# Patient Record
Sex: FEMALE | Race: WHITE | Employment: OTHER | URBAN - METROPOLITAN AREA
[De-identification: names, ages, dates, MRNs, and addresses within clinical notes are randomized per-mention and may not be internally consistent; named-entity substitution may affect disease eponyms.]

---

## 2017-05-11 ENCOUNTER — HOSPITAL ENCOUNTER (OUTPATIENT)
Dept: GENERAL RADIOLOGY | Age: 67
Discharge: HOME | End: 2017-05-11
Payer: MEDICARE

## 2017-05-11 ENCOUNTER — TELEPHONE (OUTPATIENT)
Dept: ORTHOPEDIC SURGERY | Age: 67
End: 2017-05-11

## 2017-05-11 ENCOUNTER — HOSPITAL ENCOUNTER (OUTPATIENT)
Age: 67
Discharge: HOME | End: 2017-05-11
Payer: MEDICARE

## 2017-05-11 DIAGNOSIS — M17.0 OSTEOARTHRITIS OF BOTH KNEES, UNSPECIFIED OSTEOARTHRITIS TYPE: ICD-10-CM

## 2017-05-11 PROCEDURE — 73562 X-RAY EXAM OF KNEE 3: CPT

## 2017-06-08 ENCOUNTER — OFFICE VISIT (OUTPATIENT)
Dept: ORTHOPEDIC SURGERY | Age: 67
End: 2017-06-08
Payer: MEDICARE

## 2017-06-08 DIAGNOSIS — Z96.653 STATUS POST BILATERAL UNICOMPARTMENTAL KNEE REPLACEMENT: ICD-10-CM

## 2017-06-08 DIAGNOSIS — M25.562 PAIN IN BOTH KNEES, UNSPECIFIED CHRONICITY: Primary | ICD-10-CM

## 2017-06-08 DIAGNOSIS — M25.561 PAIN IN BOTH KNEES, UNSPECIFIED CHRONICITY: Primary | ICD-10-CM

## 2017-06-08 DIAGNOSIS — M54.5 LOW BACK PAIN, UNSPECIFIED BACK PAIN LATERALITY, UNSPECIFIED CHRONICITY, WITH SCIATICA PRESENCE UNSPECIFIED: ICD-10-CM

## 2017-06-08 PROCEDURE — 1036F TOBACCO NON-USER: CPT | Performed by: ORTHOPAEDIC SURGERY

## 2017-06-08 PROCEDURE — 99203 OFFICE O/P NEW LOW 30 MIN: CPT | Performed by: ORTHOPAEDIC SURGERY

## 2017-06-08 PROCEDURE — 4040F PNEUMOC VAC/ADMIN/RCVD: CPT | Performed by: ORTHOPAEDIC SURGERY

## 2017-06-08 PROCEDURE — G8427 DOCREV CUR MEDS BY ELIG CLIN: HCPCS | Performed by: ORTHOPAEDIC SURGERY

## 2017-06-08 PROCEDURE — G8421 BMI NOT CALCULATED: HCPCS | Performed by: ORTHOPAEDIC SURGERY

## 2017-06-08 PROCEDURE — 1090F PRES/ABSN URINE INCON ASSESS: CPT | Performed by: ORTHOPAEDIC SURGERY

## 2017-06-08 PROCEDURE — 3014F SCREEN MAMMO DOC REV: CPT | Performed by: ORTHOPAEDIC SURGERY

## 2017-06-08 PROCEDURE — 1123F ACP DISCUSS/DSCN MKR DOCD: CPT | Performed by: ORTHOPAEDIC SURGERY

## 2017-06-08 PROCEDURE — G8400 PT W/DXA NO RESULTS DOC: HCPCS | Performed by: ORTHOPAEDIC SURGERY

## 2017-06-08 PROCEDURE — 3017F COLORECTAL CA SCREEN DOC REV: CPT | Performed by: ORTHOPAEDIC SURGERY

## 2017-06-08 RX ORDER — CARISOPRODOL 350 MG/1
350 TABLET ORAL 4 TIMES DAILY PRN
COMMUNITY
End: 2019-12-09

## 2017-06-08 RX ORDER — NITROGLYCERIN 0.4 MG/1
0.4 TABLET SUBLINGUAL EVERY 5 MIN PRN
COMMUNITY

## 2017-06-08 RX ORDER — FESOTERODINE FUMARATE 8 MG/1
TABLET, EXTENDED RELEASE ORAL
COMMUNITY
End: 2017-11-13

## 2017-06-08 RX ORDER — ALBUTEROL SULFATE 90 UG/1
2 AEROSOL, METERED RESPIRATORY (INHALATION) EVERY 6 HOURS PRN
COMMUNITY
End: 2021-02-26 | Stop reason: SDUPTHER

## 2017-06-13 ENCOUNTER — HOSPITAL ENCOUNTER (OUTPATIENT)
Dept: MRI IMAGING | Age: 67
Discharge: HOME | End: 2017-06-13
Payer: MEDICARE

## 2017-06-13 DIAGNOSIS — M54.5 LOW BACK PAIN, UNSPECIFIED BACK PAIN LATERALITY, UNSPECIFIED CHRONICITY, WITH SCIATICA PRESENCE UNSPECIFIED: ICD-10-CM

## 2017-06-13 PROCEDURE — 72148 MRI LUMBAR SPINE W/O DYE: CPT

## 2017-06-14 ENCOUNTER — TELEPHONE (OUTPATIENT)
Dept: ORTHOPEDIC SURGERY | Age: 67
End: 2017-06-14

## 2017-06-14 DIAGNOSIS — M54.16 SPINAL STENOSIS OF LUMBAR REGION WITH RADICULOPATHY: Primary | ICD-10-CM

## 2017-06-14 DIAGNOSIS — M48.061 SPINAL STENOSIS OF LUMBAR REGION WITH RADICULOPATHY: Primary | ICD-10-CM

## 2017-06-19 ENCOUNTER — OFFICE VISIT (OUTPATIENT)
Dept: ORTHOPEDIC SURGERY | Age: 67
End: 2017-06-19
Payer: MEDICARE

## 2017-06-19 DIAGNOSIS — M54.50 CHRONIC MIDLINE LOW BACK PAIN WITHOUT SCIATICA: Primary | ICD-10-CM

## 2017-06-19 DIAGNOSIS — M25.552 LEFT HIP PAIN: ICD-10-CM

## 2017-06-19 DIAGNOSIS — G89.29 CHRONIC MIDLINE LOW BACK PAIN WITHOUT SCIATICA: Primary | ICD-10-CM

## 2017-06-19 DIAGNOSIS — M16.12 ARTHRITIS OF LEFT HIP: ICD-10-CM

## 2017-06-19 PROCEDURE — G8427 DOCREV CUR MEDS BY ELIG CLIN: HCPCS | Performed by: ORTHOPAEDIC SURGERY

## 2017-06-19 PROCEDURE — 3017F COLORECTAL CA SCREEN DOC REV: CPT | Performed by: ORTHOPAEDIC SURGERY

## 2017-06-19 PROCEDURE — 4040F PNEUMOC VAC/ADMIN/RCVD: CPT | Performed by: ORTHOPAEDIC SURGERY

## 2017-06-19 PROCEDURE — G8400 PT W/DXA NO RESULTS DOC: HCPCS | Performed by: ORTHOPAEDIC SURGERY

## 2017-06-19 PROCEDURE — 1036F TOBACCO NON-USER: CPT | Performed by: ORTHOPAEDIC SURGERY

## 2017-06-19 PROCEDURE — G8421 BMI NOT CALCULATED: HCPCS | Performed by: ORTHOPAEDIC SURGERY

## 2017-06-19 PROCEDURE — 3014F SCREEN MAMMO DOC REV: CPT | Performed by: ORTHOPAEDIC SURGERY

## 2017-06-19 PROCEDURE — 99213 OFFICE O/P EST LOW 20 MIN: CPT | Performed by: ORTHOPAEDIC SURGERY

## 2017-06-19 PROCEDURE — 1123F ACP DISCUSS/DSCN MKR DOCD: CPT | Performed by: ORTHOPAEDIC SURGERY

## 2017-06-19 PROCEDURE — 1090F PRES/ABSN URINE INCON ASSESS: CPT | Performed by: ORTHOPAEDIC SURGERY

## 2017-06-21 ENCOUNTER — HOSPITAL ENCOUNTER (OUTPATIENT)
Dept: MRI IMAGING | Age: 67
Discharge: HOME | End: 2017-06-21
Payer: MEDICARE

## 2017-06-21 DIAGNOSIS — M54.50 CHRONIC MIDLINE LOW BACK PAIN WITHOUT SCIATICA: ICD-10-CM

## 2017-06-21 DIAGNOSIS — M16.12 ARTHRITIS OF LEFT HIP: ICD-10-CM

## 2017-06-21 DIAGNOSIS — G89.29 CHRONIC MIDLINE LOW BACK PAIN WITHOUT SCIATICA: ICD-10-CM

## 2017-06-21 DIAGNOSIS — M25.552 LEFT HIP PAIN: ICD-10-CM

## 2017-06-21 PROCEDURE — 73721 MRI JNT OF LWR EXTRE W/O DYE: CPT

## 2017-06-27 ENCOUNTER — HOSPITAL ENCOUNTER (OUTPATIENT)
Dept: INTERVENTIONAL RADIOLOGY/VASCULAR | Age: 67
Discharge: HOME | End: 2017-06-27
Payer: MEDICARE

## 2017-06-27 ENCOUNTER — OFFICE VISIT (OUTPATIENT)
Dept: ORTHOPEDIC SURGERY | Age: 67
End: 2017-06-27
Payer: MEDICARE

## 2017-06-27 DIAGNOSIS — G89.29 CHRONIC MIDLINE LOW BACK PAIN WITHOUT SCIATICA: Primary | ICD-10-CM

## 2017-06-27 DIAGNOSIS — M16.12 ARTHRITIS OF LEFT HIP: ICD-10-CM

## 2017-06-27 DIAGNOSIS — M25.552 LEFT HIP PAIN: ICD-10-CM

## 2017-06-27 DIAGNOSIS — M54.50 CHRONIC MIDLINE LOW BACK PAIN WITHOUT SCIATICA: ICD-10-CM

## 2017-06-27 DIAGNOSIS — M54.50 CHRONIC MIDLINE LOW BACK PAIN WITHOUT SCIATICA: Primary | ICD-10-CM

## 2017-06-27 DIAGNOSIS — G89.29 CHRONIC MIDLINE LOW BACK PAIN WITHOUT SCIATICA: ICD-10-CM

## 2017-06-27 PROCEDURE — 6360000004 HC RX CONTRAST MEDICATION: Performed by: ORTHOPAEDIC SURGERY

## 2017-06-27 PROCEDURE — 1036F TOBACCO NON-USER: CPT | Performed by: ORTHOPAEDIC SURGERY

## 2017-06-27 PROCEDURE — G8400 PT W/DXA NO RESULTS DOC: HCPCS | Performed by: ORTHOPAEDIC SURGERY

## 2017-06-27 PROCEDURE — 3017F COLORECTAL CA SCREEN DOC REV: CPT | Performed by: ORTHOPAEDIC SURGERY

## 2017-06-27 PROCEDURE — 2500000003 HC RX 250 WO HCPCS: Performed by: RADIOLOGY

## 2017-06-27 PROCEDURE — G8427 DOCREV CUR MEDS BY ELIG CLIN: HCPCS | Performed by: ORTHOPAEDIC SURGERY

## 2017-06-27 PROCEDURE — 1123F ACP DISCUSS/DSCN MKR DOCD: CPT | Performed by: ORTHOPAEDIC SURGERY

## 2017-06-27 PROCEDURE — 4040F PNEUMOC VAC/ADMIN/RCVD: CPT | Performed by: ORTHOPAEDIC SURGERY

## 2017-06-27 PROCEDURE — 2500000003 HC RX 250 WO HCPCS: Performed by: ORTHOPAEDIC SURGERY

## 2017-06-27 PROCEDURE — 99213 OFFICE O/P EST LOW 20 MIN: CPT | Performed by: ORTHOPAEDIC SURGERY

## 2017-06-27 PROCEDURE — 20610 DRAIN/INJ JOINT/BURSA W/O US: CPT | Performed by: RADIOLOGY

## 2017-06-27 PROCEDURE — 3014F SCREEN MAMMO DOC REV: CPT | Performed by: ORTHOPAEDIC SURGERY

## 2017-06-27 PROCEDURE — 77002 NEEDLE LOCALIZATION BY XRAY: CPT | Performed by: RADIOLOGY

## 2017-06-27 PROCEDURE — 1090F PRES/ABSN URINE INCON ASSESS: CPT | Performed by: ORTHOPAEDIC SURGERY

## 2017-06-27 PROCEDURE — 6360000002 HC RX W HCPCS: Performed by: ORTHOPAEDIC SURGERY

## 2017-06-27 PROCEDURE — G8421 BMI NOT CALCULATED: HCPCS | Performed by: ORTHOPAEDIC SURGERY

## 2017-06-27 RX ORDER — LIDOCAINE HYDROCHLORIDE 10 MG/ML
INJECTION, SOLUTION INFILTRATION; PERINEURAL
Status: COMPLETED | OUTPATIENT
Start: 2017-06-27 | End: 2017-06-27

## 2017-06-27 RX ORDER — BUPIVACAINE HYDROCHLORIDE 5 MG/ML
4 INJECTION, SOLUTION EPIDURAL; INTRACAUDAL ONCE
Status: COMPLETED | OUTPATIENT
Start: 2017-06-27 | End: 2017-06-27

## 2017-06-27 RX ORDER — METHYLPREDNISOLONE ACETATE 80 MG/ML
80 INJECTION, SUSPENSION INTRA-ARTICULAR; INTRALESIONAL; INTRAMUSCULAR; SOFT TISSUE ONCE
Status: COMPLETED | OUTPATIENT
Start: 2017-06-27 | End: 2017-06-27

## 2017-06-27 RX ORDER — LIDOCAINE HYDROCHLORIDE 10 MG/ML
4 INJECTION, SOLUTION EPIDURAL; INFILTRATION; INTRACAUDAL; PERINEURAL ONCE
Status: COMPLETED | OUTPATIENT
Start: 2017-06-27 | End: 2017-06-27

## 2017-06-27 RX ADMIN — METHYLPREDNISOLONE ACETATE 80 MG: 80 INJECTION, SUSPENSION INTRA-ARTICULAR; INTRALESIONAL; INTRAMUSCULAR; SOFT TISSUE at 13:01

## 2017-06-27 RX ADMIN — LIDOCAINE HYDROCHLORIDE 3 ML: 10 INJECTION, SOLUTION INFILTRATION; PERINEURAL at 12:57

## 2017-06-27 RX ADMIN — IOTHALAMATE MEGLUMINE 50 ML: 430 INJECTION INTRAVASCULAR at 13:08

## 2017-06-27 RX ADMIN — LIDOCAINE HYDROCHLORIDE 4 ML: 10 INJECTION, SOLUTION EPIDURAL; INFILTRATION; INTRACAUDAL; PERINEURAL at 13:01

## 2017-06-27 RX ADMIN — BUPIVACAINE HYDROCHLORIDE 20 MG: 5 INJECTION, SOLUTION EPIDURAL; INTRACAUDAL at 13:01

## 2017-06-27 ASSESSMENT — PAIN SCALES - GENERAL: PAINLEVEL_OUTOF10: 0

## 2017-07-25 ENCOUNTER — OFFICE VISIT (OUTPATIENT)
Dept: ORTHOPEDIC SURGERY | Age: 67
End: 2017-07-25
Payer: MEDICARE

## 2017-07-25 DIAGNOSIS — M54.50 CHRONIC MIDLINE LOW BACK PAIN WITHOUT SCIATICA: ICD-10-CM

## 2017-07-25 DIAGNOSIS — M25.552 LEFT HIP PAIN: Primary | ICD-10-CM

## 2017-07-25 DIAGNOSIS — G89.29 CHRONIC MIDLINE LOW BACK PAIN WITHOUT SCIATICA: ICD-10-CM

## 2017-07-25 DIAGNOSIS — M16.12 ARTHRITIS OF LEFT HIP: ICD-10-CM

## 2017-07-25 DIAGNOSIS — M48.061 SPINAL STENOSIS, LUMBAR REGION, WITHOUT NEUROGENIC CLAUDICATION: ICD-10-CM

## 2017-07-25 PROCEDURE — 1123F ACP DISCUSS/DSCN MKR DOCD: CPT | Performed by: ORTHOPAEDIC SURGERY

## 2017-07-25 PROCEDURE — 3014F SCREEN MAMMO DOC REV: CPT | Performed by: ORTHOPAEDIC SURGERY

## 2017-07-25 PROCEDURE — G8400 PT W/DXA NO RESULTS DOC: HCPCS | Performed by: ORTHOPAEDIC SURGERY

## 2017-07-25 PROCEDURE — 3017F COLORECTAL CA SCREEN DOC REV: CPT | Performed by: ORTHOPAEDIC SURGERY

## 2017-07-25 PROCEDURE — G8421 BMI NOT CALCULATED: HCPCS | Performed by: ORTHOPAEDIC SURGERY

## 2017-07-25 PROCEDURE — 4040F PNEUMOC VAC/ADMIN/RCVD: CPT | Performed by: ORTHOPAEDIC SURGERY

## 2017-07-25 PROCEDURE — 1036F TOBACCO NON-USER: CPT | Performed by: ORTHOPAEDIC SURGERY

## 2017-07-25 PROCEDURE — G8427 DOCREV CUR MEDS BY ELIG CLIN: HCPCS | Performed by: ORTHOPAEDIC SURGERY

## 2017-07-25 PROCEDURE — 1090F PRES/ABSN URINE INCON ASSESS: CPT | Performed by: ORTHOPAEDIC SURGERY

## 2017-07-25 PROCEDURE — 99213 OFFICE O/P EST LOW 20 MIN: CPT | Performed by: ORTHOPAEDIC SURGERY

## 2017-08-14 ENCOUNTER — HOSPITAL ENCOUNTER (OUTPATIENT)
Dept: PAIN MANAGEMENT | Age: 67
Discharge: HOME | End: 2017-08-14
Payer: MEDICARE

## 2017-08-14 VITALS
SYSTOLIC BLOOD PRESSURE: 138 MMHG | DIASTOLIC BLOOD PRESSURE: 70 MMHG | WEIGHT: 210 LBS | TEMPERATURE: 98 F | OXYGEN SATURATION: 98 % | BODY MASS INDEX: 34.99 KG/M2 | HEART RATE: 58 BPM | HEIGHT: 65 IN | RESPIRATION RATE: 20 BRPM

## 2017-08-14 DIAGNOSIS — M54.16 LUMBAR RADICULOPATHY: Primary | ICD-10-CM

## 2017-08-14 DIAGNOSIS — M47.817 LUMBOSACRAL SPONDYLOSIS WITHOUT MYELOPATHY: ICD-10-CM

## 2017-08-14 DIAGNOSIS — M46.1 SACROILIITIS (HCC): ICD-10-CM

## 2017-08-14 DIAGNOSIS — M16.12 PRIMARY OSTEOARTHRITIS OF LEFT HIP: ICD-10-CM

## 2017-08-14 DIAGNOSIS — M48.061 DEGENERATIVE LUMBAR SPINAL STENOSIS: ICD-10-CM

## 2017-08-14 DIAGNOSIS — M51.36 DDD (DEGENERATIVE DISC DISEASE), LUMBAR: ICD-10-CM

## 2017-08-14 PROCEDURE — 80307 DRUG TEST PRSMV CHEM ANLYZR: CPT

## 2017-08-14 PROCEDURE — 99203 OFFICE O/P NEW LOW 30 MIN: CPT | Performed by: PAIN MEDICINE

## 2017-08-14 PROCEDURE — 99203 OFFICE O/P NEW LOW 30 MIN: CPT

## 2017-08-14 RX ORDER — ASPIRIN 81 MG/1
TABLET, CHEWABLE ORAL
COMMUNITY
End: 2017-08-14

## 2017-08-14 RX ORDER — ATORVASTATIN CALCIUM 40 MG/1
40 TABLET, FILM COATED ORAL DAILY
COMMUNITY
Start: 2017-07-02

## 2017-08-14 ASSESSMENT — ENCOUNTER SYMPTOMS
NAUSEA: 0
VOMITING: 0
COUGH: 0
SHORTNESS OF BREATH: 1
BACK PAIN: 1
CONSTIPATION: 1
DIARRHEA: 1
HEMOPTYSIS: 0
BLURRED VISION: 0
PHOTOPHOBIA: 0
DOUBLE VISION: 0
HEARTBURN: 1

## 2017-08-14 ASSESSMENT — PAIN DESCRIPTION - ONSET: ONSET: ON-GOING

## 2017-08-14 ASSESSMENT — PAIN DESCRIPTION - PROGRESSION: CLINICAL_PROGRESSION: NOT CHANGED

## 2017-08-14 ASSESSMENT — PAIN DESCRIPTION - ORIENTATION: ORIENTATION: LEFT

## 2017-08-14 ASSESSMENT — PAIN SCALES - GENERAL: PAINLEVEL_OUTOF10: 8

## 2017-08-14 ASSESSMENT — PAIN DESCRIPTION - PAIN TYPE: TYPE: CHRONIC PAIN

## 2017-08-14 ASSESSMENT — PAIN DESCRIPTION - DESCRIPTORS: DESCRIPTORS: ACHING;CONSTANT

## 2017-08-14 ASSESSMENT — PAIN DESCRIPTION - LOCATION: LOCATION: BACK;LEG;HIP

## 2017-08-14 ASSESSMENT — PAIN DESCRIPTION - FREQUENCY: FREQUENCY: CONTINUOUS

## 2017-08-18 LAB
6-ACETYLMORPHINE, UR: NOT DETECTED
7-AMINOCLONAZEPAM, URINE: NOT DETECTED
ALPHA-OH-ALPRAZ, URINE: NOT DETECTED
ALPRAZOLAM, URINE: NOT DETECTED
AMPHETAMINES, URINE: NOT DETECTED
BARBITURATES, URINE: NOT DETECTED
BENZOYLECGONINE, UR: NOT DETECTED
BUPRENORPHINE URINE: NOT DETECTED
CARISOPRODOL, UR: NOT DETECTED
CLONAZEPAM, URINE: NOT DETECTED
CODEINE, URINE: NOT DETECTED
CREATININE URINE: 217.6 MG/DL (ref 20–400)
DIAZEPAM, URINE: NOT DETECTED
DRUGS EXPECTED, UR: NORMAL
EER HI RES INTERP UR: NORMAL
ETHYL GLUCURONIDE UR: NOT DETECTED
FENTANYL URINE: NOT DETECTED
HYDROCODONE, URINE: NOT DETECTED
HYDROMORPHONE, URINE: NOT DETECTED
LORAZEPAM, URINE: NOT DETECTED
MARIJUANA METAB, UR: NOT DETECTED
MDA, UR: NOT DETECTED
MDEA, EVE, UR: NOT DETECTED
MDMA URINE: NOT DETECTED
MEPERIDINE METAB, UR: NOT DETECTED
METHADONE, URINE: NOT DETECTED
METHAMPHETAMINE, URINE: NOT DETECTED
METHYLPHENIDATE: NOT DETECTED
MIDAZOLAM, URINE: NOT DETECTED
MORPHINE URINE: NOT DETECTED
NORBUPRENORPHINE, URINE: NOT DETECTED
NORDIAZEPAM, URINE: NOT DETECTED
NORFENTANYL, URINE: NOT DETECTED
NORHYDROCODONE, URINE: NOT DETECTED
NOROXYCODONE, URINE: NOT DETECTED
NOROXYMORPHONE, URINE: NOT DETECTED
OXAZEPAM, URINE: NOT DETECTED
OXYCODONE URINE: NOT DETECTED
OXYMORPHONE, URINE: NOT DETECTED
PAIN MANAGEMENT DRUG PANEL INTERP, URINE: NORMAL
PAIN MGT DRUG PANEL, HI RES, UR: NORMAL
PCP,URINE: NOT DETECTED
PHENTERMINE, UR: NOT DETECTED
PROPOXYPHENE, URINE: NOT DETECTED
TAPENTADOL, URINE: NOT DETECTED
TAPENTADOL-O-SULFATE, URINE: NOT DETECTED
TEMAZEPAM, URINE: PRESENT
TRAMADOL, URINE: NOT DETECTED
ZOLPIDEM, URINE: NOT DETECTED

## 2017-10-09 ENCOUNTER — HOSPITAL ENCOUNTER (OUTPATIENT)
Dept: GENERAL RADIOLOGY | Age: 67
Discharge: HOME | End: 2017-10-09
Payer: MEDICARE

## 2017-10-09 ENCOUNTER — HOSPITAL ENCOUNTER (OUTPATIENT)
Dept: PAIN MANAGEMENT | Age: 67
Discharge: HOME | End: 2017-10-09
Payer: MEDICARE

## 2017-10-09 VITALS
SYSTOLIC BLOOD PRESSURE: 158 MMHG | HEIGHT: 66 IN | TEMPERATURE: 98.6 F | HEART RATE: 67 BPM | DIASTOLIC BLOOD PRESSURE: 71 MMHG | OXYGEN SATURATION: 95 % | WEIGHT: 212 LBS | RESPIRATION RATE: 16 BRPM | BODY MASS INDEX: 34.07 KG/M2

## 2017-10-09 DIAGNOSIS — M48.061 DEGENERATIVE LUMBAR SPINAL STENOSIS: ICD-10-CM

## 2017-10-09 DIAGNOSIS — M47.817 LUMBOSACRAL SPONDYLOSIS WITHOUT MYELOPATHY: ICD-10-CM

## 2017-10-09 DIAGNOSIS — R52 PAIN: ICD-10-CM

## 2017-10-09 DIAGNOSIS — M54.16 LUMBAR RADICULOPATHY: Primary | ICD-10-CM

## 2017-10-09 DIAGNOSIS — M51.36 DDD (DEGENERATIVE DISC DISEASE), LUMBAR: ICD-10-CM

## 2017-10-09 PROCEDURE — 6360000002 HC RX W HCPCS

## 2017-10-09 PROCEDURE — 3209999900 FLUORO FOR SURGICAL PROCEDURES

## 2017-10-09 PROCEDURE — 6360000004 HC RX CONTRAST MEDICATION

## 2017-10-09 PROCEDURE — 62323 NJX INTERLAMINAR LMBR/SAC: CPT | Performed by: PAIN MEDICINE

## 2017-10-09 PROCEDURE — 62327 NJX INTERLAMINAR LMBR/SAC: CPT

## 2017-10-09 RX ORDER — TROSPIUM CHLORIDE ER 60 MG/1
60 CAPSULE ORAL
COMMUNITY
Start: 2017-08-18 | End: 2017-11-13

## 2017-10-09 RX ORDER — HYDROCODONE BITARTRATE AND ACETAMINOPHEN 7.5; 325 MG/1; MG/1
1 TABLET ORAL EVERY 8 HOURS PRN
COMMUNITY
Start: 2017-08-18 | End: 2021-01-12 | Stop reason: ALTCHOICE

## 2017-10-09 RX ORDER — LEVOTHYROXINE SODIUM 112 UG/1
TABLET ORAL
COMMUNITY
Start: 2017-10-02 | End: 2021-09-23 | Stop reason: SDUPTHER

## 2017-10-09 ASSESSMENT — PAIN - FUNCTIONAL ASSESSMENT
PAIN_FUNCTIONAL_ASSESSMENT: 0-10
PAIN_FUNCTIONAL_ASSESSMENT: 0-10

## 2017-10-09 NOTE — PROCEDURES
Postoperative Diagnosis:    1. Lumbar radiculopathy    2. DDD (degenerative disc disease), lumbar    3. Lumbosacral spondylosis without myelopathy    4. Degenerative lumbar spinal stenosis        Procedure Performed:  Lumbar epidural steroid injection under fluoroscopy guidance without IV sedation. Indication for the Procedure: The patient failed conservative management  for pain in the low back radiating to lower extremities. As the patient is not responding to conservative management and it is interfering with activities of daily living we decided to proceed with lumbar epidural steroid injection. The procedure and risks were discussed with the patient and an informed consent was obtained  Current Pain Assessment  Pain Assessment  Pain Assessment: 0-10  Pain Level: 7  Pain Type: Chronic pain  Pain Location: Back, Leg  Pain Orientation: Right, Left  Pain Radiating Towards: bilat. legs  Pain Descriptors: Constant, Aching, Shooting, Spasm, Tingling  Pain Frequency: Continuous  Pain Onset: On-going  Clinical Progression: Gradually worsening  Effect of Pain on Daily Activities: Unable to sit/walk w/o having pain & pain w/laying down  Patient's Stated Pain Goal: 2 (decrease pain increase activiity)  Pain Intervention(s): Medication (see eMar), Rest, Repositioned  Response to Pain Intervention:  (Left hip injection 3 months ago  didn't left)     Procedure:     Patient's vital signs including BP, EKG and SaO2 were monitored by the RN and they remained stable during the procedure. A meaningful communication was kept up with the patient throughout  the procedure. The patient is placed in prone position. Skin over the back was prepped and draped in sterile manner. Then using fluoroscopy the L4, L5 interspace was observed and the skin and deep tissues in the left paramedian area were infiltrated with 5 ml of 1% lidocaine.  The #20-gauge, 3-1/2 inch Tuohy needle was inserted through the skin wheal and the epidural space was identified using loss of resistance technique with normal saline. This was confirmed with AP and lateral views using fluoroscopy after injecting about 2 ml of Omnipaque-180 and observing the spread of the contrast in the epidural space. Then after negative aspiration a total of 80 mg of triamcinolone with 8 ml of normal saline was injected into the epidural space. The needle is removed and a Band-Aid was placed over the needle insertion site. Patient's vital signs remained stable and the patient tolerated the procedure well. The patient was discharged home in stable condition and will be followed in the pain clinic in the next few weeks for further planning.     Electronically signed by Alexa Marsh MD on 10/9/2017 at 10:59 AM

## 2017-10-09 NOTE — PROGRESS NOTES
Discharge criteria met. Patient alert and oriented x3  Post procedure dressing dry and intact. Sensory and motor function intact as per pre-procedure. Instructions and follow up reviewed with pt at patient at discharge.   Patient discharged ambulatory @ [phone]

## 2017-10-10 ENCOUNTER — TELEPHONE (OUTPATIENT)
Dept: PAIN MANAGEMENT | Age: 67
End: 2017-10-10

## 2017-11-01 ENCOUNTER — HOSPITAL ENCOUNTER (OUTPATIENT)
Dept: PAIN MANAGEMENT | Age: 67
Discharge: HOME | End: 2017-11-01
Payer: MEDICARE

## 2017-11-01 VITALS
WEIGHT: 212 LBS | SYSTOLIC BLOOD PRESSURE: 151 MMHG | HEIGHT: 66 IN | DIASTOLIC BLOOD PRESSURE: 75 MMHG | TEMPERATURE: 98.2 F | BODY MASS INDEX: 34.07 KG/M2 | OXYGEN SATURATION: 97 % | HEART RATE: 62 BPM

## 2017-11-01 DIAGNOSIS — M16.12 PRIMARY OSTEOARTHRITIS OF LEFT HIP: ICD-10-CM

## 2017-11-01 DIAGNOSIS — M46.1 SACROILIITIS (HCC): ICD-10-CM

## 2017-11-01 DIAGNOSIS — M51.36 DDD (DEGENERATIVE DISC DISEASE), LUMBAR: Primary | ICD-10-CM

## 2017-11-01 DIAGNOSIS — M48.061 SPINAL STENOSIS, LUMBAR REGION, WITHOUT NEUROGENIC CLAUDICATION: ICD-10-CM

## 2017-11-01 DIAGNOSIS — M47.817 LUMBOSACRAL SPONDYLOSIS WITHOUT MYELOPATHY: ICD-10-CM

## 2017-11-01 DIAGNOSIS — M48.061 DEGENERATIVE LUMBAR SPINAL STENOSIS: ICD-10-CM

## 2017-11-01 PROCEDURE — 99213 OFFICE O/P EST LOW 20 MIN: CPT

## 2017-11-01 PROCEDURE — 99213 OFFICE O/P EST LOW 20 MIN: CPT | Performed by: NURSE PRACTITIONER

## 2017-11-01 ASSESSMENT — ENCOUNTER SYMPTOMS
GASTROINTESTINAL NEGATIVE: 1
BACK PAIN: 1

## 2017-11-01 NOTE — PROGRESS NOTES
Pulmonary valve stenosis     mild/congenital per chart    Thyroid disease 2004    HYPOTHYROIDISM ON RX    Wears glasses        Past Surgical History:   Procedure Laterality Date    CERVICAL FUSION  1993    CORONARY ANGIOPLASTY WITH STENT PLACEMENT  2000    1 STENT    FINGER SURGERY Left 03/21/2014    [place] ARTHOPLASTY LEFT THUMB    HYSTERECTOMY  1980    WITH RT SALPINGOOPHERECTOMY    JOINT REPLACEMENT Bilateral 1994    PARTIAL-KNEES    OTHER SURGICAL HISTORY Right 09/23/2014    thumb repair    SALPINGO-OOPHORECTOMY Left 1987    SHOULDER SURGERY Left 1993    SPURS    TOE OSTEOTOMY Right 6/8/2016    Right 5th digit adductory wedge osteotomy with K wire fixation        Allergies   Allergen Reactions    Pcn [Penicillins] Swelling and Hives    Heparin Other (See Comments)     MIGRAINE      Cyclobenzaprine     Heparin (Porcine)      Other reaction(s): Migraine    Other      PORCINE PRODUCTS         Current Outpatient Prescriptions:     levothyroxine (SYNTHROID) 112 MCG tablet, TAKE ONE TABLET BY MOUTH DAILY, Disp: , Rfl:     HYDROcodone-acetaminophen (NORCO) 7.5-325 MG per tablet, Take 1 tablet by mouth ., Disp: , Rfl:     atorvastatin (LIPITOR) 40 MG tablet, , Disp: , Rfl:     albuterol sulfate  (90 BASE) MCG/ACT inhaler, Inhale 2 puffs into the lungs every 6 hours as needed for Wheezing, Disp: , Rfl:     pramipexole (MIRAPEX) 0.125 MG tablet, Take 0.125 mg by mouth daily, Disp: , Rfl:     Cholecalciferol (VITAMIN D3) 2000 UNITS CAPS, Take 1 capsule by mouth daily. , Disp: , Rfl:     celecoxib (CELEBREX) 200 MG capsule, Take 200 mg by mouth 2 times daily. , Disp: , Rfl:     Multiple Vitamins-Minerals (THERAPEUTIC MULTIVITAMIN-MINERALS) tablet, Take 1 tablet by mouth daily. , Disp: , Rfl:     aspirin 81 MG EC tablet, Take 81 mg by mouth daily. LAST DOSE 9/8/14, Disp: , Rfl:     metoprolol (TOPROL-XL) 100 MG XL tablet, Take 100 mg by mouth daily. , Disp: , Rfl:     FLUoxetine (PROZAC) 20 MG 2. Probable mild left acetabular labral degeneration. 3. Mild edema in the left greater trochanteric bursa/inferior left gluteus   medius muscle, nonspecific. 4. No evidence for left-sided ARMANDO.         Study: Lumbosacral spine, 3 views       Clinical Indication: Lumbar radiculopathy       Comparison: 3 view examination of the lumbosacral spine from 3/13/09       Technique: AP, lateral and lateral coned-down views lungs are spine submitted       Findings: Hypoplastic T12 ribs. 5 lumbar vertebral bodies (with probable lumbarization first sacral segment) without malalignment or vertebral body height loss. Again noted is mild intervertebral disc space narrowing L4/L5, and mild spondylitic changes    throughout, with some facet arthropathy, the latter best appreciated at L4-L5 and L5-S1.       No destructive or blastic lesion. Pedicles and paravertebral soft tissue structures unremarkable. SI joints patent. Some aortic vascular calcifications without obvious AAA.       Impression: No acute abnormality. Similar findings to those seen on 3/13/09.       Final report electronically signed by Beatriz Manzo M.D. on 12/1/2015 5:22 PM           Assessment:    Problem List Items Addressed This Visit     Degenerative lumbar spinal stenosis    DDD (degenerative disc disease), lumbar - Primary    Lumbosacral spondylosis without myelopathy    Primary osteoarthritis of left hip    Sacroiliitis ([facility] [address]    Relevant Orders    VA INJECT FOR SACROILIAC JOINT      Other Visit Diagnoses    None. Treatment Plan:  DISCUSSION: Treatment options discussed with patient and all questions answered to patient's satisfaction.     Will schedule for right si joint injection, she has tenderness both si joints, worse on right, positive patricks sign  TREATMENT OPTIONS:   See ASAP  Right si joint injection

## 2017-11-13 ENCOUNTER — HOSPITAL ENCOUNTER (OUTPATIENT)
Dept: PAIN MANAGEMENT | Age: 67
Discharge: HOME | End: 2017-11-13
Payer: MEDICARE

## 2017-11-13 ENCOUNTER — HOSPITAL ENCOUNTER (OUTPATIENT)
Dept: GENERAL RADIOLOGY | Age: 67
Discharge: HOME | End: 2017-11-13
Payer: MEDICARE

## 2017-11-13 VITALS
WEIGHT: 212 LBS | TEMPERATURE: 97.9 F | RESPIRATION RATE: 18 BRPM | DIASTOLIC BLOOD PRESSURE: 76 MMHG | OXYGEN SATURATION: 98 % | BODY MASS INDEX: 34.07 KG/M2 | HEIGHT: 66 IN | HEART RATE: 66 BPM | SYSTOLIC BLOOD PRESSURE: 146 MMHG

## 2017-11-13 DIAGNOSIS — M46.1 SACROILIITIS (HCC): Primary | ICD-10-CM

## 2017-11-13 DIAGNOSIS — R52 PAIN: ICD-10-CM

## 2017-11-13 PROCEDURE — 6360000004 HC RX CONTRAST MEDICATION

## 2017-11-13 PROCEDURE — 6360000002 HC RX W HCPCS

## 2017-11-13 PROCEDURE — G0260 INJ FOR SACROILIAC JT ANESTH: HCPCS

## 2017-11-13 PROCEDURE — 3209999900 FLUORO FOR SURGICAL PROCEDURES

## 2017-11-13 PROCEDURE — 27096 INJECT SACROILIAC JOINT: CPT | Performed by: PAIN MEDICINE

## 2017-11-13 ASSESSMENT — PAIN DESCRIPTION - PROGRESSION: CLINICAL_PROGRESSION: GRADUALLY WORSENING

## 2017-11-13 ASSESSMENT — PAIN DESCRIPTION - DESCRIPTORS: DESCRIPTORS: ACHING;SHOOTING;SPASM

## 2017-11-13 ASSESSMENT — PAIN SCALES - GENERAL: PAINLEVEL_OUTOF10: 6

## 2017-11-13 ASSESSMENT — PAIN DESCRIPTION - ONSET: ONSET: ON-GOING

## 2017-11-13 ASSESSMENT — PAIN DESCRIPTION - PAIN TYPE: TYPE: CHRONIC PAIN

## 2017-11-13 ASSESSMENT — PAIN DESCRIPTION - FREQUENCY: FREQUENCY: CONTINUOUS

## 2017-11-13 ASSESSMENT — PAIN - FUNCTIONAL ASSESSMENT: PAIN_FUNCTIONAL_ASSESSMENT: 0-10

## 2017-11-13 ASSESSMENT — PAIN DESCRIPTION - ORIENTATION: ORIENTATION: RIGHT;LEFT;LOWER

## 2017-11-13 ASSESSMENT — PAIN DESCRIPTION - LOCATION: LOCATION: BACK

## 2017-11-13 NOTE — PROGRESS NOTES
Discharge criteria met. Post procedure dressing dry and intact. Sensory and motor function intact as per pre-procedure. Patient alert and oriented x3  Instructions and follow up reviewed with pt at patient at discharge. Discharged home transported by wheelchair, accompanied by family .   Discharged @ [phone]

## 2017-11-29 ENCOUNTER — HOSPITAL ENCOUNTER (OUTPATIENT)
Dept: PAIN MANAGEMENT | Age: 67
Discharge: HOME | End: 2017-11-29
Payer: MEDICARE

## 2017-11-29 VITALS
DIASTOLIC BLOOD PRESSURE: 74 MMHG | TEMPERATURE: 97.9 F | WEIGHT: 212 LBS | OXYGEN SATURATION: 97 % | RESPIRATION RATE: 16 BRPM | SYSTOLIC BLOOD PRESSURE: 138 MMHG | HEIGHT: 66 IN | BODY MASS INDEX: 34.07 KG/M2 | HEART RATE: 62 BPM

## 2017-11-29 DIAGNOSIS — M51.36 DDD (DEGENERATIVE DISC DISEASE), LUMBAR: Primary | ICD-10-CM

## 2017-11-29 DIAGNOSIS — M70.61 TROCHANTERIC BURSITIS OF RIGHT HIP: ICD-10-CM

## 2017-11-29 DIAGNOSIS — M48.061 DEGENERATIVE LUMBAR SPINAL STENOSIS: ICD-10-CM

## 2017-11-29 DIAGNOSIS — M47.817 LUMBOSACRAL SPONDYLOSIS WITHOUT MYELOPATHY: ICD-10-CM

## 2017-11-29 DIAGNOSIS — M48.061 SPINAL STENOSIS, LUMBAR REGION, WITHOUT NEUROGENIC CLAUDICATION: ICD-10-CM

## 2017-11-29 DIAGNOSIS — M16.11 PRIMARY OSTEOARTHRITIS OF RIGHT HIP: ICD-10-CM

## 2017-11-29 DIAGNOSIS — M46.1 SACROILIITIS (HCC): ICD-10-CM

## 2017-11-29 DIAGNOSIS — M54.16 LUMBAR RADICULOPATHY: ICD-10-CM

## 2017-11-29 PROCEDURE — 99213 OFFICE O/P EST LOW 20 MIN: CPT

## 2017-11-29 PROCEDURE — 99214 OFFICE O/P EST MOD 30 MIN: CPT | Performed by: PAIN MEDICINE

## 2017-11-29 ASSESSMENT — PAIN SCALES - GENERAL: PAINLEVEL_OUTOF10: 7

## 2017-11-29 ASSESSMENT — PAIN DESCRIPTION - DESCRIPTORS: DESCRIPTORS: ACHING;SPASM;SHOOTING

## 2017-11-29 ASSESSMENT — ENCOUNTER SYMPTOMS
DOUBLE VISION: 0
BACK PAIN: 1
HEMOPTYSIS: 0
SHORTNESS OF BREATH: 1
PHOTOPHOBIA: 0
NAUSEA: 0
BLURRED VISION: 0
EYES NEGATIVE: 1
GASTROINTESTINAL NEGATIVE: 1
COUGH: 0
VOMITING: 0

## 2017-11-29 ASSESSMENT — PAIN DESCRIPTION - ORIENTATION: ORIENTATION: RIGHT;LOWER

## 2017-11-29 ASSESSMENT — PAIN DESCRIPTION - DIRECTION: RADIATING_TOWARDS: RIGHT LEG

## 2017-11-29 ASSESSMENT — PAIN DESCRIPTION - ONSET: ONSET: ON-GOING

## 2017-11-29 ASSESSMENT — PAIN DESCRIPTION - FREQUENCY: FREQUENCY: CONTINUOUS

## 2017-11-29 ASSESSMENT — PAIN DESCRIPTION - LOCATION: LOCATION: BACK;LEG

## 2017-11-29 ASSESSMENT — PAIN DESCRIPTION - PAIN TYPE: TYPE: CHRONIC PAIN

## 2017-11-29 ASSESSMENT — PAIN DESCRIPTION - PROGRESSION: CLINICAL_PROGRESSION: GRADUALLY WORSENING

## 2017-11-29 NOTE — PROGRESS NOTES
[facility] Pain Management  Patient Pain Assessment  Consultation - No att. providers found    Primary Care Physician: Linda Tello DO    Chief complaint:   Chief Complaint   Patient presents with    Lower Back Pain     down both legs   . HISTORY OF PRESENT ILLNESS:  Sherif Lopez is 79 y.o. female with    Patient attended pain clinic with a chief complaint of pain involving the low back with pain radiating to the right lower extremity. Patient had lumbar epidural steroid injection which did help the pain in the left lower extremity but continued to have back pain her back pain is more on the right side at this time. Patient reports her pain at times radiates towards the right groin. She is also complaining of increased spasms. She had undergone right SI joint injection without much pain relief on 11/13/2017 patient is complaining of spasms in her right leg as well as in the low back and the spasms are lasting longer at this time. Back Pain   The current episode started more than 1 month ago. The problem occurs constantly (varies in intensity). The problem has been gradually worsening since onset. The pain is present in the lumbar spine and sacro-iliac. The quality of the pain is described as aching and cramping (throbbing,shooting pain to groin). The pain radiates to the left foot. The pain is at a severity of 8/10. The pain is the same all the time. The symptoms are aggravated by standing, sitting, twisting, lying down and coughing (walking, ADLs,). Stiffness is present in the morning. Associated symptoms include numbness, paresthesias and weakness. Pertinent negatives include no chest pain, dysuria, fever, headaches, tingling or weight loss.        OARRS compliant? not applicable  Concern for prescription abuse?not applicable    Current Pain Assessment  Pain Assessment  Pain Assessment: 0-10  Pain Level: 7  Pain Type: Chronic pain  Pain Location: Back, Leg  Pain Orientation: Right, Lower  Pain Radiating Towards: right leg  Pain Descriptors: Aching, Spasm, Shooting  Pain Frequency: Continuous  Pain Onset: On-going  Clinical Progression: Gradually worsening  Effect of Pain on Daily Activities: Pain increases with walking and lifting  Patient's Stated Pain Goal: 2 (Pain at 2 with walking and lifting)  Pain Intervention(s): Medication (see eMar), Rest, Repositioned                    ADVERSE MEDICATION EFFECTS:   Constipation: no  Bowel Regimen: No:   Diet: common adult  Appetite:  ok  Sedation:  no  Urinary Retention: no    FOCUSED PAIN SCALE:  Highest : 10  Lowest :6  Average: Range-7-10  When and What  was your last procedure:  1/13/17 Right SI joint injeciton    Was your procedure effective:  no    ACTIVITY/SOCIAL/EMOTIONAL:  Sleep Pattern: 4 hours per night. generally restful sleep  Energy Level:  Tired/Fatigued  Currently attending Physical Therapy:  No  Home Exercises: daily stretches  Mobility: no current mobility problem   Currently seeing a Psychiatrist or Psychologist:  No  Emotional Issues: normal   Mood: appropriate     ABERRANT BEHAVIORS SINCE LAST VISIT:  Have you ever been treated in another Pain Clinic not applicable  Refills for prescriptions appropriate: not applicable  Lost rx/pills: not applicable  Taking more medication than prescribed:  not applicable  Are you receiving PAIN medications from  other doctors: yes  Last Urine/Serum Drug Screen :8/14/17  Was Serum/UDS as anticipated?   yes  Brought pill bottles in :not applicable   Was Pill count appropriate? :not applicable   Are currently pregnant? not applicable  Recent ER visits: No             Past Medical History      Diagnosis Date    Aortic valve stenosis     mild per chart    Arthritis     CAD (coronary artery disease) 2000    1 STENT    Diabetes mellitus ([facility] [address]     Heart murmur     1962    Hyperlipidemia 2004    ON RX    MI, old     states many and they were mild    Pulmonary stenosis 1995    Pulmonary valve stenosis     mild/congenital per chart    Thyroid disease 2004    HYPOTHYROIDISM ON RX    Wears glasses        Surgical History  Past Surgical History:   Procedure Laterality Date    CERVICAL FUSION  1993    CORONARY ANGIOPLASTY WITH STENT PLACEMENT  2000    1 STENT    FINGER SURGERY Left 03/21/2014    [place] ARTHOPLASTY LEFT THUMB    HYSTERECTOMY  1980    WITH RT SALPINGOOPHERECTOMY    JOINT REPLACEMENT Bilateral 1994    PARTIAL-KNEES    OTHER SURGICAL HISTORY Right 09/23/2014    thumb repair    SALPINGO-OOPHORECTOMY Left 1987    SHOULDER SURGERY Left 1993    SPURS    TOE OSTEOTOMY Right 6/8/2016    Right 5th digit adductory wedge osteotomy with K wire fixation        Medications  Current Outpatient Prescriptions   Medication Sig Dispense Refill    levothyroxine (SYNTHROID) 112 MCG tablet TAKE ONE TABLET BY MOUTH DAILY      diclofenac sodium 1 % GEL APPLY TO AFFECTED AREA 4 TIMES A DAY.      HYDROcodone-acetaminophen (NORCO) 7.5-325 MG per tablet Take 1 tablet by mouth .  atorvastatin (LIPITOR) 40 MG tablet       carisoprodol (SOMA) 350 MG tablet Take 350 mg by mouth 4 times daily as needed for Muscle spasms      nitroGLYCERIN (NITROSTAT) 0.4 MG SL tablet Place 0.4 mg under the tongue every 5 minutes as needed for Chest pain up to max of 3 total doses. If no relief after 1 dose, call 911.  albuterol sulfate  (90 BASE) MCG/ACT inhaler Inhale 2 puffs into the lungs every 6 hours as needed for Wheezing      Menthol, Topical Analgesic, (FLEXALL EX) Apply topically      pramipexole (MIRAPEX) 0.125 MG tablet Take 0.125 mg by mouth daily      Cholecalciferol (VITAMIN D3) 2000 UNITS CAPS Take 1 capsule by mouth daily.  celecoxib (CELEBREX) 200 MG capsule Take 200 mg by mouth 2 times daily.  Multiple Vitamins-Minerals (THERAPEUTIC MULTIVITAMIN-MINERALS) tablet Take 1 tablet by mouth daily.  ALPRAZolam (XANAX) 1 MG tablet Take 1 mg by mouth as needed for Anxiety.       vomiting. Genitourinary: Negative. Negative for dysuria and hematuria. Musculoskeletal: Positive for back pain, joint pain and myalgias. Skin: Negative. Neurological: Positive for weakness, numbness and paresthesias. Negative for tingling and headaches. Endo/Heme/Allergies: Negative. Does not bruise/bleed easily. Psychiatric/Behavioral: Negative. Negative for depression, substance abuse and suicidal ideas. GENERAL PHYSICAL EXAM:  Vitals: /74   Pulse 62   Temp 97.9 °F (36.6 °C) (Oral)   Resp 16   Ht 5' 5.5\" (1.664 m)   Wt 212 lb (96.2 kg)   LMP 03/19/1980   SpO2 97%   BMI 34.74 kg/m² , Body mass index is 34.74 kg/m². Physical Exam   Constitutional: She is oriented to person, place, and time. She appears well-developed and well-nourished. Obese   HENT:   Head: Normocephalic and atraumatic. Eyes: Conjunctivae and EOM are normal. Pupils are equal, round, and reactive to light. Neck: Normal range of motion. Neck supple. No tracheal deviation present. No thyromegaly present. Cardiovascular: Normal rate and regular rhythm. Pulmonary/Chest: No respiratory distress. Abdominal: She exhibits no distension. Musculoskeletal: She exhibits no edema, tenderness or deformity. Neurological: She is alert and oriented to person, place, and time. She has normal strength. She displays no atrophy, no tremor and normal reflexes. No cranial nerve deficit or sensory deficit. She exhibits normal muscle tone. She displays a negative Romberg sign. Gait abnormal. Coordination normal.   Reflex Scores:       Patellar reflexes are 1+ on the right side and 1+ on the left side. Achilles reflexes are 1+ on the right side and 1+ on the left side. Skin: Skin is warm and dry. Psychiatric: She has a normal mood and affect.  Her speech is normal and behavior is normal. Judgment and thought content normal. Cognition and memory are normal.    Right Ankle Exam     Muscle Strength   The patient has normal right ankle strength. Left Ankle Exam     Muscle Strength   The patient has normal left ankle strength. Right Knee Exam     Muscle Strength     The patient has normal right knee strength. Other   Scars: present      Left Knee Exam     Muscle Strength     The patient has normal left knee strength. Other   Scars: present      Right Hip Exam     Tenderness   The patient is experiencing tenderness in the anterior and greater trochanter. Muscle Strength   The patient has normal right hip strength. Tests   SHAMIR: positive      Left Hip Exam     Tenderness   The patient is experiencing tenderness in the anterior, greater trochanter and posterior. Muscle Strength   The patient has normal left hip strength. Back Exam     Tenderness   The patient is experiencing tenderness in the lumbar and sacroiliac. Range of Motion   Back extension: Limited and painful. Back flexion: Limited and painful. Back lateral bend right: Limited and painful. Back lateral bend left: Limited and painful. Back rotation right: Limited and painful. Back rotation left: Limited and painful.      Tests   Straight leg raise right: positive  Straight leg raise left: positive    Other   Toe Walk: abnormal  Heel Walk: abnormal  Sensation: normal  Gait: antalgic   Erythema: no back redness  Scars: absent    Comments:  Skin --normal appearance  Surgical Scar --Absent   kyphosis absent and scoliosis absent  Alignment of her shoulders, scapulae and iliac crests--symmetric  Paraspinal tenderness:Present  Lumbar lordosis-----------Increased  Movements of the lumbar spine indicated above are diminished range with pain   Facet loading---  : Right side--    Pain-Moderate                                   Left side----   Pain  Moderate  Thai's test -------------- Right side---negative                                           Left side-----positive            Nurses Notes and Vital Signs that people with short-term low-back pain who rest feel more pain and have a harder time with daily tasks than those who stay active. 2. Keep Exercising-patient is advised to stay away from strenuous activities like gardening and avoid whatever motion caused the pain in the first place.   3. Maintain Good Posture-Exercises  to maintain good posture were shown to patient. 4. To do exercises learned in PT regularly. [x]Information (handout) on exercise was  given to patient. Decision Making Process : Patient's health history and referral records thoroughly reviewed before focused physical examination and discussion with patient. Over 50% of today's visit is spent on examining the patient and counseling. Level of complexity of date to be reviewed is Moderate. The chart date reviewed include the following: Imaging Reports. Summary of Care. Time spent reviewing with patient the below reports:   Medication safety, Treatment options. Level of diagnosis and management options of this case is multiple: involving the following management options: Interventions as needed, medication management as appropriate, future visits, activity modification, heat/ice as needed, Urine drug screen as required. [x]The patient's questions were answered to the best of my abilities. This note was created using voice recognition software. There may be inaccuracies of transcription  that are inadvertently overlooked prior to the signature. There is any questions about the transcription please contact me. Return in  4 weeks  with  Aron Earl CNP  for further plan of treatment.       Electronically signed by Idalia Fox MD on 11/29/2017 at 11:36 AM

## 2018-03-12 ENCOUNTER — HOSPITAL ENCOUNTER (OUTPATIENT)
Dept: WOMENS IMAGING | Age: 68
Discharge: HOME | End: 2018-03-14
Payer: MEDICARE

## 2018-03-12 DIAGNOSIS — Z12.39 SCREENING BREAST EXAMINATION: ICD-10-CM

## 2018-03-12 PROCEDURE — 77063 BREAST TOMOSYNTHESIS BI: CPT

## 2018-04-23 ENCOUNTER — APPOINTMENT (OUTPATIENT)
Dept: MRI IMAGING | Age: 68
End: 2018-04-23
Payer: MEDICARE

## 2018-04-23 ENCOUNTER — APPOINTMENT (OUTPATIENT)
Dept: CT IMAGING | Age: 68
End: 2018-04-23
Payer: MEDICARE

## 2018-04-23 ENCOUNTER — APPOINTMENT (OUTPATIENT)
Dept: GENERAL RADIOLOGY | Age: 68
End: 2018-04-23
Payer: MEDICARE

## 2018-04-23 ENCOUNTER — HOSPITAL ENCOUNTER (OUTPATIENT)
Age: 68
Setting detail: OBSERVATION
Discharge: HOME | End: 2018-04-24
Attending: EMERGENCY MEDICINE | Admitting: FAMILY MEDICINE
Payer: MEDICARE

## 2018-04-23 ENCOUNTER — APPOINTMENT (OUTPATIENT)
Dept: NUCLEAR MEDICINE | Age: 68
End: 2018-04-23
Payer: MEDICARE

## 2018-04-23 DIAGNOSIS — R06.02 SOB (SHORTNESS OF BREATH): ICD-10-CM

## 2018-04-23 DIAGNOSIS — R10.84 GENERALIZED ABDOMINAL PAIN: Primary | ICD-10-CM

## 2018-04-23 DIAGNOSIS — R11.2 NAUSEA VOMITING AND DIARRHEA: ICD-10-CM

## 2018-04-23 DIAGNOSIS — R19.7 NAUSEA VOMITING AND DIARRHEA: ICD-10-CM

## 2018-04-23 PROBLEM — R07.9 CHEST PAIN: Status: ACTIVE | Noted: 2018-04-23

## 2018-04-23 LAB
ABSOLUTE EOS #: 0.2 K/UL (ref 0–0.4)
ABSOLUTE IMMATURE GRANULOCYTE: ABNORMAL K/UL (ref 0–0.3)
ABSOLUTE LYMPH #: 2.3 K/UL (ref 1–4.8)
ABSOLUTE MONO #: 0.9 K/UL (ref 0.1–1.3)
ALBUMIN SERPL-MCNC: 4.3 G/DL (ref 3.5–5.2)
ALBUMIN/GLOBULIN RATIO: ABNORMAL (ref 1–2.5)
ALP BLD-CCNC: 71 U/L (ref 35–104)
ALT SERPL-CCNC: 21 U/L (ref 5–33)
ANION GAP SERPL CALCULATED.3IONS-SCNC: 15 MMOL/L (ref 9–17)
AST SERPL-CCNC: 27 U/L
BASOPHILS # BLD: 1 % (ref 0–2)
BASOPHILS ABSOLUTE: 0.1 K/UL (ref 0–0.2)
BILIRUB SERPL-MCNC: 0.4 MG/DL (ref 0.3–1.2)
BILIRUBIN URINE: NEGATIVE
BUN BLDV-MCNC: 26 MG/DL (ref 8–23)
BUN/CREAT BLD: ABNORMAL (ref 9–20)
CALCIUM SERPL-MCNC: 9.1 MG/DL (ref 8.6–10.4)
CHLORIDE BLD-SCNC: 99 MMOL/L (ref 98–107)
CO2: 23 MMOL/L (ref 20–31)
COLOR: YELLOW
COMMENT UA: NORMAL
CREAT SERPL-MCNC: 0.99 MG/DL (ref 0.5–0.9)
DIFFERENTIAL TYPE: ABNORMAL
EKG ATRIAL RATE: 66 BPM
EKG P-R INTERVAL: 136 MS
EKG Q-T INTERVAL: 428 MS
EKG QRS DURATION: 82 MS
EKG QTC CALCULATION (BAZETT): 448 MS
EKG R AXIS: 51 DEGREES
EKG T AXIS: 57 DEGREES
EKG VENTRICULAR RATE: 66 BPM
EOSINOPHILS RELATIVE PERCENT: 2 % (ref 0–4)
GFR AFRICAN AMERICAN: >60 ML/MIN
GFR NON-AFRICAN AMERICAN: 56 ML/MIN
GFR SERPL CREATININE-BSD FRML MDRD: ABNORMAL ML/MIN/{1.73_M2}
GFR SERPL CREATININE-BSD FRML MDRD: ABNORMAL ML/MIN/{1.73_M2}
GLUCOSE BLD-MCNC: 203 MG/DL (ref 70–99)
GLUCOSE URINE: NEGATIVE
HCT VFR BLD CALC: 39.2 % (ref 36–46)
HEMOGLOBIN: 12.8 G/DL (ref 12–16)
IMMATURE GRANULOCYTES: ABNORMAL %
KETONES, URINE: NEGATIVE
LEUKOCYTE ESTERASE, URINE: NEGATIVE
LIPASE: 44 U/L (ref 13–60)
LYMPHOCYTES # BLD: 20 % (ref 24–44)
MCH RBC QN AUTO: 29.5 PG (ref 26–34)
MCHC RBC AUTO-ENTMCNC: 32.6 G/DL (ref 31–37)
MCV RBC AUTO: 90.7 FL (ref 80–100)
MONOCYTES # BLD: 8 % (ref 1–7)
MYOGLOBIN: 42 NG/ML (ref 25–58)
MYOGLOBIN: 49 NG/ML (ref 25–58)
NITRITE, URINE: NEGATIVE
NRBC AUTOMATED: ABNORMAL PER 100 WBC
PDW BLD-RTO: 14 % (ref 11.5–14.9)
PH UA: 6.5 (ref 5–8)
PLATELET # BLD: 242 K/UL (ref 150–450)
PLATELET ESTIMATE: ABNORMAL
PMV BLD AUTO: 9.8 FL (ref 6–12)
POTASSIUM SERPL-SCNC: 4 MMOL/L (ref 3.7–5.3)
PROTEIN UA: NEGATIVE
RBC # BLD: 4.32 M/UL (ref 4–5.2)
RBC # BLD: ABNORMAL 10*6/UL
SEG NEUTROPHILS: 69 % (ref 36–66)
SEGMENTED NEUTROPHILS ABSOLUTE COUNT: 8.1 K/UL (ref 1.3–9.1)
SODIUM BLD-SCNC: 137 MMOL/L (ref 135–144)
SPECIFIC GRAVITY UA: 1.01 (ref 1–1.03)
TOTAL PROTEIN: 7 G/DL (ref 6.4–8.3)
TROPONIN INTERP: NORMAL
TROPONIN INTERP: NORMAL
TROPONIN T: <0.03 NG/ML
TROPONIN T: <0.03 NG/ML
TSH SERPL DL<=0.05 MIU/L-ACNC: 13.92 MIU/L (ref 0.3–5)
TURBIDITY: CLEAR
URINE HGB: NEGATIVE
UROBILINOGEN, URINE: NORMAL
WBC # BLD: 11.6 K/UL (ref 3.5–11)
WBC # BLD: ABNORMAL 10*3/UL

## 2018-04-23 PROCEDURE — 96372 THER/PROPH/DIAG INJ SC/IM: CPT

## 2018-04-23 PROCEDURE — 93005 ELECTROCARDIOGRAM TRACING: CPT

## 2018-04-23 PROCEDURE — G0378 HOSPITAL OBSERVATION PER HR: HCPCS

## 2018-04-23 PROCEDURE — 2580000003 HC RX 258: Performed by: EMERGENCY MEDICINE

## 2018-04-23 PROCEDURE — A9500 TC99M SESTAMIBI: HCPCS | Performed by: INTERNAL MEDICINE

## 2018-04-23 PROCEDURE — 6360000002 HC RX W HCPCS: Performed by: EMERGENCY MEDICINE

## 2018-04-23 PROCEDURE — 83690 ASSAY OF LIPASE: CPT

## 2018-04-23 PROCEDURE — 87086 URINE CULTURE/COLONY COUNT: CPT

## 2018-04-23 PROCEDURE — 2580000003 HC RX 258: Performed by: INTERNAL MEDICINE

## 2018-04-23 PROCEDURE — 83874 ASSAY OF MYOGLOBIN: CPT

## 2018-04-23 PROCEDURE — 6370000000 HC RX 637 (ALT 250 FOR IP): Performed by: EMERGENCY MEDICINE

## 2018-04-23 PROCEDURE — 74177 CT ABD & PELVIS W/CONTRAST: CPT

## 2018-04-23 PROCEDURE — 6370000000 HC RX 637 (ALT 250 FOR IP): Performed by: FAMILY MEDICINE

## 2018-04-23 PROCEDURE — 99285 EMERGENCY DEPT VISIT HI MDM: CPT

## 2018-04-23 PROCEDURE — 74183 MRI ABD W/O CNTR FLWD CNTR: CPT

## 2018-04-23 PROCEDURE — 85025 COMPLETE CBC W/AUTO DIFF WBC: CPT

## 2018-04-23 PROCEDURE — 81003 URINALYSIS AUTO W/O SCOPE: CPT

## 2018-04-23 PROCEDURE — 84484 ASSAY OF TROPONIN QUANT: CPT

## 2018-04-23 PROCEDURE — A9579 GAD-BASE MR CONTRAST NOS,1ML: HCPCS | Performed by: FAMILY MEDICINE

## 2018-04-23 PROCEDURE — 6360000004 HC RX CONTRAST MEDICATION: Performed by: FAMILY MEDICINE

## 2018-04-23 PROCEDURE — 80053 COMPREHEN METABOLIC PANEL: CPT

## 2018-04-23 PROCEDURE — 71046 X-RAY EXAM CHEST 2 VIEWS: CPT

## 2018-04-23 PROCEDURE — 3430000000 HC RX DIAGNOSTIC RADIOPHARMACEUTICAL: Performed by: INTERNAL MEDICINE

## 2018-04-23 PROCEDURE — 2580000003 HC RX 258: Performed by: FAMILY MEDICINE

## 2018-04-23 PROCEDURE — 6360000002 HC RX W HCPCS: Performed by: INTERNAL MEDICINE

## 2018-04-23 PROCEDURE — 96374 THER/PROPH/DIAG INJ IV PUSH: CPT

## 2018-04-23 PROCEDURE — 84443 ASSAY THYROID STIM HORMONE: CPT

## 2018-04-23 PROCEDURE — 6360000004 HC RX CONTRAST MEDICATION: Performed by: EMERGENCY MEDICINE

## 2018-04-23 PROCEDURE — 6370000000 HC RX 637 (ALT 250 FOR IP): Performed by: INTERNAL MEDICINE

## 2018-04-23 PROCEDURE — 78452 HT MUSCLE IMAGE SPECT MULT: CPT

## 2018-04-23 PROCEDURE — 36415 COLL VENOUS BLD VENIPUNCTURE: CPT

## 2018-04-23 RX ORDER — 0.9 % SODIUM CHLORIDE 0.9 %
100 INTRAVENOUS SOLUTION INTRAVENOUS ONCE
Status: COMPLETED | OUTPATIENT
Start: 2018-04-23 | End: 2018-04-23

## 2018-04-23 RX ORDER — ACETAMINOPHEN 325 MG/1
650 TABLET ORAL EVERY 4 HOURS PRN
Status: DISCONTINUED | OUTPATIENT
Start: 2018-04-23 | End: 2018-04-24 | Stop reason: HOSPADM

## 2018-04-23 RX ORDER — ONDANSETRON 2 MG/ML
4 INJECTION INTRAMUSCULAR; INTRAVENOUS ONCE
Status: COMPLETED | OUTPATIENT
Start: 2018-04-23 | End: 2018-04-23

## 2018-04-23 RX ORDER — BACLOFEN 10 MG/1
10 TABLET ORAL 2 TIMES DAILY
Status: ON HOLD | COMMUNITY
End: 2021-07-17 | Stop reason: HOSPADM

## 2018-04-23 RX ORDER — METOPROLOL SUCCINATE 100 MG/1
100 TABLET, EXTENDED RELEASE ORAL DAILY
Status: DISCONTINUED | OUTPATIENT
Start: 2018-04-23 | End: 2018-04-24 | Stop reason: HOSPADM

## 2018-04-23 RX ORDER — BACLOFEN 10 MG/1
10 TABLET ORAL 2 TIMES DAILY
Status: DISCONTINUED | OUTPATIENT
Start: 2018-04-23 | End: 2018-04-24 | Stop reason: HOSPADM

## 2018-04-23 RX ORDER — CELECOXIB 200 MG/1
200 CAPSULE ORAL DAILY
Status: DISCONTINUED | OUTPATIENT
Start: 2018-04-23 | End: 2018-04-23

## 2018-04-23 RX ORDER — SODIUM CHLORIDE 0.9 % (FLUSH) 0.9 %
10 SYRINGE (ML) INJECTION PRN
Status: DISCONTINUED | OUTPATIENT
Start: 2018-04-23 | End: 2018-04-24 | Stop reason: HOSPADM

## 2018-04-23 RX ORDER — LIDOCAINE 50 MG/G
OINTMENT TOPICAL PRN
COMMUNITY
End: 2019-12-09

## 2018-04-23 RX ORDER — LIDOCAINE 50 MG/G
OINTMENT TOPICAL PRN
Status: DISCONTINUED | OUTPATIENT
Start: 2018-04-23 | End: 2018-04-24 | Stop reason: HOSPADM

## 2018-04-23 RX ORDER — PANTOPRAZOLE SODIUM 40 MG/1
40 TABLET, DELAYED RELEASE ORAL
Status: DISCONTINUED | OUTPATIENT
Start: 2018-04-23 | End: 2018-04-24 | Stop reason: HOSPADM

## 2018-04-23 RX ORDER — ONDANSETRON 2 MG/ML
4 INJECTION INTRAMUSCULAR; INTRAVENOUS EVERY 6 HOURS PRN
Status: DISCONTINUED | OUTPATIENT
Start: 2018-04-23 | End: 2018-04-24 | Stop reason: HOSPADM

## 2018-04-23 RX ORDER — DICYCLOMINE HYDROCHLORIDE 10 MG/ML
20 INJECTION INTRAMUSCULAR ONCE
Status: COMPLETED | OUTPATIENT
Start: 2018-04-23 | End: 2018-04-23

## 2018-04-23 RX ORDER — ATORVASTATIN CALCIUM 40 MG/1
40 TABLET, FILM COATED ORAL NIGHTLY
Status: DISCONTINUED | OUTPATIENT
Start: 2018-04-23 | End: 2018-04-24 | Stop reason: HOSPADM

## 2018-04-23 RX ORDER — LEVOTHYROXINE SODIUM 112 UG/1
112 TABLET ORAL DAILY
Status: DISCONTINUED | OUTPATIENT
Start: 2018-04-23 | End: 2018-04-24 | Stop reason: HOSPADM

## 2018-04-23 RX ORDER — CETIRIZINE HYDROCHLORIDE 10 MG/1
5 TABLET ORAL DAILY
Status: DISCONTINUED | OUTPATIENT
Start: 2018-04-23 | End: 2018-04-24 | Stop reason: HOSPADM

## 2018-04-23 RX ORDER — TIZANIDINE 2 MG/1
2 TABLET ORAL 3 TIMES DAILY
Status: DISCONTINUED | OUTPATIENT
Start: 2018-04-23 | End: 2018-04-24 | Stop reason: HOSPADM

## 2018-04-23 RX ORDER — ALPRAZOLAM 1 MG/1
1 TABLET ORAL PRN
Status: DISCONTINUED | OUTPATIENT
Start: 2018-04-23 | End: 2018-04-24 | Stop reason: HOSPADM

## 2018-04-23 RX ORDER — NYSTATIN 100000 U/G
CREAM TOPICAL 2 TIMES DAILY
Status: DISCONTINUED | OUTPATIENT
Start: 2018-04-23 | End: 2018-04-24 | Stop reason: HOSPADM

## 2018-04-23 RX ORDER — FLUOXETINE HYDROCHLORIDE 20 MG/1
20 CAPSULE ORAL DAILY
Status: DISCONTINUED | OUTPATIENT
Start: 2018-04-23 | End: 2018-04-24 | Stop reason: HOSPADM

## 2018-04-23 RX ORDER — ISOSORBIDE MONONITRATE 30 MG/1
30 TABLET, EXTENDED RELEASE ORAL DAILY
Status: DISCONTINUED | OUTPATIENT
Start: 2018-04-23 | End: 2018-04-24 | Stop reason: HOSPADM

## 2018-04-23 RX ORDER — M-VIT,TX,IRON,MINS/CALC/FOLIC 27MG-0.4MG
1 TABLET ORAL DAILY
Status: DISCONTINUED | OUTPATIENT
Start: 2018-04-23 | End: 2018-04-24 | Stop reason: HOSPADM

## 2018-04-23 RX ORDER — FLUTICASONE PROPIONATE 50 MCG
1 SPRAY, SUSPENSION (ML) NASAL DAILY
Status: ON HOLD | COMMUNITY
End: 2021-07-02 | Stop reason: HOSPADM

## 2018-04-23 RX ORDER — HYDROCODONE BITARTRATE AND ACETAMINOPHEN 7.5; 325 MG/1; MG/1
1 TABLET ORAL EVERY 8 HOURS PRN
Status: DISCONTINUED | OUTPATIENT
Start: 2018-04-23 | End: 2018-04-24 | Stop reason: HOSPADM

## 2018-04-23 RX ORDER — MAGNESIUM HYDROXIDE/ALUMINUM HYDROXICE/SIMETHICONE 120; 1200; 1200 MG/30ML; MG/30ML; MG/30ML
15 SUSPENSION ORAL
Status: COMPLETED | OUTPATIENT
Start: 2018-04-23 | End: 2018-04-23

## 2018-04-23 RX ORDER — NITROGLYCERIN 0.4 MG/1
0.4 TABLET SUBLINGUAL EVERY 5 MIN PRN
Status: DISCONTINUED | OUTPATIENT
Start: 2018-04-23 | End: 2018-04-24 | Stop reason: HOSPADM

## 2018-04-23 RX ORDER — NYSTATIN 100000 U/G
CREAM TOPICAL 2 TIMES DAILY PRN
COMMUNITY

## 2018-04-23 RX ORDER — FLUTICASONE PROPIONATE 50 MCG
1 SPRAY, SUSPENSION (ML) NASAL DAILY
Status: DISCONTINUED | OUTPATIENT
Start: 2018-04-23 | End: 2018-04-24 | Stop reason: HOSPADM

## 2018-04-23 RX ORDER — 0.9 % SODIUM CHLORIDE 0.9 %
1000 INTRAVENOUS SOLUTION INTRAVENOUS ONCE
Status: COMPLETED | OUTPATIENT
Start: 2018-04-23 | End: 2018-04-23

## 2018-04-23 RX ORDER — 0.9 % SODIUM CHLORIDE 0.9 %
50 INTRAVENOUS SOLUTION INTRAVENOUS ONCE
Status: COMPLETED | OUTPATIENT
Start: 2018-04-23 | End: 2018-04-23

## 2018-04-23 RX ORDER — PRAMIPEXOLE DIHYDROCHLORIDE 0.12 MG/1
0.12 TABLET ORAL NIGHTLY
Status: DISCONTINUED | OUTPATIENT
Start: 2018-04-23 | End: 2018-04-24 | Stop reason: HOSPADM

## 2018-04-23 RX ORDER — SODIUM CHLORIDE 0.9 % (FLUSH) 0.9 %
10 SYRINGE (ML) INJECTION EVERY 12 HOURS SCHEDULED
Status: DISCONTINUED | OUTPATIENT
Start: 2018-04-23 | End: 2018-04-24 | Stop reason: HOSPADM

## 2018-04-23 RX ORDER — OXYBUTYNIN CHLORIDE 15 MG/1
15 TABLET, EXTENDED RELEASE ORAL DAILY
COMMUNITY
End: 2021-09-01

## 2018-04-23 RX ORDER — ASPIRIN 81 MG/1
81 TABLET ORAL DAILY
Status: DISCONTINUED | OUTPATIENT
Start: 2018-04-23 | End: 2018-04-24 | Stop reason: HOSPADM

## 2018-04-23 RX ORDER — CELECOXIB 200 MG/1
200 CAPSULE ORAL 2 TIMES DAILY
Status: DISCONTINUED | OUTPATIENT
Start: 2018-04-23 | End: 2018-04-24 | Stop reason: HOSPADM

## 2018-04-23 RX ORDER — LEVOCETIRIZINE DIHYDROCHLORIDE 5 MG/1
5 TABLET, FILM COATED ORAL NIGHTLY
COMMUNITY
End: 2021-09-01

## 2018-04-23 RX ORDER — CHOLECALCIFEROL (VITAMIN D3) 125 MCG
500 CAPSULE ORAL DAILY
Status: DISCONTINUED | OUTPATIENT
Start: 2018-04-23 | End: 2018-04-24 | Stop reason: HOSPADM

## 2018-04-23 RX ORDER — DEXTROSE AND SODIUM CHLORIDE 5; .45 G/100ML; G/100ML
INJECTION, SOLUTION INTRAVENOUS CONTINUOUS
Status: DISCONTINUED | OUTPATIENT
Start: 2018-04-23 | End: 2018-04-24

## 2018-04-23 RX ADMIN — Medication 10 ML: at 21:10

## 2018-04-23 RX ADMIN — LEVOTHYROXINE SODIUM 112 MCG: 112 TABLET ORAL at 17:07

## 2018-04-23 RX ADMIN — HYDROCODONE BITARTRATE AND ACETAMINOPHEN 1 TABLET: 7.5; 325 TABLET ORAL at 15:39

## 2018-04-23 RX ADMIN — ISOSORBIDE MONONITRATE 30 MG: 30 TABLET, EXTENDED RELEASE ORAL at 12:20

## 2018-04-23 RX ADMIN — ALUMINUM HYDROXIDE, MAGNESIUM HYDROXIDE, AND SIMETHICONE 15 ML: 200; 200; 20 SUSPENSION ORAL at 08:35

## 2018-04-23 RX ADMIN — DEXTROSE AND SODIUM CHLORIDE: 5; 450 INJECTION, SOLUTION INTRAVENOUS at 22:10

## 2018-04-23 RX ADMIN — FLUOXETINE HYDROCHLORIDE 20 MG: 20 CAPSULE ORAL at 17:06

## 2018-04-23 RX ADMIN — ONDANSETRON 4 MG: 2 INJECTION INTRAMUSCULAR; INTRAVENOUS at 07:21

## 2018-04-23 RX ADMIN — Medication 10 ML: at 13:45

## 2018-04-23 RX ADMIN — ATORVASTATIN CALCIUM 40 MG: 40 TABLET, FILM COATED ORAL at 21:10

## 2018-04-23 RX ADMIN — SODIUM CHLORIDE 1000 ML: 9 INJECTION, SOLUTION INTRAVENOUS at 07:21

## 2018-04-23 RX ADMIN — Medication 10 ML: at 08:30

## 2018-04-23 RX ADMIN — GADOTERIDOL 20 ML: 279.3 INJECTION, SOLUTION INTRAVENOUS at 20:22

## 2018-04-23 RX ADMIN — DICYCLOMINE HYDROCHLORIDE 20 MG: 20 INJECTION, SOLUTION INTRAMUSCULAR at 09:25

## 2018-04-23 RX ADMIN — BACLOFEN 10 MG: 10 TABLET ORAL at 21:09

## 2018-04-23 RX ADMIN — ASPIRIN 81 MG: 81 TABLET, COATED ORAL at 12:20

## 2018-04-23 RX ADMIN — CELECOXIB 200 MG: 200 CAPSULE ORAL at 21:01

## 2018-04-23 RX ADMIN — SODIUM CHLORIDE 50 ML: 9 INJECTION, SOLUTION INTRAVENOUS at 20:22

## 2018-04-23 RX ADMIN — FLUTICASONE PROPIONATE 1 SPRAY: 50 SPRAY, METERED NASAL at 17:06

## 2018-04-23 RX ADMIN — ENOXAPARIN SODIUM 40 MG: 40 INJECTION SUBCUTANEOUS at 12:20

## 2018-04-23 RX ADMIN — IOPAMIDOL 75 ML: 755 INJECTION, SOLUTION INTRAVENOUS at 08:30

## 2018-04-23 RX ADMIN — SODIUM CHLORIDE 100 ML: 9 INJECTION, SOLUTION INTRAVENOUS at 08:30

## 2018-04-23 RX ADMIN — TETRAKIS(2-METHOXYISOBUTYLISOCYANIDE)COPPER(I) TETRAFLUOROBORATE 37.9 MILLICURIE: 1 INJECTION, POWDER, LYOPHILIZED, FOR SOLUTION INTRAVENOUS at 13:45

## 2018-04-23 RX ADMIN — ACETAMINOPHEN 650 MG: 325 TABLET, FILM COATED ORAL at 12:00

## 2018-04-23 RX ADMIN — NYSTATIN: 100000 CREAM TOPICAL at 21:14

## 2018-04-23 RX ADMIN — LIDOCAINE HYDROCHLORIDE 15 ML: 20 SOLUTION ORAL; TOPICAL at 08:35

## 2018-04-23 RX ADMIN — DEXTROSE AND SODIUM CHLORIDE: 5; 450 INJECTION, SOLUTION INTRAVENOUS at 12:23

## 2018-04-23 ASSESSMENT — ENCOUNTER SYMPTOMS
CONSTIPATION: 0
COLOR CHANGE: 0
TROUBLE SWALLOWING: 0
BLOOD IN STOOL: 0
EYE REDNESS: 0
DIARRHEA: 1
EYE PAIN: 0
COUGH: 0
SORE THROAT: 0
EYE DISCHARGE: 0
CHEST TIGHTNESS: 0
NAUSEA: 1
WHEEZING: 0
SINUS PRESSURE: 0
VOMITING: 1
FACIAL SWELLING: 0
BACK PAIN: 0
RHINORRHEA: 0
ABDOMINAL PAIN: 1
SHORTNESS OF BREATH: 1

## 2018-04-23 ASSESSMENT — PAIN DESCRIPTION - DESCRIPTORS
DESCRIPTORS: DULL
DESCRIPTORS: CONSTANT;HEAVINESS

## 2018-04-23 ASSESSMENT — PAIN SCALES - GENERAL
PAINLEVEL_OUTOF10: 7
PAINLEVEL_OUTOF10: 4
PAINLEVEL_OUTOF10: 4
PAINLEVEL_OUTOF10: 8
PAINLEVEL_OUTOF10: 5
PAINLEVEL_OUTOF10: 4
PAINLEVEL_OUTOF10: 7
PAINLEVEL_OUTOF10: 7
PAINLEVEL_OUTOF10: 5
PAINLEVEL_OUTOF10: 5

## 2018-04-23 ASSESSMENT — PAIN DESCRIPTION - PAIN TYPE
TYPE: ACUTE PAIN

## 2018-04-23 ASSESSMENT — PAIN DESCRIPTION - PROGRESSION
CLINICAL_PROGRESSION: NOT CHANGED
CLINICAL_PROGRESSION: GRADUALLY IMPROVING
CLINICAL_PROGRESSION: GRADUALLY IMPROVING

## 2018-04-23 ASSESSMENT — PAIN DESCRIPTION - LOCATION
LOCATION: ABDOMEN
LOCATION: HEAD
LOCATION: BREAST
LOCATION: ABDOMEN;CHEST
LOCATION: HEAD

## 2018-04-23 ASSESSMENT — PAIN DESCRIPTION - ORIENTATION: ORIENTATION: LEFT;UPPER

## 2018-04-24 ENCOUNTER — APPOINTMENT (OUTPATIENT)
Dept: ULTRASOUND IMAGING | Age: 68
End: 2018-04-24
Payer: MEDICARE

## 2018-04-24 ENCOUNTER — APPOINTMENT (OUTPATIENT)
Dept: CT IMAGING | Age: 68
End: 2018-04-24
Payer: MEDICARE

## 2018-04-24 VITALS
OXYGEN SATURATION: 97 % | SYSTOLIC BLOOD PRESSURE: 123 MMHG | DIASTOLIC BLOOD PRESSURE: 53 MMHG | RESPIRATION RATE: 17 BRPM | TEMPERATURE: 97.5 F | HEIGHT: 66 IN | WEIGHT: 223.77 LBS | HEART RATE: 60 BPM | BODY MASS INDEX: 35.96 KG/M2

## 2018-04-24 LAB
CHOLESTEROL/HDL RATIO: 3.8
CHOLESTEROL: 120 MG/DL
CULTURE: NO GROWTH
CULTURE: NORMAL
D-DIMER QUANTITATIVE: 0.68 MG/L FEU
EKG ATRIAL RATE: 69 BPM
EKG P AXIS: 53 DEGREES
EKG P-R INTERVAL: 160 MS
EKG Q-T INTERVAL: 422 MS
EKG QRS DURATION: 74 MS
EKG QTC CALCULATION (BAZETT): 452 MS
EKG R AXIS: 53 DEGREES
EKG T AXIS: 61 DEGREES
EKG VENTRICULAR RATE: 69 BPM
HDLC SERPL-MCNC: 32 MG/DL
LDL CHOLESTEROL: 60 MG/DL (ref 0–130)
LV EF: 55 %
LV EF: 68 %
LVEF MODALITY: NORMAL
LVEF MODALITY: NORMAL
Lab: NORMAL
SPECIMEN DESCRIPTION: NORMAL
SPECIMEN DESCRIPTION: NORMAL
STATUS: NORMAL
TRIGL SERPL-MCNC: 142 MG/DL
VLDLC SERPL CALC-MCNC: ABNORMAL MG/DL (ref 1–30)

## 2018-04-24 PROCEDURE — 6360000002 HC RX W HCPCS: Performed by: INTERNAL MEDICINE

## 2018-04-24 PROCEDURE — A9500 TC99M SESTAMIBI: HCPCS | Performed by: INTERNAL MEDICINE

## 2018-04-24 PROCEDURE — 6360000004 HC RX CONTRAST MEDICATION: Performed by: FAMILY MEDICINE

## 2018-04-24 PROCEDURE — 3430000000 HC RX DIAGNOSTIC RADIOPHARMACEUTICAL: Performed by: INTERNAL MEDICINE

## 2018-04-24 PROCEDURE — G0378 HOSPITAL OBSERVATION PER HR: HCPCS

## 2018-04-24 PROCEDURE — 93306 TTE W/DOPPLER COMPLETE: CPT

## 2018-04-24 PROCEDURE — 6370000000 HC RX 637 (ALT 250 FOR IP): Performed by: FAMILY MEDICINE

## 2018-04-24 PROCEDURE — 2580000003 HC RX 258: Performed by: FAMILY MEDICINE

## 2018-04-24 PROCEDURE — 93017 CV STRESS TEST TRACING ONLY: CPT

## 2018-04-24 PROCEDURE — 76705 ECHO EXAM OF ABDOMEN: CPT

## 2018-04-24 PROCEDURE — 96372 THER/PROPH/DIAG INJ SC/IM: CPT

## 2018-04-24 PROCEDURE — 85379 FIBRIN DEGRADATION QUANT: CPT

## 2018-04-24 PROCEDURE — 6370000000 HC RX 637 (ALT 250 FOR IP): Performed by: INTERNAL MEDICINE

## 2018-04-24 PROCEDURE — 71260 CT THORAX DX C+: CPT

## 2018-04-24 PROCEDURE — 2580000003 HC RX 258: Performed by: INTERNAL MEDICINE

## 2018-04-24 PROCEDURE — 36415 COLL VENOUS BLD VENIPUNCTURE: CPT

## 2018-04-24 PROCEDURE — 80061 LIPID PANEL: CPT

## 2018-04-24 RX ORDER — NITROGLYCERIN 0.4 MG/1
0.4 TABLET SUBLINGUAL EVERY 5 MIN PRN
Status: DISCONTINUED | OUTPATIENT
Start: 2018-04-24 | End: 2018-04-24 | Stop reason: HOSPADM

## 2018-04-24 RX ORDER — METOPROLOL TARTRATE 5 MG/5ML
2.5 INJECTION INTRAVENOUS PRN
Status: DISCONTINUED | OUTPATIENT
Start: 2018-04-24 | End: 2018-04-24 | Stop reason: HOSPADM

## 2018-04-24 RX ORDER — AMINOPHYLLINE DIHYDRATE 25 MG/ML
100 INJECTION, SOLUTION INTRAVENOUS
Status: COMPLETED | OUTPATIENT
Start: 2018-04-24 | End: 2018-04-24

## 2018-04-24 RX ORDER — SODIUM CHLORIDE 0.9 % (FLUSH) 0.9 %
10 SYRINGE (ML) INJECTION PRN
Status: DISCONTINUED | OUTPATIENT
Start: 2018-04-24 | End: 2018-04-24 | Stop reason: HOSPADM

## 2018-04-24 RX ORDER — PANTOPRAZOLE SODIUM 40 MG/1
40 TABLET, DELAYED RELEASE ORAL
Qty: 30 TABLET | Refills: 3 | Status: SHIPPED | OUTPATIENT
Start: 2018-04-25 | End: 2021-08-11

## 2018-04-24 RX ORDER — ISOSORBIDE MONONITRATE 30 MG/1
30 TABLET, EXTENDED RELEASE ORAL DAILY
Qty: 30 TABLET | Refills: 3 | Status: SHIPPED | OUTPATIENT
Start: 2018-04-25

## 2018-04-24 RX ORDER — 0.9 % SODIUM CHLORIDE 0.9 %
250 INTRAVENOUS SOLUTION INTRAVENOUS ONCE
Status: DISCONTINUED | OUTPATIENT
Start: 2018-04-24 | End: 2018-04-24 | Stop reason: HOSPADM

## 2018-04-24 RX ORDER — 0.9 % SODIUM CHLORIDE 0.9 %
100 INTRAVENOUS SOLUTION INTRAVENOUS ONCE
Status: COMPLETED | OUTPATIENT
Start: 2018-04-24 | End: 2018-04-24

## 2018-04-24 RX ADMIN — Medication 10 ML: at 08:51

## 2018-04-24 RX ADMIN — CELECOXIB 200 MG: 200 CAPSULE ORAL at 10:43

## 2018-04-24 RX ADMIN — METOPROLOL SUCCINATE 100 MG: 100 TABLET, EXTENDED RELEASE ORAL at 10:44

## 2018-04-24 RX ADMIN — TETRAKIS(2-METHOXYISOBUTYLISOCYANIDE)COPPER(I) TETRAFLUOROBORATE 34.8 MILLICURIE: 1 INJECTION, POWDER, LYOPHILIZED, FOR SOLUTION INTRAVENOUS at 09:00

## 2018-04-24 RX ADMIN — AMINOPHYLLINE 100 MG: 25 INJECTION, SOLUTION INTRAVENOUS at 09:04

## 2018-04-24 RX ADMIN — ISOSORBIDE MONONITRATE 30 MG: 30 TABLET, EXTENDED RELEASE ORAL at 10:44

## 2018-04-24 RX ADMIN — Medication 10 ML: at 08:45

## 2018-04-24 RX ADMIN — IOPAMIDOL 75 ML: 755 INJECTION, SOLUTION INTRAVENOUS at 15:42

## 2018-04-24 RX ADMIN — LEVOTHYROXINE SODIUM 112 MCG: 112 TABLET ORAL at 10:43

## 2018-04-24 RX ADMIN — Medication 10 ML: at 10:45

## 2018-04-24 RX ADMIN — ENOXAPARIN SODIUM 40 MG: 40 INJECTION SUBCUTANEOUS at 10:45

## 2018-04-24 RX ADMIN — HYDROCODONE BITARTRATE AND ACETAMINOPHEN 1 TABLET: 7.5; 325 TABLET ORAL at 06:58

## 2018-04-24 RX ADMIN — DEXTROSE AND SODIUM CHLORIDE: 5; 450 INJECTION, SOLUTION INTRAVENOUS at 10:53

## 2018-04-24 RX ADMIN — FLUOXETINE HYDROCHLORIDE 20 MG: 20 CAPSULE ORAL at 10:43

## 2018-04-24 RX ADMIN — SODIUM CHLORIDE 100 ML: 9 INJECTION, SOLUTION INTRAVENOUS at 15:42

## 2018-04-24 RX ADMIN — Medication 10 ML: at 15:43

## 2018-04-24 RX ADMIN — REGADENOSON 0.4 MG: 0.08 INJECTION, SOLUTION INTRAVENOUS at 08:51

## 2018-04-24 ASSESSMENT — PAIN SCALES - GENERAL
PAINLEVEL_OUTOF10: 4
PAINLEVEL_OUTOF10: 0
PAINLEVEL_OUTOF10: 7
PAINLEVEL_OUTOF10: 8
PAINLEVEL_OUTOF10: 4

## 2018-11-07 ENCOUNTER — HOSPITAL ENCOUNTER (OUTPATIENT)
Dept: CT IMAGING | Age: 68
Discharge: HOME | End: 2018-11-09
Payer: MEDICARE

## 2018-11-07 DIAGNOSIS — N28.9 RENAL LESION: ICD-10-CM

## 2018-11-07 DIAGNOSIS — R05.9 COUGH: ICD-10-CM

## 2018-11-07 LAB
BUN BLDV-MCNC: 30 MG/DL (ref 8–23)
CREAT SERPL-MCNC: 1.4 MG/DL (ref 0.5–0.9)
GFR AFRICAN AMERICAN: 45 ML/MIN
GFR NON-AFRICAN AMERICAN: 37 ML/MIN
GFR SERPL CREATININE-BSD FRML MDRD: ABNORMAL ML/MIN/{1.73_M2}
GFR SERPL CREATININE-BSD FRML MDRD: ABNORMAL ML/MIN/{1.73_M2}

## 2018-11-07 PROCEDURE — 74176 CT ABD & PELVIS W/O CONTRAST: CPT

## 2018-11-07 PROCEDURE — 84520 ASSAY OF UREA NITROGEN: CPT

## 2018-11-07 PROCEDURE — 6360000004 HC RX CONTRAST MEDICATION: Performed by: FAMILY MEDICINE

## 2018-11-07 PROCEDURE — 82565 ASSAY OF CREATININE: CPT

## 2018-11-07 PROCEDURE — 71250 CT THORAX DX C-: CPT

## 2018-11-07 PROCEDURE — 36415 COLL VENOUS BLD VENIPUNCTURE: CPT

## 2018-11-07 RX ADMIN — IOHEXOL 50 ML: 240 INJECTION, SOLUTION INTRATHECAL; INTRAVASCULAR; INTRAVENOUS; ORAL at 14:18

## 2019-03-12 ENCOUNTER — HOSPITAL ENCOUNTER (OUTPATIENT)
Dept: WOMENS IMAGING | Age: 69
Discharge: HOME | End: 2019-03-14
Payer: MEDICARE

## 2019-03-12 DIAGNOSIS — Z12.39 BREAST CANCER SCREENING: ICD-10-CM

## 2019-03-12 PROCEDURE — 77063 BREAST TOMOSYNTHESIS BI: CPT

## 2019-12-09 ENCOUNTER — HOSPITAL ENCOUNTER (OUTPATIENT)
Dept: PAIN MANAGEMENT | Age: 69
Discharge: HOME | End: 2019-12-09
Payer: MEDICARE

## 2019-12-09 VITALS
SYSTOLIC BLOOD PRESSURE: 161 MMHG | DIASTOLIC BLOOD PRESSURE: 71 MMHG | RESPIRATION RATE: 16 BRPM | BODY MASS INDEX: 35.32 KG/M2 | HEART RATE: 62 BPM | WEIGHT: 212 LBS | OXYGEN SATURATION: 97 % | TEMPERATURE: 97.5 F | HEIGHT: 65 IN

## 2019-12-09 DIAGNOSIS — M54.16 LUMBAR RADICULOPATHY: Primary | ICD-10-CM

## 2019-12-09 DIAGNOSIS — M47.817 LUMBOSACRAL SPONDYLOSIS WITHOUT MYELOPATHY: ICD-10-CM

## 2019-12-09 DIAGNOSIS — M51.36 DDD (DEGENERATIVE DISC DISEASE), LUMBAR: ICD-10-CM

## 2019-12-09 DIAGNOSIS — M48.061 DEGENERATIVE LUMBAR SPINAL STENOSIS: ICD-10-CM

## 2019-12-09 PROCEDURE — 99215 OFFICE O/P EST HI 40 MIN: CPT

## 2019-12-09 PROCEDURE — 99214 OFFICE O/P EST MOD 30 MIN: CPT | Performed by: PAIN MEDICINE

## 2019-12-09 ASSESSMENT — ENCOUNTER SYMPTOMS
RECTAL PAIN: 0
WHEEZING: 1
NAUSEA: 1
COUGH: 0
CONSTIPATION: 0
EYE REDNESS: 0
EYES NEGATIVE: 1
BACK PAIN: 1
ABDOMINAL PAIN: 0

## 2019-12-09 ASSESSMENT — PAIN DESCRIPTION - PROGRESSION: CLINICAL_PROGRESSION: GRADUALLY WORSENING

## 2019-12-09 ASSESSMENT — PAIN DESCRIPTION - ONSET: ONSET: ON-GOING

## 2019-12-09 ASSESSMENT — PAIN DESCRIPTION - PAIN TYPE: TYPE: CHRONIC PAIN

## 2019-12-09 ASSESSMENT — PAIN DESCRIPTION - LOCATION: LOCATION: BACK

## 2019-12-09 ASSESSMENT — PAIN SCALES - GENERAL: PAINLEVEL_OUTOF10: 8

## 2019-12-09 ASSESSMENT — PAIN DESCRIPTION - ORIENTATION: ORIENTATION: RIGHT;LEFT;LOWER;MID

## 2019-12-09 ASSESSMENT — PAIN DESCRIPTION - FREQUENCY: FREQUENCY: CONTINUOUS

## 2020-05-13 ENCOUNTER — HOSPITAL ENCOUNTER (OUTPATIENT)
Dept: PHYSICAL THERAPY | Age: 70
Setting detail: THERAPIES SERIES
Discharge: HOME | End: 2020-05-13
Payer: MEDICARE

## 2020-05-13 PROCEDURE — 97162 PT EVAL MOD COMPLEX 30 MIN: CPT

## 2020-05-13 PROCEDURE — 97110 THERAPEUTIC EXERCISES: CPT

## 2020-05-13 PROCEDURE — 97112 NEUROMUSCULAR REEDUCATION: CPT

## 2020-05-13 ASSESSMENT — PAIN SCALES - GENERAL: PAINLEVEL_OUTOF10: 9

## 2020-05-13 ASSESSMENT — PAIN DESCRIPTION - LOCATION: LOCATION: BACK

## 2020-05-13 ASSESSMENT — PAIN DESCRIPTION - ORIENTATION: ORIENTATION: RIGHT;MID;LOWER

## 2020-05-13 ASSESSMENT — PAIN DESCRIPTION - PROGRESSION: CLINICAL_PROGRESSION: GRADUALLY WORSENING

## 2020-05-13 ASSESSMENT — PAIN DESCRIPTION - DESCRIPTORS: DESCRIPTORS: SHARP;SHOOTING

## 2020-05-13 ASSESSMENT — PAIN - FUNCTIONAL ASSESSMENT: PAIN_FUNCTIONAL_ASSESSMENT: PREVENTS OR INTERFERES SOME ACTIVE ACTIVITIES AND ADLS

## 2020-05-13 ASSESSMENT — PAIN DESCRIPTION - PAIN TYPE: TYPE: CHRONIC PAIN

## 2020-05-13 ASSESSMENT — PAIN DESCRIPTION - FREQUENCY: FREQUENCY: CONTINUOUS

## 2020-05-13 ASSESSMENT — PAIN DESCRIPTION - ONSET: ONSET: PROGRESSIVE

## 2020-05-13 NOTE — PROGRESS NOTES
[]  Gait     [x]  Ther Exercise 15  1    []  Manual Therapy       []  Ther Activities       []  Aquatics     []  Vasopneumatic Device     [x]  Neuro Re-Ed  15 1    []  Other       Total Treatment Time: 60  3       Rehab Potential:  [x] Good  [] Fair  [] Poor   Suggested Professional Referral:  [x] No  [] Yes:  Barriers to Goal Achievement:  [x] No  [] Yes:  Domestic Concerns:  [x] No  [] Yes:    Treatment Plan:  [x] Therapeutic Exercise   64637  [] Iontophoresis: 4 mg/mL Dexamethasone Sodium Phosphate  mAmin  60444   [] Therapeutic Activity  78806 [] Vasopneumatic cold with compression  25317    [] Gait Training   86357 [] Ultrasound   [id number]   [x] Neuromuscular Re-education  79243 [x] Electrical Stimulation Unattended  04001   [] Manual Therapy  34522 [] Electrical Stimulation Attended  11733   [x] Instruction in HEP  [] Lumbar/Cervical Traction  63201   [] Aquatic Therapy   48164 [x] Cold/hot pack    [] Massage   70967      [] Dry Needling, 1 or 2 muscles  66112   [] Biofeedback, first 15 minutes   04835  [] Biofeedback, additional 15 minutes   19794 [] Dry Needling, 3 or more muscles  41678      Frequency:      2     X/wk x      6    wk's      [x] Plans/Goals, Risk/Benefits discussed with pt/family  Comprehension of Education [x] yes  [x] Needs Review  Pt/Family Education: [x] Verbal  [x] Demo  [x] Written    More objective information is available upon request.  Thank you for this referral.        Medicare/Regulatory Requirements:  I have reviewed this plan of care and certify a need for   Medically necessary rehabilitation services.   [x] Physician Signature    Date:     Electronically signed by: Skyler Oneal, [address] @ [facility]  [address] [place] [address] 4 [facility]  [place], [address] [facility]  Phone [phone]  Fax [phone]

## 2020-05-18 ENCOUNTER — HOSPITAL ENCOUNTER (OUTPATIENT)
Dept: PHYSICAL THERAPY | Age: 70
Setting detail: THERAPIES SERIES
Discharge: HOME | End: 2020-05-18
Payer: MEDICARE

## 2020-05-18 NOTE — PROGRESS NOTES
Physical Therapy  Cancellation Note  Date: 2020  Patient Name: Brian Nice  MRN: [id number]     :   1950    General  Referring Practitioner: Juan Pelayo DO  PT Visit Information  Onset Date: 10/01/20  PT Insurance Information: Ras Brumfield   Total # of Visits Approved: 12  Total # of Visits to Date: 1  Plan of Care/Certification Expiration Date: 20  No Show: 0  Canceled Appointment: 1  Progress Note Counter:   General Comment  Comments: Leandra called and cancelled her treatment for today.            Curtis Benjamin, PT

## 2020-05-20 ENCOUNTER — HOSPITAL ENCOUNTER (OUTPATIENT)
Dept: WOMENS IMAGING | Age: 70
Discharge: HOME | End: 2020-05-22
Payer: MEDICARE

## 2020-05-20 PROCEDURE — 77063 BREAST TOMOSYNTHESIS BI: CPT

## 2020-05-21 ENCOUNTER — HOSPITAL ENCOUNTER (OUTPATIENT)
Dept: PHYSICAL THERAPY | Age: 70
Setting detail: THERAPIES SERIES
Discharge: HOME | End: 2020-05-21
Payer: MEDICARE

## 2020-05-27 ENCOUNTER — HOSPITAL ENCOUNTER (OUTPATIENT)
Dept: PHYSICAL THERAPY | Age: 70
Setting detail: THERAPIES SERIES
Discharge: HOME | End: 2020-05-27
Payer: MEDICARE

## 2020-05-28 ENCOUNTER — HOSPITAL ENCOUNTER (OUTPATIENT)
Dept: PHYSICAL THERAPY | Age: 70
Setting detail: THERAPIES SERIES
Discharge: HOME | End: 2020-05-28
Payer: MEDICARE

## 2020-06-30 ASSESSMENT — ENCOUNTER SYMPTOMS
CONSTIPATION: 0
EYES NEGATIVE: 1
WHEEZING: 1
EYE REDNESS: 0
BACK PAIN: 1
COUGH: 0
RECTAL PAIN: 0
ABDOMINAL PAIN: 0
NAUSEA: 1

## 2020-06-30 NOTE — PROGRESS NOTES
Sathya Razo is a 79 y.o. female evaluated on 7/1/2020. Modality of virtual service provided -via  telephone   Conset:  Patientn and/or health care decision maker is aware that that patient may receive a bill for this telephone service, depending on one's insurance coverage, and has provided verbal consent to proceed: Yes    Patient identification was verified at the start of the visit: Yes    Chief complaint: Sathya Razo is 79 y.o.,  female, with chief complaint of back pain. Patient is complaining of pain involving the low back area with pain radiating to the lower extremities of longstanding duration this patient was previously seen in the pain clinic in 2017 and had undergone lumbar epidural steroid injections with about 50 to 70% improvement in the pain. Patient reports the pain is lately worse and is interfering with her activities of daily living. She was apparently getting pain medications from her primary care physician. Giving her according to the patient. She was taking about 2 Norco's per day which is helping to control the pain. She is also requesting medications to help the pain. Pain is decreased to a certain extent when she lies on the left side with the use of ice or Biofreeze. Back Pain   This is a chronic problem. The current episode started more than 1 year ago. The problem occurs constantly. The problem has been gradually worsening since onset. The pain is present in the lumbar spine and sacro-iliac. The quality of the pain is described as aching, cramping and stabbing (pressure like, nagging). The pain radiates to the left knee, right foot, right thigh and left foot (worse on rt side). The pain is at a severity of 6/10. The pain is severe. The pain is the same all the time. The symptoms are aggravated by standing, sitting, bending, position, twisting and lying down (walking, lifting, ADLs). Associated symptoms include numbness, tingling and weakness.  Pertinent negatives include no abdominal pain, chest pain, dysuria or paresis. (Rt>lt) Risk factors include lack of exercise and obesity. 10/9/2017 LESI 11/13/2017 SI joint injection seen on 12/9/2019 and LESI was planned but patient did not have it    Alleviating factors:heat and rest   Lifestyle changes experienced with pain: Wakes from sleep, Prevents or limits ADLs, Increases w/activity. , Increases w/prolonged sitting/standing/walking  Mood changes,none  Patient currently unemployed. Physical therapy did not help the pain. Are you under psychological counseling at present: No  Goals for treatment include:  Decrease in pain  Enjoy daily and recreational activities, return to previous status. Patient relates current medications are helping the pain. Patient reports taking pain medications as prescribed, denies obtaining medications from different sources and denies use of illegal drugs. Patient denies side effects from medications like nausea, vomiting, constipation or drowsiness. Patient reports current activities of daily living ar possible due to medications and would like to continue them. ADVERSE MEDICATION EFFECTS:   Nausea and vomiting: no   Constipation: no-Undercontrol-: yes  Dizziness/drowsy/sleepy--no  Urinary Retention: no    ACTIVITY/SOCIAL/EMOTIONAL:  Sleep Pattern: 6 hours per night.  generally restful sleep  Home Exercises: -Stretching  Activity:not significantly changed  Emotional Issues: normal.   Currently seeing a Psychiatrist or Psychologist:  No      ABERRANT BEHAVIORS SINCE LAST VISIT  Lost rx/pills:------------------------------------------ no  Taking  medication as prescribed: ----------- yes  Urine Drug Screen ---------------------------------  yes  Recent ER visits: -------------------------------------No  Pill count is appropriate: ---------------------------yes   Refills for prescriptions appropriate:---------- yes      Past Medical History:   Diagnosis Date    Aortic valve stenosis     mild per chart    Arthritis     CAD (coronary artery disease)     1 STENT    Diabetes mellitus ([address]     Heart murmur         Hyperlipidemia 2004    ON RX    MI, old     states many and they were mild    Pulmonary stenosis     Pulmonary valve stenosis     mild/congenital per chart    Sciatica     right leg    Thyroid disease 2004    HYPOTHYROIDISM ON RX    Wears glasses        Past Surgical History:   Procedure Laterality Date    CERVICAL FUSION      CORONARY ANGIOPLASTY WITH STENT PLACEMENT      1 STENT    FINGER SURGERY Left 2014    [place] ARTHOPLASTY LEFT THUMB    HYSTERECTOMY  1980    WITH RT SALPINGOOPHERECTOMY    JOINT REPLACEMENT Bilateral 1994    PARTIAL-KNEES    OTHER SURGICAL HISTORY Right 2014    thumb repair    SALPINGO-OOPHORECTOMY Left 1987    SHOULDER SURGERY Left 1993    SPURS    TOE OSTEOTOMY Right 2016    Right 5th digit adductory wedge osteotomy with K wire fixation        Family History   Problem Relation Age of Onset    Breast Cancer Mother         BREAST WITH METS T  LIVER AND LUNG    Other Father         AAA    Heart Disease Father     Asthma Sister     Diabetes Sister         NIDDM    Stroke Brother     Diabetes Brother         NIDDM    Stroke Maternal Grandmother     Asthma Son        Social History     Socioeconomic History    Marital status:       Spouse name: None    Number of children: None    Years of education: None    Highest education level: None   Occupational History    None   Social Needs    Financial resource strain: None    Food insecurity     Worry: None     Inability: None    Transportation needs     Medical: None     Non-medical: None   Tobacco Use    Smoking status: Former Smoker     Packs/day: 1.00     Years: 30.00     Pack years: 30.00     Types: Cigarettes     Last attempt to quit: 3/19/2004     Years since quittin.2    Smokeless tobacco: Never Used   Substance and Sexual MG tablet       nitroGLYCERIN (NITROSTAT) 0.4 MG SL tablet Place 0.4 mg under the tongue every 5 minutes as needed for Chest pain up to max of 3 total doses. If no relief after 1 dose, call 911.  albuterol sulfate  (90 BASE) MCG/ACT inhaler Inhale 2 puffs into the lungs every 6 hours as needed for Wheezing      Cholecalciferol (VITAMIN D3) 2000 UNITS CAPS Take 1 capsule by mouth daily.  celecoxib (CELEBREX) 200 MG capsule Take 200 mg by mouth 2 times daily.  Multiple Vitamins-Minerals (THERAPEUTIC MULTIVITAMIN-MINERALS) tablet Take 1 tablet by mouth daily.  ALPRAZolam (XANAX) 1 MG tablet Take 1 mg by mouth as needed for Anxiety.  aspirin 81 MG EC tablet Take 81 mg by mouth daily. LAST DOSE 9/8/14      metoprolol (TOPROL-XL) 100 MG XL tablet Take 100 mg by mouth daily.  FLUoxetine (PROZAC) 20 MG capsule Take 20 mg by mouth daily       metFORMIN (GLUCOPHAGE) 1000 MG tablet Take 1,000 mg by mouth every 12 hours. LAST DOSE 03/17/2014      ascorbic acid (VITAMIN C) 500 MG tablet Take 1,000 mg by mouth daily.  vitamin B-12 (CYANOCOBALAMIN) 500 MCG tablet Take 500 mcg by mouth daily. No current facility-administered medications on file prior to encounter. Review of Systems   Constitutional: Positive for fatigue. Negative for activity change, appetite change and unexpected weight change. HENT: Negative. Negative for congestion, dental problem and mouth sores. Eyes: Negative. Negative for redness and visual disturbance. Wears glasses   Respiratory: Positive for wheezing. Negative for cough. Cardiovascular: Negative for chest pain, palpitations and leg swelling. Hypertensive Dr Jesus Cortez. STENOSIS  H/ CAROTID STNOSIS   Gastrointestinal: Positive for nausea. Negative for abdominal pain, constipation and rectal pain. Endocrine: Negative for cold intolerance, heat intolerance and polyuria.         Diabetic   Genitourinary: Positive for vaginal pain. Negative for dysuria and hematuria. Musculoskeletal: Positive for back pain and gait problem. Skin: Negative. Negative for pallor and rash. Allergic/Immunologic: Positive for environmental allergies. Neurological: Positive for tingling, weakness and numbness. Bilateral leg   Psychiatric/Behavioral: Positive for behavioral problems. Negative for hallucinations and sleep disturbance. The patient is not nervous/anxious. Depression anxiety        Physical Exam  Skin:         Neurological:      General: No focal deficit present. Mental Status: She is alert.    Psychiatric:         Mood and Affect: Mood normal.            DATA:  LAB.:  8/18/2017 11:29 PM - Jacob, pn Incoming Lab Results From [facility]     Component Value Ref Range & Units Status Collected Lab   Pain Management Drug Panel Interp, Urine Inconsistent   [facility]   ________________________________________________________________   DRUGS EXPECTED:   HYDROCODONE [8/12/17 EVENING]   ________________________________________________________________   CONSISTENT with medications provided:   HYDROCODONE : based on the absence of hydrocodone and metabolites   ________________________________________________________________   INCONSISTENT with medications provided       X-Ray reports:  Narrative   EXAMINATION:   MRI OF THE LUMBAR SPINE WITHOUT CONTRAST, 6/13/2017 3:58 pm       TECHNIQUE:   Multiplanar multisequence MRI of the lumbar spine was performed without the   administration of intravenous contrast.       COMPARISON:   None.       HISTORY:   ORDERING SYSTEM PROVIDED HISTORY: Low back pain, unspecified back pain   laterality, unspecified chronicity, with sciatica presence unspecified   TECHNOLOGIST PROVIDED HISTORY:   Reason for exam:->Back Pain   Reason for exam:->Spinal stenosis       FINDINGS:   BONES/ALIGNMENT: There is a normal lumbar lordosis.  Assuming that the last well-formed disc represents L5-S1, the conus medullaris ends at L1 is within   normal limits.  No acute fracture or traumatic subluxation is identified. There is disc desiccation and disc height loss at L3-4 and L4-5.       SPINAL CORD: The conus terminates normally.       SOFT TISSUES: No paraspinal mass identified.       L1-L2: There is no significant disc herniation, spinal canal stenosis or   neural foraminal narrowing.       L2-L3: There is no significant disc herniation, spinal canal stenosis or   neural foraminal narrowing.       L3-L4: Disc bulge resulting in mild narrowing of the left neural foramen and   moderate narrowing of the thecal sac.       L4-L5: Disc bulge resulting in mild to moderate narrowing of both neural   foramen.  There is moderate narrowing of the thecal sac.       L5-S1: There is no significant disc herniation, spinal canal stenosis or   neural foraminal narrowing.           Impression   Degenerative thecal sac narrowing and neural foraminal stenosis at L3-4 and   L4-5. Clinical  impression:  1. Lumbar radiculopathy    2. Degenerative lumbar spinal stenosis    3. DDD (degenerative disc disease), lumbar    4. Lumbosacral spondylosis without myelopathy        Plan of care: We will continue current pain medications  Current medications are being tolerated without any Adverse side effects. Orders Placed This Encounter   Medications    HYDROcodone-acetaminophen (NORCO) 5-325 MG per tablet     Sig: Take 1 tablet by mouth every 12 hours as needed for Pain for up to 30 days. Dispense:  60 tablet     Refill:  0     Reduce doses taken as pain becomes manageable     Urine drug screens have been appropriate. No aberrant activity noted. Analgesia is achieved. Activities of daily living are possible because of medications. Safe use of medications explained to patient.     PDMP Monitoring:    Last PDMP [place] [facility] as Reviewed [facility]):  Review User Review Instant Review Result   THANG JOSE ANTONIO GOODRICH 6/30/2020 10:51 AM Reviewed PDMP [1]     Counselling/Preventive measures for pain  Control:    [x]  Spine strengthening exercises are discussed with patient in detail. [x] Ill effects of being on chronic pain medications such as sleep disturbances, hormonal changes, withdrawal symptoms,  chronic opioid dependence and tolerance were discussed with patient. I had asked the patient to minimize medication use and utilize pain medications only for uncontrolled rest pain or pain with exertional activities. I advised patient not to self escalate pain medications without consulting with us. At each of patient's future visits we will try to taper pain medications, while adjusting the adjunct medications, and re-evaluating for Physical Therapy to improve spinal and joint strength. We will continue to have discussions to decrease pain medications as tolerated. I also discussed with the patient regarding the dangers of combining narcotic pain medication with tranquilizers, alcohol or illegal drugs or taking the medication any other than prescribed. The dangers including the respiratory depression and death. Patient was told to tell  to all  physicians regarding the medications he is getting from pain clinic. Patient is warned not to take any unprescribed medications over-the-counter medications that can depress breathing . Patient is advised to talk to the pharmacist or physicians if planning to take any over-the-counter medications before  takeing them. Patient is strongly advised to avoid tranquilizers or  Relaxants for any medications that can depress breathing or recreational drugs. Patient is also advised to tell us if there is any changes in his medications from other physicians. We discussed the same at today's visit and have not been to implement it, as the patient's pain is not under control with current medications.   Patient reports increased in the pain levels and is unable to function because of the pain.  Pain radiates mostly towards the right lower extremity. Hence we decided to try lumbar epidural steroid injections for the pain relief she had 1 done in 2017 with good improvement in the pain. She is requesting another injection. The following treatment plan was developed after discussion with patient:    We discussed Lumbar Epidural steroid Injections x 1  at L4 - L5. Patient tried and failed NSAIDS,Home exercises, Physical Therapy, Chiropractic manipulations without relief. Patient exhibited signs of radiculopathy with positive straight leg raising test on right    Patient has not had prior Lumbar Surgery. We will see the patient in 2 weeks after the procedures and re-evaluate symptoms. Orders Placed This Encounter   Procedures    Lumbar Epidural Steroid Injection/Caudal     Standing Status:   Future     Standing Expiration Date:   7/1/2021   Liam Modest For Surgical Procedures     Standing Status:   Future     Standing Expiration Date:   7/1/2021    Saline lock IV     Standing Status:   Future     Standing Expiration Date:   1/1/2022       Decision Making Process : Patient's health history and referral records thoroughly reviewed before focused physical examination and discussion with patient. I have spent 25 mins. Over 50% of today's visit is spent on examining the patient and counseling and coordinating the care. Level of complexity of date to be reviewed is Moderate. The chart date reviewed include the following: Imaging Reports. Summary of Care. Time spent reviewing with patient the below reports:   Medication safety, Treatment options. Level of diagnosis and management options of this case is multiple: involving the following management options: Interventions as needed, medication management as appropriate, future visits, activity modification, heat/ice as needed, Urine drug screen as required.      [x]The patient's questions were answered to the best of my abilities. This note was created using voice recognition software. There may be inaccuracies of transcription  that are inadvertently overlooked prior to the signature. There is any questions about the transcription please contact me. Return in  4 weeks  with physician / CNP  for further plan of treatment. Due to the COVID-19 pandemic and the appropriate interventions by Salvador Post, our non-urgent pain management patients will not be seen in the office at this time for their protection and the protection of our staff. To offer continuity of care, their prescriptions will be escribed this month after a careful chart review and review of their OARRS report  Pursuant to the emergency declaration under the [employer] and [facility], 1135 waiver authority and the [employer] and [employer] Act, this Virtual Visit was conducted, with patient's consent, to reduce the patient's risk of exposure to COVID-19 and provide continuity of care for an established patient. Services were provided through a video synchronous discussion virtually to substitute for in-person appointment. \"  Documentation:  I communicated with the patient and/or health care decision maker about plan of care  Details of this discussion including any medical advice provided: Total Time: minutes: 21-30 minutes    I affirm this is a Patient Initiated Episode with an Established Patient who has not had a related appointment within my department in the past 7 days or scheduled within the next 24 hours.     Electronically signed by Chloe Yusuf MD on 7/2/2020 at 7:23 AM

## 2020-07-01 ENCOUNTER — HOSPITAL ENCOUNTER (OUTPATIENT)
Dept: PAIN MANAGEMENT | Age: 70
Discharge: HOME | End: 2020-07-01
Payer: MEDICARE

## 2020-07-01 PROCEDURE — 99213 OFFICE O/P EST LOW 20 MIN: CPT

## 2020-07-01 PROCEDURE — 99215 OFFICE O/P EST HI 40 MIN: CPT | Performed by: PAIN MEDICINE

## 2020-07-01 RX ORDER — HYDROCODONE BITARTRATE AND ACETAMINOPHEN 5; 325 MG/1; MG/1
1 TABLET ORAL EVERY 12 HOURS PRN
Qty: 60 TABLET | Refills: 0 | Status: SHIPPED | OUTPATIENT
Start: 2020-07-01 | End: 2020-08-31 | Stop reason: SDUPTHER

## 2020-08-31 ENCOUNTER — HOSPITAL ENCOUNTER (OUTPATIENT)
Dept: PAIN MANAGEMENT | Age: 70
Discharge: HOME | End: 2020-08-31
Payer: MEDICARE

## 2020-08-31 PROCEDURE — 99213 OFFICE O/P EST LOW 20 MIN: CPT

## 2020-08-31 PROCEDURE — 99214 OFFICE O/P EST MOD 30 MIN: CPT | Performed by: PAIN MEDICINE

## 2020-08-31 RX ORDER — HYDROCODONE BITARTRATE AND ACETAMINOPHEN 5; 325 MG/1; MG/1
1 TABLET ORAL EVERY 12 HOURS PRN
Qty: 60 TABLET | Refills: 0 | Status: SHIPPED | OUTPATIENT
Start: 2020-08-31 | End: 2020-10-28 | Stop reason: SDUPTHER

## 2020-08-31 ASSESSMENT — ENCOUNTER SYMPTOMS
WHEEZING: 1
NAUSEA: 1
EYE REDNESS: 0
BACK PAIN: 1
RECTAL PAIN: 0
ABDOMINAL PAIN: 0
CONSTIPATION: 0
EYES NEGATIVE: 1
COUGH: 0

## 2020-08-31 NOTE — PROGRESS NOTES
Austin Tobias is a 79 y.o. female evaluated on 8/31/2020. Modality of virtual service provided -via  telephone   Consent:  Patient and/or health care decision maker is aware that that patient may receive a bill for this telephone service, depending on one's insurance coverage, and has provided verbal consent to proceed: Yes    Patient identification was verified at the start of the visit: Yes    Chief complaint: Austin Tobias is 79 y.o., - female, with plaint of back pain. Patient is complaining of pain involving the low back area with pain radiating to both lower extremities. Patient reports that she is unable to carry even a gallon of milk because of the pain in the back. She also has pain in her knees due to arthritis. The pain is worse in her left knee. She reports her back pain is gradually getting worse. She had undergone lumbar epidural steroid injection and 2017 with some improvement in the pain by 70% and she is requesting a repeat procedure as her pain is getting worse and is interfering with the activities of daily living. She is at present taking Norco twice a day to control her pain and she will like us to write the pain medications instead of her primary care physician. Back Pain   This is a chronic problem. The current episode started more than 1 year ago. The problem occurs constantly. The problem has been gradually worsening since onset. The pain is present in the lumbar spine and sacro-iliac. The quality of the pain is described as aching and cramping (sometimes sharp). The pain radiates to the left knee, right foot, right thigh and left foot (worse on rt side). The pain is at a severity of 9/10 (7-10). The pain is severe. The pain is the same all the time. The symptoms are aggravated by standing, sitting, bending, position, twisting and lying down (walking, lifting, ADLs). Associated symptoms include numbness, tingling and weakness.  Pertinent negatives include no abdominal pain, chest pain, dysuria or paresis. (Rt>lt) Risk factors include lack of exercise and obesity. Alleviating factors:heat, rest and stretching  Lifestyle changes experienced with pain: Wakes from sleep, Prevents or limits ADLs, Increases w/activity. Increases w/prolonged sitting/standing/walking  Mood changes,none  Patient currently unemployed. Physical therapy did not help the pain. Are you under psychological counseling at present: Yes  Goals for treatment include:  Decrease in pain  Enjoy daily and recreational activities, return to previous status. Patient relates current medications are helping the pain. Patient reports taking pain medications as prescribed, denies obtaining medications from different sources and denies use of illegal drugs. Patient denies side effects from medications like nausea, vomiting, constipation or drowsiness. Patient reports current activities of daily living ar possible due to medications and would like to continue them. ACTIVITY/SOCIAL/EMOTIONAL:  Sleep Pattern: 8 hours per night. nightime awakenings  Home Exercises: daily Stretching and strengthening  Activity:unchanged  Emotional Issues: anxiety/ nervousness.    Currently seeing a Psychiatrist or Psychologist:  Yes     ADVERSE MEDICATION EFFECTS:   Nausea and vomiting: no   Constipation: no-Undercontrol-: yes  Dizziness/drowsy/sleepy--no  Urinary Retention: no    ABERRANT BEHAVIORS SINCE LAST VISIT  Lost rx/pills:------------------------------------------ no  Taking  medication as prescribed: ----------- yes  Urine Drug Screen ---------------------------------  no  Recent ER visits: -------------------------------------No  Pill count is appropriate: ---------------------------yes   Refills for prescriptions appropriate:---------- yes      Past Medical History:   Diagnosis Date    Aortic valve stenosis     mild per chart    Arthritis     CAD (coronary artery disease) 2000    1 STENT    [facility]     Heart murmur         Hyperlipidemia 2004    ON RX    MI, old     states many and they were mild    Pulmonary stenosis     Pulmonary valve stenosis     mild/congenital per chart    Sciatica     right leg    Thyroid disease 2004    HYPOTHYROIDISM ON RX    Wears glasses        Past Surgical History:   Procedure Laterality Date    CERVICAL FUSION      CORONARY ANGIOPLASTY WITH STENT PLACEMENT      1 STENT    FINGER SURGERY Left 2014    [place] ARTHOPLASTY LEFT THUMB    HYSTERECTOMY  1980    WITH RT SALPINGOOPHERECTOMY    JOINT REPLACEMENT Bilateral 1994    PARTIAL-KNEES    OTHER SURGICAL HISTORY Right 2014    thumb repair    SALPINGO-OOPHORECTOMY Left 1987    SHOULDER SURGERY Left     SPURS    TOE OSTEOTOMY Right 2016    Right 5th digit adductory wedge osteotomy with K wire fixation        Family History   Problem Relation Age of Onset    Breast Cancer Mother         BREAST WITH METS T  LIVER AND LUNG    Other Father         AAA    Heart Disease Father     Asthma Sister     Diabetes Sister         NIDDM    Stroke Brother     Diabetes Brother         NIDDM    Stroke Maternal Grandmother     Asthma Son        Social History     Socioeconomic History    Marital status:       Spouse name: None    Number of children: None    Years of education: None    Highest education level: None   Occupational History    None   Social Needs    Financial resource strain: None    Food insecurity     Worry: None     Inability: None    Transportation needs     Medical: None     Non-medical: None   Tobacco Use    Smoking status: Former Smoker     Packs/day: 1.00     Years: 30.00     Pack years: 30.00     Types: Cigarettes     Last attempt to quit: 3/19/2004     Years since quittin.4    Smokeless tobacco: Never Used   Substance and Sexual Activity    Alcohol use: Yes     Comment: socially, once a year    Drug use: No    Sexual activity: None   Lifestyle    Physical activity     Days per week: None     Minutes per session: None    Stress: None   Relationships    Social connections     Talks on phone: None     Gets together: None     Attends Zoroastrian service: None     Active member of club or organization: None     Attends meetings of clubs or organizations: None     Relationship status: None    Intimate partner violence     Fear of current or ex partner: None     Emotionally abused: None     Physically abused: None     Forced sexual activity: None   Other Topics Concern    None   Social History Narrative    None       Allergies   Allergen Reactions    Pcn [Penicillins] Swelling and Hives    Heparin Other (See Comments)     MIGRAINE      Lamotrigine Other (See Comments), Itching, Nausea Only and Rash    Cyclobenzaprine     Heparin (Porcine)      Other reaction(s): Migraine    Other      PORCINE PRODUCTS       Current Outpatient Medications on File Prior to Encounter   Medication Sig Dispense Refill    isosorbide mononitrate (IMDUR) 30 MG extended release tablet Take 1 tablet by mouth daily 30 tablet 3    pantoprazole (PROTONIX) 40 MG tablet Take 1 tablet by mouth every morning (before breakfast) 30 tablet 3    baclofen (LIORESAL) 10 MG tablet Take 10 mg by mouth 2 times daily      fluticasone (FLONASE) 50 MCG/ACT nasal spray 1 spray by Nasal route daily      levocetirizine (XYZAL) 5 MG tablet Take 5 mg by mouth nightly      nystatin (MYCOSTATIN) 098970 UNIT/GM cream Apply topically 2 times daily Apply topically 2 times daily.  oxybutynin (DITROPAN XL) 15 MG extended release tablet Take 15 mg by mouth daily      levothyroxine (SYNTHROID) 112 MCG tablet TAKE ONE TABLET BY MOUTH DAILY      HYDROcodone-acetaminophen (NORCO) 7.5-325 MG per tablet Take 1 tablet by mouth every 8 hours as needed.  Fili Bearsmooth atorvastatin (LIPITOR) 40 MG tablet       nitroGLYCERIN (NITROSTAT) 0.4 MG SL tablet Place 0.4 mg under the tongue every 5 minutes as needed for Chest pain up to max of 3 total doses. If no relief after 1 dose, call 911.  albuterol sulfate  (90 BASE) MCG/ACT inhaler Inhale 2 puffs into the lungs every 6 hours as needed for Wheezing      Cholecalciferol (VITAMIN D3) 2000 UNITS CAPS Take 1 capsule by mouth daily.  celecoxib (CELEBREX) 200 MG capsule Take 200 mg by mouth 2 times daily.  Multiple Vitamins-Minerals (THERAPEUTIC MULTIVITAMIN-MINERALS) tablet Take 1 tablet by mouth daily.  ALPRAZolam (XANAX) 1 MG tablet Take 1 mg by mouth as needed for Anxiety.  aspirin 81 MG EC tablet Take 81 mg by mouth daily. LAST DOSE 9/8/14      metoprolol (TOPROL-XL) 100 MG XL tablet Take 100 mg by mouth daily.  FLUoxetine (PROZAC) 20 MG capsule Take 20 mg by mouth daily       metFORMIN (GLUCOPHAGE) 1000 MG tablet Take 1,000 mg by mouth every 12 hours. LAST DOSE 03/17/2014      ascorbic acid (VITAMIN C) 500 MG tablet Take 1,000 mg by mouth daily.  vitamin B-12 (CYANOCOBALAMIN) 500 MCG tablet Take 500 mcg by mouth daily. No current facility-administered medications on file prior to encounter. Review of Systems   Constitutional: Positive for fatigue. Negative for activity change, appetite change and unexpected weight change. HENT: Negative. Negative for congestion, dental problem and mouth sores. Eyes: Negative. Negative for redness and visual disturbance. Wears glasses   Respiratory: Positive for wheezing. Negative for cough. Cardiovascular: Negative for chest pain, palpitations and leg swelling. Hypertensive Dr Richard Hwang. STENOSIS  H/ CAROTID STNOSIS   Gastrointestinal: Positive for nausea. Negative for abdominal pain, constipation and rectal pain. Endocrine: Negative for cold intolerance, heat intolerance and polyuria. Diabetic   Genitourinary: Positive for vaginal pain. Negative for dysuria and hematuria. Musculoskeletal: Positive for back pain and gait problem. Skin: Negative. Negative for pallor and rash. Allergic/Immunologic: Positive for environmental allergies. Neurological: Positive for tingling, weakness and numbness. Bilateral leg   Psychiatric/Behavioral: Positive for behavioral problems. Negative for hallucinations and sleep disturbance. The patient is not nervous/anxious. Depression anxiety        Physical Exam  Skin:                  DATA:  LAB.:  8/18/2017 11:29 PM - Jacob, [facility] Incoming Lab Results From [facility]     Component  Value  Ref Range & Units  Status  Collected  Lab    Pain Management Drug Panel Interp, Urine  Inconsistent   Final  08/14/2017  2:19 PM  [facility]    (NOTE)   ________________________________________________________________   DRUGS EXPECTED:   HYDROCODONE [8/12/17 EVENING]   ________________________________________________________________   CONSISTENT with medications provided:   HYDROCODONE : based on the absence of hydrocodone and metabolites   ________________________________________________________________   INCONSISTENT with medications provided:   Temazepam        X-Ray reports:  EXAMINATION:   MRI OF THE LUMBAR SPINE WITHOUT CONTRAST, 6/13/2017 3:58 pm       TECHNIQUE:   Multiplanar multisequence MRI of the lumbar spine was performed without the   administration of intravenous contrast.       COMPARISON:   None. HISTORY:   ORDERING SYSTEM PROVIDED HISTORY: Low back pain, unspecified back pain   laterality, unspecified chronicity, with sciatica presence unspecified   TECHNOLOGIST PROVIDED HISTORY:   Reason for exam:->Back Pain   Reason for exam:->Spinal stenosis       FINDINGS:   BONES/ALIGNMENT: There is a normal lumbar lordosis. Assuming that the last   well-formed disc represents L5-S1, the conus medullaris ends at L1 is within   normal limits. No acute fracture or traumatic subluxation is identified. There is disc desiccation and disc height loss at L3-4 and L4-5.        SPINAL CORD: The conus terminates normally. SOFT TISSUES: No paraspinal mass identified. L1-L2: There is no significant disc herniation, spinal canal stenosis or   neural foraminal narrowing. L2-L3: There is no significant disc herniation, spinal canal stenosis or   neural foraminal narrowing. L3-L4: Disc bulge resulting in mild narrowing of the left neural foramen and   moderate narrowing of the thecal sac. L4-L5: Disc bulge resulting in mild to moderate narrowing of both neural   foramen. There is moderate narrowing of the thecal sac. L5-S1: There is no significant disc herniation, spinal canal stenosis or   neural foraminal narrowing. Impression   Degenerative thecal sac narrowing and neural foraminal stenosis at L3-4 and   L4-5. FINDINGS:   BONES/ALIGNMENT: There is a normal lumbar lordosis. Assuming that the last   well-formed disc represents L5-S1, the conus medullaris ends at L1 is within   normal limits. No acute fracture or traumatic subluxation is identified. There is disc desiccation and disc height loss at L3-4 and L4-5. SPINAL CORD: The conus terminates normally. SOFT TISSUES: No paraspinal mass identified. L1-L2: There is no significant disc herniation, spinal canal stenosis or   neural foraminal narrowing. L2-L3: There is no significant disc herniation, spinal canal stenosis or   neural foraminal narrowing. L3-L4: Disc bulge resulting in mild narrowing of the left neural foramen and   moderate narrowing of the thecal sac. L4-L5: Disc bulge resulting in mild to moderate narrowing of both neural   foramen. There is moderate narrowing of the thecal sac. L5-S1: There is no significant disc herniation, spinal canal stenosis or   neural foraminal narrowing. Impression   Degenerative thecal sac narrowing and neural foraminal stenosis at L3-4 and   L4-5. Clinical  impression:  1. Lumbar radiculopathy    2.  Degenerative lumbar spinal stenosis    3. DDD (degenerative disc disease), lumbar    4. Lumbosacral spondylosis without myelopathy        Plan of care: We will continue current pain medications  Current medications are being tolerated without any Adverse side effects. Orders Placed This Encounter   Medications    HYDROcodone-acetaminophen (NORCO) 5-325 MG per tablet     Sig: Take 1 tablet by mouth every 12 hours as needed for Pain for up to 30 days. Dispense:  60 tablet     Refill:  0     Reduce doses taken as pain becomes manageable     Urine drug screens have been appropriate. No aberrant activity noted. Analgesia is achieved. Activities of daily living are possible because of medications. Safe use of medications explained to patient. PDMP Monitoring:    Last PDMP Jenn Tristins as Reviewed [facility]):  Review User Review Instant Review Result   Changfanta Enirquez 8/31/2020 12:01 PM Reviewed PDMP [1]     Counselling/Preventive measures for pain  Control:    [x]  Spine strengthening exercises are discussed with patient in detail. [x] Ill effects of being on chronic pain medications such as sleep disturbances, hormonal changes, withdrawal symptoms,  chronic opioid dependence and tolerance were discussed with patient. I had asked the patient to minimize medication use and utilize pain medications only for uncontrolled rest pain or pain with exertional activities. I advised patient not to self escalate pain medications without consulting with us. At each of patient's future visits we will try to taper pain medications, while adjusting the adjunct medications, and re-evaluating for Physical Therapy to improve spinal and joint strength. We will continue to have discussions to decrease pain medications as tolerated. I also discussed with the patient regarding the dangers of combining narcotic pain medication with tranquilizers, alcohol or illegal drugs or taking the medication any other than prescribed.  The dangers including the respiratory depression and death. Patient was told to tell  to all  physicians regarding the medications he is getting from pain clinic. Patient is warned not to take any unprescribed medications over-the-counter medications that can depress breathing . Patient is advised to talk to the pharmacist or physicians if planning to take any over-the-counter medications before  takeing them. Patient is strongly advised to avoid tranquilizers or  Relaxants for any medications that can depress breathing or recreational drugs. Patient is also advised to tell us if there is any changes in his medications from other physicians. We discussed the same at today's visit and have not been to implement it, as the patient's pain is not under control with current medications. The following treatment plan was developed after discussion with patient:    We discussed Lumbar Epidural steroid Injections x 1  at L3 - L4.  /L4-5    Patient tried and failed NSAIDS,Home exercises, Physical Therapy, Chiropractic manipulations without relief. Patient exhibited signs of radiculopathy bilaterally    Patient has not had prior Lumbar Surgery. We will see the patient in 2 weeks after the procedures and re-evaluate symptoms. Orders Placed This Encounter   Procedures    Lumbar Epidural Steroid Injection/Caudal     Standing Status:   Future     Standing Expiration Date:   8/31/2021    FLUORO FOR SURGICAL PROCEDURES     Standing Status:   Future     Standing Expiration Date:   8/31/2021    Saline lock IV     Standing Status:   Future     Standing Expiration Date:   2/28/2022       Decision Making Process : Patient's health history and referral records thoroughly reviewed before focused physical examination and discussion with patient. I have spent 25 mins. Over 50% of today's visit is spent on examining the patient and counseling and coordinating the care. Level of complexity of date to be reviewed is Moderate.  The chart date reviewed include the following: Imaging Reports. Summary of Care. Time spent reviewing with patient the below reports:   Medication safety, Treatment options. Level of diagnosis and management options of this case is multiple: involving the following management options: Interventions as needed, medication management as appropriate, future visits, activity modification, heat/ice as needed, Urine drug screen as required. [x]The patient's questions were answered to the best of my abilities. This note was created using voice recognition software. There may be inaccuracies of transcription  that are inadvertently overlooked prior to the signature. There is any questions about the transcription please contact me. Return in  4 weeks  with physician / CNP  for further plan of treatment. Due to the COVID-19 pandemic and the appropriate interventions by Salvador Post, our non-urgent pain management patients will not be seen in the office at this time for their protection and the protection of our staff. To offer continuity of care, their prescriptions will be escribed this month after a careful chart review and review of their OARRS report  Pursuant to the emergency declaration under the [employer] [facility], [facility]5 waiver authority and the [employer] and [employer] Act, this Virtual Visit was conducted, with patient's consent, to reduce the patient's risk of exposure to COVID-19 and provide continuity of care for an established patient. Services were provided through a video synchronous discussion virtually to substitute for in-person appointment. \"  Documentation:  I communicated with the patient and/or health care decision maker about plan of care  Details of this discussion including any medical advice provided:   Total Time: minutes: 21-30 minutes    I affirm this is a Patient Initiated Episode with an Established

## 2020-09-11 ENCOUNTER — TELEPHONE (OUTPATIENT)
Dept: PAIN MANAGEMENT | Age: 70
End: 2020-09-11

## 2020-09-14 ENCOUNTER — HOSPITAL ENCOUNTER (OUTPATIENT)
Dept: PAIN MANAGEMENT | Age: 70
Discharge: HOME | End: 2020-09-14
Payer: MEDICARE

## 2020-09-14 ENCOUNTER — HOSPITAL ENCOUNTER (OUTPATIENT)
Dept: GENERAL RADIOLOGY | Age: 70
Discharge: HOME | End: 2020-09-16
Payer: MEDICARE

## 2020-09-14 VITALS
TEMPERATURE: 98.4 F | SYSTOLIC BLOOD PRESSURE: 165 MMHG | HEIGHT: 65 IN | DIASTOLIC BLOOD PRESSURE: 75 MMHG | OXYGEN SATURATION: 98 % | RESPIRATION RATE: 16 BRPM | BODY MASS INDEX: 35.32 KG/M2 | HEART RATE: 55 BPM | WEIGHT: 212 LBS

## 2020-09-14 PROCEDURE — 6360000004 HC RX CONTRAST MEDICATION: Performed by: PAIN MEDICINE

## 2020-09-14 PROCEDURE — 6360000002 HC RX W HCPCS: Performed by: PAIN MEDICINE

## 2020-09-14 PROCEDURE — 62323 NJX INTERLAMINAR LMBR/SAC: CPT | Performed by: PAIN MEDICINE

## 2020-09-14 PROCEDURE — 62323 NJX INTERLAMINAR LMBR/SAC: CPT

## 2020-09-14 PROCEDURE — 3209999900 FLUORO FOR SURGICAL PROCEDURES

## 2020-09-14 RX ORDER — TRIAMCINOLONE ACETONIDE 40 MG/ML
INJECTION, SUSPENSION INTRA-ARTICULAR; INTRAMUSCULAR
Status: COMPLETED | OUTPATIENT
Start: 2020-09-14 | End: 2020-09-14

## 2020-09-14 RX ADMIN — IOHEXOL 2 ML: 180 INJECTION INTRAVENOUS at 11:07

## 2020-09-14 RX ADMIN — TRIAMCINOLONE ACETONIDE 80 MG: 40 INJECTION, SUSPENSION INTRA-ARTICULAR; INTRAMUSCULAR at 11:08

## 2020-09-14 ASSESSMENT — PAIN DESCRIPTION - DESCRIPTORS: DESCRIPTORS: CONSTANT;SORE

## 2020-09-14 ASSESSMENT — PAIN - FUNCTIONAL ASSESSMENT
PAIN_FUNCTIONAL_ASSESSMENT: 0-10
PAIN_FUNCTIONAL_ASSESSMENT: 0-10

## 2020-09-14 NOTE — PROGRESS NOTES
Discharge criteria met. Patient alert and oriented x3  Post procedure dressing dry and intact. Sensory and motor function intact as per pre-procedure. Instructions and follow up reviewed with pt at patient at discharge. Patient discharged ambulatory @ 11: 30AM. Steady gait. Son to drive pt home and care for her today.

## 2020-09-14 NOTE — PROCEDURES
Pre-Procedure Note    Patient Name: Joy Thapa   YOB: 1950  Room/Bed: Room/bed info not found  Medical Record Number: [id number]  Date: 9/14/2020       Indication:    1. Lumbar radiculopathy    2. Degenerative lumbar spinal stenosis    3. DDD (degenerative disc disease), lumbar    4. Lumbosacral spondylosis without myelopathy        Consent: On file. Vital Signs:   Vitals:    09/14/20 1126   BP: (!) 165/75   Pulse: 55   Resp: 16   Temp:    SpO2:        Past Medical History:   has a past medical history of Aortic valve stenosis, Arthritis, CAD (coronary artery disease), Diabetes mellitus ([address] Heart murmur, Hyperlipidemia, MI, old, Pulmonary stenosis, Pulmonary valve stenosis, Sciatica, Thyroid disease, and Wears glasses. Past Surgical History:   has a past surgical history that includes Hysterectomy (1980); Salpingo-oophorectomy (Left, 1987); shoulder surgery (Left, 1993); cervical fusion (1993); joint replacement (Bilateral, 1994); Coronary angioplasty with stent (2000); Finger surgery (Left, 03/21/2014); other surgical history (Right, 09/23/2014); and Toe Osteotomy (Right, 6/8/2016). Pre-Sedation Documentation and Exam:   Vital signs have been reviewed (see flow sheet for vitals). Mallampati Airway Assessment:  normal    ASA Classification:  Class 3 - A patient with severe systemic disease that limits activity but is not incapacitating    Sedation/ Anesthesia Plan:   intravenous sedation   as needed. Medications Planned:   midazolam (Versed) / Fentanyl  Intravenously  as needed. Patient is an appropriate candidate for plan of sedation: yes  Patient's History and Physical examination was reviewed and there is no change. Electronically signed by Sammie Gonzales MD on 9/14/2020 at 12:11 PM        Preoperative Diagnosis:    1. Lumbar radiculopathy    2. Degenerative lumbar spinal stenosis    3. DDD (degenerative disc disease), lumbar    4.  Lumbosacral spondylosis without myelopathy        Postoperative Diagnosis:    1. Lumbar radiculopathy    2. Degenerative lumbar spinal stenosis    3. DDD (degenerative disc disease), lumbar    4. Lumbosacral spondylosis without myelopathy        Procedure Performed:  Lumbar epidural steroid injection under fluoroscopy guidance without IV sedation. Indication for the Procedure: The patient failed conservative management  for pain in the low back radiating to lower extremities. f.    As the patient is not responding to conservative management and it is interfering with activities of daily living we decided to proceed with lumbar epidural steroid injection. The procedure and risks were discussed with the patient and an informed consent was obtained  Current Pain Assessment  Pain Assessment  Pain Assessment: 0-10  Pain Level: 4  Patient's Stated Pain Goal: 2  Pain Type: Chronic pain  Pain Location: Back  Pain Orientation: Right, Left, Posterior  Pain Radiating Towards: BILAT LEG TENDERNESS  Pain Descriptors: Constant, Sore  Pain Frequency: Continuous  Pain Onset: On-going  Clinical Progression: Gradually worsening  Effect of Pain on Daily Activities: stairs and distance walking prolonged standing     Procedure:    . Patient's vital signs including BP, EKG and SaO2 were monitored by the RN and they remained stable during the procedure. A meaningful communication was kept up with the patient throughout  the procedure. The patient is placed in prone position. Skin over the back was prepped and draped in sterile manner. Then using fluoroscopy the L4, L5 interspace was observed and the skin and deep tissues in the right paramedian area were infiltrated with 4 ml of 1% lidocaine. The #20-gauge, 3-1/2 inch Tuohy needle was inserted through the skin wheal and the epidural space was identified using loss of resistance technique with normal saline.      This was confirmed with AP and lateral views using fluoroscopy after injecting about 2 ml of Omnipaque-180 and observing the spread of the contrast in the epidural space. Then after negative aspiration a total of 80 mg of triamcinolone with 8 ml of normal saline was injected into the epidural space. The needle is removed and a Band-Aid was placed over the needle insertion site. Patient's vital signs remained stable and the patient tolerated the procedure well. The patient was discharged home in stable condition and will be followed in the pain clinic in the next few weeks for further planning.     Electronically signed by Sammie Gonzales MD on 9/14/2020 at 12:11 PM

## 2020-09-15 ENCOUNTER — TELEPHONE (OUTPATIENT)
Dept: PAIN MANAGEMENT | Age: 70
End: 2020-09-15

## 2020-09-27 ASSESSMENT — ENCOUNTER SYMPTOMS
BACK PAIN: 1
ABDOMINAL PAIN: 0
CONSTIPATION: 0
EYE REDNESS: 0
NAUSEA: 1
RECTAL PAIN: 0
COUGH: 0
EYES NEGATIVE: 1

## 2020-09-27 NOTE — PROGRESS NOTES
 Other Father         AAA    Heart Disease Father     Asthma Sister     Diabetes Sister         NIDDM    Stroke Brother     Diabetes Brother         NIDDM    Stroke Maternal Grandmother     Asthma Son        Social History     Socioeconomic History    Marital status:       Spouse name: None    Number of children: None    Years of education: None    Highest education level: None   Occupational History    None   Social Needs    Financial resource strain: None    Food insecurity     Worry: None     Inability: None    Transportation needs     Medical: None     Non-medical: None   Tobacco Use    Smoking status: Former Smoker     Packs/day: 1.00     Years: 30.00     Pack years: 30.00     Types: Cigarettes     Last attempt to quit: 3/19/2004     Years since quittin.5    Smokeless tobacco: Never Used   Substance and Sexual Activity    Alcohol use: Yes     Comment: socially, once a year    Drug use: No    Sexual activity: None   Lifestyle    Physical activity     Days per week: None     Minutes per session: None    Stress: None   Relationships    Social connections     Talks on phone: None     Gets together: None     Attends Sikh service: None     Active member of club or organization: None     Attends meetings of clubs or organizations: None     Relationship status: None    Intimate partner violence     Fear of current or ex partner: None     Emotionally abused: None     Physically abused: None     Forced sexual activity: None   Other Topics Concern    None   Social History Narrative    None       Allergies   Allergen Reactions    Pcn [Penicillins] Swelling and Hives    Heparin Other (See Comments)     MIGRAINE      Lamotrigine Other (See Comments), Itching, Nausea Only and Rash    Cyclobenzaprine     Heparin (Porcine)      Other reaction(s): Migraine    Other      PORCINE PRODUCTS       Current Outpatient Medications on File Prior to Encounter   Medication Sig Dispense Refill    HYDROcodone-acetaminophen (NORCO) 5-325 MG per tablet Take 1 tablet by mouth every 12 hours as needed for Pain for up to 30 days. 60 tablet 0    isosorbide mononitrate (IMDUR) 30 MG extended release tablet Take 1 tablet by mouth daily 30 tablet 3    pantoprazole (PROTONIX) 40 MG tablet Take 1 tablet by mouth every morning (before breakfast) 30 tablet 3    baclofen (LIORESAL) 10 MG tablet Take 10 mg by mouth 2 times daily      fluticasone (FLONASE) 50 MCG/ACT nasal spray 1 spray by Nasal route daily      levocetirizine (XYZAL) 5 MG tablet Take 5 mg by mouth nightly      nystatin (MYCOSTATIN) 069945 UNIT/GM cream Apply topically 2 times daily Apply topically 2 times daily.  oxybutynin (DITROPAN XL) 15 MG extended release tablet Take 15 mg by mouth daily      levothyroxine (SYNTHROID) 112 MCG tablet TAKE ONE TABLET BY MOUTH DAILY      HYDROcodone-acetaminophen (NORCO) 7.5-325 MG per tablet Take 1 tablet by mouth every 8 hours as needed. Estevan Loss atorvastatin (LIPITOR) 40 MG tablet       nitroGLYCERIN (NITROSTAT) 0.4 MG SL tablet Place 0.4 mg under the tongue every 5 minutes as needed for Chest pain up to max of 3 total doses. If no relief after 1 dose, call 911.  albuterol sulfate  (90 BASE) MCG/ACT inhaler Inhale 2 puffs into the lungs every 6 hours as needed for Wheezing      Cholecalciferol (VITAMIN D3) 2000 UNITS CAPS Take 1 capsule by mouth daily.  celecoxib (CELEBREX) 200 MG capsule Take 200 mg by mouth 2 times daily.  Multiple Vitamins-Minerals (THERAPEUTIC MULTIVITAMIN-MINERALS) tablet Take 1 tablet by mouth daily.  ALPRAZolam (XANAX) 1 MG tablet Take 1 mg by mouth as needed for Anxiety.  aspirin 81 MG EC tablet Take 81 mg by mouth daily. LAST DOSE 9/8/14      metoprolol (TOPROL-XL) 100 MG XL tablet Take 100 mg by mouth daily.       FLUoxetine (PROZAC) 20 MG capsule Take 20 mg by mouth daily       metFORMIN (GLUCOPHAGE) 1000 MG tablet Take 1,000 mg by mouth every 12 hours. LAST DOSE 03/17/2014      ascorbic acid (VITAMIN C) 500 MG tablet Take 1,000 mg by mouth daily.  vitamin B-12 (CYANOCOBALAMIN) 500 MCG tablet Take 500 mcg by mouth daily. No current facility-administered medications on file prior to encounter. Review of Systems   Constitutional: Negative for activity change, appetite change, fatigue and unexpected weight change. HENT: Negative. Negative for congestion, dental problem and mouth sores. Eyes: Negative. Negative for photophobia, redness and visual disturbance. Wears glasses   Respiratory: Negative for cough and shortness of breath. Cardiovascular: Negative for chest pain, palpitations and leg swelling. Hypertensive Dr Pepper Rios. STENOSIS  H/ CAROTID STNOSIS   Gastrointestinal: Positive for nausea. Negative for abdominal pain, constipation, rectal pain and vomiting. Endocrine: Negative for cold intolerance, heat intolerance and polyuria. Diabetic   Genitourinary: Positive for vaginal pain. Negative for dysuria and hematuria. Musculoskeletal: Positive for back pain and gait problem. Skin: Negative. Negative for pallor and rash. Allergic/Immunologic: Positive for environmental allergies. Neurological: Negative for tingling and numbness. Bilateral leg   Psychiatric/Behavioral: Negative for hallucinations, self-injury, sleep disturbance and suicidal ideas. The patient is not nervous/anxious. Depression anxiety        Physical Exam  Skin:         Neurological:      Mental Status: She is alert and oriented to person, place, and time. Psychiatric:         Mood and Affect: Mood normal.          DATA:  LAB.:  11/7/2018  1:00 PM - Abbe James Incoming Lab Results From [facility]     Component  Value  Ref Range & Units  Status  Collected  Lab    BUN  30High    8 - 23 mg/dL  Final  11/07/2018 12:44 PM  - [facility]  1. 40High    0.50 - 0.90 mg/dL  Final  11/07/2018 12:44 PM  [address] Lab    GFR Non-  37Low    >60 mL/min  Final  11/07/2018 12:44 PM  [address] Lab    GFR   45Low    >60 mL/min  Final  11/07/2018 12:44 PM  [address] Lab    GFR Comment                  X-Ray reports:  EXAMINATION:   MRI OF THE LUMBAR SPINE WITHOUT CONTRAST, 6/13/2017 3:58 pm       TECHNIQUE:   Multiplanar multisequence MRI of the lumbar spine was performed without the   administration of intravenous contrast.       COMPARISON:   None. HISTORY:   ORDERING SYSTEM PROVIDED HISTORY: Low back pain, unspecified back pain   laterality, unspecified chronicity, with sciatica presence unspecified   TECHNOLOGIST PROVIDED HISTORY:   Reason for exam:->Back Pain   Reason for exam:->Spinal stenosis       FINDINGS:   BONES/ALIGNMENT: There is a normal lumbar lordosis. Assuming that the last   well-formed disc represents L5-S1, the conus medullaris ends at L1 is within   normal limits. No acute fracture or traumatic subluxation is identified. There is disc desiccation and disc height loss at L3-4 and L4-5. SPINAL CORD: The conus terminates normally. SOFT TISSUES: No paraspinal mass identified. L1-L2: There is no significant disc herniation, spinal canal stenosis or   neural foraminal narrowing. L2-L3: There is no significant disc herniation, spinal canal stenosis or   neural foraminal narrowing. L3-L4: Disc bulge resulting in mild narrowing of the left neural foramen and   moderate narrowing of the thecal sac. L4-L5: Disc bulge resulting in mild to moderate narrowing of both neural   foramen. There is moderate narrowing of the thecal sac. L5-S1: There is no significant disc herniation, spinal canal stenosis or   neural foraminal narrowing. Impression   Degenerative thecal sac narrowing and neural foraminal stenosis at L3-4 and   L4-5. FINDINGS:   BONES/ALIGNMENT: There is a normal lumbar lordosis. Assuming that the last   well-formed disc represents L5-S1, the conus medullaris ends at L1 is within   normal limits. No acute fracture or traumatic subluxation is identified. There is disc desiccation and disc height loss at L3-4 and L4-5. SPINAL CORD: The conus terminates normally. SOFT TISSUES: No paraspinal mass identified. L1-L2: There is no significant disc herniation, spinal canal stenosis or   neural foraminal narrowing. L2-L3: There is no significant disc herniation, spinal canal stenosis or   neural foraminal narrowing. L3-L4: Disc bulge resulting in mild narrowing of the left neural foramen and   moderate narrowing of the thecal sac. L4-L5: Disc bulge resulting in mild to moderate narrowing of both neural   foramen. There is moderate narrowing of the thecal sac. L5-S1: There is no significant disc herniation, spinal canal stenosis or   neural foraminal narrowing. Impression   Degenerative thecal sac narrowing and neural foraminal stenosis at L3-4 and   L4-5. Clinical  impression:  1. Lumbar radiculopathy    2. Degenerative lumbar spinal stenosis    3. DDD (degenerative disc disease), lumbar    4. Lumbosacral spondylosis without myelopathy    5. Encounter for medication management        Plan of care:  Patient is doing well with good pain relief after the  last procedure. Patient is able to increase the activity levels. Patient does not complain much pain and is overall satisfied with the pain relief. As patient is doing well at this time no further interventions are needed. Counselling/Preventive measures for pain  Control:    [x]  Spine strengthening exercises are discussed with patient in detail. Patient is encouraged to exercise and counseled extensively on importance of exercise and strengthening the muscles to help the pain.    Patient is counseled on importance of exercise and,core strengthening. Some  specific exercises to strengthen the abdominal muscles and low back muscles Were discussed. Also aquatic (water) physical therapy and benefits were explained to patient. including \" Water supports the body and minimizes the effect of gravity, making it easier for patients to start an exercise program.\"   The following important principles were discussed with patient:  1. Limit Bed Rest--Studies show that people with short-term low-back pain who rest feel more pain and have a harder time with daily tasks than those who stay active. 2. Keep Exercising-patient is advised to stay away from strenuous activities like gardening and avoid whatever motion caused the pain in the first place.   3. Maintain Good Posture-Exercises  to maintain good posture were shown to patient. 4. To do exercises learned in PT regularly. []Information (handout) on exercise was  given to patient. Decision Making Process : Patient's health history and referral records thoroughly reviewed before focused physical examination and discussion with patient. Over 50% of today's visit is spent on examining the patient and counseling. Level of complexity of date to be reviewed is Moderate. The chart date reviewed include the following: Imaging Reports. Summary of Care. Time spent reviewing with patient the below reports:   Medication safety, Treatment options. Level of diagnosis and management options of this case is multiple: involving the following management options: Interventions as needed, medication management as appropriate, future visits, activity modification, heat/ice as needed, Urine drug screen as required. [x]The patient's questions were answered to the best of my abilities. This note was created using voice recognition software. There may be inaccuracies of transcription  that are inadvertently overlooked prior to the signature.   There is any questions about the transcription please contact me. At this time as the patient is doing well we will see the patient as needed. Patient is advised to contact the pain clinic if the pain returns are if our services are needed again      PDMP Monitoring:    Last PDMP Jenn Herring as Reviewed [facility]):  Review User Review Instant Review Result   Hailey Enriquez 9/27/2020  6:35 AM Reviewed PDMP [1]       Decision Making Process : Patient's health history and referral records thoroughly reviewed before focused physical examination and discussion with patient. I have spent 15 mins. Over 50% of today's visit is spent on examining the patient and counseling and coordinating the care. Level of complexity of date to be reviewed is Moderate. The chart date reviewed include the following: Imaging Reports. Summary of Care. Time spent reviewing with patient the below reports:   Medication safety, Treatment options. Level of diagnosis and management options of this case is multiple: involving the following management options: Interventions as needed, medication management as appropriate, future visits, activity modification, heat/ice as needed, Urine drug screen as required. [x]The patient's questions were answered to the best of my abilities. This note was created using voice recognition software. There may be inaccuracies of transcription  that are inadvertently overlooked prior to the signature. There is any questions about the transcription please contact me. Return as needed with physician / CNP  for further plan of treatment. Due to the COVID-19 pandemic and the appropriate interventions by Salvador Post, our non-urgent pain management patients will not be seen in the office at this time for their protection and the protection of our staff.  To offer continuity of care, their prescriptions will be escribed this month after a careful chart review and review of their OARRS report  Pursuant to the emergency declaration under the [address] and [facility], [address] [place] [address] authority and the [place] and [employer] Act, this Virtual Visit was conducted, with patient's consent, to reduce the patient's risk of exposure to COVID-19 and provide continuity of care for an established patient. Services were provided through a video synchronous discussion virtually to substitute for in-person appointment. \"  Documentation:  I communicated with the patient and/or health care decision maker about plan of care  Details of this discussion including any medical advice provided: Total Time: minutes: 11-20 minutes    I affirm this is a Patient Initiated Episode with an Established Patient who has not had a related appointment within my department in the past 7 days or scheduled within the next 24 hours.     Electronically signed by Jayleen Jane MD on 9/27/2020 at 6:36 AM

## 2020-09-28 ENCOUNTER — HOSPITAL ENCOUNTER (OUTPATIENT)
Dept: PAIN MANAGEMENT | Age: 70
Discharge: HOME | End: 2020-09-28
Payer: MEDICARE

## 2020-09-28 PROCEDURE — 99213 OFFICE O/P EST LOW 20 MIN: CPT

## 2020-09-28 PROCEDURE — 99442 PR PHYS/QHP TELEPHONE EVALUATION 11-20 MIN: CPT | Performed by: PAIN MEDICINE

## 2020-09-28 ASSESSMENT — ENCOUNTER SYMPTOMS
VOMITING: 0
SHORTNESS OF BREATH: 0
PHOTOPHOBIA: 0

## 2020-10-25 ASSESSMENT — ENCOUNTER SYMPTOMS
CONSTIPATION: 0
SHORTNESS OF BREATH: 0
PHOTOPHOBIA: 0
BACK PAIN: 1
EYES NEGATIVE: 1
NAUSEA: 1
COUGH: 0
RECTAL PAIN: 0
ABDOMINAL PAIN: 0
EYE REDNESS: 0
VOMITING: 0

## 2020-10-26 NOTE — PROGRESS NOTES
Manuel Guaman is a 79 y.o. female evaluated on 10/28/2020. Modality of virtual service provided -via  telephone   Consent:  Patient and/or health care decision maker is aware that that patient may receive a bill for this telephone service, depending on one's insurance coverage, and has provided verbal consent to proceed: Yes    Patient identification was verified at the start of the visit: Yes    Chief complaint: Manuel Guaman is 79 y.o.,  female, with chief complaint of back pain. Patient is complaining of pain in low back area with pain radiating to both lower extremities. She had undergone lumbar epidural steroid injection on 9/14/2020 with 85% improvement in the pain that lasted for about 5 weeks. Since then her pain has returned to its original extent and she reports it is somewhat worse at this time. The pain that is radiating to the right lower extremity is worse for the last week and a half or so. Overall her pain is very similar to the pain she had in the past before the procedure. Reports she is trying to exercise. Back Pain   This is a chronic problem. The current episode started more than 1 year ago. The problem occurs constantly. The problem has been gradually improving since onset. The pain is present in the lumbar spine and sacro-iliac. The quality of the pain is described as aching and cramping (sometimes sharp). Radiates to: Pain in the calves and in the legs on the right side. The pain is at a severity of 7/10 (6-10). The pain is severe. The pain is the same all the time. The symptoms are aggravated by standing, sitting, bending, position, twisting and lying down (walking, lifting, ADLs). Pertinent negatives include no abdominal pain, chest pain, dysuria, numbness or tingling. (Rt>lt) Risk factors include obesity and lack of exercise. Alleviating factors:heat and rest   Lifestyle changes experienced with pain: Prevents or limits ADLs, Increases w/activity.   Increases w/prolonged sitting/standing/walking  Mood changes,none  Patient currently unemployed. Physical therapy did not help the pain. (Patient cannot afford therapy)    Are you under psychological counseling at present: No  Goals for treatment include:  Decrease in pain  Enjoy daily and recreational activities, return to previous status. Last procedure was about epidural steroid injection on 9/14/2020    Patient relates current medications are helping the pain. Patient reports taking pain medications as prescribed, denies obtaining medications from different sources and denies use of illegal drugs. Patient denies side effects from medications like nausea, vomiting, constipation or drowsiness. Patient reports current activities of daily living ar possible due to medications and would like to continue them.        ACTIVITY/SOCIAL/EMOTIONAL:  Sleep Pattern: 6 hours per night. nightime awakenings  Home Exercises: daily Trying to do stretching and strengthening  Activity:unchanged  Emotional Issues: normal.   Currently seeing a Psychiatrist or Psychologist:  No     ADVERSE MEDICATION EFFECTS:   Nausea and vomiting: no   Constipation: no-Undercontrol-: yes  Dizziness/drowsy/sleepy--no  Urinary Retention: no    ABERRANT BEHAVIORS SINCE LAST VISIT  Lost rx/pills:------------------------------------------ no  Taking  medication as prescribed: ----------- yes  Urine Drug Screen ---------------------------------  yes  Recent ER visits: -------------------------------------No  Pill count is appropriate: ---------------------------yes   Refills for prescriptions appropriate:---------- yes      Past Medical History:   Diagnosis Date    Aortic valve stenosis     mild per chart    Arthritis     CAD (coronary artery disease) 2000    1 STENT    Diabetes mellitus ([facility] [address]     Heart murmur     1962    Hyperlipidemia 2004    ON RX    MI, old     states many and they were mild    Pulmonary stenosis 1995    Pulmonary valve stenosis mild/congenital per chart    Sciatica     right leg    Thyroid disease 2004    HYPOTHYROIDISM ON RX    Wears glasses        Past Surgical History:   Procedure Laterality Date    CERVICAL FUSION      CORONARY ANGIOPLASTY WITH STENT PLACEMENT      1 STENT    FINGER SURGERY Left 2014    [place] ARTHOPLASTY LEFT THUMB    HYSTERECTOMY  1980    WITH RT SALPINGOOPHERECTOMY    JOINT REPLACEMENT Bilateral     PARTIAL-KNEES    OTHER SURGICAL HISTORY Right 2014    thumb repair    SALPINGO-OOPHORECTOMY Left     SHOULDER SURGERY Left     SPURS    TOE OSTEOTOMY Right 2016    Right 5th digit adductory wedge osteotomy with K wire fixation        Family History   Problem Relation Age of Onset    Breast Cancer Mother         BREAST WITH METS T  LIVER AND LUNG    Other Father         AAA    Heart Disease Father     Asthma Sister     Diabetes Sister         NIDDM    Stroke Brother     Diabetes Brother         NIDDM    Stroke Maternal Grandmother     Asthma Son        Social History     Socioeconomic History    Marital status:       Spouse name: None    Number of children: None    Years of education: None    Highest education level: None   Occupational History    None   Social Needs    Financial resource strain: None    Food insecurity     Worry: None     Inability: None    Transportation needs     Medical: None     Non-medical: None   Tobacco Use    Smoking status: Former Smoker     Packs/day: 1.00     Years: 30.00     Pack years: 30.00     Types: Cigarettes     Last attempt to quit: 3/19/2004     Years since quittin.6    Smokeless tobacco: Never Used   Substance and Sexual Activity    Alcohol use: Yes     Comment: socially, once a year    Drug use: No    Sexual activity: None   Lifestyle    Physical activity     Days per week: None     Minutes per session: None    Stress: None   Relationships    Social connections     Talks on phone: None     Gets together: None     Attends Evangelical service: None     Active member of club or organization: None     Attends meetings of clubs or organizations: None     Relationship status: None    Intimate partner violence     Fear of current or ex partner: None     Emotionally abused: None     Physically abused: None     Forced sexual activity: None   Other Topics Concern    None   Social History Narrative    None       Allergies   Allergen Reactions    Pcn [Penicillins] Swelling and Hives    Heparin Other (See Comments)     MIGRAINE      Lamotrigine Other (See Comments), Itching, Nausea Only and Rash    Cyclobenzaprine     Heparin (Porcine)      Other reaction(s): Migraine    Other      PORCINE PRODUCTS       Current Outpatient Medications on File Prior to Encounter   Medication Sig Dispense Refill    isosorbide mononitrate (IMDUR) 30 MG extended release tablet Take 1 tablet by mouth daily 30 tablet 3    pantoprazole (PROTONIX) 40 MG tablet Take 1 tablet by mouth every morning (before breakfast) 30 tablet 3    baclofen (LIORESAL) 10 MG tablet Take 10 mg by mouth 2 times daily      fluticasone (FLONASE) 50 MCG/ACT nasal spray 1 spray by Nasal route daily      levocetirizine (XYZAL) 5 MG tablet Take 5 mg by mouth nightly      nystatin (MYCOSTATIN) 420026 UNIT/GM cream Apply topically 2 times daily Apply topically 2 times daily.  oxybutynin (DITROPAN XL) 15 MG extended release tablet Take 15 mg by mouth daily      levothyroxine (SYNTHROID) 112 MCG tablet TAKE ONE TABLET BY MOUTH DAILY      HYDROcodone-acetaminophen (NORCO) 7.5-325 MG per tablet Take 1 tablet by mouth every 8 hours as needed. [place] atorvastatin (LIPITOR) 40 MG tablet       nitroGLYCERIN (NITROSTAT) 0.4 MG SL tablet Place 0.4 mg under the tongue every 5 minutes as needed for Chest pain up to max of 3 total doses. If no relief after 1 dose, call 911.       albuterol sulfate  (90 BASE) MCG/ACT inhaler Inhale 2 puffs into the lungs every 6 hours as needed for Wheezing      Cholecalciferol (VITAMIN D3) 2000 UNITS CAPS Take 1 capsule by mouth daily.  celecoxib (CELEBREX) 200 MG capsule Take 200 mg by mouth 2 times daily.  Multiple Vitamins-Minerals (THERAPEUTIC MULTIVITAMIN-MINERALS) tablet Take 1 tablet by mouth daily.  ALPRAZolam (XANAX) 1 MG tablet Take 1 mg by mouth as needed for Anxiety.  aspirin 81 MG EC tablet Take 81 mg by mouth daily. LAST DOSE 9/8/14      metoprolol (TOPROL-XL) 100 MG XL tablet Take 100 mg by mouth daily.  FLUoxetine (PROZAC) 20 MG capsule Take 20 mg by mouth daily       metFORMIN (GLUCOPHAGE) 1000 MG tablet Take 1,000 mg by mouth every 12 hours. LAST DOSE 03/17/2014      ascorbic acid (VITAMIN C) 500 MG tablet Take 1,000 mg by mouth daily.  vitamin B-12 (CYANOCOBALAMIN) 500 MCG tablet Take 500 mcg by mouth daily. No current facility-administered medications on file prior to encounter. Review of Systems   Constitutional: Negative for activity change, appetite change, fatigue and unexpected weight change. HENT: Negative. Negative for congestion, dental problem and mouth sores. Eyes: Negative. Negative for photophobia, redness and visual disturbance. Wears glasses   Respiratory: Negative for cough and shortness of breath. Cardiovascular: Negative for chest pain, palpitations and leg swelling. Hypertensive Dr Ricardo Arredondo. STENOSIS  H/ CAROTID STNOSIS   Gastrointestinal: Positive for nausea. Negative for abdominal pain, constipation, rectal pain and vomiting. Endocrine: Negative for cold intolerance, heat intolerance and polyuria. Diabetic   Genitourinary: Positive for vaginal pain. Negative for dysuria and hematuria. Musculoskeletal: Positive for back pain and gait problem. Skin: Negative. Negative for pallor and rash. Allergic/Immunologic: Positive for environmental allergies.    Neurological: Negative for tingling and numbness. Bilateral leg   Psychiatric/Behavioral: Negative for hallucinations, self-injury, sleep disturbance and suicidal ideas. The patient is not nervous/anxious. Depression anxiety        Physical Exam  Skin:         Neurological:      Mental Status: She is alert and oriented to person, place, and time. Psychiatric:         Mood and Affect: Mood normal.            DATA:  LAB.:  8/18/2017 11:29 PM - Jacob, [facility] Incoming Lab Results From [facility]     Component  Value  Ref Range & Units  Status  Collected  Lab    Pain Management Drug Panel Interp, Urine  Inconsistent   Final  08/14/2017  2:19 PM  [facility]    (NOTE)   ________________________________________________________________   DRUGS EXPECTED:   HYDROCODONE [8/12/17 EVENING]   ________________________________________________________________   CONSISTENT with medications provided:   HYDROCODONE : based on the absence of hydrocodone and metabolites   ________________________________________________________   4/23/2018  8:05 AM - Jacob, [facility] Incoming Lab Results From [facility]     Component  Value  Ref Range & Units  Status  Collected  Lab    Glucose  203High    70 - 99 mg/dL  Final  04/23/2018  7:18 AM  [facility] [address] Lab    BUN  26High    8 - 23 mg/dL  Final  04/23/2018  7:18 AM  [facility] [address] Lab    CREATININE  0. 99High    0.50 - 0.90 mg/dL  Final  04/23/2018  7:18 AM  [facility] [address] Lab    Bun/Cre Ratio  NOT REPORTED  9 - 20  Final  04/23/2018  7:18 AM  [facility] [address] Lab    Calcium  9.1  8.6 - 10.4 mg/dL  Final  04/23/2018  7:18 AM  [facility] [address] Lab    Sodium  137  135 - 144 mmol/L  Final  04/23/2018  7:18 AM  [address] Lab    Potassium  4.0  3.7 - 5.3 mmol/L  Final  04/23/2018  7:18 AM  [facility] [address] Lab    Chloride  99  98 - 107 mmol/L  Final  04/23/2018  7:18 AM  [facility] [address] Lab    CO2  23  20 - 31 mmol/L  Final  04/23/2018  7:18 AM  - [facility]    Anion Gap  15  9 - 17 mmol/L  Final  04/23/2018  7:18 AM  [facility]    Alkaline Phosphatase  71  35 - 104 U/L  Final  04/23/2018  7:18 AM  [facility] Lab    ALT  21  5 - 33 U/L  Final  04/23/2018  7:18 AM  [facility]    AST  27  <32 U/L  Final  04/23/2018  7:18 AM  [facility]    Total Bilirubin  0.40  0.3 - 1.2 mg/dL  Final  04/23/2018  7:18 AM  [facility] Lab    Total Protein  7.0  6.4 - 8.3 g/dL  Final  04/23/2018  7:18 AM  [facility]    Alb  4.3  3.5 - 5.2 g/dL  Final            X-Ray reports:  EXAMINATION:    MRI OF THE LUMBAR SPINE WITHOUT CONTRAST, 6/13/2017 3:58 pm         TECHNIQUE:    Multiplanar multisequence MRI of the lumbar spine was performed without the    administration of intravenous contrast.         COMPARISON:    None.         HISTORY:    ORDERING SYSTEM PROVIDED HISTORY: Low back pain, unspecified back pain    laterality, unspecified chronicity, with sciatica presence unspecified    TECHNOLOGIST PROVIDED HISTORY:    Reason for exam:->Back Pain    Reason for exam:->Spinal stenosis         FINDINGS:    BONES/ALIGNMENT: There is a normal lumbar lordosis.  Assuming that the last    well-formed disc represents L5-S1, the conus medullaris ends at L1 is within    normal limits.  No acute fracture or traumatic subluxation is identified.     There is disc desiccation and disc height loss at L3-4 and L4-5.         SPINAL CORD: The conus terminates normally.         SOFT TISSUES: No paraspinal mass identified.         L1-L2: There is no significant disc herniation, spinal canal stenosis or    neural foraminal narrowing.         L2-L3: There is no significant disc herniation, spinal canal stenosis or    neural foraminal narrowing.         L3-L4: Disc bulge resulting in mild narrowing of the left neural foramen and    moderate narrowing of the thecal sac.         L4-L5: Disc bulge resulting in mild to moderate narrowing of both neural    foramen.  There is moderate narrowing of the thecal sac.         L5-S1: There is no significant disc herniation, spinal canal stenosis or    neural foraminal narrowing.              Impression    Degenerative thecal sac narrowing and neural foraminal stenosis at L3-4 and    L4-5. Clinical  impression:  1. Lumbar radiculopathy    2. Degenerative lumbar spinal stenosis    3. DDD (degenerative disc disease), lumbar    4. Lumbosacral spondylosis without myelopathy    5. Encounter for medication management        Plan of care:  I discussed with the patient regarding her options for treatment we will discuss repeating the lumbar epidural steroid injections. Patient would like to hold off on further interventions and would like to work on core strengthening and exercise. At this time she would like to continue her medications. We will continue current pain medications  Current medications are being tolerated without any Adverse side effects. Orders Placed This Encounter   Medications    HYDROcodone-acetaminophen (NORCO) 5-325 MG per tablet     Sig: Take 1 tablet by mouth every 12 hours as needed for Pain for up to 30 days. Dispense:  60 tablet     Refill:  0     Reduce doses taken as pain becomes manageable     Urine drug screens have been appropriate. No aberrant activity noted. Analgesia is achieved. Activities of daily living are possible because of medications. Safe use of medications explained to patient. PDMP Monitoring:    Last PDMP Am[facility] Neighbours as Reviewed [facility]):  Review User Review Instant Review Result   Sabrina Milan 10/25/2020 10:12 PM Reviewed PDMP [1]     Counselling/Preventive measures for pain  Control:    [x]  Spine strengthening exercises are discussed with patient in detail.      [x] Ill effects of being on chronic pain medications such as sleep disturbances, hormonal changes, withdrawal symptoms,  chronic opioid dependence and tolerance were discussed with patient. I had asked the patient to minimize medication use and utilize pain medications only for uncontrolled rest pain or pain with exertional activities. I advised patient not to self escalate pain medications without consulting with us. At each of patient's future visits we will try to taper pain medications, while adjusting the adjunct medications, and re-evaluating for Physical Therapy to improve spinal and joint strength. We will continue to have discussions to decrease pain medications as tolerated. I also discussed with the patient regarding the dangers of combining narcotic pain medication with tranquilizers, alcohol or illegal drugs or taking the medication any other than prescribed. The dangers including the respiratory depression and death. Patient was told to tell  to all  physicians regarding the medications he is getting from pain clinic. Patient is warned not to take any unprescribed medications over-the-counter medications that can depress breathing . Patient is advised to talk to the pharmacist or physicians if planning to take any over-the-counter medications before  takeing them. Patient is strongly advised to avoid tranquilizers or  Relaxants for any medications that can depress breathing or recreational drugs. Patient is also advised to tell us if there is any changes in his medications from other physicians. We discussed the same at today's visit and have not been to implement it, as the patient's pain is not under control with current medications. Decision Making Process : Patient's health history and referral records thoroughly reviewed before focused physical examination and discussion with patient. I have spent 25 mins. Over 50% of today's visit is spent on examining the patient and counseling and coordinating the care. Level of complexity of date to be reviewed is Moderate. The chart date reviewed include the following: Imaging Reports. Summary of Care. Time spent reviewing with patient the below reports:   Medication safety, Treatment options. Level of diagnosis and management options of this case is multiple: involving the following management options: Interventions as needed, medication management as appropriate, future visits, activity modification, heat/ice as needed, Urine drug screen as required. [x]The patient's questions were answered to the best of my abilities. This note was created using voice recognition software. There may be inaccuracies of transcription  that are inadvertently overlooked prior to the signature. There is any questions about the transcription please contact me. Return in  4 weeks  with physician / CNP  for further plan of treatment. Due to the COVID-19 pandemic and the appropriate interventions by Salvador Post, our non-urgent pain management patients will not be seen in the office at this time for their protection and the protection of our staff. To offer continuity of care, their prescriptions will be escribed this month after a careful chart review and review of their OARRS report  Pursuant to the emergency declaration under the [address] [facility]  and [place] [address] waiver authority and the [employer] Act, this Virtual Visit was conducted, with patient's consent, to reduce the patient's risk of exposure to COVID-19 and provide continuity of care for an established patient. Services were provided through a video synchronous discussion virtually to substitute for in-person appointment. \"  Documentation:  I communicated with the patient and/or health care decision maker about plan of care  Details of this discussion including any medical advice provided:   Total Time: minutes: 21-30 minutes    I affirm this is a Patient Initiated Episode with an Established Patient who has not had a related appointment within my department in the past 7 days or scheduled within the next 24 hours.     Electronically signed by Ashely Terry MD on 10/28/2020 at 8:40 PM

## 2020-10-28 ENCOUNTER — HOSPITAL ENCOUNTER (OUTPATIENT)
Dept: PAIN MANAGEMENT | Age: 70
Discharge: HOME | End: 2020-10-28
Payer: MEDICARE

## 2020-10-28 PROCEDURE — 99214 OFFICE O/P EST MOD 30 MIN: CPT | Performed by: PAIN MEDICINE

## 2020-10-28 PROCEDURE — 99213 OFFICE O/P EST LOW 20 MIN: CPT

## 2020-10-28 RX ORDER — HYDROCODONE BITARTRATE AND ACETAMINOPHEN 5; 325 MG/1; MG/1
1 TABLET ORAL EVERY 12 HOURS PRN
Qty: 60 TABLET | Refills: 0 | Status: SHIPPED | OUTPATIENT
Start: 2020-10-28 | End: 2020-11-27

## 2020-12-04 ENCOUNTER — TELEPHONE (OUTPATIENT)
Dept: PAIN MANAGEMENT | Age: 70
End: 2020-12-04

## 2020-12-08 ENCOUNTER — TELEPHONE (OUTPATIENT)
Dept: PAIN MANAGEMENT | Age: 70
End: 2020-12-08

## 2021-01-12 ENCOUNTER — OFFICE VISIT (OUTPATIENT)
Dept: ORTHOPEDIC SURGERY | Age: 71
End: 2021-01-12
Payer: MEDICARE

## 2021-01-12 VITALS — BODY MASS INDEX: 36.02 KG/M2 | HEIGHT: 64 IN | WEIGHT: 211 LBS

## 2021-01-12 DIAGNOSIS — M54.2 NECK PAIN: Primary | ICD-10-CM

## 2021-01-12 DIAGNOSIS — M50.30 DDD (DEGENERATIVE DISC DISEASE), CERVICAL: ICD-10-CM

## 2021-01-12 PROCEDURE — 99203 OFFICE O/P NEW LOW 30 MIN: CPT | Performed by: ORTHOPAEDIC SURGERY

## 2021-01-12 NOTE — PROGRESS NOTES
Patient ID: Tali Gardner is a 79 y.o. female. Chief Complaint   Patient presents with    New Patient     Neck pain        HPI     Patient presents with slowly progressive neck pain. Patient does get some symptoms of migraines. Patient with a history of C4-5 anterior cervical discectomy and fusion 20+ years ago by Dr. Makayla Mcneal this took care of her full-blown migraines. Patient without a lot of radicular pain but does have significant axial neck pain with intermittent significant headaches.     Past Medical History:   Diagnosis Date    Aortic valve stenosis     mild per chart    Arthritis     CAD (coronary artery disease) 2000    1 STENT    Diabetes mellitus ([facility] [address]     Heart murmur     1962    Hyperlipidemia 2004    ON RX    MI, old     states many and they were mild    Pulmonary stenosis 1995    Pulmonary valve stenosis     mild/congenital per chart    Sciatica     right leg    Thyroid disease 2004    HYPOTHYROIDISM ON RX    Wears glasses      Past Surgical History:   Procedure Laterality Date    CERVICAL FUSION  1993    CORONARY ANGIOPLASTY WITH STENT PLACEMENT  2000    1 STENT    FINGER SURGERY Left 03/21/2014    [place] ARTHOPLASTY LEFT THUMB    HYSTERECTOMY  1980    WITH RT SALPINGOOPHERECTOMY    JOINT REPLACEMENT Bilateral 1994    PARTIAL-KNEES    OTHER SURGICAL HISTORY Right 09/23/2014    thumb repair    SALPINGO-OOPHORECTOMY Left 1987    SHOULDER SURGERY Left 1993    SPURS    TOE OSTEOTOMY Right 6/8/2016    Right 5th digit adductory wedge osteotomy with K wire fixation      Family History   Problem Relation Age of Onset    Breast Cancer Mother         BREAST WITH METS T  LIVER AND LUNG    Other Father         AAA    Heart Disease Father     Asthma Sister     Diabetes Sister         NIDDM    Stroke Brother     Diabetes Brother         NIDDM    Stroke Maternal Grandmother     Asthma Son      Social History     Occupational History    Not on file   Tobacco Use  Smoking status: Former Smoker     Packs/day: 1.00     Years: 30.00     Pack years: 30.00     Types: Cigarettes     Quit date: 3/19/2004     Years since quittin.8    Smokeless tobacco: Never Used   Substance and Sexual Activity    Alcohol use: Yes     Comment: socially, once a year    Drug use: No    Sexual activity: Not on file        Review of Systems   All other systems reviewed and are negative. Physical Exam  Vitals signs and nursing note reviewed. Constitutional:       Appearance: She is well-developed. HENT:      Head: Normocephalic and atraumatic. Nose: Nose normal.   Eyes:      Conjunctiva/sclera: Conjunctivae normal.   Neck:      Musculoskeletal: Normal range of motion and neck supple. Pulmonary:      Effort: Pulmonary effort is normal. No respiratory distress. Musculoskeletal:      Comments: Normal gait     Skin:     General: Skin is warm and dry. Neurological:      Mental Status: She is alert and oriented to person, place, and time. Sensory: No sensory deficit. Psychiatric:         Behavior: Behavior normal.         Thought Content: Thought content normal.     Diagnostic x-rays AP lateral cervical spine severe multilevel facet arthritis and cervical degenerative disc disease history of distant C4-5 anterior cervical discectomy and fusion solidly ankylosed no plate    Assessment:          1. Neck pain    2. DDD (degenerative disc disease), cervical      Patient has been utilizing a chiropractor for 3 years this is helped modestly but her symptoms have progressively increased  Plan:     MRI cervical spine    Follow-up    No orders of the defined types were placed in this encounter. Omar Miller MD    Please note that this chart was generated using voicerecognition Dragon dictation software. Although every effort was made to ensurethe accuracy of this automated transcription, some errors in transcription may haveoccurred.

## 2021-01-28 ENCOUNTER — HOSPITAL ENCOUNTER (OUTPATIENT)
Dept: MRI IMAGING | Age: 71
Discharge: HOME | End: 2021-01-30
Payer: MEDICARE

## 2021-01-28 DIAGNOSIS — M54.2 NECK PAIN: ICD-10-CM

## 2021-01-28 PROCEDURE — 72141 MRI NECK SPINE W/O DYE: CPT

## 2021-02-23 ENCOUNTER — OFFICE VISIT (OUTPATIENT)
Dept: ORTHOPEDIC SURGERY | Age: 71
End: 2021-02-23
Payer: MEDICARE

## 2021-02-23 VITALS — TEMPERATURE: 98 F

## 2021-02-23 DIAGNOSIS — M54.2 NECK PAIN: Primary | ICD-10-CM

## 2021-02-23 DIAGNOSIS — M48.02 CERVICAL STENOSIS OF SPINE: ICD-10-CM

## 2021-02-23 DIAGNOSIS — M50.30 DDD (DEGENERATIVE DISC DISEASE), CERVICAL: ICD-10-CM

## 2021-02-23 PROCEDURE — 99213 OFFICE O/P EST LOW 20 MIN: CPT | Performed by: ORTHOPAEDIC SURGERY

## 2021-02-23 NOTE — PROGRESS NOTES
Patient ID: Robert Mason is a 79 y.o. female. No chief complaint on file. HPI     Please refer to my previous clinic note    Patient with chronic history axial neck pain associated with migraines. Patient status post anterior cervical discectomy and fusion C4-5 with relief of the migraines and some of the axial neck pain this was many years ago. Patient continues to deny significant radicular symptoms.     Patient has been through significant conservative care but failed to respond she is here for follow-up MRI cervical spine    Past Medical History:   Diagnosis Date    Aortic valve stenosis     mild per chart    Arthritis     CAD (coronary artery disease) 2000    1 STENT    Diabetes mellitus ([facility] [address]     Heart murmur     1962    Hyperlipidemia 2004    ON RX    MI, old     states many and they were mild    Pulmonary stenosis 1995    Pulmonary valve stenosis     mild/congenital per chart    Sciatica     right leg    Thyroid disease 2004    HYPOTHYROIDISM ON RX    Wears glasses      Past Surgical History:   Procedure Laterality Date    CERVICAL FUSION  1993    CORONARY ANGIOPLASTY WITH STENT PLACEMENT  2000    1 STENT    FINGER SURGERY Left 03/21/2014    [place] ARTHOPLASTY LEFT THUMB    HYSTERECTOMY  1980    WITH RT SALPINGOOPHERECTOMY    JOINT REPLACEMENT Bilateral 1994    PARTIAL-KNEES    OTHER SURGICAL HISTORY Right 09/23/2014    thumb repair    SALPINGO-OOPHORECTOMY Left 1987    SHOULDER SURGERY Left 1993    SPURS    TOE OSTEOTOMY Right 6/8/2016    Right 5th digit adductory wedge osteotomy with K wire fixation      Family History   Problem Relation Age of Onset    Breast Cancer Mother         BREAST WITH METS T  LIVER AND LUNG    Other Father         AAA    Heart Disease Father     Asthma Sister     Diabetes Sister         NIDDM    Stroke Brother     Diabetes Brother         NIDDM    Stroke Maternal Grandmother     Asthma Son      Social History     Occupational History    Not on file   Tobacco Use    Smoking status: Former Smoker     Packs/day: 1.00     Years: 30.00     Pack years: 30.00     Types: Cigarettes     Quit date: 3/19/2004     Years since quittin.9    Smokeless tobacco: Never Used   Substance and Sexual Activity    Alcohol use: Yes     Comment: socially, once a year    Drug use: No    Sexual activity: Not on file        Review of Systems   All other systems reviewed and are negative. Physical Exam  Vitals signs and nursing note reviewed. Constitutional:       Appearance: She is well-developed. HENT:      Head: Normocephalic and atraumatic. Nose: Nose normal.   Eyes:      Conjunctiva/sclera: Conjunctivae normal.   Neck:      Musculoskeletal: Normal range of motion and neck supple. Pulmonary:      Effort: Pulmonary effort is normal. No respiratory distress. Musculoskeletal:      Comments: Normal gait     Skin:     General: Skin is warm and dry. Neurological:      Mental Status: She is alert and oriented to person, place, and time. Sensory: No sensory deficit. Psychiatric:         Behavior: Behavior normal.         Thought Content: Thought content normal.       MRI cervical spine is reviewed patient with some moderate stenosis at C3-4 and C5-6 and some mild stenosis at C6-7 patient with some significant foraminal stenosis left greater than right at 3 4 right greater than left at 5 6    Assessment:          1. Neck pain    2. DDD (degenerative disc disease), cervical    3. Cervical stenosis of spine        Moderate cervical stenosis without radicular pain significant enough that we could consider surgery but not an ideal surgical candidate    Plan:     Trial cervical epidural steroid injections    Follow-up 10 weeks    If fails may consider C3-4 and C5-6 anterior cervical discectomy and fusion    No orders of the defined types were placed in this encounter.        Julia Rodríguez MD    Please note that this chart was generated using voicerecognition Dragon dictation software. Although every effort was made to ensurethe accuracy of this automated transcription, some errors in transcription may haveoccurred.

## 2021-02-24 ENCOUNTER — TELEPHONE (OUTPATIENT)
Dept: PAIN MANAGEMENT | Age: 71
End: 2021-02-24

## 2021-02-24 NOTE — TELEPHONE ENCOUNTER
Attempted to reach patient via phone, message left on voice mail to call office to schedule.  Patient last seen here 10-28-20

## 2021-02-26 ENCOUNTER — TELEPHONE (OUTPATIENT)
Dept: PAIN MANAGEMENT | Age: 71
End: 2021-02-26

## 2021-02-26 RX ORDER — FLUOXETINE 10 MG/1
CAPSULE ORAL
Status: ON HOLD | COMMUNITY
Start: 2020-07-08 | End: 2021-07-02 | Stop reason: HOSPADM

## 2021-02-26 RX ORDER — PRAMIPEXOLE DIHYDROCHLORIDE 0.12 MG/1
TABLET ORAL
COMMUNITY
Start: 2020-10-02 | End: 2021-09-07 | Stop reason: ALTCHOICE

## 2021-02-26 RX ORDER — NYSTATIN 100000 [USP'U]/G
POWDER TOPICAL 2 TIMES DAILY PRN
COMMUNITY
Start: 2020-09-22

## 2021-02-26 RX ORDER — OXYBUTYNIN CHLORIDE 10 MG/1
TABLET, EXTENDED RELEASE ORAL
Status: ON HOLD | COMMUNITY
Start: 2020-09-21 | End: 2021-07-02 | Stop reason: HOSPADM

## 2021-02-26 RX ORDER — ALBUTEROL SULFATE 90 UG/1
AEROSOL, METERED RESPIRATORY (INHALATION)
COMMUNITY
Start: 2020-10-20 | End: 2021-09-07 | Stop reason: ALTCHOICE

## 2021-02-26 RX ORDER — CLOTRIMAZOLE 1 %
1 CREAM WITH APPLICATOR VAGINAL 2 TIMES DAILY
COMMUNITY
Start: 2021-02-19 | End: 2021-09-23

## 2021-02-26 RX ORDER — LOSARTAN POTASSIUM 50 MG/1
TABLET ORAL
Status: ON HOLD | COMMUNITY
Start: 2020-12-11 | End: 2021-07-02 | Stop reason: HOSPADM

## 2021-02-26 RX ORDER — BUSPIRONE HYDROCHLORIDE 15 MG/1
15 TABLET ORAL 2 TIMES DAILY
COMMUNITY
Start: 2020-11-09

## 2021-02-26 RX ORDER — BUPROPION HYDROCHLORIDE 300 MG/1
TABLET ORAL
COMMUNITY
Start: 2020-11-16

## 2021-02-26 RX ORDER — SULFAMETHOXAZOLE AND TRIMETHOPRIM 800; 160 MG/1; MG/1
1 TABLET ORAL 2 TIMES DAILY
COMMUNITY
Start: 2021-02-19 | End: 2021-04-12 | Stop reason: ALTCHOICE

## 2021-02-26 ASSESSMENT — ENCOUNTER SYMPTOMS
RESPIRATORY NEGATIVE: 1
ALLERGIC/IMMUNOLOGIC NEGATIVE: 1
EYES NEGATIVE: 1
NAUSEA: 1
VOMITING: 1

## 2021-02-26 ASSESSMENT — PAIN - FUNCTIONAL ASSESSMENT: PAIN_FUNCTIONAL_ASSESSMENT: PREVENTS OR INTERFERES SOME ACTIVE ACTIVITIES AND ADLS

## 2021-02-26 ASSESSMENT — PAIN DESCRIPTION - ONSET: ONSET: GRADUAL

## 2021-02-26 ASSESSMENT — PAIN DESCRIPTION - ORIENTATION: ORIENTATION: RIGHT;LEFT;POSTERIOR

## 2021-02-26 ASSESSMENT — PAIN DESCRIPTION - DESCRIPTORS: DESCRIPTORS: ACHING;DULL

## 2021-02-26 NOTE — PROGRESS NOTES
[facility] Pain Management  Patient Pain Assessment  Consultation - Dr. Moncho Hargrove    Primary Care Physician: James Deluna DO    Chief complaint:   Chief Complaint   Patient presents with    Neck Pain   . Jj Finley is a 79 y.o. female evaluated on 3/15/2021. Patient is referred by Clark Charles MD  Patient identification was verified at the start of the visit: Yes    Chief complaint: Jj Finley is 79 y.o.,  female, with  Neck Pain  . HISTORY OF PRESENT ILLNESS:  Jj Finley is 79 y.o. female with    Referring diagnosis  M54.2 (ICD-10-CM) - Neck pain  M50.30 (ICD-10-CM) - DDD (degenerative disc disease), cervical  M48.02 (ICD-10-CM) - Cervical stenosis of spine  Bowling accident 30 yrs ago -had neck surg--helped a lot    Patient is a 66-year-old female who was previously seen in the pain clinic for pain in the low back area is referred to the pain clinic back again for neck pain. Patient had a lumbar epidural steroid injection on 9/14/2020 with good pain relief. Her back pain is slowly returning again. Her main symptom is pain in the cervical region for the last 8 months which has been gradually getting worse. Pain is getting \"excruciating and she had to take a Tylenol. She did physical therapy and is doing home exercises but it is not helping the pain at this time. Of her pain is axial in nature with some pain radiating to the right upper extremity. She previously had undergone cervical fusion surgery. Patient to have pain is decreased to a certain extent with the use of heating pad patient reports she is getting depressed because she is not able to function due to severe pain. Her sleep patterns are poor because of the pain. Of her pain is axial.  She is also having pain radiating to the right upper extremity with some weakness. Patient also having problems in her right shoulder movement. Neck Pain   This is a chronic problem.  The current episode started more than 1 month ago. The problem occurs constantly. The problem has been gradually worsening. The pain is associated with nothing. The pain is present in the left side, midline and right side (worse on rt. side). The quality of the pain is described as aching. The pain is at a severity of 7/10 (4-10). The pain is severe. The symptoms are aggravated by twisting, position and bending (pushing , pulling , heavy lifting, ADLs). Stiffness is present in the morning. Associated symptoms include numbness and weakness. Pertinent negatives include no chest pain or headaches. Associated symptoms comments: In right hand. RX Monitoring 3/13/2021   Attestation -   Periodic Controlled Substance Monitoring Possible medication side effects, risk of tolerance/dependence & alternative treatments discussed. ;No signs of potential drug abuse or diversion identified. ;Assessed functional status. ;Obtaining appropriate analgesic effect of treatment. Chronic Pain > 50 MEDD Re-evaluated the status of the patient's underlying condition causing pain.;Obtained or confirmed \"Consent for Opioid Use\" on file.      Current Pain Assessment  Pain Assessment  Pain Assessment: 0-10  Pain Level: 7  Patient's Stated Pain Goal: 3(Decrease pain level to 3 and increase activity)  Pain Type: Chronic pain  Pain Location: Arm, Neck  Pain Orientation: Right, Upper  Pain Radiating Towards: down right arm  Pain Descriptors: Aching, Throbbing, Constant, Pressure, Tingling, Other (Comment)(weakness in right arm)  Pain Frequency: Continuous  Pain Onset: On-going  Clinical Progression: Gradually worsening  Effect of Pain on Daily Activities: Difficult to do household chores  Functional Pain Assessment: Prevents or interferes some active activities and ADLs  Non-Pharmaceutical Pain Intervention(s): Rest, Repositioned, Heat applied, Cold applied  RASS Score: Alert and calm  POSS Score (Patient Ctrl Analgesia): 1         ADVERSE MEDICATION EFFECTS: Constipation: no  Bowel Regimen: No  Diet: Common Adult diet/ diabetic. Does not check surgars. Last A1C 7.3  3/17/2020 ( care everywhere)  Appetite:  not normal  Sedation:  no  Urinary Retention: no    FOCUSED PAIN SCALE:  Highest : 10  Lowest : nagging, which she states, \" it makes me gag\"  Average: Range-7 8   was your last procedure:  None in neck    Was your procedure effective:  not applicable    ACTIVITY/SOCIAL/EMOTIONAL:  Sleep Pattern: 6 hours per night.nightime awakenings, sleeps two hours at a time  Energy Level: No Energy  Currently attending Physical Therapy:  No, financial hardship  Home Exercises: when able  Mobility: unable to turn head without pain  Do you use assistive devices? No  Have you fallen in the last 30 days?:  No  Currently seeing a Psychiatrist or Psychologist:  No  Emotional Issues: normal   Mood: states she has depression. Denies any current or past suicidal ideations    ABERRANT BEHAVIORS SINCE LAST VISIT:  Have you ever been treated in another Pain Clinic was seen here for back pain  Refills for prescriptions appropriate: not applicable  Lost rx/pills: not applicable  Taking more medication than prescribed:  not applicable  Are you receiving PAIN medications from  other doctors: not applicable  Last Urine/Serum Drug Screen : 3/17/2020  Was Serum/UDS as anticipated?  See results in [address] pill bottles in :not applicable   Was Pill count appropriate? :not applicable   Are currently pregnant? not applicable  Recent ER visits: No    Vitals:    03/15/21 1254   BP: (!) 124/53   Pulse: 54   Resp: 16   Temp: 98.4 °F (36.9 °C)   SpO2: 96%            Past Medical History      Diagnosis Date    Aortic valve stenosis     mild per chart    Arthritis     CAD (coronary artery disease) 2000    1 STENT    Diabetes mellitus ([facility] [address]     Heart murmur     1962    Hyperlipidemia 2004    ON RX    MI, old     states many and they were mild    Pulmonary stenosis 1995    Pulmonary valve stenosis     mild/congenital per chart    Sciatica     right leg    Thyroid disease 2004    HYPOTHYROIDISM ON RX    Wears glasses        Surgical History  Past Surgical History:   Procedure Laterality Date    CERVICAL FUSION  1993    CORONARY ANGIOPLASTY WITH STENT PLACEMENT  2000    1 STENT    FINGER SURGERY Left 03/21/2014    [place] ARTHOPLASTY LEFT THUMB    HYSTERECTOMY  1980    WITH RT SALPINGOOPHERECTOMY    JOINT REPLACEMENT Bilateral 1994    PARTIAL-KNEES    OTHER SURGICAL HISTORY Right 09/23/2014    thumb repair    SALPINGO-OOPHORECTOMY Left 1987    SHOULDER SURGERY Left 1993    SPURS    TOE OSTEOTOMY Right 6/8/2016    Right 5th digit adductory wedge osteotomy with K wire fixation        Medications  Current Outpatient Medications   Medication Sig Dispense Refill    HYDROcodone-acetaminophen (NORCO) 5-325 MG per tablet Take 1 tablet by mouth every 8 hours as needed for Pain for up to 15 days.  90 tablet 0    sulfamethoxazole-trimethoprim (BACTRIM DS;SEPTRA DS) 800-160 MG per tablet Take 1 tablet by mouth 2 times daily      losartan (COZAAR) 50 MG tablet TAKE ONE TABLET BY MOUTH DAILY      buPROPion (WELLBUTRIN XL) 300 MG extended release tablet TAKE ONE TABLET BY MOUTH DAILY      FLUoxetine (PROZAC) 10 MG capsule TAKE ONE CAPSULE BY MOUTH DAILY      metFORMIN (GLUCOPHAGE) 1000 MG tablet TAKE ONE TABLET BY MOUTH TWICE A DAY WITH MEALS      clotrimazole (LOTRIMIN) 1 % vaginal cream Place 1 applicator vaginally 2 times daily      oxybutynin (DITROPAN-XL) 10 MG extended release tablet TAKE ONE TABLET BY MOUTH DAILY      pramipexole (MIRAPEX) 0.125 MG tablet TAKE ONE TABLET BY MOUTH DAILY      ascorbic acid (VITAMIN C) 1000 MG tablet Take 1,000 mg by mouth daily      albuterol sulfate  (90 Base) MCG/ACT inhaler INHALE TWO PUFFS BY MOUTH EVERY 4 TO 6 HOURS AS NEEDED FOR WHEEZING      nystatin (NYAMYC) 095260 UNIT/GM powder APPLY TO AFFECTED AREA(S) FOUR TIMES A DAY      busPIRone (BUSPAR) 15 MG tablet Take 15 mg by mouth 2 times daily      isosorbide mononitrate (IMDUR) 30 MG extended release tablet Take 1 tablet by mouth daily 30 tablet 3    pantoprazole (PROTONIX) 40 MG tablet Take 1 tablet by mouth every morning (before breakfast) 30 tablet 3    baclofen (LIORESAL) 10 MG tablet Take 10 mg by mouth 2 times daily      fluticasone (FLONASE) 50 MCG/ACT nasal spray 1 spray by Nasal route daily      levocetirizine (XYZAL) 5 MG tablet Take 5 mg by mouth nightly      nystatin (MYCOSTATIN) 996949 UNIT/GM cream Apply topically 2 times daily Apply topically 2 times daily.  oxybutynin (DITROPAN XL) 15 MG extended release tablet Take 15 mg by mouth daily      levothyroxine (SYNTHROID) 112 MCG tablet TAKE ONE TABLET BY MOUTH DAILY      atorvastatin (LIPITOR) 40 MG tablet       nitroGLYCERIN (NITROSTAT) 0.4 MG SL tablet Place 0.4 mg under the tongue every 5 minutes as needed for Chest pain up to max of 3 total doses. If no relief after 1 dose, call 911.  celecoxib (CELEBREX) 200 MG capsule Take 200 mg by mouth 2 times daily.  Multiple Vitamins-Minerals (THERAPEUTIC MULTIVITAMIN-MINERALS) tablet Take 1 tablet by mouth daily.  ALPRAZolam (XANAX) 1 MG tablet Take 1 mg by mouth as needed for Anxiety.  aspirin 81 MG EC tablet Take 81 mg by mouth daily. LAST DOSE 9/8/14      metoprolol (TOPROL-XL) 100 MG XL tablet Take 100 mg by mouth daily.  FLUoxetine (PROZAC) 20 MG capsule Take 20 mg by mouth daily       ascorbic acid (VITAMIN C) 500 MG tablet Take 1,000 mg by mouth daily.  vitamin B-12 (CYANOCOBALAMIN) 500 MCG tablet Take 500 mcg by mouth daily.  Cholecalciferol (VITAMIN D3) 2000 UNITS CAPS Take 1 capsule by mouth daily. No current facility-administered medications for this encounter.         Allergies  Pcn [penicillins], Heparin, Lamotrigine, Cyclobenzaprine, and Heparin (porcine)    Family History  family history includes Asthma in her sister and son; Breast Cancer in her mother; Diabetes in her brother and sister; Heart Disease in her father; Other in her father; Stroke in her brother and maternal grandmother. Social History  Social History     Socioeconomic History    Marital status:      Spouse name: None    Number of children: None    Years of education: None    Highest education level: None   Occupational History    None   Social Needs    Financial resource strain: None    Food insecurity     Worry: None     Inability: None    Transportation needs     Medical: None     Non-medical: None   Tobacco Use    Smoking status: Former Smoker     Packs/day: 1.00     Years: 30.00     Pack years: 30.00     Types: Cigarettes     Quit date: 3/19/2004     Years since quittin.0    Smokeless tobacco: Never Used   Substance and Sexual Activity    Alcohol use: Yes     Comment: socially, once a year    Drug use: No    Sexual activity: None   Lifestyle    Physical activity     Days per week: None     Minutes per session: None    Stress: None   Relationships    Social connections     Talks on phone: None     Gets together: None     Attends Spiritism service: None     Active member of club or organization: None     Attends meetings of clubs or organizations: None     Relationship status: None    Intimate partner violence     Fear of current or ex partner: None     Emotionally abused: None     Physically abused: None     Forced sexual activity: None   Other Topics Concern    None   Social History Narrative    None      reports no history of drug use. REVIEW OF SYSTEMS:      Review of Systems   Constitutional: Positive for activity change and fatigue. Negative for appetite change and unexpected weight change. HENT: Negative for congestion, hearing loss, sore throat and voice change. Eyes: Negative. Negative for pain and visual disturbance. Respiratory: Negative. Negative for cough and shortness of breath. Cardiovascular: Negative. Negative for chest pain and palpitations. Gastrointestinal: Positive for nausea and vomiting. Negative for abdominal pain and constipation. Endocrine: Negative. Negative for cold intolerance and polyuria. Genitourinary: Negative. Negative for dysuria and hematuria. Musculoskeletal: Positive for myalgias and neck pain. Skin: Negative. Negative for rash. Allergic/Immunologic: Negative. Neurological: Positive for weakness and numbness. Negative for headaches. Hematological: Does not bruise/bleed easily. Psychiatric/Behavioral: Positive for sleep disturbance. Negative for self-injury and suicidal ideas. The patient is not nervous/anxious. Depressed due to pain            GENERAL PHYSICAL EXAM:  Vitals: BP (!) 124/53   Pulse 54   Temp 98.4 °F (36.9 °C) (Oral)   Resp 16   Ht 5' 5\" (1.651 m)   Wt 202 lb (91.6 kg)   LMP 03/19/1980   SpO2 96%   BMI 33.61 kg/m² , Body mass index is 33.61 kg/m². Physical Exam  Constitutional:       Appearance: Normal appearance. She is obese. HENT:      Head: Normocephalic. Nose: Nose normal.      Mouth/Throat:      Mouth: Mucous membranes are moist.   Eyes:      Extraocular Movements: Extraocular movements intact. Pupils: Pupils are equal, round, and reactive to light. Cardiovascular:      Rate and Rhythm: Normal rate and regular rhythm. Pulses: Normal pulses. Pulmonary:      Effort: Pulmonary effort is normal.      Breath sounds: Normal breath sounds. Abdominal:      General: Abdomen is flat. Bowel sounds are normal. There is no distension. Palpations: There is no mass. Musculoskeletal:      Right lower leg: No edema. Left lower leg: No edema. Skin:     Findings: No rash. Neurological:      Mental Status: She is alert and oriented to person, place, and time. Cranial Nerves: Cranial nerves are intact. No cranial nerve deficit. Sensory: Sensation is intact.       Motor: Motor function is intact. No weakness, tremor or atrophy. Coordination: Coordination is intact. Deep Tendon Reflexes:      Reflex Scores:       Tricep reflexes are 2+ on the right side and 2+ on the left side. Bicep reflexes are 2+ on the right side and 2+ on the left side. Psychiatric:         Mood and Affect: Mood normal.         Speech: Speech normal.         Behavior: Behavior normal.         Thought Content: Thought content normal.         Judgment: Judgment normal.      Back Exam     Tenderness   The patient is experiencing tenderness in the cervical.    Range of Motion   Back extension: Painful and limited. Back flexion: Painful and limited. Back lateral bend right: Painful and limited. Back lateral bend left: Painful and limited. Back rotation right: Painful and limited. Back rotation left: Painful and limited. Other   Scars: present    Comments:  Condition of the cervical spine reveals well-healed scar anteriorly. There is loss of cervical lordosis with slight increase in the thoracic kyphosis. Palpation reveals severe tenderness over the paraspinal muscles bilaterally worse on the right side. Spurling's maneuver is positive especially in the right side. Facet loading is positive bilaterally worse on the right side cervical traction test is somewhat positive on the right side. Right Hand Exam     Muscle Strength   The patient has normal right wrist strength. Left Hand Exam     Muscle Strength   The patient has normal left wrist strength. Right Elbow Exam     Muscle Strength   The patient has normal right elbow strength. Left Elbow Exam     Muscle Strength   The patient has normal left elbow strength. Right Shoulder Exam     Tenderness   The patient is experiencing tenderness in the acromioclavicular joint, acromion, biceps tendon and clavicle. Range of Motion   Right shoulder active abduction: Painful and limited to less than 90 degrees. Right shoulder passive abduction: Painful and limited to less than 90 degrees. Right shoulder extension: Painful and limited. Right shoulder external rotation: Painful and limited. Right shoulder forward flexion: Painful and limited. Tests   Impingement: positive      Left Shoulder Exam   Left shoulder exam is normal.              Nurses Notes and Vital Signs reviewed. DATA  Labs:   [address] [place] [address] Market  Component Name Value Ref Range   Amphetamine/methamphetamine Negative   Comment: AMPH/METH screening cut off = 1000 ng/mL Negative^Negative    Barbiturate Screen, Ur Negative   Comment: Barbiturates screening cut off value = 200 ng/mL Negative^Negative    Benzodiazepine Screen, Urine Negative   Comment: Benzodiazepines screening cut off value = 200 ng/mL Negative^Negative    THC, urine Negative   Comment: Cannabinoids/THC screening cut off value = 50 ng/mL Negative^Negative    Cocaine (metabolite) Negative   Comment: Cocaine screening cut off value = 300 ng/mL Negative^Negative    Opiate Quant, Ur Negative   Comment:  Opiates screening cut off value = 300 ng/mL        NOTE:  This test is used for the detection of  codeine, hydrocodone (>1000 ng/mL), morphine  and hydromorphone (>900 ng/mL) in urine. Negative^Negative    Phencyclidine Negative   Comment: Phencyclidine screening cut off value = 25 ng/mL Negative^Negative    Oxycodone Negative   Comment:  Oxycodone screening cut off value = 300 ng/mL        NOTE:  This test is used for the detection of  oxycodone and oxymorphone in urine. Negative^Negative    Methadone Negative   Comment: Methadone screening cut off value = 300 ng/mL.  Negative^Negative    Ecstasy Positive (A)   Comment:  Interference from Buproprion or Labetalol  may cause a positive result, confirmation  available upon request.  Ecstasy screening cut off value = 500 ng/mL        This report is intended for use in clinical  monitoring or management of patients.  It is  not for use in sac and results in mild   central canal stenosis.       C7-T1: There is no significant disc protrusion, spinal canal stenosis or   neural foraminal narrowing.           Impression   Fusion of the C4 and C5 vertebral bodies.       Moderate central canal stenosis at C3-4 with associated severe left foraminal   stenosis.       Moderate central canal stenosis at C5-6.  Severe right foraminal stenosis and   moderate left foraminal stenosis are noted at this level.       Mild central canal stenosis and mild left foraminal stenosis at C6-7.               Patient Active Problem List   Diagnosis    CMC arthritis    Arthritis of left hip    Left hip pain    Chronic midline low back pain without sciatica    Degenerative lumbar spinal stenosis    Lumbar radiculopathy    DDD (degenerative disc disease), lumbar    Lumbosacral spondylosis without myelopathy    Primary osteoarthritis of left hip    Sacroiliitis (HCC)    Chest pain    DDD (degenerative disc disease), cervical    Neck pain    Cervical stenosis of spine        ASSESSMENT    Marilyn Mandel is a 79 y.o. female with     1. Osteoarthritis of spine with radiculopathy, cervical region    2. Degenerative disc disease, cervical    3. Cervical radiculopathy    4. Status post cervical spinal fusion    5. Primary osteoarthritis, right shoulder    6. Impingement syndrome of right shoulder         PLAN  Patient's   [x] x-ray    [] CT scan    [x] MRI  Were/was  Reviewed. These findings are consistent with the patient's symptoms and physical examination.       [x] Patient's findings on the x-ray were explained to the patient using a bone modal.    Other reports reviewed include    [] Bone scan   [] EMG and nerve conduction studies   [x] Referral reports-  I also discussed with him the following treatment options Including advantages and disadvantages of each:    [x] Physical therapy    [x] Interventional pain treatment    [x] Medication management    [] Surgical we decided to try cervical facet joint injections both for diagnostic as well as for therapeutic purposes. She also has some radicular pain and may need cervical epidural steroid injections if the patient does not get adequate relief from the cervical facet joint injections. It also appears she has a pain originating in her right shoulder as the right shoulder movements are extremely limited and painful. She may also need a shoulder joint injection depending on response to cervical facet joint injections. Orders Placed This Encounter   Procedures    FLUORO FOR SURGICAL PROCEDURES     Standing Status:   Future     Standing Expiration Date:   3/15/2022    DRUG SCREEN, PAIN     Standing Status:   Standing     Number of Occurrences:   1    MD INJ DX/THER AGNT PARAVERT FACET JOINT, CERV/THORAC, 1ST LEVEL    Saline lock IV     Standing Status:   Future     Standing Expiration Date:   9/15/2022     And is having severe pain and hence we will give her Norco for pain control. Patient is advised about the proper use of Norco.  She was previously taking Norco.  Orders Placed This Encounter   Medications    HYDROcodone-acetaminophen (NORCO) 5-325 MG per tablet     Sig: Take 1 tablet by mouth every 8 hours as needed for Pain for up to 15 days. Dispense:  90 tablet     Refill:  0     Reduce doses taken as pain becomes manageable       Decision Making Process : Patient's health history and referral records thoroughly reviewed before focused physical examination and discussion with patient. Over 50% of today's visit is spent on examining the patient and counseling. Level of complexity of date to be reviewed is Moderate. The chart date reviewed include the following: Imaging Reports. Summary of Care. Time spent reviewing with patient the below reports:   Medication safety, Treatment options.     Level of diagnosis and management options of this case is multiple: involving the following management options: Interventions as needed, medication management as appropriate, future visits, activity modification, heat/ice as needed, Urine drug screen as required. [x]The patient's questions were answered to the best of my abilities. Documentation:  I communicated with the patient and/or health care decision maker about plan of care  Details of this discussion including any medical advice provided: Total Time: minutes: 21-30 minutes    I affirm this is a Patient Initiated Episode with an Established Patient who has not had a related appointment within my department in the past 7 days or scheduled within the next 24 hours. This note was created using voice recognition software. There may be inaccuracies of transcription  that are inadvertently overlooked prior to the signature. There is any questions about the transcription please contact me.     Electronically signed by Jesus Manuel Hernandez MD on 3/16/2021 at 6:16 AM

## 2021-03-15 ENCOUNTER — HOSPITAL ENCOUNTER (OUTPATIENT)
Dept: PAIN MANAGEMENT | Age: 71
Discharge: HOME | End: 2021-03-15
Payer: MEDICARE

## 2021-03-15 VITALS
HEIGHT: 65 IN | RESPIRATION RATE: 16 BRPM | TEMPERATURE: 98.4 F | BODY MASS INDEX: 33.66 KG/M2 | WEIGHT: 202 LBS | OXYGEN SATURATION: 96 % | HEART RATE: 54 BPM | DIASTOLIC BLOOD PRESSURE: 53 MMHG | SYSTOLIC BLOOD PRESSURE: 124 MMHG

## 2021-03-15 DIAGNOSIS — Z98.1 STATUS POST CERVICAL SPINAL FUSION: ICD-10-CM

## 2021-03-15 DIAGNOSIS — M50.30 DEGENERATIVE DISC DISEASE, CERVICAL: ICD-10-CM

## 2021-03-15 DIAGNOSIS — M54.12 CERVICAL RADICULOPATHY: ICD-10-CM

## 2021-03-15 DIAGNOSIS — M19.011 PRIMARY OSTEOARTHRITIS, RIGHT SHOULDER: ICD-10-CM

## 2021-03-15 DIAGNOSIS — M75.41 IMPINGEMENT SYNDROME OF RIGHT SHOULDER: ICD-10-CM

## 2021-03-15 DIAGNOSIS — M47.22 OSTEOARTHRITIS OF SPINE WITH RADICULOPATHY, CERVICAL REGION: Primary | ICD-10-CM

## 2021-03-15 PROCEDURE — 99215 OFFICE O/P EST HI 40 MIN: CPT | Performed by: PAIN MEDICINE

## 2021-03-15 PROCEDURE — 80307 DRUG TEST PRSMV CHEM ANLYZR: CPT

## 2021-03-15 PROCEDURE — 99215 OFFICE O/P EST HI 40 MIN: CPT

## 2021-03-15 RX ORDER — HYDROCODONE BITARTRATE AND ACETAMINOPHEN 5; 325 MG/1; MG/1
1 TABLET ORAL EVERY 8 HOURS PRN
Qty: 90 TABLET | Refills: 0 | Status: SHIPPED | OUTPATIENT
Start: 2021-03-15 | End: 2021-04-12 | Stop reason: SDUPTHER

## 2021-03-15 ASSESSMENT — PAIN DESCRIPTION - ORIENTATION: ORIENTATION: RIGHT;UPPER

## 2021-03-15 ASSESSMENT — PAIN DESCRIPTION - DIRECTION: RADIATING_TOWARDS: DOWN RIGHT ARM

## 2021-03-15 ASSESSMENT — PAIN DESCRIPTION - FREQUENCY: FREQUENCY: CONTINUOUS

## 2021-03-15 ASSESSMENT — PAIN SCALES - GENERAL: PAINLEVEL_OUTOF10: 7

## 2021-03-16 ASSESSMENT — ENCOUNTER SYMPTOMS
SHORTNESS OF BREATH: 0
ABDOMINAL PAIN: 0
EYE PAIN: 0
SORE THROAT: 0
CONSTIPATION: 0
VOICE CHANGE: 0
COUGH: 0

## 2021-03-19 LAB
6-ACETYLMORPHINE, UR: NOT DETECTED
7-AMINOCLONAZEPAM, URINE: NOT DETECTED
ALPHA-OH-ALPRAZ, URINE: NOT DETECTED
ALPHA-OH-MIDAZOLAM, URINE: NOT DETECTED
ALPRAZOLAM, URINE: NOT DETECTED
AMPHETAMINES, URINE: NOT DETECTED
BARBITURATES, URINE: NOT DETECTED
BENZOYLECGONINE, UR: NOT DETECTED
BUPRENORPHINE URINE: NOT DETECTED
CARISOPRODOL, UR: NOT DETECTED
CLONAZEPAM, URINE: NOT DETECTED
CODEINE, URINE: NOT DETECTED
CREATININE URINE: 81.4 MG/DL (ref 20–400)
DIAZEPAM, URINE: NOT DETECTED
DRUGS EXPECTED, UR: NORMAL
EER HI RES INTERP UR: NORMAL
ETHYL GLUCURONIDE UR: NOT DETECTED
FENTANYL URINE: NOT DETECTED
GABAPENTIN: NOT DETECTED
HYDROCODONE, URINE: NOT DETECTED
HYDROMORPHONE, URINE: NOT DETECTED
LORAZEPAM, URINE: NOT DETECTED
MARIJUANA METAB, UR: NOT DETECTED
MDA, UR: NOT DETECTED
MDEA, EVE, UR: NOT DETECTED
MDMA URINE: NOT DETECTED
MEPERIDINE METAB, UR: NOT DETECTED
METHADONE, URINE: NOT DETECTED
METHAMPHETAMINE, URINE: NOT DETECTED
METHYLPHENIDATE: NOT DETECTED
MIDAZOLAM, URINE: NOT DETECTED
MORPHINE URINE: NOT DETECTED
NALOXONE URINE: NOT DETECTED
NORBUPRENORPHINE, URINE: NOT DETECTED
NORDIAZEPAM, URINE: NOT DETECTED
NORFENTANYL, URINE: NOT DETECTED
NORHYDROCODONE, URINE: NOT DETECTED
NOROXYCODONE, URINE: NOT DETECTED
NOROXYMORPHONE, URINE: NOT DETECTED
OXAZEPAM, URINE: NOT DETECTED
OXYCODONE URINE: NOT DETECTED
OXYMORPHONE, URINE: NOT DETECTED
PAIN MANAGEMENT DRUG PANEL INTERP, URINE: NORMAL
PAIN MGT DRUG PANEL, HI RES, UR: NORMAL
PCP,URINE: NOT DETECTED
PHENTERMINE, UR: NOT DETECTED
PREGABALIN: NOT DETECTED
TAPENTADOL, URINE: NOT DETECTED
TAPENTADOL-O-SULFATE, URINE: NOT DETECTED
TEMAZEPAM, URINE: NOT DETECTED
TRAMADOL, URINE: NOT DETECTED
ZOLPIDEM METABOLITE (ZCA), URINE: NOT DETECTED
ZOLPIDEM, URINE: NOT DETECTED

## 2021-03-25 ENCOUNTER — TELEPHONE (OUTPATIENT)
Dept: PAIN MANAGEMENT | Age: 71
End: 2021-03-25

## 2021-03-25 NOTE — TELEPHONE ENCOUNTER
Spoke with patient and reviewed pre-procedure instructions. COVID vaccine #2, 4 weeks ago.  Verbalized understanding

## 2021-03-29 ENCOUNTER — HOSPITAL ENCOUNTER (OUTPATIENT)
Dept: GENERAL RADIOLOGY | Age: 71
Discharge: HOME | End: 2021-03-31
Payer: MEDICARE

## 2021-03-29 ENCOUNTER — HOSPITAL ENCOUNTER (OUTPATIENT)
Dept: PAIN MANAGEMENT | Age: 71
Discharge: HOME | End: 2021-03-29
Payer: MEDICARE

## 2021-03-29 VITALS
BODY MASS INDEX: 33.66 KG/M2 | DIASTOLIC BLOOD PRESSURE: 78 MMHG | SYSTOLIC BLOOD PRESSURE: 116 MMHG | RESPIRATION RATE: 16 BRPM | WEIGHT: 202 LBS | HEART RATE: 55 BPM | OXYGEN SATURATION: 97 % | HEIGHT: 65 IN | TEMPERATURE: 98.4 F

## 2021-03-29 DIAGNOSIS — M47.817 LUMBOSACRAL SPONDYLOSIS WITHOUT MYELOPATHY: ICD-10-CM

## 2021-03-29 DIAGNOSIS — M50.30 DEGENERATIVE DISC DISEASE, CERVICAL: ICD-10-CM

## 2021-03-29 DIAGNOSIS — M47.22 OSTEOARTHRITIS OF SPINE WITH RADICULOPATHY, CERVICAL REGION: Primary | ICD-10-CM

## 2021-03-29 DIAGNOSIS — M54.16 LUMBAR RADICULOPATHY: ICD-10-CM

## 2021-03-29 DIAGNOSIS — Z98.1 STATUS POST CERVICAL SPINAL FUSION: ICD-10-CM

## 2021-03-29 DIAGNOSIS — M48.061 DEGENERATIVE LUMBAR SPINAL STENOSIS: ICD-10-CM

## 2021-03-29 PROCEDURE — 64491 INJ PARAVERT F JNT C/T 2 LEV: CPT

## 2021-03-29 PROCEDURE — 64490 INJ PARAVERT F JNT C/T 1 LEV: CPT

## 2021-03-29 PROCEDURE — 64492 INJ PARAVERT F JNT C/T 3 LEV: CPT

## 2021-03-29 PROCEDURE — 2500000003 HC RX 250 WO HCPCS: Performed by: PAIN MEDICINE

## 2021-03-29 PROCEDURE — 3209999900 FLUORO FOR SURGICAL PROCEDURES

## 2021-03-29 PROCEDURE — 64490 INJ PARAVERT F JNT C/T 1 LEV: CPT | Performed by: PAIN MEDICINE

## 2021-03-29 PROCEDURE — 6360000002 HC RX W HCPCS: Performed by: PAIN MEDICINE

## 2021-03-29 PROCEDURE — 64491 INJ PARAVERT F JNT C/T 2 LEV: CPT | Performed by: PAIN MEDICINE

## 2021-03-29 PROCEDURE — 6360000004 HC RX CONTRAST MEDICATION: Performed by: PAIN MEDICINE

## 2021-03-29 PROCEDURE — 64492 INJ PARAVERT F JNT C/T 3 LEV: CPT | Performed by: PAIN MEDICINE

## 2021-03-29 RX ORDER — TRIAMCINOLONE ACETONIDE 40 MG/ML
INJECTION, SUSPENSION INTRA-ARTICULAR; INTRAMUSCULAR
Status: COMPLETED | OUTPATIENT
Start: 2021-03-29 | End: 2021-03-29

## 2021-03-29 RX ORDER — BUPIVACAINE HYDROCHLORIDE 5 MG/ML
INJECTION, SOLUTION EPIDURAL; INTRACAUDAL
Status: COMPLETED | OUTPATIENT
Start: 2021-03-29 | End: 2021-03-29

## 2021-03-29 RX ORDER — MIDAZOLAM HYDROCHLORIDE 1 MG/ML
INJECTION INTRAMUSCULAR; INTRAVENOUS
Status: COMPLETED | OUTPATIENT
Start: 2021-03-29 | End: 2021-03-29

## 2021-03-29 RX ORDER — FENTANYL CITRATE 50 UG/ML
INJECTION, SOLUTION INTRAMUSCULAR; INTRAVENOUS
Status: COMPLETED | OUTPATIENT
Start: 2021-03-29 | End: 2021-03-29

## 2021-03-29 RX ADMIN — Medication 50 MCG: at 09:18

## 2021-03-29 RX ADMIN — IOHEXOL 3 ML: 180 INJECTION INTRAVENOUS at 09:26

## 2021-03-29 RX ADMIN — MIDAZOLAM 2 MG: 1 INJECTION INTRAMUSCULAR; INTRAVENOUS at 09:09

## 2021-03-29 RX ADMIN — TRIAMCINOLONE ACETONIDE 80 MG: 40 INJECTION, SUSPENSION INTRA-ARTICULAR; INTRAMUSCULAR at 09:26

## 2021-03-29 RX ADMIN — BUPIVACAINE HYDROCHLORIDE 12 ML: 5 INJECTION, SOLUTION EPIDURAL; INTRACAUDAL; PERINEURAL at 09:25

## 2021-03-29 ASSESSMENT — PAIN DESCRIPTION - DIRECTION: RADIATING_TOWARDS: DOWN RIGHT ARM

## 2021-03-29 ASSESSMENT — PAIN - FUNCTIONAL ASSESSMENT
PAIN_FUNCTIONAL_ASSESSMENT: 0-10
PAIN_FUNCTIONAL_ASSESSMENT: PREVENTS OR INTERFERES SOME ACTIVE ACTIVITIES AND ADLS

## 2021-03-29 ASSESSMENT — PAIN DESCRIPTION - LOCATION: LOCATION: ARM;NECK

## 2021-03-29 ASSESSMENT — PAIN SCALES - GENERAL: PAINLEVEL_OUTOF10: 6

## 2021-03-29 ASSESSMENT — PAIN DESCRIPTION - PROGRESSION: CLINICAL_PROGRESSION: GRADUALLY WORSENING

## 2021-03-29 ASSESSMENT — PAIN DESCRIPTION - FREQUENCY: FREQUENCY: CONTINUOUS

## 2021-03-29 ASSESSMENT — PAIN DESCRIPTION - DESCRIPTORS: DESCRIPTORS: ACHING

## 2021-03-29 ASSESSMENT — PAIN DESCRIPTION - PAIN TYPE: TYPE: CHRONIC PAIN

## 2021-03-29 ASSESSMENT — PAIN DESCRIPTION - ORIENTATION: ORIENTATION: RIGHT

## 2021-03-29 NOTE — PROGRESS NOTES
Discharge criteria met. Patient alert and oriented x3  Post procedure dressing dry and intact. Sensory and motor function intact as per pre-procedure. Instructions and follow up reviewed with pt at patient at discharge. Patient discharged by wheelchair  @9:55am. Steady gait. Sister to drive pt home and care for her today.

## 2021-03-29 NOTE — PROCEDURES
Pre-Procedure Note    Patient Name: Puma Castellon   YOB: 1950  Room/Bed: Room/bed info not found  Medical Record Number: [id number]  Date: 3/29/2021       Indication:    1. Osteoarthritis of spine with radiculopathy, cervical region    2. Degenerative disc disease, cervical    3. Status post cervical spinal fusion    4. Lumbar radiculopathy    5. Degenerative lumbar spinal stenosis    6. Lumbosacral spondylosis without myelopathy        Consent: On file. Vital Signs:   Vitals:    03/29/21 0924   BP: (!) 181/78   Pulse: 55   Resp: 16   Temp:    SpO2: 95%       Past Medical History:   has a past medical history of Aortic valve stenosis, Arthritis, CAD (coronary artery disease), Diabetes mellitus ([address] Heart murmur, Hyperlipidemia, MI, old, Pulmonary stenosis, Pulmonary valve stenosis, Sciatica, Thyroid disease, and Wears glasses. Past Surgical History:   has a past surgical history that includes Hysterectomy (1980); Salpingo-oophorectomy (Left, 1987); shoulder surgery (Left, 1993); cervical fusion (1993); joint replacement (Bilateral, 1994); Coronary angioplasty with stent (2000); Finger surgery (Left, 03/21/2014); other surgical history (Right, 09/23/2014); and Toe Osteotomy (Right, 6/8/2016). Pre-Sedation Documentation and Exam:   Vital signs have been reviewed (see flow sheet for vitals). Mallampati Airway Assessment:  normal    ASA Classification:  Class 3 - A patient with severe systemic disease that limits activity but is not incapacitating    Sedation/ Anesthesia Plan:   intravenous sedation  as needed. Medications Planned:   midazolam (Versed) / Fentanyl  Intravenously  as needed. Patient is an appropriate candidate for plan of sedation: yes  Patient's History and Physical examination was reviewed and there is no change. Electronically signed by Minda Blackburn MD on 3/29/2021 at 9:36 AM    Preoperative Diagnosis:    1. Degenerative cervical disc disease.   2.  Cervical spondylosis. 3.  Facet joint arthropathy. Postoperative Diagnosis:   1. Degenerative cervical disc disease. 2.  Cervical spondylosis. 3.  Facet joint arthropathy. Procedure Performed:  Cervical facet joint injections at the levels of C2-3, C3-4, C4-5, C5-6, C6-7, C7-T1 on the Right side with fluoroscopic guidance, with IV sedation    Indication for the Procedure: The patient had a history of chronic cervical pain that is not responding well to the conservative treatment. Patient's pain is mostly axial in nature. Pain is interfering with the activities of daily living. Physical examination revealed facet tenderness and facet loading is positive. We decided to try cervical facet joint injection for diagnostic as well as for therapeutic purposes. The procedure and its risks were discussed with the patient and an informed consent was obtained. .Current Pain Assessment  Pain Assessment  Pain Assessment: 0-10  Pain Level: 6  Patient's Stated Pain Goal: 2(to decrease pain with increased activities)  Pain Type: Chronic pain  Pain Location: Arm, Neck  Pain Orientation: Right  Pain Radiating Towards: down right arm  Pain Descriptors: Aching, Throbbing, Constant, Dull, Pressure  Pain Frequency: Continuous  Pain Onset: On-going  Clinical Progression: Gradually worsening  Effect of Pain on Daily Activities: painful to do household chores  Functional Pain Assessment: Prevents or interferes some active activities and ADLs  Non-Pharmaceutical Pain Intervention(s): Cold applied, Heat applied, Repositioned, Rest   Procedure:  After starting an IV, the patient was sedated with 2 mg of Midazolam and 50 mcg of Fentanyl intravenously by the RN under my direct supervision. Patient's vital signs including BP, EKG and SaO2 were monitored by RN and they remained stable during the procedure. A meaningful communication was kept up with the patient throughout   the procedure. The patient is placed in prone position.   Skin

## 2021-03-30 ENCOUNTER — TELEPHONE (OUTPATIENT)
Dept: PAIN MANAGEMENT | Age: 71
End: 2021-03-30

## 2021-04-12 ENCOUNTER — HOSPITAL ENCOUNTER (OUTPATIENT)
Dept: PAIN MANAGEMENT | Age: 71
Discharge: HOME | End: 2021-04-12
Payer: MEDICARE

## 2021-04-12 DIAGNOSIS — M48.061 DEGENERATIVE LUMBAR SPINAL STENOSIS: ICD-10-CM

## 2021-04-12 DIAGNOSIS — M47.22 OSTEOARTHRITIS OF SPINE WITH RADICULOPATHY, CERVICAL REGION: ICD-10-CM

## 2021-04-12 DIAGNOSIS — M50.30 DDD (DEGENERATIVE DISC DISEASE), CERVICAL: ICD-10-CM

## 2021-04-12 DIAGNOSIS — G89.29 CHRONIC MIDLINE LOW BACK PAIN WITHOUT SCIATICA: ICD-10-CM

## 2021-04-12 DIAGNOSIS — M54.50 CHRONIC MIDLINE LOW BACK PAIN WITHOUT SCIATICA: ICD-10-CM

## 2021-04-12 DIAGNOSIS — M46.1 SACROILIITIS (HCC): Primary | ICD-10-CM

## 2021-04-12 DIAGNOSIS — M47.817 LUMBOSACRAL SPONDYLOSIS WITHOUT MYELOPATHY: ICD-10-CM

## 2021-04-12 DIAGNOSIS — M16.12 PRIMARY OSTEOARTHRITIS OF LEFT HIP: ICD-10-CM

## 2021-04-12 DIAGNOSIS — M51.36 DDD (DEGENERATIVE DISC DISEASE), LUMBAR: ICD-10-CM

## 2021-04-12 DIAGNOSIS — M48.02 CERVICAL STENOSIS OF SPINE: ICD-10-CM

## 2021-04-12 DIAGNOSIS — M54.16 LUMBAR RADICULOPATHY: ICD-10-CM

## 2021-04-12 PROCEDURE — 99441 PR PHYS/QHP TELEPHONE EVALUATION 5-10 MIN: CPT | Performed by: NURSE PRACTITIONER

## 2021-04-12 PROCEDURE — 99213 OFFICE O/P EST LOW 20 MIN: CPT

## 2021-04-12 RX ORDER — HYDROCODONE BITARTRATE AND ACETAMINOPHEN 5; 325 MG/1; MG/1
1 TABLET ORAL EVERY 8 HOURS PRN
Qty: 90 TABLET | Refills: 0 | Status: SHIPPED | OUTPATIENT
Start: 2021-04-14 | End: 2021-08-11 | Stop reason: SDUPTHER

## 2021-04-12 ASSESSMENT — ENCOUNTER SYMPTOMS
GASTROINTESTINAL NEGATIVE: 1
EYES NEGATIVE: 1
RESPIRATORY NEGATIVE: 1

## 2021-04-12 NOTE — PROGRESS NOTES
[facility] [address] PROGRESS NOTE      Patient  completed []  video visit   [x]   phone call:     10    Minutes :       [x]    to  review Medication Agreement    [x]  Follow up after procedure   []  Discuss treatment options      Location:  Provider:  working from    []    home    []   [facility] ,   patient at home       Chief Complaint:  Neck pain    She had right cervical  facet injection on 3- C2-T1 with 75% relief, The headaches have decreased, She has some improved ROM in her neck, Her sleep is better. She does home exercises. She still has some right shoulder pain. She wants to hold off getting a right shoulder injection right now, No Ed visits. Neck Pain   This is a chronic problem. The problem occurs intermittently. The problem has been gradually worsening. The pain is present in the right side (rigth shoulder). The quality of the pain is described as aching. The pain is at a severity of 3/10. The pain is mild. Exacerbated by: lift pull. The pain is same all the time. Associated symptoms include headaches and numbness. She has tried heat (rest) for the symptoms.                Treatment goals:  Functional status: for pain to stay at a 3    Aberrancy:   Any alcoholic beverages     no       Any illegal drugs  no      Analgesia:          3           Adverse  Effects :no      ADL;s :stretching      Data:    When was thelast UDS:  3-          Was the UDS appropriate:  [x] yes []   no      Record/Diagnostics Review:      As above, I did review the imaging       3/19/2021 12:22 PM - Jacob, [facility] Incoming Lab Results From [facility]    Component Value Ref Range & Units Status Collected Lab   Pain Management Drug Panel Interp, Urine See Note   Final 03/15/2021 12:45 PM [facility]   (NOTE)   ________________________________________________________________   NO DRUGS PROVIDED   ________________________________________________________________   Please call [address] at [phone] for   Medical Director interpretation if applicable. Alternatively,   please consider the PAIN HYB U ([id number]) which does not require   medication information or provide compliance interpretation. ________________________________________________________________   INTERPRETIVE INFORMATION: Targeted drug profile Interp   Interpretation depends on accuracy and completeness of patient   medication information submitted by client. 6-Acetylmorphine, Ur Not Detected   Final 03/15/2021 12:45 PM [facility]   7-Aminoclonazepam, Urine Not Detected   Final 03/15/2021 12:45 PM [facility]   Alpha-OH-Alpraz, Urine Not Detected   Final 03/15/2021 12:45 PM [facility]   Alprazolam, Urine Not Detected   Final 03/15/2021 12:45 PM [facility]   Amphetamines, urine Not Detected   Final 03/15/2021 12:45 PM [facility]   Barbiturates, Ur Not Detected   Final 03/15/2021 12:45 PM [facility]   Benzoylecgonine, Ur Not Detected   Final 03/15/2021 12:45 PM [facility]   Buprenorphine Urine Not Detected   Final 03/15/2021 12:45 PM [facility]   Carisoprodol, Ur Not Detected   Final 03/15/2021 12:45 PM [facility]   (NOTE)   The carisoprodol immunoassay has cross-reactivity to carisoprodol   and meprobamate.     Clonazepam, Urine Not Detected   Final 03/15/2021 12:45 PM [facility]   Codeine, Urine Not Detected   Final 03/15/2021 12:45 PM [facility]   MDA, Ur Not Detected   Final 03/15/2021 12:45 PM [facility]   Diazepam, Urine Not Detected   Final 03/15/2021 12:45 PM [facility]   Ethyl Glucuronide Ur Not Detected   Final 03/15/2021 12:45 PM [facility]   Fentanyl, Ur Not Detected   Final 03/15/2021 12:45 PM [facility]   Hydrocodone, Urine Not Detected   Final 03/15/2021 12:45 PM [facility]   Hydromorphone, Urine Not Detected   Final 03/15/2021 12:45 PM [facility]   Lorazepam, Urine Not Detected   Final 03/15/2021 12:45 PM [facility]   Marijuana Metab, Ur Not Detected   Final 03/15/2021 12:45 PM [facility]   MDEA, LISBETH, Ur Not Detected   Final 03/15/2021 12:45 PM [facility]   MDMA, Urine Not Detected   Final 03/15/2021 12:45 PM [facility]   Meperidine Metab, Ur Not Detected   Final 03/15/2021 12:45 PM [facility]   Methadone, Urine Not Detected   Final 03/15/2021 12:45 PM [facility]   Methamphetamine, Urine Not Detected   Final 03/15/2021 12:45 PM [facility]   Methylphenidate Not Detected   Final 03/15/2021 12:45 PM [facility]   Midazolam, Urine Not Detected   Final 03/15/2021 12:45 PM [facility]   Morphine Urine Not Detected   Final 03/15/2021 12:45 PM [facility]   Norbuprenorphine, Urine Not Detected   Final 03/15/2021 12:45 PM [facility]   Nordiazepam, Urine Not Detected   Final 03/15/2021 12:45 PM [facility]   Norfentanyl, Urine Not Detected   Final 03/15/2021 12:45 PM [facility]   NORHYDROCODONE, URINE Not Detected   Final 03/15/2021 12:45 PM [facility]   Noroxycodone, Urine Not Detected   Final 03/15/2021 12:45 PM [facility]   NOROXYMORPHONE, URINE Not Detected   Final 03/15/2021 12:45 PM [facility]   Oxazepam, Urine Not Detected   Final 03/15/2021 12:45 PM [facility]   Oxycodone Urine Not Detected   Final 03/15/2021 12:45 PM [facility]   Oxymorphone, Urine Not Detected   Final 03/15/2021 12:45 PM [facility]   PCP, Urine Not Detected   Final 03/15/2021 12:45 PM [facility]   Phentermine, Ur Not Detected   Final 03/15/2021 12:45 PM [facility]   Tapentadol-O-Sulfate, Urine Not Detected   Final 03/15/2021 12:45 PM [facility]   Tapentadol, Urine Not Detected   Final 03/15/2021 12:45 PM [facility]   Temazepam, Urine Not Detected   Final 03/15/2021 12:45 PM [facility]   Tramadol, Urine Not Detected   Final 03/15/2021 12:45 PM [facility]   Zolpidem, Urine Not Detected   Final 03/15/2021 12:45 PM [facility]   Drugs Expected, Ur UNKNOWN   Final 03/15/2021 12:45  [facility]   Creatinine, Ur 81.4  20.0 - 400.0 mg/dL Final 03/15/2021 12:45 PM [facility]   Pain Mgt Drug Panel, Hi Res, Ur See Below   Final 03/15/2021 12:45 PM [facility]   (NOTE)   Methodology: Qualitative Enzyme Immunoassay and Qualitative Liquid   Chromatography-Tandem Mass Spectrometry, Quantitative   Spectrophotometry   The absence of expected drug(s) and/or drug metabolite(s) may   indicate non-compliance, inappropriate timing of specimen   collection relative to drug administration, poor drug absorption,   diluted/adulterated urine, or limitations of testing. The   concentration must be greater than or equal to the cutoff to be   reported as present.  If specific drug concentrations are   required, contact the laboratory within two weeks of specimen   collection to request quantification by a second analytical   technique. Interpretive questions should be directed to the   laboratory. Results based on immunoassay detection that do not match clinical   expectations should be   interpreted with caution. Confirmatory testing by mass   spectrometry for immunoassay-based results is available, if   ordered within two weeks of specimen collection. Additional   charges apply. For medical purposes only; not valid for forensic use. This test was developed and its performance characteristics   determined by [facility]. It has not been cleared or   approved by the [employer] and Drug Administration. This test was   performed in a CLIA certified laboratory and is intended for   clinical purposes. EER Hi Res Interp Ur See Note   Final 03/15/2021 12:45 PM [facility]   (NOTE)   Access [facility] Enhanced Report using the link below:   -Direct access: [email or web address]   Performed By: [facility] Idania Saeed [address] [place] [address]   : Joan Page.  Emery Del Rosario MD    Naloxone Urine Not Detected   Final 03/15/2021 12:45 PM [facility]   Gabapentin Not Detected   Final 03/15/2021 12:45 PM [facility]   Pregabalin Not Detected   Final 03/15/2021 12:45 PM [facility]   Alpha-OH-Midazolam, Urine Not Detected   Final 03/15/2021 12:45 PM [facility]   Zolpidem Metabolite (ZCA), Urine Not Detected   Final 03/15/2021 12:45 PM [facility]   Testing Performed By    Lab - Abbreviation Name Director Address Valid Date Range   [address]  MD Darlin Cross [address] [place] [id number] 12/16/20 [address] Yessi stenosis.       C7-T1: There is no significant disc protrusion, spinal canal stenosis or   neural foraminal narrowing.           Impression   Fusion of the C4 and C5 vertebral bodies.       Moderate central canal stenosis at C3-4 with associated severe left foraminal   stenosis.       Moderate central canal stenosis at C5-6.  Severe right foraminal stenosis and   moderate left foraminal stenosis are noted at this level.       Mild central canal stenosis and mild left foraminal stenosis at C6-7.                     Pill count: appropriate    fill date :4-    Morphine equivalent dose as reported on OARRS:15  Periodic Controlled Substance Monitoring: Assessed functional status. , Possible medication side effects, risk of tolerance/dependence & alternative treatments discussed., No signs of potential drug abuse or diversion identified., Obtaining appropriate analgesic effect of treatment. Conchita Galdamez, APRN - CNP)  Review ofOARRS does not show any aberrant prescription behavior. Medication is helping the patient stay active. Patient denies any side effects and reports adequate analgesia. No sign of misuse/abuse.             Past Medical History:   Diagnosis Date    Aortic valve stenosis     mild per chart    Arthritis     CAD (coronary artery disease) 2000    1 STENT    [address] [facility] [address] [address]     Heart murmur     1962    Hyperlipidemia 2004    ON RX    MI, old     states many and they were mild    Pulmonary stenosis 1995    Pulmonary valve stenosis     mild/congenital per chart    Sciatica     right leg    Thyroid disease 2004    HYPOTHYROIDISM ON RX    Wears glasses        Past Surgical History:   Procedure Laterality Date    CERVICAL FUSION  1993    CORONARY ANGIOPLASTY WITH STENT PLACEMENT  2000    1 STENT    FINGER SURGERY Left 03/21/2014    [place] ARTHOPLASTY LEFT THUMB    HYSTERECTOMY  1980    WITH RT SALPINGOOPHERECTOMY    JOINT REPLACEMENT Bilateral 1994    PARTIAL-KNEES    OTHER Spouse name: Not on file    Number of children: Not on file    Years of education: Not on file    Highest education level: Not on file   Occupational History    Not on file   Social Needs    Financial resource strain: Not on file    Food insecurity     Worry: Not on file     Inability: Not on file    Transportation needs     Medical: Not on file     Non-medical: Not on file   Tobacco Use    Smoking status: Former Smoker     Packs/day: 1.00     Years: 30.00     Pack years: 30.00     Types: Cigarettes     Quit date: 3/19/2004     Years since quittin.0    Smokeless tobacco: Never Used   Substance and Sexual Activity    Alcohol use: Yes     Comment: socially, once a year    Drug use: No    Sexual activity: Not on file   Lifestyle    Physical activity     Days per week: Not on file     Minutes per session: Not on file    Stress: Not on file   Relationships    Social connections     Talks on phone: Not on file     Gets together: Not on file     Attends [employer] service: Not on file     Active member of club or organization: Not on file     Attends meetings of clubs or organizations: Not on file     Relationship status: Not on file    Intimate partner violence     Fear of current or ex partner: Not on file     Emotionally abused: Not on file     Physically abused: Not on file     Forced sexual activity: Not on file   Other Topics Concern    Not on file   Social History Narrative    Not on file         Review of Systems:  Review of Systems   Constitution: Negative. HENT: Negative. Eyes: Negative. Cardiovascular: Negative. Respiratory: Negative. Hematologic/Lymphatic: Negative. Skin: Negative. Musculoskeletal: Positive for joint pain and neck pain. Gastrointestinal: Negative. Genitourinary: Positive for urgency. Neurological: Positive for dizziness, headaches and numbness. Psychiatric/Behavioral: Positive for depression.         Managing         Physical Exam:  LMP 03/19/1980     Physical Exam  Skin:         Neurological:      Mental Status: She is alert and oriented to person, place, and time. Psychiatric:         Mood and Affect: Mood normal.         Thought Content: Thought content normal.           Assessment:    Problem List Items Addressed This Visit     Sacroiliitis ([address] - Primary    Relevant Medications    HYDROcodone-acetaminophen (NORCO) 5-325 MG per tablet (Start on 4/14/2021)    Primary osteoarthritis of left hip    Relevant Medications    HYDROcodone-acetaminophen (NORCO) 5-325 MG per tablet (Start on 4/14/2021)    Osteoarthritis of spine with radiculopathy, cervical region    Relevant Medications    HYDROcodone-acetaminophen (NORCO) 5-325 MG per tablet (Start on 4/14/2021)    Lumbosacral spondylosis without myelopathy    Relevant Medications    HYDROcodone-acetaminophen (NORCO) 5-325 MG per tablet (Start on 4/14/2021)    Lumbar radiculopathy    Degenerative lumbar spinal stenosis    Relevant Medications    HYDROcodone-acetaminophen (NORCO) 5-325 MG per tablet (Start on 4/14/2021)    DDD (degenerative disc disease), lumbar    Relevant Medications    HYDROcodone-acetaminophen ([place]) 5-325 MG per tablet (Start on 4/14/2021)    DDD (degenerative disc disease), cervical    Relevant Medications    HYDROcodone-acetaminophen (NORCO) 5-325 MG per tablet (Start on 4/14/2021)    Chronic midline low back pain without sciatica    Relevant Medications    HYDROcodone-acetaminophen ([place]) 5-325 MG per tablet (Start on 4/14/2021)    Cervical stenosis of spine            Treatment Plan:  DISCUSSION: Treatment options discussed withpatient and all questions answered to patient's satisfaction.      Possible side effects, risk of tolerance and or dependence and alternative treatments discussed    Obtaining appropriate analgesic effect of treatment   No signs of potential drug abuse or diversion identified    [x] Ill effects of being on chronic pain medications such as sleep disturbances, respiratory depression, hormonal changes, withdrawal symptoms, chronic opioid dependence and tolerance as well as risk of taking opioids with Benzodiazepines and taking opioids along with alcohol,  werediscussed with patient. I had asked the patient to minimize medication use and utilize pain medications only for uncontrolled rest pain or pain with exertional activities. I advised patient not to self-escalate painmedications without consulting with us. At each of patient's future visits we will try to taper pain medications, while adjusting the adjunct medications, and re-evaluating for Physical Therapy to improve spinal andjoint strength. We will continue to have discussions to decrease pain medications as tolerated. Counseled patient on effects their pain medication and /or their medical condition mayhave on their  ability to drive or operate machinery. Instructed not to drive or operate machinery if drowsy     I also discussed with the patient regarding the dangers of combining narcotic pain medication with tranquilizers, alcohol or illegal drugs or taking the medication any way other than prescribed. The dangers were discussed  including respiratory depression and death. Patient was told to tell  all  physicians regarding the medications he is getting from pain clinic. Patient is warned not to take any unprescribed medications over-the-countermedications that can depress breathing . Patient is advised to talk to the pharmacist or physicians if planning to take any over-the-counter medications before  takeing them. Patient is strongly advised to avoid tranquilizers or  relaxants, illegal drugs  or any medications that can depress breathing  Patient is also advised to tell us if there is any changes in their medications from other physicians.             TREATMENT OPTIONS:       Medication Agreement Requirements Met  Continue Opioid therapy  Script written for  hydrocodone  Follow up appointment made

## 2021-05-04 ENCOUNTER — TELEPHONE (OUTPATIENT)
Dept: PAIN MANAGEMENT | Age: 71
End: 2021-05-04

## 2021-05-04 NOTE — TELEPHONE ENCOUNTER
Patient left message stating that she has been complaining of a headache since yesterday and a dull pain in the back of her neck. Patient states that her headache is getting worse today and she is taking her pain medications, but she just wanted that to be documented in her chart and no call back was needed.

## 2021-05-05 ENCOUNTER — TELEPHONE (OUTPATIENT)
Dept: PAIN MANAGEMENT | Age: 71
End: 2021-05-05

## 2021-05-05 NOTE — TELEPHONE ENCOUNTER
Received. Will call patient since she did not have a recent injection that would've caused a headache.

## 2021-05-05 NOTE — TELEPHONE ENCOUNTER
Patient states the pain she had pre procedure is returning. She has had the same pain for 2 days. Offered to make her an appointment, she will call when one is needed.

## 2021-05-25 ENCOUNTER — OFFICE VISIT (OUTPATIENT)
Dept: ORTHOPEDIC SURGERY | Age: 71
End: 2021-05-25
Payer: MEDICARE

## 2021-05-25 VITALS — BODY MASS INDEX: 33.32 KG/M2 | HEART RATE: 60 BPM | HEIGHT: 65 IN | WEIGHT: 200 LBS

## 2021-05-25 DIAGNOSIS — M50.30 DDD (DEGENERATIVE DISC DISEASE), CERVICAL: Primary | ICD-10-CM

## 2021-05-25 DIAGNOSIS — M48.02 CERVICAL STENOSIS OF SPINE: ICD-10-CM

## 2021-05-25 PROCEDURE — 99213 OFFICE O/P EST LOW 20 MIN: CPT | Performed by: ORTHOPAEDIC SURGERY

## 2021-05-25 NOTE — PROGRESS NOTES
spray by Nasal route daily, Disp: , Rfl:     levocetirizine (XYZAL) 5 MG tablet, Take 5 mg by mouth nightly, Disp: , Rfl:     nystatin (MYCOSTATIN) 129994 UNIT/GM cream, Apply topically 2 times daily Apply topically 2 times daily. , Disp: , Rfl:     oxybutynin (DITROPAN XL) 15 MG extended release tablet, Take 15 mg by mouth daily, Disp: , Rfl:     levothyroxine (SYNTHROID) 112 MCG tablet, TAKE ONE TABLET BY MOUTH DAILY, Disp: , Rfl:     atorvastatin (LIPITOR) 40 MG tablet, , Disp: , Rfl:     nitroGLYCERIN (NITROSTAT) 0.4 MG SL tablet, Place 0.4 mg under the tongue every 5 minutes as needed for Chest pain up to max of 3 total doses. If no relief after 1 dose, call 911., Disp: , Rfl:     Cholecalciferol (VITAMIN D3) 2000 UNITS CAPS, Take 1 capsule by mouth daily. , Disp: , Rfl:     celecoxib (CELEBREX) 200 MG capsule, Take 200 mg by mouth 2 times daily. , Disp: , Rfl:     Multiple Vitamins-Minerals (THERAPEUTIC MULTIVITAMIN-MINERALS) tablet, Take 1 tablet by mouth daily. , Disp: , Rfl:     ALPRAZolam (XANAX) 1 MG tablet, Take 1 mg by mouth as needed for Anxiety. , Disp: , Rfl:     aspirin 81 MG EC tablet, Take 81 mg by mouth daily. LAST DOSE 9/8/14, Disp: , Rfl:     metoprolol (TOPROL-XL) 100 MG XL tablet, Take 100 mg by mouth daily. , Disp: , Rfl:     FLUoxetine (PROZAC) 20 MG capsule, Take 20 mg by mouth daily , Disp: , Rfl:     vitamin B-12 (CYANOCOBALAMIN) 500 MCG tablet, Take 500 mcg by mouth daily. , Disp: , Rfl:   Allergies   Allergen Reactions    Pcn [Penicillins] Swelling and Hives    Heparin Other (See Comments)     MIGRAINE      Lamotrigine Other (See Comments), Itching, Nausea Only and Rash    Cyclobenzaprine      Dry mouth, jitters    Heparin (Porcine)      Other reaction(s): Migraine     Social History     Socioeconomic History    Marital status:       Spouse name: Not on file    Number of children: Not on file    Years of education: Not on file    Highest education level: Not on file   Occupational History    Not on file   Tobacco Use    Smoking status: Former Smoker     Packs/day: 1.00     Years: 30.00     Pack years: 30.00     Types: Cigarettes     Quit date: 3/19/2004     Years since quittin.1    Smokeless tobacco: Never Used   Vaping Use    Vaping Use: Never used   Substance and Sexual Activity    Alcohol use: Yes     Comment: socially, once a year    Drug use: No    Sexual activity: Not on file   Other Topics Concern    Not on file   Social History Narrative    Not on file     Social Determinants of Health     Financial Resource Strain:     Difficulty of Paying Living Expenses:    Food Insecurity:     Worried About Running Out of Food in the Last Year:     [address] in the Last Year:    Transportation Needs:     Lack of Transportation (Medical):      Lack of Transportation (Non-Medical):    Physical Activity:     Days of Exercise per Week:     Minutes of Exercise per Session:    Stress:     Feeling of Stress :    Social Connections:     Frequency of Communication with Friends and Family:     Frequency of Social Gatherings with Friends and Family:     Attends Muslim Services:     Active Member of Clubs or Organizations:     Attends Club or Organization Meetings:     Marital Status:    Intimate Partner Violence:     Fear of Current or Ex-Partner:     Emotionally Abused:     Physically Abused:     Sexually Abused:      Past Medical History:   Diagnosis Date    Aortic valve stenosis     mild per chart    Arthritis     CAD (coronary artery disease)     1 STENT    Diabetes mellitus ([facility] [address]     Heart murmur         Hyperlipidemia 2004    ON RX    MI, old     states many and they were mild    Pulmonary stenosis     Pulmonary valve stenosis     mild/congenital per chart    Sciatica     right leg    Thyroid disease     HYPOTHYROIDISM ON RX    Wears glasses      Past Surgical History:   Procedure Laterality Date    CERVICAL FUSION 1993    CORONARY ANGIOPLASTY WITH STENT PLACEMENT  2000    1 STENT    FINGER SURGERY Left 03/21/2014    [place] ARTHOPLASTY LEFT THUMB    HYSTERECTOMY  1980    WITH RT SALPINGOOPHERECTOMY    JOINT REPLACEMENT Bilateral 1994    PARTIAL-KNEES    OTHER SURGICAL HISTORY Right 09/23/2014    thumb repair    SALPINGO-OOPHORECTOMY Left 1987    SHOULDER SURGERY Left 1993    SPURS    TOE OSTEOTOMY Right 6/8/2016    Right 5th digit adductory wedge osteotomy with K wire fixation      Family History   Problem Relation Age of Onset    Breast Cancer Mother         BREAST WITH METS T  LIVER AND LUNG    Other Father         AAA    Heart Disease Father     Asthma Sister     Diabetes Sister         NIDDM    Stroke Brother     Diabetes Brother         NIDDM    Stroke Maternal Grandmother     Asthma Son         Physical Exam:  Vitals signs and nursing note reviewed. Constitutional:       Appearance: She is well-developed. HENT:      Head: Normocephalic and atraumatic. Nose: Nose normal.   Eyes:      Conjunctiva/sclera: Conjunctivae normal.   Neck:      Musculoskeletal: Normal range of motion and neck supple. Pulmonary:      Effort: Pulmonary effort is normal. No respiratory distress. Musculoskeletal:      Comments: Normal gait     Skin:     General: Skin is warm and dry. Neurological:      Mental Status: She is alert and oriented to person, place, and time. Sensory: No sensory deficit. Psychiatric:         Behavior: Behavior normal.         Thought Content: Thought content normal.        Diagnostic imaging:    MRI in January 2021 cervical spine is reviewed patient with some moderate stenosis at C3-4 and C5-6 and some mild stenosis at C6-7 patient with some significant foraminal stenosis left greater than right at 3 4 right greater than left at 5 6      Assessment and Plan:  1.  DDD (degenerative disc disease), cervical      She is a candidate for C3-4 and C5-6 anterior cervical discectomy and fusion although she is not interested in proceeding with surgery because her neck pain has improved with cervical epidural steroid injections    Follow up in 2 months      Provider Attestation:  Haydee Mack, personally performed the services described in this documentation. All medical record entries made by the scribe were at my direction and in my presence. I have reviewed the chart and discharge instructions and agree that the records reflect my personal performance and is accurate and complete. Preston Lo MD 5/25/21     Scribe Attestation:  By signing my name below, Shade Stephanie, attest that this documentation has been prepared under the direction and in the presence of Dr. Sarbjit Rivera. Electronically signed: Akilah Herrera, 5/25/21     Please note that this chart was generated using voice recognition Dragon dictation software. Although every effort was made to ensure the accuracy of this automated transcription, some errors in transcription may have occurred.

## 2021-06-20 ENCOUNTER — HOSPITAL ENCOUNTER (INPATIENT)
Age: 71
LOS: 12 days | Discharge: HOME | DRG: 659 | End: 2021-07-02
Attending: EMERGENCY MEDICINE | Admitting: FAMILY MEDICINE
Payer: MEDICARE

## 2021-06-20 ENCOUNTER — APPOINTMENT (OUTPATIENT)
Dept: CT IMAGING | Age: 71
DRG: 659 | End: 2021-06-20
Payer: MEDICARE

## 2021-06-20 DIAGNOSIS — N17.9 ACUTE RENAL FAILURE, UNSPECIFIED ACUTE RENAL FAILURE TYPE (HCC): ICD-10-CM

## 2021-06-20 DIAGNOSIS — N13.30 HYDRONEPHROSIS, UNSPECIFIED HYDRONEPHROSIS TYPE: ICD-10-CM

## 2021-06-20 DIAGNOSIS — N12 PYELONEPHRITIS: Primary | ICD-10-CM

## 2021-06-20 PROBLEM — E11.9 TYPE 2 DIABETES MELLITUS, WITHOUT LONG-TERM CURRENT USE OF INSULIN (HCC): Status: ACTIVE | Noted: 2021-06-20

## 2021-06-20 PROBLEM — N30.00 ACUTE INFECTIVE CYSTITIS: Status: ACTIVE | Noted: 2021-06-20

## 2021-06-20 PROBLEM — I10 ESSENTIAL HYPERTENSION: Status: ACTIVE | Noted: 2021-06-20

## 2021-06-20 PROBLEM — E03.9 HYPOTHYROIDISM: Status: ACTIVE | Noted: 2021-06-20

## 2021-06-20 PROBLEM — N30.00 ACUTE CYSTITIS WITHOUT HEMATURIA: Status: ACTIVE | Noted: 2021-06-20

## 2021-06-20 PROBLEM — N13.4 HYDROURETER, LEFT: Status: ACTIVE | Noted: 2021-06-20

## 2021-06-20 LAB
-: ABNORMAL
ABSOLUTE EOS #: 0.1 K/UL (ref 0–0.4)
ABSOLUTE IMMATURE GRANULOCYTE: ABNORMAL K/UL (ref 0–0.3)
ABSOLUTE LYMPH #: 1.3 K/UL (ref 1–4.8)
ABSOLUTE MONO #: 0.1 K/UL (ref 0.1–1.3)
ALBUMIN SERPL-MCNC: 4.4 G/DL (ref 3.5–5.2)
ALBUMIN/GLOBULIN RATIO: ABNORMAL (ref 1–2.5)
ALP BLD-CCNC: 103 U/L (ref 35–104)
ALT SERPL-CCNC: 12 U/L (ref 5–33)
AMORPHOUS: ABNORMAL
ANION GAP SERPL CALCULATED.3IONS-SCNC: 14 MMOL/L (ref 9–17)
AST SERPL-CCNC: 15 U/L
BACTERIA: ABNORMAL
BASOPHILS # BLD: 1 % (ref 0–2)
BASOPHILS ABSOLUTE: 0.1 K/UL (ref 0–0.2)
BILIRUB SERPL-MCNC: 0.28 MG/DL (ref 0.3–1.2)
BILIRUBIN URINE: NEGATIVE
BUN BLDV-MCNC: 39 MG/DL (ref 8–23)
BUN/CREAT BLD: ABNORMAL (ref 9–20)
CALCIUM SERPL-MCNC: 9.6 MG/DL (ref 8.6–10.4)
CASTS UA: ABNORMAL /LPF
CHLORIDE BLD-SCNC: 102 MMOL/L (ref 98–107)
CO2: 22 MMOL/L (ref 20–31)
COLOR: YELLOW
COMMENT UA: ABNORMAL
CREAT SERPL-MCNC: 2.7 MG/DL (ref 0.5–0.9)
CRYSTALS, UA: ABNORMAL /HPF
DIFFERENTIAL TYPE: ABNORMAL
EOSINOPHILS RELATIVE PERCENT: 1 % (ref 0–4)
EPITHELIAL CELLS UA: ABNORMAL /HPF
GFR AFRICAN AMERICAN: 21 ML/MIN
GFR NON-AFRICAN AMERICAN: 17 ML/MIN
GFR SERPL CREATININE-BSD FRML MDRD: ABNORMAL ML/MIN/{1.73_M2}
GFR SERPL CREATININE-BSD FRML MDRD: ABNORMAL ML/MIN/{1.73_M2}
GLUCOSE BLD-MCNC: 151 MG/DL (ref 70–99)
GLUCOSE BLD-MCNC: 167 MG/DL (ref 65–105)
GLUCOSE URINE: NEGATIVE
HCT VFR BLD CALC: 38.6 % (ref 36–46)
HEMOGLOBIN: 12.5 G/DL (ref 12–16)
IMMATURE GRANULOCYTES: ABNORMAL %
KETONES, URINE: NEGATIVE
LACTIC ACID: 2.3 MMOL/L (ref 0.5–2.2)
LACTIC ACID: 2.8 MMOL/L (ref 0.5–2.2)
LEUKOCYTE ESTERASE, URINE: ABNORMAL
LYMPHOCYTES # BLD: 16 % (ref 24–44)
MCH RBC QN AUTO: 29.2 PG (ref 26–34)
MCHC RBC AUTO-ENTMCNC: 32.4 G/DL (ref 31–37)
MCV RBC AUTO: 89.9 FL (ref 80–100)
MONOCYTES # BLD: 1 % (ref 1–7)
MUCUS: ABNORMAL
NITRITE, URINE: POSITIVE
NRBC AUTOMATED: ABNORMAL PER 100 WBC
OTHER OBSERVATIONS UA: ABNORMAL
PDW BLD-RTO: 13.8 % (ref 11.5–14.9)
PH UA: 6.5 (ref 5–8)
PLATELET # BLD: 232 K/UL (ref 150–450)
PLATELET ESTIMATE: ABNORMAL
PMV BLD AUTO: 9.1 FL (ref 6–12)
POTASSIUM SERPL-SCNC: 5.3 MMOL/L (ref 3.7–5.3)
PROTEIN UA: ABNORMAL
RBC # BLD: 4.3 M/UL (ref 4–5.2)
RBC # BLD: ABNORMAL 10*6/UL
RBC UA: ABNORMAL /HPF
RENAL EPITHELIAL, UA: ABNORMAL /HPF
SEG NEUTROPHILS: 81 % (ref 36–66)
SEGMENTED NEUTROPHILS ABSOLUTE COUNT: 6.7 K/UL (ref 1.3–9.1)
SODIUM BLD-SCNC: 138 MMOL/L (ref 135–144)
SPECIFIC GRAVITY UA: 1.01 (ref 1–1.03)
TOTAL PROTEIN: 7.9 G/DL (ref 6.4–8.3)
TRICHOMONAS: ABNORMAL
TURBIDITY: ABNORMAL
URINE HGB: ABNORMAL
UROBILINOGEN, URINE: NORMAL
WBC # BLD: 8.2 K/UL (ref 3.5–11)
WBC # BLD: ABNORMAL 10*3/UL
WBC UA: ABNORMAL /HPF
YEAST: ABNORMAL

## 2021-06-20 PROCEDURE — 96375 TX/PRO/DX INJ NEW DRUG ADDON: CPT

## 2021-06-20 PROCEDURE — 96365 THER/PROPH/DIAG IV INF INIT: CPT

## 2021-06-20 PROCEDURE — 6360000002 HC RX W HCPCS: Performed by: STUDENT IN AN ORGANIZED HEALTH CARE EDUCATION/TRAINING PROGRAM

## 2021-06-20 PROCEDURE — 6370000000 HC RX 637 (ALT 250 FOR IP): Performed by: EMERGENCY MEDICINE

## 2021-06-20 PROCEDURE — 83605 ASSAY OF LACTIC ACID: CPT

## 2021-06-20 PROCEDURE — 6360000002 HC RX W HCPCS: Performed by: FAMILY MEDICINE

## 2021-06-20 PROCEDURE — 87040 BLOOD CULTURE FOR BACTERIA: CPT

## 2021-06-20 PROCEDURE — 74176 CT ABD & PELVIS W/O CONTRAST: CPT

## 2021-06-20 PROCEDURE — 6360000002 HC RX W HCPCS: Performed by: EMERGENCY MEDICINE

## 2021-06-20 PROCEDURE — 2580000003 HC RX 258: Performed by: FAMILY MEDICINE

## 2021-06-20 PROCEDURE — 6370000000 HC RX 637 (ALT 250 FOR IP): Performed by: FAMILY MEDICINE

## 2021-06-20 PROCEDURE — 87088 URINE BACTERIA CULTURE: CPT

## 2021-06-20 PROCEDURE — 87186 SC STD MICRODIL/AGAR DIL: CPT

## 2021-06-20 PROCEDURE — 2580000003 HC RX 258: Performed by: EMERGENCY MEDICINE

## 2021-06-20 PROCEDURE — 81001 URINALYSIS AUTO W/SCOPE: CPT

## 2021-06-20 PROCEDURE — 82947 ASSAY GLUCOSE BLOOD QUANT: CPT

## 2021-06-20 PROCEDURE — 85025 COMPLETE CBC W/AUTO DIFF WBC: CPT

## 2021-06-20 PROCEDURE — 87086 URINE CULTURE/COLONY COUNT: CPT

## 2021-06-20 PROCEDURE — 99283 EMERGENCY DEPT VISIT LOW MDM: CPT

## 2021-06-20 PROCEDURE — 2060000000 HC ICU INTERMEDIATE R&B

## 2021-06-20 PROCEDURE — 36415 COLL VENOUS BLD VENIPUNCTURE: CPT

## 2021-06-20 PROCEDURE — 83036 HEMOGLOBIN GLYCOSYLATED A1C: CPT

## 2021-06-20 PROCEDURE — 80053 COMPREHEN METABOLIC PANEL: CPT

## 2021-06-20 RX ORDER — BUPROPION HYDROCHLORIDE 300 MG/1
300 TABLET ORAL DAILY
Status: DISCONTINUED | OUTPATIENT
Start: 2021-06-20 | End: 2021-07-02 | Stop reason: HOSPADM

## 2021-06-20 RX ORDER — MORPHINE SULFATE 4 MG/ML
2 INJECTION, SOLUTION INTRAMUSCULAR; INTRAVENOUS EVERY 4 HOURS PRN
Status: DISCONTINUED | OUTPATIENT
Start: 2021-06-20 | End: 2021-07-02 | Stop reason: HOSPADM

## 2021-06-20 RX ORDER — MORPHINE SULFATE 4 MG/ML
2 INJECTION, SOLUTION INTRAMUSCULAR; INTRAVENOUS EVERY 6 HOURS PRN
Status: DISCONTINUED | OUTPATIENT
Start: 2021-06-20 | End: 2021-06-20

## 2021-06-20 RX ORDER — CHOLECALCIFEROL (VITAMIN D3) 125 MCG
500 CAPSULE ORAL DAILY
Status: DISCONTINUED | OUTPATIENT
Start: 2021-06-20 | End: 2021-07-02 | Stop reason: HOSPADM

## 2021-06-20 RX ORDER — BUSPIRONE HYDROCHLORIDE 15 MG/1
15 TABLET ORAL 2 TIMES DAILY
Status: DISCONTINUED | OUTPATIENT
Start: 2021-06-20 | End: 2021-07-02 | Stop reason: HOSPADM

## 2021-06-20 RX ORDER — SODIUM CHLORIDE 9 MG/ML
25 INJECTION, SOLUTION INTRAVENOUS PRN
Status: DISCONTINUED | OUTPATIENT
Start: 2021-06-20 | End: 2021-07-02 | Stop reason: HOSPADM

## 2021-06-20 RX ORDER — ACETAMINOPHEN 325 MG/1
650 TABLET ORAL EVERY 6 HOURS PRN
Status: DISCONTINUED | OUTPATIENT
Start: 2021-06-20 | End: 2021-07-02 | Stop reason: HOSPADM

## 2021-06-20 RX ORDER — ALPRAZOLAM 0.25 MG/1
1 TABLET ORAL DAILY PRN
Status: DISCONTINUED | OUTPATIENT
Start: 2021-06-20 | End: 2021-06-25

## 2021-06-20 RX ORDER — PANTOPRAZOLE SODIUM 40 MG/1
40 TABLET, DELAYED RELEASE ORAL
Status: DISCONTINUED | OUTPATIENT
Start: 2021-06-21 | End: 2021-07-02 | Stop reason: HOSPADM

## 2021-06-20 RX ORDER — ONDANSETRON 2 MG/ML
4 INJECTION INTRAMUSCULAR; INTRAVENOUS ONCE
Status: COMPLETED | OUTPATIENT
Start: 2021-06-20 | End: 2021-06-20

## 2021-06-20 RX ORDER — ATORVASTATIN CALCIUM 10 MG/1
10 TABLET, FILM COATED ORAL DAILY
Status: DISCONTINUED | OUTPATIENT
Start: 2021-06-20 | End: 2021-07-02 | Stop reason: HOSPADM

## 2021-06-20 RX ORDER — MAGNESIUM SULFATE 1 G/100ML
1000 INJECTION INTRAVENOUS PRN
Status: DISCONTINUED | OUTPATIENT
Start: 2021-06-20 | End: 2021-06-27

## 2021-06-20 RX ORDER — DEXTROSE MONOHYDRATE 50 MG/ML
100 INJECTION, SOLUTION INTRAVENOUS PRN
Status: DISCONTINUED | OUTPATIENT
Start: 2021-06-20 | End: 2021-07-02 | Stop reason: HOSPADM

## 2021-06-20 RX ORDER — ONDANSETRON 4 MG/1
4 TABLET, ORALLY DISINTEGRATING ORAL EVERY 8 HOURS PRN
Status: DISCONTINUED | OUTPATIENT
Start: 2021-06-20 | End: 2021-07-02 | Stop reason: HOSPADM

## 2021-06-20 RX ORDER — SODIUM CHLORIDE 0.9 % (FLUSH) 0.9 %
10 SYRINGE (ML) INJECTION PRN
Status: DISCONTINUED | OUTPATIENT
Start: 2021-06-20 | End: 2021-07-02 | Stop reason: HOSPADM

## 2021-06-20 RX ORDER — POTASSIUM CHLORIDE 7.45 MG/ML
10 INJECTION INTRAVENOUS PRN
Status: DISCONTINUED | OUTPATIENT
Start: 2021-06-20 | End: 2021-06-20

## 2021-06-20 RX ORDER — ACETAMINOPHEN 650 MG/1
650 SUPPOSITORY RECTAL EVERY 6 HOURS PRN
Status: DISCONTINUED | OUTPATIENT
Start: 2021-06-20 | End: 2021-07-02 | Stop reason: HOSPADM

## 2021-06-20 RX ORDER — PRAMIPEXOLE DIHYDROCHLORIDE 0.12 MG/1
0.12 TABLET ORAL DAILY
Status: DISCONTINUED | OUTPATIENT
Start: 2021-06-20 | End: 2021-07-02 | Stop reason: HOSPADM

## 2021-06-20 RX ORDER — METOPROLOL SUCCINATE 100 MG/1
100 TABLET, EXTENDED RELEASE ORAL DAILY
Status: DISCONTINUED | OUTPATIENT
Start: 2021-06-20 | End: 2021-07-02 | Stop reason: HOSPADM

## 2021-06-20 RX ORDER — ONDANSETRON 2 MG/ML
4 INJECTION INTRAMUSCULAR; INTRAVENOUS EVERY 6 HOURS PRN
Status: DISCONTINUED | OUTPATIENT
Start: 2021-06-20 | End: 2021-07-02 | Stop reason: HOSPADM

## 2021-06-20 RX ORDER — POTASSIUM CHLORIDE 20 MEQ/1
40 TABLET, EXTENDED RELEASE ORAL PRN
Status: DISCONTINUED | OUTPATIENT
Start: 2021-06-20 | End: 2021-06-20

## 2021-06-20 RX ORDER — ASPIRIN 81 MG/1
81 TABLET ORAL DAILY
Status: DISCONTINUED | OUTPATIENT
Start: 2021-06-20 | End: 2021-07-02 | Stop reason: HOSPADM

## 2021-06-20 RX ORDER — BACLOFEN 10 MG/1
10 TABLET ORAL 2 TIMES DAILY
Status: DISCONTINUED | OUTPATIENT
Start: 2021-06-20 | End: 2021-07-02 | Stop reason: HOSPADM

## 2021-06-20 RX ORDER — FLUOXETINE 10 MG/1
10 CAPSULE ORAL DAILY
Status: DISCONTINUED | OUTPATIENT
Start: 2021-06-20 | End: 2021-07-02 | Stop reason: HOSPADM

## 2021-06-20 RX ORDER — SODIUM CHLORIDE 0.9 % (FLUSH) 0.9 %
10 SYRINGE (ML) INJECTION EVERY 12 HOURS SCHEDULED
Status: DISCONTINUED | OUTPATIENT
Start: 2021-06-20 | End: 2021-07-02 | Stop reason: HOSPADM

## 2021-06-20 RX ORDER — DEXTROSE MONOHYDRATE 25 G/50ML
12.5 INJECTION, SOLUTION INTRAVENOUS PRN
Status: DISCONTINUED | OUTPATIENT
Start: 2021-06-20 | End: 2021-07-02 | Stop reason: HOSPADM

## 2021-06-20 RX ORDER — NICOTINE POLACRILEX 4 MG
15 LOZENGE BUCCAL PRN
Status: DISCONTINUED | OUTPATIENT
Start: 2021-06-20 | End: 2021-07-02 | Stop reason: HOSPADM

## 2021-06-20 RX ORDER — 0.9 % SODIUM CHLORIDE 0.9 %
1000 INTRAVENOUS SOLUTION INTRAVENOUS ONCE
Status: COMPLETED | OUTPATIENT
Start: 2021-06-20 | End: 2021-06-20

## 2021-06-20 RX ORDER — FENTANYL CITRATE 50 UG/ML
100 INJECTION, SOLUTION INTRAMUSCULAR; INTRAVENOUS ONCE
Status: COMPLETED | OUTPATIENT
Start: 2021-06-20 | End: 2021-06-20

## 2021-06-20 RX ORDER — SODIUM CHLORIDE 9 MG/ML
INJECTION, SOLUTION INTRAVENOUS CONTINUOUS
Status: ACTIVE | OUTPATIENT
Start: 2021-06-20 | End: 2021-06-21

## 2021-06-20 RX ORDER — LOSARTAN POTASSIUM 50 MG/1
50 TABLET ORAL DAILY
Status: DISCONTINUED | OUTPATIENT
Start: 2021-06-20 | End: 2021-07-02 | Stop reason: HOSPADM

## 2021-06-20 RX ORDER — LEVOTHYROXINE SODIUM 112 UG/1
112 TABLET ORAL DAILY
Status: DISCONTINUED | OUTPATIENT
Start: 2021-06-20 | End: 2021-07-02 | Stop reason: HOSPADM

## 2021-06-20 RX ORDER — PHENAZOPYRIDINE HYDROCHLORIDE 200 MG/1
200 TABLET, FILM COATED ORAL ONCE
Status: COMPLETED | OUTPATIENT
Start: 2021-06-20 | End: 2021-06-20

## 2021-06-20 RX ORDER — ISOSORBIDE MONONITRATE 30 MG/1
30 TABLET, EXTENDED RELEASE ORAL DAILY
Status: DISCONTINUED | OUTPATIENT
Start: 2021-06-20 | End: 2021-07-02 | Stop reason: HOSPADM

## 2021-06-20 RX ORDER — HEPARIN SODIUM 5000 [USP'U]/ML
5000 INJECTION, SOLUTION INTRAVENOUS; SUBCUTANEOUS EVERY 8 HOURS SCHEDULED
Status: DISCONTINUED | OUTPATIENT
Start: 2021-06-20 | End: 2021-07-02 | Stop reason: HOSPADM

## 2021-06-20 RX ORDER — ALBUTEROL SULFATE 90 UG/1
2 AEROSOL, METERED RESPIRATORY (INHALATION) EVERY 6 HOURS PRN
Status: DISCONTINUED | OUTPATIENT
Start: 2021-06-20 | End: 2021-07-02 | Stop reason: HOSPADM

## 2021-06-20 RX ADMIN — FENTANYL CITRATE 100 MCG: 0.05 INJECTION, SOLUTION INTRAMUSCULAR; INTRAVENOUS at 11:46

## 2021-06-20 RX ADMIN — SODIUM CHLORIDE: 9 INJECTION, SOLUTION INTRAVENOUS at 21:44

## 2021-06-20 RX ADMIN — FLUOXETINE 10 MG: 10 CAPSULE ORAL at 22:23

## 2021-06-20 RX ADMIN — Medication 2 MG: at 20:14

## 2021-06-20 RX ADMIN — PRAMIPEXOLE DIHYDROCHLORIDE 0.12 MG: 0.12 TABLET ORAL at 22:23

## 2021-06-20 RX ADMIN — BUPROPION HYDROCHLORIDE 300 MG: 300 TABLET, FILM COATED, EXTENDED RELEASE ORAL at 21:43

## 2021-06-20 RX ADMIN — METOPROLOL SUCCINATE 100 MG: 100 TABLET, EXTENDED RELEASE ORAL at 21:43

## 2021-06-20 RX ADMIN — SODIUM CHLORIDE 1000 ML: 9 INJECTION, SOLUTION INTRAVENOUS at 11:46

## 2021-06-20 RX ADMIN — PHENAZOPYRIDINE HYDROCHLORIDE 200 MG: 200 TABLET ORAL at 11:27

## 2021-06-20 RX ADMIN — BACLOFEN 10 MG: 10 TABLET ORAL at 21:43

## 2021-06-20 RX ADMIN — ATORVASTATIN CALCIUM 10 MG: 10 TABLET, FILM COATED ORAL at 21:43

## 2021-06-20 RX ADMIN — INSULIN LISPRO 1 UNITS: 100 INJECTION, SOLUTION INTRAVENOUS; SUBCUTANEOUS at 21:39

## 2021-06-20 RX ADMIN — OXYBUTYNIN CHLORIDE 15 MG: 10 TABLET, EXTENDED RELEASE ORAL at 21:43

## 2021-06-20 RX ADMIN — CEFTRIAXONE SODIUM 1000 MG: 1 INJECTION, POWDER, FOR SOLUTION INTRAMUSCULAR; INTRAVENOUS at 13:06

## 2021-06-20 RX ADMIN — ASPIRIN 81 MG: 81 TABLET, COATED ORAL at 21:43

## 2021-06-20 RX ADMIN — SODIUM CHLORIDE, PRESERVATIVE FREE 10 ML: 5 INJECTION INTRAVENOUS at 21:44

## 2021-06-20 RX ADMIN — CYANOCOBALAMIN TAB 500 MCG 500 MCG: 500 TAB at 21:43

## 2021-06-20 RX ADMIN — ISOSORBIDE MONONITRATE 30 MG: 30 TABLET, EXTENDED RELEASE ORAL at 21:42

## 2021-06-20 RX ADMIN — HEPARIN SODIUM 5000 UNITS: 5000 INJECTION INTRAVENOUS; SUBCUTANEOUS at 21:45

## 2021-06-20 RX ADMIN — ONDANSETRON 4 MG: 2 INJECTION INTRAMUSCULAR; INTRAVENOUS at 13:05

## 2021-06-20 RX ADMIN — BUSPIRONE HYDROCHLORIDE 15 MG: 15 TABLET ORAL at 21:43

## 2021-06-20 ASSESSMENT — PAIN SCALES - GENERAL
PAINLEVEL_OUTOF10: 8
PAINLEVEL_OUTOF10: 10
PAINLEVEL_OUTOF10: 7

## 2021-06-20 ASSESSMENT — ENCOUNTER SYMPTOMS
DIARRHEA: 0
NAUSEA: 1
COUGH: 0
TROUBLE SWALLOWING: 0
ABDOMINAL PAIN: 0
CHEST TIGHTNESS: 0
SHORTNESS OF BREATH: 0
COLOR CHANGE: 0
NAUSEA: 0
BACK PAIN: 1
BLOOD IN STOOL: 0
EYE ITCHING: 0
VOMITING: 0
EYE REDNESS: 0

## 2021-06-20 ASSESSMENT — PAIN DESCRIPTION - ORIENTATION
ORIENTATION: LEFT
ORIENTATION: LEFT

## 2021-06-20 ASSESSMENT — PAIN DESCRIPTION - PAIN TYPE
TYPE: ACUTE PAIN
TYPE: ACUTE PAIN

## 2021-06-20 ASSESSMENT — PAIN DESCRIPTION - DESCRIPTORS: DESCRIPTORS: SHARP;STABBING

## 2021-06-20 ASSESSMENT — PAIN DESCRIPTION - LOCATION
LOCATION: FLANK;ABDOMEN
LOCATION: FLANK

## 2021-06-20 NOTE — ED TRIAGE NOTES
Mode of arrival (squad #, walk in, police, etc) : Walk In        Chief complaint(s): Flank pain, urinary frequency        Arrival Note (brief scenario, treatment PTA, etc). : Pt arrives to ED c/o left sided flank pain that started around 0730 this morning. Patient states that she thinks that she has a bladder infection because she is only able to urinate a little at a time but has been going more frequently. C= \"Have you ever felt that you should Cut down on your drinking? \"  No  A= \"Have people Annoyed you by criticizing your drinking? \"  No  G= \"Have you ever felt bad or Guilty about your drinking? \"  No  E= \"Have you ever had a drink as an Eye-opener first thing in the morning to steady your nerves or to help a hangover? \"  No      Deferred []      Reason for deferring: N/A    *If yes to two or more: probable alcohol abuse. *

## 2021-06-20 NOTE — ED NOTES
Pt comes to this ER with c/o lt flank pain, nausea, and urinary frequency that started around 0800 this morning. Pt arrives A+O x 4, GCS = 15, PMS x 4 intact, eupneic, and PWD. Pulse is regular et strong. Abdomen is soft et nondistended.        Nubia Bishop RN  06/20/21 [id number]

## 2021-06-20 NOTE — ED PROVIDER NOTES
[facility]      Pt Name: Marlen Sheriff  MRN: [id number]  [place] 1950  Date of evaluation: 6/20/21      CHIEF COMPLAINT       Chief Complaint   Patient presents with    Flank Pain    Urinary Frequency     HISTORY OF PRESENT ILLNESS   HPI 70 y.o. female  presents with c/o left flank pain. Symptoms started 8am this morning. Pain is described as \"deep\" in character, severe in severity (rating it 8 / 10). The pain is located primarily in the left flank with radiation towards her groin. The pain has been constant in course. The patient tried no medications prior to arrival for relief of symptoms. The patient denies a history of similar symptoms. REVIEW OF SYSTEMS       Review of Systems   Constitutional: Negative for chills, diaphoresis and fever. HENT: Positive for congestion (chronic). Eyes: Negative for visual disturbance. Respiratory: Negative for cough and shortness of breath. Cardiovascular: Negative for chest pain. Gastrointestinal: Positive for nausea. Negative for diarrhea and vomiting. Genitourinary: Positive for dysuria (x 1 week), flank pain, frequency and urgency. Musculoskeletal: Positive for back pain. Skin: Negative for rash. Neurological: Negative for headaches.        PAST MEDICAL HISTORY     Past Medical History:   Diagnosis Date    Aortic valve stenosis     mild per chart    Arthritis     CAD (coronary artery disease) 2000    1 STENT    Diabetes mellitus ([facility] [address]     Heart murmur     1962    Hyperlipidemia 2004    ON RX    MI, old     states many and they were mild    Pulmonary stenosis 1995    Pulmonary valve stenosis     mild/congenital per chart    Sciatica     right leg    Thyroid disease 2004    HYPOTHYROIDISM ON RX    Wears glasses        SURGICAL HISTORY       Past Surgical History:   Procedure Laterality Date    CERVICAL FUSION  1993    CORONARY ANGIOPLASTY WITH STENT PLACEMENT  2000    1 STENT    CYSTOSCOPY Left 6/21/2021    CYSTOSCOPY URETERAL STENT INSERTION performed by Mayelin Diaz MD at [address] Left 03/21/2014    [place] ARTHOPLASTY LEFT THUMB    HYSTERECTOMY  1980    WITH RT SALPINGOOPHERECTOMY    JOINT REPLACEMENT Bilateral 1994    PARTIAL-KNEES    OTHER SURGICAL HISTORY Right 09/23/2014    thumb repair    SALPINGO-OOPHORECTOMY Left 1987    SHOULDER SURGERY Left 1993    SPURS    TOE OSTEOTOMY Right 6/8/2016    Right 5th digit adductory wedge osteotomy with K wire fixation        CURRENT MEDICATIONS       Current Discharge Medication List      CONTINUE these medications which have NOT CHANGED    Details   losartan (COZAAR) 50 MG tablet TAKE ONE TABLET BY MOUTH DAILY      buPROPion (WELLBUTRIN XL) 300 MG extended release tablet TAKE ONE TABLET BY MOUTH DAILY      !! FLUoxetine (PROZAC) 10 MG capsule TAKE ONE CAPSULE BY MOUTH DAILY      metFORMIN (GLUCOPHAGE) 1000 MG tablet TAKE ONE TABLET BY MOUTH TWICE A DAY WITH MEALS      clotrimazole (LOTRIMIN) 1 % vaginal cream Place 1 applicator vaginally 2 times daily      !! oxybutynin (DITROPAN-XL) 10 MG extended release tablet TAKE ONE TABLET BY MOUTH DAILY      pramipexole (MIRAPEX) 0.125 MG tablet TAKE ONE TABLET BY MOUTH DAILY      ascorbic acid (VITAMIN C) 1000 MG tablet Take 1,000 mg by mouth daily      albuterol sulfate  (90 Base) MCG/ACT inhaler INHALE TWO PUFFS BY MOUTH EVERY 4 TO 6 HOURS AS NEEDED FOR WHEEZING      nystatin (NYAMYC) 930341 UNIT/GM powder APPLY TO AFFECTED AREA(S) FOUR TIMES A DAY      busPIRone (BUSPAR) 15 MG tablet Take 15 mg by mouth 2 times daily      isosorbide mononitrate (IMDUR) 30 MG extended release tablet Take 1 tablet by mouth daily  Qty: 30 tablet, Refills: 3      pantoprazole (PROTONIX) 40 MG tablet Take 1 tablet by mouth every morning (before breakfast)  Qty: 30 tablet, Refills: 3      baclofen (LIORESAL) 10 MG tablet Take 10 mg by mouth 2 times daily      fluticasone (FLONASE) 50 MCG/ACT nasal spray 1 spray by Nasal route daily      levocetirizine (XYZAL) 5 MG tablet Take 5 mg by mouth nightly      nystatin (MYCOSTATIN) 136767 UNIT/GM cream Apply topically 2 times daily Apply topically 2 times daily. !! oxybutynin (DITROPAN XL) 15 MG extended release tablet Take 15 mg by mouth daily      levothyroxine (SYNTHROID) 112 MCG tablet TAKE ONE TABLET BY MOUTH DAILY      atorvastatin (LIPITOR) 40 MG tablet       nitroGLYCERIN (NITROSTAT) 0.4 MG SL tablet Place 0.4 mg under the tongue every 5 minutes as needed for Chest pain up to max of 3 total doses. If no relief after 1 dose, call 911. Cholecalciferol (VITAMIN D3) 2000 UNITS CAPS Take 1 capsule by mouth daily. celecoxib (CELEBREX) 200 MG capsule Take 200 mg by mouth 2 times daily. Multiple Vitamins-Minerals (THERAPEUTIC MULTIVITAMIN-MINERALS) tablet Take 1 tablet by mouth daily. ALPRAZolam (XANAX) 1 MG tablet Take 1 mg by mouth as needed for Anxiety. aspirin 81 MG EC tablet Take 81 mg by mouth daily. LAST DOSE 14      metoprolol (TOPROL-XL) 100 MG XL tablet Take 100 mg by mouth daily. !! FLUoxetine (PROZAC) 20 MG capsule Take 20 mg by mouth daily       vitamin B-12 (CYANOCOBALAMIN) 500 MCG tablet Take 500 mcg by mouth daily. !! - Potential duplicate medications found. Please discuss with provider. ALLERGIES     is allergic to pcn [penicillins], heparin, lamotrigine, cyclobenzaprine, and heparin (porcine). FAMILY HISTORY     She indicated that her mother is . She indicated that her father is . She indicated that her sister is alive. She indicated that her brother is alive. She indicated that her maternal grandmother is . She indicated that her son is alive. SOCIAL HISTORY      reports that she quit smoking about 17 years ago. Her smoking use included cigarettes. She has a 30.00 pack-year smoking history.  She has never used smokeless tobacco. She reports current alcohol use. She reports that she does not use drugs. PHYSICAL EXAM     INITIAL VITALS: /71   Pulse 83   Temp 98.3 °F (36.8 °C) (Oral)   Resp 16   Ht 5' 5\" (1.651 m)   Wt 205 lb (93 kg)   LMP 03/19/1980   SpO2 94%   BMI 34.11 kg/m²   Gen: nad  Head: Normocephalic, atraumatic  Eye: Pupils equal round reactive to light, no conjunctivitis  ENT: MMM  Neck: no JVD  Heart: Regular rate and rhythm no murmurs  Lungs: Clear to auscultation bilaterally, no respiratory distress  Chest wall: No crepitus, no tenderness palpation  Abdomen: Soft, nontender, nondistended, with no peritoneal signs  MSK: left flank ttp but no cva ttp. Neurologic: Patient is alert and oriented x3, motor and sensation is intact in all 4 extremities, fluent speech  Extr: no calf ttp. Extremities warm and well perfused. MEDICAL DECISION MAKING:     MDM  70 y.o. female presenting with flank pain and dysuria. Will evalute for obstructing stone. Providing fluids and analgesics. Emergency Department course:    UA appears infected. CT shows hydroureter and hydronephrosis with perinephric stranding. Likely recently passed stone but given her infected urine and perinephric stranding concern for possible developing pyelonephritis. Pt treated with IV cetriaxone. Pt also has toribio and elevated lactic acid. Giving fluids. Admitting to hospital. D/w pt and she is in agreement with the plan. D/w Dr. Ilia White who wrote admission orders. DIAGNOSTIC RESULTS     EKG: All EKG's are interpreted by the Emergency Department Physician who either signs or Co-signs this chart in the absence of a cardiologist.        RADIOLOGY:All plain film, CT, MRI, and formal ultrasound images (except ED bedside ultrasound) are read by the radiologist and the images and interpretations are directly viewed by the emergency physician. CT HEAD WO CONTRAST   Preliminary Result   No acute intracranial abnormality.   Senescent changes including chronic microvascular change in atherosclerotic disease of the major intracranial   vessels. FLUORO FOR SURGICAL PROCEDURES   Final Result      CT ABDOMEN PELVIS WO CONTRAST Additional Contrast? None   Final Result   1. Left-sided hydroureter and hydronephrosis with associated asymmetrical   perinephric and periureteral fat stranding without obstructing calculus   noted. Findings likely represents a recently passed stone. 2. Nonobstructing calculi right kidney, largest measuring 5 mm. 3. Small hiatal hernia. 4. Stable ectasia infrarenal abdominal aortic aneurysm measuring 2.7 cm. LABS: All lab results were reviewed by myself, and all abnormals are listed below.   Labs Reviewed   CULTURE, URINE - Abnormal; Notable for the following components:       Result Value    Culture ESCHERICHIA COLI >889251 CFU/ML (*)     All other components within normal limits   CBC WITH AUTO DIFFERENTIAL - Abnormal; Notable for the following components:    Seg Neutrophils 81 (*)     Lymphocytes 16 (*)     All other components within normal limits   COMPREHENSIVE METABOLIC PANEL - Abnormal; Notable for the following components:    Glucose 151 (*)     BUN 39 (*)     CREATININE 2.70 (*)     Total Bilirubin 0.28 (*)     GFR Non- 17 (*)     GFR  21 (*)     All other components within normal limits   LACTIC ACID - Abnormal; Notable for the following components:    Lactic Acid 2.8 (*)     All other components within normal limits   URINE RT REFLEX TO CULTURE - Abnormal; Notable for the following components:    Turbidity UA TURBID (*)     Urine Hgb MOD (*)     Protein, UA 2+ (*)     Nitrite, Urine POSITIVE (*)     Leukocyte Esterase, Urine LARGE (*)     All other components within normal limits   MICROSCOPIC URINALYSIS - Abnormal; Notable for the following components:    Bacteria, UA MODERATE (*)     Amorphous, UA 1+ (*)     All other components within normal limits   LACTIC ACID - Abnormal; Notable for the following components:    Lactic Acid 2.3 (*)     All other components within normal limits   HEMOGLOBIN A1C - Abnormal; Notable for the following components:    Hemoglobin A1C 7.0 (*)     All other components within normal limits   CBC WITH AUTO DIFFERENTIAL - Abnormal; Notable for the following components:    WBC 29.4 (*)     RBC 3.45 (*)     Hemoglobin 9.9 (*)     Hematocrit 31.0 (*)     Seg Neutrophils 74 (*)     Lymphocytes 8 (*)     Bands 16 (*)     Segs Absolute 21.76 (*)     Absolute Bands # 4.70 (*)     All other components within normal limits   COMPREHENSIVE METABOLIC PANEL W/ REFLEX TO MG FOR LOW K - Abnormal; Notable for the following components:    Glucose 112 (*)     BUN 52 (*)     CREATININE 5.02 (*)     Calcium 8.3 (*)     Potassium 5.7 (*)     Total Bilirubin 0.26 (*)     Total Protein 6.1 (*)     Albumin 3.3 (*)     GFR Non- 8 (*)     GFR  10 (*)     All other components within normal limits   LACTIC ACID - Abnormal; Notable for the following components:    Lactic Acid 2.4 (*)     All other components within normal limits   BASIC METABOLIC PANEL - Abnormal; Notable for the following components:    Glucose 112 (*)     BUN 54 (*)     CREATININE 5.02 (*)     Calcium 8.3 (*)     Potassium 5.9 (*)     GFR Non- 8 (*)     GFR  10 (*)     All other components within normal limits   POTASSIUM - Abnormal; Notable for the following components:    Potassium 5.5 (*)     All other components within normal limits   URINE RT REFLEX TO CULTURE - Abnormal; Notable for the following components:    Turbidity UA CLOUDY (*)     Urine Hgb LARGE (*)     Leukocyte Esterase, Urine LARGE (*)     All other components within normal limits   PROTEIN / CREATININE RATIO, URINE - Abnormal; Notable for the following components:    Urine Total Protein Creatinine Ratio 2.30 (*)     All other components within normal limits   PTH, INTACT WITH IONIZED CALCIUM - Abnormal; Notable for the following components:    Pth Intact 65.40 (*)     All other components within normal limits   C4 COMPLEMENT - Abnormal; Notable for the following components:    Complement C4 46 (*)     All other components within normal limits   MICROSCOPIC URINALYSIS - Abnormal; Notable for the following components:    Bacteria, UA FEW (*)     All other components within normal limits   CBC WITH AUTO DIFFERENTIAL - Abnormal; Notable for the following components:    WBC 17.0 (*)     RBC 3.42 (*)     Hemoglobin 9.9 (*)     Hematocrit 30.8 (*)     Platelets 668 (*)     Seg Neutrophils 72 (*)     Lymphocytes 5 (*)     Bands 18 (*)     Segs Absolute 12.24 (*)     Absolute Lymph # 0.85 (*)     Absolute Bands # 3.06 (*)     All other components within normal limits   COMPREHENSIVE METABOLIC PANEL W/ REFLEX TO MG FOR LOW K - Abnormal; Notable for the following components:    Glucose 102 (*)     BUN 59 (*)     CREATININE 5.73 (*)     Calcium 8.5 (*)     CO2 18 (*)     Total Bilirubin 0.22 (*)     Total Protein 6.3 (*)     Albumin 3.1 (*)     GFR Non- 7 (*)     GFR  9 (*)     All other components within normal limits   POC GLUCOSE FINGERSTICK - Abnormal; Notable for the following components:    POC Glucose 167 (*)     All other components within normal limits   POC GLUCOSE FINGERSTICK - Abnormal; Notable for the following components:    POC Glucose 115 (*)     All other components within normal limits   POC GLUCOSE FINGERSTICK - Abnormal; Notable for the following components:    POC Glucose 127 (*)     All other components within normal limits   CULTURE, BLOOD 1   CULTURE, BLOOD 1   CULTURE, URINE   POTASSIUM   SODIUM, URINE, RANDOM   EOSINOPHILS, URINE   CHLORIDE, URINE, RANDOM   CK   C3 COMPLEMENT   CHAIM SCREEN WITH REFLEX   ANTI-NEUTROPHILIC CYTOPLASMIC ANTIBODY   POTASSIUM   POC GLUCOSE FINGERSTICK   POC GLUCOSE FINGERSTICK   POC GLUCOSE FINGERSTICK   POCT GLUCOSE   POCT GLUCOSE   POCT GLUCOSE   POCT GLUCOSE   POCT GLUCOSE   POCT GLUCOSE   POCT GLUCOSE   POCT GLUCOSE   POCT GLUCOSE       EMERGENCY DEPARTMENT COURSE:   Vitals:    Vitals:    06/22/21 0042 06/22/21 0223 06/22/21 0715 06/22/21 0815   BP: (!) 108/45  (!) 181/84 122/71   Pulse: 76 72 83    Resp: 18  16    Temp: 98.2 °F (36.8 °C)  98.3 °F (36.8 °C)    TempSrc: Oral  Oral    SpO2: 90%  94%    Weight:       Height:           The patient was given the following medications while in the emergency department:  Orders Placed This Encounter   Medications    fentaNYL (SUBLIMAZE) injection 100 mcg    phenazopyridine (PYRIDIUM) tablet 200 mg    0.9 % sodium chloride bolus    ondansetron (ZOFRAN) injection 4 mg    cefTRIAXone (ROCEPHIN) 1000 mg IVPB in 50 mL D5W minibag     Order Specific Question:   Antimicrobial Indications     Answer:   Urinary Tract Infection    albuterol sulfate  (90 Base) MCG/ACT inhaler 2 puff     Order Specific Question:   Initiate RT Bronchodilator Protocol     Answer:    Yes    ALPRAZolam (XANAX) tablet 1 mg    aspirin EC tablet 81 mg    atorvastatin (LIPITOR) tablet 10 mg    baclofen (LIORESAL) tablet 10 mg    buPROPion (WELLBUTRIN XL) extended release tablet 300 mg    busPIRone (BUSPAR) tablet 15 mg    FLUoxetine (PROZAC) capsule 10 mg    isosorbide mononitrate (IMDUR) extended release tablet 30 mg    levothyroxine (SYNTHROID) tablet 112 mcg    losartan (COZAAR) tablet 50 mg    metoprolol succinate (TOPROL XL) extended release tablet 100 mg    oxybutynin (DITROPAN-XL) extended release tablet 15 mg    pantoprazole (PROTONIX) tablet 40 mg    pramipexole (MIRAPEX) tablet 0.125 mg    vitamin B-12 (CYANOCOBALAMIN) tablet 500 mcg    insulin lispro (HUMALOG) injection vial 0-12 Units    insulin lispro (HUMALOG) injection vial 0-6 Units    glucose (GLUTOSE) 40 % oral gel 15 g    dextrose 50 % IV solution    glucagon (rDNA) injection 1 mg    dextrose 5 % solution    sodium chloride flush 0.9 % injection 10 mL    sodium chloride flush 0.9 % injection 10 mL    0.9 % sodium chloride infusion    DISCONTD: potassium chloride (KLOR-CON M) extended release tablet 40 mEq    DISCONTD: potassium bicarb-citric acid (EFFER-K) effervescent tablet 40 mEq    DISCONTD: potassium chloride 10 mEq/100 mL IVPB (Peripheral Line)    magnesium sulfate 1000 mg in dextrose 5% 100 mL IVPB    DISCONTD: enoxaparin (LOVENOX) injection 30 mg    OR Linked Order Group     ondansetron (ZOFRAN-ODT) disintegrating tablet 4 mg     ondansetron (ZOFRAN) injection 4 mg    magnesium hydroxide (MILK OF MAGNESIA) 400 MG/5ML suspension 30 mL    OR Linked Order Group     acetaminophen (TYLENOL) tablet 650 mg     acetaminophen (TYLENOL) suppository 650 mg    0.9 % sodium chloride infusion    DISCONTD: cefTRIAXone (ROCEPHIN) 1000 mg IVPB in 50 mL D5W minibag     Order Specific Question:   Antimicrobial Indications     Answer:   Urinary Tract Infection    morphine sulfate (PF) injection 2 mg    DISCONTD: morphine sulfate (PF) injection 2 mg    heparin (porcine) injection 5,000 Units    cefTRIAXone (ROCEPHIN) 1000 mg IVPB in 50 mL D5W minibag     Order Specific Question:   Antimicrobial Indications     Answer:   Urinary Tract Infection    oxyCODONE-acetaminophen (PERCOCET) 5-325 MG per tablet 1 tablet    dextrose 50 % IV solution    DISCONTD: insulin regular (HUMULIN R;NOVOLIN R) injection 10 Units    insulin regular (HUMULIN R;NOVOLIN R) injection 10 Units    0.9 % sodium chloride infusion    sodium polystyrene (KAYEXALATE) powder 30 g     -------------------------  CRITICAL CARE:   CONSULTS: IP CONSULT TO PRIMARY CARE PROVIDER  IP CONSULT TO UROLOGY  IP CONSULT TO NEPHROLOGY  IP CONSULT TO CARDIOLOGY  PROCEDURES: Procedures     FINAL IMPRESSION      1. Pyelonephritis    2.  Acute renal failure, unspecified acute renal failure type ([facility] [address]    3. Hydronephrosis, unspecified hydronephrosis type DISPOSITION/PLAN   DISPOSITION Admitted 06/20/2021 02:47:45 PM      PATIENT REFERRED TO:  No follow-up provider specified.     DISCHARGE MEDICATIONS:  Current Discharge Medication List            Kenny Petersen MD  Attending Emergency Physician                      Kenny Petersen MD  06/22/21 0195

## 2021-06-20 NOTE — H&P
History and Physical      Name: Delfino Canales  MRN: [id number]     Acct: [de-identified]  Room: A/A    Admit Date: 6/20/2021  PCP: Chente Randall DO      Chief Complaint:     Chief Complaint   Patient presents with    Flank Pain    Urinary Frequency       History Obtained From:     patient, electronic medical record    History of Present Illness:      Marilyn Pavon is a  70 y.o.  female who presents with Flank Pain and Urinary Frequency  Patient states that she started experiencing left flank pain dysuria, urinary frequency nausea since this morning. The flank pain is sharp 8 out of 10 radiating to the left groin area constant.   Patient denies hematuria, diarrhea vomiting chest pain shortness of breath lightheadedness fever or chills    Past Medical History:     Past Medical History:   Diagnosis Date    Aortic valve stenosis     mild per chart    Arthritis     CAD (coronary artery disease) 2000    1 STENT    Diabetes mellitus ([address]     Heart murmur     1962    Hyperlipidemia 2004    ON RX    MI, old     states many and they were mild    Pulmonary stenosis 1995    Pulmonary valve stenosis     mild/congenital per chart    Sciatica     right leg    Thyroid disease 2004    HYPOTHYROIDISM ON RX    Wears glasses         Past Surgical History:     Past Surgical History:   Procedure Laterality Date    CERVICAL FUSION  1993    CORONARY ANGIOPLASTY WITH STENT PLACEMENT  2000    1 STENT    FINGER SURGERY Left 03/21/2014    [place] ARTHOPLASTY LEFT THUMB    HYSTERECTOMY  1980    WITH RT SALPINGOOPHERECTOMY    JOINT REPLACEMENT Bilateral 1994    PARTIAL-KNEES    OTHER SURGICAL HISTORY Right 09/23/2014    thumb repair    SALPINGO-OOPHORECTOMY Left 1987    SHOULDER SURGERY Left 1993    SPURS    TOE OSTEOTOMY Right 6/8/2016    Right 5th digit adductory wedge osteotomy with K wire fixation         Medications Prior to Admission:       Prior to Admission medications    Medication Sig Start Date End Date Taking?  Authorizing Provider   losartan (COZAAR) 50 MG tablet TAKE ONE TABLET BY MOUTH DAILY 12/11/20   Historical Provider, MD   buPROPion (WELLBUTRIN XL) 300 MG extended release tablet TAKE ONE TABLET BY MOUTH DAILY 11/16/20   Historical Provider, MD   FLUoxetine (PROZAC) 10 MG capsule TAKE ONE CAPSULE BY MOUTH DAILY 7/8/20   Historical Provider, MD   metFORMIN (GLUCOPHAGE) 1000 MG tablet TAKE ONE TABLET BY MOUTH TWICE A DAY WITH MEALS 1/4/21   Historical Provider, MD   clotrimazole (LOTRIMIN) 1 % vaginal cream Place 1 applicator vaginally 2 times daily 2/19/21   Historical Provider, MD   oxybutynin (DITROPAN-XL) 10 MG extended release tablet TAKE ONE TABLET BY MOUTH DAILY 9/21/20   Historical Provider, MD   pramipexole (MIRAPEX) 0.125 MG tablet TAKE ONE TABLET BY MOUTH DAILY 10/2/20   Historical Provider, MD   ascorbic acid (VITAMIN C) 1000 MG tablet Take 1,000 mg by mouth daily    Historical Provider, MD   albuterol sulfate  (90 Base) MCG/ACT inhaler INHALE TWO PUFFS BY MOUTH EVERY 4 TO 6 HOURS AS NEEDED FOR WHEEZING 10/20/20   Historical Provider, MD   nystatin ([address]) 863568 UNIT/GM powder APPLY TO AFFECTED AREA(S) FOUR TIMES A DAY 9/22/20   Historical Provider, MD   busPIRone (BUSPAR) 15 MG tablet Take 15 mg by mouth 2 times daily 11/9/20   Historical Provider, MD   isosorbide mononitrate (IMDUR) 30 MG extended release tablet Take 1 tablet by mouth daily 4/25/18   Lovely Calabrese MD   pantoprazole (PROTONIX) 40 MG tablet Take 1 tablet by mouth every morning (before breakfast) 4/25/18   Lovely Calabrese MD   baclofen (LIORESAL) 10 MG tablet Take 10 mg by mouth 2 times daily    Historical Provider, MD   fluticasone (FLONASE) 50 MCG/ACT nasal spray 1 spray by Nasal route daily    Historical Provider, MD   levocetirizine (XYZAL) 5 MG tablet Take 5 mg by mouth nightly    Historical Provider, MD   nystatin (MYCOSTATIN) 540519 UNIT/GM cream Apply topically 2 times daily Apply topically 2 times daily.    Historical Provider, MD   oxybutynin (DITROPAN XL) 15 MG extended release tablet Take 15 mg by mouth daily    Historical Provider, MD   levothyroxine (SYNTHROID) 112 MCG tablet TAKE ONE TABLET BY MOUTH DAILY 10/2/17   Historical Provider, MD   atorvastatin (LIPITOR) 40 MG tablet  7/2/17   Historical Provider, MD   nitroGLYCERIN (NITROSTAT) 0.4 MG SL tablet Place 0.4 mg under the tongue every 5 minutes as needed for Chest pain up to max of 3 total doses. If no relief after 1 dose, call 911. Historical Provider, MD   Cholecalciferol (VITAMIN D3) 2000 UNITS CAPS Take 1 capsule by mouth daily. Historical Provider, MD   celecoxib (CELEBREX) 200 MG capsule Take 200 mg by mouth 2 times daily. Historical Provider, MD   Multiple Vitamins-Minerals (THERAPEUTIC MULTIVITAMIN-MINERALS) tablet Take 1 tablet by mouth daily. Historical Provider, MD   ALPRAZolam Evaline Beams) 1 MG tablet Take 1 mg by mouth as needed for Anxiety. Historical Provider, MD   aspirin 81 MG EC tablet Take 81 mg by mouth daily. LAST DOSE 9/8/14    Historical Provider, MD   metoprolol (TOPROL-XL) 100 MG XL tablet Take 100 mg by mouth daily. Historical Provider, MD   FLUoxetine (PROZAC) 20 MG capsule Take 20 mg by mouth daily     Historical Provider, MD   vitamin B-12 (CYANOCOBALAMIN) 500 MCG tablet Take 500 mcg by mouth daily. Historical Provider, MD        Allergies:       Pcn [penicillins], Heparin, Lamotrigine, Cyclobenzaprine, and Heparin (porcine)    Social History:     Tobacco:    reports that she quit smoking about 17 years ago. Her smoking use included cigarettes. She has a 30.00 pack-year smoking history. She has never used smokeless tobacco.  Alcohol:      reports current alcohol use. Drug Use:  reports no history of drug use.     Family History:     Family History   Problem Relation Age of Onset    Breast Cancer Mother         BREAST WITH METS T  LIVER AND LUNG    Other Father         AAA    Heart Disease Father    [facility] Asthma Sister     Diabetes Sister         NIDDM    Stroke Brother     Diabetes Brother         NIDDM    Stroke Maternal Grandmother     Asthma Son        Review of Systems:     Positive and Negative as described in HPI   all 10 systems are reviewed and negative except as Noted      Review of Systems   Constitutional: Negative for chills and fatigue. HENT: Negative for drooling, mouth sores, sneezing and trouble swallowing. Eyes: Negative for redness and itching. Respiratory: Negative for cough, chest tightness and shortness of breath. Cardiovascular: Negative for chest pain, palpitations and leg swelling. Gastrointestinal: Negative for abdominal pain, blood in stool, diarrhea, nausea and vomiting. Endocrine: Negative for heat intolerance and polyphagia. Genitourinary: Positive for dysuria and frequency. Negative for difficulty urinating, flank pain, hematuria and pelvic pain. Musculoskeletal: Negative for arthralgias, joint swelling and neck stiffness. Skin: Negative for color change and pallor. Allergic/Immunologic: Negative for food allergies. Neurological: Negative for dizziness, seizures and headaches. Hematological: Does not bruise/bleed easily. Psychiatric/Behavioral: Negative for agitation, behavioral problems and suicidal ideas. The patient is not hyperactive. Code Status:  Prior    Physical Exam:     Vitals:  BP (!) 150/69   Pulse 93   Temp 98 °F (36.7 °C) (Oral)   Resp 16   Ht 5' 5\" (1.651 m)   Wt 210 lb (95.3 kg)   LMP 1980   SpO2 98%   BMI 34.95 kg/m²   Temp (24hrs), Av °F (36.7 °C), Min:98 °F (36.7 °C), Max:98 °F (36.7 °C)        Physical Exam  Vitals reviewed. HENT:      Head: Normocephalic. Right Ear: External ear normal.      Left Ear: External ear normal.      Nose: Nose normal.      Mouth/Throat:      Mouth: Mucous membranes are moist.      Pharynx: Oropharynx is clear.    Eyes:      Conjunctiva/sclera: Conjunctivae normal. Mucus, UA NOT REPORTED None    Trichomonas, UA NOT REPORTED None    Amorphous, UA 1+ (A) None    Other Observations UA NOT REPORTED NOT REQ.     Yeast, UA NOT REPORTED None   CBC with DIFF    Collection Time: 06/20/21 11:46 AM   Result Value Ref Range    WBC 8.2 3.5 - 11.0 k/uL    RBC 4.30 4.0 - 5.2 m/uL    Hemoglobin 12.5 12.0 - 16.0 g/dL    Hematocrit 38.6 36 - 46 %    MCV 89.9 80 - 100 fL    MCH 29.2 26 - 34 pg    MCHC 32.4 31 - 37 g/dL    RDW 13.8 11.5 - 14.9 %    Platelets 883 930 - 364 k/uL    MPV 9.1 6.0 - 12.0 fL    NRBC Automated NOT REPORTED per 100 WBC    Differential Type NOT REPORTED     Seg Neutrophils 81 (H) 36 - 66 %    Lymphocytes 16 (L) 24 - 44 %    Monocytes 1 1 - 7 %    Eosinophils % 1 0 - 4 %    Basophils 1 0 - 2 %    Immature Granulocytes NOT REPORTED 0 %    Segs Absolute 6.70 1.3 - 9.1 k/uL    Absolute Lymph # 1.30 1.0 - 4.8 k/uL    Absolute Mono # 0.10 0.1 - 1.3 k/uL    Absolute Eos # 0.10 0.0 - 0.4 k/uL    Basophils Absolute 0.10 0.0 - 0.2 k/uL    Absolute Immature Granulocyte NOT REPORTED 0.00 - 0.30 k/uL    WBC Morphology NOT REPORTED     RBC Morphology NOT REPORTED     Platelet Estimate NOT REPORTED    Comprehensive Metabolic Panel    Collection Time: 06/20/21 11:46 AM   Result Value Ref Range    Glucose 151 (H) 70 - 99 mg/dL    BUN 39 (H) 8 - 23 mg/dL    CREATININE 2.70 (H) 0.50 - 0.90 mg/dL    Bun/Cre Ratio NOT REPORTED 9 - 20    Calcium 9.6 8.6 - 10.4 mg/dL    Sodium 138 135 - 144 mmol/L    Potassium 5.3 3.7 - 5.3 mmol/L    Chloride 102 98 - 107 mmol/L    CO2 22 20 - 31 mmol/L    Anion Gap 14 9 - 17 mmol/L    Alkaline Phosphatase 103 35 - 104 U/L    ALT 12 5 - 33 U/L    AST 15 <32 U/L    Total Bilirubin 0.28 (L) 0.3 - 1.2 mg/dL    Total Protein 7.9 6.4 - 8.3 g/dL    Albumin 4.4 3.5 - 5.2 g/dL    Albumin/Globulin Ratio NOT REPORTED 1.0 - 2.5    GFR Non-African American 17 (L) >60 mL/min    GFR  21 (L) >60 mL/min    GFR Comment          GFR Staging NOT REPORTED    Lactic Acid    Collection Time: 06/20/21 11:46 AM   Result Value Ref Range    Lactic Acid 2.8 (H) 0.5 - 2.2 mmol/L   Lactic Acid    Collection Time: 06/20/21  2:07 PM   Result Value Ref Range    Lactic Acid 2.3 (H) 0.5 - 2.2 mmol/L       Assesment:     Primary Problem  Acute cystitis without hematuria    Principal Problem:    Acute cystitis without hematuria  Active Problems:    WERO (acute kidney injury) ([facility] [address]    Hydronephrosis of left kidney    Hydroureter, left    Type 2 diabetes mellitus, without long-term current use of insulin ([facility] [address]    Essential hypertension    Hypothyroidism    Acute infective cystitis  Resolved Problems:    * No resolved hospital problems. *      Plan:     1. IV normal saline at 100 mL/h  2. IV Rocephin 1 g daily  3. Urine culture  4. Blood cultures x2  5. CBC, CMP  6. Insulin sliding scale  7. Hold Metformin  8. Consult urology  9. Morphine 2 mg IV every 4 hours as needed for moderate to severe pain  10. Monitor lactate level  11. DVT prophylaxis Lovenox  12. EPCs  13.  check and replace electrolytes per sliding scale  14.   restart home medications        Electronically signed by Lamar Robertson MD     Copy sent to Dr. Mariajose Johnson DO

## 2021-06-21 ENCOUNTER — APPOINTMENT (OUTPATIENT)
Dept: GENERAL RADIOLOGY | Age: 71
DRG: 659 | End: 2021-06-21
Payer: MEDICARE

## 2021-06-21 ENCOUNTER — ANESTHESIA EVENT (OUTPATIENT)
Dept: OPERATING ROOM | Age: 71
DRG: 659 | End: 2021-06-21
Payer: MEDICARE

## 2021-06-21 ENCOUNTER — ANESTHESIA (OUTPATIENT)
Dept: OPERATING ROOM | Age: 71
DRG: 659 | End: 2021-06-21
Payer: MEDICARE

## 2021-06-21 VITALS
OXYGEN SATURATION: 100 % | TEMPERATURE: 96.8 F | DIASTOLIC BLOOD PRESSURE: 45 MMHG | SYSTOLIC BLOOD PRESSURE: 90 MMHG | RESPIRATION RATE: 12 BRPM

## 2021-06-21 LAB
-: ABNORMAL
ABSOLUTE BANDS #: 4.7 K/UL (ref 0–1)
ABSOLUTE EOS #: 0 K/UL (ref 0–0.4)
ABSOLUTE IMMATURE GRANULOCYTE: ABNORMAL K/UL (ref 0–0.3)
ABSOLUTE LYMPH #: 2.35 K/UL (ref 1–4.8)
ABSOLUTE MONO #: 0.59 K/UL (ref 0.1–1.3)
ALBUMIN SERPL-MCNC: 3.3 G/DL (ref 3.5–5.2)
ALBUMIN/GLOBULIN RATIO: ABNORMAL (ref 1–2.5)
ALP BLD-CCNC: 68 U/L (ref 35–104)
ALT SERPL-CCNC: 11 U/L (ref 5–33)
AMORPHOUS: ABNORMAL
ANION GAP SERPL CALCULATED.3IONS-SCNC: 13 MMOL/L (ref 9–17)
ANION GAP SERPL CALCULATED.3IONS-SCNC: 13 MMOL/L (ref 9–17)
AST SERPL-CCNC: 18 U/L
BACTERIA: ABNORMAL
BANDS: 16 % (ref 0–10)
BASOPHILS # BLD: 0 % (ref 0–2)
BASOPHILS ABSOLUTE: 0 K/UL (ref 0–0.2)
BILIRUB SERPL-MCNC: 0.26 MG/DL (ref 0.3–1.2)
BILIRUBIN URINE: NEGATIVE
BUN BLDV-MCNC: 52 MG/DL (ref 8–23)
BUN BLDV-MCNC: 54 MG/DL (ref 8–23)
BUN/CREAT BLD: ABNORMAL (ref 9–20)
BUN/CREAT BLD: ABNORMAL (ref 9–20)
CALCIUM IONIZED: 1.23 MMOL/L (ref 1.13–1.33)
CALCIUM SERPL-MCNC: 8.3 MG/DL (ref 8.6–10.4)
CALCIUM SERPL-MCNC: 8.3 MG/DL (ref 8.6–10.4)
CASTS UA: ABNORMAL /LPF
CHLORIDE BLD-SCNC: 103 MMOL/L (ref 98–107)
CHLORIDE BLD-SCNC: 104 MMOL/L (ref 98–107)
CHLORIDE, UR: 47 MMOL/L
CO2: 20 MMOL/L (ref 20–31)
CO2: 20 MMOL/L (ref 20–31)
COLOR: YELLOW
COMMENT UA: ABNORMAL
COMPLEMENT C3: 139 MG/DL (ref 90–180)
COMPLEMENT C4: 46 MG/DL (ref 10–40)
CREAT SERPL-MCNC: 5.02 MG/DL (ref 0.5–0.9)
CREAT SERPL-MCNC: 5.02 MG/DL (ref 0.5–0.9)
CREATININE URINE: 48.7 MG/DL (ref 28–217)
CRYSTALS, UA: ABNORMAL /HPF
DIFFERENTIAL TYPE: ABNORMAL
EOSINOPHIL,URINE: NORMAL
EOSINOPHILS RELATIVE PERCENT: 0 % (ref 0–4)
EPITHELIAL CELLS UA: ABNORMAL /HPF
ESTIMATED AVERAGE GLUCOSE: 154 MG/DL
GFR AFRICAN AMERICAN: 10 ML/MIN
GFR AFRICAN AMERICAN: 10 ML/MIN
GFR NON-AFRICAN AMERICAN: 8 ML/MIN
GFR NON-AFRICAN AMERICAN: 8 ML/MIN
GFR SERPL CREATININE-BSD FRML MDRD: ABNORMAL ML/MIN/{1.73_M2}
GLUCOSE BLD-MCNC: 112 MG/DL (ref 70–99)
GLUCOSE BLD-MCNC: 112 MG/DL (ref 70–99)
GLUCOSE BLD-MCNC: 115 MG/DL (ref 65–105)
GLUCOSE BLD-MCNC: 75 MG/DL (ref 65–105)
GLUCOSE BLD-MCNC: 97 MG/DL (ref 65–105)
GLUCOSE BLD-MCNC: 97 MG/DL (ref 65–105)
GLUCOSE URINE: NEGATIVE
HBA1C MFR BLD: 7 % (ref 4–6)
HCT VFR BLD CALC: 31 % (ref 36–46)
HEMOGLOBIN: 9.9 G/DL (ref 12–16)
IMMATURE GRANULOCYTES: ABNORMAL %
KETONES, URINE: NEGATIVE
LACTIC ACID: 2.4 MMOL/L (ref 0.5–2.2)
LEUKOCYTE ESTERASE, URINE: ABNORMAL
LYMPHOCYTES # BLD: 8 % (ref 24–44)
MCH RBC QN AUTO: 28.7 PG (ref 26–34)
MCHC RBC AUTO-ENTMCNC: 32 G/DL (ref 31–37)
MCV RBC AUTO: 89.9 FL (ref 80–100)
MONOCYTES # BLD: 2 % (ref 1–7)
MORPHOLOGY: ABNORMAL
MORPHOLOGY: ABNORMAL
MUCUS: ABNORMAL
NITRITE, URINE: NEGATIVE
NRBC AUTOMATED: ABNORMAL PER 100 WBC
OTHER OBSERVATIONS UA: ABNORMAL
PDW BLD-RTO: 13.6 % (ref 11.5–14.9)
PH UA: 5.5 (ref 5–8)
PLATELET # BLD: 170 K/UL (ref 150–450)
PLATELET ESTIMATE: ABNORMAL
PMV BLD AUTO: 9.1 FL (ref 6–12)
POTASSIUM SERPL-SCNC: 5.3 MMOL/L (ref 3.7–5.3)
POTASSIUM SERPL-SCNC: 5.5 MMOL/L (ref 3.7–5.3)
POTASSIUM SERPL-SCNC: 5.7 MMOL/L (ref 3.7–5.3)
POTASSIUM SERPL-SCNC: 5.9 MMOL/L (ref 3.7–5.3)
PROTEIN UA: NEGATIVE
PTH INTACT: 65.4 PG/ML (ref 15–65)
RBC # BLD: 3.45 M/UL (ref 4–5.2)
RBC # BLD: ABNORMAL 10*6/UL
RBC UA: ABNORMAL /HPF
RENAL EPITHELIAL, UA: ABNORMAL /HPF
SEG NEUTROPHILS: 74 % (ref 36–66)
SEGMENTED NEUTROPHILS ABSOLUTE COUNT: 21.76 K/UL (ref 1.3–9.1)
SODIUM BLD-SCNC: 136 MMOL/L (ref 135–144)
SODIUM BLD-SCNC: 137 MMOL/L (ref 135–144)
SODIUM,UR: 59 MMOL/L
SPECIFIC GRAVITY UA: 1 (ref 1–1.03)
TOTAL CK: 134 U/L (ref 26–192)
TOTAL PROTEIN, URINE: 112 MG/DL
TOTAL PROTEIN: 6.1 G/DL (ref 6.4–8.3)
TRICHOMONAS: ABNORMAL
TURBIDITY: ABNORMAL
URINE HGB: ABNORMAL
URINE TOTAL PROTEIN CREATININE RATIO: 2.3 (ref 0–0.2)
UROBILINOGEN, URINE: NORMAL
WBC # BLD: 29.4 K/UL (ref 3.5–11)
WBC # BLD: ABNORMAL 10*3/UL
WBC UA: ABNORMAL /HPF
YEAST: ABNORMAL

## 2021-06-21 PROCEDURE — 6360000002 HC RX W HCPCS: Performed by: UROLOGY

## 2021-06-21 PROCEDURE — 6370000000 HC RX 637 (ALT 250 FOR IP): Performed by: INTERNAL MEDICINE

## 2021-06-21 PROCEDURE — 80048 BASIC METABOLIC PNL TOTAL CA: CPT

## 2021-06-21 PROCEDURE — 6360000002 HC RX W HCPCS: Performed by: STUDENT IN AN ORGANIZED HEALTH CARE EDUCATION/TRAINING PROGRAM

## 2021-06-21 PROCEDURE — 2580000003 HC RX 258: Performed by: UROLOGY

## 2021-06-21 PROCEDURE — 85025 COMPLETE CBC W/AUTO DIFF WBC: CPT

## 2021-06-21 PROCEDURE — 6370000000 HC RX 637 (ALT 250 FOR IP): Performed by: FAMILY MEDICINE

## 2021-06-21 PROCEDURE — 81001 URINALYSIS AUTO W/SCOPE: CPT

## 2021-06-21 PROCEDURE — 3700000000 HC ANESTHESIA ATTENDED CARE: Performed by: UROLOGY

## 2021-06-21 PROCEDURE — 6360000002 HC RX W HCPCS: Performed by: FAMILY MEDICINE

## 2021-06-21 PROCEDURE — 6370000000 HC RX 637 (ALT 250 FOR IP): Performed by: UROLOGY

## 2021-06-21 PROCEDURE — 6360000002 HC RX W HCPCS

## 2021-06-21 PROCEDURE — 80053 COMPREHEN METABOLIC PANEL: CPT

## 2021-06-21 PROCEDURE — 86225 DNA ANTIBODY NATIVE: CPT

## 2021-06-21 PROCEDURE — 87086 URINE CULTURE/COLONY COUNT: CPT

## 2021-06-21 PROCEDURE — 3700000001 HC ADD 15 MINUTES (ANESTHESIA): Performed by: UROLOGY

## 2021-06-21 PROCEDURE — 86160 COMPLEMENT ANTIGEN: CPT

## 2021-06-21 PROCEDURE — 82550 ASSAY OF CK (CPK): CPT

## 2021-06-21 PROCEDURE — 2500000003 HC RX 250 WO HCPCS: Performed by: INTERNAL MEDICINE

## 2021-06-21 PROCEDURE — 87088 URINE BACTERIA CULTURE: CPT

## 2021-06-21 PROCEDURE — 82330 ASSAY OF CALCIUM: CPT

## 2021-06-21 PROCEDURE — 82436 ASSAY OF URINE CHLORIDE: CPT

## 2021-06-21 PROCEDURE — 3600000012 HC SURGERY LEVEL 2 ADDTL 15MIN: Performed by: UROLOGY

## 2021-06-21 PROCEDURE — 7100000000 HC PACU RECOVERY - FIRST 15 MIN: Performed by: UROLOGY

## 2021-06-21 PROCEDURE — 84156 ASSAY OF PROTEIN URINE: CPT

## 2021-06-21 PROCEDURE — 3209999900 FLUORO FOR SURGICAL PROCEDURES

## 2021-06-21 PROCEDURE — 87186 SC STD MICRODIL/AGAR DIL: CPT

## 2021-06-21 PROCEDURE — 7100000001 HC PACU RECOVERY - ADDTL 15 MIN: Performed by: UROLOGY

## 2021-06-21 PROCEDURE — 2500000003 HC RX 250 WO HCPCS

## 2021-06-21 PROCEDURE — 36415 COLL VENOUS BLD VENIPUNCTURE: CPT

## 2021-06-21 PROCEDURE — 2580000003 HC RX 258: Performed by: FAMILY MEDICINE

## 2021-06-21 PROCEDURE — 82570 ASSAY OF URINE CREATININE: CPT

## 2021-06-21 PROCEDURE — 84300 ASSAY OF URINE SODIUM: CPT

## 2021-06-21 PROCEDURE — 83516 IMMUNOASSAY NONANTIBODY: CPT

## 2021-06-21 PROCEDURE — 51798 US URINE CAPACITY MEASURE: CPT

## 2021-06-21 PROCEDURE — C1758 CATHETER, URETERAL: HCPCS | Performed by: UROLOGY

## 2021-06-21 PROCEDURE — 87205 SMEAR GRAM STAIN: CPT

## 2021-06-21 PROCEDURE — 83970 ASSAY OF PARATHORMONE: CPT

## 2021-06-21 PROCEDURE — 6370000000 HC RX 637 (ALT 250 FOR IP): Performed by: STUDENT IN AN ORGANIZED HEALTH CARE EDUCATION/TRAINING PROGRAM

## 2021-06-21 PROCEDURE — 3600000002 HC SURGERY LEVEL 2 BASE: Performed by: UROLOGY

## 2021-06-21 PROCEDURE — 2060000000 HC ICU INTERMEDIATE R&B

## 2021-06-21 PROCEDURE — 83605 ASSAY OF LACTIC ACID: CPT

## 2021-06-21 PROCEDURE — 0T778DZ DILATION OF LEFT URETER WITH INTRALUMINAL DEVICE, VIA NATURAL OR ARTIFICIAL OPENING ENDOSCOPIC: ICD-10-PCS | Performed by: UROLOGY

## 2021-06-21 PROCEDURE — 82947 ASSAY GLUCOSE BLOOD QUANT: CPT

## 2021-06-21 PROCEDURE — C2617 STENT, NON-COR, TEM W/O DEL: HCPCS | Performed by: UROLOGY

## 2021-06-21 PROCEDURE — 86038 ANTINUCLEAR ANTIBODIES: CPT

## 2021-06-21 PROCEDURE — 84132 ASSAY OF SERUM POTASSIUM: CPT

## 2021-06-21 PROCEDURE — C1769 GUIDE WIRE: HCPCS | Performed by: UROLOGY

## 2021-06-21 PROCEDURE — 2709999900 HC NON-CHARGEABLE SUPPLY: Performed by: UROLOGY

## 2021-06-21 DEVICE — URETERAL STENT
Type: IMPLANTABLE DEVICE | Site: URETER | Status: FUNCTIONAL
Brand: POLARIS™ ULTRA

## 2021-06-21 RX ORDER — DEXTROSE MONOHYDRATE 25 G/50ML
25 INJECTION, SOLUTION INTRAVENOUS ONCE
Status: COMPLETED | OUTPATIENT
Start: 2021-06-21 | End: 2021-06-21

## 2021-06-21 RX ORDER — OXYCODONE HYDROCHLORIDE AND ACETAMINOPHEN 5; 325 MG/1; MG/1
1 TABLET ORAL EVERY 6 HOURS PRN
Status: DISCONTINUED | OUTPATIENT
Start: 2021-06-21 | End: 2021-07-02 | Stop reason: HOSPADM

## 2021-06-21 RX ORDER — PROPOFOL 10 MG/ML
INJECTION, EMULSION INTRAVENOUS PRN
Status: DISCONTINUED | OUTPATIENT
Start: 2021-06-21 | End: 2021-06-21 | Stop reason: SDUPTHER

## 2021-06-21 RX ORDER — MIDAZOLAM HYDROCHLORIDE 1 MG/ML
INJECTION INTRAMUSCULAR; INTRAVENOUS PRN
Status: DISCONTINUED | OUTPATIENT
Start: 2021-06-21 | End: 2021-06-21 | Stop reason: SDUPTHER

## 2021-06-21 RX ORDER — FENTANYL CITRATE 50 UG/ML
INJECTION, SOLUTION INTRAMUSCULAR; INTRAVENOUS PRN
Status: DISCONTINUED | OUTPATIENT
Start: 2021-06-21 | End: 2021-06-21 | Stop reason: SDUPTHER

## 2021-06-21 RX ORDER — LIDOCAINE HYDROCHLORIDE 10 MG/ML
INJECTION, SOLUTION EPIDURAL; INFILTRATION; INTRACAUDAL; PERINEURAL PRN
Status: DISCONTINUED | OUTPATIENT
Start: 2021-06-21 | End: 2021-06-21 | Stop reason: SDUPTHER

## 2021-06-21 RX ADMIN — SODIUM CHLORIDE: 9 INJECTION, SOLUTION INTRAVENOUS at 15:16

## 2021-06-21 RX ADMIN — FENTANYL CITRATE 25 MCG: 50 INJECTION, SOLUTION INTRAMUSCULAR; INTRAVENOUS at 13:12

## 2021-06-21 RX ADMIN — Medication 2 MG: at 10:08

## 2021-06-21 RX ADMIN — PANTOPRAZOLE SODIUM 40 MG: 40 TABLET, DELAYED RELEASE ORAL at 06:09

## 2021-06-21 RX ADMIN — PROPOFOL 50 MCG/KG/MIN: 10 INJECTION, EMULSION INTRAVENOUS at 13:05

## 2021-06-21 RX ADMIN — PROPOFOL 30 MG: 10 INJECTION, EMULSION INTRAVENOUS at 13:04

## 2021-06-21 RX ADMIN — MIDAZOLAM 1 MG: 1 INJECTION INTRAMUSCULAR; INTRAVENOUS at 12:55

## 2021-06-21 RX ADMIN — ONDANSETRON 4 MG: 2 INJECTION INTRAMUSCULAR; INTRAVENOUS at 17:28

## 2021-06-21 RX ADMIN — HEPARIN SODIUM 5000 UNITS: 5000 INJECTION INTRAVENOUS; SUBCUTANEOUS at 06:07

## 2021-06-21 RX ADMIN — SODIUM CHLORIDE: 9 INJECTION, SOLUTION INTRAVENOUS at 12:57

## 2021-06-21 RX ADMIN — CEFTRIAXONE SODIUM 1000 MG: 1 INJECTION, POWDER, FOR SOLUTION INTRAMUSCULAR; INTRAVENOUS at 11:51

## 2021-06-21 RX ADMIN — LEVOTHYROXINE SODIUM 112 MCG: 0.11 TABLET ORAL at 06:09

## 2021-06-21 RX ADMIN — BUSPIRONE HYDROCHLORIDE 15 MG: 15 TABLET ORAL at 21:28

## 2021-06-21 RX ADMIN — OXYCODONE HYDROCHLORIDE AND ACETAMINOPHEN 1 TABLET: 5; 325 TABLET ORAL at 04:49

## 2021-06-21 RX ADMIN — METOPROLOL SUCCINATE 100 MG: 100 TABLET, EXTENDED RELEASE ORAL at 10:45

## 2021-06-21 RX ADMIN — HEPARIN SODIUM 5000 UNITS: 5000 INJECTION INTRAVENOUS; SUBCUTANEOUS at 21:29

## 2021-06-21 RX ADMIN — FENTANYL CITRATE 25 MCG: 50 INJECTION, SOLUTION INTRAMUSCULAR; INTRAVENOUS at 13:07

## 2021-06-21 RX ADMIN — INSULIN HUMAN 10 UNITS: 100 INJECTION, SOLUTION PARENTERAL at 10:11

## 2021-06-21 RX ADMIN — Medication 2 MG: at 02:29

## 2021-06-21 RX ADMIN — BACLOFEN 10 MG: 10 TABLET ORAL at 21:28

## 2021-06-21 RX ADMIN — DEXTROSE MONOHYDRATE 25 G: 25 INJECTION, SOLUTION INTRAVENOUS at 10:11

## 2021-06-21 RX ADMIN — OXYCODONE HYDROCHLORIDE AND ACETAMINOPHEN 1 TABLET: 5; 325 TABLET ORAL at 21:28

## 2021-06-21 RX ADMIN — LIDOCAINE HYDROCHLORIDE 50 MG: 10 INJECTION, SOLUTION EPIDURAL; INFILTRATION; INTRACAUDAL; PERINEURAL at 13:04

## 2021-06-21 ASSESSMENT — PULMONARY FUNCTION TESTS
PIF_VALUE: 1
PIF_VALUE: 0
PIF_VALUE: 0
PIF_VALUE: 1
PIF_VALUE: 0
PIF_VALUE: 1
PIF_VALUE: 1
PIF_VALUE: 0
PIF_VALUE: 1
PIF_VALUE: 1
PIF_VALUE: 0
PIF_VALUE: 1
PIF_VALUE: 2
PIF_VALUE: 1
PIF_VALUE: 0
PIF_VALUE: 1

## 2021-06-21 ASSESSMENT — PAIN SCALES - GENERAL
PAINLEVEL_OUTOF10: 9
PAINLEVEL_OUTOF10: 0
PAINLEVEL_OUTOF10: 0
PAINLEVEL_OUTOF10: 9
PAINLEVEL_OUTOF10: 5
PAINLEVEL_OUTOF10: 9
PAINLEVEL_OUTOF10: 0
PAINLEVEL_OUTOF10: 9
PAINLEVEL_OUTOF10: 9

## 2021-06-21 ASSESSMENT — ENCOUNTER SYMPTOMS
SHORTNESS OF BREATH: 0
STRIDOR: 0
CHEST TIGHTNESS: 0
ABDOMINAL PAIN: 0
BLOOD IN STOOL: 0
VOMITING: 0
TROUBLE SWALLOWING: 0
EYE REDNESS: 0
COUGH: 0
COLOR CHANGE: 0
EYE ITCHING: 0
NAUSEA: 0
SHORTNESS OF BREATH: 0

## 2021-06-21 ASSESSMENT — PAIN DESCRIPTION - ORIENTATION
ORIENTATION: LEFT
ORIENTATION: LOWER
ORIENTATION: LEFT

## 2021-06-21 ASSESSMENT — PAIN DESCRIPTION - PAIN TYPE
TYPE: ACUTE PAIN

## 2021-06-21 ASSESSMENT — PAIN - FUNCTIONAL ASSESSMENT: PAIN_FUNCTIONAL_ASSESSMENT: 0-10

## 2021-06-21 ASSESSMENT — LIFESTYLE VARIABLES: SMOKING_STATUS: 0

## 2021-06-21 ASSESSMENT — PAIN DESCRIPTION - LOCATION
LOCATION: BACK
LOCATION: FLANK;ABDOMEN
LOCATION: ABDOMEN;FLANK

## 2021-06-21 ASSESSMENT — PAIN SCALES - WONG BAKER: WONGBAKER_NUMERICALRESPONSE: 0

## 2021-06-21 NOTE — PLAN OF CARE
Problem: Pain:  Goal: Pain level will decrease  Description: Pain level will decrease  Outcome: Ongoing  Note: Pt medicated with pain medication prn. Assessed all pain characteristics including level, type, location, frequency, and onset. Non-pharmacologic interventions offered to pt as well. Pt states pain is tolerable at this time. Will continue to monitor.

## 2021-06-21 NOTE — PROGRESS NOTES
Progress Note    6/21/2021   12:37 PM    Name:  Trecia Canavan  MRN:    [id number]     Acct:     [de-identified]   Room:  STCZ OR Pool/NONE  IP Day: 1     Admit Date: 6/20/2021 10:18 AM  PCP: Derick Lafleur DO    Subjective:     C/C:   Chief Complaint   Patient presents with    Flank Pain    Urinary Frequency       Interval History: Status: not changed. Patient has left flank pain. denies chest pain shortness of breath nausea vomiting. Vital signs reviewed and stable. Recent labs reviewed creatinine elevated at 5.2, potassium 5.9. Patient received 10 units of regular insulin with dextrose. Recheck potassium 5.5    ROS:   all 10 systems reviewed and are negative except as noted    Review of Systems   Constitutional: Negative for chills and fatigue. HENT: Negative for drooling, mouth sores, sneezing and trouble swallowing. Eyes: Negative for redness and itching. Respiratory: Negative for cough, chest tightness and shortness of breath. Cardiovascular: Negative for chest pain, palpitations and leg swelling. Gastrointestinal: Negative for abdominal pain, blood in stool, nausea and vomiting. Left flank pain   Endocrine: Negative for heat intolerance and polyphagia. Genitourinary: Negative for difficulty urinating, flank pain and pelvic pain. Musculoskeletal: Negative for arthralgias, joint swelling and neck stiffness. Skin: Negative for color change and pallor. Allergic/Immunologic: Negative for food allergies. Neurological: Negative for dizziness, seizures and headaches. Hematological: Does not bruise/bleed easily. Psychiatric/Behavioral: Negative for agitation, behavioral problems and suicidal ideas. The patient is not hyperactive. Medications: Allergies:    Allergies   Allergen Reactions    Pcn [Penicillins] Swelling and Hives    Heparin Other (See Comments)     MIGRAINE      Lamotrigine Other (See Comments), Itching, Nausea Only and Rash    Cyclobenzaprine      Dry mouth, jitters    Heparin (Porcine)      Other reaction(s): Migraine       Current Meds: [MAR Hold] oxyCODONE-acetaminophen (PERCOCET) 5-325 MG per tablet 1 tablet, Q6H PRN  [MAR Hold] albuterol sulfate  (90 Base) MCG/ACT inhaler 2 puff, Q6H PRN  [MAR Hold] ALPRAZolam (XANAX) tablet 1 mg, Daily PRN  [MAR Hold] aspirin EC tablet 81 mg, Daily  [MAR Hold] atorvastatin (LIPITOR) tablet 10 mg, Daily  [MAR Hold] baclofen (LIORESAL) tablet 10 mg, BID  [MAR Hold] buPROPion (WELLBUTRIN XL) extended release tablet 300 mg, Daily  [MAR Hold] busPIRone (BUSPAR) tablet 15 mg, BID  [MAR Hold] FLUoxetine (PROZAC) capsule 10 mg, Daily  [MAR Hold] isosorbide mononitrate (IMDUR) extended release tablet 30 mg, Daily  [MAR Hold] levothyroxine (SYNTHROID) tablet 112 mcg, Daily  [Held by provider] losartan (COZAAR) tablet 50 mg, Daily  [MAR Hold] metoprolol succinate (TOPROL XL) extended release tablet 100 mg, Daily  [MAR Hold] oxybutynin (DITROPAN-XL) extended release tablet 15 mg, Daily  [MAR Hold] pantoprazole (PROTONIX) tablet 40 mg, QAM AC  [MAR Hold] pramipexole (MIRAPEX) tablet 0.125 mg, Daily  [MAR Hold] vitamin B-12 (CYANOCOBALAMIN) tablet 500 mcg, Daily  [MAR Hold] insulin lispro (HUMALOG) injection vial 0-12 Units, TID WC  [MAR Hold] insulin lispro (HUMALOG) injection vial 0-6 Units, Nightly  [MAR Hold] glucose (GLUTOSE) 40 % oral gel 15 g, PRN  [MAR Hold] dextrose 50 % IV solution, PRN  [MAR Hold] glucagon (rDNA) injection 1 mg, PRN  [MAR Hold] dextrose 5 % solution, PRN  [MAR Hold] sodium chloride flush 0.9 % injection 10 mL, 2 times per day  [facility] Hold] sodium chloride flush 0.9 % injection 10 mL, PRN  [MAR Hold] 0.9 % sodium chloride infusion, PRN  [MAR Hold] magnesium sulfate 1000 mg in dextrose 5% 100 mL IVPB, PRN  [MAR Hold] ondansetron (ZOFRAN-ODT) disintegrating tablet 4 mg, Q8H PRN   Or  [MAR Hold] ondansetron (ZOFRAN) injection 4 mg, Q6H PRN  [MAR Hold] magnesium hydroxide (MILK OF MAGNESIA) 400 MG/5ML suspension 30 mL, Daily PRN  [MAR Hold] acetaminophen (TYLENOL) tablet 650 mg, Q6H PRN   Or  [MAR Hold] acetaminophen (TYLENOL) suppository 650 mg, Q6H PRN  [MAR Hold] 0.9 % sodium chloride infusion, Continuous  [MAR Hold] morphine sulfate (PF) injection 2 mg, Q4H PRN  [MAR Hold] heparin (porcine) injection 5,000 Units, 3 times per day  [MAR Hold] cefTRIAXone (ROCEPHIN) 1000 mg IVPB in 50 mL D5W minibag, Q24H        Data:     Code Status:  Full Code    Family History   Problem Relation Age of Onset    Breast Cancer Mother         BREAST WITH METS T  LIVER AND LUNG    Other Father         AAA    Heart Disease Father     Asthma Sister     Diabetes Sister         NIDDM    Stroke Brother     Diabetes Brother         NIDDM    Stroke Maternal Grandmother     Asthma Son        Social History     Socioeconomic History    Marital status:      Spouse name: Not on file    Number of children: 1    Years of education: Not on file    Highest education level: Not on file   Occupational History    Not on file   Tobacco Use    Smoking status: Former Smoker     Packs/day: 1.00     Years: 30.00     Pack years: 30.00     Types: Cigarettes     Quit date: 3/19/2004     Years since quittin.2    Smokeless tobacco: Never Used   Vaping Use    Vaping Use: Never used   Substance and Sexual Activity    Alcohol use: Yes     Comment: socially, once a year    Drug use: No    Sexual activity: Not Currently   Other Topics Concern    Not on file   Social History Narrative    Not on file     Social Determinants of Health     Financial Resource Strain:     Difficulty of Paying Living Expenses:    Food Insecurity:     Worried About Running Out of Food in the Last Year:     [address] in the Last Year:    Transportation Needs:     Lack of Transportation (Medical):      Lack of Transportation (Non-Medical):    Physical Activity:     Days of Exercise per Week:     Minutes of Exercise per Session:    Stress:     Feeling of Stress :    Social Connections:     Frequency of Communication with Friends and Family:     Frequency of Social Gatherings with Friends and Family:     Attends [employer] Services:     Active Member of Clubs or Organizations:     Attends Club or Organization Meetings:     Marital Status:    Intimate Partner Violence:     Fear of Current or Ex-Partner:     Emotionally Abused:     Physically Abused:     Sexually Abused:        I/O (24Hr): Intake/Output Summary (Last 24 hours) at 6/21/2021 1237  Last data filed at 6/21/2021 0604  Gross per 24 hour   Intake 3640 ml   Output --   Net 3640 ml     Radiology:  CT ABDOMEN PELVIS WO CONTRAST Additional Contrast? None    Result Date: 6/20/2021  1. Left-sided hydroureter and hydronephrosis with associated asymmetrical perinephric and periureteral fat stranding without obstructing calculus noted. Findings likely represents a recently passed stone. 2. Nonobstructing calculi right kidney, largest measuring 5 mm. 3. Small hiatal hernia. 4. Stable ectasia infrarenal abdominal aortic aneurysm measuring 2.7 cm. Labs:  Recent Results (from the past 24 hour(s))   Lactic Acid    Collection Time: 06/20/21  2:07 PM   Result Value Ref Range    Lactic Acid 2.3 (H) 0.5 - 2.2 mmol/L   POC Glucose Fingerstick    Collection Time: 06/20/21  8:53 PM   Result Value Ref Range    POC Glucose 167 (H) 65 - 105 mg/dL   Culture, Blood 1    Collection Time: 06/20/21  9:35 PM    Specimen: Blood   Result Value Ref Range    Specimen Description . BLOOD     Special Requests NOT REPORTED     Culture NO GROWTH 8 HOURS    Hemoglobin A1c    Collection Time: 06/20/21  9:36 PM   Result Value Ref Range    Hemoglobin A1C 7.0 (H) 4.0 - 6.0 %    Estimated Avg Glucose 154 mg/dL   Culture, Blood 1    Collection Time: 06/20/21  9:39 PM    Specimen: Blood   Result Value Ref Range    Specimen Description . BLOOD     Special Requests NOT REPORTED     Culture NO GROWTH 8 HOURS    CBC with DIFF Acid    Collection Time: 21  5:16 AM   Result Value Ref Range    Lactic Acid 2.4 (H) 0.5 - 2.2 mmol/L   BASIC METABOLIC PANEL    Collection Time: 21  6:59 AM   Result Value Ref Range    Glucose 112 (H) 70 - 99 mg/dL    BUN 54 (H) 8 - 23 mg/dL    CREATININE 5.02 (HH) 0.50 - 0.90 mg/dL    Bun/Cre Ratio NOT REPORTED 9 - 20    Calcium 8.3 (L) 8.6 - 10.4 mg/dL    Sodium 137 135 - 144 mmol/L    Potassium 5.9 (H) 3.7 - 5.3 mmol/L    Chloride 104 98 - 107 mmol/L    CO2 20 20 - 31 mmol/L    Anion Gap 13 9 - 17 mmol/L    GFR Non-African American 8 (L) >60 mL/min    GFR  10 (L) >60 mL/min    GFR Comment          GFR Staging NOT REPORTED    POC Glucose Fingerstick    Collection Time: 21  7:02 AM   Result Value Ref Range    POC Glucose 97 65 - 105 mg/dL   POTASSIUM    Collection Time: 21 11:23 AM   Result Value Ref Range    Potassium 5.5 (H) 3.7 - 5.3 mmol/L   POC Glucose Fingerstick    Collection Time: 21 11:42 AM   Result Value Ref Range    POC Glucose 115 (H) 65 - 105 mg/dL       Physical Examination:        Vitals:  BP (!) 117/55   Pulse 66   Temp 97.1 °F (36.2 °C) (Infrared)   Resp 18   Ht 5' 5\" (1.651 m)   Wt 205 lb (93 kg)   LMP 1980   SpO2 95%   BMI 34.11 kg/m²   Temp (24hrs), Av.2 °F (36.8 °C), Min:97.1 °F (36.2 °C), Max:99 °F (37.2 °C)    Recent Labs     21  0702 21  1142   POCGLU 167* 97 115*         Physical Exam  Vitals reviewed. HENT:      Head: Normocephalic. Right Ear: External ear normal.      Left Ear: External ear normal.      Nose: Nose normal.      Mouth/Throat:      Mouth: Mucous membranes are moist.      Pharynx: Oropharynx is clear. Eyes:      Conjunctiva/sclera: Conjunctivae normal.   Cardiovascular:      Rate and Rhythm: Normal rate and regular rhythm. Pulses: Normal pulses. Heart sounds: Normal heart sounds.    Pulmonary:      Effort: Pulmonary effort is normal.      Breath sounds: Normal breath sounds. Abdominal:      General: Bowel sounds are normal. There is no distension. Palpations: Abdomen is soft. Tenderness: There is left CVA tenderness. Musculoskeletal:         General: No deformity. Cervical back: Normal range of motion and neck supple. Right lower leg: No edema. Left lower leg: No edema. Skin:     General: Skin is warm. Capillary Refill: Capillary refill takes less than 2 seconds. Coloration: Skin is not jaundiced. Neurological:      General: No focal deficit present. Mental Status: She is alert. Mental status is at baseline. Psychiatric:         Mood and Affect: Mood normal.         Behavior: Behavior normal.         Assessment:        Primary Problem  Acute cystitis without hematuria     Principal Problem:    Acute cystitis without hematuria  Active Problems:    WERO (acute kidney injury) ([facility] [address]    Hydronephrosis of left kidney    Hydroureter, left    Type 2 diabetes mellitus, without long-term current use of insulin (HCC)    Essential hypertension    Hypothyroidism    Acute infective cystitis  Resolved Problems:    * No resolved hospital problems. *      Past Medical History:   Diagnosis Date    Aortic valve stenosis     mild per chart    Arthritis     CAD (coronary artery disease) 2000    1 STENT    Diabetes mellitus ([facility] [address]     Heart murmur     1962    Hyperlipidemia 2004    ON RX    MI, old     states many and they were mild    Pulmonary stenosis 1995    Pulmonary valve stenosis     mild/congenital per chart    Sciatica     right leg    Thyroid disease 2004    HYPOTHYROIDISM ON RX    Wears glasses         Plan:        1. IV normal saline at 100 mL/h  2. Patient has intermediate cardiac risk for ureteral stent placement. 3. Monitor CMP  4. Urology input noted  1. Recheck potassium in 4 hours  2. DVT Prophylaxis Heparin subcu  3. EPCs  4. PT/OT to evaluate and treat  5.  Pain control IV morphine 2 mg every 4 hours as needed for

## 2021-06-21 NOTE — FLOWSHEET NOTE
06/21/21 5157   Provider Notification   Reason for Communication Review case;Critical Value (comment)  (Cr 5.02)   Provider Name Dr. Handy Hung   Provider Notification Physician   Method of Communication Call   Response See orders   Notification Time      RN updated Dr. Handy Hung on new consult and pt's current Creatinine elevation to 5.02 from 2.7. RN to orders STAT BMP. See MAR/orders.

## 2021-06-21 NOTE — CONSULTS
Cardiology Consult           Date of Admission:  6/20/2021  Date of Consultation:  6/21/2021      PCP:  Mariajose Johnson DO      Chief Complaint: Preoperative cardiac clearance    History of Present Illness:  Marilyn Valerio is a 70 y.o. female who presents with Left flank  pain. She does have a prior history of a remote stent that was placed at [facility] roughly 25 years ago. She has mild calcified aortic valve,  A calcified ascending aorta, hyperlipidemia, and   Knowncarotid disease. Patient was just seen in the office in February of 2021 for preoperative cardiac risk stratification for cervical spine as well as knee replacement. At that time she was considered a moderate but non prohibitive risk. She initially presented to the ER after experiencing left flank pain, urinary frequency, and nausea. She was found to have left hydronephrosis and nonobstructing stones. Her creatinine has elevated to 5, her potassium is 5.7. Urology evaluated patient and is planning to take her for a ureter stent at 1:00 p.m. this afternoon. Prior to this she has not had any type of chest pain, shortness of breath, palpitations, lightheadedness, dizziness, syncope. She is able to ambulate greater than 250 ft as well as up and down flights of stairs without any chest pain or shortness of breath. She is Mets >4. Her most recent stress test in 2019 showed no reversible ischemia. Her echocardiogram at that time showed a preserved left ventricular function, mild MR, calcified aortic valve without stenosis. EKG done while I was at bedside, shows normal sinus rhythm without any evidence of T-wave abnormalities or ischemia. PMH:   has a past medical history of Aortic valve stenosis, Arthritis, CAD (coronary artery disease), Diabetes mellitus ([address] Heart murmur, Hyperlipidemia, MI, old, Pulmonary stenosis, Pulmonary valve stenosis, Sciatica, Thyroid disease, and Wears glasses.       PSH: has a past surgical history that includes Hysterectomy (1980); Salpingo-oophorectomy (Left, 1987); shoulder surgery (Left, 1993); cervical fusion (1993); joint replacement (Bilateral, 1994); Coronary angioplasty with stent (2000); Finger surgery (Left, 03/21/2014); other surgical history (Right, 09/23/2014); and Toe Osteotomy (Right, 6/8/2016). Allergies: Allergies   Allergen Reactions    Pcn [Penicillins] Swelling and Hives    Heparin Other (See Comments)     MIGRAINE      Lamotrigine Other (See Comments), Itching, Nausea Only and Rash    Cyclobenzaprine      Dry mouth, jitters    Heparin (Porcine)      Other reaction(s): Migraine        Home Meds:    Prior to Admission medications    Medication Sig Start Date End Date Taking?  Authorizing Provider   losartan (COZAAR) 50 MG tablet TAKE ONE TABLET BY MOUTH DAILY 12/11/20   Historical Provider, MD   buPROPion (WELLBUTRIN XL) 300 MG extended release tablet TAKE ONE TABLET BY MOUTH DAILY 11/16/20   Historical Provider, MD   FLUoxetine (PROZAC) 10 MG capsule TAKE ONE CAPSULE BY MOUTH DAILY 7/8/20   Historical Provider, MD   metFORMIN (GLUCOPHAGE) 1000 MG tablet TAKE ONE TABLET BY MOUTH TWICE A DAY WITH MEALS 1/4/21   Historical Provider, MD   clotrimazole (LOTRIMIN) 1 % vaginal cream Place 1 applicator vaginally 2 times daily 2/19/21   Historical Provider, MD   oxybutynin (DITROPAN-XL) 10 MG extended release tablet TAKE ONE TABLET BY MOUTH DAILY 9/21/20   Historical Provider, MD   pramipexole (MIRAPEX) 0.125 MG tablet TAKE ONE TABLET BY MOUTH DAILY 10/2/20   Historical Provider, MD   ascorbic acid (VITAMIN C) 1000 MG tablet Take 1,000 mg by mouth daily    Historical Provider, MD   albuterol sulfate  (90 Base) MCG/ACT inhaler INHALE TWO PUFFS BY MOUTH EVERY 4 TO 6 HOURS AS NEEDED FOR WHEEZING 10/20/20   Historical Provider, MD   nystatin ([address]) 632874 UNIT/GM powder APPLY TO AFFECTED AREA(S) FOUR TIMES A DAY 9/22/20   Historical Provider, MD busPIRone (BUSPAR) 15 MG tablet Take 15 mg by mouth 2 times daily 11/9/20   Historical Provider, MD   isosorbide mononitrate (IMDUR) 30 MG extended release tablet Take 1 tablet by mouth daily 4/25/18   Leigha Dia MD   pantoprazole (PROTONIX) 40 MG tablet Take 1 tablet by mouth every morning (before breakfast) 4/25/18   Leigha Dia MD   baclofen (LIORESAL) 10 MG tablet Take 10 mg by mouth 2 times daily    Historical Provider, MD   fluticasone (FLONASE) 50 MCG/ACT nasal spray 1 spray by Nasal route daily    Historical Provider, MD   levocetirizine (XYZAL) 5 MG tablet Take 5 mg by mouth nightly    Historical Provider, MD   nystatin (MYCOSTATIN) 240092 UNIT/GM cream Apply topically 2 times daily Apply topically 2 times daily. Historical Provider, MD   oxybutynin (DITROPAN XL) 15 MG extended release tablet Take 15 mg by mouth daily    Historical Provider, MD   levothyroxine (SYNTHROID) 112 MCG tablet TAKE ONE TABLET BY MOUTH DAILY 10/2/17   Historical Provider, MD   atorvastatin (LIPITOR) 40 MG tablet  7/2/17   Historical Provider, MD   nitroGLYCERIN (NITROSTAT) 0.4 MG SL tablet Place 0.4 mg under the tongue every 5 minutes as needed for Chest pain up to max of 3 total doses. If no relief after 1 dose, call 911. Historical Provider, MD   Cholecalciferol (VITAMIN D3) 2000 UNITS CAPS Take 1 capsule by mouth daily. Historical Provider, MD   celecoxib (CELEBREX) 200 MG capsule Take 200 mg by mouth 2 times daily. Historical Provider, MD   Multiple Vitamins-Minerals (THERAPEUTIC MULTIVITAMIN-MINERALS) tablet Take 1 tablet by mouth daily. Historical Provider, MD   ALPRAZolam Rhoderick Mullet) 1 MG tablet Take 1 mg by mouth as needed for Anxiety. Historical Provider, MD   aspirin 81 MG EC tablet Take 81 mg by mouth daily. LAST DOSE 9/8/14    Historical Provider, MD   metoprolol (TOPROL-XL) 100 MG XL tablet Take 100 mg by mouth daily.     Historical Provider, MD   FLUoxetine (PROZAC) 20 MG capsule Take 20 mg by mouth daily     Historical Provider, MD   vitamin B-12 (CYANOCOBALAMIN) 500 MCG tablet Take 500 mcg by mouth daily.     Historical Provider, MD        Hospital Meds:    Current Facility-Administered Medications   Medication Dose Route Frequency Provider Last Rate Last Admin    oxyCODONE-acetaminophen (PERCOCET) 5-325 MG per tablet 1 tablet  1 tablet Oral Q6H PRN Radha Tobias MD   1 tablet at 06/21/21 0449    albuterol sulfate  (90 Base) MCG/ACT inhaler 2 puff  2 puff Inhalation Q6H PRN Macario Arroyo MD        ALPRAZolam [place]) tablet 1 mg  1 mg Oral Daily PRN Macario Arroyo MD        aspirin EC tablet 81 mg  81 mg Oral Daily Macario Arroyo MD   81 mg at 06/20/21 2143    atorvastatin (LIPITOR) tablet 10 mg  10 mg Oral Daily Macario Arroyo MD   10 mg at 06/20/21 2143    baclofen (LIORESAL) tablet 10 mg  10 mg Oral BID Macario Arroyo MD   10 mg at 06/20/21 2143    buPROPion (WELLBUTRIN XL) extended release tablet 300 mg  300 mg Oral Daily Macario Arroyo MD   300 mg at 06/20/21 2143    busPIRone (BUSPAR) tablet 15 mg  15 mg Oral BID Macario Arroyo MD   15 mg at 06/20/21 2143    FLUoxetine (PROZAC) capsule 10 mg  10 mg Oral Daily Macario Arroyo MD   10 mg at 06/20/21 2223    isosorbide mononitrate (IMDUR) extended release tablet 30 mg  30 mg Oral Daily Macario Arroyo MD   30 mg at 06/20/21 2142    levothyroxine (SYNTHROID) tablet 112 mcg  112 mcg Oral Daily Macario Arroyo MD   112 mcg at 06/21/21 0609    [Held by provider] losartan (COZAAR) tablet 50 mg  50 mg Oral Daily Macario Arroyo MD        metoprolol succinate (TOPROL XL) extended release tablet 100 mg  100 mg Oral Daily Macario Arroyo MD   100 mg at 06/21/21 1045    oxybutynin (DITROPAN-XL) extended release tablet 15 mg  15 mg Oral Daily Macario Arroyo MD   15 mg at 06/20/21 2143    pantoprazole (PROTONIX) tablet 40 mg  40 mg Oral QAM AC Macario Arroyo MD   40 mg at 06/21/21 0609    pramipexole (MIRAPEX) tablet 0.125 mg  0.125 mg Oral Daily Dora Coronel MD   0.125 mg at 06/20/21 2223    vitamin B-12 (CYANOCOBALAMIN) tablet 500 mcg  500 mcg Oral Daily Dora Coronel MD   500 mcg at 06/20/21 2143    insulin lispro (HUMALOG) injection vial 0-12 Units  0-12 Units Subcutaneous TID WC Dora Coronel MD        insulin lispro (HUMALOG) injection vial 0-6 Units  0-6 Units Subcutaneous Nightly Dora Coronel MD   1 Units at 06/20/21 2139    glucose (GLUTOSE) 40 % oral gel 15 g  15 g Oral PRN Dora Coronel MD        dextrose 50 % IV solution  12.5 g Intravenous PRN Dora Coronel MD        glucagon (rDNA) injection 1 mg  1 mg Intramuscular PRN Dora Coronel MD        dextrose 5 % solution  100 mL/hr Intravenous PRN Dora Coronel MD        sodium chloride flush 0.9 % injection 10 mL  10 mL Intravenous 2 times per day Dora Coronel MD   10 mL at 06/20/21 2144    sodium chloride flush 0.9 % injection 10 mL  10 mL Intravenous PRN Dora Coronel MD        0.9 % sodium chloride infusion  25 mL Intravenous PRN Dora Coroenl MD        magnesium sulfate 1000 mg in dextrose 5% 100 mL IVPB  1,000 mg Intravenous PRN Dora Coronel MD        ondansetron (ZOFRAN-ODT) disintegrating tablet 4 mg  4 mg Oral Q8H PRN Dora Coronel MD        Or    ondansetron (ZOFRAN) injection 4 mg  4 mg Intravenous Q6H PRN Dora Coronel MD        magnesium hydroxide (MILK OF MAGNESIA) 400 MG/5ML suspension 30 mL  30 mL Oral Daily PRN Dora Coronel MD        acetaminophen (TYLENOL) tablet 650 mg  650 mg Oral Q6H PRN Dora Coronel MD        Or    acetaminophen (TYLENOL) suppository 650 mg  650 mg Rectal Q6H PRN Dora Coronel MD        0.9 % sodium chloride infusion   Intravenous Continuous Dora Coronel  mL/hr at 06/20/21 2144 New Bag at 06/20/21 2144    morphine sulfate (PF) injection 2 mg  2 mg Intravenous Q4H PRN Dora Coronel MD   2 mg at 06/21/21 1008    heparin (porcine) injection 5,000 Units  5,000 Units Subcutaneous 3 times per day Jocy Cat MD   5,000 Units at 06/21/21 0607    cefTRIAXone (ROCEPHIN) 1000 mg IVPB in 50 mL D5W minibag  1,000 mg Intravenous Q24H Mary Ruffin MD           Social History:       TOBACCO:   reports that she quit smoking about 17 years ago. Her smoking use included cigarettes. She has a 30.00 pack-year smoking history. She has never used smokeless tobacco.  ETOH:   reports current alcohol use. DRUGS:  reports no history of drug use. OCCUPATION:          Family Histroy:         Problem Relation Age of Onset    Breast Cancer Mother         BREAST WITH METS T  LIVER AND LUNG    Other Father         AAA    Heart Disease Father     Asthma Sister     Diabetes Sister         NIDDM    Stroke Brother     Diabetes Brother         NIDDM    Stroke Maternal Grandmother     Asthma Son            Review of Systems:   · Constitutional: there has been no unanticipated weight loss. There's been no change in energy level, sleep pattern, or activity level. · Eyes: No visual changes or diplopia. No scleral icterus. · ENT: No Headaches, hearing loss or vertigo. No mouth sores or sore throat. · Cardiovascular: No chest pain, dyspnea on exertion, palpitations or loss of consciousness. No cough, hemoptysis, pleuritic pain, or phlebitis. · Respiratory: No cough or wheezing, no sputum production. No hematemesis. · Gastrointestinal: No abdominal pain, appetite loss, blood in stools. No change in bowel or bladder habits. · Genitourinary:  positive dysuria, trouble voiding,  no hematuria. · Musculoskeletal:  No gait disturbance, weakness or joint complaints. · Integumentary: No rash or pruritis. · Neurological: No headache, diplopia, change in muscle strength, numbness or tingling. No change in gait, balance, coordination, mood, affect, memory, mentation, behavior.   · Psychiatric: No anxiety, or depression. · Endocrine: No temperature intolerance. No excessive thirst, fluid intake, or urination. No tremor. · Hematologic/Lymphatic: No abnormal bruising or bleeding, blood clots or swollen lymph nodes. · Allergic/Immunologic: No nasal congestion or hives. Physical Exam    Vital Signs: /89   Pulse 75   Temp 99 °F (37.2 °C) (Oral)   Resp 18   Ht 5' 5\" (1.651 m)   Wt 205 lb 4 oz (93.1 kg)   LMP 03/19/1980   SpO2 97%   BMI 34.16 kg/m²        Admission Weight: 210 lb (95.3 kg)     General appearance: Awake, Alert Cooperative    Head: Normocephalic, without obvious abnormality, atraumatic    Eyes: Conjunctivae/corneas clear. PERRL, EOM's intact. Fundi benign    Neck: no adenopathy, no carotid bruit, no JVD, supple, symmetrical, trachea midline and thyroid: not enlarged, symmetric, no tenderness/mass/nodules    Lungs: clear to auscultation bilaterally    Heart: regular rate and rhythm, S1, S2 normal, no murmur, click, rub or gallop    Abdomen: Soft, non-tender. Bowel sounds normal. No masses,  no organomegaly    Extremities: extremities normal, atraumatic, no cyanosis or edema    Skin: Skin color, texture, turgor normal. No rashes or lesions    Neurologic: Grossly normal        MEDICAL DECISION MAKING/TESTING    Cardiac Cath:   2009 -  Patent stent,  Otherwise nonobstructive coronary disease    Echo/Stress:      Stress test 2019 -  No ischemia     echo 2019    Left Ventricle: Systolic function is normal with an ejection fraction of 55-60%.   Left Ventricle: No segmental wall motion abnormalities.   Aortic Valve: The aortic valve is trileaflet. The leaflets are moderately thickened.   Mitral Valve: The leaflets are mildly thickened.   Mitral Valve: There is mild regurgitation.     EKG:   Normal sinus rhythm        Labs:      CBC:   Recent Labs     06/20/21  1146 06/21/21  0516   WBC 8.2 29.4*   HGB 12.5 9.9*   HCT 38.6 31.0*   MCV 89.9 89.9    170     BMP:   Recent Labs 06/20/21  1146 06/21/21  0516 06/21/21  0659    136 137   K 5.3 5.7* 5.9*    103 104   CO2 22 20 20   BUN 39* 52* 54*   CREATININE 2.70* 5.02* 5.02*     PT/INR: No results for input(s): PROTIME, INR in the last 72 hours. APTT: No results for input(s): APTT in the last 72 hours. MAG: No results for input(s): MG in the last 72 hours. D Dimer: No results for input(s): DDIMER in the last 72 hours. Troponin T No results for input(s): TROPONINT in the last 72 hours. ProBNP Invalid input(s): PRO-BNP          Diagnosis:  Principal Problem:    Acute cystitis without hematuria  Active Problems:    WERO (acute kidney injury) ([facility] [address]    Hydronephrosis of left kidney    Hydroureter, left    Type 2 diabetes mellitus, without long-term current use of insulin (HCC)    Essential hypertension    Hypothyroidism    Acute infective cystitis  Resolved Problems:    * No resolved hospital problems. 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   Last echo with no report of pulmonary stenosis     Mild calcific aortic stenosis.     Calcified ascending aorta.     Carotid disease.   -  Right-sided 50-69%,  Left side no significant plaque/stenosis  -  Follows with vascular     Dyslipidemia.

## 2021-06-21 NOTE — CARE COORDINATION
CASE MANAGEMENT NOTE:    Admission Date:  6/20/2021 Marilyn Thapa is a 70 y.o.  female    Admitted for : Acute infective cystitis [N30.00]    Met with:  Patient    PCP:  Will Bob                                Insurance:  [employer]      Is patient alert and oriented at time of discussion:  Yes    Current Residence/ Living Arrangements:  independently at home, Son lives w/ Her             Current Services PTA:  No    Does patient go to outpatient dialysis: No  If yes, location and chair time: NA    Is patient agreeable to VNS: No    Freedom of choice provided:  Yes    List of [address] provided: No    VNS chosen:  No, Denies the need    DME:  none    Home Oxygen: No    Nebulizer: No    CPAP/BIPAP: No    Supplier: N/A    Potential Assistance Needed: No    SNF needed: No    Freedom of choice and list provided: NA    Pharmacy:  [facility]       Does Patient want to use MEDS to BEDS? No    Is patient currently receiving oral anticoagulation therapy? No    Is the Patient an [facility] [place] with Readmission Risk Score greater than 14%? No  If yes, pt needs a follow up appointment made within 7 days. Family Members/Caregivers that pt would like involved in their care:    Yes    If yes, list name here:  SonLexie    Transportation Provider:  Patient             Discharge Plan:  6/21/21 [employer] Pt. Lives in 2 story home, Son, lives w/ her. No DME, Denies VNS, PT/OT on board, will follow for any rec/needs. Pt. Had Cysto w/ Stent today w/ Urology. IV Rocephin/Fluids. Cr 5.02, Bun 54, K+ 5.9. Nephro following.  Will continue to follow//KB                Electronically signed by: Tiffanie Guzman RN on 6/21/2021 at 3:38 PM

## 2021-06-21 NOTE — CONSULTS
Department of Internal Medicine  Nephrology Mireya Whittington MD   Consult Note    Reason for consultation: Management of acute kidney injury at nephrolithiasis. Consulting physician: Jett Beatty MD    History of presenting illness: This is a 70 y.o. female with a significant past medical history of Type 2 diabetes mellitus [diagnosed in 2015], Coronary artery disease [s/p PTCA/stents], hyperlipidemia, hypothyroidism, systemic hypertension and Chronic kidney disease stage III [baseline serum creatinine 1.40 mg/dL in November 2018], who presented to the emergency department yesterday with sudden onset of severe left flank pain associated with nausea and vomiting. She had chills but did not have fever. Laboratory studies at presentation revealed serum creatinine 2.7 mg/dL which increased overnight to 5 mg/dL associated with hyperkalemia and serum potassium 5.9 mmol/L.  CT scan of the abdomen and pelvis showed:  1. Left-sided hydroureter and hydronephrosis with associated asymmetrical perinephric and periureteral fat stranding without obstructing calculus noted. Findings likely represents a recently passed stone. 2. Nonobstructing calculi right kidney, largest measuring 5 mm. 3. Small hiatal hernia. 4. Stable ectasia infrarenal abdominal aortic aneurysm measuring 2.7 cm.      Pcn [penicillins], Heparin, Lamotrigine, Cyclobenzaprine, and Heparin (porcine)    Past Medical History:   Diagnosis Date    Aortic valve stenosis     mild per chart    Arthritis     CAD (coronary artery disease) 2000    1 STENT    Diabetes mellitus ([facility] [address]     Heart murmur     1962    Hyperlipidemia 2004    ON RX    MI, old     states many and they were mild    Pulmonary stenosis 1995    Pulmonary valve stenosis     mild/congenital per chart    Sciatica     right leg    Thyroid disease 2004    HYPOTHYROIDISM ON RX    Wears glasses        Scheduled Meds:   [MAR Hold] aspirin  81 mg Oral Daily    [MAR Hold] atorvastatin  10 mg Oral Daily    [MAR Hold] baclofen  10 mg Oral BID    [MAR Hold] buPROPion  300 mg Oral Daily    [MAR Hold] busPIRone  15 mg Oral BID    [MAR Hold] FLUoxetine  10 mg Oral Daily    [MAR Hold] isosorbide mononitrate  30 mg Oral Daily    [MAR Hold] levothyroxine  112 mcg Oral Daily    [Held by provider] losartan  50 mg Oral Daily    [MAR Hold] metoprolol succinate  100 mg Oral Daily    [MAR Hold] oxybutynin  15 mg Oral Daily    [MAR Hold] pantoprazole  40 mg Oral QAM AC    [MAR Hold] pramipexole  0.125 mg Oral Daily    [MAR Hold] vitamin B-12  500 mcg Oral Daily    [MAR Hold] insulin lispro  0-12 Units Subcutaneous TID WC    [MAR Hold] insulin lispro  0-6 Units Subcutaneous Nightly    [MAR Hold] sodium chloride flush  10 mL Intravenous 2 times per day    [facility] heparin (porcine)  5,000 Units Subcutaneous 3 times per day    [facility] Hold] cefTRIAXone (ROCEPHIN) IV  1,000 mg Intravenous Q24H     Continuous Infusions:   [MAR Hold] dextrose      [MAR Hold] sodium chloride      [MAR Hold] sodium chloride 100 mL/hr at 06/20/21 2144     PRN Meds:. [MAR Hold] oxyCODONE-acetaminophen, [MAR Hold] albuterol sulfate HFA, [MAR Hold] ALPRAZolam, [MAR Hold] glucose, [MAR Hold] dextrose, [MAR Hold] glucagon (rDNA), [MAR Hold] dextrose, [MAR Hold] sodium chloride flush, [MAR Hold] sodium chloride, [MAR Hold] magnesium sulfate, [MAR Hold] ondansetron **OR** [MAR Hold] ondansetron, [MAR Hold] magnesium hydroxide, [MAR Hold] acetaminophen **OR** [MAR Hold] acetaminophen, [MAR Hold] morphine    Family History   Problem Relation Age of Onset    Breast Cancer Mother         BREAST WITH METS T  LIVER AND LUNG    Other Father         AAA    Heart Disease Father     Asthma Sister     Diabetes Sister         NIDDM    Stroke Brother     Diabetes Brother         NIDDM    Stroke Maternal Grandmother     Asthma Son         Social History     Socioeconomic History    Marital status:       Spouse name: None    Number of children: 1    Years of education: None    Highest education level: None   Occupational History    None   Tobacco Use    Smoking status: Former Smoker     Packs/day: 1.00     Years: 30.00     Pack years: 30.00     Types: Cigarettes     Quit date: 3/19/2004     Years since quittin.2    Smokeless tobacco: Never Used   Vaping Use    Vaping Use: Never used   Substance and Sexual Activity    Alcohol use: Yes     Comment: socially, once a year    Drug use: No    Sexual activity: Not Currently   Other Topics Concern    None   Social History Narrative    None     Social Determinants of Health     Financial Resource Strain:     Difficulty of Paying Living Expenses:    Food Insecurity:     Worried About Running Out of Food in the Last Year:     Ran Out of Food in the Last Year:    Transportation Needs:     Lack of Transportation (Medical):  Lack of Transportation (Non-Medical):    Physical Activity:     Days of Exercise per Week:     Minutes of Exercise per Session:    Stress:     Feeling of Stress :    Social Connections:     Frequency of Communication with Friends and Family:     Frequency of Social Gatherings with Friends and Family:     Attends [employer] Services:     Active Member of Clubs or Organizations:     Attends Club or Organization Meetings:     Marital Status:    Intimate Partner Violence:     Fear of Current or Ex-Partner:     Emotionally Abused:     Physically Abused:     Sexually Abused:      Review of systems: CNS - no headache or dizziness; Cardiac - no chest pain; Respiratory - no shortness of breath; Gastrointestinal - +nausea,+vomiting,no diarrhea; Musculoskeletal - general body aches; Left flank pain Skin/Integument - no rashes.     Physical Exam:    VITALS:  BP (!) 121/51   Pulse 67   Temp 97 °F (36.1 °C)   Resp 14   Ht 5' 5\" (1.651 m)   Wt 205 lb (93 kg)   LMP 1980   SpO2 96%   BMI 34.11 kg/m²   24HR INTAKE/OUTPUT: Intake/Output Summary (Last 24 hours) at 6/21/2021 1440  Last data filed at 6/21/2021 1424  Gross per 24 hour   Intake 2010 ml   Output --   Net 2010 ml       Constitutional: alert, appears stated age and cooperative    Skin: Skin color, texture, turgor normal. No rashes or lesions    Head: Normocephalic, without obvious abnormality, atraumatic     Cardiovascular/Edema: S1, S2 with 2/6 systolic murmur    Respiratory: Lungs: clear to auscultation bilaterally    Abdomen: soft, non-tender; bowel sounds normal; no masses,  no organomegaly    Back: Left costovertebral angle tenderness    Extremities: extremities normal, atraumatic, no cyanosis or edema    Neuro:  Grossly normal      CBC:   Recent Labs     06/20/21  1146 06/21/21  0516   WBC 8.2 29.4*   HGB 12.5 9.9*    170     BMP:    Recent Labs     06/20/21  1146 06/21/21  0516 06/21/21  0659 06/21/21  1123    136 137  --    K 5.3 5.7* 5.9* 5.5*    103 104  --    CO2 22 20 20  --    BUN 39* 52* 54*  --    CREATININE 2.70* 5.02* 5.02*  --    GLUCOSE 151* 112* 112*  --        Lab Results   Component Value Date    NITRU POSITIVE 06/20/2021    COLORU YELLOW 06/20/2021    PHUR 6.5 06/20/2021    WBCUA TOO NUMEROUS TO COUNT 06/20/2021    RBCUA 2 TO 5 06/20/2021    MUCUS NOT REPORTED 06/20/2021    TRICHOMONAS NOT REPORTED 06/20/2021    YEAST NOT REPORTED 06/20/2021    BACTERIA MODERATE 06/20/2021    SPECGRAV 1.010 06/20/2021    LEUKOCYTESUR LARGE 06/20/2021    UROBILINOGEN Normal 06/20/2021    BILIRUBINUR NEGATIVE 06/20/2021    GLUCOSEU NEGATIVE 06/20/2021    KETUA NEGATIVE 06/20/2021    AMORPHOUS 1+ 06/20/2021     Urine Creatinine:     Lab Results   Component Value Date    LABCREA 81.4 03/15/2021     IMPRESSION/RECOMMENDATIONS:      1. Acute kidney injury - Most consistent with obstructive nephropathy secondary to kidney stone. Patient is scheduled for cystoscopy with ureteral stent placement and probable stone retrieval later today.   There are no indications for acute hemodialysis at this time. Plan: Continue IV fluid 0.9 normal saline at 100 mL/h. Strict input and output documentation. Avoid nephrotoxic agents. Random urine electrolytes. Urinalysis and urine microscopy. Basic metabolic profile daily. 2.  Nephrolithiasis - Patient with family history [sister]. Will perform metabolic stone evaluation as outpatient since this is patient's first episode of kidney stone. 3.  Systemic hypertension - Blood pressure is adequately controlled. 4.  Hyperkalemia - Secondary to impaired potassium elimination due to reduced GFR in the setting of use of angiotensin receptor blocker. Hold losartan. 50% dextrose followed by IV insulin. Calcium gluconate 1 g IV for cardiac membrane stabilization. Recheck serum potassium in 4 hours. Prognosis is guarded. Thank you very much for the courtesy of this consultation.     Rip Kelly MD FACP  Attending Nephrologist  6/21/2021 2:39 PM

## 2021-06-21 NOTE — CONSULTS
Department of Urology  Urology Consult Note    Patient:  Barb Mahan  MRN: [id number]  YOB: 1950    Reason for Consult:  Left flank pain, left hydronephrosis, and renal failure      CHIEF COMPLAINT:    Chief Complaint   Patient presents with    Flank Pain    Urinary Frequency       History Obtained From:   patient    HISTORY OF PRESENT ILLNESS:    The patient is a 70 y.o. female who developed severe left flank pain yesterday. She did come to the emergency department and was found to have left hydronephrosis and nonobstructing stones. She continues to have significant nausea and vomiting. She is also continuing to have significant flank pain. Her creatinine has risen up to 5. Her potassium is 5.7.     Past Medical History:        Diagnosis Date    Aortic valve stenosis     mild per chart    Arthritis     CAD (coronary artery disease) 2000 1 STENT    Diabetes mellitus ([address]     Heart murmur     1962    Hyperlipidemia 2004    ON RX    MI, old     states many and they were mild    Pulmonary stenosis 1995    Pulmonary valve stenosis     mild/congenital per chart    Sciatica     right leg    Thyroid disease 2004    HYPOTHYROIDISM ON RX    Wears glasses      Past Surgical History:        Procedure Laterality Date    CERVICAL FUSION  1993    CORONARY ANGIOPLASTY WITH STENT PLACEMENT  2000 1 STENT    FINGER SURGERY Left 03/21/2014    [place] ARTHOPLASTY LEFT THUMB    HYSTERECTOMY  1980    WITH RT SALPINGOOPHERECTOMY    JOINT REPLACEMENT Bilateral 1994    PARTIAL-KNEES    OTHER SURGICAL HISTORY Right 09/23/2014    thumb repair    SALPINGO-OOPHORECTOMY Left 1987    SHOULDER SURGERY Left 1993    SPURS    TOE OSTEOTOMY Right 6/8/2016    Right 5th digit adductory wedge osteotomy with K wire fixation      Current Medications:   Current Facility-Administered Medications: oxyCODONE-acetaminophen (PERCOCET) 5-325 MG per tablet 1 tablet, 1 tablet, Oral, Q6H PRN  albuterol sulfate  (90 Base) MCG/ACT inhaler 2 puff, 2 puff, Inhalation, Q6H PRN  ALPRAZolam (XANAX) tablet 1 mg, 1 mg, Oral, Daily PRN  aspirin EC tablet 81 mg, 81 mg, Oral, Daily  atorvastatin (LIPITOR) tablet 10 mg, 10 mg, Oral, Daily  baclofen (LIORESAL) tablet 10 mg, 10 mg, Oral, BID  buPROPion (WELLBUTRIN XL) extended release tablet 300 mg, 300 mg, Oral, Daily  busPIRone (BUSPAR) tablet 15 mg, 15 mg, Oral, BID  FLUoxetine (PROZAC) capsule 10 mg, 10 mg, Oral, Daily  isosorbide mononitrate (IMDUR) extended release tablet 30 mg, 30 mg, Oral, Daily  levothyroxine (SYNTHROID) tablet 112 mcg, 112 mcg, Oral, Daily  [Held by provider] losartan (COZAAR) tablet 50 mg, 50 mg, Oral, Daily  metoprolol succinate (TOPROL XL) extended release tablet 100 mg, 100 mg, Oral, Daily  oxybutynin (DITROPAN-XL) extended release tablet 15 mg, 15 mg, Oral, Daily  pantoprazole (PROTONIX) tablet 40 mg, 40 mg, Oral, QAM AC  pramipexole (MIRAPEX) tablet 0.125 mg, 0.125 mg, Oral, Daily  vitamin B-12 (CYANOCOBALAMIN) tablet 500 mcg, 500 mcg, Oral, Daily  insulin lispro (HUMALOG) injection vial 0-12 Units, 0-12 Units, Subcutaneous, TID WC  insulin lispro (HUMALOG) injection vial 0-6 Units, 0-6 Units, Subcutaneous, Nightly  glucose (GLUTOSE) 40 % oral gel 15 g, 15 g, Oral, PRN  dextrose 50 % IV solution, 12.5 g, Intravenous, PRN  glucagon (rDNA) injection 1 mg, 1 mg, Intramuscular, PRN  dextrose 5 % solution, 100 mL/hr, Intravenous, PRN  sodium chloride flush 0.9 % injection 10 mL, 10 mL, Intravenous, 2 times per day  sodium chloride flush 0.9 % injection 10 mL, 10 mL, Intravenous, PRN  0.9 % sodium chloride infusion, 25 mL, Intravenous, PRN  magnesium sulfate 1000 mg in dextrose 5% 100 mL IVPB, 1,000 mg, Intravenous, PRN  ondansetron (ZOFRAN-ODT) disintegrating tablet 4 mg, 4 mg, Oral, Q8H PRN **OR** ondansetron (ZOFRAN) injection 4 mg, 4 mg, Intravenous, Q6H PRN  magnesium hydroxide (MILK OF MAGNESIA) 400 MG/5ML suspension 30 mL, 30 mL, Oral, Daily PRN  acetaminophen (TYLENOL) tablet 650 mg, 650 mg, Oral, Q6H PRN **OR** acetaminophen (TYLENOL) suppository 650 mg, 650 mg, Rectal, Q6H PRN  0.9 % sodium chloride infusion, , Intravenous, Continuous  morphine sulfate (PF) injection 2 mg, 2 mg, Intravenous, Q4H PRN  heparin (porcine) injection 5,000 Units, 5,000 Units, Subcutaneous, 3 times per day  cefTRIAXone (ROCEPHIN) 1000 mg IVPB in 50 mL D5W minibag, 1,000 mg, Intravenous, Q24H    Allergies: ALG@    Social History:   Social History     Socioeconomic History    Marital status:      Spouse name: Not on file    Number of children: 1    Years of education: Not on file    Highest education level: Not on file   Occupational History    Not on file   Tobacco Use    Smoking status: Former Smoker     Packs/day: 1.00     Years: 30.00     Pack years: 30.00     Types: Cigarettes     Quit date: 3/19/2004     Years since quittin.2    Smokeless tobacco: Never Used   Vaping Use    Vaping Use: Never used   Substance and Sexual Activity    Alcohol use: Yes     Comment: socially, once a year    Drug use: No    Sexual activity: Not Currently   Other Topics Concern    Not on file   Social History Narrative    Not on file     Social Determinants of Health     Financial Resource Strain:     Difficulty of Paying Living Expenses:    Food Insecurity:     Worried About Running Out of Food in the Last Year:     [address] in the Last Year:    Transportation Needs:     Lack of Transportation (Medical):      Lack of Transportation (Non-Medical):    Physical Activity:     Days of Exercise per Week:     Minutes of Exercise per Session:    Stress:     Feeling of Stress :    Social Connections:     Frequency of Communication with Friends and Family:     Frequency of Social Gatherings with Friends and Family:     Attends J[employer] Services:     Active Member of Clubs or Organizations:     Attends Club or Organization Meetings:     Marital Status: Intimate Partner Violence:     Fear of Current or Ex-Partner:     Emotionally Abused:     Physically Abused:     Sexually Abused:        Family History:       Problem Relation Age of Onset    Breast Cancer Mother         BREAST WITH METS T  LIVER AND LUNG    Other Father         AAA    Heart Disease Father     Asthma Sister     Diabetes Sister         NIDDM    Stroke Brother     Diabetes Brother         NIDDM    Stroke Maternal Grandmother     Asthma Son        Review of Systems:  Constitutional: Negative for fever, chills and activity change. Eyes: Negative for pain, redness and visual disturbance. Respiratory: Negative for cough, shortness of breath and wheezing. Cardiovascular: Negative for chest pain and leg swelling. Gastrointestinal: Negative for nausea, vomiting and abdominal pain. Endocrine: Negative for polydipsia and polyphagia. Genitourinary: Negative for dysuria, frequency, hematuria, flank pain and difficulty urinating. Musculoskeletal: Negative for myalgias, back pain and joint swelling. Skin: Negative for color change, rash and wound. Allergic/Immunologic: Negative for environmental allergies and food allergies. Neurological: Negative for dizziness, tremors and numbness. Hematological: Negative for adenopathy. Does not bruise/bleed easily. Psychiatric/Behavioral: Negative for confusion and dysphoric mood. The patient is not nervous/anxious.        Patient Vitals for the past 24 hrs:   BP Temp Temp src Pulse Resp SpO2 Height Weight   06/21/21 0345 -- -- -- -- -- -- 5' 5\" (1.651 m) 205 lb 4 oz (93.1 kg)   06/21/21 0025 (!) 127/98 98.2 °F (36.8 °C) Oral 73 18 92 % -- --   06/20/21 2041 (!) 128/51 97.8 °F (36.6 °C) Oral 83 18 92 % -- --   06/20/21 2030 -- -- -- -- -- -- -- 207 lb 3.7 oz (94 kg)   06/20/21 2015 (!) 111/58 98.9 °F (37.2 °C) -- 84 18 97 % -- --   06/20/21 1624 (!) 107/56 98.4 °F (36.9 °C) Oral 96 16 94 % -- --   06/20/21 1353 (!) 150/69 -- -- 93 16 98 % -- --   06/20/21 1019 (!) 195/102 98 °F (36.7 °C) Oral 74 20 97 % 5' 5\" (1.651 m) 210 lb (95.3 kg)       Intake/Output Summary (Last 24 hours) at 6/21/2021 0803  Last data filed at 6/21/2021 0604  Gross per 24 hour   Intake 3640 ml   Output --   Net 3640 ml       Recent Labs     06/20/21  1146 06/21/21  0516   WBC 8.2 29.4*   HGB 12.5 9.9*   HCT 38.6 31.0*   MCV 89.9 89.9    170     Recent Labs     06/20/21  1146 06/21/21  0516 06/21/21  0659    136 137   K 5.3 5.7* 5.9*    103 104   CO2 22 20 20   BUN 39* 52* 54*   CREATININE 2.70* 5.02* 5.02*       Recent Labs     06/20/21  1045   COLORU YELLOW   PHUR 6.5   WBCUA TOO NUMEROUS TO COUNT   RBCUA 2 TO 5   MUCUS NOT REPORTED   TRICHOMONAS NOT REPORTED   YEAST NOT REPORTED   BACTERIA MODERATE*   SPECGRAV 1.010   LEUKOCYTESUR LARGE*   UROBILINOGEN Normal   BILIRUBINUR NEGATIVE       Additional Lab/culture results:    Physical Exam:  Constitutional: Patient in no acute distress; Neuro: alert and oriented to person place and time. Psych: Mood and affect normal.  Skin: Normal  Lungs: Respiratory effort normal  Cardiovascular:  Normal peripheral pulses  Abdomen: Patient does have tenderness in the left upper quadrant and left flank  Bladder non-tender and not distended. Interval Imaging Findings:   CT ABDOMEN PELVIS WO CONTRAST Additional Contrast? None    Result Date: 6/20/2021  EXAMINATION: CT OF THE ABDOMEN AND PELVIS WITHOUT CONTRAST 6/20/2021 1:11 pm TECHNIQUE: CT of the abdomen and pelvis was performed without the administration of intravenous contrast. Multiplanar reformatted images are provided for review. Dose modulation, iterative reconstruction, and/or weight based adjustment of the mA/kV was utilized to reduce the radiation dose to as low as reasonably achievable. COMPARISON: CT abdomen and pelvis November 7, 2018.  HISTORY: ORDERING SYSTEM PROVIDED HISTORY: left flank pain, r/o obstructing stone TECHNOLOGIST PROVIDED HISTORY: left stenosis    Lumbar radiculopathy    DDD (degenerative disc disease), lumbar    Lumbosacral spondylosis without myelopathy    Primary osteoarthritis of left hip    Sacroiliitis (HCC)    Chest pain    DDD (degenerative disc disease), cervical    Neck pain    Cervical stenosis of spine    Osteoarthritis of spine with radiculopathy, cervical region    WERO (acute kidney [facility]) ([facility] [address]    Acute cystitis without hematuria    Hydronephrosis of left kidney    Hydroureter, left    Type 2 diabetes mellitus, without long-term current use of insulin ([facility] [address]    Essential hypertension    Hypothyroidism    Acute infective cystitis       Plan: The patient has left hydronephrosis and hydroureter likely secondary to a recently passed stone and associated ureteral edema causing obstruction. Her creatinine has risen to 5 and she has hyperkalemia. She does also continue to have significant pain along with nausea and vomiting. We will schedule her for a stent today. Would leave the stent in until her renal function stabilizes and then she will need to cystoscopy with retrograde pyelogram to ensure that her hydronephrosis has resolved. Thank you.     Electronically signed by Dontae Wolff MD on 6/21/2021 at 8:03 AM

## 2021-06-21 NOTE — ANESTHESIA PRE PROCEDURE
Department of Anesthesiology  Preprocedure Note       Name:  Christal Power   Age:  70 y.o.  :  1950                                          MRN:  [id number]         Date:  2021      Surgeon: Jason Krishnan):  Peter Reilly MD    Procedure: Procedure(s):  CYSTOSCOPY URETERAL STENT INSERTION    Medications prior to admission:   Prior to Admission medications    Medication Sig Start Date End Date Taking?  Authorizing Provider   losartan (COZAAR) 50 MG tablet TAKE ONE TABLET BY MOUTH DAILY 20   Historical Provider, MD   buPROPion (WELLBUTRIN XL) 300 MG extended release tablet TAKE ONE TABLET BY MOUTH DAILY 20   Historical Provider, MD   FLUoxetine (PROZAC) 10 MG capsule TAKE ONE CAPSULE BY MOUTH DAILY 20   Historical Provider, MD   metFORMIN (GLUCOPHAGE) 1000 MG tablet TAKE ONE TABLET BY MOUTH TWICE A DAY WITH MEALS 21   Historical Provider, MD   clotrimazole (LOTRIMIN) 1 % vaginal cream Place 1 applicator vaginally 2 times daily 21   Historical Provider, MD   oxybutynin (DITROPAN-XL) 10 MG extended release tablet TAKE ONE TABLET BY MOUTH DAILY 20   Historical Provider, MD   pramipexole (MIRAPEX) 0.125 MG tablet TAKE ONE TABLET BY MOUTH DAILY 10/2/20   Historical Provider, MD   ascorbic acid (VITAMIN C) 1000 MG tablet Take 1,000 mg by mouth daily    Historical Provider, MD   albuterol sulfate  (90 Base) MCG/ACT inhaler INHALE TWO PUFFS BY MOUTH EVERY 4 TO 6 HOURS AS NEEDED FOR WHEEZING 10/20/20   Historical Provider, MD   nystatin ([address]) 684228 UNIT/GM powder APPLY TO AFFECTED AREA(S) FOUR TIMES A DAY 20   Historical Provider, MD   busPIRone (BUSPAR) 15 MG tablet Take 15 mg by mouth 2 times daily 20   Historical Provider, MD   isosorbide mononitrate (IMDUR) 30 MG extended release tablet Take 1 tablet by mouth daily 18   Abe Bae MD   pantoprazole (PROTONIX) 40 MG tablet Take 1 tablet by mouth every morning (before breakfast) 18   Nevin Tamayo Latisha Ashby MD   baclofen (LIORESAL) 10 MG tablet Take 10 mg by mouth 2 times daily    Historical Provider, MD   fluticasone (FLONASE) 50 MCG/ACT nasal spray 1 spray by Nasal route daily    Historical Provider, MD   levocetirizine (XYZAL) 5 MG tablet Take 5 mg by mouth nightly    Historical Provider, MD   nystatin (MYCOSTATIN) 203020 UNIT/GM cream Apply topically 2 times daily Apply topically 2 times daily. Historical Provider, MD   oxybutynin (DITROPAN XL) 15 MG extended release tablet Take 15 mg by mouth daily    Historical Provider, MD   levothyroxine (SYNTHROID) 112 MCG tablet TAKE ONE TABLET BY MOUTH DAILY 10/2/17   Historical Provider, MD   atorvastatin (LIPITOR) 40 MG tablet  7/2/17   Historical Provider, MD   nitroGLYCERIN (NITROSTAT) 0.4 MG SL tablet Place 0.4 mg under the tongue every 5 minutes as needed for Chest pain up to max of 3 total doses. If no relief after 1 dose, call 911. Historical Provider, MD   Cholecalciferol (VITAMIN D3) 2000 UNITS CAPS Take 1 capsule by mouth daily. Historical Provider, MD   celecoxib (CELEBREX) 200 MG capsule Take 200 mg by mouth 2 times daily. Historical Provider, MD   Multiple Vitamins-Minerals (THERAPEUTIC MULTIVITAMIN-MINERALS) tablet Take 1 tablet by mouth daily. Historical Provider, MD   ALPRAZolam Shirley Senior) 1 MG tablet Take 1 mg by mouth as needed for Anxiety. Historical Provider, MD   aspirin 81 MG EC tablet Take 81 mg by mouth daily. LAST DOSE 9/8/14    Historical Provider, MD   metoprolol (TOPROL-XL) 100 MG XL tablet Take 100 mg by mouth daily. Historical Provider, MD   FLUoxetine (PROZAC) 20 MG capsule Take 20 mg by mouth daily     Historical Provider, MD   vitamin B-12 (CYANOCOBALAMIN) 500 MCG tablet Take 500 mcg by mouth daily.     Historical Provider, MD       Current medications:    Current Facility-Administered Medications   Medication Dose Route Frequency Provider Last Rate Last Admin    [MAR Hold] oxyCODONE-acetaminophen (PERCOCET) 5-325 MG per tablet 1 tablet  1 tablet Oral Q6H PRN Britta Wick MD   1 tablet at 06/21/21 0449    [MAR Hold] albuterol sulfate  (90 Base) MCG/ACT inhaler 2 puff  2 puff Inhalation Q6H PRN Ashley Holder MD        [facility] Hold] ALPRAZolam Jhonny Life) tablet 1 mg  1 mg Oral Daily PRN Ashley Holder MD        [facility] Hold] aspirin EC tablet 81 mg  81 mg Oral Daily Ashley Holedr MD   81 mg at 06/20/21 2143    [MAR Hold] atorvastatin (LIPITOR) tablet 10 mg  10 mg Oral Daily Ashley Holder MD   10 mg at 06/20/21 2143    [MAR Hold] baclofen (LIORESAL) tablet 10 mg  10 mg Oral BID Ashley Holder MD   10 mg at 06/20/21 2143    [MAR Hold] buPROPion (WELLBUTRIN XL) extended release tablet 300 mg  300 mg Oral Daily Ashley Holder MD   300 mg at 06/20/21 2143    [MAR Hold] busPIRone (BUSPAR) tablet 15 mg  15 mg Oral BID Ashley Holder MD   15 mg at 06/20/21 2143    [MAR Hold] FLUoxetine (PROZAC) capsule 10 mg  10 mg Oral Daily Ashley Holder MD   10 mg at 06/20/21 2223    [MAR Hold] isosorbide mononitrate (IMDUR) extended release tablet 30 mg  30 mg Oral Daily Ashley Holder MD   30 mg at 06/20/21 2142    [MAR Hold] levothyroxine (SYNTHROID) tablet 112 mcg  112 mcg Oral Daily Ashley Holder MD   112 mcg at 06/21/21 0609    [Held by provider] losartan (COZAAR) tablet 50 mg  50 mg Oral Daily Ashley Holder MD        [facility] Hold] metoprolol succinate (TOPROL XL) extended release tablet 100 mg  100 mg Oral Daily Ashley Holder MD   100 mg at 06/21/21 1045    [MAR Hold] oxybutynin (DITROPAN-XL) extended release tablet 15 mg  15 mg Oral Daily Aslhey Holder MD   15 mg at 06/20/21 2143    [MAR Hold] pantoprazole (PROTONIX) tablet 40 mg  40 mg Oral QAM AC Ashley Holder MD   40 mg at 06/21/21 0609    [MAR Hold] pramipexole (MIRAPEX) tablet 0.125 mg  0.125 mg Oral Daily Ashley Holder MD   0.125 mg at 06/20/21 2223    [MAR Hold] vitamin B-12 (CYANOCOBALAMIN) tablet 500 mcg  500 mcg Oral Daily Kendrick Madsen MD   500 mcg at 06/20/21 2143    [MAR Hold] insulin lispro (HUMALOG) injection vial 0-12 Units  0-12 Units Subcutaneous TID WC MD [facility] Hold] insulin lispro (HUMALOG) injection vial 0-6 Units  0-6 Units Subcutaneous Nightly Kendrick Madsen MD   1 Units at 06/20/21 2139    [MAR Hold] glucose (GLUTOSE) 40 % oral gel 15 g  15 g Oral PRN MD [facility] Hold] dextrose 50 % IV solution  12.5 g Intravenous PRN Kendrick Madsen MD       [facility] Hold] glucagon (rDNA) injection 1 mg  1 mg Intramuscular PRN Kendrick Madsen MD       [facility] Hold] dextrose 5 % solution  100 mL/hr Intravenous PRN Kendrick Madsen MD       [facility] Hold] sodium chloride flush 0.9 % injection 10 mL  10 mL Intravenous 2 times per day Kendrick Madsen MD   10 mL at 06/20/21 2144    [MAR Hold] sodium chloride flush 0.9 % injection 10 mL  10 mL Intravenous PRN MD [facility] Hold] 0.9 % sodium chloride infusion  25 mL Intravenous PRN Kendrick Madsen MD       [facility] Hold] magnesium sulfate 1000 mg in dextrose 5% 100 mL IVPB  1,000 mg Intravenous PRN MD Judith [facility] Hold] ondansetron (ZOFRAN-ODT) disintegrating tablet 4 mg  4 mg Oral Q8H PRN Kendrick Madsen MD        Or   [facility] Hold] ondansetron (ZOFRAN) injection 4 mg  4 mg Intravenous Q6H PRN MD Judith [facility] Hold] magnesium hydroxide (MILK OF MAGNESIA) 400 MG/5ML suspension 30 mL  30 mL Oral Daily PRN MD Judith [facility] Hold] acetaminophen (TYLENOL) tablet 650 mg  650 mg Oral Q6H PRN Kendrick Madsen MD        Or   [facility] Hold] acetaminophen (TYLENOL) suppository 650 mg  650 mg Rectal Q6H PRN MD Judith [facility] Hold] 0.9 % sodium chloride infusion   Intravenous Continuous Kendrick Madsen  mL/hr at 06/20/21 2144 New Bag at 06/20/21 2144    [MAR Hold] morphine sulfate (PF) injection 2 mg  2 mg Intravenous Q4H PRN Canelo Sabrina Madison MD   2 mg at 06/21/21 1008    [MAR Hold] heparin (porcine) injection 5,000 Units  5,000 Units Subcutaneous 3 times per day Almas Dowell MD   5,000 Units at 06/21/21 0607    [MAR Hold] cefTRIAXone (ROCEPHIN) 1000 mg IVPB in 50 mL D5W minibag  1,000 mg Intravenous Q24H Cathy Peña  mL/hr at 06/21/21 1151 1,000 mg at 06/21/21 1151       Allergies:     Allergies   Allergen Reactions    Pcn [Penicillins] Swelling and Hives    Heparin Other (See Comments)     MIGRAINE      Lamotrigine Other (See Comments), Itching, Nausea Only and Rash    Cyclobenzaprine      Dry mouth, jitters    Heparin (Porcine)      Other reaction(s): Migraine       Problem List:    Patient Active Problem List   Diagnosis Code    CMC arthritis M19.049    Arthritis of left hip M16.12    Left hip pain M25.552    Chronic midline low back pain without sciatica M54.5, G89.29    Degenerative lumbar spinal stenosis M48.061    Lumbar radiculopathy M54.16    DDD (degenerative disc disease), lumbar M51.36    Lumbosacral spondylosis without myelopathy M47.817    Primary osteoarthritis of left hip M16.12    Sacroiliitis (HCC) M46.1    Chest pain R07.9    DDD (degenerative disc disease), cervical M50.30    Neck pain M54.2    Cervical stenosis of spine M48.02    Osteoarthritis of spine with radiculopathy, cervical region M47.22    WERO (acute kidney injury) (HCC) N17.9    Acute cystitis without hematuria N30.00    Hydronephrosis of left kidney N13.30    Hydroureter, left N13.4    Type 2 diabetes mellitus, without long-term current use of insulin (HCC) E11.9    Essential hypertension I10    Hypothyroidism E03.9    Acute infective cystitis N30.00       Past Medical History:        Diagnosis Date    Aortic valve stenosis     mild per chart    Arthritis     CAD (coronary artery disease) 2000    1 STENT    Diabetes mellitus ([facility])     Heart murmur     1962    Hyperlipidemia 2004    ON RX    MI, old     states many and problems/murmurs, CABG/stent, dysrhythmias,  angina,  CHF, orthopnea, PND,  ADKINS, murmur, weak pulses,  friction rub, systolic click, carotid bruit,  JVD and peripheral edema      Rhythm: regular  Rate: normal                    Neuro/Psych:   (+) neuromuscular disease:,    (-) seizures, TIA, CVA, headaches, psychiatric history and depression/anxiety            GI/Hepatic/Renal: Neg GI/Hepatic/Renal ROS       (-) hiatal hernia, GERD, PUD, hepatitis, liver disease, no renal disease, bowel prep and no morbid obesity       Endo/Other:    (+) DiabetesType II DM, , hypothyroidism: arthritis: OA., no malignancy/cancer. (-) hyperthyroidism, blood dyscrasia, no electrolyte abnormalities, no malignancy/cancer               Abdominal:           Vascular: negative vascular ROS. - PVD, DVT and PE. Anesthesia Plan      general     ASA 3       Induction: intravenous. MIPS: Postoperative opioids intended and Prophylactic antiemetics administered. Anesthetic plan and risks discussed with patient.       Plan discussed with CRNA.            K 5.5      Tamra Feng MD   6/21/2021

## 2021-06-21 NOTE — PROGRESS NOTES
Physical Therapy        Physical Therapy Cancel Note      DATE: 2021    NAME: River Abbott  MRN: [id number]   : 1950      Patient not seen this date for Physical Therapy due to:    2021 at 1252- HOLD PT evaluation as pt is in surgery for cystoscopy and ureteral stent insertion.       Electronically signed by Tano Chaparro, PT on 2021 at 12:52 PM

## 2021-06-21 NOTE — FLOWSHEET NOTE
06/21/21 5819   Provider Notification   Reason for Communication Critical Value (comment)  (Cr 5.02)   Provider Name Dr. Abebe Carson   Provider Notification Physician   Method of Communication Call   Response See orders   Notification Time      RN updated Dr. Abebe Carson that pt's morning Creatinine  Went from 2.7 to 5.02, pt still has not urinated during this shift and has a bladder scan of 91, and pt's WBC elevated to 29.4 from 8.2. RN to place consult for nephrology. See MAR/orders.

## 2021-06-21 NOTE — FLOWSHEET NOTE
06/21/21 1938   Encounter Summary   Services provided to: Patient   Referral/Consult From: Joyce   Continue Visiting   (6-21-21)   Complexity of Encounter Low   Length of Encounter 15 minutes   Spiritual/Orthodox   Type Ritual   Intervention Anointing   Sacraments   Sacrament of Sick-Anointing Anointed  (Fr Bee 6-21-21)

## 2021-06-21 NOTE — FLOWSHEET NOTE
PT was not available as she went out for her surgery but her older sister was in room. PT's family life was shared and PT's sister also asked to pray for her possible knee surgery. 06/21/21 1422   Encounter Summary   Services provided to: Family; Patient not available   Referral/Consult From: [facility] Family members   Continue Visiting   (6/21/21)   Complexity of Encounter Moderate   Length of Encounter 30 minutes   Spiritual Assessment Completed Yes   Routine   Type Initial   Assessment Calm; Approachable;Coping   Intervention Prayer;Explored feelings, thoughts, concerns; Active listening;Sustaining presence/ Ministry of presence   Outcome Shared life review;Engaged in conversation;Comfort;Expressed gratitude;Encouraged;Receptive

## 2021-06-21 NOTE — PROGRESS NOTES
Pt has tried many times to urinate in the bathroom without success. Bladder scan only showed 91mL. Dr. Stuart Hendricks and Dr. Ade Morgan are aware.

## 2021-06-21 NOTE — ANESTHESIA POSTPROCEDURE EVALUATION
POST- ANESTHESIA EVALUATION       Pt Name: Ruth Monroe  MRN: [id number]  YOB: 1950  Date of evaluation: 6/21/2021  Time:  6:57 PM      /68   Pulse 72   Temp 97.2 °F (36.2 °C)   Resp 16   Ht 5' 5\" (1.651 m)   Wt 205 lb (93 kg)   LMP 03/19/1980   SpO2 96%   BMI 34.11 kg/m²      Consciousness Level  Awake  Cardiopulmonary Status  Stable  Pain Adequately Treated YES  Nausea / Vomiting  NO  Adequate Hydration  YES  Anesthesia Related Complications NONE      Electronically signed by Amanda Smith MD on 6/21/2021 at 6:57 PM       Department of Anesthesiology  Postprocedure Note    Patient: Ruth Monroe  MRN: [id number]  YOB: 1950  Date of evaluation: 6/21/2021  Time:  6:57 PM     Procedure Summary     Date: 06/21/21 Room / Location: [address] [place] [address]    Anesthesia Start: 1257 Anesthesia Stop: 7268    Procedure: CYSTOSCOPY URETERAL STENT INSERTION (Left Ureter) Diagnosis: (HYDRONEPHROSIS, RENAL FAILURE)    Surgeons: Sheyla Mixon MD Responsible Provider: Amanda Smith MD    Anesthesia Type: general ASA Status: 3          Anesthesia Type: general    Ifrah Phase I: Ifrah Score: 8    Ifrah Phase II:      Last vitals: Reviewed and per EMR flowsheets.        Anesthesia Post Evaluation

## 2021-06-21 NOTE — FLOWSHEET NOTE
06/21/21 0336   Provider Notification   Reason for Communication Patient request   Provider Name Dr. Sana Vincent   Provider Notification Physician   Method of Communication Call   Response See orders   Notification Time [phone]     RN updated Dr. Sana Vincent that pt still having 9/10 pain in left flank/abdomen 1hr post 2mg IV Morphine. RN to order PO Percocet 5/325mg Q6PRN for pain 4-10. RN also updated Dr. Sana Vincent that pt has not urinated since arrival 6to unit. RN to order bladder scan Q6hrs scheduled and to straight cath pt if bladder scan >350. See MAR/orders.

## 2021-06-21 NOTE — PLAN OF CARE
Problem: Pain:  Goal: Pain level will decrease  Description: Pain level will decrease  6/21/2021 1612 by Lizbet Garcia RN  Outcome: Ongoing

## 2021-06-21 NOTE — OP NOTE
Operative Note      Patient: Marilyn Fuentes  YOB: 1950  MRN: [id number]    Date of Procedure: 6/21/2021    Pre-Op Diagnosis: Left HYDRONEPHROSIS, RENAL FAILURE    Post-Op Diagnosis: Same       Procedure(s):  CYSTOSCOPY URETERAL STENT INSERTION    Surgeon(s):  Rosario Cedeno MD    Assistant:   * No surgical staff found *    Anesthesia: Monitor Anesthesia Care    Estimated Blood Loss (mL): Minimal    Complications: None    Specimens:   ID Type Source Tests Collected by Time Destination   1 : URINE, CYSTOSCOPY Urine Urine, Cystoscopic URINE RT REFLEX TO CULTURE Rosario Cedeno MD 6/21/2021 1313        Implants:  Implant Name Type Inv. Item Serial No.  Lot No. LRB No. Used Action   STENT URET 6FR L26CM PERCFLX HYDR+ DBL PGTL THRD 2  STENT URET 6FR L26CM PERCFLX HYDR+ DBL PGTL THRD 2  [employer]- [id number] Left 1 Implanted         Drains: * No LDAs found *    Findings: Drainage of emi pus from left kidney upon successful placement of left ureteral stent    Detailed Description of Procedure: The patient was brought to the operating room. She was properly identified. She was administered a monitored anesthetic and placed in the 5 dorsolithotomy position. She was prepped and draped in sterile fashion. A rigid cystoscope was introduced into the bladder. The urine was cloudy. I did drain the bladder and then redistended it and placed a wire into the left ureter. Almost immediately upon passage of the wire there was drainage of emi pus. Gushed out in significant quantities. I then placed a 6 [place], 26 cm double-J stent over the wire. I did use fluoroscopic guidance for the proximal positioning of the stent and cystoscopic guidance to see a curl in the bladder. I did drain the bladder and sent the urine for culture and activities. The procedure was then terminated. The plan for the patient is to return to the floor where she will be monitored closely.     Electronically signed by Dontae Wolff MD on 6/21/2021 at 1:19 PM

## 2021-06-22 ENCOUNTER — APPOINTMENT (OUTPATIENT)
Dept: CT IMAGING | Age: 71
DRG: 659 | End: 2021-06-22
Payer: MEDICARE

## 2021-06-22 LAB
-: NORMAL
ABSOLUTE BANDS #: 3.06 K/UL (ref 0–1)
ABSOLUTE EOS #: 0 K/UL (ref 0–0.4)
ABSOLUTE IMMATURE GRANULOCYTE: ABNORMAL K/UL (ref 0–0.3)
ABSOLUTE LYMPH #: 0.85 K/UL (ref 1–4.8)
ABSOLUTE MONO #: 0.85 K/UL (ref 0.1–1.3)
ALBUMIN SERPL-MCNC: 3.1 G/DL (ref 3.5–5.2)
ALBUMIN/GLOBULIN RATIO: ABNORMAL (ref 1–2.5)
ALP BLD-CCNC: 83 U/L (ref 35–104)
ALT SERPL-CCNC: 11 U/L (ref 5–33)
AMORPHOUS: NORMAL
ANCA MYELOPEROXIDASE: <0.3 AU/ML (ref 0–3.5)
ANCA PROTEINASE 3: <0.7 AU/ML (ref 0–2)
ANION GAP SERPL CALCULATED.3IONS-SCNC: 15 MMOL/L (ref 9–17)
ANTI DNA DOUBLE STRANDED: 1 IU/ML
ANTI-NUCLEAR ANTIBODY (ANA): NEGATIVE
AST SERPL-CCNC: 18 U/L
BACTERIA: NORMAL
BANDS: 18 % (ref 0–10)
BASOPHILS # BLD: 0 % (ref 0–2)
BASOPHILS ABSOLUTE: 0 K/UL (ref 0–0.2)
BILIRUB SERPL-MCNC: 0.22 MG/DL (ref 0.3–1.2)
BILIRUBIN URINE: NEGATIVE
BUN BLDV-MCNC: 59 MG/DL (ref 8–23)
BUN/CREAT BLD: ABNORMAL (ref 9–20)
CALCIUM SERPL-MCNC: 8.5 MG/DL (ref 8.6–10.4)
CASTS UA: NORMAL /LPF
CHLORIDE BLD-SCNC: 105 MMOL/L (ref 98–107)
CO2: 18 MMOL/L (ref 20–31)
COLOR: YELLOW
COMMENT UA: ABNORMAL
CREAT SERPL-MCNC: 5.73 MG/DL (ref 0.5–0.9)
CRYSTALS, UA: NORMAL /HPF
CULTURE: ABNORMAL
DIFFERENTIAL TYPE: ABNORMAL
ENA ANTIBODIES SCREEN: 0.1 U/ML
EOSINOPHILS RELATIVE PERCENT: 0 % (ref 0–4)
EPITHELIAL CELLS UA: NORMAL /HPF
GFR AFRICAN AMERICAN: 9 ML/MIN
GFR NON-AFRICAN AMERICAN: 7 ML/MIN
GFR SERPL CREATININE-BSD FRML MDRD: ABNORMAL ML/MIN/{1.73_M2}
GFR SERPL CREATININE-BSD FRML MDRD: ABNORMAL ML/MIN/{1.73_M2}
GLUCOSE BLD-MCNC: 102 MG/DL (ref 70–99)
GLUCOSE BLD-MCNC: 106 MG/DL (ref 65–105)
GLUCOSE BLD-MCNC: 116 MG/DL (ref 65–105)
GLUCOSE BLD-MCNC: 127 MG/DL (ref 65–105)
GLUCOSE BLD-MCNC: 133 MG/DL (ref 65–105)
GLUCOSE BLD-MCNC: 143 MG/DL (ref 65–105)
GLUCOSE URINE: NEGATIVE
HCT VFR BLD CALC: 30.8 % (ref 36–46)
HEMOGLOBIN: 9.9 G/DL (ref 12–16)
IMMATURE GRANULOCYTES: ABNORMAL %
KETONES, URINE: NEGATIVE
LEUKOCYTE ESTERASE, URINE: ABNORMAL
LYMPHOCYTES # BLD: 5 % (ref 24–44)
Lab: ABNORMAL
MCH RBC QN AUTO: 29 PG (ref 26–34)
MCHC RBC AUTO-ENTMCNC: 32.2 G/DL (ref 31–37)
MCV RBC AUTO: 89.9 FL (ref 80–100)
MONOCYTES # BLD: 5 % (ref 1–7)
MORPHOLOGY: ABNORMAL
MUCUS: NORMAL
NITRITE, URINE: NEGATIVE
NRBC AUTOMATED: ABNORMAL PER 100 WBC
OTHER OBSERVATIONS UA: NORMAL
PDW BLD-RTO: 13.6 % (ref 11.5–14.9)
PH UA: 6 (ref 5–8)
PLATELET # BLD: 145 K/UL (ref 150–450)
PLATELET ESTIMATE: ABNORMAL
PMV BLD AUTO: 9 FL (ref 6–12)
POTASSIUM SERPL-SCNC: 5.2 MMOL/L (ref 3.7–5.3)
POTASSIUM SERPL-SCNC: 5.3 MMOL/L (ref 3.7–5.3)
PROTEIN UA: ABNORMAL
RBC # BLD: 3.42 M/UL (ref 4–5.2)
RBC # BLD: ABNORMAL 10*6/UL
RBC UA: NORMAL /HPF
RENAL EPITHELIAL, UA: NORMAL /HPF
SEG NEUTROPHILS: 72 % (ref 36–66)
SEGMENTED NEUTROPHILS ABSOLUTE COUNT: 12.24 K/UL (ref 1.3–9.1)
SODIUM BLD-SCNC: 138 MMOL/L (ref 135–144)
SPECIFIC GRAVITY UA: 1.01 (ref 1–1.03)
SPECIMEN DESCRIPTION: ABNORMAL
TOTAL PROTEIN: 6.3 G/DL (ref 6.4–8.3)
TRICHOMONAS: NORMAL
TURBIDITY: ABNORMAL
URINE HGB: ABNORMAL
UROBILINOGEN, URINE: NORMAL
WBC # BLD: 17 K/UL (ref 3.5–11)
WBC # BLD: ABNORMAL 10*3/UL
WBC UA: NORMAL /HPF
YEAST: NORMAL

## 2021-06-22 PROCEDURE — 6360000002 HC RX W HCPCS: Performed by: UROLOGY

## 2021-06-22 PROCEDURE — 85025 COMPLETE CBC W/AUTO DIFF WBC: CPT

## 2021-06-22 PROCEDURE — 51702 INSERT TEMP BLADDER CATH: CPT

## 2021-06-22 PROCEDURE — 70450 CT HEAD/BRAIN W/O DYE: CPT

## 2021-06-22 PROCEDURE — 51798 US URINE CAPACITY MEASURE: CPT

## 2021-06-22 PROCEDURE — 97162 PT EVAL MOD COMPLEX 30 MIN: CPT

## 2021-06-22 PROCEDURE — 2500000003 HC RX 250 WO HCPCS: Performed by: INTERNAL MEDICINE

## 2021-06-22 PROCEDURE — 81001 URINALYSIS AUTO W/SCOPE: CPT

## 2021-06-22 PROCEDURE — 2580000003 HC RX 258: Performed by: UROLOGY

## 2021-06-22 PROCEDURE — 84132 ASSAY OF SERUM POTASSIUM: CPT

## 2021-06-22 PROCEDURE — 97110 THERAPEUTIC EXERCISES: CPT

## 2021-06-22 PROCEDURE — 2060000000 HC ICU INTERMEDIATE R&B

## 2021-06-22 PROCEDURE — 6370000000 HC RX 637 (ALT 250 FOR IP): Performed by: UROLOGY

## 2021-06-22 PROCEDURE — 80053 COMPREHEN METABOLIC PANEL: CPT

## 2021-06-22 PROCEDURE — 36415 COLL VENOUS BLD VENIPUNCTURE: CPT

## 2021-06-22 PROCEDURE — 6370000000 HC RX 637 (ALT 250 FOR IP): Performed by: INTERNAL MEDICINE

## 2021-06-22 PROCEDURE — 2580000003 HC RX 258: Performed by: INTERNAL MEDICINE

## 2021-06-22 RX ORDER — FERROUS SULFATE 325(65) MG
325 TABLET ORAL 2 TIMES DAILY WITH MEALS
Status: DISCONTINUED | OUTPATIENT
Start: 2021-06-22 | End: 2021-06-22

## 2021-06-22 RX ORDER — SODIUM POLYSTYRENE SULFONATE 4.1 MEQ/G
30 POWDER, FOR SUSPENSION ORAL; RECTAL ONCE
Status: COMPLETED | OUTPATIENT
Start: 2021-06-22 | End: 2021-06-22

## 2021-06-22 RX ORDER — SODIUM CHLORIDE 9 MG/ML
INJECTION, SOLUTION INTRAVENOUS CONTINUOUS
Status: DISCONTINUED | OUTPATIENT
Start: 2021-06-22 | End: 2021-06-22

## 2021-06-22 RX ORDER — HYDRALAZINE HYDROCHLORIDE 20 MG/ML
10 INJECTION INTRAMUSCULAR; INTRAVENOUS EVERY 6 HOURS PRN
Status: DISCONTINUED | OUTPATIENT
Start: 2021-06-22 | End: 2021-07-02 | Stop reason: HOSPADM

## 2021-06-22 RX ORDER — FERROUS SULFATE 325(65) MG
325 TABLET ORAL
Status: DISCONTINUED | OUTPATIENT
Start: 2021-06-23 | End: 2021-07-02 | Stop reason: HOSPADM

## 2021-06-22 RX ADMIN — ATORVASTATIN CALCIUM 10 MG: 10 TABLET, FILM COATED ORAL at 08:30

## 2021-06-22 RX ADMIN — ONDANSETRON 4 MG: 2 INJECTION INTRAMUSCULAR; INTRAVENOUS at 11:22

## 2021-06-22 RX ADMIN — BUSPIRONE HYDROCHLORIDE 15 MG: 15 TABLET ORAL at 20:55

## 2021-06-22 RX ADMIN — FLUOXETINE 10 MG: 10 CAPSULE ORAL at 08:29

## 2021-06-22 RX ADMIN — SODIUM CHLORIDE: 9 INJECTION, SOLUTION INTRAVENOUS at 08:23

## 2021-06-22 RX ADMIN — HEPARIN SODIUM 5000 UNITS: 5000 INJECTION INTRAVENOUS; SUBCUTANEOUS at 05:56

## 2021-06-22 RX ADMIN — SODIUM POLYSTYRENE SULFONATE 30 G: 1 POWDER ORAL; RECTAL at 10:09

## 2021-06-22 RX ADMIN — BUSPIRONE HYDROCHLORIDE 15 MG: 15 TABLET ORAL at 08:30

## 2021-06-22 RX ADMIN — PRAMIPEXOLE DIHYDROCHLORIDE 0.12 MG: 0.12 TABLET ORAL at 08:28

## 2021-06-22 RX ADMIN — METOPROLOL SUCCINATE 100 MG: 100 TABLET, EXTENDED RELEASE ORAL at 08:30

## 2021-06-22 RX ADMIN — CEFTRIAXONE SODIUM 1000 MG: 1 INJECTION, POWDER, FOR SOLUTION INTRAMUSCULAR; INTRAVENOUS at 11:23

## 2021-06-22 RX ADMIN — BACLOFEN 10 MG: 10 TABLET ORAL at 08:29

## 2021-06-22 RX ADMIN — PANTOPRAZOLE SODIUM 40 MG: 40 TABLET, DELAYED RELEASE ORAL at 05:56

## 2021-06-22 RX ADMIN — CYANOCOBALAMIN TAB 500 MCG 500 MCG: 500 TAB at 08:29

## 2021-06-22 RX ADMIN — LEVOTHYROXINE SODIUM 112 MCG: 0.11 TABLET ORAL at 08:28

## 2021-06-22 RX ADMIN — ASPIRIN 81 MG: 81 TABLET, COATED ORAL at 08:30

## 2021-06-22 RX ADMIN — INSULIN LISPRO 2 UNITS: 100 INJECTION, SOLUTION INTRAVENOUS; SUBCUTANEOUS at 12:35

## 2021-06-22 RX ADMIN — OXYBUTYNIN CHLORIDE 15 MG: 10 TABLET, EXTENDED RELEASE ORAL at 08:29

## 2021-06-22 RX ADMIN — SODIUM CHLORIDE, PRESERVATIVE FREE 10 ML: 5 INJECTION INTRAVENOUS at 20:55

## 2021-06-22 RX ADMIN — SODIUM BICARBONATE: 84 INJECTION, SOLUTION INTRAVENOUS at 14:38

## 2021-06-22 RX ADMIN — HEPARIN SODIUM 5000 UNITS: 5000 INJECTION INTRAVENOUS; SUBCUTANEOUS at 12:34

## 2021-06-22 RX ADMIN — BUPROPION HYDROCHLORIDE 300 MG: 300 TABLET, FILM COATED, EXTENDED RELEASE ORAL at 08:30

## 2021-06-22 RX ADMIN — ISOSORBIDE MONONITRATE 30 MG: 30 TABLET, EXTENDED RELEASE ORAL at 08:30

## 2021-06-22 RX ADMIN — HEPARIN SODIUM 5000 UNITS: 5000 INJECTION INTRAVENOUS; SUBCUTANEOUS at 21:28

## 2021-06-22 RX ADMIN — BACLOFEN 10 MG: 10 TABLET ORAL at 20:55

## 2021-06-22 RX ADMIN — SODIUM CHLORIDE, PRESERVATIVE FREE 10 ML: 5 INJECTION INTRAVENOUS at 08:32

## 2021-06-22 ASSESSMENT — PAIN DESCRIPTION - PAIN TYPE: TYPE: ACUTE PAIN

## 2021-06-22 ASSESSMENT — ENCOUNTER SYMPTOMS
COLOR CHANGE: 0
TROUBLE SWALLOWING: 0
BLOOD IN STOOL: 0
NAUSEA: 0
VOMITING: 0
COUGH: 0
SHORTNESS OF BREATH: 0
EYE REDNESS: 0
EYE ITCHING: 0
ABDOMINAL PAIN: 0
CHEST TIGHTNESS: 0

## 2021-06-22 ASSESSMENT — PAIN SCALES - GENERAL: PAINLEVEL_OUTOF10: 0

## 2021-06-22 ASSESSMENT — PAIN DESCRIPTION - ONSET: ONSET: ON-GOING

## 2021-06-22 ASSESSMENT — PAIN SCALES - WONG BAKER: WONGBAKER_NUMERICALRESPONSE: 2

## 2021-06-22 ASSESSMENT — PAIN DESCRIPTION - FREQUENCY: FREQUENCY: CONTINUOUS

## 2021-06-22 ASSESSMENT — PAIN DESCRIPTION - LOCATION: LOCATION: THROAT

## 2021-06-22 NOTE — ANESTHESIA POSTPROCEDURE EVALUATION
Department of Anesthesiology  Postprocedure Note    Patient: Teri Moss  MRN: [id number]  YOB: 1950  Date of evaluation: 6/22/2021  Time:  12:08 PM     Procedure Summary     Date: 06/21/21 Room / Location: [id number] [place] [address]: ARNULFO MATHEW    Anesthesia Start: 1257 Anesthesia Stop: 4564    Procedure: CYSTOSCOPY URETERAL STENT INSERTION (Left Ureter) Diagnosis: (HYDRONEPHROSIS, RENAL FAILURE)    Surgeons: Stone Tellez MD Responsible Provider: Tamra Feng MD    Anesthesia Type: general ASA Status: 3          Anesthesia Type: general    Ifrah Phase I: Ifrah Score: 8    Ifrah Phase II:      Last vitals: Reviewed and per EMR flowsheets. Anesthesia Post Evaluation    Comments: POD #1. Patient seen at bedside. No anesthesia complications reported.

## 2021-06-22 NOTE — PROGRESS NOTES
[facility]   OCCUPATIONAL THERAPY MISSED TREATMENT NOTE   INPATIENT   Date: 21  Patient Name: Uriah Mccallum       Room:   MRN: [id number]   Account #: [de-identified]    : 1950  (75 y.o.)  Gender: female                 REASON FOR MISSED TREATMENT:  Patient unable to participate   -    NEEDS EVAL 21 Attempted - did not awaken to spoken name      Kaleb Stokes OT

## 2021-06-22 NOTE — CARE COORDINATION
ONGOING DISCHARGE PLAN:    Patient is alert and oriented x4. Spoke with patient regarding discharge plan and patient confirms that plan is still to return to home w/ Her Son. Pt. Denies VNS. Pt. Had Cysto w/ Stent yesterday, w/ Urology. Pt. Cr today 5.73, Bun 59, K+ 5.3. Pt. Is refusing Dialysis. Bicarb GTT. Nephro continues to follow. WBC today 17.0. Remains on IV Rocephin. PT/OT on board, will follow for any rec. Will continue to follow for additional discharge needs.     Electronically signed by Robert Estevez RN on 6/22/2021 at 2:11 PM

## 2021-06-22 NOTE — PROGRESS NOTES
Progress Note    Patient Name:  Shama Click    :  1950 7:02 AM      SUBJECTIVE       Ms. Anais Otoole  has no chest pain, shortness of breath, palpitations, nausea or vomiting. Spoke to bedside RN, patient has been in the bathroom for 20 minutes. No events overnight, no cardiac complaints. OBJECTIVE     Vital signs:    BP (!) 108/45   Pulse 72   Temp 98.2 °F (36.8 °C) (Oral)   Resp 18   Ht 5' 5\" (1.651 m)   Wt 205 lb (93 kg)   LMP 1980   SpO2 90%   BMI 34.11 kg/m²  2 L/min      Admit Weight:  210 lb (95.3 kg)    Last 3 weights: Wt Readings from Last 3 Encounters:   21 205 lb (93 kg)   21 200 lb (90.7 kg)   21 202 lb (91.6 kg)       BMI: Body mass index is 34.11 kg/m². Input/Output:       Intake/Output Summary (Last 24 hours) at 2021 7288  Last data filed at 2021 0406  Gross per 24 hour   Intake 2568 ml   Output 520 ml   Net 2048 ml         Exam:     Unable to perform secondary to patient being in the bathroom during the time of my rounds. Laboratory Studies:     CBC:   Recent Labs     21  1146 21  0516 21  0515   WBC 8.2 29.4* 17.0*   HGB 12.5 9.9* 9.9*   HCT 38.6 31.0* 30.8*   MCV 89.9 89.9 89.9    170 145*     BMP:   Recent Labs     21  0516 21  0659 21  1123 21  1532 21  0515    137  --   --  138   K 5.7* 5.9* 5.5* 5.3 5.3    104  --   --  105   CO2 20 20  --   --  18*   BUN 52* 54*  --   --  59*   CREATININE 5.02* 5.02*  --   --  5.73*     PT/INR: No results for input(s): PROTIME, INR in the last 72 hours. APTT: No results for input(s): APTT in the last 72 hours. MAG: No results for input(s): MG in the last 72 hours. D Dimer: No results for input(s): DDIMER in the last 72 hours. Troponin  No results for input(s): TROPONINI in the last 72 hours. No results for input(s): TROPONINT in the last 72 hours.    BNP No results for input(s): BNP in the last 72 side no significant plaque/stenosis  -  Follows with vascular     Dyslipidemia. Stable from a cardiac standpoint at this time. Stable from a cardiovascular standpoint.

## 2021-06-22 NOTE — PROGRESS NOTES
surgical history (Right, 2014); Toe Osteotomy (Right, 2016); and Cystoscopy (Left, 2021). Restrictions  Restrictions/Precautions  Restrictions/Precautions: Fall Risk, Up as Tolerated (peripheral IV left forearm)  Required Braces or Orthoses?: No  Implants present? : Metal implants (cardiac stent, bilateral partial knees, cervical fusion)  Vision/Hearing  Vision: Impaired  Vision Exceptions: Wears glasses at all times  Hearing: Within functional limits     Subjective  General  Patient assessed for rehabilitation services?: Yes  Response To Previous Treatment: Not applicable  Family / Caregiver Present: No  Referring Practitioner: Dr. Destiny Landrum  Referral Date : 21  Diagnosis: acute infective cystitis  Follows Commands: Impaired (appears confused, slow to process and respond)  Other (Comment): OK per nurse Liam Miranda. to proceed w/ PT evaluation but reports that the patient is more confused today than yesterday  General Comment  Comments: pt had cystoscopy w/ ureteral insertion on 2021. Dr. Luis F Gonzalez visited w/ patient and has ordered a stat CT scan of the head due to increased confusion  Subjective  Subjective: Pt reports that she is hungry but appears to nod off. Therapist continued to often rewake pt up and reorient her. Pt had C/O left flank pain and urinary frequency on admit. Pain Screening  Patient Currently in Pain: Yes  Pain Assessment  Benavidez-Wise Pain Rating: Hurts a little bit  Patient's Stated Pain Goal: No pain  Pain Type: Acute pain  Pain Location: Throat  Pain Descriptors:  (pt would not answer question regarding description)  Pain Frequency: Continuous  Pain Onset: On-going  Non-Pharmaceutical Pain Intervention(s): Repositioned  Response to Pain Intervention: Drowsy; Confused  Multiple Pain Sites: No  Vital Signs  Patient Currently in Pain: Yes       Orientation  Orientation  Overall Orientation Status: Impaired (name stated correctly; - states no;  place: unable to state; cooperative. Ambulation  Ambulation?: No     Balance  Comments: Will need to be reassessed when more alert and cooperative. Exercises  Comments: PROM bilateral LEs and bilateral AROM for ankle pumps     Plan   Plan  Times per week: 5-7 treatments/ week  Times per day:  (5-7 treatments/ week)  Specific instructions for Next Treatment: Will need to be reassessed when more alert and cooperative. Current Treatment Recommendations: Strengthening, Safety Education & Training, ROM, Balance Training, Endurance Training, Patient/Caregiver Education & Training, Equipment Evaluation, Education, & procurement, Functional Mobility Training, Transfer Training, Gait Training, Positioning, Cognitive Reorientation  Safety Devices  Type of devices:  All fall risk precautions in place, Bed alarm in place, Call light within reach, Patient at risk for falls, Left in bed, Gait belt, Nurse notified (nurse Nevaeh Christianson)    G-Code       OutComes Score                                                  AM-PAC Score  AM-PAC Inpatient Mobility Raw Score : 6 (06/22/21 0847)  AM-PAC Inpatient T-Scale Score : 23.55 (06/22/21 0847)  Mobility Inpatient CMS 0-100% Score: 100 (06/22/21 0847)  Mobility Inpatient CMS G-Code Modifier : CN (06/22/21 0847)          Goals  Short term goals  Time Frame for Short term goals: 5-7 treatments/ week  Short term goal 1: pt to tolerate 1/2 hour of therapuetic exercise  Short term goal 2: pt to demonstrate good technique for LE strengthening, balance activities and energy conservation techniques  Short term goal 3: pt to demonstrate 3/5 strength or better for LEs  Short term goal 4: pt to demonstrate rolling in bed w/ min x 1 for position change  Short term goal 5: advance to dangling at the EOB w/ min x 1 and progress sitting tolerance to 5 minutes  Patient Goals   Patient goals : unable to state a goal       Therapy Time   Individual Concurrent Group Co-treatment   Time In 0847         Time Out 0916         Minutes 29 Timed Code Treatment Minutes: [facility], PT

## 2021-06-22 NOTE — PROGRESS NOTES
Department of Internal Medicine  Nephrology Chel Hodges MD   Consult Note    Reason for consultation: Management of acute kidney injury at nephrolithiasis. Consulting physician: Khadijah Melchor MD    Interval history: Patient was seen and examined today and she is increasingly forgetful and slightly disoriented. She does not appear to be in respiratory distress. Renal function continues to worsen and she has hyperkalemia today. I mentioned that she may need dialysis and she became emotional and did not want it. Stat CT scan of the brain did not show any acute bleed or infarct but showed chronic microvascular changes. History of presenting illness: This is a 70 y.o. female with a significant past medical history of Type 2 diabetes mellitus [diagnosed in 2015], Coronary artery disease [s/p PTCA/stents], hyperlipidemia, hypothyroidism, systemic hypertension and Chronic kidney disease stage III [baseline serum creatinine 1.40 mg/dL in November 2018], who presented to the emergency department yesterday with sudden onset of severe left flank pain associated with nausea and vomiting. She had chills but did not have fever. Laboratory studies at presentation revealed serum creatinine 2.7 mg/dL which increased overnight to 5 mg/dL associated with hyperkalemia and serum potassium 5.9 mmol/L.  CT scan of the abdomen and pelvis showed:  1. Left-sided hydroureter and hydronephrosis with associated asymmetrical perinephric and periureteral fat stranding without obstructing calculus noted. Findings likely represents a recently passed stone. 2. Nonobstructing calculi right kidney, largest measuring 5 mm. 3. Small hiatal hernia. 4. Stable ectasia infrarenal abdominal aortic aneurysm measuring 2.7 cm.      Scheduled Meds:   aspirin  81 mg Oral Daily    atorvastatin  10 mg Oral Daily    baclofen  10 mg Oral BID    buPROPion  300 mg Oral Daily    busPIRone  15 mg Oral BID    FLUoxetine  10 mg Oral Daily 06/21/21  0516 06/22/21  0515   WBC 8.2 29.4* 17.0*   HGB 12.5 9.9* 9.9*    170 145*     BMP:    Recent Labs     06/21/21  0516 06/21/21  0659 06/21/21  1123 06/21/21  1532 06/22/21  0515    137  --   --  138   K 5.7* 5.9* 5.5* 5.3 5.3    104  --   --  105   CO2 20 20  --   --  18*   BUN 52* 54*  --   --  59*   CREATININE 5.02* 5.02*  --   --  5.73*   GLUCOSE 112* 112*  --   --  102*       Lab Results   Component Value Date    NITRU NEGATIVE 06/21/2021    COLORU YELLOW 06/21/2021    PHUR 5.5 06/21/2021    WBCUA 50  06/21/2021    RBCUA 20 TO 50 06/21/2021    MUCUS NOT REPORTED 06/21/2021    TRICHOMONAS NOT REPORTED 06/21/2021    YEAST NOT REPORTED 06/21/2021    BACTERIA FEW 06/21/2021    SPECGRAV 1.000 06/21/2021    LEUKOCYTESUR LARGE 06/21/2021    UROBILINOGEN Normal 06/21/2021    BILIRUBINUR NEGATIVE 06/21/2021    GLUCOSEU NEGATIVE 06/21/2021    KETUA NEGATIVE 06/21/2021    AMORPHOUS NOT REPORTED 06/21/2021     Urine Creatinine:     Lab Results   Component Value Date    LABCREA 48.7 06/21/2021     IMPRESSION/RECOMMENDATIONS:      1. Acute kidney injury - Most consistent with obstructive nephropathy secondary to kidney stone. She underwent cystoscopy with left ureteral stent insertion 6/21/2021. She is nonoliguric but there has been no improvement in renal function. Plan: Change IV fluid to bicarbonate drip at 125 mL/h. Patient has indications for initiation of dialysis but is refusing at this time and will observe for the last 24 hours. Strict input and output documentation. Avoid nephrotoxic agents. Basic metabolic profile daily. 2.  Nephrolithiasis - Patient with family history [sister]. Will perform metabolic stone evaluation as outpatient since this is patient's first episode of kidney stone. 3.  Systemic hypertension - Blood pressure is adequately controlled.     4.  Hyperkalemia - Secondary to impaired potassium elimination due to reduced GFR in the setting of use of angiotensin receptor blocker. Continue to hold angiotensin receptor blocker, losartan. 50% dextrose followed by IV insulin. Calcium gluconate 1 g IV for cardiac membrane stabilization. Recheck serum potassium in 4 hours. 5.  Non-anion gap metabolic acidosis - secondary to uremia. Will place on bicarbonate drip. Prognosis is guarded.     Abdelrahman Bernal MD FACP  Attending Nephrologist  6/22/2021 1:24 PM

## 2021-06-22 NOTE — PROGRESS NOTES
Physician Progress Note      Camila Patel  CSN #:                  [id number]  :                       1950  ADMIT DATE:       2021 10:18 AM  DISCH DATE:  RESPONDING  PROVIDER #:        Sabas Del Angel MD          QUERY TEXT:    Pt admitted with acute cystitis. Pt noted to have elevated WBC and Lactic   Acid. If possible, please document in the progress notes and discharge summary   if you are evaluating and /or treating any of the following: The medical record reflects the following:  Risk Factors: 72yo  Clinical Indicators: UA+ nitrites, large LE, moderate bacteria; UC+   ESCHERICHIA COLI >277622 CFU/ML; WBC 8.2-->29.4; LA 2.8; Creat 2.7-->5.02; CT   ABD PELVIS-->L side hydroureter and hydronephrosis with associated   asymmetrical perinephric and periureteral fat stranding without obstructing   calculus noted, likely recently passed stone, non obstructing calculi R kidney  Treatment: 1L NS Bolus, Maintenance IVF @ 100ml/hr, IV Rocephin, monitoring,   OR for L ureter stent placement, urology consult, hospital admission  Options provided:  -- Sepsis, present on admission  -- Acute cystitis without Sepsis  -- Sepsis was ruled out  -- Other - I will add my own diagnosis  -- Disagree - Not applicable / Not valid  -- Disagree - Clinically unable to determine / Unknown  -- Refer to Clinical Documentation Reviewer    PROVIDER RESPONSE TEXT:    This patient has acute cystitis without Sepsis.     Query created by: Chelsey Hays on 2021 3:26 PM      Electronically signed by:  Sabas Del Angel MD 2021 3:31 PM

## 2021-06-22 NOTE — PROGRESS NOTES
Progress Note    6/22/2021   2:56 PM    Name:  Mona Peñaloza  MRN:    [id number]     Acct:     [de-identified]   Room:  2114/2114-01   Day: 2     Admit Date: 6/20/2021 10:18 AM  PCP: Itz Coffey DO    Subjective:     C/C:   Chief Complaint   Patient presents with    Flank Pain    Urinary Frequency       Interval History: Status: not changed. Patient had left ureteral stent placement yesterday. per nursing report patient patient had intermittent confusion this morning. Currently patient is alert oriented x3. No reports of vomiting chest pain or shortness of breath. left flank pain is well controlled. Patient is tolerating clear liquid fluids. Vital signs reviewed. Recent labs reviewed creatinine elevated at 5.73, hemoglobin 9.9, WBC 17    ROS:   all 10 systems reviewed and are negative except as noted    Review of Systems   Constitutional: Negative for chills and fatigue. HENT: Negative for drooling, mouth sores, sneezing and trouble swallowing. Eyes: Negative for redness and itching. Respiratory: Negative for cough, chest tightness and shortness of breath. Cardiovascular: Negative for chest pain, palpitations and leg swelling. Gastrointestinal: Negative for abdominal pain, blood in stool, nausea and vomiting. Endocrine: Negative for heat intolerance and polyphagia. Genitourinary: Negative for difficulty urinating, flank pain and pelvic pain. Musculoskeletal: Negative for arthralgias, joint swelling and neck stiffness. Skin: Negative for color change and pallor. Allergic/Immunologic: Negative for food allergies. Neurological: Negative for dizziness, seizures and headaches. Hematological: Does not bruise/bleed easily. Psychiatric/Behavioral: Negative for agitation, behavioral problems, confusion and suicidal ideas. The patient is not hyperactive. Medications: Allergies:    Allergies   Allergen Reactions    Pcn [Penicillins] Swelling and Hives    Heparin Other (See Comments)     MIGRAINE      Lamotrigine Other (See Comments), Itching, Nausea Only and Rash    Cyclobenzaprine      Dry mouth, jitters    Heparin (Porcine)      Other reaction(s): Migraine       Current Meds: sodium bicarbonate 75 mEq in sodium chloride 0.45 % 1,000 mL infusion, Continuous  hydrALAZINE (APRESOLINE) injection 10 mg, Q6H PRN  [START ON 6/23/2021] ferrous sulfate (IRON 325) tablet 325 mg, Daily with breakfast  oxyCODONE-acetaminophen (PERCOCET) 5-325 MG per tablet 1 tablet, Q6H PRN  albuterol sulfate  (90 Base) MCG/ACT inhaler 2 puff, Q6H PRN  ALPRAZolam (XANAX) tablet 1 mg, Daily PRN  aspirin EC tablet 81 mg, Daily  atorvastatin (LIPITOR) tablet 10 mg, Daily  baclofen (LIORESAL) tablet 10 mg, BID  buPROPion (WELLBUTRIN XL) extended release tablet 300 mg, Daily  busPIRone (BUSPAR) tablet 15 mg, BID  FLUoxetine (PROZAC) capsule 10 mg, Daily  isosorbide mononitrate (IMDUR) extended release tablet 30 mg, Daily  levothyroxine (SYNTHROID) tablet 112 mcg, Daily  [Held by provider] losartan (COZAAR) tablet 50 mg, Daily  metoprolol succinate (TOPROL XL) extended release tablet 100 mg, Daily  oxybutynin (DITROPAN-XL) extended release tablet 15 mg, Daily  pantoprazole (PROTONIX) tablet 40 mg, QAM AC  pramipexole (MIRAPEX) tablet 0.125 mg, Daily  vitamin B-12 (CYANOCOBALAMIN) tablet 500 mcg, Daily  insulin lispro (HUMALOG) injection vial 0-12 Units, TID WC  insulin lispro (HUMALOG) injection vial 0-6 Units, Nightly  glucose (GLUTOSE) 40 % oral gel 15 g, PRN  dextrose 50 % IV solution, PRN  glucagon (rDNA) injection 1 mg, PRN  dextrose 5 % solution, PRN  sodium chloride flush 0.9 % injection 10 mL, 2 times per day  sodium chloride flush 0.9 % injection 10 mL, PRN  0.9 % sodium chloride infusion, PRN  magnesium sulfate 1000 mg in dextrose 5% 100 mL IVPB, PRN  ondansetron (ZOFRAN-ODT) disintegrating tablet 4 mg, Q8H PRN   Or  ondansetron (ZOFRAN) injection 4 mg, Q6H PRN  magnesium hydroxide (MILK OF MAGNESIA) 400 MG/5ML suspension 30 mL, Daily PRN  acetaminophen (TYLENOL) tablet 650 mg, Q6H PRN   Or  acetaminophen (TYLENOL) suppository 650 mg, Q6H PRN  morphine sulfate (PF) injection 2 mg, Q4H PRN  heparin (porcine) injection 5,000 Units, 3 times per day  cefTRIAXone (ROCEPHIN) 1000 mg IVPB in 50 mL D5W minibag, Q24H        Data:     Code Status:  Full Code    Family History   Problem Relation Age of Onset    Breast Cancer Mother         BREAST WITH METS T  LIVER AND LUNG    Other Father         AAA    Heart Disease Father     Asthma Sister     Diabetes Sister         NIDDM    Stroke Brother     Diabetes Brother         NIDDM    Stroke Maternal Grandmother     Asthma Son        Social History     Socioeconomic History    Marital status:      Spouse name: Not on file    Number of children: 1    Years of education: Not on file    Highest education level: Not on file   Occupational History    Not on file   Tobacco Use    Smoking status: Former Smoker     Packs/day: 1.00     Years: 30.00     Pack years: 30.00     Types: Cigarettes     Quit date: 3/19/2004     Years since quittin.2    Smokeless tobacco: Never Used   Vaping Use    Vaping Use: Never used   Substance and Sexual Activity    Alcohol use: Yes     Comment: socially, once a year    Drug use: No    Sexual activity: Not Currently   Other Topics Concern    Not on file   Social History Narrative    Not on file     Social Determinants of Health     Financial Resource Strain:     Difficulty of Paying Living Expenses:    Food Insecurity:     Worried About Running Out of Food in the Last Year:     [address] in the Last Year:    Transportation Needs:     Lack of Transportation (Medical):      Lack of Transportation (Non-Medical):    Physical Activity:     Days of Exercise per Week:     Minutes of Exercise per Session:    Stress:     Feeling of Stress :    Social Connections:     Frequency of Communication with Friends and Family:     Frequency of Social Gatherings with Friends and Family:     Attends [employer] Services:     Active Member of Clubs or Organizations:     Attends Club or Organization Meetings:     Marital Status:    Intimate Partner Violence:     Fear of Current or Ex-Partner:     Emotionally Abused:     Physically Abused:     Sexually Abused:        I/O (24Hr): Intake/Output Summary (Last 24 hours) at 6/22/2021 1456  Last data filed at 6/22/2021 1211  Gross per 24 hour   Intake 2043 ml   Output 1020 ml   Net 1023 ml     Radiology:  CT ABDOMEN PELVIS WO CONTRAST Additional Contrast? None    Result Date: 6/20/2021  1. Left-sided hydroureter and hydronephrosis with associated asymmetrical perinephric and periureteral fat stranding without obstructing calculus noted. Findings likely represents a recently passed stone. 2. Nonobstructing calculi right kidney, largest measuring 5 mm. 3. Small hiatal hernia. 4. Stable ectasia infrarenal abdominal aortic aneurysm measuring 2.7 cm. CT HEAD WO CONTRAST    Result Date: 6/22/2021  No acute intracranial abnormality. Senescent changes including chronic microvascular change and atherosclerotic disease of the major intracranial vessels.        Labs:  Recent Results (from the past 24 hour(s))   K (Potassium)    Collection Time: 06/21/21  3:32 PM   Result Value Ref Range    Potassium 5.3 3.7 - 5.3 mmol/L   CK    Collection Time: 06/21/21  3:32 PM   Result Value Ref Range    Total  26 - 192 U/L   PTH, INTACT WITH IONIZED CALCIUM    Collection Time: 06/21/21  3:32 PM   Result Value Ref Range    Pth Intact 65.40 (H) 15.0 - 65.0 pg/mL    Calcium, Ion 1.23 1.13 - 1.33 mmol/L   C4 COMPLEMENT    Collection Time: 06/21/21  3:32 PM   Result Value Ref Range    Complement C4 46 (H) 10 - 40 mg/dL   C3 COMPLEMENT    Collection Time: 06/21/21  3:32 PM   Result Value Ref Range    Complement C3 139 90 - 180 mg/dL   Urinalysis Reflex to Culture    Collection Time: 06/21/21  3:47 PM    Specimen: Urine, Cystoscopic   Result Value Ref Range    Color, UA YELLOW YELLOW    Turbidity UA CLOUDY (A) CLEAR    Glucose, Ur NEGATIVE NEGATIVE    Bilirubin Urine NEGATIVE NEGATIVE    Ketones, Urine NEGATIVE NEGATIVE    Specific Gravity, UA 1.000 1.000 - 1.030    Urine Hgb LARGE (A) NEGATIVE    pH, UA 5.5 5.0 - 8.0    Protein, UA NEGATIVE NEGATIVE    Urobilinogen, Urine Normal Normal    Nitrite, Urine NEGATIVE NEGATIVE    Leukocyte Esterase, Urine LARGE (A) NEGATIVE    Urinalysis Comments NOT REPORTED    Microscopic Urinalysis    Collection Time: 06/21/21  3:47 PM   Result Value Ref Range    -          WBC, UA 50  /HPF    RBC, UA 20 TO 50 /HPF    Casts UA NOT REPORTED /LPF    Crystals, UA NOT REPORTED None /HPF    Epithelial Cells UA 0 TO 2 /HPF    Renal Epithelial, UA NOT REPORTED 0 /HPF    Bacteria, UA FEW (A) None    Mucus, UA NOT REPORTED None    Trichomonas, UA NOT REPORTED None    Amorphous, UA NOT REPORTED None    Other Observations UA NOT REPORTED NOT REQ.     Yeast, UA NOT REPORTED None   POC Glucose Fingerstick    Collection Time: 06/21/21  4:12 PM   Result Value Ref Range    POC Glucose 97 65 - 105 mg/dL   Protein / Creatinine Ratio, Urine    Collection Time: 06/21/21  6:35 PM   Result Value Ref Range    Total Protein, Urine 112 mg/dL    Creatinine, Ur 48.7 28.0 - 217.0 mg/dL    Urine Total Protein Creatinine Ratio 2.30 (H) 0.00 - 0.20   SODIUM, URINE, RANDOM    Collection Time: 06/21/21  6:35 PM   Result Value Ref Range    Sodium,Ur 59 mmol/L   EOSINOPHILS, URINE    Collection Time: 06/21/21  6:35 PM   Result Value Ref Range    Eosinophil, Ur FEW    CHLORIDE, URINE, RANDOM    Collection Time: 06/21/21  6:35 PM   Result Value Ref Range    Chloride, Ur 47 mmol/L   POC Glucose Fingerstick    Collection Time: 06/21/21  8:29 PM   Result Value Ref Range    POC Glucose 75 65 - 105 mg/dL   CBC with DIFF    Collection Time: 06/22/21  5:15 AM   Result Value Ref Range    WBC 17.0 (H) 3.5 - 11.0 k/uL RBC 3.42 (L) 4.0 - 5.2 m/uL    Hemoglobin 9.9 (L) 12.0 - 16.0 g/dL    Hematocrit 30.8 (L) 36 - 46 %    MCV 89.9 80 - 100 fL    MCH 29.0 26 - 34 pg    MCHC 32.2 31 - 37 g/dL    RDW 13.6 11.5 - 14.9 %    Platelets 941 (L) 032 - 450 k/uL    MPV 9.0 6.0 - 12.0 fL    NRBC Automated NOT REPORTED per 100 WBC    Differential Type NOT REPORTED     Immature Granulocytes NOT REPORTED 0 %    Absolute Immature Granulocyte NOT REPORTED 0.00 - 0.30 k/uL    WBC Morphology NOT REPORTED     RBC Morphology NOT REPORTED     Platelet Estimate NOT REPORTED     Seg Neutrophils 72 (H) 36 - 66 %    Lymphocytes 5 (L) 24 - 44 %    Monocytes 5 1 - 7 %    Eosinophils % 0 0 - 4 %    Basophils 0 0 - 2 %    Bands 18 (H) 0 - 10 %    Segs Absolute 12.24 (H) 1.3 - 9.1 k/uL    Absolute Lymph # 0.85 (L) 1.0 - 4.8 k/uL    Absolute Mono # 0.85 0.1 - 1.3 k/uL    Absolute Eos # 0.00 0.0 - 0.4 k/uL    Basophils Absolute 0.00 0.0 - 0.2 k/uL    Absolute Bands # 3.06 (H) 0.0 - 1.0 k/uL    Morphology FEW POLYCHROMASIA    Comprehensive Metabolic Panel w/ Reflex to MG    Collection Time: 06/22/21  5:15 AM   Result Value Ref Range    Glucose 102 (H) 70 - 99 mg/dL    BUN 59 (H) 8 - 23 mg/dL    CREATININE 5.73 (HH) 0.50 - 0.90 mg/dL    Bun/Cre Ratio NOT REPORTED 9 - 20    Calcium 8.5 (L) 8.6 - 10.4 mg/dL    Sodium 138 135 - 144 mmol/L    Potassium 5.3 3.7 - 5.3 mmol/L    Chloride 105 98 - 107 mmol/L    CO2 18 (L) 20 - 31 mmol/L    Anion Gap 15 9 - 17 mmol/L    Alkaline Phosphatase 83 35 - 104 U/L    ALT 11 5 - 33 U/L    AST 18 <32 U/L    Total Bilirubin 0.22 (L) 0.3 - 1.2 mg/dL    Total Protein 6.3 (L) 6.4 - 8.3 g/dL    Albumin 3.1 (L) 3.5 - 5.2 g/dL    Albumin/Globulin Ratio NOT REPORTED 1.0 - 2.5    GFR Non-African American 7 (L) >60 mL/min    GFR  9 (L) >60 mL/min    GFR Comment          GFR Staging NOT REPORTED    POC Glucose Fingerstick    Collection Time: 06/22/21  7:01 AM   Result Value Ref Range    POC Glucose 127 (H) 65 - 105 mg/dL   POC Glucose Fingerstick    Collection Time: 21 11:32 AM   Result Value Ref Range    POC Glucose 143 (H) 65 - 105 mg/dL       Physical Examination:        Vitals:  BP (!) 155/67   Pulse 76   Temp 98 °F (36.7 °C) (Oral)   Resp 18   Ht 5' 5\" (1.651 m)   Wt 205 lb (93 kg)   LMP 1980   SpO2 95%   BMI 34.11 kg/m²   Temp (24hrs), Av °F (36.7 °C), Min:97.2 °F (36.2 °C), Max:98.3 °F (36.8 °C)    Recent Labs     21  1612 21  2029 21  0701 21  1132   POCGLU 97 75 127* 143*         Physical Exam  Vitals reviewed. HENT:      Head: Normocephalic. Right Ear: External ear normal.      Left Ear: External ear normal.      Nose: Nose normal.      Mouth/Throat:      Mouth: Mucous membranes are moist.      Pharynx: Oropharynx is clear. Eyes:      Conjunctiva/sclera: Conjunctivae normal.   Cardiovascular:      Rate and Rhythm: Normal rate and regular rhythm. Pulses: Normal pulses. Heart sounds: Normal heart sounds. Pulmonary:      Effort: Pulmonary effort is normal.      Breath sounds: Normal breath sounds. Abdominal:      General: Bowel sounds are normal.      Palpations: Abdomen is soft. Tenderness: There is no left CVA tenderness. Musculoskeletal:         General: No deformity. Cervical back: Normal range of motion and neck supple. Right lower leg: No edema. Left lower leg: No edema. Skin:     General: Skin is warm. Capillary Refill: Capillary refill takes less than 2 seconds. Coloration: Skin is not jaundiced. Neurological:      General: No focal deficit present. Mental Status: She is alert. Mental status is at baseline.    Psychiatric:         Mood and Affect: Mood normal.         Behavior: Behavior normal.         Assessment:        Primary Problem  Acute cystitis without hematuria     Principal Problem:    Acute cystitis without hematuria  Active Problems:    WERO (acute kidney injury) ([facility] [address]    Hydronephrosis of left kidney Hydroureter, left    Type 2 diabetes mellitus, without long-term current use of insulin (HCC)    Essential hypertension    Hypothyroidism    Acute infective cystitis  Resolved Problems:    * No resolved hospital problems. *      Past Medical History:   Diagnosis Date    Aortic valve stenosis     mild per chart    Arthritis     CAD (coronary artery disease) 2000    1 STENT    Diabetes mellitus ([address]     Heart murmur     1962    Hyperlipidemia 2004    ON RX    MI, old     states many and they were mild    Pulmonary stenosis 1995    Pulmonary valve stenosis     mild/congenital per chart    Sciatica     right leg    Thyroid disease 2004    HYPOTHYROIDISM ON RX    Wears glasses         Plan:        1. Bicarb drip at 125 mL/h  2. CT head report reviewed and does not show any acute changes. 3. Monitor CMP  1. On Lopressor and Imdur  2. Start hydralazine IV 10 mg every 6 hours as needed systolic blood pressure more than 160.  3. Nephrology input noted  4. Urine culture positive. Continue Rocephin 1 g daily IV  5. Blood culture pending  6. Advance diet as tolerated. 7. KUB report reviewed and unremarkable. 8. Start ferrous sulfate 325 mg p.o. daily  9. DVT Prophylaxis Heparin subcu  10. EPCs  11. PT/OT to evaluate and treat  12. Pain control  13. Replace electrolytes as per sliding scale  14. Home medications reviewed and appropriate medications continued  15.  Reviewed labs and imaging studies from last 24 hours and results explained to patient      Electronically signed by Juan Cochran MD

## 2021-06-23 ENCOUNTER — APPOINTMENT (OUTPATIENT)
Dept: GENERAL RADIOLOGY | Age: 71
DRG: 659 | End: 2021-06-23
Payer: MEDICARE

## 2021-06-23 LAB
ABSOLUTE EOS #: 0.1 K/UL (ref 0–0.4)
ABSOLUTE IMMATURE GRANULOCYTE: ABNORMAL K/UL (ref 0–0.3)
ABSOLUTE LYMPH #: 0.8 K/UL (ref 1–4.8)
ABSOLUTE MONO #: 0.6 K/UL (ref 0.1–1.3)
ALBUMIN SERPL-MCNC: 3.1 G/DL (ref 3.5–5.2)
ALBUMIN/GLOBULIN RATIO: ABNORMAL (ref 1–2.5)
ALP BLD-CCNC: 87 U/L (ref 35–104)
ALT SERPL-CCNC: 11 U/L (ref 5–33)
ANION GAP SERPL CALCULATED.3IONS-SCNC: 16 MMOL/L (ref 9–17)
AST SERPL-CCNC: 14 U/L
BASOPHILS # BLD: 0 % (ref 0–2)
BASOPHILS ABSOLUTE: 0 K/UL (ref 0–0.2)
BILIRUB SERPL-MCNC: 0.22 MG/DL (ref 0.3–1.2)
BUN BLDV-MCNC: 55 MG/DL (ref 8–23)
BUN/CREAT BLD: ABNORMAL (ref 9–20)
CALCIUM SERPL-MCNC: 8.8 MG/DL (ref 8.6–10.4)
CHLORIDE BLD-SCNC: 104 MMOL/L (ref 98–107)
CO2: 21 MMOL/L (ref 20–31)
CREAT SERPL-MCNC: 5.24 MG/DL (ref 0.5–0.9)
CULTURE: ABNORMAL
DIFFERENTIAL TYPE: ABNORMAL
EOSINOPHILS RELATIVE PERCENT: 1 % (ref 0–4)
GFR AFRICAN AMERICAN: 10 ML/MIN
GFR NON-AFRICAN AMERICAN: 8 ML/MIN
GFR SERPL CREATININE-BSD FRML MDRD: ABNORMAL ML/MIN/{1.73_M2}
GFR SERPL CREATININE-BSD FRML MDRD: ABNORMAL ML/MIN/{1.73_M2}
GLUCOSE BLD-MCNC: 137 MG/DL (ref 65–105)
GLUCOSE BLD-MCNC: 145 MG/DL (ref 65–105)
GLUCOSE BLD-MCNC: 148 MG/DL (ref 70–99)
GLUCOSE BLD-MCNC: 150 MG/DL (ref 65–105)
GLUCOSE BLD-MCNC: 168 MG/DL (ref 65–105)
GLUCOSE BLD-MCNC: 193 MG/DL (ref 65–105)
HCT VFR BLD CALC: 29.2 % (ref 36–46)
HEMOGLOBIN: 9.5 G/DL (ref 12–16)
IMMATURE GRANULOCYTES: ABNORMAL %
LV EF: 68 %
LVEF MODALITY: NORMAL
LYMPHOCYTES # BLD: 7 % (ref 24–44)
Lab: ABNORMAL
MCH RBC QN AUTO: 29.3 PG (ref 26–34)
MCHC RBC AUTO-ENTMCNC: 32.7 G/DL (ref 31–37)
MCV RBC AUTO: 89.7 FL (ref 80–100)
MONOCYTES # BLD: 5 % (ref 1–7)
NRBC AUTOMATED: ABNORMAL PER 100 WBC
PDW BLD-RTO: 13.6 % (ref 11.5–14.9)
PLATELET # BLD: 165 K/UL (ref 150–450)
PLATELET ESTIMATE: ABNORMAL
PMV BLD AUTO: 9 FL (ref 6–12)
POTASSIUM SERPL-SCNC: 4.9 MMOL/L (ref 3.7–5.3)
RBC # BLD: 3.25 M/UL (ref 4–5.2)
RBC # BLD: ABNORMAL 10*6/UL
SEG NEUTROPHILS: 87 % (ref 36–66)
SEGMENTED NEUTROPHILS ABSOLUTE COUNT: 10.7 K/UL (ref 1.3–9.1)
SODIUM BLD-SCNC: 141 MMOL/L (ref 135–144)
SPECIMEN DESCRIPTION: ABNORMAL
TOTAL PROTEIN: 6.3 G/DL (ref 6.4–8.3)
WBC # BLD: 12.2 K/UL (ref 3.5–11)
WBC # BLD: ABNORMAL 10*3/UL

## 2021-06-23 PROCEDURE — 71045 X-RAY EXAM CHEST 1 VIEW: CPT

## 2021-06-23 PROCEDURE — 2060000000 HC ICU INTERMEDIATE R&B

## 2021-06-23 PROCEDURE — 2580000003 HC RX 258: Performed by: UROLOGY

## 2021-06-23 PROCEDURE — 80053 COMPREHEN METABOLIC PANEL: CPT

## 2021-06-23 PROCEDURE — 36556 INSERT NON-TUNNEL CV CATH: CPT

## 2021-06-23 PROCEDURE — 6360000002 HC RX W HCPCS: Performed by: UROLOGY

## 2021-06-23 PROCEDURE — 82947 ASSAY GLUCOSE BLOOD QUANT: CPT

## 2021-06-23 PROCEDURE — 6370000000 HC RX 637 (ALT 250 FOR IP): Performed by: INTERNAL MEDICINE

## 2021-06-23 PROCEDURE — 6370000000 HC RX 637 (ALT 250 FOR IP): Performed by: UROLOGY

## 2021-06-23 PROCEDURE — 2500000003 HC RX 250 WO HCPCS: Performed by: INTERNAL MEDICINE

## 2021-06-23 PROCEDURE — 6360000002 HC RX W HCPCS: Performed by: FAMILY MEDICINE

## 2021-06-23 PROCEDURE — 93306 TTE W/DOPPLER COMPLETE: CPT

## 2021-06-23 PROCEDURE — 85025 COMPLETE CBC W/AUTO DIFF WBC: CPT

## 2021-06-23 PROCEDURE — 05HM33Z INSERTION OF INFUSION DEVICE INTO RIGHT INTERNAL JUGULAR VEIN, PERCUTANEOUS APPROACH: ICD-10-PCS | Performed by: SURGERY

## 2021-06-23 PROCEDURE — 36415 COLL VENOUS BLD VENIPUNCTURE: CPT

## 2021-06-23 PROCEDURE — 6370000000 HC RX 637 (ALT 250 FOR IP): Performed by: FAMILY MEDICINE

## 2021-06-23 PROCEDURE — 2580000003 HC RX 258: Performed by: INTERNAL MEDICINE

## 2021-06-23 PROCEDURE — 6360000002 HC RX W HCPCS: Performed by: INTERNAL MEDICINE

## 2021-06-23 RX ORDER — AMLODIPINE BESYLATE 5 MG/1
5 TABLET ORAL ONCE
Status: COMPLETED | OUTPATIENT
Start: 2021-06-23 | End: 2021-06-23

## 2021-06-23 RX ORDER — AMLODIPINE BESYLATE 5 MG/1
5 TABLET ORAL DAILY
Status: DISCONTINUED | OUTPATIENT
Start: 2021-06-23 | End: 2021-06-24

## 2021-06-23 RX ORDER — HYDRALAZINE HYDROCHLORIDE 20 MG/ML
10 INJECTION INTRAMUSCULAR; INTRAVENOUS EVERY 6 HOURS
Status: DISCONTINUED | OUTPATIENT
Start: 2021-06-23 | End: 2021-07-01

## 2021-06-23 RX ORDER — SODIUM CHLORIDE 450 MG/100ML
INJECTION, SOLUTION INTRAVENOUS CONTINUOUS
Status: DISCONTINUED | OUTPATIENT
Start: 2021-06-23 | End: 2021-06-24

## 2021-06-23 RX ADMIN — HYDRALAZINE HYDROCHLORIDE 10 MG: 20 INJECTION INTRAMUSCULAR; INTRAVENOUS at 21:35

## 2021-06-23 RX ADMIN — FLUOXETINE 10 MG: 10 CAPSULE ORAL at 08:20

## 2021-06-23 RX ADMIN — ATORVASTATIN CALCIUM 10 MG: 10 TABLET, FILM COATED ORAL at 08:17

## 2021-06-23 RX ADMIN — HYDRALAZINE HYDROCHLORIDE 10 MG: 20 INJECTION INTRAMUSCULAR; INTRAVENOUS at 23:20

## 2021-06-23 RX ADMIN — HEPARIN SODIUM 5000 UNITS: 5000 INJECTION INTRAVENOUS; SUBCUTANEOUS at 05:25

## 2021-06-23 RX ADMIN — HYDRALAZINE HYDROCHLORIDE 10 MG: 20 INJECTION INTRAMUSCULAR; INTRAVENOUS at 00:40

## 2021-06-23 RX ADMIN — HEPARIN SODIUM 5000 UNITS: 5000 INJECTION INTRAVENOUS; SUBCUTANEOUS at 21:35

## 2021-06-23 RX ADMIN — SODIUM BICARBONATE: 84 INJECTION, SOLUTION INTRAVENOUS at 02:28

## 2021-06-23 RX ADMIN — SODIUM CHLORIDE: 4.5 INJECTION, SOLUTION INTRAVENOUS at 12:25

## 2021-06-23 RX ADMIN — BUPROPION HYDROCHLORIDE 300 MG: 300 TABLET, FILM COATED, EXTENDED RELEASE ORAL at 08:16

## 2021-06-23 RX ADMIN — AMLODIPINE BESYLATE 5 MG: 5 TABLET ORAL at 12:24

## 2021-06-23 RX ADMIN — FERROUS SULFATE TAB 325 MG (65 MG ELEMENTAL FE) 325 MG: 325 (65 FE) TAB at 08:17

## 2021-06-23 RX ADMIN — SODIUM CHLORIDE, PRESERVATIVE FREE 10 ML: 5 INJECTION INTRAVENOUS at 21:38

## 2021-06-23 RX ADMIN — AMLODIPINE BESYLATE 5 MG: 5 TABLET ORAL at 17:09

## 2021-06-23 RX ADMIN — INSULIN LISPRO 2 UNITS: 100 INJECTION, SOLUTION INTRAVENOUS; SUBCUTANEOUS at 08:21

## 2021-06-23 RX ADMIN — HEPARIN SODIUM 5000 UNITS: 5000 INJECTION INTRAVENOUS; SUBCUTANEOUS at 14:31

## 2021-06-23 RX ADMIN — LEVOTHYROXINE SODIUM 112 MCG: 0.11 TABLET ORAL at 05:25

## 2021-06-23 RX ADMIN — ISOSORBIDE MONONITRATE 30 MG: 30 TABLET, EXTENDED RELEASE ORAL at 08:17

## 2021-06-23 RX ADMIN — PRAMIPEXOLE DIHYDROCHLORIDE 0.12 MG: 0.12 TABLET ORAL at 08:21

## 2021-06-23 RX ADMIN — BACLOFEN 10 MG: 10 TABLET ORAL at 08:20

## 2021-06-23 RX ADMIN — METOPROLOL SUCCINATE 100 MG: 100 TABLET, EXTENDED RELEASE ORAL at 08:16

## 2021-06-23 RX ADMIN — CEFTRIAXONE SODIUM 1000 MG: 1 INJECTION, POWDER, FOR SOLUTION INTRAMUSCULAR; INTRAVENOUS at 14:31

## 2021-06-23 RX ADMIN — PANTOPRAZOLE SODIUM 40 MG: 40 TABLET, DELAYED RELEASE ORAL at 05:24

## 2021-06-23 RX ADMIN — HYDRALAZINE HYDROCHLORIDE 10 MG: 20 INJECTION INTRAMUSCULAR; INTRAVENOUS at 14:33

## 2021-06-23 RX ADMIN — BUSPIRONE HYDROCHLORIDE 15 MG: 15 TABLET ORAL at 08:17

## 2021-06-23 RX ADMIN — OXYBUTYNIN CHLORIDE 15 MG: 10 TABLET, EXTENDED RELEASE ORAL at 08:17

## 2021-06-23 RX ADMIN — ASPIRIN 81 MG: 81 TABLET, COATED ORAL at 08:17

## 2021-06-23 RX ADMIN — CYANOCOBALAMIN TAB 500 MCG 500 MCG: 500 TAB at 08:16

## 2021-06-23 RX ADMIN — SODIUM CHLORIDE, PRESERVATIVE FREE 10 ML: 5 INJECTION INTRAVENOUS at 08:17

## 2021-06-23 ASSESSMENT — ENCOUNTER SYMPTOMS
COLOR CHANGE: 0
CHEST TIGHTNESS: 0
COUGH: 0
EYE REDNESS: 0
BLOOD IN STOOL: 0
NAUSEA: 0
SHORTNESS OF BREATH: 0
EYE ITCHING: 0
TROUBLE SWALLOWING: 0
VOMITING: 0
ABDOMINAL PAIN: 0

## 2021-06-23 ASSESSMENT — PAIN SCALES - GENERAL
PAINLEVEL_OUTOF10: 0

## 2021-06-23 ASSESSMENT — PAIN SCALES - WONG BAKER: WONGBAKER_NUMERICALRESPONSE: 0

## 2021-06-23 NOTE — CONSULTS
General Surgery Consult      Pt Name: Day Coleman  MRN: [id number]  YOB: 1950  Date of evaluation: 6/23/2021  Primary Care Physician: Sandeep Holden DO   Patient evaluated at the request of  Dr. Cuong Saba  Reason for evaluation: Hemodialysis catheter placement    SUBJECTIVE:   History of Chief Complaint:    Day Coleman is a 70 y.o. female who presents with past medical history of diabetes coronary artery disease hyperlipidemia hypothyroidism hypertension chronic kidney disease stage III presented with sudden onset severe left flank pain with nausea vomiting. Patient had medical work-up. Hemodialysis was planned after discussion with the family. Hemodialysis catheter insertion requested. Chart was reviewed. Blood work noted. Past Medical History   has a past medical history of Aortic valve stenosis, Arthritis, CAD (coronary artery disease), Diabetes mellitus ([address] Heart murmur, Hyperlipidemia, MI, old, Pulmonary stenosis, Pulmonary valve stenosis, Sciatica, Thyroid disease, and Wears glasses. Past Surgical History   has a past surgical history that includes Hysterectomy (1980); Salpingo-oophorectomy (Left, 1987); shoulder surgery (Left, 1993); cervical fusion (1993); joint replacement (Bilateral, 1994); Coronary angioplasty with stent (2000); Finger surgery (Left, 03/21/2014); other surgical history (Right, 09/23/2014); Toe Osteotomy (Right, 6/8/2016); and Cystoscopy (Left, 6/21/2021). Medications  Prior to Admission medications    Medication Sig Start Date End Date Taking?  Authorizing Provider   losartan (COZAAR) 50 MG tablet TAKE ONE TABLET BY MOUTH DAILY 12/11/20   Historical Provider, MD   buPROPion (WELLBUTRIN XL) 300 MG extended release tablet TAKE ONE TABLET BY MOUTH DAILY 11/16/20   Historical Provider, MD   FLUoxetine (PROZAC) 10 MG capsule TAKE ONE CAPSULE BY MOUTH DAILY 7/8/20   Historical Provider, MD   metFORMIN (GLUCOPHAGE) 1000 MG tablet TAKE ONE TABLET BY MOUTH TWICE A DAY WITH MEALS 1/4/21   Historical Provider, MD   clotrimazole (LOTRIMIN) 1 % vaginal cream Place 1 applicator vaginally 2 times daily 2/19/21   Historical Provider, MD   oxybutynin (DITROPAN-XL) 10 MG extended release tablet TAKE ONE TABLET BY MOUTH DAILY 9/21/20   Historical Provider, MD   pramipexole (MIRAPEX) 0.125 MG tablet TAKE ONE TABLET BY MOUTH DAILY 10/2/20   Historical Provider, MD   ascorbic acid (VITAMIN C) 1000 MG tablet Take 1,000 mg by mouth daily    Historical Provider, MD   albuterol sulfate  (90 Base) MCG/ACT inhaler INHALE TWO PUFFS BY MOUTH EVERY 4 TO 6 HOURS AS NEEDED FOR WHEEZING 10/20/20   Historical Provider, MD   nystatin ([address]) 885865 UNIT/GM powder APPLY TO AFFECTED AREA(S) FOUR TIMES A DAY 9/22/20   Historical Provider, MD   busPIRone (BUSPAR) 15 MG tablet Take 15 mg by mouth 2 times daily 11/9/20   Historical Provider, MD   isosorbide mononitrate (IMDUR) 30 MG extended release tablet Take 1 tablet by mouth daily 4/25/18   Blanca Tong MD   pantoprazole (PROTONIX) 40 MG tablet Take 1 tablet by mouth every morning (before breakfast) 4/25/18   Blanca Tong MD   baclofen (LIORESAL) 10 MG tablet Take 10 mg by mouth 2 times daily    Historical Provider, MD   fluticasone (FLONASE) 50 MCG/ACT nasal spray 1 spray by Nasal route daily    Historical Provider, MD   levocetirizine (XYZAL) 5 MG tablet Take 5 mg by mouth nightly    Historical Provider, MD   nystatin (MYCOSTATIN) 355133 UNIT/GM cream Apply topically 2 times daily Apply topically 2 times daily.     Historical Provider, MD   oxybutynin (DITROPAN XL) 15 MG extended release tablet Take 15 mg by mouth daily    Historical Provider, MD   levothyroxine (SYNTHROID) 112 MCG tablet TAKE ONE TABLET BY MOUTH DAILY 10/2/17   Historical Provider, MD   atorvastatin (LIPITOR) 40 MG tablet  7/2/17   Historical Provider, MD   nitroGLYCERIN (NITROSTAT) 0.4 MG SL tablet Place 0.4 mg under the tongue every 5 minutes as needed for Chest pain up to max of 3 total doses. If no relief after 1 dose, call 911. Historical Provider, MD   Cholecalciferol (VITAMIN D3) 2000 UNITS CAPS Take 1 capsule by mouth daily. Historical Provider, MD   celecoxib (CELEBREX) 200 MG capsule Take 200 mg by mouth 2 times daily. Historical Provider, MD   Multiple Vitamins-Minerals (THERAPEUTIC MULTIVITAMIN-MINERALS) tablet Take 1 tablet by mouth daily. Historical Provider, MD   ALPRAZolam Mela Allegra) 1 MG tablet Take 1 mg by mouth as needed for Anxiety. Historical Provider, MD   aspirin 81 MG EC tablet Take 81 mg by mouth daily. LAST DOSE 9/8/14    Historical Provider, MD   metoprolol (TOPROL-XL) 100 MG XL tablet Take 100 mg by mouth daily. Historical Provider, MD   FLUoxetine (PROZAC) 20 MG capsule Take 20 mg by mouth daily     Historical Provider, MD   vitamin B-12 (CYANOCOBALAMIN) 500 MCG tablet Take 500 mcg by mouth daily. Historical Provider, MD     Allergies  is allergic to pcn [penicillins], heparin, lamotrigine, cyclobenzaprine, and heparin (porcine). Family History  family history includes Asthma in her sister and son; Breast Cancer in her mother; Diabetes in her brother and sister; Heart Disease in her father; Other in her father; Stroke in her brother and maternal grandmother. Social History   reports that she quit smoking about 17 years ago. Her smoking use included cigarettes. She has a 30.00 pack-year smoking history. She has never used smokeless tobacco. She reports current alcohol use. She reports that she does not use drugs. Review of Systems:  All 10 system review was conducted. Please refer to chart. OBJECTIVE:   VITALS:  height is 5' 5\" (1.651 m) and weight is 215 lb 2.7 oz (97.6 kg). Her oral temperature is 97.5 °F (36.4 °C). Her blood pressure is 112/55 (abnormal) and her pulse is 83. Her respiration is 18 and oxygen saturation is 93%.    CONSTITUTIONAL: Awake alert  SKIN: Skin color, texture, turgor normal. No rashes or lesions. LYMPH: no cervical nodes, no inguinal nodes  HEENT: Head is normocephalic, atraumatic. EOMI, PERRLA  NECK: Supple, symmetrical, trachea midline, no adenopathy, thyroid symmetric, not enlarged and no tenderness, skin normal  CHEST/LUNGS: chest symmetric with normal A/P diameter, normal respiratory rate and rhythm, lungs clear to auscultation without wheezes, rales or rhonchi. No accessory muscle use. Scars None   CARDIOVASCULAR: Heart regular rate and rhythm Normal S1 and S2. . Carotid and femoral pulses 2+/4 and equal bilaterally  ABDOMEN: Soft nondistended nontender  RECTAL: deferred, not clinically indicated  NEUROLOGIC: Deferred  EXTREMITIES: no cyanosis, no clubbing and no edema    LABS:   CBC with Differential:    Lab Results   Component Value Date    WBC 12.2 06/23/2021    RBC 3.25 06/23/2021    HGB 9.5 06/23/2021    HCT 29.2 06/23/2021     06/23/2021    MCV 89.7 06/23/2021    MCH 29.3 06/23/2021    MCHC 32.7 06/23/2021    RDW 13.6 06/23/2021    LYMPHOPCT 7 06/23/2021    MONOPCT 5 06/23/2021    BASOPCT 0 06/23/2021    MONOSABS 0.60 06/23/2021    LYMPHSABS 0.80 06/23/2021    EOSABS 0.10 06/23/2021    BASOSABS 0.00 06/23/2021    DIFFTYPE NOT REPORTED 06/23/2021     BMP:    Lab Results   Component Value Date     06/23/2021    K 4.9 06/23/2021     06/23/2021    CO2 21 06/23/2021    BUN 55 06/23/2021    LABALBU 3.1 06/23/2021    CREATININE 5.24 06/23/2021    CALCIUM 8.8 06/23/2021    GFRAA 10 06/23/2021    LABGLOM 8 06/23/2021    GLUCOSE 148 06/23/2021     Hepatic Function Panel:    Lab Results   Component Value Date    ALKPHOS 87 06/23/2021    ALT 11 06/23/2021    AST 14 06/23/2021    PROT 6.3 06/23/2021    BILITOT 0.22 06/23/2021    LABALBU 3.1 06/23/2021     Calcium:    Lab Results   Component Value Date    CALCIUM 8.8 06/23/2021     Magnesium:  No results found for: MG  Phosphorus:  No results found for: PHOS  PT/INR:  No results found for: PROTIME, INR  ABG:  No results found for: PHART, PH, BLR3DMM, PCO2, PO2ART, PO2, ENU3EEB, HCO3, BEART, BE, THGBART, THB, HHP3HTV, Q4DPHNHJ, O2SAT  Urine Culture:  No components found for: CURINE  Blood Culture:  No components found for: CBLOOD, CFUNGUSBL  Stool Culture:  No components found for: CSTOOL    RADIOLOGY:   I have personally reviewed the following films:  CT ABDOMEN PELVIS WO CONTRAST Additional Contrast? None    Result Date: 6/20/2021  EXAMINATION: CT OF THE ABDOMEN AND PELVIS WITHOUT CONTRAST 6/20/2021 1:11 pm TECHNIQUE: CT of the abdomen and pelvis was performed without the administration of intravenous contrast. Multiplanar reformatted images are provided for review. Dose modulation, iterative reconstruction, and/or weight based adjustment of the mA/kV was utilized to reduce the radiation dose to as low as reasonably achievable. COMPARISON: CT abdomen and pelvis November 7, 2018. HISTORY: ORDERING SYSTEM PROVIDED HISTORY: left flank pain, r/o obstructing stone TECHNOLOGIST PROVIDED HISTORY: left flank pain, r/o obstructing stone Decision Support Exception - unselect if not a suspected or confirmed emergency medical condition->Emergency Medical Condition (MA) Reason for Exam: Flank Pain; Urinary Frequency Acuity: Acute Type of Exam: Initial Additional signs and symptoms: Pt c/o left flank pain since 8am; dry heaving FINDINGS: Lower Chest: Mild bibasilar atelectasis/scarring. Calcified granulomas. Organs: Suboptimal evaluation due to lack of IV contrast.  Liver gallbladder pancreas spleen and adrenal glands all appear unremarkable. Mild hydronephrosis and hydroureter with associated asymmetrical perinephric and periureteral fat stranding without obstructing calculus noted. Nonobstructing calculi identified involving the right kidney largest measuring 5 mm. No right renal calculus. Abdominal aorta demonstrates moderate calcification without aneurysm. Stable fusiform type ectasia of the infrarenal abdominal aorta measuring 2.7 cm.  GI/Bowel: Small hiatal hernia. Distal stomach is grossly unremarkable. Small bowel appears nondilated. No acute colonic abnormality. Pelvis: Urinary bladder is grossly unremarkable. Uterus has been removed. No adnexal mass. Peritoneum/Retroperitoneum: No free air, free fluid or lymphadenopathy. Bones/Soft Tissues: Abdominal wall demonstrates no acute findings. Osseous structures demonstrate degenerative change. 1. Left-sided hydroureter and hydronephrosis with associated asymmetrical perinephric and periureteral fat stranding without obstructing calculus noted. Findings likely represents a recently passed stone. 2. Nonobstructing calculi right kidney, largest measuring 5 mm. 3. Small hiatal hernia. 4. Stable ectasia infrarenal abdominal aortic aneurysm measuring 2.7 cm. FLUORO FOR SURGICAL PROCEDURES    Result Date: 6/21/2021  Radiology exam is complete. No Radiologist dictation. Please follow up with ordering provider. IMPRESSION:   Acute kidney injury most consistent with obstructive nephropathy secondary to kidney stone. Hypertension/E. coli UTI  Non-anion gap metabolic acidosis    does not have any pertinent problems on file. PLAN:   I will proceed with hemodialysis catheter insertion as requested. Family/POA is in agreement. Thank you for this interesting consult and for allowing us to participate in the care of this patient. If you have any questions please don't hesitate to call.           Electronically signed by Rosalio Liz MD  on 6/23/2021 at 6:30 PM

## 2021-06-23 NOTE — PLAN OF CARE
Problem: Skin Integrity:  Goal: Will show no infection signs and symptoms  Description: Will show no infection signs and symptoms  Outcome: Pt shows no new signs/symptoms this shift      Problem: Skin Integrity:  Goal: Absence of new skin breakdown  Description: Absence of new skin breakdown  Outcome: Pt has no new skin breakdown

## 2021-06-23 NOTE — PROCEDURES
Preoperative diagnosis: Acute kidney injury    Postoperative diagnosis: Same    Procedure: Right internal jugular vein hemodialysis Jeffy catheter insertion under ultrasound guidance    Surgeon: Dr. Cherylene Saunas    Asst.: None    Anesthesia: Local    Preparation: Chloraprep    EBL: Less than 10 mL    Specimen: None    Procedure: Procedure was performed at the bedside. Operative site was prepped and draped in usual sterile fashion. Timeout was done. Right internal jugular vein was visualized using the SonoSite. Under ultrasound guidance using Seldinger technique right internal jugular vein was accessed without any difficulty. Guidewire was introduced. Dilators were placed over the guidewire. Dilators were removed. Jeffy catheter was inserted over the guidewire. Guidewire was removed. Catheter was secured. All the ports were aspirated and flushed using saline solution without any difficulty. Catheter was secured. Clean dressing was applied. Patient tolerated procedure well. -  Recommendations: Stat portable chest x-ray.

## 2021-06-23 NOTE — PROGRESS NOTES
Progress Note    Patient Name:  Mayra Pineda    :  1950 8:55 AM      SUBJECTIVE       Ms. Reji Kenney  has no chest pain, shortness of breath, palpitations. She complains of nausea. Confusion has improved from yesterday. CT head was unremarkable. Nephrology discussed possible dialysis with patient and she refused. Creat is slightly improved from yesterday. On bicarb gtt. Repeat echo pending. OBJECTIVE     Vital signs:    BP (!) 173/80   Pulse 75   Temp 98.2 °F (36.8 °C) (Oral)   Resp 18   Ht 5' 5\" (1.651 m)   Wt 215 lb 2.7 oz (97.6 kg)   LMP 1980   SpO2 92%   BMI 35.81 kg/m²  2 L/min      Admit Weight:  210 lb (95.3 kg)    Last 3 weights: Wt Readings from Last 3 Encounters:   21 215 lb 2.7 oz (97.6 kg)   21 200 lb (90.7 kg)   21 202 lb (91.6 kg)       BMI: Body mass index is 35.81 kg/m². Input/Output:       Intake/Output Summary (Last 24 hours) at 2021 0855  Last data filed at 2021 0701  Gross per 24 hour   Intake 240 ml   Output 2500 ml   Net -2260 ml         Exam:     General appearance: awake and alert moves all ext   Lungs: no rhonchi, no wheezes, no rales  Heart: S1 and S2 no murmur  Abdomen: positive bowel sounds, no bruits, no masses  Extremities: warm and dry, no cyanosis, no clubbing        Laboratory Studies:     CBC:   Recent Labs     21  0516 21  0515 21  0546   WBC 29.4* 17.0* 12.2*   HGB 9.9* 9.9* 9.5*   HCT 31.0* 30.8* 29.2*   MCV 89.9 89.9 89.7    145* 165     BMP:   Recent Labs     21  0659 21  0515 21  1504 21  0546    138  --  141   K 5.9* 5.3 5.2 4.9    105  --  104   CO2 20 18*  --  21   BUN 54* 59*  --  55*   CREATININE 5.02* 5.73*  --  5.24*     PT/INR: No results for input(s): PROTIME, INR in the last 72 hours. APTT: No results for input(s): APTT in the last 72 hours. MAG: No results for input(s): MG in the last 72 hours.   D Dimer: No results for input(s): DDIMER in the last 72 hours. Troponin  No results for input(s): TROPONINI in the last 72 hours. No results for input(s): TROPONINT in the last 72 hours. BNP No results for input(s): BNP in the last 72 hours. No results for input(s): PROBNP in the last 72 hours. Pulse Ox: SpO2  Av.7 %  Min: 91 %  Max: 95 %  Supplemental O2: O2 Flow Rate (L/min): 2 L/min     Current Meds:    ferrous sulfate  325 mg Oral Daily with breakfast    aspirin  81 mg Oral Daily    atorvastatin  10 mg Oral Daily    baclofen  10 mg Oral BID    buPROPion  300 mg Oral Daily    busPIRone  15 mg Oral BID    FLUoxetine  10 mg Oral Daily    isosorbide mononitrate  30 mg Oral Daily    levothyroxine  112 mcg Oral Daily    [Held by provider] losartan  50 mg Oral Daily    metoprolol succinate  100 mg Oral Daily    oxybutynin  15 mg Oral Daily    pantoprazole  40 mg Oral QAM AC    pramipexole  0.125 mg Oral Daily    vitamin B-12  500 mcg Oral Daily    insulin lispro  0-12 Units Subcutaneous TID WC    insulin lispro  0-6 Units Subcutaneous Nightly    sodium chloride flush  10 mL Intravenous 2 times per day    heparin (porcine)  5,000 Units Subcutaneous 3 times per day    cefTRIAXone (ROCEPHIN) IV  1,000 mg Intravenous Q24H     Continuous Infusions:    IV infusion builder 125 mL/hr at 21 0228    dextrose      sodium chloride              ASSESSMENT     Principal Problem:    Acute cystitis without hematuria  Active Problems:    WERO (acute kidney injury) (HCC)    Hydronephrosis of left kidney    Hydroureter, left    Type 2 diabetes mellitus, without long-term current use of insulin (HCC)    Essential hypertension    Hypothyroidism    Acute infective cystitis  Resolved Problems:    * No resolved hospital problems.  *      PLAN     S/P cystoscopy with ureteral stent placement       ASCVD without current angina  -  Remote stent at [address] Real test 2019 with no ischemia      preserved

## 2021-06-23 NOTE — CARE COORDINATION
DISCHARGE PLANNING NOTE:    Plan remains undetermined at this time. Patient confused intermittently and refusing dialysis. Per nephro re-eval in 24 hours - may need to talk with family. Cr 5.24 today.      Electronically signed by Baljit Winchester RN on 6/23/2021 at 2:31 PM

## 2021-06-23 NOTE — PROGRESS NOTES
Progress Note    6/23/2021   12:38 PM    Name:  Uriah Mccallum  MRN:    [id number]     Acct:     [de-identified]   Room:  2114/2114-01   Day: 3     Admit Date: 6/20/2021 10:18 AM  PCP: Annie Grajeda DO    Subjective:     C/C:   Chief Complaint   Patient presents with    Flank Pain    Urinary Frequency       Interval History: Status: not changed. Patient has intermittent confusion. Patient denies chest pain shortness of breath nausea vomiting abdominal pain or flank pain. Vital signs reviewed elevated blood pressure readings noted. Recent labs reviewed creatinine 5.24, hemoglobin 9.5    ROS:   all 10 systems reviewed and are negative except as noted    Review of Systems   Constitutional: Negative for chills and fatigue. HENT: Negative for drooling, mouth sores, sneezing and trouble swallowing. Eyes: Negative for redness and itching. Respiratory: Negative for cough, chest tightness and shortness of breath. Cardiovascular: Negative for chest pain, palpitations and leg swelling. Gastrointestinal: Negative for abdominal pain, blood in stool, nausea and vomiting. Endocrine: Negative for heat intolerance and polyphagia. Genitourinary: Negative for difficulty urinating, flank pain and pelvic pain. Musculoskeletal: Negative for arthralgias, joint swelling and neck stiffness. Skin: Negative for color change and pallor. Allergic/Immunologic: Negative for food allergies. Neurological: Negative for dizziness, seizures and headaches. Hematological: Does not bruise/bleed easily. Psychiatric/Behavioral: Positive for confusion. Negative for agitation, behavioral problems and suicidal ideas. The patient is not hyperactive. Medications: Allergies:    Allergies   Allergen Reactions    Pcn [Penicillins] Swelling and Hives    Heparin Other (See Comments)     MIGRAINE      Lamotrigine Other (See Comments), Itching, Nausea Only and Rash    Cyclobenzaprine      Dry mouth, jitters    Heparin (Porcine)      Other reaction(s): Migraine       Current Meds: perflutren lipid microspheres (DEFINITY) injection 2.2 mg, ONCE PRN  0.45 % sodium chloride infusion, Continuous  amLODIPine (NORVASC) tablet 5 mg, Daily  hydrALAZINE (APRESOLINE) injection 10 mg, Q6H PRN  ferrous sulfate (IRON 325) tablet 325 mg, Daily with breakfast  oxyCODONE-acetaminophen (PERCOCET) 5-325 MG per tablet 1 tablet, Q6H PRN  albuterol sulfate  (90 Base) MCG/ACT inhaler 2 puff, Q6H PRN  ALPRAZolam (XANAX) tablet 1 mg, Daily PRN  aspirin EC tablet 81 mg, Daily  atorvastatin (LIPITOR) tablet 10 mg, Daily  baclofen (LIORESAL) tablet 10 mg, BID  buPROPion (WELLBUTRIN XL) extended release tablet 300 mg, Daily  busPIRone (BUSPAR) tablet 15 mg, BID  FLUoxetine (PROZAC) capsule 10 mg, Daily  isosorbide mononitrate (IMDUR) extended release tablet 30 mg, Daily  levothyroxine (SYNTHROID) tablet 112 mcg, Daily  [Held by provider] losartan (COZAAR) tablet 50 mg, Daily  metoprolol succinate (TOPROL XL) extended release tablet 100 mg, Daily  oxybutynin (DITROPAN-XL) extended release tablet 15 mg, Daily  pantoprazole (PROTONIX) tablet 40 mg, QAM AC  pramipexole (MIRAPEX) tablet 0.125 mg, Daily  vitamin B-12 (CYANOCOBALAMIN) tablet 500 mcg, Daily  insulin lispro (HUMALOG) injection vial 0-12 Units, TID WC  insulin lispro (HUMALOG) injection vial 0-6 Units, Nightly  glucose (GLUTOSE) 40 % oral gel 15 g, PRN  dextrose 50 % IV solution, PRN  glucagon (rDNA) injection 1 mg, PRN  dextrose 5 % solution, PRN  sodium chloride flush 0.9 % injection 10 mL, 2 times per day  sodium chloride flush 0.9 % injection 10 mL, PRN  0.9 % sodium chloride infusion, PRN  magnesium sulfate 1000 mg in dextrose 5% 100 mL IVPB, PRN  ondansetron (ZOFRAN-ODT) disintegrating tablet 4 mg, Q8H PRN   Or  ondansetron (ZOFRAN) injection 4 mg, Q6H PRN  magnesium hydroxide (MILK OF MAGNESIA) 400 MG/5ML suspension 30 mL, Daily PRN  acetaminophen (TYLENOL) tablet 650 mg, Q6H PRN Or  acetaminophen (TYLENOL) suppository 650 mg, Q6H PRN  morphine sulfate (PF) injection 2 mg, Q4H PRN  heparin (porcine) injection 5,000 Units, 3 times per day  cefTRIAXone (ROCEPHIN) 1000 mg IVPB in 50 mL D5W minibag, Q24H        Data:     Code Status:  Full Code    Family History   Problem Relation Age of Onset    Breast Cancer Mother         BREAST WITH METS T  LIVER AND LUNG    Other Father         AAA    Heart Disease Father     Asthma Sister     Diabetes Sister         NIDDM    Stroke Brother     Diabetes Brother         NIDDM    Stroke Maternal Grandmother     Asthma Son        Social History     Socioeconomic History    Marital status:      Spouse name: Not on file    Number of children: 1    Years of education: Not on file    Highest education level: Not on file   Occupational History    Not on file   Tobacco Use    Smoking status: Former Smoker     Packs/day: 1.00     Years: 30.00     Pack years: 30.00     Types: Cigarettes     Quit date: 3/19/2004     Years since quittin.2    Smokeless tobacco: Never Used   Vaping Use    Vaping Use: Never used   Substance and Sexual Activity    Alcohol use: Yes     Comment: socially, once a year    Drug use: No    Sexual activity: Not Currently   Other Topics Concern    Not on file   Social History Narrative    Not on file     Social Determinants of Health     Financial Resource Strain:     Difficulty of Paying Living Expenses:    Food Insecurity:     Worried About Running Out of Food in the Last Year:     [address] in the Last Year:    Transportation Needs:     Lack of Transportation (Medical):      Lack of Transportation (Non-Medical):    Physical Activity:     Days of Exercise per Week:     Minutes of Exercise per Session:    Stress:     Feeling of Stress :    Social Connections:     Frequency of Communication with Friends and Family:     Frequency of Social Gatherings with Friends and Family:     Attends Nondenominational Services:     Active Member of Clubs or Organizations:     Attends Club or Organization Meetings:     Marital Status:    Intimate Partner Violence:     Fear of Current or Ex-Partner:     Emotionally Abused:     Physically Abused:     Sexually Abused:        I/O (24Hr): Intake/Output Summary (Last 24 hours) at 6/23/2021 1238  Last data filed at 6/23/2021 0701  Gross per 24 hour   Intake 240 ml   Output 2000 ml   Net -1760 ml     Radiology:  CT ABDOMEN PELVIS WO CONTRAST Additional Contrast? None    Result Date: 6/20/2021  1. Left-sided hydroureter and hydronephrosis with associated asymmetrical perinephric and periureteral fat stranding without obstructing calculus noted. Findings likely represents a recently passed stone. 2. Nonobstructing calculi right kidney, largest measuring 5 mm. 3. Small hiatal hernia. 4. Stable ectasia infrarenal abdominal aortic aneurysm measuring 2.7 cm. CT HEAD WO CONTRAST    Result Date: 6/22/2021  No acute intracranial abnormality. Senescent changes including chronic microvascular change and atherosclerotic disease of the major intracranial vessels.        Labs:  Recent Results (from the past 24 hour(s))   K (Potassium)    Collection Time: 06/22/21  3:04 PM   Result Value Ref Range    Potassium 5.2 3.7 - 5.3 mmol/L   POC Glucose Fingerstick    Collection Time: 06/22/21  4:47 PM   Result Value Ref Range    POC Glucose 116 (H) 65 - 105 mg/dL   Urinalysis Reflex to Culture    Collection Time: 06/22/21  7:59 PM    Specimen: Urine, clean catch   Result Value Ref Range    Color, UA YELLOW YELLOW    Turbidity UA CLOUDY (A) CLEAR    Glucose, Ur NEGATIVE NEGATIVE    Bilirubin Urine NEGATIVE NEGATIVE    Ketones, Urine NEGATIVE NEGATIVE    Specific Gravity, UA 1.006 1.000 - 1.030    Urine Hgb LARGE (A) NEGATIVE    pH, UA 6.0 5.0 - 8.0    Protein, UA 1+ (A) NEGATIVE    Urobilinogen, Urine Normal Normal    Nitrite, Urine NEGATIVE NEGATIVE    Leukocyte Esterase, Urine MOD (A) NEGATIVE Urinalysis Comments NOT REPORTED    Microscopic Urinalysis    Collection Time: 06/22/21  7:59 PM   Result Value Ref Range    -          WBC, UA 5 TO 10 /HPF    RBC, UA 5 TO 10 /HPF    Casts UA NOT REPORTED /LPF    Crystals, UA NOT REPORTED None /HPF    Epithelial Cells UA NOT REPORTED /HPF    Renal Epithelial, UA NOT REPORTED 0 /HPF    Bacteria, UA NOT REPORTED None    Mucus, UA NOT REPORTED None    Trichomonas, UA NOT REPORTED None    Amorphous, UA NOT REPORTED None    Other Observations UA NOT REPORTED NOT REQ.     Yeast, UA NOT REPORTED None   POC Glucose Fingerstick    Collection Time: 06/22/21  8:02 PM   Result Value Ref Range    POC Glucose 133 (H) 65 - 105 mg/dL   CBC with DIFF    Collection Time: 06/23/21  5:46 AM   Result Value Ref Range    WBC 12.2 (H) 3.5 - 11.0 k/uL    RBC 3.25 (L) 4.0 - 5.2 m/uL    Hemoglobin 9.5 (L) 12.0 - 16.0 g/dL    Hematocrit 29.2 (L) 36 - 46 %    MCV 89.7 80 - 100 fL    MCH 29.3 26 - 34 pg    MCHC 32.7 31 - 37 g/dL    RDW 13.6 11.5 - 14.9 %    Platelets 295 275 - 856 k/uL    MPV 9.0 6.0 - 12.0 fL    NRBC Automated NOT REPORTED per 100 WBC    Differential Type NOT REPORTED     Seg Neutrophils 87 (H) 36 - 66 %    Lymphocytes 7 (L) 24 - 44 %    Monocytes 5 1 - 7 %    Eosinophils % 1 0 - 4 %    Basophils 0 0 - 2 %    Immature Granulocytes NOT REPORTED 0 %    Segs Absolute 10.70 (H) 1.3 - 9.1 k/uL    Absolute Lymph # 0.80 (L) 1.0 - 4.8 k/uL    Absolute Mono # 0.60 0.1 - 1.3 k/uL    Absolute Eos # 0.10 0.0 - 0.4 k/uL    Basophils Absolute 0.00 0.0 - 0.2 k/uL    Absolute Immature Granulocyte NOT REPORTED 0.00 - 0.30 k/uL    WBC Morphology NOT REPORTED     RBC Morphology NOT REPORTED     Platelet Estimate NOT REPORTED    Comprehensive Metabolic Panel w/ Reflex to MG    Collection Time: 06/23/21  5:46 AM   Result Value Ref Range    Glucose 148 (H) 70 - 99 mg/dL    BUN 55 (H) 8 - 23 mg/dL    CREATININE 5.24 () 0.50 - 0.90 mg/dL    Bun/Cre Ratio NOT REPORTED 9 - 20    Calcium 8.8 8.6 - 10.4 mg/dL    Sodium 141 135 - 144 mmol/L    Potassium 4.9 3.7 - 5.3 mmol/L    Chloride 104 98 - 107 mmol/L    CO2 21 20 - 31 mmol/L    Anion Gap 16 9 - 17 mmol/L    Alkaline Phosphatase 87 35 - 104 U/L    ALT 11 5 - 33 U/L    AST 14 <32 U/L    Total Bilirubin 0.22 (L) 0.3 - 1.2 mg/dL    Total Protein 6.3 (L) 6.4 - 8.3 g/dL    Albumin 3.1 (L) 3.5 - 5.2 g/dL    Albumin/Globulin Ratio NOT REPORTED 1.0 - 2.5    GFR Non- 8 (L) >60 mL/min    GFR  10 (L) >60 mL/min    GFR Comment          GFR Staging NOT REPORTED    POC Glucose Fingerstick    Collection Time: 21  6:57 AM   Result Value Ref Range    POC Glucose 193 (H) 65 - 105 mg/dL   POC Glucose Fingerstick    Collection Time: 21 12:30 PM   Result Value Ref Range    POC Glucose 137 (H) 65 - 105 mg/dL       Physical Examination:        Vitals:  BP (!) 173/80   Pulse 75   Temp 98.2 °F (36.8 °C) (Oral)   Resp 18   Ht 5' 5\" (1.651 m)   Wt 215 lb 2.7 oz (97.6 kg)   LMP 1980   SpO2 93%   BMI 35.81 kg/m²   Temp (24hrs), Av.2 °F (36.8 °C), Min:98 °F (36.7 °C), Max:98.4 °F (36.9 °C)    Recent Labs     21  1647 21  0657 21  1230   POCGLU 116* 133* 193* 137*         Physical Exam  Vitals reviewed. HENT:      Head: Normocephalic. Right Ear: External ear normal.      Left Ear: External ear normal.      Nose: Nose normal.      Mouth/Throat:      Mouth: Mucous membranes are moist.      Pharynx: Oropharynx is clear. Eyes:      Conjunctiva/sclera: Conjunctivae normal.   Cardiovascular:      Rate and Rhythm: Normal rate and regular rhythm. Pulses: Normal pulses. Heart sounds: Normal heart sounds. Pulmonary:      Effort: Pulmonary effort is normal.      Breath sounds: Normal breath sounds. Abdominal:      General: Bowel sounds are normal.      Palpations: Abdomen is soft. Tenderness: There is no abdominal tenderness.  There is no right CVA tenderness or left CVA tenderness. Musculoskeletal:         General: No deformity. Cervical back: Normal range of motion and neck supple. Skin:     General: Skin is warm. Capillary Refill: Capillary refill takes less than 2 seconds. Coloration: Skin is not jaundiced. Neurological:      Mental Status: She is alert. Cranial Nerves: No cranial nerve deficit. Sensory: No sensory deficit. Motor: No weakness. Comments: Patient has intermittent confusion   Psychiatric:         Mood and Affect: Mood normal.         Behavior: Behavior normal.         Assessment:        Primary Problem  Acute cystitis without hematuria     Principal Problem:    Acute cystitis without hematuria  Active Problems:    WERO (acute kidney injury) ([address]    Hydronephrosis of left kidney    Hydroureter, left    Type 2 diabetes mellitus, without long-term current use of insulin (HCC)    Essential hypertension    Hypothyroidism    Acute infective cystitis  Resolved Problems:    * No resolved hospital problems. *      Past Medical History:   Diagnosis Date    Aortic valve stenosis     mild per chart    Arthritis     CAD (coronary artery disease) 2000    1 STENT    Diabetes mellitus ([facility]     Heart murmur     1962    Hyperlipidemia 2004    ON RX    MI, old     states many and they were mild    Pulmonary stenosis 1995    Pulmonary valve stenosis     mild/congenital per chart    Sciatica     right leg    Thyroid disease 2004    HYPOTHYROIDISM ON RX    Wears glasses         Plan:              1. 0.45% normal saline at 125 mL/h  2. On metoprolol and Imdur  3. Hold Cozaar  4. Hydralazine 10 mg every 6 hours as needed for systolic blood pressure more than 160  5. IV Rocephin 1 g daily  6. Blood culture no growth for 3 days  7. DVT Prophylaxis Heparin subcu  8. Monitor CMP  9. Nephrology input noted  10. EPCs  11. PT/OT to evaluate and treat  12. Pain control  13. Replace electrolytes as per sliding scale  14.  Home medications

## 2021-06-23 NOTE — PROGRESS NOTES
Department of Internal Medicine  Nephrology Beverly Espinoza MD   Consult Note    Reason for consultation: Management of acute kidney injury at nephrolithiasis. Consulting physician: Annie Cuevas MD    Interval history: Patient with fluctuating orientation but awake and responsive. She apparently improved over the course of the day yesterday was alert and oriented. During my encounter with patient today, she did not demonstrate good understanding of questions and answers were inappropriate. Cazares catheter was placed 6/22/2021. History of presenting illness: This is a 70 y.o. female with a significant past medical history of Type 2 diabetes mellitus [diagnosed in 2015], Coronary artery disease [s/p PTCA/stents], hyperlipidemia, hypothyroidism, systemic hypertension and Chronic kidney disease stage III [baseline serum creatinine 1.40 mg/dL in November 2018], who presented to the emergency department yesterday with sudden onset of severe left flank pain associated with nausea and vomiting. She had chills but did not have fever. Laboratory studies at presentation revealed serum creatinine 2.7 mg/dL which increased overnight to 5 mg/dL associated with hyperkalemia and serum potassium 5.9 mmol/L.  CT scan of the abdomen and pelvis showed:  1. Left-sided hydroureter and hydronephrosis with associated asymmetrical perinephric and periureteral fat stranding without obstructing calculus noted. Findings likely represents a recently passed stone. 2. Nonobstructing calculi right kidney, largest measuring 5 mm. 3. Small hiatal hernia. 4. Stable ectasia infrarenal abdominal aortic aneurysm measuring 2.7 cm.      Scheduled Meds:   ferrous sulfate  325 mg Oral Daily with breakfast    aspirin  81 mg Oral Daily    atorvastatin  10 mg Oral Daily    baclofen  10 mg Oral BID    buPROPion  300 mg Oral Daily    busPIRone  15 mg Oral BID    FLUoxetine  10 mg Oral Daily    isosorbide mononitrate  30 mg Oral Daily  levothyroxine  112 mcg Oral Daily    [Held by provider] losartan  50 mg Oral Daily    metoprolol succinate  100 mg Oral Daily    oxybutynin  15 mg Oral Daily    pantoprazole  40 mg Oral QAM AC    pramipexole  0.125 mg Oral Daily    vitamin B-12  500 mcg Oral Daily    insulin lispro  0-12 Units Subcutaneous TID WC    insulin lispro  0-6 Units Subcutaneous Nightly    sodium chloride flush  10 mL Intravenous 2 times per day    heparin (porcine)  5,000 Units Subcutaneous 3 times per day    cefTRIAXone (ROCEPHIN) IV  1,000 mg Intravenous Q24H     Continuous Infusions:   IV infusion builder 125 mL/hr at 06/23/21 0228    dextrose      sodium chloride       PRN Meds:.perflutren lipid microspheres, hydrALAZINE, oxyCODONE-acetaminophen, albuterol sulfate HFA, ALPRAZolam, glucose, dextrose, glucagon (rDNA), dextrose, sodium chloride flush, sodium chloride, magnesium sulfate, ondansetron **OR** ondansetron, magnesium hydroxide, acetaminophen **OR** acetaminophen, morphine    Physical Exam:    VITALS:  BP (!) 173/80   Pulse 75   Temp 98.2 °F (36.8 °C) (Oral)   Resp 18   Ht 5' 5\" (1.651 m)   Wt 215 lb 2.7 oz (97.6 kg)   LMP 03/19/1980   SpO2 92%   BMI 35.81 kg/m²   24HR INTAKE/OUTPUT:      Intake/Output Summary (Last 24 hours) at 6/23/2021 1008  Last data filed at 6/23/2021 0701  Gross per 24 hour   Intake 240 ml   Output 2300 ml   Net -2060 ml     Constitutional: Slightly disoriented. Skin: Skin color, texture, turgor normal. No rashes or lesions    Head: Normocephalic, without obvious abnormality, atraumatic     Cardiovascular/Edema: S1, S2 with 2/6 systolic murmur    Respiratory:  clear to auscultation bilaterally    Abdomen: soft, non-tender; bowel sounds normal; no masses,  no organomegaly    Back: Left costovertebral angle tenderness    Extremities: extremities normal, atraumatic, no cyanosis or edema    Neuro:  Confused but with no acute focal neurologic deficits.     CBC:   Recent Labs 06/21/21  0516 06/22/21  0515 06/23/21  0546   WBC 29.4* 17.0* 12.2*   HGB 9.9* 9.9* 9.5*    145* 165     BMP:    Recent Labs     06/21/21  0659 06/22/21  0515 06/22/21  1504 06/23/21  0546    138  --  141   K 5.9* 5.3 5.2 4.9    105  --  104   CO2 20 18*  --  21   BUN 54* 59*  --  55*   CREATININE 5.02* 5.73*  --  5.24*   GLUCOSE 112* 102*  --  148*       Lab Results   Component Value Date    NITRU NEGATIVE 06/22/2021    COLORU YELLOW 06/22/2021    PHUR 6.0 06/22/2021    WBCUA 5 TO 10 06/22/2021    RBCUA 5 TO 10 06/22/2021    MUCUS NOT REPORTED 06/22/2021    TRICHOMONAS NOT REPORTED 06/22/2021    YEAST NOT REPORTED 06/22/2021    BACTERIA NOT REPORTED 06/22/2021    SPECGRAV 1.006 06/22/2021    LEUKOCYTESUR MOD 06/22/2021    UROBILINOGEN Normal 06/22/2021    BILIRUBINUR NEGATIVE 06/22/2021    GLUCOSEU NEGATIVE 06/22/2021    KETUA NEGATIVE 06/22/2021    AMORPHOUS NOT REPORTED 06/22/2021     Urine Creatinine:     Lab Results   Component Value Date    LABCREA 48.7 06/21/2021     IMPRESSION/RECOMMENDATIONS:      1. Acute kidney injury - Most consistent with obstructive nephropathy secondary to kidney stone. She underwent cystoscopy with left ureteral stent insertion 6/21/2021. She is nonoliguric and continues to refuse dialysis although competency is questionable. Her confusion may reflect uremic encephalopathy. Plan: Change IVF to 0.45 normal saline at 125 mL/h. Patient has indications for initiation of dialysis but is refusing at this time and will observe for another 24 hours. Strict input and output documentation. Avoid nephrotoxic agents. Basic metabolic profile daily. 2.  Nephrolithiasis - Patient with family history [sister]. Will perform metabolic stone evaluation as outpatient since this is patient's first episode of kidney stone. 3.  Systemic hypertension - Suboptimally controlled. Start amlodipine 5 mg p.o. daily.     4.  E. coli urinary tract infection - complicating nephrolithiasis. Continue IV ceftriaxone 1 g every 24 hours. 5.  Non-anion gap metabolic acidosis - secondary to uremia. Improved with bicarbonate drip. Prognosis is guarded.     Td Murray MD FACP  Attending Nephrologist  6/23/2021 10:08 AM

## 2021-06-23 NOTE — PROGRESS NOTES
RN spoke with Dr. Niru Benavidez via telephone. Updated MD that Dr. J Carlos Augustinigham spoke with patient's son today via telephone regarding plan for dialysis. Updated that patient's son is agreeable. Orders received for a surgery consult to place a Jeffy catheter.

## 2021-06-23 NOTE — PROGRESS NOTES
RN spoke with Dr. Mady Echevarria via telephone. MD requests to speak to patient's son via telephone to discuss patient's need for dialysis as patient is currently confused. MD requests consent from patient's son to start dialysis as needed. Patient's son agreeable to dialysis.

## 2021-06-24 PROBLEM — R07.9 CHEST PAIN: Status: RESOLVED | Noted: 2018-04-23 | Resolved: 2021-06-24

## 2021-06-24 LAB
ABSOLUTE EOS #: 0 K/UL (ref 0–0.4)
ABSOLUTE IMMATURE GRANULOCYTE: ABNORMAL K/UL (ref 0–0.3)
ABSOLUTE LYMPH #: 0.8 K/UL (ref 1–4.8)
ABSOLUTE MONO #: 0.6 K/UL (ref 0.1–1.3)
ALBUMIN SERPL-MCNC: 3.6 G/DL (ref 3.5–5.2)
ALBUMIN/GLOBULIN RATIO: ABNORMAL (ref 1–2.5)
ALP BLD-CCNC: 103 U/L (ref 35–104)
ALT SERPL-CCNC: 11 U/L (ref 5–33)
ANION GAP SERPL CALCULATED.3IONS-SCNC: 19 MMOL/L (ref 9–17)
AST SERPL-CCNC: 13 U/L
BASOPHILS # BLD: 0 % (ref 0–2)
BASOPHILS ABSOLUTE: 0 K/UL (ref 0–0.2)
BILIRUB SERPL-MCNC: 0.27 MG/DL (ref 0.3–1.2)
BUN BLDV-MCNC: 45 MG/DL (ref 8–23)
BUN/CREAT BLD: ABNORMAL (ref 9–20)
CALCIUM SERPL-MCNC: 9.4 MG/DL (ref 8.6–10.4)
CHLORIDE BLD-SCNC: 100 MMOL/L (ref 98–107)
CO2: 21 MMOL/L (ref 20–31)
CREAT SERPL-MCNC: 4.15 MG/DL (ref 0.5–0.9)
DIFFERENTIAL TYPE: ABNORMAL
EOSINOPHILS RELATIVE PERCENT: 0 % (ref 0–4)
GFR AFRICAN AMERICAN: 13 ML/MIN
GFR NON-AFRICAN AMERICAN: 11 ML/MIN
GFR SERPL CREATININE-BSD FRML MDRD: ABNORMAL ML/MIN/{1.73_M2}
GFR SERPL CREATININE-BSD FRML MDRD: ABNORMAL ML/MIN/{1.73_M2}
GLUCOSE BLD-MCNC: 138 MG/DL (ref 65–105)
GLUCOSE BLD-MCNC: 159 MG/DL (ref 65–105)
GLUCOSE BLD-MCNC: 160 MG/DL (ref 65–105)
GLUCOSE BLD-MCNC: 160 MG/DL (ref 65–105)
GLUCOSE BLD-MCNC: 171 MG/DL (ref 70–99)
HBV SURFACE AB TITR SER: <3.5 MIU/ML
HCT VFR BLD CALC: 32 % (ref 36–46)
HEMOGLOBIN: 10.7 G/DL (ref 12–16)
HEPATITIS B SURFACE ANTIGEN: NONREACTIVE
IMMATURE GRANULOCYTES: ABNORMAL %
LYMPHOCYTES # BLD: 7 % (ref 24–44)
MCH RBC QN AUTO: 29.4 PG (ref 26–34)
MCHC RBC AUTO-ENTMCNC: 33.4 G/DL (ref 31–37)
MCV RBC AUTO: 87.9 FL (ref 80–100)
MONOCYTES # BLD: 5 % (ref 1–7)
NRBC AUTOMATED: ABNORMAL PER 100 WBC
PDW BLD-RTO: 13.6 % (ref 11.5–14.9)
PLATELET # BLD: 207 K/UL (ref 150–450)
PLATELET ESTIMATE: ABNORMAL
PMV BLD AUTO: 8.8 FL (ref 6–12)
POTASSIUM SERPL-SCNC: 4.4 MMOL/L (ref 3.7–5.3)
RBC # BLD: 3.64 M/UL (ref 4–5.2)
RBC # BLD: ABNORMAL 10*6/UL
SEG NEUTROPHILS: 88 % (ref 36–66)
SEGMENTED NEUTROPHILS ABSOLUTE COUNT: 11 K/UL (ref 1.3–9.1)
SODIUM BLD-SCNC: 140 MMOL/L (ref 135–144)
TOTAL PROTEIN: 7.3 G/DL (ref 6.4–8.3)
WBC # BLD: 12.6 K/UL (ref 3.5–11)
WBC # BLD: ABNORMAL 10*3/UL

## 2021-06-24 PROCEDURE — 36556 INSERT NON-TUNNEL CV CATH: CPT

## 2021-06-24 PROCEDURE — 95819 EEG AWAKE AND ASLEEP: CPT | Performed by: PSYCHIATRY & NEUROLOGY

## 2021-06-24 PROCEDURE — 6370000000 HC RX 637 (ALT 250 FOR IP): Performed by: UROLOGY

## 2021-06-24 PROCEDURE — 5A1D70Z PERFORMANCE OF URINARY FILTRATION, INTERMITTENT, LESS THAN 6 HOURS PER DAY: ICD-10-PCS | Performed by: FAMILY MEDICINE

## 2021-06-24 PROCEDURE — 87340 HEPATITIS B SURFACE AG IA: CPT

## 2021-06-24 PROCEDURE — 97110 THERAPEUTIC EXERCISES: CPT

## 2021-06-24 PROCEDURE — 97166 OT EVAL MOD COMPLEX 45 MIN: CPT

## 2021-06-24 PROCEDURE — 6360000002 HC RX W HCPCS: Performed by: UROLOGY

## 2021-06-24 PROCEDURE — 6370000000 HC RX 637 (ALT 250 FOR IP): Performed by: FAMILY MEDICINE

## 2021-06-24 PROCEDURE — 36415 COLL VENOUS BLD VENIPUNCTURE: CPT

## 2021-06-24 PROCEDURE — 97530 THERAPEUTIC ACTIVITIES: CPT

## 2021-06-24 PROCEDURE — 85025 COMPLETE CBC W/AUTO DIFF WBC: CPT

## 2021-06-24 PROCEDURE — 80053 COMPREHEN METABOLIC PANEL: CPT

## 2021-06-24 PROCEDURE — 99223 1ST HOSP IP/OBS HIGH 75: CPT | Performed by: PSYCHIATRY & NEUROLOGY

## 2021-06-24 PROCEDURE — 95819 EEG AWAKE AND ASLEEP: CPT

## 2021-06-24 PROCEDURE — 2580000003 HC RX 258: Performed by: UROLOGY

## 2021-06-24 PROCEDURE — 86317 IMMUNOASSAY INFECTIOUS AGENT: CPT

## 2021-06-24 PROCEDURE — 2580000003 HC RX 258: Performed by: INTERNAL MEDICINE

## 2021-06-24 PROCEDURE — 2500000003 HC RX 250 WO HCPCS: Performed by: INTERNAL MEDICINE

## 2021-06-24 PROCEDURE — 6370000000 HC RX 637 (ALT 250 FOR IP): Performed by: INTERNAL MEDICINE

## 2021-06-24 PROCEDURE — 6360000002 HC RX W HCPCS: Performed by: INTERNAL MEDICINE

## 2021-06-24 PROCEDURE — 90935 HEMODIALYSIS ONE EVALUATION: CPT

## 2021-06-24 PROCEDURE — 2500000003 HC RX 250 WO HCPCS: Performed by: FAMILY MEDICINE

## 2021-06-24 PROCEDURE — 2060000000 HC ICU INTERMEDIATE R&B

## 2021-06-24 PROCEDURE — 82947 ASSAY GLUCOSE BLOOD QUANT: CPT

## 2021-06-24 RX ORDER — SODIUM CITRATE 4 % (5 ML)
1.2 SYRINGE (ML) MISCELLANEOUS PRN
Status: DISCONTINUED | OUTPATIENT
Start: 2021-06-24 | End: 2021-07-02 | Stop reason: HOSPADM

## 2021-06-24 RX ORDER — AMLODIPINE BESYLATE 10 MG/1
10 TABLET ORAL DAILY
Status: DISCONTINUED | OUTPATIENT
Start: 2021-06-25 | End: 2021-07-02 | Stop reason: HOSPADM

## 2021-06-24 RX ORDER — METOPROLOL TARTRATE 5 MG/5ML
5 INJECTION INTRAVENOUS EVERY 6 HOURS PRN
Status: DISCONTINUED | OUTPATIENT
Start: 2021-06-24 | End: 2021-07-02 | Stop reason: HOSPADM

## 2021-06-24 RX ADMIN — Medication 1.2 ML: at 12:29

## 2021-06-24 RX ADMIN — HYDRALAZINE HYDROCHLORIDE 10 MG: 20 INJECTION INTRAMUSCULAR; INTRAVENOUS at 14:16

## 2021-06-24 RX ADMIN — HEPARIN SODIUM 5000 UNITS: 5000 INJECTION INTRAVENOUS; SUBCUTANEOUS at 21:14

## 2021-06-24 RX ADMIN — INSULIN LISPRO 1 UNITS: 100 INJECTION, SOLUTION INTRAVENOUS; SUBCUTANEOUS at 21:13

## 2021-06-24 RX ADMIN — ASPIRIN 81 MG: 81 TABLET, COATED ORAL at 08:04

## 2021-06-24 RX ADMIN — HYDRALAZINE HYDROCHLORIDE 10 MG: 20 INJECTION INTRAMUSCULAR; INTRAVENOUS at 08:04

## 2021-06-24 RX ADMIN — HYDRALAZINE HYDROCHLORIDE 10 MG: 20 INJECTION INTRAMUSCULAR; INTRAVENOUS at 21:14

## 2021-06-24 RX ADMIN — HYDRALAZINE HYDROCHLORIDE 10 MG: 20 INJECTION INTRAMUSCULAR; INTRAVENOUS at 03:08

## 2021-06-24 RX ADMIN — ONDANSETRON 4 MG: 2 INJECTION INTRAMUSCULAR; INTRAVENOUS at 06:08

## 2021-06-24 RX ADMIN — SODIUM CHLORIDE, PRESERVATIVE FREE 10 ML: 5 INJECTION INTRAVENOUS at 08:04

## 2021-06-24 RX ADMIN — CEFTRIAXONE SODIUM 1000 MG: 1 INJECTION, POWDER, FOR SOLUTION INTRAMUSCULAR; INTRAVENOUS at 14:28

## 2021-06-24 RX ADMIN — BUSPIRONE HYDROCHLORIDE 15 MG: 15 TABLET ORAL at 21:14

## 2021-06-24 RX ADMIN — PRAMIPEXOLE DIHYDROCHLORIDE 0.12 MG: 0.12 TABLET ORAL at 08:05

## 2021-06-24 RX ADMIN — AMLODIPINE BESYLATE 5 MG: 5 TABLET ORAL at 14:18

## 2021-06-24 RX ADMIN — INSULIN LISPRO 2 UNITS: 100 INJECTION, SOLUTION INTRAVENOUS; SUBCUTANEOUS at 08:06

## 2021-06-24 RX ADMIN — ISOSORBIDE MONONITRATE 30 MG: 30 TABLET, EXTENDED RELEASE ORAL at 08:04

## 2021-06-24 RX ADMIN — BUPROPION HYDROCHLORIDE 300 MG: 300 TABLET, FILM COATED, EXTENDED RELEASE ORAL at 08:03

## 2021-06-24 RX ADMIN — OXYBUTYNIN CHLORIDE 15 MG: 10 TABLET, EXTENDED RELEASE ORAL at 08:03

## 2021-06-24 RX ADMIN — ATORVASTATIN CALCIUM 10 MG: 10 TABLET, FILM COATED ORAL at 08:03

## 2021-06-24 RX ADMIN — HEPARIN SODIUM 5000 UNITS: 5000 INJECTION INTRAVENOUS; SUBCUTANEOUS at 14:17

## 2021-06-24 RX ADMIN — METOPROLOL SUCCINATE 100 MG: 100 TABLET, EXTENDED RELEASE ORAL at 14:18

## 2021-06-24 RX ADMIN — BACLOFEN 10 MG: 10 TABLET ORAL at 21:14

## 2021-06-24 RX ADMIN — FLUOXETINE 10 MG: 10 CAPSULE ORAL at 08:05

## 2021-06-24 RX ADMIN — LEVOTHYROXINE SODIUM 112 MCG: 0.11 TABLET ORAL at 05:57

## 2021-06-24 RX ADMIN — BUSPIRONE HYDROCHLORIDE 15 MG: 15 TABLET ORAL at 08:03

## 2021-06-24 RX ADMIN — Medication 1.2 ML: at 12:28

## 2021-06-24 RX ADMIN — METOPROLOL TARTRATE 5 MG: 1 INJECTION, SOLUTION INTRAVENOUS at 00:49

## 2021-06-24 RX ADMIN — HEPARIN SODIUM 5000 UNITS: 5000 INJECTION INTRAVENOUS; SUBCUTANEOUS at 05:55

## 2021-06-24 RX ADMIN — INSULIN LISPRO 2 UNITS: 100 INJECTION, SOLUTION INTRAVENOUS; SUBCUTANEOUS at 17:51

## 2021-06-24 RX ADMIN — SODIUM CHLORIDE: 4.5 INJECTION, SOLUTION INTRAVENOUS at 00:22

## 2021-06-24 RX ADMIN — FERROUS SULFATE TAB 325 MG (65 MG ELEMENTAL FE) 325 MG: 325 (65 FE) TAB at 08:04

## 2021-06-24 RX ADMIN — SODIUM CHLORIDE, PRESERVATIVE FREE 10 ML: 5 INJECTION INTRAVENOUS at 21:21

## 2021-06-24 RX ADMIN — PANTOPRAZOLE SODIUM 40 MG: 40 TABLET, DELAYED RELEASE ORAL at 05:56

## 2021-06-24 RX ADMIN — OXYCODONE HYDROCHLORIDE AND ACETAMINOPHEN 1 TABLET: 5; 325 TABLET ORAL at 05:56

## 2021-06-24 RX ADMIN — BACLOFEN 10 MG: 10 TABLET ORAL at 08:03

## 2021-06-24 RX ADMIN — CYANOCOBALAMIN TAB 500 MCG 500 MCG: 500 TAB at 08:04

## 2021-06-24 RX ADMIN — SODIUM CHLORIDE: 4.5 INJECTION, SOLUTION INTRAVENOUS at 06:02

## 2021-06-24 ASSESSMENT — PAIN DESCRIPTION - PAIN TYPE: TYPE: CHRONIC PAIN

## 2021-06-24 ASSESSMENT — PAIN SCALES - GENERAL
PAINLEVEL_OUTOF10: 0
PAINLEVEL_OUTOF10: 4
PAINLEVEL_OUTOF10: 0

## 2021-06-24 ASSESSMENT — ENCOUNTER SYMPTOMS
CHEST TIGHTNESS: 0
VOMITING: 0
SHORTNESS OF BREATH: 0
EYE ITCHING: 0
EYE REDNESS: 0
COUGH: 0
COLOR CHANGE: 0
TROUBLE SWALLOWING: 0
BLOOD IN STOOL: 0
ABDOMINAL PAIN: 0
NAUSEA: 0

## 2021-06-24 ASSESSMENT — PAIN DESCRIPTION - FREQUENCY: FREQUENCY: INTERMITTENT

## 2021-06-24 ASSESSMENT — PAIN DESCRIPTION - LOCATION: LOCATION: GENERALIZED

## 2021-06-24 ASSESSMENT — PAIN DESCRIPTION - DESCRIPTORS: DESCRIPTORS: ACHING

## 2021-06-24 NOTE — CARE COORDINATION
DISCHARGE PLANNING NOTE:    Message left on son Annel iJmenez requesting a call back to discuss the possibility that his mom needs to go to SNF after discharge from [facility]. Electronically signed by Su Alston RN on 6/24/2021 at 2:38 PM    Call back received from son Benjy Moreno. He understands that she may need SNF, however he has a hard time accepting that. He states that he could help her at home, however would not be able to lift her at all due to his own back issues. I told him that I just wanted to plant that seed that she may need SNF and to talk with other family members about it over the weekend and get back with us on Monday.      Electronically signed by Su Alston RN on 6/24/2021 at 2:51 PM

## 2021-06-24 NOTE — PROGRESS NOTES
Department of Internal Medicine  Nephrology Dashawn Saenz MD   Consult Note    Reason for consultation: Management of acute kidney injury at nephrolithiasis. Consulting physician: Mary Yo MD    Interval history: Patient was seen and examined today and she remains pleasantly confused but not in distress. Ileanae Klinefelter catheter was placed yesterday as patient son agreed to acute hemodialysis. Laboratory studies today were reviewed. Patient's son was present throughout this encounter. Cazares catheter was placed 6/22/2021. History of presenting illness: This is a 70 y.o. female with a significant past medical history of Type 2 diabetes mellitus [diagnosed in 2015], Coronary artery disease [s/p PTCA/stents], hyperlipidemia, hypothyroidism, systemic hypertension and Chronic kidney disease stage III [baseline serum creatinine 1.40 mg/dL in November 2018], who presented to the emergency department yesterday with sudden onset of severe left flank pain associated with nausea and vomiting. She had chills but did not have fever. Laboratory studies at presentation revealed serum creatinine 2.7 mg/dL which increased overnight to 5 mg/dL associated with hyperkalemia and serum potassium 5.9 mmol/L.  CT scan of the abdomen and pelvis showed:  1. Left-sided hydroureter and hydronephrosis with associated asymmetrical perinephric and periureteral fat stranding without obstructing calculus noted. Findings likely represents a recently passed stone. 2. Nonobstructing calculi right kidney, largest measuring 5 mm. 3. Small hiatal hernia. 4. Stable ectasia infrarenal abdominal aortic aneurysm measuring 2.7 cm.      Scheduled Meds:   amLODIPine  5 mg Oral Daily    hydrALAZINE  10 mg Intravenous Q6H    ferrous sulfate  325 mg Oral Daily with breakfast    aspirin  81 mg Oral Daily    atorvastatin  10 mg Oral Daily    baclofen  10 mg Oral BID    buPROPion  300 mg Oral Daily    busPIRone  15 mg Oral BID    FLUoxetine  10 mg Oral Daily    isosorbide mononitrate  30 mg Oral Daily    levothyroxine  112 mcg Oral Daily    [Held by provider] losartan  50 mg Oral Daily    metoprolol succinate  100 mg Oral Daily    oxybutynin  15 mg Oral Daily    pantoprazole  40 mg Oral QAM AC    pramipexole  0.125 mg Oral Daily    vitamin B-12  500 mcg Oral Daily    insulin lispro  0-12 Units Subcutaneous TID WC    insulin lispro  0-6 Units Subcutaneous Nightly    sodium chloride flush  10 mL Intravenous 2 times per day    heparin (porcine)  5,000 Units Subcutaneous 3 times per day    cefTRIAXone (ROCEPHIN) IV  1,000 mg Intravenous Q24H     Continuous Infusions:   dextrose      sodium chloride       PRN Meds:.metoprolol, sodium citrate, sodium citrate, perflutren lipid microspheres, hydrALAZINE, oxyCODONE-acetaminophen, albuterol sulfate HFA, ALPRAZolam, glucose, dextrose, glucagon (rDNA), dextrose, sodium chloride flush, sodium chloride, magnesium sulfate, ondansetron **OR** ondansetron, magnesium hydroxide, acetaminophen **OR** acetaminophen, morphine    Physical Exam:    VITALS:  BP (!) 164/76   Pulse 93   Temp 97.5 °F (36.4 °C) (Oral)   Resp 18   Ht 5' 5\" (1.651 m)   Wt 212 lb 15.4 oz (96.6 kg)   LMP 03/19/1980   SpO2 93%   BMI 35.44 kg/m²   24HR INTAKE/OUTPUT:      Intake/Output Summary (Last 24 hours) at 6/24/2021 1445  Last data filed at 6/24/2021 1339  Gross per 24 hour   Intake 3091.5 ml   Output 3900 ml   Net -808.5 ml     Constitutional: Slightly disoriented.     Skin: Skin color, texture, turgor normal. No rashes or lesions    Head: Normocephalic, without obvious abnormality, atraumatic     Cardiovascular/Edema: S1, S2 with 2/6 systolic murmur    Respiratory:  clear to auscultation bilaterally    Abdomen: soft, non-tender; bowel sounds normal; no masses,  no organomegaly    Back: Left costovertebral angle tenderness    Extremities: extremities normal, atraumatic, no cyanosis or edema    Neuro:  Confused but with no acute focal neurologic deficits. CBC:   Recent Labs     06/22/21  0515 06/23/21  0546 06/24/21  0524   WBC 17.0* 12.2* 12.6*   HGB 9.9* 9.5* 10.7*   * 165 207     BMP:    Recent Labs     06/22/21  0515 06/22/21  1504 06/23/21  0546 06/24/21  0524     --  141 140   K 5.3 5.2 4.9 4.4     --  104 100   CO2 18*  --  21 21   BUN 59*  --  55* 45*   CREATININE 5.73*  --  5.24* 4.15*   GLUCOSE 102*  --  148* 171*       Lab Results   Component Value Date    NITRU NEGATIVE 06/22/2021    COLORU YELLOW 06/22/2021    PHUR 6.0 06/22/2021    WBCUA 5 TO 10 06/22/2021    RBCUA 5 TO 10 06/22/2021    MUCUS NOT REPORTED 06/22/2021    TRICHOMONAS NOT REPORTED 06/22/2021    YEAST NOT REPORTED 06/22/2021    BACTERIA NOT REPORTED 06/22/2021    SPECGRAV 1.006 06/22/2021    LEUKOCYTESUR MOD 06/22/2021    UROBILINOGEN Normal 06/22/2021    BILIRUBINUR NEGATIVE 06/22/2021    GLUCOSEU NEGATIVE 06/22/2021    KETUA NEGATIVE 06/22/2021    AMORPHOUS NOT REPORTED 06/22/2021     Urine Creatinine:     Lab Results   Component Value Date    LABCREA 48.7 06/21/2021     IMPRESSION/RECOMMENDATIONS:      1. Acute kidney injury - Most consistent with obstructive nephropathy secondary to kidney stone. She underwent cystoscopy with left ureteral stent insertion 6/21/2021. Plan: Monitor urine output closely. Acute hemodialysis today for 2 hours. Strict input and output documentation. Avoid nephrotoxic agents. Basic metabolic profile daily. 2.  Nephrolithiasis - Patient with family history [sister]. Will perform metabolic stone evaluation as outpatient since this is patient's first episode of kidney stone. 3.  Systemic hypertension - Suboptimally controlled. Increase amlodipine to 10 mg p.o. daily. 4.  E. coli urinary tract infection - complicating nephrolithiasis. Continue IV ceftriaxone 1 g every 24 hours. 5.  Non-anion gap metabolic acidosis - secondary to uremia.  Start sodium bicarbonate 650 mg p.o. twice daily.    Prognosis is guarded. Had prolonged discussion with patient's son and his questions were satisfactorily answered.     Robert Pulido MD FACP  Attending Nephrologist  6/24/2021 2:45 PM

## 2021-06-24 NOTE — PROGRESS NOTES
Progress Note    Patient Name:  Rick Aguirre    :  1950 7:01 AM      SUBJECTIVE       Ms. Reji Carmen  Resting comfortably, does not appear to be in acute distress. Intermittent confusion persists. She knows where she is, but occasionally will make comments that do not make sense. She keeps saying \"I am not following you anywhere I am staying where I am.\"  Patient did receive a QM catheter and there is potential for dialysis treatment today. Creat 4.15 today. Echo completed, EF of 65-70%, grade 2 DD, no WMA, mild TR, mild Pulm hypertension, RVSP 41 mmhg. OBJECTIVE     Vital signs:    BP (!) 174/73   Pulse 86   Temp 98.2 °F (36.8 °C) (Oral)   Resp 18   Ht 5' 5\" (1.651 m)   Wt 215 lb 2.7 oz (97.6 kg)   LMP 1980   SpO2 93%   BMI 35.81 kg/m²  2 L/min      Admit Weight:  210 lb (95.3 kg)    Last 3 weights: Wt Readings from Last 3 Encounters:   21 215 lb 2.7 oz (97.6 kg)   21 200 lb (90.7 kg)   21 202 lb (91.6 kg)       BMI: Body mass index is 35.81 kg/m².     Input/Output:       Intake/Output Summary (Last 24 hours) at 2021 0701  Last data filed at 2021 0602  Gross per 24 hour   Intake 2941.5 ml   Output 4180 ml   Net -1238.5 ml         Exam:     General appearance: awake and moves all ext   Lungs: no rhonchi, no wheezes, no rales  Heart: S1 and S2 no murmur  Abdomen: positive bowel sounds, no bruits, no masses  Extremities: warm and dry, no cyanosis, no clubbing        Laboratory Studies:     CBC:   Recent Labs     21  0515 21  0546 21  0524   WBC 17.0* 12.2* 12.6*   HGB 9.9* 9.5* 10.7*   HCT 30.8* 29.2* 32.0*   MCV 89.9 89.7 87.9   * 165 207     BMP:   Recent Labs     21  0515 21  1504 21  0546 21  0524     --  141 140   K 5.3 5.2 4.9 4.4     --  104 100   CO2 18*  --  21 21   BUN 59*  --  55* 45*   CREATININE 5.73*  --  5.24* 4.15*     PT/INR: No results for input(s): PROTIME, INR in the last 72 hours. APTT: No results for input(s): APTT in the last 72 hours. MAG: No results for input(s): MG in the last 72 hours. D Dimer: No results for input(s): DDIMER in the last 72 hours. Troponin  No results for input(s): TROPONINI in the last 72 hours. No results for input(s): TROPONINT in the last 72 hours. BNP No results for input(s): BNP in the last 72 hours. No results for input(s): PROBNP in the last 72 hours. Pulse Ox:  SpO2  Av.8 %  Min: 92 %  Max: 93 %  Supplemental O2: O2 Flow Rate (L/min): 2 L/min     Current Meds:    amLODIPine  5 mg Oral Daily    hydrALAZINE  10 mg Intravenous Q6H    ferrous sulfate  325 mg Oral Daily with breakfast    aspirin  81 mg Oral Daily    atorvastatin  10 mg Oral Daily    baclofen  10 mg Oral BID    buPROPion  300 mg Oral Daily    busPIRone  15 mg Oral BID    FLUoxetine  10 mg Oral Daily    isosorbide mononitrate  30 mg Oral Daily    levothyroxine  112 mcg Oral Daily    [Held by provider] losartan  50 mg Oral Daily    metoprolol succinate  100 mg Oral Daily    oxybutynin  15 mg Oral Daily    pantoprazole  40 mg Oral QAM AC    pramipexole  0.125 mg Oral Daily    vitamin B-12  500 mcg Oral Daily    insulin lispro  0-12 Units Subcutaneous TID WC    insulin lispro  0-6 Units Subcutaneous Nightly    sodium chloride flush  10 mL Intravenous 2 times per day    heparin (porcine)  5,000 Units Subcutaneous 3 times per day    cefTRIAXone (ROCEPHIN) IV  1,000 mg Intravenous Q24H     Continuous Infusions:    sodium chloride 125 mL/hr at 21 0602    dextrose      sodium chloride              ASSESSMENT     Principal Problem:    Acute cystitis without hematuria  Active Problems:    WERO (acute kidney injury) (HCC)    Hydronephrosis of left kidney    Hydroureter, left    Type 2 diabetes mellitus, without long-term current use of insulin (HCC)    Essential hypertension    Hypothyroidism    Acute infective cystitis  Resolved Problems:    * No resolved hospital problems.  *      PLAN     S/P cystoscopy with left ureteral stent placement 6/22/21    WERO  - QM placed in preporation for dialysis treatment     Intermittent confusion   - questionable encephalopathy - per primary team      ASCVD without current angina  -  Remote stent at [address] test 2019 with no ischemia      preserved left ventricular function, EF 55-60% per echocardiogram 2019     Congenital heart disease with mild congenital pulmonary stenosis.  Last echo with no report of pulmonary stenosis     Mild calcific aortic stenosis.     Calcified ascending aorta.     Carotid disease.   -  Right-sided 50-69%,  Left side no significant plaque/stenosis  -  Follows with vascular     Dyslipidemia.          Stable from a cardiac standpoint at this time.

## 2021-06-24 NOTE — PROGRESS NOTES
Comprehensive Nutrition Assessment    Type and Reason for Visit:  Consult (Poor appetite poor intake. oral nutrition supplement recommendation)    Nutrition Recommendations/Plan: Continue current diet and advance as medically appropriate. Start [employer] with each meal.     Nutrition Assessment:  Patient admitted with diagnosis of acute infective cystitis, s/p cystoscopy with left ureteral stent placement 6/22/21, hemodialysis catheter insertion 6/23/21. Started dialysis 6/24/21. Patient eating poorly, consuming less than 25% of meals. On Full Liquid low potassium diet. Refused offer of Nepro and fortified pudding said don't like milky kind of supplements. Agreed to [employer] with each meal.    Malnutrition Assessment:  Malnutrition Status: At risk for malnutrition (Comment)    Context:  Acute Illness     Findings of the 6 clinical characteristics of malnutrition:  Energy Intake:  7 - 50% or less of estimated energy requirements for 5 or more days  Weight Loss:  No significant weight loss     Body Fat Loss:  No significant body fat loss     Muscle Mass Loss:  No significant muscle mass loss    Fluid Accumulation:  No significant fluid accumulation     Strength:  Not Performed    Estimated Daily Nutrient Needs:  Energy (kcal):  5209-8195 kcal based on [address] Jeor and 1.2-1.3 factor; Weight Used for Energy Requirements:  Current     Protein (g):  114-125 gm based on 2-2.2 gm/kg ideal; Weight Used for Protein Requirements:  Ideal          Nutrition Related Findings:  NO edema. Loss of appetite. Labs and meds reviewed. Abnormal renal labs. First dialysis 6/24/21      Wounds:  Surgical Incision       Current Nutrition Therapies:    ADULT DIET; Full Liquid;  Low Potassium (Less than 3000 mg/day)    Anthropometric Measures:  · Height: 5' 5\" (165.1 cm)  · Current Body Weight: 212 lb 15.4 oz (96.6 kg)   · Admission Body Weight: 207 lb (93.9 kg)    · Usual Body Weight:  (per past weight records 200 - 215 lbs in 1 month likely due to shift in fluids)     · Ideal Body Weight: 125 lbs; % Ideal Body Weight 170.4 %   · BMI: 35.4  · Adjusted Body Weight:  ; No Adjustment   · BMI Categories: Obese Class 2 (BMI 35.0 -39.9)       Nutrition Diagnosis:   · Inadequate oral intake related to renal dysfunction as evidenced by intake 0-25%, dialysis, lab values    Nutrition Interventions:   Food and/or Nutrient Delivery:  Continue Current Diet, Start Oral Nutrition Supplement  Nutrition Education/Counseling:  Education not indicated   Coordination of Nutrition Care:  Continue to monitor while inpatient    Goals:  Meet greater than 75% of estimated nutrition needs       Nutrition Monitoring and Evaluation:   Behavioral-Environmental Outcomes:  None Identified   Food/Nutrient Intake Outcomes:  Diet Advancement/Tolerance, Food and Nutrient Intake, Supplement Intake  Physical Signs/Symptoms Outcomes:  Biochemical Data, Weight, Fluid Status or Edema, Hemodynamic Status     Discharge Planning:     Too soon to determine       Some areas of assessment may be incomplete due to COVID-19 precautions    Reva Enriquez RD, LD  Office phone [phone]

## 2021-06-24 NOTE — PLAN OF CARE
Problem: Pain:  Goal: Pain level will decrease  Description: Pain level will decrease  6/24/2021 0150 by Janeth Foster RN  Outcome: Met This Shift  6/23/2021 1758 by Robbi Munoz RN  Outcome: Ongoing  Goal: Control of acute pain  Description: Control of acute pain  6/24/2021 0150 by Janeth Foster RN  Outcome: Met This Shift  6/23/2021 1758 by Robbi Munoz RN  Outcome: Ongoing  Goal: Control of chronic pain  Description: Control of chronic pain  6/24/2021 0150 by Janeth Foster RN  Outcome: Met This Shift  6/23/2021 1758 by Robbi Munoz RN  Outcome: Ongoing     Problem: Falls - Risk of:  Goal: Will remain free from falls  Description: Will remain free from falls  6/24/2021 0150 by Janeth Foster RN  Outcome: Met This Shift  6/23/2021 1758 by Robbi Munoz RN  Outcome: Ongoing  Goal: Absence of physical injury  Description: Absence of physical injury  6/24/2021 0150 by Janeth Foster RN  Outcome: Met This Shift  6/23/2021 1758 by Robbi Munoz RN  Outcome: Ongoing     Problem: Musculor/Skeletal Functional Status  Goal: Absence of falls  6/24/2021 0150 by Janeth Foster RN  Outcome: Met This Shift  6/23/2021 1758 by Robbi Munoz RN  Outcome: Ongoing     Problem: Skin Integrity:  Goal: Will show no infection signs and symptoms  Description: Will show no infection signs and symptoms  6/24/2021 0150 by Janeth Foster RN  Outcome: Met This Shift  Note: Patient not moving around like she should, patient being repositioned Q2H with pillow support  6/23/2021 1758 by Robbi Munoz RN  Outcome: Ongoing  Goal: Absence of new skin breakdown  Description: Absence of new skin breakdown  6/24/2021 0150 by Janeth Foster RN  Outcome: Met This Shift  6/23/2021 1758 by Robbi Munoz RN  Outcome: Ongoing     Problem: Musculor/Skeletal Functional Status  Goal: Highest potential functional level  6/24/2021 0150 by Janeth Foster RN  Outcome: Not Met This Shift  6/23/2021 1758 by Ervin Hair RN  Outcome: Ongoing

## 2021-06-24 NOTE — PROGRESS NOTES
mayo    Heparin (Porcine)      Other reaction(s): Migraine       Current Meds: metoprolol (LOPRESSOR) injection 5 mg, Q6H PRN  sodium citrate 4 % injection 1.2 mL, PRN  sodium citrate 4 % injection 1.2 mL, PRN  [START ON 6/25/2021] amLODIPine (NORVASC) tablet 10 mg, Daily  perflutren lipid microspheres (DEFINITY) injection 2.2 mg, ONCE PRN  hydrALAZINE (APRESOLINE) injection 10 mg, Q6H  hydrALAZINE (APRESOLINE) injection 10 mg, Q6H PRN  ferrous sulfate (IRON 325) tablet 325 mg, Daily with breakfast  oxyCODONE-acetaminophen (PERCOCET) 5-325 MG per tablet 1 tablet, Q6H PRN  albuterol sulfate  (90 Base) MCG/ACT inhaler 2 puff, Q6H PRN  ALPRAZolam (XANAX) tablet 1 mg, Daily PRN  aspirin EC tablet 81 mg, Daily  atorvastatin (LIPITOR) tablet 10 mg, Daily  baclofen (LIORESAL) tablet 10 mg, BID  buPROPion (WELLBUTRIN XL) extended release tablet 300 mg, Daily  busPIRone (BUSPAR) tablet 15 mg, BID  FLUoxetine (PROZAC) capsule 10 mg, Daily  isosorbide mononitrate (IMDUR) extended release tablet 30 mg, Daily  levothyroxine (SYNTHROID) tablet 112 mcg, Daily  [Held by provider] losartan (COZAAR) tablet 50 mg, Daily  metoprolol succinate (TOPROL XL) extended release tablet 100 mg, Daily  oxybutynin (DITROPAN-XL) extended release tablet 15 mg, Daily  pantoprazole (PROTONIX) tablet 40 mg, QAM AC  pramipexole (MIRAPEX) tablet 0.125 mg, Daily  vitamin B-12 (CYANOCOBALAMIN) tablet 500 mcg, Daily  insulin lispro (HUMALOG) injection vial 0-12 Units, TID WC  insulin lispro (HUMALOG) injection vial 0-6 Units, Nightly  glucose (GLUTOSE) 40 % oral gel 15 g, PRN  dextrose 50 % IV solution, PRN  glucagon (rDNA) injection 1 mg, PRN  dextrose 5 % solution, PRN  sodium chloride flush 0.9 % injection 10 mL, 2 times per day  sodium chloride flush 0.9 % injection 10 mL, PRN  0.9 % sodium chloride infusion, PRN  magnesium sulfate 1000 mg in dextrose 5% 100 mL IVPB, PRN  ondansetron (ZOFRAN-ODT) disintegrating tablet 4 mg, Q8H PRN Or  ondansetron (ZOFRAN) injection 4 mg, Q6H PRN  magnesium hydroxide (MILK OF MAGNESIA) 400 MG/5ML suspension 30 mL, Daily PRN  acetaminophen (TYLENOL) tablet 650 mg, Q6H PRN   Or  acetaminophen (TYLENOL) suppository 650 mg, Q6H PRN  morphine sulfate (PF) injection 2 mg, Q4H PRN  heparin (porcine) injection 5,000 Units, 3 times per day  cefTRIAXone (ROCEPHIN) 1000 mg IVPB in 50 mL D5W minibag, Q24H        Data:     Code Status:  Full Code    Family History   Problem Relation Age of Onset    Breast Cancer Mother         BREAST WITH METS T  LIVER AND LUNG    Other Father         AAA    Heart Disease Father     Asthma Sister     Diabetes Sister         NIDDM    Stroke Brother     Diabetes Brother         NIDDM    Stroke Maternal Grandmother     Asthma Son        Social History     Socioeconomic History    Marital status:      Spouse name: Not on file    Number of children: 1    Years of education: Not on file    Highest education level: Not on file   Occupational History    Not on file   Tobacco Use    Smoking status: Former Smoker     Packs/day: 1.00     Years: 30.00     Pack years: 30.00     Types: Cigarettes     Quit date: 3/19/2004     Years since quittin.2    Smokeless tobacco: Never Used   Vaping Use    Vaping Use: Never used   Substance and Sexual Activity    Alcohol use: Yes     Comment: socially, once a year    Drug use: No    Sexual activity: Not Currently   Other Topics Concern    Not on file   Social History Narrative    Not on file     Social Determinants of Health     Financial Resource Strain:     Difficulty of Paying Living Expenses:    Food Insecurity:     Worried About Running Out of Food in the Last Year:     [address] in the Last Year:    Transportation Needs:     Lack of Transportation (Medical):      Lack of Transportation (Non-Medical):    Physical Activity:     Days of Exercise per Week:     Minutes of Exercise per Session:    Stress:     Feeling of Stress :    Social Connections:     Frequency of Communication with Friends and Family:     Frequency of Social Gatherings with Friends and Family:     Attends Congregation Services:     Active Member of Clubs or Organizations:     Attends Club or Organization Meetings:     Marital Status:    Intimate Partner Violence:     Fear of Current or Ex-Partner:     Emotionally Abused:     Physically Abused:     Sexually Abused:        I/O (24Hr): Intake/Output Summary (Last 24 hours) at 6/24/2021 1504  Last data filed at 6/24/2021 1339  Gross per 24 hour   Intake 3091.5 ml   Output 3900 ml   Net -808.5 ml     Radiology:  CT ABDOMEN PELVIS WO CONTRAST Additional Contrast? None    Result Date: 6/20/2021  1. Left-sided hydroureter and hydronephrosis with associated asymmetrical perinephric and periureteral fat stranding without obstructing calculus noted. Findings likely represents a recently passed stone. 2. Nonobstructing calculi right kidney, largest measuring 5 mm. 3. Small hiatal hernia. 4. Stable ectasia infrarenal abdominal aortic aneurysm measuring 2.7 cm. CT HEAD WO CONTRAST    Result Date: 6/22/2021  No acute intracranial abnormality. Senescent changes including chronic microvascular change and atherosclerotic disease of the major intracranial vessels. XR CHEST PORTABLE    Result Date: 6/23/2021  Right internal jugular central venous catheter terminates over the mid superior vena cava.  Hypoventilated chest.       Labs:  Recent Results (from the past 24 hour(s))   POC Glucose Fingerstick    Collection Time: 06/23/21  4:13 PM   Result Value Ref Range    POC Glucose 145 (H) 65 - 105 mg/dL   POC Glucose Fingerstick    Collection Time: 06/23/21  7:20 PM   Result Value Ref Range    POC Glucose 150 (H) 65 - 105 mg/dL   POC Glucose Fingerstick    Collection Time: 06/23/21  8:19 PM   Result Value Ref Range    POC Glucose 168 (H) 65 - 105 mg/dL   CBC with DIFF    Collection Time: 06/24/21  5:24 AM Result Value Ref Range    WBC 12.6 (H) 3.5 - 11.0 k/uL    RBC 3.64 (L) 4.0 - 5.2 m/uL    Hemoglobin 10.7 (L) 12.0 - 16.0 g/dL    Hematocrit 32.0 (L) 36 - 46 %    MCV 87.9 80 - 100 fL    MCH 29.4 26 - 34 pg    MCHC 33.4 31 - 37 g/dL    RDW 13.6 11.5 - 14.9 %    Platelets 576 526 - 121 k/uL    MPV 8.8 6.0 - 12.0 fL    NRBC Automated NOT REPORTED per 100 WBC    Differential Type NOT REPORTED     Seg Neutrophils 88 (H) 36 - 66 %    Lymphocytes 7 (L) 24 - 44 %    Monocytes 5 1 - 7 %    Eosinophils % 0 0 - 4 %    Basophils 0 0 - 2 %    Immature Granulocytes NOT REPORTED 0 %    Segs Absolute 11.00 (H) 1.3 - 9.1 k/uL    Absolute Lymph # 0.80 (L) 1.0 - 4.8 k/uL    Absolute Mono # 0.60 0.1 - 1.3 k/uL    Absolute Eos # 0.00 0.0 - 0.4 k/uL    Basophils Absolute 0.00 0.0 - 0.2 k/uL    Absolute Immature Granulocyte NOT REPORTED 0.00 - 0.30 k/uL    WBC Morphology NOT REPORTED     RBC Morphology NOT REPORTED     Platelet Estimate NOT REPORTED    Comprehensive Metabolic Panel w/ Reflex to MG    Collection Time: 06/24/21  5:24 AM   Result Value Ref Range    Glucose 171 (H) 70 - 99 mg/dL    BUN 45 (H) 8 - 23 mg/dL    CREATININE 4.15 (H) 0.50 - 0.90 mg/dL    Bun/Cre Ratio NOT REPORTED 9 - 20    Calcium 9.4 8.6 - 10.4 mg/dL    Sodium 140 135 - 144 mmol/L    Potassium 4.4 3.7 - 5.3 mmol/L    Chloride 100 98 - 107 mmol/L    CO2 21 20 - 31 mmol/L    Anion Gap 19 (H) 9 - 17 mmol/L    Alkaline Phosphatase 103 35 - 104 U/L    ALT 11 5 - 33 U/L    AST 13 <32 U/L    Total Bilirubin 0.27 (L) 0.3 - 1.2 mg/dL    Total Protein 7.3 6.4 - 8.3 g/dL    Albumin 3.6 3.5 - 5.2 g/dL    Albumin/Globulin Ratio NOT REPORTED 1.0 - 2.5    GFR Non-African American 11 (L) >60 mL/min    GFR  13 (L) >60 mL/min    GFR Comment          GFR Staging NOT REPORTED    POC Glucose Fingerstick    Collection Time: 06/24/21  7:15 AM   Result Value Ref Range    POC Glucose 160 (H) 65 - 105 mg/dL   Hep B Surf Ag    Collection Time: 06/24/21  8:56 AM   Result Value Ref Range    Hepatitis B Surface Ag NONREACTIVE NONREACTIVE   HEPATITIS B SURFACE ANTIBODY    Collection Time: 21  8:56 AM   Result Value Ref Range    Hep B S Ab <3.50 <10 mIU/mL   POC Glucose Fingerstick    Collection Time: 21  1:12 PM   Result Value Ref Range    POC Glucose 138 (H) 65 - 105 mg/dL       Physical Examination:        Vitals:  BP (!) 164/76   Pulse 93   Temp 97.5 °F (36.4 °C) (Oral)   Resp 18   Ht 5' 5\" (1.651 m)   Wt 212 lb 15.4 oz (96.6 kg)   LMP 1980   SpO2 93%   BMI 35.44 kg/m²   Temp (24hrs), Av.8 °F (36.6 °C), Min:97.4 °F (36.3 °C), Max:98.3 °F (36.8 °C)    Recent Labs     21  1920 21  2019 21  0715 21  1312   POCGLU 150* 168* 160* 138*         Physical Exam  Vitals reviewed. HENT:      Head: Normocephalic. Right Ear: External ear normal.      Left Ear: External ear normal.      Nose: Nose normal.      Mouth/Throat:      Mouth: Mucous membranes are moist.      Pharynx: Oropharynx is clear. Eyes:      Conjunctiva/sclera: Conjunctivae normal.   Cardiovascular:      Rate and Rhythm: Normal rate and regular rhythm. Pulses: Normal pulses. Heart sounds: Normal heart sounds. Pulmonary:      Effort: Pulmonary effort is normal.      Breath sounds: Normal breath sounds. Abdominal:      General: Bowel sounds are normal.      Palpations: Abdomen is soft. Tenderness: There is no abdominal tenderness. There is no left CVA tenderness. Musculoskeletal:         General: No deformity. Cervical back: Normal range of motion and neck supple. Right lower leg: No edema. Left lower leg: No edema. Skin:     General: Skin is warm. Capillary Refill: Capillary refill takes less than 2 seconds. Coloration: Skin is not jaundiced. Neurological:      Mental Status: She is alert. Cranial Nerves: No cranial nerve deficit. Sensory: No sensory deficit. Motor: No weakness.       Comments: Oriented to place person    Psychiatric:         Mood and Affect: Mood normal.         Behavior: Behavior normal.         Assessment:        Primary Problem  Acute cystitis without hematuria     Principal Problem:    Acute cystitis without hematuria  Active Problems:    WERO (acute kidney injury) ([facility] [address]    Hydronephrosis of left kidney    Hydroureter, left    Type 2 diabetes mellitus, without long-term current use of insulin (HCC)    Essential hypertension    Hypothyroidism    Acute infective cystitis  Resolved Problems:    * No resolved hospital problems. *      Past Medical History:   Diagnosis Date    Aortic valve stenosis     mild per chart    Arthritis     CAD (coronary artery disease) 2000    1 STENT    Diabetes mellitus ([facility] [address]     Heart murmur     1962    Hyperlipidemia 2004    ON RX    MI, old     states many and they were mild    Pulmonary stenosis 1995    Pulmonary valve stenosis     mild/congenital per chart    Sciatica     right leg    Thyroid disease 2004    HYPOTHYROIDISM ON RX    Wears glasses         Plan:        1. Rocephin 1 g IV daily  2. Norvasc 10 mg p.o. daily  3. Metoprolol 100 mg XL p.o. daily  4. Hydralazine 10 mg every 6 hours IV  5. Continue Imdur  6. Blood culture no growth to date  7. Nephrology input noted  8. Monitor CBC CMP  1. PPI  2. DVT Prophylaxis Heparin subcu  3. EPCs  4. PT/OT to evaluate and treat  5. Pain control  6. Replace electrolytes as per sliding scale  7. Home medications reviewed and appropriate medications continued  8.  Reviewed labs and imaging studies from last 24 hours and results explained to patient      Electronically signed by Doreen Wheat MD

## 2021-06-24 NOTE — PROGRESS NOTES
HEMODIALYSIS PRE-TREATMENT NOTE    Patient Identifiers prior to treatment:band, name and birthdate    Isolation Required: no                     Isolation Type: none       (please document if patient is being managed as a PUI/COVID-19 patient)        Hepatitis status:                           Date Drawn                             Result  Hepatitis B Surface Antigen na     na                  Hepatitis B Surface Antibody na na        Hepatitis B Core Antibody na na          How was Hepatitis Status verified: na     Was a copy of the labs you documented provided to facility for the patient's chart:na    Hemodialysis orders verified: yes    Access Within normal limits ( I.e. s/s of infection,...): yes    Pre-Assessment completed: yes    Pre-dialysis report received from: Winnie Davidson                      Time: 1000

## 2021-06-24 NOTE — CARE COORDINATION
DISCHARGE PLANNING NOTE:    Plan is for this patient to return to home. On 6/22, PT/OT recs SNF - Will need updated PT/OT notes    Family agreeable to dialysis and had mishel placed and 1st dialysis treatment today. LSW notified to follow for outpatient dialysis slot and possible SNF.      Electronically signed by Papo Aldana RN on 6/24/2021 at 1:30 PM

## 2021-06-24 NOTE — CONSULTS
NEUROLOGY MENTAL STATUS PROGRESS NOTES  Marilyn Mora  1950    Chief Complaint: Acute mental status changes    ASSESSMENT and PLAN:  June [address] Emelina Garcia presents with altered mental status secondary to uremic encephalopathy. Her EEG showed triphasic waves which are consistent with metabolic encephalopathy secondary kidney or liver dysfunction. Her nurse noted that her mental status significantly improved this afternoon after hemodialysis. Discussed with her nurse    Spent 30 minutes face to face encounter with the patient and 30 minutes in counseling her sister regarding diagnosis, work-up and management and prognosis, and in documentation, medical decision making and coordination of care. HISTORY OF PRESENT ILLNESS  Marilyn Mora,  70 y.o. yr old female, with past medical history of CAD, DM and HTN, referred by Dr. Mónica Alas for altered mental status. She was admitted on 6/20/21 for acute flank pain and was diagnosed with WERO secondary to obstructive nephropathy from a kidney stone. She underwent cystoscopy with left ureteral stent insertion on 6/21/2021. Her urine culture grew E. Coli and she was started on Ceftriaxone. She was noted to be intermittently confused during this admission. Personally reviewed and interpreted: CT HEAD WO CONTRAST: No acute infarct; moderate cv small vessel disease, atherosclerosis of major intracranial arteries. No current facility-administered medications on file prior to encounter.      Current Outpatient Medications on File Prior to Encounter   Medication Sig Dispense Refill    losartan (COZAAR) 50 MG tablet TAKE ONE TABLET BY MOUTH DAILY      buPROPion (WELLBUTRIN XL) 300 MG extended release tablet TAKE ONE TABLET BY MOUTH DAILY      FLUoxetine (PROZAC) 10 MG capsule TAKE ONE CAPSULE BY MOUTH DAILY      metFORMIN (GLUCOPHAGE) 1000 MG tablet TAKE ONE TABLET BY MOUTH TWICE A DAY WITH MEALS      clotrimazole (LOTRIMIN) 1 % vaginal cream Place 1 applicator vaginally 2 times daily      oxybutynin (DITROPAN-XL) 10 MG extended release tablet TAKE ONE TABLET BY MOUTH DAILY      pramipexole (MIRAPEX) 0.125 MG tablet TAKE ONE TABLET BY MOUTH DAILY      ascorbic acid (VITAMIN C) 1000 MG tablet Take 1,000 mg by mouth daily      albuterol sulfate  (90 Base) MCG/ACT inhaler INHALE TWO PUFFS BY MOUTH EVERY 4 TO 6 HOURS AS NEEDED FOR WHEEZING      nystatin (NYAMYC) 340557 UNIT/GM powder APPLY TO AFFECTED AREA(S) FOUR TIMES A DAY      busPIRone (BUSPAR) 15 MG tablet Take 15 mg by mouth 2 times daily      isosorbide mononitrate (IMDUR) 30 MG extended release tablet Take 1 tablet by mouth daily 30 tablet 3    pantoprazole (PROTONIX) 40 MG tablet Take 1 tablet by mouth every morning (before breakfast) 30 tablet 3    baclofen (LIORESAL) 10 MG tablet Take 10 mg by mouth 2 times daily      fluticasone (FLONASE) 50 MCG/ACT nasal spray 1 spray by Nasal route daily      levocetirizine (XYZAL) 5 MG tablet Take 5 mg by mouth nightly      nystatin (MYCOSTATIN) 760956 UNIT/GM cream Apply topically 2 times daily Apply topically 2 times daily.  oxybutynin (DITROPAN XL) 15 MG extended release tablet Take 15 mg by mouth daily      levothyroxine (SYNTHROID) 112 MCG tablet TAKE ONE TABLET BY MOUTH DAILY      atorvastatin (LIPITOR) 40 MG tablet       nitroGLYCERIN (NITROSTAT) 0.4 MG SL tablet Place 0.4 mg under the tongue every 5 minutes as needed for Chest pain up to max of 3 total doses. If no relief after 1 dose, call 911.  Cholecalciferol (VITAMIN D3) 2000 UNITS CAPS Take 1 capsule by mouth daily.  celecoxib (CELEBREX) 200 MG capsule Take 200 mg by mouth 2 times daily.  Multiple Vitamins-Minerals (THERAPEUTIC MULTIVITAMIN-MINERALS) tablet Take 1 tablet by mouth daily.  ALPRAZolam (XANAX) 1 MG tablet Take 1 mg by mouth as needed for Anxiety.  aspirin 81 MG EC tablet Take 81 mg by mouth daily.  LAST DOSE 9/8/14      metoprolol (TOPROL-XL) 100 MG XL tablet Take 100 mg by mouth daily.  FLUoxetine (PROZAC) 20 MG capsule Take 20 mg by mouth daily       vitamin B-12 (CYANOCOBALAMIN) 500 MCG tablet Take 500 mcg by mouth daily. Allergies   Allergen Reactions    Pcn [Penicillins] Swelling and Hives    Heparin Other (See Comments)     MIGRAINE      Lamotrigine Other (See Comments), Itching, Nausea Only and Rash    Cyclobenzaprine      Dry mouth, jitters    Heparin (Porcine)      Other reaction(s): Migraine       Past Medical History:   Diagnosis Date    Aortic valve stenosis     mild per chart    Arthritis     CAD (coronary artery disease) 2000    1 STENT    Diabetes mellitus ([address]     Heart murmur     1962    Hyperlipidemia 2004    ON RX    MI, old     states many and they were mild    Pulmonary stenosis 1995    Pulmonary valve stenosis     mild/congenital per chart    Sciatica     right leg    Thyroid disease 2004    HYPOTHYROIDISM ON RX    Wears glasses        Past Surgical History:   Procedure Laterality Date    CERVICAL FUSION  1993    CORONARY ANGIOPLASTY WITH STENT PLACEMENT  2000 1 STENT    CYSTOSCOPY Left 6/21/2021    CYSTOSCOPY URETERAL STENT INSERTION performed by Cristian Mohan MD at [address] Left 03/21/2014    [place] ARTHOPLASTY LEFT THUMB    HYSTERECTOMY  1980    WITH RT SALPINGOOPHERECTOMY    JOINT REPLACEMENT Bilateral 1994    PARTIAL-KNEES    OTHER SURGICAL HISTORY Right 09/23/2014    thumb repair    SALPINGO-OOPHORECTOMY Left 1987    SHOULDER SURGERY Left 1993    SPURS    TOE OSTEOTOMY Right 6/8/2016    Right 5th digit adductory wedge osteotomy with K wire fixation        Social History     Socioeconomic History    Marital status:       Spouse name: Not on file    Number of children: 1    Years of education: Not on file    Highest education level: Not on file   Occupational History    Not on file   Tobacco Use    Smoking status: Former Smoker     Packs/day: 1.00     Years: 30.00     Pack years: 30.00     Types: Cigarettes     Quit date: 3/19/2004     Years since quittin.2    Smokeless tobacco: Never Used   Vaping Use    Vaping Use: Never used   Substance and Sexual Activity    Alcohol use: Yes     Comment: socially, once a year    Drug use: No    Sexual activity: Not Currently   Other Topics Concern    Not on file   Social History Narrative    Not on file     Social Determinants of Health     Financial Resource Strain:     Difficulty of Paying Living Expenses:    Food Insecurity:     Worried About Running Out of Food in the Last Year:     [address] in the Last Year:    Transportation Needs:     Lack of Transportation (Medical):      Lack of Transportation (Non-Medical):    Physical Activity:     Days of Exercise per Week:     Minutes of Exercise per Session:    Stress:     Feeling of Stress :    Social Connections:     Frequency of Communication with Friends and Family:     Frequency of Social Gatherings with Friends and Family:     Attends [employer] Services:     Active Member of Clubs or Organizations:     Attends Club or Organization Meetings:     Marital Status:    Intimate Partner Violence:     Fear of Current or Ex-Partner:     Emotionally Abused:     Physically Abused:     Sexually Abused:        Family History   Problem Relation Age of Onset    Breast Cancer Mother         BREAST WITH METS T  LIVER AND LUNG    Other Father         AAA    Heart Disease Father     Asthma Sister     Diabetes Sister         NIDDM    Stroke Brother     Diabetes Brother         NIDDM    Stroke Maternal Grandmother     Asthma Son        Unable to obtain ROS due to patient's mental status     Physical Exam    Vitals:    21 0739   BP:    Pulse: 85   Resp:    Temp:    SpO2: 92%        NEUROLOGIC EXAMINATION  Mental Status: Obtunded, short attention span and concentration; language output is at times incoherent; occasionally answered questions incorrectly; followed simple commands; (+) bradyphrenia  Cranial Nerves: 3 mm BRTL; intact VF; full EOM's; no facial asymmetry, midline tongue  Motor and Sensory: spastic BUE, Moves all extremities spontaneously (barely lifts against gravity) but slowly and symmetrically; left asterixis  Reflexes: no Babinski  Cerebellar and Gait cannot be assessed due to the patient's altered mental status     6/24/2021 05:24   Sodium 140   Potassium 4.4   Chloride 100   CO2 21   BUN 45 (H)   Creatinine 4.15 (H)   Anion Gap 19 (H)   GFR Non-African American 11 (L)   Glucose 171 (H)   Calcium 9.4   Total Protein 7.3   Albumin 3.6   Alk Phos 103   ALT 11   AST 13   Bilirubin 0.27 (L)      6/22/2021 19:59   Color, UA YELLOW   Turbidity UA CLOUDY (A)   Glucose, UA NEGATIVE   Bilirubin, Urine NEGATIVE   Ketones, Urine NEGATIVE   Specific Gravity, UA 1.006   pH, UA 6.0   Protein, UA 1+ (A)   Urobilinogen, Urine Normal   Nitrite, Urine NEGATIVE   Leukocyte Esterase, Urine MOD (A)     Culture Abnormal  06/21/2021  4:25 PM [place] [address] 10 to 50,000 CFU/ML    Anay Garcia MD

## 2021-06-24 NOTE — PROGRESS NOTES
Dialysis Post Treatment Report:     -Access Assessment  WNL     -Ultrafiltration   UF Goal 1500   Prime (-) 500   NS Flush (-)    Other (-/+)    Total UF Removed 1000     -Medications / Blood Administration  Medications Given (Y/N) no   Blood Products (Y/N)      Narrative: Tolerated tx well. VSS. Denies any complaints.

## 2021-06-24 NOTE — PROGRESS NOTES
Physical Therapy    Facility/Department: [facility] PROGRESSIVE CARE   Assessment    NAME: Manju Mora  : 1950  MRN: [id number]    Date of Service: 2021    Discharge Recommendations:  Patient would benefit from continued therapy after discharge        Assessment   Body structures, Functions, Activity limitations: Decreased functional mobility ; Decreased balance;Decreased cognition;Decreased endurance  Assessment: pt has decreased tolerance to activit; decreased cognition however, this has improved  REQUIRES PT FOLLOW UP: Yes  Activity Tolerance  Activity Tolerance: Patient limited by cognitive status; Patient limited by endurance       Patient Diagnosis(es): The primary encounter diagnosis was Pyelonephritis. Diagnoses of Acute renal failure, unspecified acute renal failure type ([address] and Hydronephrosis, unspecified hydronephrosis type were also pertinent to this visit. has a past medical history of Aortic valve stenosis, Arthritis, CAD (coronary artery disease), Diabetes mellitus ([address] Heart murmur, Hyperlipidemia, MI, old, Pulmonary stenosis, Pulmonary valve stenosis, Sciatica, Thyroid disease, and Wears glasses. has a past surgical history that includes Hysterectomy (); Salpingo-oophorectomy (Left, ); shoulder surgery (Left, ); cervical fusion (); joint replacement (Bilateral, ); Coronary angioplasty with stent (); Finger surgery (Left, 2014); other surgical history (Right, 2014); Toe Osteotomy (Right, 2016); and Cystoscopy (Left, 2021).     Restrictions  Restrictions/Precautions  Restrictions/Precautions: Fall Risk, Up as Tolerated  Required Braces or Orthoses?: No  Implants present? : Metal implants (cardiac stent, bilateral partial knees, cervical fusion)        Subjective  General  Patient assessed for rehabilitation services?: Yes  Family / Caregiver Present: Yes  Follows Commands: Impaired (pt does not always understand directions given)  Subjective  Subjective: pt able to answer home set-up questions initially but became fatigued and son finished. pt's son states that she is much less confussed today  Pain Screening  Patient Currently in Pain: Denies  Vital Signs  Patient Currently in Pain: Denies       Orientation  Orientation  Orientation Level: Oriented to place;Oriented to person  Social/Functional History  Social/Functional History  Lives With: Son  Type of Home: House  Home Layout: Two level, Bed/Bath upstairs (~12 steps to 2nd floor w/R HR)  Home Access: Stairs to enter without rails  Entrance Stairs - Number of Steps: 4 with two pillars  Bathroom Shower/Tub: Tub/Shower unit  Bathroom Toilet: Handicap height  Bathroom Equipment:  (NO DME)  Bathroom Accessibility: Walker accessible (\"tight fit\")  Home Equipment:  (NO DME)  Receives Help From: Family  ADL Assistance: Independent  Homemaking Assistance: Independent  Homemaking Responsibilities: Yes  Ambulation Assistance: Independent  Transfer Assistance: Independent  Active : Yes  Occupation: Retired  Additional Comments: Pt's son is on disability and able to A as needed at discharge. Objective    AROM RLE (degrees)  RLE AROM: WFL  AROM LLE (degrees)  LLE AROM : WFL  Strength RLE  Strength RLE: WFL  Strength Other  Other: unable to perform MMT due to pt not able to follow instructions        Bed mobility  Bridging: Contact guard assistance  Rolling to Right: Maximum assistance  Supine to Sit: 2 Person assistance;Maximum assistance  Sit to Supine: 2 Person assistance;Maximum assistance  Scooting: Moderate assistance  Comment: pt sat edge of bed unsupported for 15min  Transfers  Sit to Stand: Moderate Assistance;2 Person Assistance  Stand to sit: 2 Person Assistance; Moderate Assistance  Ambulation  Ambulation?: No (pt stood bedside briefly with 2 assist and to 2 shuffling side-steps towards head of bed)     Balance  Sitting - Static: Fair;- (CGA-Debbie)  Sitting - Dynamic: Fair;- (CGA-Debbie)  Comments: pt has mild posterior lean needing tactile assist to correct        Plan   Plan  Times per week: 5-7 treatments/ week  Times per day:  (5-7 treatments/ week)  Specific instructions for Next Treatment: Will need to be reassessed when more alert and cooperative.   Current Treatment Recommendations: Strengthening, Safety Education & Training, ROM, Balance Training, Endurance Training, Patient/Caregiver Education & Training, Equipment Evaluation, Education, & procurement, Functional Mobility Training, Transfer Training, Gait Training, Positioning, Cognitive Reorientation  Safety Devices  Type of devices: Bed alarm in place, Call light within reach, Patient at risk for falls, Left in bed      Goals  Short term goals  Time Frame for Short term goals: 5-7 treatments/ week  Short term goal 1: pt to tolerate 1/2 hour of therapuetic exercise  Short term goal 2: pt to demonstrate good technique for LE strengthening, balance activities and energy conservation techniques  Short term goal 3: pt to demonstrate 3/5 strength or better for LEs  Short term goal 4: pt to demonstrate rolling in bed w/ min x 1 for position change  Short term goal 5: advance to dangling at the EOB w/ min x 1 and progress sitting tolerance to 5 minutes  Patient Goals   Patient goals : unable to state a goal       Therapy Time   Individual Concurrent Group Co-treatment   Time In 1302 (5624)         Time Out 1329 (7260)         Minutes 27(15)          total min: 1800 [facility],

## 2021-06-24 NOTE — PROGRESS NOTES
Dialysis Safety Checks:    Patient ID Verified (Y/N) yes     -Hepatitis Test                   Date      Result  Hepatitis B Surface Antigen   21 pend     Hepatitis B Surface Antibody 21 pend     Hepatitis B Core Antibody        -Treatment Initiation  Blood Vol Processed Goal (Liters)  Pump Speed x Treatment Hours x 60 Minutes    Target Fluid Removal 1000     * Intra-treatment documented Safety Checks include the followin) Access and face visible at all times. 2) All connections and blood lines are secure with no kinks. 3) NVL alarm engaged. 4) Hemosafe device applied (if applicable). 5) No collapse of Arterial or Venous blood chambers. 6) All blood lines / pump segments in the air detectors. --------------------------------------------------------------------------------    Patient mentation confusion. VSS. Denies any complaints. First tx today.

## 2021-06-24 NOTE — PLAN OF CARE
Nutrition Problem #1: Inadequate oral intake  Intervention: Food and/or Nutrient Delivery: Continue Current Diet, Start Oral Nutrition Supplement  Nutritional Goals: Meet greater than 75% of estimated nutrition needs

## 2021-06-24 NOTE — PROGRESS NOTES
[address]    Occupational Therapy Evaluation  Date: 21  Patient Name: River Abbott       Room:   MRN: [id number]  Account: [de-identified]   : 1950  (75 y.o.) Gender: female     Discharge Recommendations:  Further Occupational Therapy is recommended upon facility discharge. Equipment Needed:  (TBD)    Referring Practitioner: Dr. Fern Lane  Diagnosis: Acute infective cystitis    Treatment Diagnosis: Impaired self-care status  Past Medical History:  has a past medical history of Aortic valve stenosis, Arthritis, CAD (coronary artery disease), Diabetes mellitus ([address] Heart murmur, Hyperlipidemia, MI, old, Pulmonary stenosis, Pulmonary valve stenosis, Sciatica, Thyroid disease, and Wears glasses. Past Surgical History:   has a past surgical history that includes Hysterectomy (); Salpingo-oophorectomy (Left, ); shoulder surgery (Left, ); cervical fusion (); joint replacement (Bilateral, ); Coronary angioplasty with stent (); Finger surgery (Left, 2014); other surgical history (Right, 2014); Toe Osteotomy (Right, 2016); and Cystoscopy (Left, 2021). Restrictions  Restrictions/Precautions: Fall Risk, Up as Tolerated  Implants present? : Metal implants (cardiac stent, B partial knees, cervical fusion)  Other position/activity restrictions: Pt started on HD this date  Required Braces or Orthoses?: No     Vitals  Temp: 97.5 °F (36.4 °C)  Pulse: 93  Resp: 18  BP: (!) 164/76  Height: 5' 5\" (165.1 cm)  Weight: 212 lb 15.4 oz (96.6 kg)  BMI (Calculated): 35.5  Oxygen Therapy  SpO2: 93 %  Pulse Oximeter Device Mode: Intermittent  Pulse Oximeter Device Location: Finger  O2 Device: None (Room air)  O2 Flow Rate (L/min): 2 L/min  Level of Consciousness: Responds to Voice (1)    Subjective  Subjective: \"Am I on the bed? \"  Comments: Pt still having episodes of confusion.  While sitting EOB pt frequently asked questions indicating that she didn't know what position she was in (lying/sitting/standing) or where she was (chair vs bed vs ??). Overall Orientation Status: Impaired  Orientation Level: Oriented to place, Oriented to person, Disoriented to time, Disoriented to situation  Vision  Vision: Impaired  Vision Exceptions: Wears glasses at all times  Hearing  Hearing: Within functional limits  Social/Functional History  Lives With: Son  Type of Home: House  Home Layout: Two level, Bed/Bath upstairs (~12 steps to 2nd floor w/R HR)  Home Access: Stairs to enter without rails  Entrance Stairs - Number of Steps: 4 with two pillars  Bathroom Shower/Tub: Tub/Shower unit  Bathroom Toilet: Handicap height  Bathroom Equipment:  (NO DME)  Bathroom Accessibility: Walker accessible (\"tight fit\")  Home Equipment:  (NO DME)  Receives Help From: Family  ADL Assistance: Independent  Homemaking Assistance: Independent  Homemaking Responsibilities: Yes  Ambulation Assistance: Independent  Transfer Assistance: Independent  Active : Yes  Occupation: Retired  Additional Comments: Pt's son is on disability and able to A as needed at discharge. Objective  Vision - Basic Assessment  Patient Visual Report: No visual complaint reported. Cognition  Overall Cognitive Status: Impaired  Arousal/Alertness: Inconsistent responses to stimuli  Attention Span: Difficulty attending to directions  Memory: Decreased recall of recent events  Following Commands:  Follows one step commands with repetition (Inconsistent, occasional needs tactile/visual cues )  Safety Judgement: Decreased awareness of need for safety  Awareness of Errors: Assistance required to identify errors made  Insights: Decreased awareness of deficits  Additional Comments: Pt initially able to answer PLOF questions with Min prompting/correcting from son, but after ~5 minutes pt started to fatigue and was unable to continue, becoming increasingly distracted and confused   Perception  Overall Perceptual Status: Impaired  Motor Planning: Hand over hand to sequence tasks  Sensation  Overall Sensation Status: WFL   ADL  Feeding: Maximum assistance (Pt able to hold can/drink from straw; unable to feed self)  Grooming: Dependent/Total  UE Bathing: Dependent/Total  LE Bathing: Dependent/Total  UE Dressing: Dependent/Total  LE Dressing: Dependent/Total  Toileting: Dependent/Total (Cazares catheter)  Additional Comments: Pt demo'd physical ability to complete tasks more IND but currently requiring extensive A due to decreased cognition, decreased coordination, and general weakness and fatigue. UE Function  Hand Dominance  Hand Dominance: Right     LUE Strength  L Hand General: 4+/5  LUE Strength Comment: Unable to formally assess 2* pt's decreased cognition/inability to follow directions; Observed grossly intact     LUE Tone: Normotonic     LUE AROM (degrees)  LUE General AROM: Difficult to fully assess 2* pt's decreased cognition, at EOB pt demo'd ~50% active shoulder ROM with reaching; Elbow observed WF     Left Hand AROM (degrees)  Left Hand AROM: L  Left Hand General AROM: Pt tends to hold hands in fists but did demo ability to fully extend fingers with prompting/tactile cues     RUE Strength  R Hand General: 4+/5  RUE Strength Comment: Unable to formally assess 2* pt's decreased cognition/inability to follow directions;  Observed grossly intact      RUE Tone: Normotonic     RUE AROM (degrees)  RUE General AROM: Difficult to fully assess 2* pt's decreased cognition, at EOB pt demo'd ~50% active shoulder ROM with reaching; Elbow observed [facility]     Right Hand AROM (degrees)  Right Hand AROM: WFL  Right Hand General AROM: Pt tends to hold hands in fists but did demo ability to fully extend fingers with prompting/tactile cues    Fine Motor Skills  Coordination  Movements Are Fluid And Coordinated: No  Coordination and Movement description: Fine motor impairments, Gross motor impairments, Decreased speed, Decreased accuracy, Right UE, Left UE (Holds hands in fists; limited reach at shoulder)     Mobility  Supine to Sit: Maximum assistance, 2 Person assistance  Sit to Supine: Maximum assistance, 2 Person assistance       Balance  Sitting Balance: Contact guard assistance (SBA/CGA at EOB, posterior lean)  Standing Balance: Moderate assistance (x2)     Bed mobility  Bridging: Contact guard assistance  Rolling to Right: Maximum assistance  Supine to Sit: Maximum assistance;2 Person assistance  Sit to Supine: Maximum assistance;2 Person assistance  Scooting: Moderate assistance     Transfers  Stand Step Transfers: Moderate assistance, 2 Person assistance (Pt took 2 small shuffle steps at EOB)  Sit to stand: Moderate assistance, 2 Person assistance  Stand to sit: Moderate assistance, 2 Person assistance  Functional Activity Tolerance  Functional Activity Tolerance: Tolerates 10 - 20 min exercise with multiple rests  Additional Comments: Sat EOB for ~15 minutes with SBA/CGA, posterior lean   Assessment  Performance deficits / Impairments: Decreased functional mobility , Decreased ADL status, Decreased ROM, Decreased strength, Decreased safe awareness, Decreased cognition, Decreased endurance, Decreased balance, Decreased high-level IADLs, Decreased fine motor control, Decreased coordination  Treatment Diagnosis: Impaired self-care status  Prognosis: Good  Decision Making: Medium Complexity  REQUIRES OT FOLLOW UP: Yes  Discharge Recommendations: Patient would benefit from continued therapy after discharge  Activity Tolerance: Patient limited by fatigue, Treatment limited secondary to decreased cognition    Goals  Patient Goals   Patient goals : Pt reports that she wants to go home. Short term goals  Time Frame for Short term goals: By Discharge  Short term goal 1: Pt will complete bed mobility with Max A x1 and tolerate sitting EOB for 10-15 minutes unsupported with no LOB while participating in a dynamic functional task.   Short term goal 2: Pt will complete eating/grooming tasks with Min A using AE/modified techniques as needed. Short term goal 3: Pt will actively participate in self-care routine and complete bathing/dressing tasks with Mod A and Good safety using AE if appropriate. Short term goal 4: Pt will complete toilet transfer and toileting with Mod A x1 and Good safety using least restrictive device. Short term goal 5: Pt will actively participate in 15-20 minutes of therapeutic exercise/activity to promote increased independence and safety with self-care and mobility. Plan  Safety Devices  Safety Devices in place: Yes  Type of devices: Patient at risk for falls, Gait belt, Left in bed, Call light within reach, Bed alarm in place, Nurse notified     Plan  Times per week: 5-7  Times per day: Daily  Current Treatment Recommendations: Strengthening, ROM, Balance Training, Functional Mobility Training, Endurance Training, Cognitive Reorientation, Safety Education & Training, Patient/Caregiver Education & Training, Equipment Evaluation, Education, & procurement, Self-Care / ADL, Cognitive/Perceptual Training    Equipment Recommendations  Equipment Needed:  (TBD)     06/24/21 0935 06/24/21 1302   OT Individual Minutes   Time In [address] [place] [address]   Time Out [id number]   Minutes 19 23   Time Code Minutes    Timed Code Treatment Minutes  --  15 Minutes   Unable to finish evaluation in AM 2* pt being taken to HD.   Electronically signed by Damon Box on 6/24/21 at 3:45 PM EDT

## 2021-06-24 NOTE — FLOWSHEET NOTE
06/24/21 0007   Vital Signs   Temp 98.2 °F (36.8 °C)   Temp Source Oral   Pulse 92   Heart Rate Source Monitor   Resp 18   BP (!) 190/70   MAP (mmHg) 105   Patient Position Supine   Level of Consciousness Responds to Voice (1)   MEWS Score 1   Oxygen Therapy   SpO2 93 %   Pulse Oximeter Device Mode Intermittent   Pulse Oximeter Device Location Finger   O2 Device None (Room air)   Dr. Dorothy Frost notified, order for PRN IV Lopressor 5mg Q6H for SBP >160

## 2021-06-24 NOTE — PROCEDURES
EEG    Reason for EEG: Altered mental status      TECHNICAL DESCRIPTION  This is a 21 channel digital EEG recording with the patient awake and asleepElectrodes were placed in accordance with the 10-20 International System of Electrode Placement. Single lead EKG monitoring was included    Waking background activity consists of medium voltage 4-5 theta Hz activity admixed with high voltage 2-3 delta Hz activity. Low voltage fast frequency beta activity is seen anteriorly. During drowsiness, there is attenuation of background activity. Vertex sharp waves are seen during sleep. Intermittent bursts of generalized triphasic waves are seen. Hyperventilation is not performed. Photic stimulation is unremarkable. There are no epileptiform disharges seen. IMPRESSION  The awake and asleep EEG showed moderate-severe diffuse slowing of background activity associated with triphasic waves consistent of a moderate to severe diffuse cerebral dysfunction probably secondary to metabolic encephalopathy. There is no electrophysiologic evidence of epileptiform activity. EKG rhythm was regular.     David Guerra MD

## 2021-06-24 NOTE — PLAN OF CARE
Problem: Pain:  Goal: Pain level will decrease  Description: Pain level will decrease  Outcome: Ongoing   Pt denies pain this shift. Will continue to monitor. Problem: Falls - Risk of:  Goal: Will remain free from falls  Description: Will remain free from falls  Outcome: Ongoing   No falls noted this shift. Problem: Musculor/Skeletal Functional Status  Goal: Highest potential functional level  Outcome: Ongoing   Pt to get up with therapy, but pt had to go to dialysis. Pt to continue therapy 6/25. Problem: Nutrition  Goal: Optimal nutrition therapy  Outcome: Ongoing   Pt has increased appetite this shift. Will continue to monitor. Problem: Skin Integrity:  Goal: Will show no infection signs and symptoms  Description: Will show no infection signs and symptoms  Outcome: Ongoing  No new s/s of infection. Pt continues on iv atb. Problem: Skin Integrity:  Goal: Absence of new skin breakdown  Description: Absence of new skin breakdown  Outcome: Ongoing    No new altered skin noted this shift. Will continue to monitor.

## 2021-06-24 NOTE — FLOWSHEET NOTE
Dr. Scott Evans notified of high BP      06/23/21 1915   Vital Signs   BP (!) 190/75   and HD cath placement. He started to start IV Hydralazine 10mg Q6 sched and to continue 1/2 NS @ 125ml/hr.  He stated to hold off on HD until the AM. Electronically signed by Alfred Miguel RN on 6/23/2021 at 9:07 PM

## 2021-06-25 LAB
ABSOLUTE EOS #: 0.1 K/UL (ref 0–0.4)
ABSOLUTE IMMATURE GRANULOCYTE: ABNORMAL K/UL (ref 0–0.3)
ABSOLUTE LYMPH #: 1.3 K/UL (ref 1–4.8)
ABSOLUTE MONO #: 1.1 K/UL (ref 0.1–1.3)
ALBUMIN SERPL-MCNC: 3.4 G/DL (ref 3.5–5.2)
ALBUMIN/GLOBULIN RATIO: ABNORMAL (ref 1–2.5)
ALP BLD-CCNC: 85 U/L (ref 35–104)
ALT SERPL-CCNC: 9 U/L (ref 5–33)
ANION GAP SERPL CALCULATED.3IONS-SCNC: 14 MMOL/L (ref 9–17)
AST SERPL-CCNC: 18 U/L
BASOPHILS # BLD: 1 % (ref 0–2)
BASOPHILS ABSOLUTE: 0.1 K/UL (ref 0–0.2)
BILIRUB SERPL-MCNC: 0.24 MG/DL (ref 0.3–1.2)
BUN BLDV-MCNC: 28 MG/DL (ref 8–23)
BUN/CREAT BLD: ABNORMAL (ref 9–20)
CALCIUM SERPL-MCNC: 8.9 MG/DL (ref 8.6–10.4)
CHLORIDE BLD-SCNC: 99 MMOL/L (ref 98–107)
CO2: 25 MMOL/L (ref 20–31)
CREAT SERPL-MCNC: 2.95 MG/DL (ref 0.5–0.9)
DIFFERENTIAL TYPE: ABNORMAL
EOSINOPHILS RELATIVE PERCENT: 1 % (ref 0–4)
GFR AFRICAN AMERICAN: 19 ML/MIN
GFR NON-AFRICAN AMERICAN: 16 ML/MIN
GFR SERPL CREATININE-BSD FRML MDRD: ABNORMAL ML/MIN/{1.73_M2}
GFR SERPL CREATININE-BSD FRML MDRD: ABNORMAL ML/MIN/{1.73_M2}
GLUCOSE BLD-MCNC: 146 MG/DL (ref 65–105)
GLUCOSE BLD-MCNC: 156 MG/DL (ref 65–105)
GLUCOSE BLD-MCNC: 164 MG/DL (ref 70–99)
GLUCOSE BLD-MCNC: 235 MG/DL (ref 65–105)
GLUCOSE BLD-MCNC: 252 MG/DL (ref 65–105)
HCT VFR BLD CALC: 30.8 % (ref 36–46)
HEMOGLOBIN: 10.4 G/DL (ref 12–16)
IMMATURE GRANULOCYTES: ABNORMAL %
LYMPHOCYTES # BLD: 13 % (ref 24–44)
MAGNESIUM: 1.5 MG/DL (ref 1.6–2.6)
MCH RBC QN AUTO: 29.6 PG (ref 26–34)
MCHC RBC AUTO-ENTMCNC: 33.8 G/DL (ref 31–37)
MCV RBC AUTO: 87.8 FL (ref 80–100)
MONOCYTES # BLD: 11 % (ref 1–7)
NRBC AUTOMATED: ABNORMAL PER 100 WBC
PDW BLD-RTO: 13.6 % (ref 11.5–14.9)
PLATELET # BLD: 203 K/UL (ref 150–450)
PLATELET ESTIMATE: ABNORMAL
PMV BLD AUTO: 8.5 FL (ref 6–12)
POTASSIUM SERPL-SCNC: 3.2 MMOL/L (ref 3.7–5.3)
POTASSIUM SERPL-SCNC: 3.6 MMOL/L (ref 3.7–5.3)
RBC # BLD: 3.51 M/UL (ref 4–5.2)
RBC # BLD: ABNORMAL 10*6/UL
SEG NEUTROPHILS: 74 % (ref 36–66)
SEGMENTED NEUTROPHILS ABSOLUTE COUNT: 7.4 K/UL (ref 1.3–9.1)
SODIUM BLD-SCNC: 138 MMOL/L (ref 135–144)
TOTAL PROTEIN: 6.5 G/DL (ref 6.4–8.3)
WBC # BLD: 9.9 K/UL (ref 3.5–11)
WBC # BLD: ABNORMAL 10*3/UL

## 2021-06-25 PROCEDURE — 90935 HEMODIALYSIS ONE EVALUATION: CPT

## 2021-06-25 PROCEDURE — 6360000002 HC RX W HCPCS: Performed by: UROLOGY

## 2021-06-25 PROCEDURE — 6370000000 HC RX 637 (ALT 250 FOR IP): Performed by: UROLOGY

## 2021-06-25 PROCEDURE — 80053 COMPREHEN METABOLIC PANEL: CPT

## 2021-06-25 PROCEDURE — 6370000000 HC RX 637 (ALT 250 FOR IP): Performed by: FAMILY MEDICINE

## 2021-06-25 PROCEDURE — 36415 COLL VENOUS BLD VENIPUNCTURE: CPT

## 2021-06-25 PROCEDURE — 85025 COMPLETE CBC W/AUTO DIFF WBC: CPT

## 2021-06-25 PROCEDURE — 6370000000 HC RX 637 (ALT 250 FOR IP): Performed by: INTERNAL MEDICINE

## 2021-06-25 PROCEDURE — 82947 ASSAY GLUCOSE BLOOD QUANT: CPT

## 2021-06-25 PROCEDURE — 83735 ASSAY OF MAGNESIUM: CPT

## 2021-06-25 PROCEDURE — 2580000003 HC RX 258: Performed by: UROLOGY

## 2021-06-25 PROCEDURE — 2500000003 HC RX 250 WO HCPCS: Performed by: FAMILY MEDICINE

## 2021-06-25 PROCEDURE — 99231 SBSQ HOSP IP/OBS SF/LOW 25: CPT | Performed by: PSYCHIATRY & NEUROLOGY

## 2021-06-25 PROCEDURE — 6360000002 HC RX W HCPCS: Performed by: INTERNAL MEDICINE

## 2021-06-25 PROCEDURE — 2060000000 HC ICU INTERMEDIATE R&B

## 2021-06-25 PROCEDURE — 84132 ASSAY OF SERUM POTASSIUM: CPT

## 2021-06-25 RX ORDER — POTASSIUM CHLORIDE 7.45 MG/ML
10 INJECTION INTRAVENOUS PRN
Status: DISCONTINUED | OUTPATIENT
Start: 2021-06-25 | End: 2021-06-27

## 2021-06-25 RX ORDER — POTASSIUM CHLORIDE 20 MEQ/1
40 TABLET, EXTENDED RELEASE ORAL PRN
Status: DISCONTINUED | OUTPATIENT
Start: 2021-06-25 | End: 2021-06-27

## 2021-06-25 RX ORDER — MAGNESIUM SULFATE 1 G/100ML
1000 INJECTION INTRAVENOUS
Status: DISCONTINUED | OUTPATIENT
Start: 2021-06-25 | End: 2021-06-25

## 2021-06-25 RX ORDER — ALPRAZOLAM 0.5 MG/1
0.5 TABLET ORAL 3 TIMES DAILY PRN
Status: DISCONTINUED | OUTPATIENT
Start: 2021-06-25 | End: 2021-07-02 | Stop reason: HOSPADM

## 2021-06-25 RX ORDER — POTASSIUM CHLORIDE 20 MEQ/1
40 TABLET, EXTENDED RELEASE ORAL ONCE
Status: COMPLETED | OUTPATIENT
Start: 2021-06-25 | End: 2021-06-25

## 2021-06-25 RX ADMIN — FLUOXETINE 10 MG: 10 CAPSULE ORAL at 08:45

## 2021-06-25 RX ADMIN — HEPARIN SODIUM 5000 UNITS: 5000 INJECTION INTRAVENOUS; SUBCUTANEOUS at 13:44

## 2021-06-25 RX ADMIN — BUSPIRONE HYDROCHLORIDE 15 MG: 15 TABLET ORAL at 20:52

## 2021-06-25 RX ADMIN — METOPROLOL SUCCINATE 100 MG: 100 TABLET, EXTENDED RELEASE ORAL at 13:50

## 2021-06-25 RX ADMIN — HEPARIN SODIUM 5000 UNITS: 5000 INJECTION INTRAVENOUS; SUBCUTANEOUS at 20:52

## 2021-06-25 RX ADMIN — HYDRALAZINE HYDROCHLORIDE 10 MG: 20 INJECTION INTRAMUSCULAR; INTRAVENOUS at 13:50

## 2021-06-25 RX ADMIN — PANTOPRAZOLE SODIUM 40 MG: 40 TABLET, DELAYED RELEASE ORAL at 06:13

## 2021-06-25 RX ADMIN — MAGNESIUM SULFATE HEPTAHYDRATE 1000 MG: 1 INJECTION, SOLUTION INTRAVENOUS at 06:51

## 2021-06-25 RX ADMIN — CYANOCOBALAMIN TAB 500 MCG 500 MCG: 500 TAB at 08:42

## 2021-06-25 RX ADMIN — ISOSORBIDE MONONITRATE 30 MG: 30 TABLET, EXTENDED RELEASE ORAL at 08:43

## 2021-06-25 RX ADMIN — BUPROPION HYDROCHLORIDE 300 MG: 300 TABLET, FILM COATED, EXTENDED RELEASE ORAL at 08:43

## 2021-06-25 RX ADMIN — BUSPIRONE HYDROCHLORIDE 15 MG: 15 TABLET ORAL at 08:43

## 2021-06-25 RX ADMIN — METOPROLOL TARTRATE 5 MG: 1 INJECTION, SOLUTION INTRAVENOUS at 00:58

## 2021-06-25 RX ADMIN — POTASSIUM CHLORIDE 40 MEQ: 1500 TABLET, EXTENDED RELEASE ORAL at 13:45

## 2021-06-25 RX ADMIN — HEPARIN SODIUM 5000 UNITS: 5000 INJECTION INTRAVENOUS; SUBCUTANEOUS at 06:13

## 2021-06-25 RX ADMIN — AMLODIPINE BESYLATE 10 MG: 10 TABLET ORAL at 13:50

## 2021-06-25 RX ADMIN — FERROUS SULFATE TAB 325 MG (65 MG ELEMENTAL FE) 325 MG: 325 (65 FE) TAB at 08:43

## 2021-06-25 RX ADMIN — INSULIN LISPRO 2 UNITS: 100 INJECTION, SOLUTION INTRAVENOUS; SUBCUTANEOUS at 13:44

## 2021-06-25 RX ADMIN — ALPRAZOLAM 0.5 MG: 0.5 TABLET ORAL at 14:53

## 2021-06-25 RX ADMIN — ASPIRIN 81 MG: 81 TABLET, COATED ORAL at 08:43

## 2021-06-25 RX ADMIN — HYDRALAZINE HYDROCHLORIDE 10 MG: 20 INJECTION INTRAMUSCULAR; INTRAVENOUS at 03:36

## 2021-06-25 RX ADMIN — INSULIN LISPRO 2 UNITS: 100 INJECTION, SOLUTION INTRAVENOUS; SUBCUTANEOUS at 08:49

## 2021-06-25 RX ADMIN — MAGNESIUM SULFATE HEPTAHYDRATE 1000 MG: 1 INJECTION, SOLUTION INTRAVENOUS at 08:50

## 2021-06-25 RX ADMIN — BACLOFEN 10 MG: 10 TABLET ORAL at 20:52

## 2021-06-25 RX ADMIN — PRAMIPEXOLE DIHYDROCHLORIDE 0.12 MG: 0.12 TABLET ORAL at 08:45

## 2021-06-25 RX ADMIN — SODIUM CHLORIDE, PRESERVATIVE FREE 10 ML: 5 INJECTION INTRAVENOUS at 20:54

## 2021-06-25 RX ADMIN — INSULIN LISPRO 3 UNITS: 100 INJECTION, SOLUTION INTRAVENOUS; SUBCUTANEOUS at 20:52

## 2021-06-25 RX ADMIN — CEFTRIAXONE SODIUM 1000 MG: 1 INJECTION, POWDER, FOR SOLUTION INTRAMUSCULAR; INTRAVENOUS at 13:44

## 2021-06-25 RX ADMIN — LEVOTHYROXINE SODIUM 112 MCG: 0.11 TABLET ORAL at 06:13

## 2021-06-25 RX ADMIN — ATORVASTATIN CALCIUM 10 MG: 10 TABLET, FILM COATED ORAL at 08:43

## 2021-06-25 RX ADMIN — OXYBUTYNIN CHLORIDE 15 MG: 10 TABLET, EXTENDED RELEASE ORAL at 08:43

## 2021-06-25 RX ADMIN — SODIUM CHLORIDE, PRESERVATIVE FREE 10 ML: 5 INJECTION INTRAVENOUS at 08:44

## 2021-06-25 RX ADMIN — BACLOFEN 10 MG: 10 TABLET ORAL at 08:43

## 2021-06-25 RX ADMIN — INSULIN LISPRO 4 UNITS: 100 INJECTION, SOLUTION INTRAVENOUS; SUBCUTANEOUS at 17:56

## 2021-06-25 ASSESSMENT — PAIN SCALES - GENERAL
PAINLEVEL_OUTOF10: 0

## 2021-06-25 ASSESSMENT — ENCOUNTER SYMPTOMS
RECTAL PAIN: 0
STRIDOR: 0
EYE DISCHARGE: 0
APNEA: 0
SORE THROAT: 0
ANAL BLEEDING: 0
CHEST TIGHTNESS: 0
EYE PAIN: 0

## 2021-06-25 NOTE — PROGRESS NOTES
Patient already obtained 2G of magnesium this shift. Notified Dr. Lorin Amador Ok to d/c the additional two grams. Also, patient is to have oral potassium when she comes back from Dialysis. Pt then is to have a potassium recheck one hour post oral administration per Dr. Lorin Scherer.

## 2021-06-25 NOTE — PROGRESS NOTES
HEMODIALYSIS POST TREATMENT NOTE    Treatment time ordered: 150min    Actual treatment time: 147 min    UltraFiltration Goal: 1000  UltraFiltration Removed: 988      Pre Treatment weight: 96kg  Post Treatment weight: 95.1kg  Estimated Dry Weight:na    Access used:     Central Venous Catheter:          Tunneled or Non-tunneled: non           Site: Rt neck          Access Flow: good       Internal Access:       AV Fistula or AV Graft: na         Site: na       Access Flow: na       Sign and symptoms of infection: no       If YES: na    Medications or blood products given:none    Chronic outpatient schedule: na    Chronic outpatient unit: na    Summary of response to treatment: Pt tolerated Tx but had anxiety and states. Pt system clotted with 3 min letf and arterial side was rinsed back Dr Sandy Herring notified. Nothing further. Explain if orders NOT met, was physician notified:ileana      ACES flowsheet faxed to patient unit/ placed in patient chart: na    Post assessment completed: yes    Report given to: Annie Hargrove documented Safety Checks include the followin) Access and face visible at all times. 2) All connections and blood lines are secure with no kinks. 3) NVL alarm engaged. 4) Hemosafe device applied (if applicable). 5) No collapse of Arterial or Venous blood chambers. 6) All blood lines / pump segments in the air detectors.

## 2021-06-25 NOTE — PLAN OF CARE
Problem: Pain:  Goal: Pain level will decrease  Description: Pain level will decrease  Outcome: Ongoing   Pt denies pain this shift. Will continue to monitor. Problem: Falls - Risk of:  Goal: Will remain free from falls  Description: Will remain free from falls  Outcome: Ongoing   No falls noted this shift. Problem: Musculor/Skeletal Functional Status  Goal: Highest potential functional level  Outcome: Ongoing   Pt to get up with therapy, but pt had to go to dialysis. Pt to continue therapy 6/25. Problem: Nutrition  Goal: Optimal nutrition therapy  Outcome: Ongoing   Pt has increased appetite this shift. Will continue to monitor. Problem: Skin Integrity:  Goal: Will show no infection signs and symptoms  Description: Will show no infection signs and symptoms  Outcome: Ongoing  No new s/s of infection. Pt continues on iv atb. Problem: Skin Integrity:  Goal: Absence of new skin breakdown  Description: Absence of new skin breakdown  Outcome: Ongoing    No new altered skin noted this shift. Will continue to monitor. 0 = understands/communicates without difficulty

## 2021-06-25 NOTE — PROGRESS NOTES
HEMODIALYSIS PRE-TREATMENT NOTE    Patient Identifiers prior to treatment: name and     Isolation Required: no                      Isolation Type: na       (please document if patient is being managed as a PUI/COVID-19 patient)        Hepatitis status:                           Date Drawn                             Result  Hepatitis B Surface Antigen 21     neg                     Hepatitis B Surface Antibody 21 neg        Hepatitis B Core Antibody 21 neg          How was Hepatitis Status verified: labbs     Was a copy of the labs you documented provided to facility for the patient's chart: yes    Hemodialysis orders verified: yes    Access Within normal limits ( I.e. s/s of infection,...): yes     Pre-Assessment completed: yes    Pre-dialysis report received from: Cyn Ulrich                      Time: 01

## 2021-06-25 NOTE — PROGRESS NOTES
Nutrition Note    Pt does not like Magic Cup supplements. She is requesting Ensure Clear.  Will modify supplements per pt's request.    Electronically signed by Alexandro De Souza RD, LD on 6/25/21 at 1:46 PM EDT    Contact: [phone]

## 2021-06-25 NOTE — PROGRESS NOTES
Progress Note    6/25/2021   1:47 PM    Name:  Annie López  MRN:    [id number]     Acct:     [de-identified]   Room:  211/2114-01   Day: 5     Admit Date: 6/20/2021 10:18 AM  PCP: Naty Smart DO    Subjective:     C/C:   Chief Complaint   Patient presents with    Flank Pain    Urinary Frequency       Interval History: Status: not changed. patient is doing better she is more alert and oriented today her flank pain is better she did get 2nd treatment of hemodialysis today. ROS:   all 10 systems reviewed and are negative except as noted    Review of Systems   Constitutional: Negative for appetite change and fatigue. HENT: Negative for congestion, postnasal drip and sore throat. Eyes: Negative for pain and discharge. Respiratory: Negative for apnea, chest tightness and stridor. Cardiovascular: Negative for palpitations. Gastrointestinal: Negative for anal bleeding and rectal pain. Endocrine: Negative for polydipsia and polyphagia. Genitourinary: Negative for decreased urine volume, flank pain and hematuria. Musculoskeletal: Negative for joint swelling and neck stiffness. Skin: Negative for rash. Allergic/Immunologic: Negative for food allergies. Neurological: Negative for seizures, facial asymmetry and speech difficulty. Hematological: Does not bruise/bleed easily. Psychiatric/Behavioral: Negative for behavioral problems and suicidal ideas. The patient is not hyperactive. Medications: Allergies:    Allergies   Allergen Reactions    Pcn [Penicillins] Swelling and Hives    Heparin Other (See Comments)     MIGRAINE      Lamotrigine Other (See Comments), Itching, Nausea Only and Rash    Cyclobenzaprine      Dry mouth, jitters    Heparin (Porcine)      Other reaction(s): Migraine       Current Meds: potassium chloride (KLOR-CON M) extended release tablet 40 mEq, PRN   Or  potassium bicarb-citric acid (EFFER-K) effervescent tablet 40 mEq, PRN   Or  potassium chloride 10 mEq/100 mL IVPB (Peripheral Line), PRN  metoprolol (LOPRESSOR) injection 5 mg, Q6H PRN  sodium citrate 4 % injection 1.2 mL, PRN  sodium citrate 4 % injection 1.2 mL, PRN  amLODIPine (NORVASC) tablet 10 mg, Daily  perflutren lipid microspheres (DEFINITY) injection 2.2 mg, ONCE PRN  hydrALAZINE (APRESOLINE) injection 10 mg, Q6H  hydrALAZINE (APRESOLINE) injection 10 mg, Q6H PRN  ferrous sulfate (IRON 325) tablet 325 mg, Daily with breakfast  oxyCODONE-acetaminophen (PERCOCET) 5-325 MG per tablet 1 tablet, Q6H PRN  albuterol sulfate  (90 Base) MCG/ACT inhaler 2 puff, Q6H PRN  ALPRAZolam (XANAX) tablet 1 mg, Daily PRN  aspirin EC tablet 81 mg, Daily  atorvastatin (LIPITOR) tablet 10 mg, Daily  baclofen (LIORESAL) tablet 10 mg, BID  buPROPion (WELLBUTRIN XL) extended release tablet 300 mg, Daily  busPIRone (BUSPAR) tablet 15 mg, BID  FLUoxetine (PROZAC) capsule 10 mg, Daily  isosorbide mononitrate (IMDUR) extended release tablet 30 mg, Daily  levothyroxine (SYNTHROID) tablet 112 mcg, Daily  [Held by provider] losartan (COZAAR) tablet 50 mg, Daily  metoprolol succinate (TOPROL XL) extended release tablet 100 mg, Daily  oxybutynin (DITROPAN-XL) extended release tablet 15 mg, Daily  pantoprazole (PROTONIX) tablet 40 mg, QAM AC  pramipexole (MIRAPEX) tablet 0.125 mg, Daily  vitamin B-12 (CYANOCOBALAMIN) tablet 500 mcg, Daily  insulin lispro (HUMALOG) injection vial 0-12 Units, TID WC  insulin lispro (HUMALOG) injection vial 0-6 Units, Nightly  glucose (GLUTOSE) 40 % oral gel 15 g, PRN  dextrose 50 % IV solution, PRN  glucagon (rDNA) injection 1 mg, PRN  dextrose 5 % solution, PRN  sodium chloride flush 0.9 % injection 10 mL, 2 times per day  sodium chloride flush 0.9 % injection 10 mL, PRN  0.9 % sodium chloride infusion, PRN  magnesium sulfate 1000 mg in dextrose 5% 100 mL IVPB, PRN  ondansetron (ZOFRAN-ODT) disintegrating tablet 4 mg, Q8H PRN   Or  ondansetron (ZOFRAN) injection 4 mg, Q6H PRN  magnesium hydroxide (MILK OF MAGNESIA) 400 MG/5ML suspension 30 mL, Daily PRN  acetaminophen (TYLENOL) tablet 650 mg, Q6H PRN   Or  acetaminophen (TYLENOL) suppository 650 mg, Q6H PRN  morphine sulfate (PF) injection 2 mg, Q4H PRN  heparin (porcine) injection 5,000 Units, 3 times per day  cefTRIAXone (ROCEPHIN) 1000 mg IVPB in 50 mL D5W minibag, Q24H        Data:     Code Status:  Full Code    Family History   Problem Relation Age of Onset    Breast Cancer Mother         BREAST WITH METS T  LIVER AND LUNG    Other Father         AAA    Heart Disease Father     Asthma Sister     Diabetes Sister         NIDDM    Stroke Brother     Diabetes Brother         NIDDM    Stroke Maternal Grandmother     Asthma Son        Social History     Socioeconomic History    Marital status:      Spouse name: Not on file    Number of children: 1    Years of education: Not on file    Highest education level: Not on file   Occupational History    Not on file   Tobacco Use    Smoking status: Former Smoker     Packs/day: 1.00     Years: 30.00     Pack years: 30.00     Types: Cigarettes     Quit date: 3/19/2004     Years since quittin.2    Smokeless tobacco: Never Used   Vaping Use    Vaping Use: Never used   Substance and Sexual Activity    Alcohol use: Yes     Comment: socially, once a year    Drug use: No    Sexual activity: Not Currently   Other Topics Concern    Not on file   Social History Narrative    Not on file     Social Determinants of Health     Financial Resource Strain:     Difficulty of Paying Living Expenses:    Food Insecurity:     Worried About Running Out of Food in the Last Year:     [address] in the Last Year:    Transportation Needs:     Lack of Transportation (Medical):      Lack of Transportation (Non-Medical):    Physical Activity:     Days of Exercise per Week:     Minutes of Exercise per Session:    Stress:     Feeling of Stress :    Social Connections:     Frequency of Communication with Friends and Family:     Frequency of Social Gatherings with Friends and Family:     Attends Mosque Services:     Active Member of Clubs or Organizations:     Attends Club or Organization Meetings:     Marital Status:    Intimate Partner Violence:     Fear of Current or Ex-Partner:     Emotionally Abused:     Physically Abused:     Sexually Abused:        I/O (24Hr): Intake/Output Summary (Last 24 hours) at 6/25/2021 1347  Last data filed at 6/25/2021 0930  Gross per 24 hour   Intake 550 ml   Output 1875 ml   Net -1325 ml     Radiology:  CT ABDOMEN PELVIS WO CONTRAST Additional Contrast? None    Result Date: 6/20/2021  1. Left-sided hydroureter and hydronephrosis with associated asymmetrical perinephric and periureteral fat stranding without obstructing calculus noted. Findings likely represents a recently passed stone. 2. Nonobstructing calculi right kidney, largest measuring 5 mm. 3. Small hiatal hernia. 4. Stable ectasia infrarenal abdominal aortic aneurysm measuring 2.7 cm. CT HEAD WO CONTRAST    Result Date: 6/22/2021  No acute intracranial abnormality. Senescent changes including chronic microvascular change and atherosclerotic disease of the major intracranial vessels. XR CHEST PORTABLE    Result Date: 6/23/2021  Right internal jugular central venous catheter terminates over the mid superior vena cava.  Hypoventilated chest.       Labs:  Recent Results (from the past 24 hour(s))   POC Glucose Fingerstick    Collection Time: 06/24/21  5:08 PM   Result Value Ref Range    POC Glucose 160 (H) 65 - 105 mg/dL   POC Glucose Fingerstick    Collection Time: 06/24/21  8:08 PM   Result Value Ref Range    POC Glucose 159 (H) 65 - 105 mg/dL   CBC with DIFF    Collection Time: 06/25/21  5:22 AM   Result Value Ref Range    WBC 9.9 3.5 - 11.0 k/uL    RBC 3.51 (L) 4.0 - 5.2 m/uL    Hemoglobin 10.4 (L) 12.0 - 16.0 g/dL    Hematocrit 30.8 (L) 36 - 46 %    MCV 87.8 80 - 100 fL    MCH 29.6 26 - 34 pg    MCHC 33.8 31 - 37 g/dL    RDW 13.6 11.5 - 14.9 %    Platelets 239 065 - 555 k/uL    MPV 8.5 6.0 - 12.0 fL    NRBC Automated NOT REPORTED per 100 WBC    Differential Type NOT REPORTED     Seg Neutrophils 74 (H) 36 - 66 %    Lymphocytes 13 (L) 24 - 44 %    Monocytes 11 (H) 1 - 7 %    Eosinophils % 1 0 - 4 %    Basophils 1 0 - 2 %    Immature Granulocytes NOT REPORTED 0 %    Segs Absolute 7.40 1.3 - 9.1 k/uL    Absolute Lymph # 1.30 1.0 - 4.8 k/uL    Absolute Mono # 1.10 0.1 - 1.3 k/uL    Absolute Eos # 0.10 0.0 - 0.4 k/uL    Basophils Absolute 0.10 0.0 - 0.2 k/uL    Absolute Immature Granulocyte NOT REPORTED 0.00 - 0.30 k/uL    WBC Morphology NOT REPORTED     RBC Morphology NOT REPORTED     Platelet Estimate NOT REPORTED    Comprehensive Metabolic Panel w/ Reflex to MG    Collection Time: 06/25/21  5:22 AM   Result Value Ref Range    Glucose 164 (H) 70 - 99 mg/dL    BUN 28 (H) 8 - 23 mg/dL    CREATININE 2.95 (H) 0.50 - 0.90 mg/dL    Bun/Cre Ratio NOT REPORTED 9 - 20    Calcium 8.9 8.6 - 10.4 mg/dL    Sodium 138 135 - 144 mmol/L    Potassium 3.2 (L) 3.7 - 5.3 mmol/L    Chloride 99 98 - 107 mmol/L    CO2 25 20 - 31 mmol/L    Anion Gap 14 9 - 17 mmol/L    Alkaline Phosphatase 85 35 - 104 U/L    ALT 9 5 - 33 U/L    AST 18 <32 U/L    Total Bilirubin 0.24 (L) 0.3 - 1.2 mg/dL    Total Protein 6.5 6.4 - 8.3 g/dL    Albumin 3.4 (L) 3.5 - 5.2 g/dL    Albumin/Globulin Ratio NOT REPORTED 1.0 - 2.5    GFR Non- 16 (L) >60 mL/min    GFR  19 (L) >60 mL/min    GFR Comment          GFR Staging NOT REPORTED    Magnesium    Collection Time: 06/25/21  5:22 AM   Result Value Ref Range    Magnesium 1.5 (L) 1.6 - 2.6 mg/dL   POC Glucose Fingerstick    Collection Time: 06/25/21  7:07 AM   Result Value Ref Range    POC Glucose 146 (H) 65 - 105 mg/dL   POC Glucose Fingerstick    Collection Time: 06/25/21 11:39 AM   Result Value Ref Range    POC Glucose 156 (H) 65 - 105 mg/dL       Physical Examination:        Vitals:  /67   Pulse 91   Temp 97.8 °F (36.6 °C) (Oral)   Resp 18   Ht 5' 5\" (1.651 m)   Wt 211 lb 10.3 oz (96 kg)   LMP 1980   SpO2 94%   BMI 35.22 kg/m²   Temp (24hrs), Av.8 °F (36.6 °C), Min:97.7 °F (36.5 °C), Max:98.2 °F (36.8 °C)    Recent Labs     21  1708 21  0707 21  1139   POCGLU 160* 159* 146* 156*         Physical Exam  Vitals reviewed. Constitutional:       Appearance: She is well-developed. HENT:      Head: Normocephalic and atraumatic. Nose: Nose normal.   Eyes:      General: Lids are normal.   Neck:      Trachea: No tracheal deviation. Cardiovascular:      Rate and Rhythm: Normal rate and regular rhythm. Heart sounds: Normal heart sounds, S1 normal and S2 normal.   Pulmonary:      Effort: Pulmonary effort is normal. No respiratory distress. Breath sounds: Normal breath sounds. Abdominal:      General: Bowel sounds are normal. There is no distension. Palpations: Abdomen is soft. Tenderness: There is no abdominal tenderness. There is no guarding. Musculoskeletal:         General: No tenderness or deformity. Lymphadenopathy:      Cervical: No cervical adenopathy. Upper Body:      Right upper body: No supraclavicular or epitrochlear adenopathy. Left upper body: No supraclavicular or epitrochlear adenopathy. Skin:     General: Skin is warm and dry. Capillary Refill: Capillary refill takes less than 2 seconds. Neurological:      Mental Status: She is alert. Motor: No abnormal muscle tone or seizure activity.    Psychiatric:         Speech: Speech normal.         Behavior: Behavior normal.         Judgment: Judgment normal.         Assessment:        Primary Problem  Acute cystitis without hematuria     Principal Problem:    Acute cystitis without hematuria  Active Problems:    Acute renal failure (HCC)    Hydronephrosis of left kidney    Hydroureter, left    Type 2 diabetes mellitus, without long-term current use of insulin ([address]    Essential hypertension    Hypothyroidism    Acute infective cystitis    Uremia  Resolved Problems:    * No resolved hospital problems. *      Past Medical History:   Diagnosis Date    Aortic valve stenosis     mild per chart    Arthritis     CAD (coronary artery disease) 2000    1 STENT    Diabetes mellitus ([address]     Heart murmur     1962    Hyperlipidemia 2004    ON RX    MI, old     states many and they were mild    Pulmonary stenosis 1995    Pulmonary valve stenosis     mild/congenital per chart    Sciatica     right leg    Thyroid disease 2004    HYPOTHYROIDISM ON RX    Wears glasses         Plan:        1.  patient is doing well with hemodialysis  2.  will advance diet to soft  3. Rocephin 1 g IV daily  4. Norvasc 10 mg p.o. daily  5. Metoprolol 100 mg XL p.o. daily  6. Hydralazine 10 mg every 6 hours IV  7. Continue Imdur  8. Blood culture no growth to date  9. Nephrology input noted  10. Monitor CBC CMP  1. PPI  2. DVT Prophylaxis Heparin subcu  3. EPCs  4. PT/OT to evaluate and treat  5. Pain control  6. Replace electrolytes as per sliding scale  7. Home medications reviewed and appropriate medications continued  8.  Reviewed labs and imaging studies from last 24 hours and results explained to patient       Electronically signed by Kvng Cisneros MD

## 2021-06-25 NOTE — PROGRESS NOTES
Progress Note    Patient Name:  Trecia Canavan    :  1950 10:01 AM      SUBJECTIVE       Ms. Real Chong  has no chest pain, shortness of breath, palpitations, nausea or vomiting. Per bedside RN she is doing much better today. Patient seem more alert during my assessment, knows where she is and why she is here. She did have her first dialysis treatment yesterday, planning another treatment today. Mag was low this morning, this has been replaced. K has been low as well, being replaced per nephrology recommendations. BP has been elevated, has hydralazine prn. Off home cozaar. OBJECTIVE     Vital signs:    BP (!) 181/77   Pulse 77   Temp 97.7 °F (36.5 °C) (Oral)   Resp 18   Ht 5' 5\" (1.651 m)   Wt 211 lb 10.3 oz (96 kg)   LMP 1980   SpO2 92%   BMI 35.22 kg/m²  2 L/min      Admit Weight:  210 lb (95.3 kg)    Last 3 weights: Wt Readings from Last 3 Encounters:   21 211 lb 10.3 oz (96 kg)   21 200 lb (90.7 kg)   21 202 lb (91.6 kg)       BMI: Body mass index is 35.22 kg/m². Input/Output:       Intake/Output Summary (Last 24 hours) at 2021 1001  Last data filed at 2021 4878  Gross per 24 hour   Intake 250 ml   Output 1875 ml   Net -1625 ml         Exam:     General appearance: awake and alert moves all ext   Lungs: no rhonchi, no wheezes, no rales  Heart: S1 and S2 no murmur  Abdomen: positive bowel sounds, no bruits, no masses  Extremities: warm and dry, no cyanosis, no clubbing        Laboratory Studies:     CBC:   Recent Labs     21  0546 21  0521   WBC 12.2* 12.6* 9.9   HGB 9.5* 10.7* 10.4*   HCT 29.2* 32.0* 30.8*   MCV 89.7 87.9 87.8    207 203     BMP:   Recent Labs     21  0546 21  0524 21  0522    140 138   K 4.9 4.4 3.2*    100 99   CO2    BUN 55* 45* 28*   CREATININE 5.24* 4.15* 2.95*     PT/INR: No results for input(s): PROTIME, INR in the last 72 hours.   APTT: No results for input(s): APTT in the last 72 hours. MAG:   Recent Labs     21  0522   MG 1.5*     D Dimer: No results for input(s): DDIMER in the last 72 hours. Troponin  No results for input(s): TROPONINI in the last 72 hours. No results for input(s): TROPONINT in the last 72 hours. BNP No results for input(s): BNP in the last 72 hours. No results for input(s): PROBNP in the last 72 hours. Pulse Ox: SpO2  Av.7 %  Min: 91 %  Max: 95 %  Supplemental O2: O2 Flow Rate (L/min): 2 L/min     Current Meds:    amLODIPine  10 mg Oral Daily    hydrALAZINE  10 mg Intravenous Q6H    ferrous sulfate  325 mg Oral Daily with breakfast    aspirin  81 mg Oral Daily    atorvastatin  10 mg Oral Daily    baclofen  10 mg Oral BID    buPROPion  300 mg Oral Daily    busPIRone  15 mg Oral BID    FLUoxetine  10 mg Oral Daily    isosorbide mononitrate  30 mg Oral Daily    levothyroxine  112 mcg Oral Daily    [Held by provider] losartan  50 mg Oral Daily    metoprolol succinate  100 mg Oral Daily    oxybutynin  15 mg Oral Daily    pantoprazole  40 mg Oral QAM AC    pramipexole  0.125 mg Oral Daily    vitamin B-12  500 mcg Oral Daily    insulin lispro  0-12 Units Subcutaneous TID WC    insulin lispro  0-6 Units Subcutaneous Nightly    sodium chloride flush  10 mL Intravenous 2 times per day    heparin (porcine)  5,000 Units Subcutaneous 3 times per day    cefTRIAXone (ROCEPHIN) IV  1,000 mg Intravenous Q24H     Continuous Infusions:    dextrose      sodium chloride              ASSESSMENT     Principal Problem:    Acute cystitis without hematuria  Active Problems:    Acute renal failure (HCC)    Hydronephrosis of left kidney    Hydroureter, left    Type 2 diabetes mellitus, without long-term current use of insulin (HCC)    Essential hypertension    Hypothyroidism    Acute infective cystitis    Uremia  Resolved Problems:    * No resolved hospital problems.  *      PLAN       S/P cystoscopy with left ureteral stent placement 6/22/21     WERO  - QM placed in preporation for dialysis treatment      Intermittent confusion   - questionable encephalopathy - per primary team      ASCVD without current angina  -  Remote stent at [address] test 2019 with no ischemia      preserved left ventricular function, EF 55-60% per echocardiogram 2019     Congenital heart disease with mild congenital pulmonary stenosis.  Last echo with no report of pulmonary stenosis     Mild calcific aortic stenosis.     Calcified ascending aorta.     Carotid disease.   -  Right-sided 50-69%,  Left side no significant plaque/stenosis  -  Follows with vascular     Dyslipidemia.     Hypertension   - elevated, has prn hydralazine.    - off home cozaar due to WERO  - will let nephrology determine BP medications with new dialysis         Stable from a cardiac standpoint at this time.

## 2021-06-25 NOTE — CARE COORDINATION
DISCHARGE PLANNING NOTE:    2nd dialysis treatment today. Would need tunnel cath if long-term dialysis is needed. I spoke with patients son yesterday to try and discuss SNF, however son seemed hesitant to discuss this and stated that he wanted to wait until after the weekend and see how patient does and talk with family. LSW notified to follow from a distance.      Electronically signed by Farheen Weeks RN on 6/25/2021 at 3:20 PM

## 2021-06-25 NOTE — PROGRESS NOTES
Department of Internal Medicine  Nephrology Irlanda Ochoa MD   Consult Note    Reason for consultation: Management of acute kidney injury at nephrolithiasis. Consulting physician: Siddhartha Rosenberg MD    Interval history: Patient was seen and examined today on dialysis. Patient is tolerating the treatment well. No nausea, abdominal pain or fever-mishel catheter was placed on 6/23/21  Laboratory studies today were reviewed. Cazares catheter was placed 6/22/2021. Potassium is 3.2 and magnesium 1.5. History of presenting illness: This is a 70 y.o. female with a significant past medical history of Type 2 diabetes mellitus [diagnosed in 2015], Coronary artery disease [s/p PTCA/stents], hyperlipidemia, hypothyroidism, systemic hypertension and Chronic kidney disease stage III [baseline serum creatinine 1.40 mg/dL in November 2018], who presented to the emergency department yesterday with sudden onset of severe left flank pain associated with nausea and vomiting. She had chills but did not have fever. Laboratory studies at presentation revealed serum creatinine 2.7 mg/dL which increased overnight to 5 mg/dL associated with hyperkalemia and serum potassium 5.9 mmol/L.  CT scan of the abdomen and pelvis showed:  1. Left-sided hydroureter and hydronephrosis with associated asymmetrical perinephric and periureteral fat stranding without obstructing calculus noted. Findings likely represents a recently passed stone. 2. Nonobstructing calculi right kidney, largest measuring 5 mm. 3. Small hiatal hernia. 4. Stable ectasia infrarenal abdominal aortic aneurysm measuring 2.7 cm.      Scheduled Meds:   amLODIPine  10 mg Oral Daily    hydrALAZINE  10 mg Intravenous Q6H    ferrous sulfate  325 mg Oral Daily with breakfast    aspirin  81 mg Oral Daily    atorvastatin  10 mg Oral Daily    baclofen  10 mg Oral BID    buPROPion  300 mg Oral Daily    busPIRone  15 mg Oral BID    FLUoxetine  10 mg Oral Daily    isosorbide mononitrate  30 mg Oral Daily    levothyroxine  112 mcg Oral Daily    [Held by provider] losartan  50 mg Oral Daily    metoprolol succinate  100 mg Oral Daily    oxybutynin  15 mg Oral Daily    pantoprazole  40 mg Oral QAM AC    pramipexole  0.125 mg Oral Daily    vitamin B-12  500 mcg Oral Daily    insulin lispro  0-12 Units Subcutaneous TID WC    insulin lispro  0-6 Units Subcutaneous Nightly    sodium chloride flush  10 mL Intravenous 2 times per day    heparin (porcine)  5,000 Units Subcutaneous 3 times per day    cefTRIAXone (ROCEPHIN) IV  1,000 mg Intravenous Q24H     Continuous Infusions:   dextrose      sodium chloride       PRN Meds:.potassium chloride **OR** potassium alternative oral replacement **OR** potassium chloride, metoprolol, sodium citrate, sodium citrate, perflutren lipid microspheres, hydrALAZINE, oxyCODONE-acetaminophen, albuterol sulfate HFA, ALPRAZolam, glucose, dextrose, glucagon (rDNA), dextrose, sodium chloride flush, sodium chloride, magnesium sulfate, ondansetron **OR** ondansetron, magnesium hydroxide, acetaminophen **OR** acetaminophen, morphine    Physical Exam:    VITALS:  BP (!) 134/49   Pulse 71   Temp 97.7 °F (36.5 °C)   Resp 18   Ht 5' 5\" (1.651 m)   Wt 211 lb 10.3 oz (96 kg)   LMP 03/19/1980   SpO2 92%   BMI 35.22 kg/m²   24HR INTAKE/OUTPUT:      Intake/Output Summary (Last 24 hours) at 6/25/2021 1143  Last data filed at 6/25/2021 9676  Gross per 24 hour   Intake 250 ml   Output 1875 ml   Net -1625 ml     Constitutional: Slightly disoriented.     Skin: Skin color, texture, turgor normal. No rashes or lesions    Head: Normocephalic, without obvious abnormality, atraumatic     Cardiovascular/Edema: S1, S2 with 2/6 systolic murmur    Respiratory:  clear to auscultation bilaterally    Abdomen: soft, non-tender; bowel sounds normal; no masses,  no organomegaly    Back: Left costovertebral angle tenderness    Extremities: extremities normal, atraumatic, no cyanosis or edema    Neuro:  Confused but with no acute focal neurologic deficits. CBC:   Recent Labs     06/23/21  0546 06/24/21  0524 06/25/21 0522   WBC 12.2* 12.6* 9.9   HGB 9.5* 10.7* 10.4*    207 203     BMP:    Recent Labs     06/23/21  0546 06/24/21  0524 06/25/21 0522    140 138   K 4.9 4.4 3.2*    100 99   CO2 21 21 25   BUN 55* 45* 28*   CREATININE 5.24* 4.15* 2.95*   GLUCOSE 148* 171* 164*       Lab Results   Component Value Date    NITRU NEGATIVE 06/22/2021    COLORU YELLOW 06/22/2021    PHUR 6.0 06/22/2021    WBCUA 5 TO 10 06/22/2021    RBCUA 5 TO 10 06/22/2021    MUCUS NOT REPORTED 06/22/2021    TRICHOMONAS NOT REPORTED 06/22/2021    YEAST NOT REPORTED 06/22/2021    BACTERIA NOT REPORTED 06/22/2021    SPECGRAV 1.006 06/22/2021    LEUKOCYTESUR MOD 06/22/2021    UROBILINOGEN Normal 06/22/2021    BILIRUBINUR NEGATIVE 06/22/2021    GLUCOSEU NEGATIVE 06/22/2021    KETUA NEGATIVE 06/22/2021    AMORPHOUS NOT REPORTED 06/22/2021     Urine Creatinine:     Lab Results   Component Value Date    LABCREA 48.7 06/21/2021     IMPRESSION/RECOMMENDATIONS:      1. Acute kidney injury - Most consistent with obstructive nephropathy secondary to kidney stone. She underwent cystoscopy with left ureteral stent insertion 6/21/2021. Plan: Monitor urine output closely. Acute hemodialysis today for 2 hours. Strict input and output documentation. Avoid nephrotoxic agents. Basic metabolic profile daily. Continue to hold losartan    2. Nephrolithiasis - Patient with family history [sister]. Will perform metabolic stone evaluation as outpatient since this is patient's first episode of kidney stone. 3.  Systemic hypertension -continue  amlodipine to 10 mg p.o. daily. 4.  E. coli urinary tract infection - complicating nephrolithiasis. Continue IV ceftriaxone 1 g every 24 hours. 5.  Non-anion gap metabolic acidosis - secondary to uremia. Start sodium bicarbonate 650 mg p.o. twice daily. 6.  Hypokalemia and hypomagnesemia-p.o. potassium chloride 40 mEq x 1.   IV magnesium sulfate 2 g          MD MELLISSA Ramirez  Attending Nephrologist  6/25/2021 11:43 AM

## 2021-06-25 NOTE — PROGRESS NOTES
Discussed with nursing staff. Jeffy catheter functioning fine. I will sign off the case. Please call me as needed.

## 2021-06-25 NOTE — PLAN OF CARE
Problem: Pain:  Goal: Pain level will decrease  Description: Pain level will decrease  6/25/2021 0451 by Karthikeyan Gabriel RN  Outcome: Ongoing     Problem: Pain:  Goal: Control of acute pain  Description: Control of acute pain  6/25/2021 0451 by Karthikeyan Gabriel RN  Outcome: Ongoing     Problem: Pain:  Goal: Control of chronic pain  Description: Control of chronic pain  6/25/2021 0451 by Karthikeyan Gabriel RN  Outcome: Ongoing     Problem: Falls - Risk of:  Goal: Will remain free from falls  Description: Will remain free from falls  6/25/2021 0451 by Karthikeyan Gabriel RN  Outcome: Ongoing     Problem: Musculor/Skeletal Functional Status  Goal: Highest potential functional level  6/25/2021 0451 by Karthikeyan Gabriel RN  Outcome: Ongoing     Problem: Skin Integrity:  Goal: Will show no infection signs and symptoms  Description: Will show no infection signs and symptoms  6/25/2021 0451 by Karthikeyan Gabriel RN  Outcome: Ongoing     Problem: Nutrition  Goal: Optimal nutrition therapy  6/25/2021 0451 by Karthikeyan Gabriel RN  Outcome: Ongoing

## 2021-06-25 NOTE — PROGRESS NOTES
NEUROLOGY STROKE PROGRESS  NOTES    Marilyn Mora  1950    Chief Complaint: Altered mental status     ASSESSMENT and PLAN:  Marilyn Mora,  70 y.o. yr old  female,referred for altered mental status more likely secondary to uremic encephalopathy. EEG showed triphasic waves indicative of metabolic encephalopathy, significantly improved    Continue current management    Will sign off. Thank you for the referral.    Spent 15 minutes face to face encounter with the patient and 10 minutes in counseling the patient and her son (diagnosis), and in documentation, medical decision making and coordination of care. INTERVAL HISTORY  She had dialysis again today. Her son confirmed that her thinking is clearer today. She was able to state her birthday to her.      PHYSICAL EXAMINATION  Vitals:    06/25/21 0846   BP: (!) 181/77   Pulse: 77   Resp: 18   Temp: 97.7 °F (36.5 °C)   SpO2: 92%     General: comfortable, pleasant, cooperative    NEUROLOGIC EXAMINATION  Mental Status: Alert, oriented as month and place, impaired attention span and concentration; language output is fluent and coherent, followed all commands; more spontaneous in her conversations  Cranial Nerves: Full EOM's; no facial asymmetry; no dysarthria  Motor: Moves all extremities spontaneously and symmetricallyy      Huan Daniel MD

## 2021-06-26 LAB
ABSOLUTE EOS #: 0.26 K/UL (ref 0–0.4)
ABSOLUTE IMMATURE GRANULOCYTE: ABNORMAL K/UL (ref 0–0.3)
ABSOLUTE LYMPH #: 2.19 K/UL (ref 1–4.8)
ABSOLUTE MONO #: 1.42 K/UL (ref 0.1–1.3)
ALBUMIN SERPL-MCNC: 3.3 G/DL (ref 3.5–5.2)
ALBUMIN/GLOBULIN RATIO: ABNORMAL (ref 1–2.5)
ALP BLD-CCNC: 87 U/L (ref 35–104)
ALT SERPL-CCNC: 10 U/L (ref 5–33)
ANION GAP SERPL CALCULATED.3IONS-SCNC: 9 MMOL/L (ref 9–17)
AST SERPL-CCNC: 15 U/L
BASOPHILS # BLD: 1 % (ref 0–2)
BASOPHILS ABSOLUTE: 0.13 K/UL (ref 0–0.2)
BILIRUB SERPL-MCNC: 0.23 MG/DL (ref 0.3–1.2)
BUN BLDV-MCNC: 22 MG/DL (ref 8–23)
BUN/CREAT BLD: ABNORMAL (ref 9–20)
CALCIUM SERPL-MCNC: 8.7 MG/DL (ref 8.6–10.4)
CHLORIDE BLD-SCNC: 99 MMOL/L (ref 98–107)
CO2: 27 MMOL/L (ref 20–31)
CREAT SERPL-MCNC: 2.49 MG/DL (ref 0.5–0.9)
CULTURE: NORMAL
CULTURE: NORMAL
DIFFERENTIAL TYPE: ABNORMAL
EOSINOPHILS RELATIVE PERCENT: 2 % (ref 0–4)
GFR AFRICAN AMERICAN: 23 ML/MIN
GFR NON-AFRICAN AMERICAN: 19 ML/MIN
GFR SERPL CREATININE-BSD FRML MDRD: ABNORMAL ML/MIN/{1.73_M2}
GFR SERPL CREATININE-BSD FRML MDRD: ABNORMAL ML/MIN/{1.73_M2}
GLUCOSE BLD-MCNC: 154 MG/DL (ref 65–105)
GLUCOSE BLD-MCNC: 158 MG/DL (ref 65–105)
GLUCOSE BLD-MCNC: 186 MG/DL (ref 70–99)
GLUCOSE BLD-MCNC: 197 MG/DL (ref 65–105)
GLUCOSE BLD-MCNC: 208 MG/DL (ref 65–105)
HCT VFR BLD CALC: 30.3 % (ref 36–46)
HEMOGLOBIN: 10 G/DL (ref 12–16)
IMMATURE GRANULOCYTES: ABNORMAL %
LYMPHOCYTES # BLD: 17 % (ref 24–44)
Lab: NORMAL
Lab: NORMAL
MAGNESIUM: 2 MG/DL (ref 1.6–2.6)
MCH RBC QN AUTO: 28.9 PG (ref 26–34)
MCHC RBC AUTO-ENTMCNC: 33.1 G/DL (ref 31–37)
MCV RBC AUTO: 87.5 FL (ref 80–100)
MONOCYTES # BLD: 11 % (ref 1–7)
MORPHOLOGY: ABNORMAL
NRBC AUTOMATED: ABNORMAL PER 100 WBC
PDW BLD-RTO: 13.2 % (ref 11.5–14.9)
PLATELET # BLD: 187 K/UL (ref 150–450)
PLATELET ESTIMATE: ABNORMAL
PMV BLD AUTO: 8.6 FL (ref 6–12)
POTASSIUM SERPL-SCNC: 3.7 MMOL/L (ref 3.7–5.3)
RBC # BLD: 3.46 M/UL (ref 4–5.2)
RBC # BLD: ABNORMAL 10*6/UL
SEG NEUTROPHILS: 69 % (ref 36–66)
SEGMENTED NEUTROPHILS ABSOLUTE COUNT: 8.9 K/UL (ref 1.3–9.1)
SODIUM BLD-SCNC: 135 MMOL/L (ref 135–144)
SPECIMEN DESCRIPTION: NORMAL
SPECIMEN DESCRIPTION: NORMAL
TOTAL PROTEIN: 6.5 G/DL (ref 6.4–8.3)
WBC # BLD: 12.9 K/UL (ref 3.5–11)
WBC # BLD: ABNORMAL 10*3/UL

## 2021-06-26 PROCEDURE — 2580000003 HC RX 258: Performed by: UROLOGY

## 2021-06-26 PROCEDURE — 82947 ASSAY GLUCOSE BLOOD QUANT: CPT

## 2021-06-26 PROCEDURE — 6370000000 HC RX 637 (ALT 250 FOR IP): Performed by: FAMILY MEDICINE

## 2021-06-26 PROCEDURE — 85025 COMPLETE CBC W/AUTO DIFF WBC: CPT

## 2021-06-26 PROCEDURE — 6370000000 HC RX 637 (ALT 250 FOR IP): Performed by: UROLOGY

## 2021-06-26 PROCEDURE — 83735 ASSAY OF MAGNESIUM: CPT

## 2021-06-26 PROCEDURE — 6360000002 HC RX W HCPCS: Performed by: UROLOGY

## 2021-06-26 PROCEDURE — 80053 COMPREHEN METABOLIC PANEL: CPT

## 2021-06-26 PROCEDURE — 36415 COLL VENOUS BLD VENIPUNCTURE: CPT

## 2021-06-26 PROCEDURE — 2060000000 HC ICU INTERMEDIATE R&B

## 2021-06-26 PROCEDURE — 6360000002 HC RX W HCPCS: Performed by: INTERNAL MEDICINE

## 2021-06-26 RX ADMIN — HYDRALAZINE HYDROCHLORIDE 10 MG: 20 INJECTION INTRAMUSCULAR; INTRAVENOUS at 14:30

## 2021-06-26 RX ADMIN — PRAMIPEXOLE DIHYDROCHLORIDE 0.12 MG: 0.12 TABLET ORAL at 07:51

## 2021-06-26 RX ADMIN — HYDRALAZINE HYDROCHLORIDE 10 MG: 20 INJECTION INTRAMUSCULAR; INTRAVENOUS at 21:20

## 2021-06-26 RX ADMIN — HEPARIN SODIUM 5000 UNITS: 5000 INJECTION INTRAVENOUS; SUBCUTANEOUS at 21:20

## 2021-06-26 RX ADMIN — OXYBUTYNIN CHLORIDE 15 MG: 10 TABLET, EXTENDED RELEASE ORAL at 07:43

## 2021-06-26 RX ADMIN — ISOSORBIDE MONONITRATE 30 MG: 30 TABLET, EXTENDED RELEASE ORAL at 07:44

## 2021-06-26 RX ADMIN — HEPARIN SODIUM 5000 UNITS: 5000 INJECTION INTRAVENOUS; SUBCUTANEOUS at 05:58

## 2021-06-26 RX ADMIN — HYDRALAZINE HYDROCHLORIDE 10 MG: 20 INJECTION INTRAMUSCULAR; INTRAVENOUS at 07:43

## 2021-06-26 RX ADMIN — ATORVASTATIN CALCIUM 10 MG: 10 TABLET, FILM COATED ORAL at 07:45

## 2021-06-26 RX ADMIN — METOPROLOL SUCCINATE 100 MG: 100 TABLET, EXTENDED RELEASE ORAL at 07:45

## 2021-06-26 RX ADMIN — LEVOTHYROXINE SODIUM 112 MCG: 0.11 TABLET ORAL at 05:58

## 2021-06-26 RX ADMIN — INSULIN LISPRO 2 UNITS: 100 INJECTION, SOLUTION INTRAVENOUS; SUBCUTANEOUS at 12:47

## 2021-06-26 RX ADMIN — PANTOPRAZOLE SODIUM 40 MG: 40 TABLET, DELAYED RELEASE ORAL at 05:58

## 2021-06-26 RX ADMIN — INSULIN LISPRO 2 UNITS: 100 INJECTION, SOLUTION INTRAVENOUS; SUBCUTANEOUS at 16:41

## 2021-06-26 RX ADMIN — AMLODIPINE BESYLATE 10 MG: 10 TABLET ORAL at 07:44

## 2021-06-26 RX ADMIN — ACETAMINOPHEN 650 MG: 325 TABLET ORAL at 15:40

## 2021-06-26 RX ADMIN — INSULIN LISPRO 2 UNITS: 100 INJECTION, SOLUTION INTRAVENOUS; SUBCUTANEOUS at 21:21

## 2021-06-26 RX ADMIN — BACLOFEN 10 MG: 10 TABLET ORAL at 21:20

## 2021-06-26 RX ADMIN — FLUOXETINE 10 MG: 10 CAPSULE ORAL at 07:51

## 2021-06-26 RX ADMIN — ASPIRIN 81 MG: 81 TABLET, COATED ORAL at 07:44

## 2021-06-26 RX ADMIN — INSULIN LISPRO 2 UNITS: 100 INJECTION, SOLUTION INTRAVENOUS; SUBCUTANEOUS at 07:42

## 2021-06-26 RX ADMIN — BUSPIRONE HYDROCHLORIDE 15 MG: 15 TABLET ORAL at 07:45

## 2021-06-26 RX ADMIN — SODIUM CHLORIDE, PRESERVATIVE FREE 10 ML: 5 INJECTION INTRAVENOUS at 07:43

## 2021-06-26 RX ADMIN — FERROUS SULFATE TAB 325 MG (65 MG ELEMENTAL FE) 325 MG: 325 (65 FE) TAB at 07:44

## 2021-06-26 RX ADMIN — SODIUM CHLORIDE, PRESERVATIVE FREE 10 ML: 5 INJECTION INTRAVENOUS at 21:27

## 2021-06-26 RX ADMIN — HEPARIN SODIUM 5000 UNITS: 5000 INJECTION INTRAVENOUS; SUBCUTANEOUS at 12:47

## 2021-06-26 RX ADMIN — CEFTRIAXONE SODIUM 1000 MG: 1 INJECTION, POWDER, FOR SOLUTION INTRAMUSCULAR; INTRAVENOUS at 12:47

## 2021-06-26 RX ADMIN — CYANOCOBALAMIN TAB 500 MCG 500 MCG: 500 TAB at 07:44

## 2021-06-26 RX ADMIN — BACLOFEN 10 MG: 10 TABLET ORAL at 07:45

## 2021-06-26 RX ADMIN — MAGNESIUM HYDROXIDE 30 ML: 400 SUSPENSION ORAL at 02:30

## 2021-06-26 RX ADMIN — BUPROPION HYDROCHLORIDE 300 MG: 300 TABLET, FILM COATED, EXTENDED RELEASE ORAL at 07:45

## 2021-06-26 RX ADMIN — BUSPIRONE HYDROCHLORIDE 15 MG: 15 TABLET ORAL at 21:20

## 2021-06-26 RX ADMIN — HYDRALAZINE HYDROCHLORIDE 10 MG: 20 INJECTION INTRAMUSCULAR; INTRAVENOUS at 03:09

## 2021-06-26 ASSESSMENT — PAIN SCALES - GENERAL
PAINLEVEL_OUTOF10: 7
PAINLEVEL_OUTOF10: 4
PAINLEVEL_OUTOF10: 6

## 2021-06-26 ASSESSMENT — PAIN DESCRIPTION - PAIN TYPE
TYPE: ACUTE PAIN
TYPE: ACUTE PAIN

## 2021-06-26 ASSESSMENT — PAIN DESCRIPTION - LOCATION: LOCATION: HEAD

## 2021-06-26 NOTE — PROGRESS NOTES
Daily    busPIRone  15 mg Oral BID    FLUoxetine  10 mg Oral Daily    isosorbide mononitrate  30 mg Oral Daily    levothyroxine  112 mcg Oral Daily    [Held by provider] losartan  50 mg Oral Daily    metoprolol succinate  100 mg Oral Daily    oxybutynin  15 mg Oral Daily    pantoprazole  40 mg Oral QAM AC    pramipexole  0.125 mg Oral Daily    vitamin B-12  500 mcg Oral Daily    insulin lispro  0-12 Units Subcutaneous TID WC    insulin lispro  0-6 Units Subcutaneous Nightly    sodium chloride flush  10 mL Intravenous 2 times per day    heparin (porcine)  5,000 Units Subcutaneous 3 times per day    cefTRIAXone (ROCEPHIN) IV  1,000 mg Intravenous Q24H     Continuous Infusions:   dextrose      sodium chloride       PRN Meds:.potassium chloride **OR** potassium alternative oral replacement **OR** potassium chloride, ALPRAZolam, metoprolol, sodium citrate, sodium citrate, perflutren lipid microspheres, hydrALAZINE, oxyCODONE-acetaminophen, albuterol sulfate HFA, glucose, dextrose, glucagon (rDNA), dextrose, sodium chloride flush, sodium chloride, magnesium sulfate, ondansetron **OR** ondansetron, magnesium hydroxide, acetaminophen **OR** acetaminophen, morphine    Physical Exam:    VITALS:  BP (!) 114/51   Pulse 76   Temp 97.7 °F (36.5 °C) (Oral)   Resp 16   Ht 5' 5\" (1.651 m)   Wt 205 lb 0.4 oz (93 kg)   LMP 03/19/1980   SpO2 97%   BMI 34.12 kg/m²   24HR INTAKE/OUTPUT:      Intake/Output Summary (Last 24 hours) at 6/26/2021 1603  Last data filed at 6/26/2021 1317  Gross per 24 hour   Intake 955 ml   Output 475 ml   Net 480 ml     Constitutional: Slightly disoriented.     Skin: Skin color, texture, turgor normal. No rashes or lesions    Head: Normocephalic, without obvious abnormality, atraumatic     Cardiovascular/Edema: S1, S2 with 2/6 systolic murmur    Respiratory:  clear to auscultation bilaterally    Abdomen: soft, non-tender; bowel sounds normal; no masses,  no organomegaly    Back: Left costovertebral angle tenderness    Extremities: extremities normal, atraumatic, no cyanosis or edema    Neuro:  Confused but with no acute focal neurologic deficits. CBC:   Recent Labs     06/24/21  0524 06/25/21  0522 06/26/21  0534   WBC 12.6* 9.9 12.9*   HGB 10.7* 10.4* 10.0*    203 187     BMP:    Recent Labs     06/24/21  0524 06/24/21  0524 06/25/21  0522 06/25/21  1535 06/26/21  0534     --  138  --  135   K 4.4   < > 3.2* 3.6* 3.7     --  99  --  99   CO2 21  --  25  --  27   BUN 45*  --  28*  --  22   CREATININE 4.15*  --  2.95*  --  2.49*   GLUCOSE 171*  --  164*  --  186*    < > = values in this interval not displayed. Lab Results   Component Value Date    NITRU NEGATIVE 06/22/2021    COLORU YELLOW 06/22/2021    PHUR 6.0 06/22/2021    WBCUA 5 TO 10 06/22/2021    RBCUA 5 TO 10 06/22/2021    MUCUS NOT REPORTED 06/22/2021    TRICHOMONAS NOT REPORTED 06/22/2021    YEAST NOT REPORTED 06/22/2021    BACTERIA NOT REPORTED 06/22/2021    SPECGRAV 1.006 06/22/2021    LEUKOCYTESUR MOD 06/22/2021    UROBILINOGEN Normal 06/22/2021    BILIRUBINUR NEGATIVE 06/22/2021    GLUCOSEU NEGATIVE 06/22/2021    KETUA NEGATIVE 06/22/2021    AMORPHOUS NOT REPORTED 06/22/2021     Urine Creatinine:     Lab Results   Component Value Date    LABCREA 48.7 06/21/2021     IMPRESSION/RECOMMENDATIONS:      1. Acute kidney injury - Most consistent with obstructive nephropathy secondary to kidney stone. She underwent cystoscopy with left ureteral stent insertion 6/21/2021. Plan:   Monitor urine output closely. Hold dialysis today and reassess renal function tomorrow  Strict input and output documentation. Avoid nephrotoxic agents. Basic metabolic profile daily. Continue to hold losartan    2. Nephrolithiasis - Patient with family history [sister]. Will perform metabolic stone evaluation as outpatient since this is patient's first episode of kidney stone.     3.  Systemic hypertension -continue  amlodipine to 10 mg p.o. daily. 4.  E. coli urinary tract infection - complicating nephrolithiasis. Continue IV ceftriaxone 1 g every 24 hours. 5.  Non-anion gap metabolic acidosis - secondary to uremia-improved with dialysis. Continue to monitor renal function for signs of recovery-we will determine if patient requires a tunneled catheter on Monday.         MD MELLISSA Smith  Attending Nephrologist  6/26/2021 4:03 PM

## 2021-06-26 NOTE — PLAN OF CARE
Problem: Pain:  Goal: Pain level will decrease  Description: Pain level will decrease  6/26/2021 1710 by Odette White RN  Outcome: Ongoing  Note: Pain decreased with prescribed medication. Problem: Pain:  Goal: Control of acute pain  Description: Control of acute pain  6/26/2021 1710 by Odette White RN  Outcome: Ongoing     Problem: Pain:  Goal: Control of chronic pain  Description: Control of chronic pain  6/26/2021 1710 by Odette White RN  Outcome: Ongoing     Problem: Falls - Risk of:  Goal: Will remain free from falls  Description: Will remain free from falls  6/26/2021 1710 by Odette White RN  Outcome: Ongoing  Note: No falls this shift. Call light within reach and siderails x2. Bed in lowest position. Patient safety maintained. Problem: Falls - Risk of:  Goal: Absence of physical injury  Description: Absence of physical injury  6/26/2021 1710 by Odette White RN  Outcome: Ongoing  Note: No falls this shift. Call light within reach and siderails x2. Bed in lowest position. Patient safety maintained. Problem: Musculor/Skeletal Functional Status  Goal: Highest potential functional level  6/26/2021 1710 by Odette White RN  Outcome: Ongoing     Problem: Musculor/Skeletal Functional Status  Goal: Absence of falls  6/26/2021 1710 by Odette White RN  Outcome: Ongoing  Note: No falls this shift. Call light within reach and siderails x2. Bed in lowest position. Patient safety maintained. Problem: Skin Integrity:  Goal: Will show no infection signs and symptoms  Description: Will show no infection signs and symptoms  6/26/2021 1710 by Odette White RN  Outcome: Ongoing     Problem: Skin Integrity:  Goal: Absence of new skin breakdown  Description: Absence of new skin breakdown  6/26/2021 1710 by Odette White RN  Outcome: Ongoing  Note: Skin assessment as charted. No new areas of breakdown.        Problem: Nutrition  Goal: Optimal nutrition therapy  6/26/2021 1710 by Antoinette Jones RN  Outcome: Ongoing

## 2021-06-26 NOTE — PROGRESS NOTES
[facility] Progress Note    6/26/2021 6:46 AM      Subjective:  Ms. Pablo Alston is more awake and denies any chest pain or shortness of breath or palpitations or lightheadedness or dizziness. Review of systems:  No fever or chills. No cough.               LABS:     Recent Results (from the past 24 hour(s))   POC Glucose Fingerstick    Collection Time: 06/25/21  7:07 AM   Result Value Ref Range    POC Glucose 146 (H) 65 - 105 mg/dL   POC Glucose Fingerstick    Collection Time: 06/25/21 11:39 AM   Result Value Ref Range    POC Glucose 156 (H) 65 - 105 mg/dL   K (Potassium)    Collection Time: 06/25/21  3:35 PM   Result Value Ref Range    Potassium 3.6 (L) 3.7 - 5.3 mmol/L   POC Glucose Fingerstick    Collection Time: 06/25/21  4:20 PM   Result Value Ref Range    POC Glucose 235 (H) 65 - 105 mg/dL   POC Glucose Fingerstick    Collection Time: 06/25/21  8:18 PM   Result Value Ref Range    POC Glucose 252 (H) 65 - 105 mg/dL   CBC with DIFF    Collection Time: 06/26/21  5:34 AM   Result Value Ref Range    WBC 12.9 (H) 3.5 - 11.0 k/uL    RBC 3.46 (L) 4.0 - 5.2 m/uL    Hemoglobin 10.0 (L) 12.0 - 16.0 g/dL    Hematocrit 30.3 (L) 36 - 46 %    MCV 87.5 80 - 100 fL    MCH 28.9 26 - 34 pg    MCHC 33.1 31 - 37 g/dL    RDW 13.2 11.5 - 14.9 %    Platelets 078 805 - 635 k/uL    MPV 8.6 6.0 - 12.0 fL    NRBC Automated NOT REPORTED per 100 WBC    Differential Type NOT REPORTED     Immature Granulocytes NOT REPORTED 0 %    Absolute Immature Granulocyte NOT REPORTED 0.00 - 0.30 k/uL    WBC Morphology NOT REPORTED     RBC Morphology NOT REPORTED     Platelet Estimate NOT REPORTED     Seg Neutrophils 69 (H) 36 - 66 %    Lymphocytes 17 (L) 24 - 44 %    Monocytes 11 (H) 1 - 7 %    Eosinophils % 2 0 - 4 %    Basophils 1 0 - 2 %    Segs Absolute 8.90 1.3 - 9.1 k/uL    Absolute Lymph # 2.19 1.0 - 4.8 k/uL    Absolute Mono # 1.42 (H) 0.1 - 1.3 k/uL    Absolute Eos # 0.26 0.0 - 0.4 k/uL    Basophils Absolute 0.13 0.0 - 0.2 k/uL    Morphology HYPERSEGMENTED NEUTROPHILS PRESENT    Comprehensive Metabolic Panel w/ Reflex to MG    Collection Time: 21  5:34 AM   Result Value Ref Range    Glucose 186 (H) 70 - 99 mg/dL    BUN 22 8 - 23 mg/dL    CREATININE 2.49 (H) 0.50 - 0.90 mg/dL    Bun/Cre Ratio NOT REPORTED 9 - 20    Calcium 8.7 8.6 - 10.4 mg/dL    Sodium 135 135 - 144 mmol/L    Potassium 3.7 3.7 - 5.3 mmol/L    Chloride 99 98 - 107 mmol/L    CO2 27 20 - 31 mmol/L    Anion Gap 9 9 - 17 mmol/L    Alkaline Phosphatase 87 35 - 104 U/L    ALT 10 5 - 33 U/L    AST 15 <32 U/L    Total Bilirubin 0.23 (L) 0.3 - 1.2 mg/dL    Total Protein 6.5 6.4 - 8.3 g/dL    Albumin 3.3 (L) 3.5 - 5.2 g/dL    Albumin/Globulin Ratio NOT REPORTED 1.0 - 2.5    GFR Non- 19 (L) >60 mL/min    GFR  23 (L) >60 mL/min    GFR Comment          GFR Staging NOT REPORTED        Pulse Ox:  SpO2  Av %  Min: 92 %  Max: 99 %    Supplemental O2: O2 Flow Rate (L/min): 2 L/min     Current Meds:    amLODIPine  10 mg Oral Daily    hydrALAZINE  10 mg Intravenous Q6H    ferrous sulfate  325 mg Oral Daily with breakfast    aspirin  81 mg Oral Daily    atorvastatin  10 mg Oral Daily    baclofen  10 mg Oral BID    buPROPion  300 mg Oral Daily    busPIRone  15 mg Oral BID    FLUoxetine  10 mg Oral Daily    isosorbide mononitrate  30 mg Oral Daily    levothyroxine  112 mcg Oral Daily    [Held by provider] losartan  50 mg Oral Daily    metoprolol succinate  100 mg Oral Daily    oxybutynin  15 mg Oral Daily    pantoprazole  40 mg Oral QAM AC    pramipexole  0.125 mg Oral Daily    vitamin B-12  500 mcg Oral Daily    insulin lispro  0-12 Units Subcutaneous TID WC    insulin lispro  0-6 Units Subcutaneous Nightly    sodium chloride flush  10 mL Intravenous 2 times per day    heparin (porcine)  5,000 Units Subcutaneous 3 times per day    cefTRIAXone (ROCEPHIN) IV  1,000 mg Intravenous Q24H            VITAL SIGNS:    BP (!) 152/66 Pulse 72   Temp 97.9 °F (36.6 °C) (Oral)   Resp 16   Ht 5' 5\" (1.651 m)   Wt 205 lb 0.4 oz (93 kg)   LMP 03/19/1980   SpO2 95%   BMI 34.12 kg/m²  2 L/min      Admit Weight:  210 lb (95.3 kg)    Last 3 weights: Wt Readings from Last 3 Encounters:   06/26/21 205 lb 0.4 oz (93 kg)   05/25/21 200 lb (90.7 kg)   03/29/21 202 lb (91.6 kg)       BMI: Body mass index is 34.12 kg/m². INPUT/OUTPUT:          Intake/Output Summary (Last 24 hours) at 6/26/2021 0646  Last data filed at 6/26/2021 0641  Gross per 24 hour   Intake 1475 ml   Output 2413 ml   Net -938 ml         Telemetry shows sinus rhythm. EXAM:     General appearance: awake, more alert. Pleasant. Laying in bed comfortably. Neck: Right IJ dialysis catheter in place. Chest: clear bilaterally. No tenderness. No rhonchi or wheezing. Cardiac: Regular rate and rhythm. No significant murmur or gallop or rubs. Abdomen: soft, non-tender. Extremities: no cyanosis, no clubbing, no calf tenderness, no leg edema. Skin:  warm and dry. Neuro:  Able to move all 4 extremities. Able to move all 4 extremities. 2 D Echocardiogram June 2021:    Summary  Left ventricle is normal in size and wall thickness. Global left ventricular systolic function is normal. Estimated LV EF 65-70  %. No obvious wall motion abnormality seen. Evidence of moderate (grade II)  diastolic dysfunction. Both atria are normal in size. Normal right ventricular size and function. Mild tricuspid regurgitation. Mild pulmonary hypertension. Estimated right ventricular systolic pressure  is 17MRHG. ASSESSMENT:    ASCAD with prior stent at [facility] with no active angina pectoris and no stress-induced myocardial ischemia nuclear stress test 2019.     Normal LV systolic function with LVEF 65-70% on 2D echocardiogram June 2021.     Mild tricuspid regurgitation, mild pulmonary hypertension on 2D echocardiogram June 2021.     Improving acute confusion.  Suspect metabolic encephalopathy.      Essential Hypertension.     Improving acute kidney injury with mild hyperkalemia.  Management per nephrologist who is following the patient.     History of 50-69% right-sided carotid artery stenosis.       Hyperlipidemia.       Other problems as charted.      REC/PLAN:    Remains relatively stable overall cardiac wise. Continue on aspirin, Lipitor, Imdur, metoprolol XL, IV hydralazine. Losartan is on hold. Further adjustment of antihypertensive medications and diuretics    Nephrologist.    No plans for any further cardiac work-up at this time. Discussed with the nurses. Will sign off. Please call us back if any new cardiac issues arise. Please advise patient to follow-up in our office in 2 to 4weeks upon hospitalization discharge at [phone]. Thanks. Electronically signed by Jaswant Rodríguez MD, [facility] [place] [facility]        PLEASE NOTE:  This progress note was completed using a voice transcription system. Every effort was made to ensure accuracy. However, inadvertent computerized transcription errors may be present.

## 2021-06-26 NOTE — PROGRESS NOTES
Arthritis, CAD (coronary artery disease), Diabetes mellitus ([facility] [address] Heart murmur, Hyperlipidemia, MI, old, Pulmonary stenosis, Pulmonary valve stenosis, Sciatica, Thyroid disease, and Wears glasses. SURGICAL HISTORY      has a past surgical history that includes Hysterectomy (1980); Salpingo-oophorectomy (Left, 1987); shoulder surgery (Left, 1993); cervical fusion (1993); joint replacement (Bilateral, 1994); Coronary angioplasty with stent (2000); Finger surgery (Left, 03/21/2014); other surgical history (Right, 09/23/2014); Toe Osteotomy (Right, 6/8/2016); and Cystoscopy (Left, 6/21/2021).     CURRENT MEDICATIONS        amLODIPine  10 mg Oral Daily    hydrALAZINE  10 mg Intravenous Q6H    ferrous sulfate  325 mg Oral Daily with breakfast    aspirin  81 mg Oral Daily    atorvastatin  10 mg Oral Daily    baclofen  10 mg Oral BID    buPROPion  300 mg Oral Daily    busPIRone  15 mg Oral BID    FLUoxetine  10 mg Oral Daily    isosorbide mononitrate  30 mg Oral Daily    levothyroxine  112 mcg Oral Daily    [Held by provider] losartan  50 mg Oral Daily    metoprolol succinate  100 mg Oral Daily    oxybutynin  15 mg Oral Daily    pantoprazole  40 mg Oral QAM AC    pramipexole  0.125 mg Oral Daily    vitamin B-12  500 mcg Oral Daily    insulin lispro  0-12 Units Subcutaneous TID WC    insulin lispro  0-6 Units Subcutaneous Nightly    sodium chloride flush  10 mL Intravenous 2 times per day    heparin (porcine)  5,000 Units Subcutaneous 3 times per day    cefTRIAXone (ROCEPHIN) IV  1,000 mg Intravenous Q24H     PRN Meds potassium chloride **OR** potassium alternative oral replacement **OR** potassium chloride, ALPRAZolam, metoprolol, sodium citrate, sodium citrate, perflutren lipid microspheres, hydrALAZINE, oxyCODONE-acetaminophen, albuterol sulfate HFA, glucose, dextrose, glucagon (rDNA), dextrose, sodium chloride flush, sodium chloride, magnesium sulfate, ondansetron **OR** ondansetron, magnesium hydroxide, acetaminophen **OR** acetaminophen, morphine  Continuous Infusions:    dextrose      sodium chloride         potassium chloride (KLOR-CON M) extended release tablet 40 mEq, PRN   Or  potassium bicarb-citric acid (EFFER-K) effervescent tablet 40 mEq, PRN   Or  potassium chloride 10 mEq/100 mL IVPB (Peripheral Line), PRN  ALPRAZolam (XANAX) tablet 0.5 mg, TID PRN  metoprolol (LOPRESSOR) injection 5 mg, Q6H PRN  sodium citrate 4 % injection 1.2 mL, PRN  sodium citrate 4 % injection 1.2 mL, PRN  amLODIPine (NORVASC) tablet 10 mg, Daily  perflutren lipid microspheres (DEFINITY) injection 2.2 mg, ONCE PRN  hydrALAZINE (APRESOLINE) injection 10 mg, Q6H  hydrALAZINE (APRESOLINE) injection 10 mg, Q6H PRN  ferrous sulfate (IRON 325) tablet 325 mg, Daily with breakfast  oxyCODONE-acetaminophen (PERCOCET) 5-325 MG per tablet 1 tablet, Q6H PRN  albuterol sulfate  (90 Base) MCG/ACT inhaler 2 puff, Q6H PRN  aspirin EC tablet 81 mg, Daily  atorvastatin (LIPITOR) tablet 10 mg, Daily  baclofen (LIORESAL) tablet 10 mg, BID  buPROPion (WELLBUTRIN XL) extended release tablet 300 mg, Daily  busPIRone (BUSPAR) tablet 15 mg, BID  FLUoxetine (PROZAC) capsule 10 mg, Daily  isosorbide mononitrate (IMDUR) extended release tablet 30 mg, Daily  levothyroxine (SYNTHROID) tablet 112 mcg, Daily  [Held by provider] losartan (COZAAR) tablet 50 mg, Daily  metoprolol succinate (TOPROL XL) extended release tablet 100 mg, Daily  oxybutynin (DITROPAN-XL) extended release tablet 15 mg, Daily  pantoprazole (PROTONIX) tablet 40 mg, QAM AC  pramipexole (MIRAPEX) tablet 0.125 mg, Daily  vitamin B-12 (CYANOCOBALAMIN) tablet 500 mcg, Daily  insulin lispro (HUMALOG) injection vial 0-12 Units, TID WC  insulin lispro (HUMALOG) injection vial 0-6 Units, Nightly  glucose (GLUTOSE) 40 % oral gel 15 g, PRN  dextrose 50 % IV solution, PRN  glucagon (rDNA) injection 1 mg, PRN  dextrose 5 % solution, PRN  sodium chloride flush 0.9 % injection 10 mL, 2 times per day  sodium chloride flush 0.9 % injection 10 mL, PRN  0.9 % sodium chloride infusion, PRN  magnesium sulfate 1000 mg in dextrose 5% 100 mL IVPB, PRN  ondansetron (ZOFRAN-ODT) disintegrating tablet 4 mg, Q8H PRN   Or  ondansetron (ZOFRAN) injection 4 mg, Q6H PRN  magnesium hydroxide (MILK OF MAGNESIA) 400 MG/5ML suspension 30 mL, Daily PRN  acetaminophen (TYLENOL) tablet 650 mg, Q6H PRN   Or  acetaminophen (TYLENOL) suppository 650 mg, Q6H PRN  morphine sulfate (PF) injection 2 mg, Q4H PRN  heparin (porcine) injection 5,000 Units, 3 times per day  cefTRIAXone (ROCEPHIN) 1000 mg IVPB in 50 mL D5W minibag, Q24H          HOME MEDICATIONS     Medications Prior to Admission: losartan (COZAAR) 50 MG tablet, TAKE ONE TABLET BY MOUTH DAILY  buPROPion (WELLBUTRIN XL) 300 MG extended release tablet, TAKE ONE TABLET BY MOUTH DAILY  FLUoxetine (PROZAC) 10 MG capsule, TAKE ONE CAPSULE BY MOUTH DAILY  metFORMIN (GLUCOPHAGE) 1000 MG tablet, TAKE ONE TABLET BY MOUTH TWICE A DAY WITH MEALS  clotrimazole (LOTRIMIN) 1 % vaginal cream, Place 1 applicator vaginally 2 times daily  oxybutynin (DITROPAN-XL) 10 MG extended release tablet, TAKE ONE TABLET BY MOUTH DAILY  pramipexole (MIRAPEX) 0.125 MG tablet, TAKE ONE TABLET BY MOUTH DAILY  ascorbic acid (VITAMIN C) 1000 MG tablet, Take 1,000 mg by mouth daily  albuterol sulfate  (90 Base) MCG/ACT inhaler, INHALE TWO PUFFS BY MOUTH EVERY 4 TO 6 HOURS AS NEEDED FOR WHEEZING  nystatin (NYAMYC) 858311 UNIT/GM powder, APPLY TO AFFECTED AREA(S) FOUR TIMES A DAY  busPIRone (BUSPAR) 15 MG tablet, Take 15 mg by mouth 2 times daily  isosorbide mononitrate (IMDUR) 30 MG extended release tablet, Take 1 tablet by mouth daily  pantoprazole (PROTONIX) 40 MG tablet, Take 1 tablet by mouth every morning (before breakfast)  baclofen (LIORESAL) 10 MG tablet, Take 10 mg by mouth 2 times daily  fluticasone (FLONASE) 50 MCG/ACT nasal spray, 1 spray by Nasal route daily  levocetirizine (XYZAL) 5 MG tablet, Take 5 mg by mouth nightly  nystatin (MYCOSTATIN) 253326 UNIT/GM cream, Apply topically 2 times daily Apply topically 2 times daily. oxybutynin (DITROPAN XL) 15 MG extended release tablet, Take 15 mg by mouth daily  levothyroxine (SYNTHROID) 112 MCG tablet, TAKE ONE TABLET BY MOUTH DAILY  atorvastatin (LIPITOR) 40 MG tablet,   nitroGLYCERIN (NITROSTAT) 0.4 MG SL tablet, Place 0.4 mg under the tongue every 5 minutes as needed for Chest pain up to max of 3 total doses. If no relief after 1 dose, call 911. Cholecalciferol (VITAMIN D3) 2000 UNITS CAPS, Take 1 capsule by mouth daily. celecoxib (CELEBREX) 200 MG capsule, Take 200 mg by mouth 2 times daily. Multiple Vitamins-Minerals (THERAPEUTIC MULTIVITAMIN-MINERALS) tablet, Take 1 tablet by mouth daily. ALPRAZolam (XANAX) 1 MG tablet, Take 1 mg by mouth as needed for Anxiety. aspirin 81 MG EC tablet, Take 81 mg by mouth daily. LAST DOSE 9/8/14  metoprolol (TOPROL-XL) 100 MG XL tablet, Take 100 mg by mouth daily. FLUoxetine (PROZAC) 20 MG capsule, Take 20 mg by mouth daily   vitamin B-12 (CYANOCOBALAMIN) 500 MCG tablet, Take 500 mcg by mouth daily. ALLERGIES     is allergic to pcn [penicillins], heparin, lamotrigine, cyclobenzaprine, and heparin (porcine). Allergies   Allergen Reactions    Pcn [Penicillins] Swelling and Hives    Heparin Other (See Comments)     MIGRAINE      Lamotrigine Other (See Comments), Itching, Nausea Only and Rash    Cyclobenzaprine      Dry mouth, jitters    Heparin (Porcine)      Other reaction(s): Migraine       SOCIAL HISTORY      reports that she quit smoking about 17 years ago. Her smoking use included cigarettes. She has a 30.00 pack-year smoking history. She has never used smokeless tobacco. She reports current alcohol use. She reports that she does not use drugs.     Vitals:    06/26/21 0040 06/26/21 0309 06/26/21 0715 06/26/21 1300   BP: (!) 139/52 (!) 152/66 (!) 146/63 (!) 114/51   Pulse: 72  71 76   Resp: 16 18 16   Temp: 97.9 °F (36.6 °C)  97.9 °F (36.6 °C) 97.7 °F (36.5 °C)   TempSrc: Oral  Oral Oral   SpO2: 95%  95% 97%   Weight: 205 lb 0.4 oz (93 kg)      Height:         Body mass index is 34.12 kg/m². BP (!) 114/51   Pulse 76   Temp 97.7 °F (36.5 °C) (Oral)   Resp 16   Ht 5' 5\" (1.651 m)   Wt 205 lb 0.4 oz (93 kg)   LMP 1980   SpO2 97%   BMI 34.12 kg/m²   Temp (24hrs), Av.9 °F (36.6 °C), Min:97.7 °F (36.5 °C), Max:98.1 °F (36.7 °C)    Pulse Ox: SpO2  Av.5 %  Min: 95 %  Max: 99 %  Supplemental O2: O2 Flow Rate (L/min): 2 L/min   Oxygen Therapy  SpO2: 97 %  Pulse Oximeter Device Mode: Intermittent  Pulse Oximeter Device Location: Finger  O2 Device: None (Room air)  O2 Flow Rate (L/min): 2 L/min    Patient Vitals for the past 96 hrs (Last 3 readings):   Weight   21 0040 205 lb 0.4 oz (93 kg)   21 1255 209 lb 10.5 oz (95.1 kg)   21 1005 211 lb 10.3 oz (96 kg)     Admission weight: 210 lb (95.3 kg)  Wt Readings from Last 3 Encounters:   21 205 lb 0.4 oz (93 kg)   21 200 lb (90.7 kg)   21 202 lb (91.6 kg)    (encounters)    /O (24Hr): Intake/Output Summary (Last 24 hours) at 2021 1609  Last data filed at 2021 1317  Gross per 24 hour   Intake 955 ml   Output 475 ml   Net 480 ml       Vitals reviewed. H&N: atraumatic, normocephalic, anicteric, no conjunctivitis, EOM normal, no lid lag or exophthalmos, nose and ears appear normal, trachea mid line, no stridor,  Chest: no respiratory distress, normal effort, fairly good air entry, no wheezes,    Heart: normal heart sounds, regular rate and rhythm, no murmurs. gallops or rubs,  Abdomen: BS present, non-tender, no masses or organomegaly detected,   Extremities: no peripheral edema, no cyanosis. no oncholysis. Skin: no rash, no erythema, no jaundice,   CNS: grossly normal without cranial nerve deficits, no abnormal coordination or tone, Psychiatric: normal mood, affect.       LABS:    CBC:   Recent Labs     06/24/21  0524 06/25/21  0522 06/26/21  0534   WBC 12.6* 9.9 12.9*   RBC 3.64* 3.51* 3.46*   HGB 10.7* 10.4* 10.0*   HCT 32.0* 30.8* 30.3*   MCV 87.9 87.8 87.5   MCH 29.4 29.6 28.9   MCHC 33.4 33.8 33.1   RDW 13.6 13.6 13.2    203 187   MPV 8.8 8.5 8.6       MAG:   Recent Labs     06/25/21  0522 06/26/21  0534   MG 1.5* 2.0       CMP:   Recent Labs     06/24/21  0524 06/24/21  0524 06/25/21  0522 06/25/21  1535 06/26/21  0534     --  138  --  135   K 4.4   < > 3.2* 3.6* 3.7     --  99  --  99   CO2 21  --  25  --  27   BUN 45*  --  28*  --  22   CREATININE 4.15*  --  2.95*  --  2.49*   GLUCOSE 171*  --  164*  --  186*   CALCIUM 9.4  --  8.9  --  8.7   PROT 7.3  --  6.5  --  6.5   LABALBU 3.6  --  3.4*  --  3.3*   BILITOT 0.27*  --  0.24*  --  0.23*   ALKPHOS 103  --  85  --  87   AST 13  --  18  --  15   ALT 11  --  9  --  10    < > = values in this interval not displayed. No results for input(s): LIPASE, AMYLASE in the last 72 hours. Phosphorus:  No results for input(s): PHOS in the last 72 hours. Albumin:   Recent Labs     06/24/21 0524 06/25/21  0522 06/26/21  0534   LABALBU 3.6 3.4* 3.3*       U/A:  Lab Results   Component Value Date    COLORU YELLOW 06/22/2021    PHUR 6.0 06/22/2021    WBCUA 5 TO 10 06/22/2021    RBCUA 5 TO 10 06/22/2021    MUCUS NOT REPORTED 06/22/2021    TRICHOMONAS NOT REPORTED 06/22/2021    YEAST NOT REPORTED 06/22/2021    BACTERIA NOT REPORTED 06/22/2021    SPECGRAV 1.006 06/22/2021    LEUKOCYTESUR MOD 06/22/2021    UROBILINOGEN Normal 06/22/2021    BILIRUBINUR NEGATIVE 06/22/2021    GLUCOSEU NEGATIVE 06/22/2021    AMORPHOUS NOT REPORTED 06/22/2021       No results for input(s): INR in the last 72 hours. Lab Results   Component Value Date    DDIMER 0.68 (H) 04/24/2018   No results found for: BNP  troponinsNo results found for: TROPONINI      D Dimer: No results for input(s): DDIMER in the last 72 hours.   Troponin T No results for input(s): TROPONINT in the last 72 hours. ProBNP   No results found for requested labs within last 30 days. ProBNP Invalid input(s): PRO-BNP  Lactic acid:Invalid input(s): LACTIC ACID      PT/INR: No results for input(s): PROTIME, INR in the last 72 hours. APTT: No results for input(s): APTT in the last 72 hours. ESR: No results found for: SEDRATE  CRP: No results found for: CRP  Folate and B12: No results found for: [id number], No results found for: FOLATE  Thyroid Studies:   Lab Results   Component Value Date    TSH 13.92 (H) 04/23/2018     D-Dimer:   Lab Results   Component Value Date    DDIMER 0.68 (H) 04/24/2018       Lactic acid: No components found for: LACT     Troponin T No results for input(s): TROPHS in the last 72 hours. Troponins:     No components found for: TROP    No results found for: TROPONINI    No results found for requested labs within last 720 hours. Cardiac Enzymes:    No results found for requested labs within last 720 hours.     No results found for: CKMB    ProBNP:    No components found for: NTBNP      U/A:  Lab Results   Component Value Date    COLORU YELLOW 06/22/2021    PHUR 6.0 06/22/2021    WBCUA 5 TO 10 06/22/2021    RBCUA 5 TO 10 06/22/2021    MUCUS NOT REPORTED 06/22/2021    TRICHOMONAS NOT REPORTED 06/22/2021    YEAST NOT REPORTED 06/22/2021    BACTERIA NOT REPORTED 06/22/2021    SPECGRAV 1.006 06/22/2021    LEUKOCYTESUR MOD 06/22/2021    UROBILINOGEN Normal 06/22/2021    BILIRUBINUR NEGATIVE 06/22/2021    GLUCOSEU NEGATIVE 06/22/2021    AMORPHOUS NOT REPORTED 06/22/2021       Urine Sodium:     Lab Results   Component Value Date    JOANNA 59 06/21/2021     Urine Potassium:  No results found for: KUR  Urine Chloride:    Lab Results   Component Value Date    CLUR 47 06/21/2021     Urine Osmolarity: No results found for: OSMOU  Urine Protein:   No results found for: TPU  Urine Creatinine:     Lab Results   Component Value Date    LABCREA 48.7 06/21/2021     Urine Eosinophils:  No components found for: UEOS    Thyroid Studies:   No components found for: T4,  FREE  No results found for: T3  Lab Results   Component Value Date    TSH 13.92 04/23/2018       Lab Results   Component Value Date    TSH 13.92 (H) 04/23/2018       HbA1c  No components found for: HA1CC    Lipids:  Lab Results   Component Value Date    HDL 32 (L) 04/24/2018    VLDL NOT REPORTED 04/24/2018         CRP: No results found for: CRP  ESR: No results found for: SEDRATE    Folate and B12: No results found for: [id number], No results found for: FOLATE    No components found for: 3WBC, 3RBC, 3HEMOGLOBIN, 3HEMATOCRIT, 3PLATELETS, 3MCV, 3SEGS, 3STAB, 3LYMP, 3MONOC, 3EOS, 3BASO, 3ASEG, 3MYEL, 3PRMY, 3BLAS, 3PLTE    Recent Results (from the past 24 hour(s))   POC Glucose Fingerstick    Collection Time: 06/25/21  4:20 PM   Result Value Ref Range    POC Glucose 235 (H) 65 - 105 mg/dL   POC Glucose Fingerstick    Collection Time: 06/25/21  8:18 PM   Result Value Ref Range    POC Glucose 252 (H) 65 - 105 mg/dL   CBC with DIFF    Collection Time: 06/26/21  5:34 AM   Result Value Ref Range    WBC 12.9 (H) 3.5 - 11.0 k/uL    RBC 3.46 (L) 4.0 - 5.2 m/uL    Hemoglobin 10.0 (L) 12.0 - 16.0 g/dL    Hematocrit 30.3 (L) 36 - 46 %    MCV 87.5 80 - 100 fL    MCH 28.9 26 - 34 pg    MCHC 33.1 31 - 37 g/dL    RDW 13.2 11.5 - 14.9 %    Platelets 181 128 - 946 k/uL    MPV 8.6 6.0 - 12.0 fL    NRBC Automated NOT REPORTED per 100 WBC    Differential Type NOT REPORTED     Immature Granulocytes NOT REPORTED 0 %    Absolute Immature Granulocyte NOT REPORTED 0.00 - 0.30 k/uL    WBC Morphology NOT REPORTED     RBC Morphology NOT REPORTED     Platelet Estimate NOT REPORTED     Seg Neutrophils 69 (H) 36 - 66 %    Lymphocytes 17 (L) 24 - 44 %    Monocytes 11 (H) 1 - 7 %    Eosinophils % 2 0 - 4 %    Basophils 1 0 - 2 %    Segs Absolute 8.90 1.3 - 9.1 k/uL    Absolute Lymph # 2.19 1.0 - 4.8 k/uL    Absolute Mono # 1.42 (H) 0.1 - 1.3 k/uL    Absolute Eos # 0.26 0.0 - 0.4 k/uL Basophils Absolute 0.13 0.0 - 0.2 k/uL    Morphology HYPERSEGMENTED NEUTROPHILS PRESENT    Comprehensive Metabolic Panel w/ Reflex to MG    Collection Time: 06/26/21  5:34 AM   Result Value Ref Range    Glucose 186 (H) 70 - 99 mg/dL    BUN 22 8 - 23 mg/dL    CREATININE 2.49 (H) 0.50 - 0.90 mg/dL    Bun/Cre Ratio NOT REPORTED 9 - 20    Calcium 8.7 8.6 - 10.4 mg/dL    Sodium 135 135 - 144 mmol/L    Potassium 3.7 3.7 - 5.3 mmol/L    Chloride 99 98 - 107 mmol/L    CO2 27 20 - 31 mmol/L    Anion Gap 9 9 - 17 mmol/L    Alkaline Phosphatase 87 35 - 104 U/L    ALT 10 5 - 33 U/L    AST 15 <32 U/L    Total Bilirubin 0.23 (L) 0.3 - 1.2 mg/dL    Total Protein 6.5 6.4 - 8.3 g/dL    Albumin 3.3 (L) 3.5 - 5.2 g/dL    Albumin/Globulin Ratio NOT REPORTED 1.0 - 2.5    GFR Non- 19 (L) >60 mL/min    GFR  23 (L) >60 mL/min    GFR Comment          GFR Staging NOT REPORTED    Magnesium    Collection Time: 06/26/21  5:34 AM   Result Value Ref Range    Magnesium 2.0 1.6 - 2.6 mg/dL   POC Glucose Fingerstick    Collection Time: 06/26/21  7:13 AM   Result Value Ref Range    POC Glucose 158 (H) 65 - 105 mg/dL   POC Glucose Fingerstick    Collection Time: 06/26/21 11:21 AM   Result Value Ref Range    POC Glucose 197 (H) 65 - 105 mg/dL       Blood Culture: No results for input(s): BC, BLOODCULT2, ORG in the last 72 hours. GRAM STAIN  No results for input(s): LABGRAM, LABANAE, ORG, WNDABS in the last 72 hours.     Resp Culture Brief : No results found for: CULTRESP    Body Fluid : No results found for: BFCX    MRSA : No results found for: [facility]    Urine Culture Brief : No results found for: LABURIN     Organism Brief : No results found for: ORG    ECHO Complete 2D W Doppler W Color    Result Date: 6/23/2021  [address] Transthoracic Echocardiography Report (TTE)  Patient Name [address] [facility]   Date of Study                 06/23/2021               MAHENDRA   Date of      1950  Gender Female  Birth   Age          70 year(s)  Race                             Room Number  2114        Height:                       65 inch, 165.1 cm   Corporate ID [id number]    Weight:                       205 pounds, 93 kg  #   Patient Acct [de-identified]   BSA:           2 m^2          BMI:      34.11  #                                                                kg/m^2   MR #         [id number]      Sonographer                   Hailee Kirkland   Accession #  [id number]  Interpreting Physician        [address]   Fellow                   Referring Nurse Practitioner   Interpreting             Referring Physician           Stevan Fortune  Type of Study   TTE procedure:2D Echocardiogram, M-Mode, Doppler, Color Doppler. Procedure Date Date: 06/23/2021 Start: 11:59 AM Study Location: [address] [place] [address] Technical Quality: Fair visualization Indications: Aortic stenosis, Coronary artery disease and Acute kidney injury. History / Tech. Comments: CAD, Stent x1, DM, HLD, MI Patient Status: Inpatient Height: 65 inches Weight: 205 pounds BSA: 2 m^2 BMI: 34.11 kg/m^2 Rhythm: Within normal limits HR: 73 bpm BP: 178/73 mmHg CONCLUSIONS Summary Left ventricle is normal in size and wall thickness. Global left ventricular systolic function is normal. Estimated LV EF 65-70 %. No obvious wall motion abnormality seen. Evidence of moderate (grade II) diastolic dysfunction. Both atria are normal in size. Normal right ventricular size and function. Mild tricuspid regurgitation. Mild pulmonary hypertension. Estimated right ventricular systolic pressure is [id number].  Signature ----------------------------------------------------------------------------  Electronically signed by Hailee Kirkland(Sonographer) on 06/23/2021 02:38  PM ---------------------------------------------------------------------------- ----------------------------------------------------------------------------  Electronically signed by Dioni Hernandez(Interpreting physician) on 2021  02:40 PM ---------------------------------------------------------------------------- FINDINGS Left Atrium Left atrium is normal in size. Left Ventricle Left ventricle is normal in size and wall thickness. Global left ventricular systolic function is normal. Estimated LV EF 65-70 %. No obvious wall motion abnormality seen. Evidence of moderate (grade II) diastolic dysfunction. Right Atrium Right atrium is normal in size. Right Ventricle Normal right ventricular size and function. Mitral Valve Mild calcification of the posterior mitral annulus. Trivial mitral regurgitation. Aortic Valve Aortic valve is sclerotic but opens well. No evidence of aortic insufficiency or stenosis. Tricuspid Valve No obvious valvular abnormality. Mild tricuspid regurgitation. Mild pulmonary hypertension. Estimated right ventricular systolic pressure is [id number]. Pulmonic Valve Pulmonic valve was not well visualized. No evidence of pulmonic insufficiency or stenosis. Pericardial Effusion No significant pericardial effusion is seen. Pleural Effusion No pleural effusion seen. Miscellaneous Normal aortic root dimension. E/E' average = 15.5.  M-mode / 2D Measurements & Calculations:   LVIDd:4.26 cm(3.7 - 5.6 cm)      Diastolic WOTVDQ:10.7 ml  UGMQX:0.37 cm(2.2 - 4.0 cm)      Systolic YHVGLV:82.4 ml  KYJH:0.62 cm(0.6 - 1.1 cm)       Aortic Root:2.5 cm(2.0 - 3.7 cm)  LVPWd:0.96 cm(0.6 - 1.1 cm)      LA Dimension: 3.5 cm(1.9 - 4.0 cm)  Fractional Shortenin.79 %    LA volume/Index: 44.6 ml /22m^2  Calculated LVEF (%): 75.94 %     LVOT:1.8 cm   Mitral:                               Aortic   Peak E-Wave: 1.03 m/s                 Peak Velocity: 1.68 m/s  Peak A-Wave: 1.25 m/s                 Mean Velocity: 1.12 m/s  E/A Ratio: 0.82                       Peak Gradient: 11.29 mmHg  Peak Gradient: 4.24 mmHg              Mean Gradient: 6 mmHg  Deceleration Time: 268 msec Area (continuity): 1.71 cm^2                                        AV VTI: 39.2 cm   Tricuspid:                            Pulmonic:   Estimated RVSP: 41 mmHg               Peak Velocity: 2.02 m/s  Peak TR Velocity: 2.90 m/s            Peak Gradient: 16.32 mmHg  Peak TR Gradient: 33.64 mmHg  Estimated RA Pressure: 8 mmHg                                         Estimated PASP: 41.64 mmHg  Septal Wall E' velocity:0.07 m/s Lateral Wall E' velocity:0.06 m/s    CT ABDOMEN PELVIS WO CONTRAST Additional Contrast? None    Result Date: 6/20/2021  EXAMINATION: CT OF THE ABDOMEN AND PELVIS WITHOUT CONTRAST 6/20/2021 1:11 pm TECHNIQUE: CT of the abdomen and pelvis was performed without the administration of intravenous contrast. Multiplanar reformatted images are provided for review. Dose modulation, iterative reconstruction, and/or weight based adjustment of the mA/kV was utilized to reduce the radiation dose to as low as reasonably achievable. COMPARISON: CT abdomen and pelvis November 7, 2018. HISTORY: ORDERING SYSTEM PROVIDED HISTORY: left flank pain, r/o obstructing stone TECHNOLOGIST PROVIDED HISTORY: left flank pain, r/o obstructing stone Decision Support Exception - unselect if not a suspected or confirmed emergency medical condition->Emergency Medical Condition (MA) Reason for Exam: Flank Pain; Urinary Frequency Acuity: Acute Type of Exam: Initial Additional signs and symptoms: Pt c/o left flank pain since 8am; dry heaving FINDINGS: Lower Chest: Mild bibasilar atelectasis/scarring. Calcified granulomas. Organs: Suboptimal evaluation due to lack of IV contrast.  Liver gallbladder pancreas spleen and adrenal glands all appear unremarkable. Mild hydronephrosis and hydroureter with associated asymmetrical perinephric and periureteral fat stranding without obstructing calculus noted. Nonobstructing calculi identified involving the right kidney largest measuring 5 mm. No right renal calculus.   Abdominal aorta demonstrates moderate calcification without aneurysm. Stable fusiform type ectasia of the infrarenal abdominal aorta measuring 2.7 cm. GI/Bowel: Small hiatal hernia. Distal stomach is grossly unremarkable. Small bowel appears nondilated. No acute colonic abnormality. Pelvis: Urinary bladder is grossly unremarkable. Uterus has been removed. No adnexal mass. Peritoneum/Retroperitoneum: No free air, free fluid or lymphadenopathy. Bones/Soft Tissues: Abdominal wall demonstrates no acute findings. Osseous structures demonstrate degenerative change. 1. Left-sided hydroureter and hydronephrosis with associated asymmetrical perinephric and periureteral fat stranding without obstructing calculus noted. Findings likely represents a recently passed stone. 2. Nonobstructing calculi right kidney, largest measuring 5 mm. 3. Small hiatal hernia. 4. Stable ectasia infrarenal abdominal aortic aneurysm measuring 2.7 cm. CT HEAD WO CONTRAST    Result Date: 6/22/2021  EXAMINATION: CT OF THE HEAD WITHOUT CONTRAST  6/22/2021 9:45 am TECHNIQUE: CT of the head was performed without the administration of intravenous contrast. Dose modulation, iterative reconstruction, and/or weight based adjustment of the mA/kV was utilized to reduce the radiation dose to as low as reasonably achievable. COMPARISON: None. HISTORY: ORDERING SYSTEM PROVIDED HISTORY: confusion TECHNOLOGIST PROVIDED HISTORY: confusion Reason for Exam: CONFUSION Acuity: Unknown Type of Exam: Unknown Additional signs and symptoms: AMS FINDINGS: BRAIN/VENTRICLES: There is no acute intracranial hemorrhage, mass effect or midline shift. No abnormal extra-axial fluid collection. The gray-white differentiation is maintained without evidence of an acute infarct. There is no evidence of hydrocephalus. Hypodensity is present in the white matter consistent with chronic microvascular change. Ventricular size is commensurate to cortical volume loss.   Cavernous carotid arteries are calcified. ORBITS: The visualized portion of the orbits demonstrate no acute abnormality. SINUSES: The visualized paranasal sinuses and mastoid air cells demonstrate no acute abnormality. SOFT TISSUES/SKULL:  No acute abnormality of the visualized skull or soft tissues. No acute intracranial abnormality. Senescent changes including chronic microvascular change and atherosclerotic disease of the major intracranial vessels. XR CHEST PORTABLE    Result Date: 6/23/2021  EXAMINATION: ONE XRAY VIEW OF THE CHEST 6/23/2021 5:12 pm COMPARISON: 04/23/2018. HISTORY: ORDERING SYSTEM PROVIDED HISTORY: line placement TECHNOLOGIST PROVIDED HISTORY: line placement Reason for Exam: Line placement. Acuity: Acute Type of Exam: Initial Additional signs and symptoms: Line placement. Relevant Medical/Surgical History: Line placement. FINDINGS: Right internal jugular central venous sheath terminates over the mid superior vena cava. External leads overlie the thorax. Low lung volumes. No pneumothorax or pleural effusion. Cardiomediastinal contours are grossly unchanged when accounting for hypoventilatory status. No acute bony findings. Right internal jugular central venous catheter terminates over the mid superior vena cava. Hypoventilated chest.     FLUORO FOR SURGICAL PROCEDURES    Result Date: 6/21/2021  Radiology exam is complete. No Radiologist dictation. Please follow up with ordering provider. This note was dictated using M*Modal Fluency Direct so please excuse any grammatical or syntax errors as no guarantees can be provided that every mistake has been identified and corrected by editing.       Electronically signed by Ganesh Campbell MD on 6/26/2021 at 4:09 PM   Answering service [phone]

## 2021-06-26 NOTE — PLAN OF CARE
Problem: Pain:  Goal: Pain level will decrease  Description: Pain level will decrease  6/26/2021 0432 by Lazaro Ramirez RN  Outcome: Ongoing     Problem: Pain:  Goal: Control of acute pain  Description: Control of acute pain  6/26/2021 0432 by Lazaro Ramirez RN  Outcome: Ongoing     Problem: Pain:  Goal: Control of chronic pain  Description: Control of chronic pain  6/26/2021 0432 by Lazaro Ramirez RN  Outcome: Ongoing     Problem: Falls - Risk of:  Goal: Will remain free from falls  Description: Will remain free from falls  6/26/2021 0432 by Lazaro Ramirez RN  Outcome: Ongoing     Problem: Falls - Risk of:  Goal: Absence of physical injury  Description: Absence of physical injury  6/26/2021 0432 by Lazaro Ramirez RN  Outcome: Ongoing     Problem: Musculor/Skeletal Functional Status  Goal: Highest potential functional level  6/26/2021 0432 by Lazaro Ramirez RN  Outcome: Ongoing     Problem: Musculor/Skeletal Functional Status  Goal: Absence of falls  6/26/2021 0432 by Lazaro Ramirez RN  Outcome: Ongoing     Problem: Skin Integrity:  Goal: Will show no infection signs and symptoms  Description: Will show no infection signs and symptoms  6/26/2021 0432 by Lazaro Ramirez RN  Outcome: Ongoing     Problem: Skin Integrity:  Goal: Absence of new skin breakdown  Description: Absence of new skin breakdown  6/26/2021 0432 by Lazaro Ramirez RN  Outcome: Ongoing     Problem: Nutrition  Goal: Optimal nutrition therapy  6/26/2021 0432 by Lazaro Ramirez RN  Outcome: Ongoing Yes...

## 2021-06-27 LAB
ABSOLUTE BANDS #: 0.42 K/UL (ref 0–1)
ABSOLUTE EOS #: 0.42 K/UL (ref 0–0.4)
ABSOLUTE IMMATURE GRANULOCYTE: ABNORMAL K/UL (ref 0–0.3)
ABSOLUTE LYMPH #: 1.67 K/UL (ref 1–4.8)
ABSOLUTE MONO #: 1.67 K/UL (ref 0.1–1.3)
ALBUMIN SERPL-MCNC: 3.4 G/DL (ref 3.5–5.2)
ALBUMIN/GLOBULIN RATIO: ABNORMAL (ref 1–2.5)
ALP BLD-CCNC: 88 U/L (ref 35–104)
ALT SERPL-CCNC: 13 U/L (ref 5–33)
ANION GAP SERPL CALCULATED.3IONS-SCNC: 13 MMOL/L (ref 9–17)
AST SERPL-CCNC: 18 U/L
BANDS: 3 % (ref 0–10)
BASOPHILS # BLD: 0 % (ref 0–2)
BASOPHILS ABSOLUTE: 0 K/UL (ref 0–0.2)
BILIRUB SERPL-MCNC: 0.21 MG/DL (ref 0.3–1.2)
BUN BLDV-MCNC: 28 MG/DL (ref 8–23)
BUN/CREAT BLD: ABNORMAL (ref 9–20)
CALCIUM SERPL-MCNC: 9.1 MG/DL (ref 8.6–10.4)
CHLORIDE BLD-SCNC: 100 MMOL/L (ref 98–107)
CO2: 25 MMOL/L (ref 20–31)
CREAT SERPL-MCNC: 2.61 MG/DL (ref 0.5–0.9)
DIFFERENTIAL TYPE: ABNORMAL
EOSINOPHILS RELATIVE PERCENT: 3 % (ref 0–4)
GFR AFRICAN AMERICAN: 22 ML/MIN
GFR NON-AFRICAN AMERICAN: 18 ML/MIN
GFR SERPL CREATININE-BSD FRML MDRD: ABNORMAL ML/MIN/{1.73_M2}
GFR SERPL CREATININE-BSD FRML MDRD: ABNORMAL ML/MIN/{1.73_M2}
GLUCOSE BLD-MCNC: 135 MG/DL (ref 65–105)
GLUCOSE BLD-MCNC: 154 MG/DL (ref 65–105)
GLUCOSE BLD-MCNC: 160 MG/DL (ref 65–105)
GLUCOSE BLD-MCNC: 182 MG/DL (ref 70–99)
GLUCOSE BLD-MCNC: 216 MG/DL (ref 65–105)
GLUCOSE BLD-MCNC: 224 MG/DL (ref 65–105)
HCT VFR BLD CALC: 29.3 % (ref 36–46)
HEMOGLOBIN: 9.6 G/DL (ref 12–16)
IMMATURE GRANULOCYTES: ABNORMAL %
LYMPHOCYTES # BLD: 12 % (ref 24–44)
MAGNESIUM: 1.8 MG/DL (ref 1.6–2.6)
MCH RBC QN AUTO: 28.9 PG (ref 26–34)
MCHC RBC AUTO-ENTMCNC: 32.9 G/DL (ref 31–37)
MCV RBC AUTO: 87.9 FL (ref 80–100)
MONOCYTES # BLD: 12 % (ref 1–7)
MORPHOLOGY: NORMAL
NRBC AUTOMATED: ABNORMAL PER 100 WBC
PDW BLD-RTO: 13.5 % (ref 11.5–14.9)
PLATELET # BLD: 197 K/UL (ref 150–450)
PLATELET ESTIMATE: ABNORMAL
PMV BLD AUTO: 8 FL (ref 6–12)
POTASSIUM SERPL-SCNC: 3.5 MMOL/L (ref 3.7–5.3)
RBC # BLD: 3.33 M/UL (ref 4–5.2)
RBC # BLD: ABNORMAL 10*6/UL
SEG NEUTROPHILS: 70 % (ref 36–66)
SEGMENTED NEUTROPHILS ABSOLUTE COUNT: 9.72 K/UL (ref 1.3–9.1)
SODIUM BLD-SCNC: 138 MMOL/L (ref 135–144)
TOTAL PROTEIN: 6.6 G/DL (ref 6.4–8.3)
WBC # BLD: 13.9 K/UL (ref 3.5–11)
WBC # BLD: ABNORMAL 10*3/UL

## 2021-06-27 PROCEDURE — 6360000002 HC RX W HCPCS: Performed by: INTERNAL MEDICINE

## 2021-06-27 PROCEDURE — 36415 COLL VENOUS BLD VENIPUNCTURE: CPT

## 2021-06-27 PROCEDURE — 2580000003 HC RX 258: Performed by: UROLOGY

## 2021-06-27 PROCEDURE — 6360000002 HC RX W HCPCS: Performed by: UROLOGY

## 2021-06-27 PROCEDURE — 6370000000 HC RX 637 (ALT 250 FOR IP): Performed by: UROLOGY

## 2021-06-27 PROCEDURE — 97116 GAIT TRAINING THERAPY: CPT

## 2021-06-27 PROCEDURE — 2060000000 HC ICU INTERMEDIATE R&B

## 2021-06-27 PROCEDURE — 97530 THERAPEUTIC ACTIVITIES: CPT

## 2021-06-27 PROCEDURE — 82947 ASSAY GLUCOSE BLOOD QUANT: CPT

## 2021-06-27 PROCEDURE — 85025 COMPLETE CBC W/AUTO DIFF WBC: CPT

## 2021-06-27 PROCEDURE — 80053 COMPREHEN METABOLIC PANEL: CPT

## 2021-06-27 PROCEDURE — 6370000000 HC RX 637 (ALT 250 FOR IP): Performed by: INTERNAL MEDICINE

## 2021-06-27 PROCEDURE — 83735 ASSAY OF MAGNESIUM: CPT

## 2021-06-27 PROCEDURE — 6370000000 HC RX 637 (ALT 250 FOR IP): Performed by: FAMILY MEDICINE

## 2021-06-27 RX ORDER — POTASSIUM CHLORIDE 20 MEQ/1
40 TABLET, EXTENDED RELEASE ORAL ONCE
Status: COMPLETED | OUTPATIENT
Start: 2021-06-27 | End: 2021-06-27

## 2021-06-27 RX ADMIN — HYDRALAZINE HYDROCHLORIDE 10 MG: 20 INJECTION INTRAMUSCULAR; INTRAVENOUS at 03:10

## 2021-06-27 RX ADMIN — SODIUM CHLORIDE, PRESERVATIVE FREE 10 ML: 5 INJECTION INTRAVENOUS at 21:26

## 2021-06-27 RX ADMIN — ASPIRIN 81 MG: 81 TABLET, COATED ORAL at 09:04

## 2021-06-27 RX ADMIN — HEPARIN SODIUM 5000 UNITS: 5000 INJECTION INTRAVENOUS; SUBCUTANEOUS at 21:22

## 2021-06-27 RX ADMIN — AMLODIPINE BESYLATE 10 MG: 10 TABLET ORAL at 09:05

## 2021-06-27 RX ADMIN — BACLOFEN 10 MG: 10 TABLET ORAL at 09:05

## 2021-06-27 RX ADMIN — OXYCODONE HYDROCHLORIDE AND ACETAMINOPHEN 1 TABLET: 5; 325 TABLET ORAL at 09:04

## 2021-06-27 RX ADMIN — METOPROLOL SUCCINATE 100 MG: 100 TABLET, EXTENDED RELEASE ORAL at 09:04

## 2021-06-27 RX ADMIN — BUPROPION HYDROCHLORIDE 300 MG: 300 TABLET, FILM COATED, EXTENDED RELEASE ORAL at 09:05

## 2021-06-27 RX ADMIN — HYDRALAZINE HYDROCHLORIDE 10 MG: 20 INJECTION INTRAMUSCULAR; INTRAVENOUS at 21:27

## 2021-06-27 RX ADMIN — HEPARIN SODIUM 5000 UNITS: 5000 INJECTION INTRAVENOUS; SUBCUTANEOUS at 05:39

## 2021-06-27 RX ADMIN — BUSPIRONE HYDROCHLORIDE 15 MG: 15 TABLET ORAL at 09:05

## 2021-06-27 RX ADMIN — BACLOFEN 10 MG: 10 TABLET ORAL at 21:27

## 2021-06-27 RX ADMIN — CEFTRIAXONE SODIUM 1000 MG: 1 INJECTION, POWDER, FOR SOLUTION INTRAMUSCULAR; INTRAVENOUS at 12:23

## 2021-06-27 RX ADMIN — HEPARIN SODIUM 5000 UNITS: 5000 INJECTION INTRAVENOUS; SUBCUTANEOUS at 12:23

## 2021-06-27 RX ADMIN — MAGNESIUM HYDROXIDE 30 ML: 400 SUSPENSION ORAL at 10:01

## 2021-06-27 RX ADMIN — INSULIN LISPRO 2 UNITS: 100 INJECTION, SOLUTION INTRAVENOUS; SUBCUTANEOUS at 21:23

## 2021-06-27 RX ADMIN — BUSPIRONE HYDROCHLORIDE 15 MG: 15 TABLET ORAL at 21:27

## 2021-06-27 RX ADMIN — LEVOTHYROXINE SODIUM 112 MCG: 0.11 TABLET ORAL at 05:38

## 2021-06-27 RX ADMIN — POTASSIUM CHLORIDE 40 MEQ: 1500 TABLET, EXTENDED RELEASE ORAL at 17:19

## 2021-06-27 RX ADMIN — FLUOXETINE 10 MG: 10 CAPSULE ORAL at 09:04

## 2021-06-27 RX ADMIN — ISOSORBIDE MONONITRATE 30 MG: 30 TABLET, EXTENDED RELEASE ORAL at 09:04

## 2021-06-27 RX ADMIN — PRAMIPEXOLE DIHYDROCHLORIDE 0.12 MG: 0.12 TABLET ORAL at 09:04

## 2021-06-27 RX ADMIN — CYANOCOBALAMIN TAB 500 MCG 500 MCG: 500 TAB at 09:04

## 2021-06-27 RX ADMIN — HYDRALAZINE HYDROCHLORIDE 10 MG: 20 INJECTION INTRAMUSCULAR; INTRAVENOUS at 17:19

## 2021-06-27 RX ADMIN — SODIUM CHLORIDE, PRESERVATIVE FREE 10 ML: 5 INJECTION INTRAVENOUS at 09:05

## 2021-06-27 RX ADMIN — FERROUS SULFATE TAB 325 MG (65 MG ELEMENTAL FE) 325 MG: 325 (65 FE) TAB at 09:05

## 2021-06-27 RX ADMIN — INSULIN LISPRO 2 UNITS: 100 INJECTION, SOLUTION INTRAVENOUS; SUBCUTANEOUS at 09:06

## 2021-06-27 RX ADMIN — PANTOPRAZOLE SODIUM 40 MG: 40 TABLET, DELAYED RELEASE ORAL at 05:38

## 2021-06-27 RX ADMIN — ATORVASTATIN CALCIUM 10 MG: 10 TABLET, FILM COATED ORAL at 09:05

## 2021-06-27 RX ADMIN — OXYCODONE HYDROCHLORIDE AND ACETAMINOPHEN 1 TABLET: 5; 325 TABLET ORAL at 21:33

## 2021-06-27 RX ADMIN — INSULIN LISPRO 4 UNITS: 100 INJECTION, SOLUTION INTRAVENOUS; SUBCUTANEOUS at 12:23

## 2021-06-27 RX ADMIN — OXYBUTYNIN CHLORIDE 15 MG: 10 TABLET, EXTENDED RELEASE ORAL at 09:04

## 2021-06-27 RX ADMIN — HYDRALAZINE HYDROCHLORIDE 10 MG: 20 INJECTION INTRAMUSCULAR; INTRAVENOUS at 09:03

## 2021-06-27 ASSESSMENT — PAIN DESCRIPTION - FREQUENCY: FREQUENCY: CONTINUOUS

## 2021-06-27 ASSESSMENT — PAIN DESCRIPTION - PROGRESSION: CLINICAL_PROGRESSION: GRADUALLY WORSENING

## 2021-06-27 ASSESSMENT — PAIN SCALES - GENERAL
PAINLEVEL_OUTOF10: 0
PAINLEVEL_OUTOF10: 0
PAINLEVEL_OUTOF10: 8
PAINLEVEL_OUTOF10: 5
PAINLEVEL_OUTOF10: 10
PAINLEVEL_OUTOF10: 10
PAINLEVEL_OUTOF10: 5

## 2021-06-27 ASSESSMENT — PAIN DESCRIPTION - PAIN TYPE
TYPE: ACUTE PAIN
TYPE: CHRONIC PAIN
TYPE: ACUTE PAIN
TYPE: CHRONIC PAIN

## 2021-06-27 ASSESSMENT — PAIN DESCRIPTION - DESCRIPTORS: DESCRIPTORS: DISCOMFORT;NAGGING

## 2021-06-27 ASSESSMENT — PAIN DESCRIPTION - LOCATION
LOCATION: NECK

## 2021-06-27 ASSESSMENT — PAIN DESCRIPTION - ONSET: ONSET: ON-GOING

## 2021-06-27 ASSESSMENT — PAIN - FUNCTIONAL ASSESSMENT: PAIN_FUNCTIONAL_ASSESSMENT: PREVENTS OR INTERFERES SOME ACTIVE ACTIVITIES AND ADLS

## 2021-06-27 ASSESSMENT — PAIN DESCRIPTION - ORIENTATION: ORIENTATION: POSTERIOR

## 2021-06-27 NOTE — PLAN OF CARE
Problem: Pain:  Goal: Pain level will decrease  Description: Pain level will decrease  Outcome: Ongoing  Goal: Control of acute pain  Description: Control of acute pain  Outcome: Ongoing  Goal: Control of chronic pain  Description: Control of chronic pain  Outcome: Ongoing     Problem: Falls - Risk of:  Goal: Will remain free from falls  Description: Will remain free from falls  Outcome: Ongoing  Goal: Absence of physical injury  Description: Absence of physical injury  Outcome: Ongoing     Problem: Musculor/Skeletal Functional Status  Goal: Highest potential functional level  Outcome: Ongoing  Goal: Absence of falls  Outcome: Ongoing     Problem: Skin Integrity:  Goal: Will show no infection signs and symptoms  Description: Will show no infection signs and symptoms  Outcome: Ongoing  Goal: Absence of new skin breakdown  Description: Absence of new skin breakdown  Outcome: Ongoing     Problem: Nutrition  Goal: Optimal nutrition therapy  Outcome: Ongoing

## 2021-06-27 NOTE — PROGRESS NOTES
Department of Internal Medicine  Nephrology Chandu Garcia MD   Consult Note    Reason for consultation: Management of acute kidney injury at nephrolithiasis. Consulting physician: Esthela Diop MD    Interval history: Patient was seen and examined today-she denies nausea flank pain or fever. Jeffy catheter was placed on 6/23/21  Laboratory studies today were reviewed. Cazares catheter was placed 6/22/2021. Urine output yesterday was 1.8 litre. Potassium is 3.5 and magnesium 1.8. History of presenting illness: This is a 70 y.o. female with a significant past medical history of Type 2 diabetes mellitus [diagnosed in 2015], Coronary artery disease [s/p PTCA/stents], hyperlipidemia, hypothyroidism, systemic hypertension and Chronic kidney disease stage III [baseline serum creatinine 1.40 mg/dL in November 2018], who presented to the emergency department yesterday with sudden onset of severe left flank pain associated with nausea and vomiting. She had chills but did not have fever. Laboratory studies at presentation revealed serum creatinine 2.7 mg/dL which increased overnight to 5 mg/dL associated with hyperkalemia and serum potassium 5.9 mmol/L.  CT scan of the abdomen and pelvis showed:  1. Left-sided hydroureter and hydronephrosis with associated asymmetrical perinephric and periureteral fat stranding without obstructing calculus noted. Findings likely represents a recently passed stone. 2. Nonobstructing calculi right kidney, largest measuring 5 mm. 3. Small hiatal hernia. 4. Stable ectasia infrarenal abdominal aortic aneurysm measuring 2.7 cm.      Scheduled Meds:   amLODIPine  10 mg Oral Daily    hydrALAZINE  10 mg Intravenous Q6H    ferrous sulfate  325 mg Oral Daily with breakfast    aspirin  81 mg Oral Daily    atorvastatin  10 mg Oral Daily    baclofen  10 mg Oral BID    buPROPion  300 mg Oral Daily    busPIRone  15 mg Oral BID    FLUoxetine  10 mg Oral Daily    isosorbide 06/25/21  0522 06/26/21  0534 06/27/21  0622   WBC 9.9 12.9* 13.9*   HGB 10.4* 10.0* 9.6*    187 197     BMP:    Recent Labs     06/25/21  0522 06/25/21  0522 06/25/21  1535 06/26/21  0534 06/27/21  0622     --   --  135 138   K 3.2*   < > 3.6* 3.7 3.5*   CL 99  --   --  99 100   CO2 25  --   --  27 25   BUN 28*  --   --  22 28*   CREATININE 2.95*  --   --  2.49* 2.61*   GLUCOSE 164*  --   --  186* 182*    < > = values in this interval not displayed. Lab Results   Component Value Date    NITRU NEGATIVE 06/22/2021    COLORU YELLOW 06/22/2021    PHUR 6.0 06/22/2021    WBCUA 5 TO 10 06/22/2021    RBCUA 5 TO 10 06/22/2021    MUCUS NOT REPORTED 06/22/2021    TRICHOMONAS NOT REPORTED 06/22/2021    YEAST NOT REPORTED 06/22/2021    BACTERIA NOT REPORTED 06/22/2021    SPECGRAV 1.006 06/22/2021    LEUKOCYTESUR MOD 06/22/2021    UROBILINOGEN Normal 06/22/2021    BILIRUBINUR NEGATIVE 06/22/2021    GLUCOSEU NEGATIVE 06/22/2021    KETUA NEGATIVE 06/22/2021    AMORPHOUS NOT REPORTED 06/22/2021     Urine Creatinine:     Lab Results   Component Value Date    LABCREA 48.7 06/21/2021     IMPRESSION/RECOMMENDATIONS:      1. Acute kidney injury - Most consistent with obstructive nephropathy secondary to kidney stone. She underwent cystoscopy with left ureteral stent insertion 6/21/2021. Plan:   Monitor urine output closely. Last dialysis on Friday. Will resume dialysis on Monday-serum creatinine is trending up  Strict input and output documentation. Avoid nephrotoxic agents. Basic metabolic profile daily. Continue to hold losartan    2. Nephrolithiasis - Patient with family history [sister]. metabolic stone evaluation as outpatient since this is patient's first episode of kidney stone. 3.  Systemic hypertension -continue  amlodipine to 10 mg p.o. daily. 4.  E. coli urinary tract infection - complicating nephrolithiasis. Continue IV ceftriaxone 1 g every 24 hours.     5.  Non-anion gap metabolic acidosis - secondary to uremia-improved with dialysis. Give additional potassium 40 mEq x 1 p.o. Continue to monitor renal function for signs of recovery-we will determine if patient requires a tunneled catheter on Monday. Okay to remove Cazares catheter with trial of void-place an  external catheter for urine output monitoring.       MD MELLISSA Snider  Attending Nephrologist  6/27/2021 1:36 PM

## 2021-06-27 NOTE — PROGRESS NOTES
Physical Therapy  REVISED GOALS  [address] [facility]     Date: 21  Patient Name:  Emelina Garcia       Room:   MRN: [id number]  Account: [de-identified]   : 1950  (75 y.o.) Gender: female   Patient Height Height: 5' 5\" (165.1 cm)  Patient Weight 210 lb 1.6 oz (95.3 kg)      21 1534   Short term goals   Time Frame for Short term goals 5-7 treatments/ week- REVISED GOALS   Short term goal 1 pt to tolerate 1/2 hour of therapuetic exercise and activity   Short term goal 2 pt to demonstrate good technique for LE strengthening, balance activities and energy conservation techniques   Short term goal 3 pt to demonstrate 3/5 strength or better for LEs   Short term goal 4 pt to demonstrate rolling in bed w/ supervision  for position change   Short term goal 5 pt to demonstrate dangling at the EOB w/ supervision and progress sitting tolerance to 15  minutes   Short term goal 6 pt to demonstrate sit <> stand and bed <> chair transfers using wheeled walker w/ SBA x 1   Short term goal 7 pt to demonstrate gait w/ wheeled walker 150' x 1 w/ CGA x 1   Short term goal 8 pt to demonstrate fair + or better ambulatory balance using wheeled walker

## 2021-06-27 NOTE — PROGRESS NOTES
6/21/2021). Restrictions  Restrictions/Precautions  Restrictions/Precautions: Fall Risk, Up as Tolerated  Required Braces or Orthoses?: No  Implants present? : Metal implants (cardiac stent, B partial knees, cervical fusion)  Position Activity Restriction  Other position/activity restrictions: Pt started on HD this date  Subjective   General  Family / Caregiver Present: No  Subjective  Subjective: Patient agreeable to therapy, but does not want anything to bother her neck. General Comment  Comments: NESS Kirk provided medication upon arrival.   Pain Screening  Patient Currently in Pain: Yes  Pain Assessment  Pain Assessment: 0-10  Pain Level: 10  Pain Type: Chronic pain  Pain Location: Neck  Pain Orientation: Posterior  Pain Descriptors: Discomfort;Nagging  Pain Frequency: Continuous  Pain Onset: On-going  Clinical Progression: Gradually worsening  Functional Pain Assessment: Prevents or interferes some active activities and ADLs  Non-Pharmaceutical Pain Intervention(s): Ambulation/Increased Activity; Emotional support;Rest;Repositioned; Heat applied (Hot towel roll, NESS Kirk provided medication upon arrival)  Response to Pain Intervention: Patient Satisfied  Vital Signs  Patient Currently in Pain: Yes       Orientation  Orientation  Orientation Level: Oriented X4  Cognition      Objective   Bed mobility  Bridging: Stand by assistance  Rolling to Left: Stand by assistance  Rolling to Right: Unable to assess  Supine to Sit: Stand by assistance  Sit to Supine: Minimal assistance  Scooting: Minimal assistance;Stand by assistance  Comment: Pt sat EOB for 6 min with SBA. Min A x2 required to prop patient into bed once supine. Transfers  Sit to Stand: Contact guard assistance  Stand to sit: Contact guard assistance  Comment: Patient demonstrated safety awareness with the following transfers. Denies lightheadness.    Ambulation  Ambulation?: Yes  Ambulation 1  Surface: level tile  Device: Rolling Walker  Assistance: Contact guard assistance  Gait Deviations: Slow Carolyne;Decreased step length;Decreased head and trunk rotation  Distance: 120'  Comments: VC required to stay within the RW 1x. 1 short standing rest break taken. Balance  Posture: Fair  Sitting - Static: Fair;+  Sitting - Dynamic: Fair;-  Comments: 1 UE support required. No signs of fatigue noted. Patient sat EOB perorming dynamic UE activities for 6 min. Exercises  Comments: Deferred this date d/t pain and fatigue. Goals  Short term goals  Time Frame for Short term goals: 5-7 treatments/ week  Short term goal 1: pt to tolerate 1/2 hour of therapuetic exercise  Short term goal 2: pt to demonstrate good technique for LE strengthening, balance activities and energy conservation techniques  Short term goal 3: pt to demonstrate 3/5 strength or better for LEs  Short term goal 4: pt to demonstrate rolling in bed w/ min x 1 for position change  Short term goal 5: advance to dangling at the EOB w/ min x 1 and progress sitting tolerance to 5 minutes  Patient Goals   Patient goals : unable to state a goal    Plan    Plan  Times per week: 5-7 treatments/ week  Times per day:  (5-7 treatments/ week)  Specific instructions for Next Treatment: Will need to be reassessed when more alert and cooperative.   Current Treatment Recommendations: Strengthening, Safety Education & Training, ROM, Balance Training, Endurance Training, Patient/Caregiver Education & Training, Equipment Evaluation, Education, & procurement, Functional Mobility Training, Transfer Training, Gait Training, Positioning, Cognitive Reorientation  Safety Devices  Type of devices: Call light within reach, Patient at risk for falls, Left in bed, Bed alarm in place     Therapy Time   Individual Concurrent Group Co-treatment   Time In (P) 0955         Time Out (P) 1020         Minutes (P) [address], PTA

## 2021-06-28 LAB
ABSOLUTE BANDS #: 0.12 K/UL (ref 0–1)
ABSOLUTE EOS #: 0.37 K/UL (ref 0–0.4)
ABSOLUTE IMMATURE GRANULOCYTE: ABNORMAL K/UL (ref 0–0.3)
ABSOLUTE LYMPH #: 1.48 K/UL (ref 1–4.8)
ABSOLUTE MONO #: 1.35 K/UL (ref 0.1–1.3)
ALBUMIN SERPL-MCNC: 3.5 G/DL (ref 3.5–5.2)
ALBUMIN/GLOBULIN RATIO: ABNORMAL (ref 1–2.5)
ALP BLD-CCNC: 85 U/L (ref 35–104)
ALT SERPL-CCNC: 15 U/L (ref 5–33)
ANION GAP SERPL CALCULATED.3IONS-SCNC: 12 MMOL/L (ref 9–17)
AST SERPL-CCNC: 18 U/L
BANDS: 1 % (ref 0–10)
BASOPHILS # BLD: 2 % (ref 0–2)
BASOPHILS ABSOLUTE: 0.25 K/UL (ref 0–0.2)
BILIRUB SERPL-MCNC: 0.18 MG/DL (ref 0.3–1.2)
BUN BLDV-MCNC: 36 MG/DL (ref 8–23)
BUN/CREAT BLD: ABNORMAL (ref 9–20)
CALCIUM SERPL-MCNC: 9 MG/DL (ref 8.6–10.4)
CHLORIDE BLD-SCNC: 100 MMOL/L (ref 98–107)
CO2: 25 MMOL/L (ref 20–31)
CREAT SERPL-MCNC: 2.9 MG/DL (ref 0.5–0.9)
DIFFERENTIAL TYPE: ABNORMAL
EOSINOPHILS RELATIVE PERCENT: 3 % (ref 0–4)
GFR AFRICAN AMERICAN: 19 ML/MIN
GFR NON-AFRICAN AMERICAN: 16 ML/MIN
GFR SERPL CREATININE-BSD FRML MDRD: ABNORMAL ML/MIN/{1.73_M2}
GFR SERPL CREATININE-BSD FRML MDRD: ABNORMAL ML/MIN/{1.73_M2}
GLUCOSE BLD-MCNC: 119 MG/DL (ref 65–105)
GLUCOSE BLD-MCNC: 171 MG/DL (ref 65–105)
GLUCOSE BLD-MCNC: 186 MG/DL (ref 70–99)
GLUCOSE BLD-MCNC: 206 MG/DL (ref 65–105)
GLUCOSE BLD-MCNC: 280 MG/DL (ref 65–105)
HCT VFR BLD CALC: 28.7 % (ref 36–46)
HEMOGLOBIN: 9.2 G/DL (ref 12–16)
IMMATURE GRANULOCYTES: ABNORMAL %
LYMPHOCYTES # BLD: 12 % (ref 24–44)
MCH RBC QN AUTO: 28.5 PG (ref 26–34)
MCHC RBC AUTO-ENTMCNC: 31.9 G/DL (ref 31–37)
MCV RBC AUTO: 89.3 FL (ref 80–100)
MONOCYTES # BLD: 11 % (ref 1–7)
MORPHOLOGY: ABNORMAL
NRBC AUTOMATED: ABNORMAL PER 100 WBC
PDW BLD-RTO: 13.6 % (ref 11.5–14.9)
PLATELET # BLD: 205 K/UL (ref 150–450)
PLATELET ESTIMATE: ABNORMAL
PMV BLD AUTO: 8.1 FL (ref 6–12)
POTASSIUM SERPL-SCNC: 4.5 MMOL/L (ref 3.7–5.3)
RBC # BLD: 3.22 M/UL (ref 4–5.2)
RBC # BLD: ABNORMAL 10*6/UL
SEG NEUTROPHILS: 71 % (ref 36–66)
SEGMENTED NEUTROPHILS ABSOLUTE COUNT: 8.73 K/UL (ref 1.3–9.1)
SODIUM BLD-SCNC: 137 MMOL/L (ref 135–144)
TOTAL PROTEIN: 6.8 G/DL (ref 6.4–8.3)
WBC # BLD: 12.3 K/UL (ref 3.5–11)
WBC # BLD: ABNORMAL 10*3/UL

## 2021-06-28 PROCEDURE — 80053 COMPREHEN METABOLIC PANEL: CPT

## 2021-06-28 PROCEDURE — 82947 ASSAY GLUCOSE BLOOD QUANT: CPT

## 2021-06-28 PROCEDURE — 2060000000 HC ICU INTERMEDIATE R&B

## 2021-06-28 PROCEDURE — 2580000003 HC RX 258: Performed by: UROLOGY

## 2021-06-28 PROCEDURE — 6360000002 HC RX W HCPCS: Performed by: INTERNAL MEDICINE

## 2021-06-28 PROCEDURE — 36415 COLL VENOUS BLD VENIPUNCTURE: CPT

## 2021-06-28 PROCEDURE — 85025 COMPLETE CBC W/AUTO DIFF WBC: CPT

## 2021-06-28 PROCEDURE — 97110 THERAPEUTIC EXERCISES: CPT

## 2021-06-28 PROCEDURE — 97535 SELF CARE MNGMENT TRAINING: CPT

## 2021-06-28 PROCEDURE — 6360000002 HC RX W HCPCS: Performed by: UROLOGY

## 2021-06-28 PROCEDURE — 6370000000 HC RX 637 (ALT 250 FOR IP): Performed by: FAMILY MEDICINE

## 2021-06-28 PROCEDURE — 6370000000 HC RX 637 (ALT 250 FOR IP): Performed by: UROLOGY

## 2021-06-28 RX ADMIN — METOPROLOL SUCCINATE 100 MG: 100 TABLET, EXTENDED RELEASE ORAL at 21:30

## 2021-06-28 RX ADMIN — PRAMIPEXOLE DIHYDROCHLORIDE 0.12 MG: 0.12 TABLET ORAL at 10:14

## 2021-06-28 RX ADMIN — HEPARIN SODIUM 5000 UNITS: 5000 INJECTION INTRAVENOUS; SUBCUTANEOUS at 14:36

## 2021-06-28 RX ADMIN — HEPARIN SODIUM 5000 UNITS: 5000 INJECTION INTRAVENOUS; SUBCUTANEOUS at 21:31

## 2021-06-28 RX ADMIN — ATORVASTATIN CALCIUM 10 MG: 10 TABLET, FILM COATED ORAL at 10:09

## 2021-06-28 RX ADMIN — FERROUS SULFATE TAB 325 MG (65 MG ELEMENTAL FE) 325 MG: 325 (65 FE) TAB at 10:09

## 2021-06-28 RX ADMIN — CEFTRIAXONE SODIUM 1000 MG: 1 INJECTION, POWDER, FOR SOLUTION INTRAMUSCULAR; INTRAVENOUS at 12:24

## 2021-06-28 RX ADMIN — HYDRALAZINE HYDROCHLORIDE 10 MG: 20 INJECTION INTRAMUSCULAR; INTRAVENOUS at 14:36

## 2021-06-28 RX ADMIN — SODIUM CHLORIDE, PRESERVATIVE FREE 10 ML: 5 INJECTION INTRAVENOUS at 10:13

## 2021-06-28 RX ADMIN — ASPIRIN 81 MG: 81 TABLET, COATED ORAL at 10:09

## 2021-06-28 RX ADMIN — FLUOXETINE 10 MG: 10 CAPSULE ORAL at 10:14

## 2021-06-28 RX ADMIN — CYANOCOBALAMIN TAB 500 MCG 500 MCG: 500 TAB at 10:10

## 2021-06-28 RX ADMIN — INSULIN LISPRO 2 UNITS: 100 INJECTION, SOLUTION INTRAVENOUS; SUBCUTANEOUS at 21:31

## 2021-06-28 RX ADMIN — LEVOTHYROXINE SODIUM 112 MCG: 0.11 TABLET ORAL at 05:53

## 2021-06-28 RX ADMIN — HYDRALAZINE HYDROCHLORIDE 10 MG: 20 INJECTION INTRAMUSCULAR; INTRAVENOUS at 21:30

## 2021-06-28 RX ADMIN — BACLOFEN 10 MG: 10 TABLET ORAL at 10:10

## 2021-06-28 RX ADMIN — BACLOFEN 10 MG: 10 TABLET ORAL at 21:30

## 2021-06-28 RX ADMIN — BUPROPION HYDROCHLORIDE 300 MG: 300 TABLET, FILM COATED, EXTENDED RELEASE ORAL at 10:10

## 2021-06-28 RX ADMIN — OXYBUTYNIN CHLORIDE 15 MG: 10 TABLET, EXTENDED RELEASE ORAL at 10:09

## 2021-06-28 RX ADMIN — HYDRALAZINE HYDROCHLORIDE 10 MG: 20 INJECTION INTRAMUSCULAR; INTRAVENOUS at 03:06

## 2021-06-28 RX ADMIN — PANTOPRAZOLE SODIUM 40 MG: 40 TABLET, DELAYED RELEASE ORAL at 05:53

## 2021-06-28 RX ADMIN — AMLODIPINE BESYLATE 10 MG: 10 TABLET ORAL at 10:09

## 2021-06-28 RX ADMIN — HYDRALAZINE HYDROCHLORIDE 10 MG: 20 INJECTION INTRAMUSCULAR; INTRAVENOUS at 10:10

## 2021-06-28 RX ADMIN — ISOSORBIDE MONONITRATE 30 MG: 30 TABLET, EXTENDED RELEASE ORAL at 10:10

## 2021-06-28 RX ADMIN — SODIUM CHLORIDE, PRESERVATIVE FREE 10 ML: 5 INJECTION INTRAVENOUS at 21:31

## 2021-06-28 RX ADMIN — BUSPIRONE HYDROCHLORIDE 15 MG: 15 TABLET ORAL at 21:30

## 2021-06-28 RX ADMIN — INSULIN LISPRO 6 UNITS: 100 INJECTION, SOLUTION INTRAVENOUS; SUBCUTANEOUS at 12:23

## 2021-06-28 RX ADMIN — INSULIN LISPRO 2 UNITS: 100 INJECTION, SOLUTION INTRAVENOUS; SUBCUTANEOUS at 10:12

## 2021-06-28 RX ADMIN — BUSPIRONE HYDROCHLORIDE 15 MG: 15 TABLET ORAL at 10:09

## 2021-06-28 RX ADMIN — HEPARIN SODIUM 5000 UNITS: 5000 INJECTION INTRAVENOUS; SUBCUTANEOUS at 05:54

## 2021-06-28 ASSESSMENT — PAIN SCALES - GENERAL
PAINLEVEL_OUTOF10: 8
PAINLEVEL_OUTOF10: 0

## 2021-06-28 ASSESSMENT — PAIN DESCRIPTION - PAIN TYPE: TYPE: CHRONIC PAIN

## 2021-06-28 ASSESSMENT — PAIN DESCRIPTION - LOCATION: LOCATION: NECK

## 2021-06-28 ASSESSMENT — PAIN DESCRIPTION - ORIENTATION: ORIENTATION: POSTERIOR

## 2021-06-28 NOTE — PROGRESS NOTES
Patient is pain free at this time and sleeping. Patient has had both measurable and un measurable urine output since hendrix was taken out during the day shift. Patient stated she was not comfortable using the external urinary device and requested to rather have a bedside commode. RN will continue to monitor and assist patient.

## 2021-06-28 NOTE — PROGRESS NOTES
[address]   INPATIENT OCCUPATIONAL THERAPY  PROGRESS NOTE  Date: 2021  Patient Name: Claritza Moreland      Room: 4/4-01  MRN: [id number]    : 1950  (75 y.o.) Gender: female     Discharge Recommendations:  Further Occupational Therapy is recommended upon facility discharge. Equipment Needed:  (TBD)    Referring Practitioner: Dr. Izaiah Valdes  Diagnosis: Acute infective cystitis  General  Chart Reviewed: Yes, Orders, Progress Notes  Patient assessed for rehabilitation services?: Yes  Family / Caregiver Present: No  Referring Practitioner: Dr. Izaiah Valdes  Diagnosis: Acute infective cystitis    Restrictions  Restrictions/Precautions: Fall Risk, Up as Tolerated  Implants present? : Metal implants (cardiac stent, bilateral partial knees, cervical fusion)  Other position/activity restrictions: Pt started on HD this date  Required Braces or Orthoses?: No      Subjective  Subjective: \"I gotta find that ring or I will be really upset\" Pt is distracted looking in her purse at beginning of tx. Patient Currently in Pain: Yes  Pain Level: 8  Pain Location: Neck  Pain Orientation: Posterior  Overall Orientation Status: Within Normal Limits  Orientation Level: Oriented X4     Pain Assessment  Pain Assessment: 0-10  Pain Level: 8  Pain Type: Chronic pain  Pain Location: Neck  Pain Orientation: Posterior    Objective  Cognition  Overall Cognitive Status: Impaired  Arousal/Alertness: Inconsistent responses to stimuli  Attention Span: Difficulty attending to directions  Memory: Decreased recall of recent events  Following Commands:  Follows one step commands with repetition  Safety Judgement: Decreased awareness of need for safety  Awareness of Errors: Assistance required to identify errors made  Insights: Decreased awareness of deficits  Additional Comments: Pt initially able to answer PLOF questions with Min prompting/correcting from son, but after ~5 minutes pt started to fatigue and was unable to continue, becoming increasingly distracted and confused  Bed mobility  Scooting: Stand by assistance  Balance  Sitting Balance: Stand by assistance (sitting EOB)  Standing Balance: Minimal assistance (1-2 UE support)  Standing Balance  Time: 1 min  Activity: functional mobility in room   Functional Mobility  Functional - Mobility Device: Rolling Walker  Activity:  (within room )  Assist Level: Minimal assistance  Functional Mobility Comments: with RW. 1 LOB noted with pt not understanding deficits stating \"I'm fine\"   ADL  Feeding: Stand by assistance  Grooming: Contact guard assistance  UE Bathing: Moderate assistance  LE Bathing: Maximum assistance  UE Dressing: Minimal assistance  LE Dressing: Minimal assistance  Toileting: Contact guard assistance  Additional Comments: All ADLs are based on clinical reasoning unless otherwise noted. Pt completes toileting using BSC with CGA and 1 LOB. Pt requires min cueing for redirection to task throughout tx. Transfers  Stand Step Transfers: Minimal assistance (w/RW)  Sit to stand: Contact guard assistance  Stand to sit: Contact guard assistance  Transfer Comments: VC for hand placements and safety awarness  Toilet Transfers  Toilet - Technique: Stand step  Equipment Used: Standard bedside commode  Toilet Transfer: Contact guard assistance  Toilet Transfers Comments: w RW. 1 LOB when turning to sit req min A for correction. Assessment  Performance deficits / Impairments: Decreased functional mobility ; Decreased ADL status; Decreased ROM; Decreased strength;Decreased safe awareness;Decreased cognition;Decreased endurance;Decreased balance;Decreased high-level IADLs;Decreased fine motor control;Decreased coordination  Prognosis: Good  Discharge Recommendations: Patient would benefit from continued therapy after discharge  Activity Tolerance: Patient Tolerated treatment well  Safety Devices in place: Yes  Type of devices: Patient at risk for falls;Gait belt;Call light within reach; Bed alarm in place;Nurse notified  Equipment Recommendations  Equipment Needed:  (TBD)          Patient Education:OT POC, safety with functional mobility and use of RW, wareness of balance deficits. Learner:patient  Method: explanation       Outcome: acknowledged understanding of, needs reinforcement     Plan  Safety Devices  Safety Devices in place: Yes  Type of devices: Patient at risk for falls, Gait belt, Call light within reach, Bed alarm in place, Nurse notified  Plan  Times per week: 5-7  Times per day: Daily  Current Treatment Recommendations: Strengthening, ROM, Balance Training, Functional Mobility Training, Endurance Training, Cognitive Reorientation, Safety Education & Training, Patient/Caregiver Education & Training, Equipment Evaluation, Education, & procurement, Self-Care / ADL, Cognitive/Perceptual Training      Goals  Short term goals  Time Frame for Short term goals: By Discharge  Short term goal 1: Pt will complete bed mobility with Max A x1 and tolerate sitting EOB for 10-15 minutes unsupported with no LOB while participating in a dynamic functional task. Short term goal 2: Pt will complete eating/grooming tasks with Min A using AE/modified techniques as needed. Short term goal 3: Pt will actively participate in self-care routine and complete bathing/dressing tasks with Mod A and Good safety using AE if appropriate. Short term goal 4: Pt will complete toilet transfer and toileting with Mod A x1 and Good safety using least restrictive device. Short term goal 5: Pt will actively participate in 15-20 minutes of therapeutic exercise/activity to promote increased independence and safety with self-care and mobility.     OT Individual Minutes  Time In: 3630  Time Out: 5250  Minutes: 23      Electronically signed by SHILPI Maier on 6/28/21 at 3:13 PM EDT

## 2021-06-28 NOTE — PROGRESS NOTES
Department of Internal Medicine  Nephrology Diallo Callejas MD  Progress Note    Reason for consultation: Management of acute kidney injury at nephrolithiasis. Consulting physician: Carmella Perrin MD    Interval history: Patient is nonoliguric although she does not have urinary catheter and urine output documentation is suboptimal.  Last hemodialysis was on Friday, 6/25/2021 after having Faulk Kelp catheter placed on 6/23/2021 for acute kidney injury complicating obstructive kidney stone. She denies flank pain and does not have shortness of breath. History of presenting illness: This is a 70 y.o. female with a significant past medical history of Type 2 diabetes mellitus [diagnosed in 2015], Coronary artery disease [s/p PTCA/stents], hyperlipidemia, hypothyroidism, systemic hypertension and Chronic kidney disease stage III [baseline serum creatinine 1.40 mg/dL in November 2018], who presented to the emergency department yesterday with sudden onset of severe left flank pain associated with nausea and vomiting. She had chills but did not have fever. Laboratory studies at presentation revealed serum creatinine 2.7 mg/dL which increased overnight to 5 mg/dL associated with hyperkalemia and serum potassium 5.9 mmol/L.  CT scan of the abdomen and pelvis showed:  1. Left-sided hydroureter and hydronephrosis with associated asymmetrical perinephric and periureteral fat stranding without obstructing calculus noted. Findings likely represents a recently passed stone. 2. Nonobstructing calculi right kidney, largest measuring 5 mm. 3. Small hiatal hernia. 4. Stable ectasia infrarenal abdominal aortic aneurysm measuring 2.7 cm.      Scheduled Meds:   amLODIPine  10 mg Oral Daily    hydrALAZINE  10 mg Intravenous Q6H    ferrous sulfate  325 mg Oral Daily with breakfast    aspirin  81 mg Oral Daily    atorvastatin  10 mg Oral Daily    baclofen  10 mg Oral BID    buPROPion  300 mg Oral Daily    busPIRone acute focal neurologic deficits. CBC:   Recent Labs     06/26/21  0534 06/27/21  0622 06/28/21  0448   WBC 12.9* 13.9* 12.3*   HGB 10.0* 9.6* 9.2*    197 205     BMP:    Recent Labs     06/26/21  0534 06/27/21  0622 06/28/21  0448    138 137   K 3.7 3.5* 4.5   CL 99 100 100   CO2 27 25 25   BUN 22 28* 36*   CREATININE 2.49* 2.61* 2.90*   GLUCOSE 186* 182* 186*       Lab Results   Component Value Date    NITRU NEGATIVE 06/22/2021    COLORU YELLOW 06/22/2021    PHUR 6.0 06/22/2021    WBCUA 5 TO 10 06/22/2021    RBCUA 5 TO 10 06/22/2021    MUCUS NOT REPORTED 06/22/2021    TRICHOMONAS NOT REPORTED 06/22/2021    YEAST NOT REPORTED 06/22/2021    BACTERIA NOT REPORTED 06/22/2021    SPECGRAV 1.006 06/22/2021    LEUKOCYTESUR MOD 06/22/2021    UROBILINOGEN Normal 06/22/2021    BILIRUBINUR NEGATIVE 06/22/2021    GLUCOSEU NEGATIVE 06/22/2021    KETUA NEGATIVE 06/22/2021    AMORPHOUS NOT REPORTED 06/22/2021     Urine Creatinine:     Lab Results   Component Value Date    LABCREA 48.7 06/21/2021     IMPRESSION/RECOMMENDATIONS:      1. Acute kidney injury - Most consistent with obstructive nephropathy secondary to kidney stone. She underwent cystoscopy with left ureteral stent insertion 6/21/2021. She is nonoliguric and we will hold off dialysis today. Plan: Check basic metabolic profile daily. Strict input and output documentation. Avoid nephrotoxic agents. Basic metabolic profile daily. Continue to hold angiotensin receptor blocker. 2.  Nephrolithiasis - Patient with family history [sister]. metabolic stone evaluation as outpatient since this is patient's first episode of kidney stone. Monitor urine output closely. 3.  Systemic hypertension - Continue amlodipine 10 mg p.o. daily and metoprolol 100 mg p.o. daily. 4.  E. coli urinary tract infection - complicating nephrolithiasis. Continue IV ceftriaxone 1 g every 24 hours. Prognosis is guarded.     Estefani Sanchez MD FACP  Attending Nephrologist  6/28/2021 12:15 PM

## 2021-06-28 NOTE — PLAN OF CARE
Nutrition Problem #1: Inadequate oral intake  Intervention: Food and/or Nutrient Delivery: Continue Current Diet, Discontinue Oral Nutrition Supplement  Nutritional Goals: Meet greater than 75% of estimated nutrition needs

## 2021-06-28 NOTE — PROGRESS NOTES
[facility]    Progress Note    6/28/2021    4:54 PM    Name:   Charity Delacruz  MRN:     [id number]     Acct:      [de-identified]   Room:   2114/2114-01   Day:  8  Admit Date:  6/20/2021 10:18 AM    PCP:   Tobin Michelle DO  Code Status:  Full Code    Subjective:     C/C:   Chief Complaint   Patient presents with    Flank Pain    Urinary Frequency       Patient seen and examined today   No new acute issues overnight   No complain of chest pain, nausea, vomiting, diaphoresis   Creatinine 2.90 today  Nephrology following   Blood sugars between 150-200 with occasional readings over 200  Urine culture  E coli sensitive to penicillins   on room air, vitals fairly stable. WERO consistent with obstructive  Nephropathy secondary to kidney stone. She underwent cystoscopy with left ureteral stent placement on 06/21/2021 she is non oliguric hoping to get off of the dialysis, check BMP daily, strict intake and output,,   Hypertension on amlodipine and metoprolol   E coli UTI sensitive to penicillins currently on ceftriaxone 1 g every 24 hours. Review of Systems:     Constitutional:  negative for chills, fevers, sweats  Respiratory:  negative for cough, dyspnea on exertion, shortness of breath, wheezing  Cardiovascular:  negative for chest pain, chest pressure/discomfort, lower extremity edema, palpitations  Gastrointestinal:  negative for abdominal pain, constipation, diarrhea, nausea, vomiting  Neurological:  negative for dizziness, headache    Medications: Allergies:     Allergies   Allergen Reactions    Pcn [Penicillins] Swelling and Hives    Heparin Other (See Comments)     MIGRAINE      Lamotrigine Other (See Comments), Itching, Nausea Only and Rash    Cyclobenzaprine      Dry mouth, jitters    Heparin (Porcine)      Other reaction(s): Migraine       Current Meds:   Scheduled Meds:    amLODIPine  10 mg Oral Daily    hydrALAZINE  10 mg Intravenous Q6H    ferrous sulfate  325 mg Oral Daily with breakfast    aspirin  81 mg Oral Daily    atorvastatin  10 mg Oral Daily    baclofen  10 mg Oral BID    buPROPion  300 mg Oral Daily    busPIRone  15 mg Oral BID    FLUoxetine  10 mg Oral Daily    isosorbide mononitrate  30 mg Oral Daily    levothyroxine  112 mcg Oral Daily    [Held by provider] losartan  50 mg Oral Daily    metoprolol succinate  100 mg Oral Daily    oxybutynin  15 mg Oral Daily    pantoprazole  40 mg Oral QAM AC    pramipexole  0.125 mg Oral Daily    vitamin B-12  500 mcg Oral Daily    insulin lispro  0-12 Units Subcutaneous TID WC    insulin lispro  0-6 Units Subcutaneous Nightly    sodium chloride flush  10 mL Intravenous 2 times per day    heparin (porcine)  5,000 Units Subcutaneous 3 times per day    cefTRIAXone (ROCEPHIN) IV  1,000 mg Intravenous Q24H     Continuous Infusions:    dextrose      sodium chloride       PRN Meds: ALPRAZolam, metoprolol, sodium citrate, sodium citrate, perflutren lipid microspheres, hydrALAZINE, oxyCODONE-acetaminophen, albuterol sulfate HFA, glucose, dextrose, glucagon (rDNA), dextrose, sodium chloride flush, sodium chloride, ondansetron **OR** ondansetron, magnesium hydroxide, acetaminophen **OR** acetaminophen, morphine    Data:     Past Medical History:   has a past medical history of Aortic valve stenosis, Arthritis, CAD (coronary artery disease), Diabetes mellitus ([address] Heart murmur, Hyperlipidemia, MI, old, Pulmonary stenosis, Pulmonary valve stenosis, Sciatica, Thyroid disease, and Wears glasses. Social History:   reports that she quit smoking about 17 years ago. Her smoking use included cigarettes. She has a 30.00 pack-year smoking history. She has never used smokeless tobacco. She reports current alcohol use. She reports that she does not use drugs.      Family History:   Family History   Problem Relation Age of Onset    Breast Cancer Mother         BREAST WITH METS T  LIVER AND LUNG    Other Father         AAA    Heart Disease Father     Asthma Sister     Diabetes Sister         NIDDM    Stroke Brother     Diabetes Brother         NIDDM    Stroke Maternal Grandmother     Asthma Son        Vitals:  /65   Pulse 80   Temp 98.1 °F (36.7 °C) (Oral)   Resp 18   Ht 5' 5\" (1.651 m)   Wt 207 lb 3.7 oz (94 kg)   LMP 1980   SpO2 97%   BMI 34.49 kg/m²   Temp (24hrs), Av.9 °F (36.6 °C), Min:97.7 °F (36.5 °C), Max:98.1 °F (36.7 °C)    Recent Labs     21  2047 21  0659 21  1122 21  1608   POCGLU 216* 171* 280* 119*       I/O (24Hr):     Intake/Output Summary (Last 24 hours) at 2021 1654  Last data filed at 2021 1446  Gross per 24 hour   Intake 1196 ml   Output 1805 ml   Net -609 ml       Labs:  Hematology:  Recent Labs     21  0534 21  0622 21  0448   WBC 12.9* 13.9* 12.3*   RBC 3.46* 3.33* 3.22*   HGB 10.0* 9.6* 9.2*   HCT 30.3* 29.3* 28.7*   MCV 87.5 87.9 89.3   MCH 28.9 28.9 28.5   MCHC 33.1 32.9 31.9   RDW 13.2 13.5 13.6    197 205   MPV 8.6 8.0 8.1     Chemistry:  Recent Labs     21  0534 21  0622 21  0448    138 137   K 3.7 3.5* 4.5   CL 99 100 100   CO2 27 25 25   GLUCOSE 186* 182* 186*   BUN 22 28* 36*   CREATININE 2.49* 2.61* 2.90*   MG 2.0 1.8  --    ANIONGAP 9 13 12   LABGLOM 19* 18* 16*   GFRAA 23* 22* 19*   CALCIUM 8.7 9.1 9.0     Recent Labs     21  0534 21  0713 21  0622 21  0656 21  1633 21  1713 21  2047 21  0448 21  0659 21  1122 21  1608   PROT 6.5  --  6.6  --   --   --   --  6.8  --   --   --    LABALBU 3.3*  --  3.4*  --   --   --   --  3.5  --   --   --    AST 15  --  18  --   --   --   --  18  --   --   --    ALT 10  --  13  --   --   --   --  15  --   --   --    ALKPHOS 87  --  88  --   --   --   --  85  --   --   --    BILITOT 0.23*  --  0.21*  --   --   --   --  0.18*  --   --   --    POCGLU  --    < >  -- < > 160* 135* 216*  --  171* 280* 119*    < > = values in this interval not displayed. ABG:No results found for: POCPH, PHART, PH, POCPCO2, ZLN9XPC, PCO2, POCPO2, PO2ART, PO2, POCHCO3, ANZ5NJZ, HCO3, NBEA, PBEA, BEART, BE, THGBART, THB, WSL9EQH, LMUC2EMK, W6EDZPQR, O2SAT, FIO2  Lab Results   Component Value Date/Time    SPECIAL NOT REPORTED 06/21/2021 04:25 PM     Lab Results   Component Value Date/Time    CULTURE ESCHERICHIA COLI 10 to 50,000 CFU/ML (A) 06/21/2021 04:25 PM       Radiology:  CT HEAD WO CONTRAST    Result Date: 6/22/2021  No acute intracranial abnormality. Senescent changes including chronic microvascular change and atherosclerotic disease of the major intracranial vessels. XR CHEST PORTABLE    Result Date: 6/23/2021  Right internal jugular central venous catheter terminates over the mid superior vena cava.  Hypoventilated chest.       Physical Examination:        General appearance:  alert, cooperative and no distress  Mental Status:  oriented to person, place and time and normal affect  Lungs:  clear to auscultation bilaterally, normal effort  Heart:  regular rate and rhythm, no murmur  Abdomen:  soft, nontender, nondistended, normal bowel sounds  Extremities:  no edema, redness, tenderness in the calves  Skin:  no gross lesions, rashes, induration    Assessment:        Hospital Problems         Last Modified POA    * (Principal) Acute cystitis without hematuria 6/20/2021 Yes    Acute renal failure ([address] 6/24/2021 Yes    Hydronephrosis of left kidney 6/20/2021 Yes    Hydroureter, left 6/20/2021 Yes    Type 2 diabetes mellitus, without long-term current use of insulin ([address] 6/20/2021 Yes    Essential hypertension 6/20/2021 Yes    Hypothyroidism 6/20/2021 Yes    Acute infective cystitis 6/20/2021 Yes    Uremia 6/24/2021 Yes          Plan:        Patient seen and examined today   No new acute issues overnight   Acute metabolic encephalopathy/ AMS secondary to uremia/uremic encephalopathy significantly improved, neurology signed off. Awake alert and oriented  No complain of chest pain, nausea, vomiting, diaphoresis   Creatinine 2.90 today  Nephrology following   Blood sugars between 150-200 with occasional readings over 200  Urine culture  E coli sensitive to penicillins   on room air, vitals fairly stable. WERO consistent with obstructive  Nephropathy secondary to kidney stone. She underwent cystoscopy with left ureteral stent placement on 06/21/2021 she is non oliguric hoping to get off of the dialysis, check BMP daily, strict intake and output,,   Hypertension on amlodipine and metoprolol   E coli UTI sensitive to penicillins currently on ceftriaxone 1 g every 24 hours.     Continue aspirin, Lipitor   Continue medium dose correction insulin  Synthroid 112 mcg   Continue to hold Cozaar   continue PT/OT   cardiology signed off             Zuleyma Ibanez MD  6/28/2021  4:54 PM

## 2021-06-28 NOTE — PROGRESS NOTES
Comprehensive Nutrition Assessment    Type and Reason for Visit:  Reassess    Nutrition Recommendations/Plan: Continue current diet and oral nutrition supplements. Nutrition Assessment:  Per flowsheet pt consuming % of meals today. Pt states she is not used to the food at the hospital and is picky about how food is cooked. Pt had concerns about sugar in the hospital food. Ensure Clear is high in carbohydrate but pt refused Nepro and other supplements in the past. Pt did not previously follow a specific diet. Will continue current diet and oral nutrition supplements. Monitor PO intake and labs. Malnutrition Assessment:  Malnutrition Status: At risk for malnutrition (Comment)    Context:  Acute Illness     Findings of the 6 clinical characteristics of malnutrition:  Energy Intake:  7 - 50% or less of estimated energy requirements for 5 or more days  Weight Loss:  No significant weight loss     Body Fat Loss:  No significant body fat loss     Muscle Mass Loss:  No significant muscle mass loss    Fluid Accumulation:  No significant fluid accumulation     Strength:  Not Performed    Estimated Daily Nutrient Needs:  Energy (kcal):  5673-0217 kcal based on [address] Jeor and 1.2-1.3 factor; Weight Used for Energy Requirements:   (96.6kg)     Protein (g):  114-125 gm based on 2-2.2 gm/kg ideal; Weight Used for Protein Requirements:  Ideal          Nutrition Related Findings:  No edema. Labs: POC Glu- 171, 280, Glu- 186, BUN- 36, Cr- 2.90. Meds: Synthroid. Other labs and meds reviewed. Last hemodialysis 6/25. Wounds:  None       Current Nutrition Therapies:    Adult Oral Nutrition Supplement; Clear Liquid Oral Supplement  ADULT DIET; Easy to Chew; Low Potassium (Less than 3000 mg/day);  Low Phosphorus (Less than 1000 mg)    Anthropometric Measures:  · Height: 5' 5\" (165.1 cm)  · Current Body Weight: 207 lb 3.7 oz (94 kg)   · Admission Body Weight: 207 lb (93.9 kg)    · Usual Body Weight:  (per past weight records 200 - 215 lbs in 1 month likely due to shift in fluids)     · Ideal Body Weight: 125 lbs; % Ideal Body Weight 170.4 %   · BMI: 34.5  · BMI Categories: Obese Class 2 (BMI 35.0 -39.9)       Nutrition Diagnosis:   · Inadequate oral intake related to renal dysfunction as evidenced by intake 0-25%, dialysis, lab values    Nutrition Interventions:   Food and/or Nutrient Delivery:  Continue Current Diet, Continue Oral Nutrition Supplement  Nutrition Education/Counseling:   (May be needed)   Coordination of Nutrition Care:  Continue to monitor while inpatient    Goals:  Meet greater than 75% of estimated nutrition needs       Nutrition Monitoring and Evaluation:   Behavioral-Environmental Outcomes:  None Identified   Food/Nutrient Intake Outcomes:  Diet Advancement/Tolerance, Food and Nutrient Intake, Supplement Intake  Physical Signs/Symptoms Outcomes:  Biochemical Data, Weight, Fluid Status or Edema, Hemodynamic Status     Discharge Planning: Too soon to determine     Electronically signed by Randall Diaz on 6/28/21 at 4:13 PM EDT    Reviewed and approved by:  Clive Kidd R.D., L.D.   Phone: [phone]

## 2021-06-28 NOTE — CARE COORDINATION
KAYLA submitted documentation to [facility] to attempt to obtain an outpatient dialysis slot.   KAYLA will follow up with [facility] in the AM.

## 2021-06-28 NOTE — PLAN OF CARE
Problem: Pain:  Goal: Pain level will decrease  Description: Pain level will decrease  6/28/2021 0429 by Carla Davis RN  Outcome: Ongoing     Problem: Pain:  Goal: Control of chronic pain  Description: Control of chronic pain  6/28/2021 0429 by Carla Davis RN  Outcome: Ongoing  Note: Pt medicated with pain medication prn. Assessed all pain characteristics including level, type, location, frequency, and onset. Non-pharmacologic interventions offered to pt as well. Pt states pain is tolerable at this time. Will continue to monitor. Problem: Pain:  Goal: Control of acute pain  Description: Control of acute pain  6/28/2021 0429 by Carla Davis RN  Outcome: Ongoing     Problem: Falls - Risk of:  Goal: Will remain free from falls  Description: Will remain free from falls  6/28/2021 0429 by Carla Davis RN  Outcome: Ongoing  Note: No falls noted this shift. Patient ambulates with x1 staff assistance without difficulty. Bed kept in low position. Safe environment maintained. Bedside table & call light in reach. Uses call light appropriately when needing assistance.         Problem: Falls - Risk of:  Goal: Absence of physical injury  Description: Absence of physical injury  6/28/2021 0429 by Carla Davis RN  Outcome: Ongoing     Problem: Musculor/Skeletal Functional Status  Goal: Absence of falls  6/28/2021 0429 by Carla Davis RN  Outcome: Ongoing

## 2021-06-29 LAB
ABSOLUTE BANDS #: 0.3 K/UL (ref 0–1)
ABSOLUTE EOS #: 0.2 K/UL (ref 0–0.4)
ABSOLUTE IMMATURE GRANULOCYTE: ABNORMAL K/UL (ref 0–0.3)
ABSOLUTE LYMPH #: 0.9 K/UL (ref 1–4.8)
ABSOLUTE MONO #: 1.1 K/UL (ref 0.1–1.3)
ALBUMIN SERPL-MCNC: 3.5 G/DL (ref 3.5–5.2)
ALBUMIN/GLOBULIN RATIO: ABNORMAL (ref 1–2.5)
ALP BLD-CCNC: 89 U/L (ref 35–104)
ALT SERPL-CCNC: 15 U/L (ref 5–33)
ANION GAP SERPL CALCULATED.3IONS-SCNC: 10 MMOL/L (ref 9–17)
AST SERPL-CCNC: 23 U/L
BANDS: 3 % (ref 0–10)
BASOPHILS # BLD: 0 % (ref 0–2)
BASOPHILS ABSOLUTE: 0 K/UL (ref 0–0.2)
BILIRUB SERPL-MCNC: 0.21 MG/DL (ref 0.3–1.2)
BUN BLDV-MCNC: 36 MG/DL (ref 8–23)
BUN/CREAT BLD: ABNORMAL (ref 9–20)
CALCIUM SERPL-MCNC: 9.5 MG/DL (ref 8.6–10.4)
CHLORIDE BLD-SCNC: 102 MMOL/L (ref 98–107)
CO2: 25 MMOL/L (ref 20–31)
CREAT SERPL-MCNC: 3.01 MG/DL (ref 0.5–0.9)
DIFFERENTIAL TYPE: ABNORMAL
EOSINOPHILS RELATIVE PERCENT: 2 % (ref 0–4)
GFR AFRICAN AMERICAN: 19 ML/MIN
GFR NON-AFRICAN AMERICAN: 15 ML/MIN
GFR SERPL CREATININE-BSD FRML MDRD: ABNORMAL ML/MIN/{1.73_M2}
GFR SERPL CREATININE-BSD FRML MDRD: ABNORMAL ML/MIN/{1.73_M2}
GLUCOSE BLD-MCNC: 115 MG/DL (ref 65–105)
GLUCOSE BLD-MCNC: 174 MG/DL (ref 65–105)
GLUCOSE BLD-MCNC: 184 MG/DL (ref 70–99)
GLUCOSE BLD-MCNC: 203 MG/DL (ref 65–105)
GLUCOSE BLD-MCNC: 99 MG/DL (ref 65–105)
HCT VFR BLD CALC: 27.6 % (ref 36–46)
HEMOGLOBIN: 8.9 G/DL (ref 12–16)
IMMATURE GRANULOCYTES: ABNORMAL %
LYMPHOCYTES # BLD: 9 % (ref 24–44)
MCH RBC QN AUTO: 29 PG (ref 26–34)
MCHC RBC AUTO-ENTMCNC: 32.4 G/DL (ref 31–37)
MCV RBC AUTO: 89.7 FL (ref 80–100)
MONOCYTES # BLD: 11 % (ref 1–7)
MORPHOLOGY: ABNORMAL
MORPHOLOGY: ABNORMAL
NRBC AUTOMATED: ABNORMAL PER 100 WBC
PDW BLD-RTO: 13.6 % (ref 11.5–14.9)
PLATELET # BLD: 236 K/UL (ref 150–450)
PLATELET ESTIMATE: ABNORMAL
PMV BLD AUTO: 7.9 FL (ref 6–12)
POTASSIUM SERPL-SCNC: 5 MMOL/L (ref 3.7–5.3)
RBC # BLD: 3.08 M/UL (ref 4–5.2)
RBC # BLD: ABNORMAL 10*6/UL
SEG NEUTROPHILS: 75 % (ref 36–66)
SEGMENTED NEUTROPHILS ABSOLUTE COUNT: 7.5 K/UL (ref 1.3–9.1)
SODIUM BLD-SCNC: 137 MMOL/L (ref 135–144)
TOTAL PROTEIN: 6.7 G/DL (ref 6.4–8.3)
WBC # BLD: 10 K/UL (ref 3.5–11)
WBC # BLD: ABNORMAL 10*3/UL

## 2021-06-29 PROCEDURE — 6360000002 HC RX W HCPCS: Performed by: UROLOGY

## 2021-06-29 PROCEDURE — 36415 COLL VENOUS BLD VENIPUNCTURE: CPT

## 2021-06-29 PROCEDURE — 6370000000 HC RX 637 (ALT 250 FOR IP): Performed by: FAMILY MEDICINE

## 2021-06-29 PROCEDURE — 6360000002 HC RX W HCPCS: Performed by: INTERNAL MEDICINE

## 2021-06-29 PROCEDURE — 80053 COMPREHEN METABOLIC PANEL: CPT

## 2021-06-29 PROCEDURE — 2580000003 HC RX 258: Performed by: UROLOGY

## 2021-06-29 PROCEDURE — 6370000000 HC RX 637 (ALT 250 FOR IP): Performed by: UROLOGY

## 2021-06-29 PROCEDURE — 2060000000 HC ICU INTERMEDIATE R&B

## 2021-06-29 PROCEDURE — 2500000003 HC RX 250 WO HCPCS: Performed by: INTERNAL MEDICINE

## 2021-06-29 PROCEDURE — 85025 COMPLETE CBC W/AUTO DIFF WBC: CPT

## 2021-06-29 PROCEDURE — 2580000003 HC RX 258: Performed by: INTERNAL MEDICINE

## 2021-06-29 PROCEDURE — 82947 ASSAY GLUCOSE BLOOD QUANT: CPT

## 2021-06-29 PROCEDURE — 90935 HEMODIALYSIS ONE EVALUATION: CPT

## 2021-06-29 RX ORDER — SODIUM CHLORIDE 9 MG/ML
INJECTION, SOLUTION INTRAVENOUS CONTINUOUS
Status: DISCONTINUED | OUTPATIENT
Start: 2021-06-29 | End: 2021-07-02 | Stop reason: HOSPADM

## 2021-06-29 RX ADMIN — SODIUM CHLORIDE: 9 INJECTION, SOLUTION INTRAVENOUS at 13:54

## 2021-06-29 RX ADMIN — ATORVASTATIN CALCIUM 10 MG: 10 TABLET, FILM COATED ORAL at 08:13

## 2021-06-29 RX ADMIN — PRAMIPEXOLE DIHYDROCHLORIDE 0.12 MG: 0.12 TABLET ORAL at 08:31

## 2021-06-29 RX ADMIN — Medication 1.2 ML: at 12:08

## 2021-06-29 RX ADMIN — ALPRAZOLAM 0.5 MG: 0.5 TABLET ORAL at 10:07

## 2021-06-29 RX ADMIN — ISOSORBIDE MONONITRATE 30 MG: 30 TABLET, EXTENDED RELEASE ORAL at 08:13

## 2021-06-29 RX ADMIN — LEVOTHYROXINE SODIUM 112 MCG: 0.11 TABLET ORAL at 06:24

## 2021-06-29 RX ADMIN — OXYCODONE HYDROCHLORIDE AND ACETAMINOPHEN 1 TABLET: 5; 325 TABLET ORAL at 22:35

## 2021-06-29 RX ADMIN — HEPARIN SODIUM 5000 UNITS: 5000 INJECTION INTRAVENOUS; SUBCUTANEOUS at 13:55

## 2021-06-29 RX ADMIN — ASPIRIN 81 MG: 81 TABLET, COATED ORAL at 08:13

## 2021-06-29 RX ADMIN — PANTOPRAZOLE SODIUM 40 MG: 40 TABLET, DELAYED RELEASE ORAL at 06:24

## 2021-06-29 RX ADMIN — BUSPIRONE HYDROCHLORIDE 15 MG: 15 TABLET ORAL at 20:18

## 2021-06-29 RX ADMIN — BACLOFEN 10 MG: 10 TABLET ORAL at 20:18

## 2021-06-29 RX ADMIN — HEPARIN SODIUM 5000 UNITS: 5000 INJECTION INTRAVENOUS; SUBCUTANEOUS at 06:24

## 2021-06-29 RX ADMIN — HYDRALAZINE HYDROCHLORIDE 10 MG: 20 INJECTION INTRAMUSCULAR; INTRAVENOUS at 20:18

## 2021-06-29 RX ADMIN — SODIUM CHLORIDE, PRESERVATIVE FREE 10 ML: 5 INJECTION INTRAVENOUS at 20:33

## 2021-06-29 RX ADMIN — FERROUS SULFATE TAB 325 MG (65 MG ELEMENTAL FE) 325 MG: 325 (65 FE) TAB at 08:13

## 2021-06-29 RX ADMIN — HYDRALAZINE HYDROCHLORIDE 10 MG: 20 INJECTION INTRAMUSCULAR; INTRAVENOUS at 03:05

## 2021-06-29 RX ADMIN — CYANOCOBALAMIN TAB 500 MCG 500 MCG: 500 TAB at 08:13

## 2021-06-29 RX ADMIN — OXYBUTYNIN CHLORIDE 15 MG: 10 TABLET, EXTENDED RELEASE ORAL at 08:13

## 2021-06-29 RX ADMIN — AMLODIPINE BESYLATE 10 MG: 10 TABLET ORAL at 08:13

## 2021-06-29 RX ADMIN — BUSPIRONE HYDROCHLORIDE 15 MG: 15 TABLET ORAL at 08:13

## 2021-06-29 RX ADMIN — HYDRALAZINE HYDROCHLORIDE 10 MG: 20 INJECTION INTRAMUSCULAR; INTRAVENOUS at 08:13

## 2021-06-29 RX ADMIN — BUPROPION HYDROCHLORIDE 300 MG: 300 TABLET, FILM COATED, EXTENDED RELEASE ORAL at 08:13

## 2021-06-29 RX ADMIN — BACLOFEN 10 MG: 10 TABLET ORAL at 08:13

## 2021-06-29 RX ADMIN — SODIUM CHLORIDE, PRESERVATIVE FREE 10 ML: 5 INJECTION INTRAVENOUS at 09:00

## 2021-06-29 RX ADMIN — INSULIN LISPRO 2 UNITS: 100 INJECTION, SOLUTION INTRAVENOUS; SUBCUTANEOUS at 08:18

## 2021-06-29 RX ADMIN — HEPARIN SODIUM 5000 UNITS: 5000 INJECTION INTRAVENOUS; SUBCUTANEOUS at 20:19

## 2021-06-29 RX ADMIN — METOPROLOL SUCCINATE 100 MG: 100 TABLET, EXTENDED RELEASE ORAL at 20:18

## 2021-06-29 RX ADMIN — CEFTRIAXONE SODIUM 1000 MG: 1 INJECTION, POWDER, FOR SOLUTION INTRAMUSCULAR; INTRAVENOUS at 13:54

## 2021-06-29 RX ADMIN — FLUOXETINE 10 MG: 10 CAPSULE ORAL at 08:31

## 2021-06-29 RX ADMIN — HYDRALAZINE HYDROCHLORIDE 10 MG: 20 INJECTION INTRAMUSCULAR; INTRAVENOUS at 13:55

## 2021-06-29 RX ADMIN — INSULIN LISPRO 4 UNITS: 100 INJECTION, SOLUTION INTRAVENOUS; SUBCUTANEOUS at 18:12

## 2021-06-29 ASSESSMENT — PAIN SCALES - GENERAL
PAINLEVEL_OUTOF10: 6
PAINLEVEL_OUTOF10: 0
PAINLEVEL_OUTOF10: 2

## 2021-06-29 NOTE — CARE COORDINATION
ONGOING DISCHARGE PLAN:    Patient is alert and oriented x4. Spoke with patient regarding discharge plan and patient confirms that plan is still to discharge to home with no needs  Patient will have dialysis today   On IV atb   Patient will be observed 24-48 more hours if no improvement will need a tunnel cath placed     Will continue to follow for additional discharge needs.     Electronically signed by Desiree Mahoney RN on 6/29/2021 at 3:20 PM

## 2021-06-29 NOTE — PROGRESS NOTES
[address] [facility]    Progress Note    6/29/2021    3:20 PM    Name:   Naomi Gonsalez  MRN:     [id number]     Acct:      [de-identified]   Room:   2114/2114-01  IP Day:  9  Admit Date:  6/20/2021 10:18 AM    PCP:   Adriana Singh DO  Code Status:  Full Code    Subjective:     C/C:   Chief Complaint   Patient presents with    Flank Pain    Urinary Frequency       Patient seen and examined today   No new acute issues overnight   No complain of chest pain, nausea, vomiting, diaphoresis   Creatinine 3.00  Nephrology following   Blood sugars between 150-200   Urine culture  E coli sensitive to penicillins   on room air, vitals fairly stable. WERO consistent with obstructive  Nephropathy secondary to kidney stone. She underwent cystoscopy with left ureteral stent placement on 06/21/2021 she is non oliguric hoping to get off of the dialysis, check BMP daily, strict intake and output,,   Hypertension on amlodipine and metoprolol   E coli UTI sensitive to penicillins currently on ceftriaxone 1 g every 24 hours. Review of Systems:     Constitutional:  negative for chills, fevers, sweats  Respiratory:  negative for cough, dyspnea on exertion, shortness of breath, wheezing  Cardiovascular:  negative for chest pain, chest pressure/discomfort, lower extremity edema, palpitations  Gastrointestinal:  negative for abdominal pain, constipation, diarrhea, nausea, vomiting  Neurological:  negative for dizziness, headache    Medications: Allergies:     Allergies   Allergen Reactions    Pcn [Penicillins] Swelling and Hives    Heparin Other (See Comments)     MIGRAINE      Lamotrigine Other (See Comments), Itching, Nausea Only and Rash    Cyclobenzaprine      Dry mouth, jitters    Heparin (Porcine)      Other reaction(s): Migraine       Current Meds:   Scheduled Meds:    amLODIPine  10 mg Oral Daily    hydrALAZINE  10 mg Intravenous Q6H    ferrous sulfate  325 mg Oral Daily with breakfast    aspirin  81 mg Oral Daily    atorvastatin  10 mg Oral Daily    baclofen  10 mg Oral BID    buPROPion  300 mg Oral Daily    busPIRone  15 mg Oral BID    FLUoxetine  10 mg Oral Daily    isosorbide mononitrate  30 mg Oral Daily    levothyroxine  112 mcg Oral Daily    [Held by provider] losartan  50 mg Oral Daily    metoprolol succinate  100 mg Oral Daily    oxybutynin  15 mg Oral Daily    pantoprazole  40 mg Oral QAM AC    pramipexole  0.125 mg Oral Daily    vitamin B-12  500 mcg Oral Daily    insulin lispro  0-12 Units Subcutaneous TID WC    insulin lispro  0-6 Units Subcutaneous Nightly    sodium chloride flush  10 mL Intravenous 2 times per day    heparin (porcine)  5,000 Units Subcutaneous 3 times per day    cefTRIAXone (ROCEPHIN) IV  1,000 mg Intravenous Q24H     Continuous Infusions:    sodium chloride 100 mL/hr at 06/29/21 1354    dextrose      sodium chloride       PRN Meds: ALPRAZolam, metoprolol, sodium citrate, sodium citrate, perflutren lipid microspheres, hydrALAZINE, oxyCODONE-acetaminophen, albuterol sulfate HFA, glucose, dextrose, glucagon (rDNA), dextrose, sodium chloride flush, sodium chloride, ondansetron **OR** ondansetron, magnesium hydroxide, acetaminophen **OR** acetaminophen, morphine    Data:     Past Medical History:   has a past medical history of Aortic valve stenosis, Arthritis, CAD (coronary artery disease), Diabetes mellitus ([address] Heart murmur, Hyperlipidemia, MI, old, Pulmonary stenosis, Pulmonary valve stenosis, Sciatica, Thyroid disease, and Wears glasses. Social History:   reports that she quit smoking about 17 years ago. Her smoking use included cigarettes. She has a 30.00 pack-year smoking history. She has never used smokeless tobacco. She reports current alcohol use. She reports that she does not use drugs.      Family History:   Family History   Problem Relation Age of Onset    Breast Cancer Mother         BREAST WITH METS T  LIVER AND LUNG  Other Father         AAA    Heart Disease Father     Asthma Sister     Diabetes Sister         NIDDM    Stroke Brother     Diabetes Brother         NIDDM    Stroke Maternal Grandmother     Asthma Son        Vitals:  BP (!) 117/54   Pulse 74   Temp 97.8 °F (36.6 °C) (Oral)   Resp 18   Ht 5' 5\" (1.651 m)   Wt 209 lb 3.5 oz (94.9 kg)   LMP 1980   SpO2 97%   BMI 34.82 kg/m²   Temp (24hrs), Av.9 °F (36.6 °C), Min:97.8 °F (36.6 °C), Max:98 °F (36.7 °C)    Recent Labs     21  1608 21  1942 21  0652 21  1226   POCGLU 119* 206* 174* 115*       I/O (24Hr):     Intake/Output Summary (Last 24 hours) at 2021 1520  Last data filed at 2021 1428  Gross per 24 hour   Intake 395 ml   Output 2400 ml   Net -2005 ml       Labs:  Hematology:  Recent Labs     21  0622 21  0448 21  0441   WBC 13.9* 12.3* 10.0   RBC 3.33* 3.22* 3.08*   HGB 9.6* 9.2* 8.9*   HCT 29.3* 28.7* 27.6*   MCV 87.9 89.3 89.7   MCH 28.9 28.5 29.0   MCHC 32.9 31.9 32.4   RDW 13.5 13.6 13.6    205 236   MPV 8.0 8.1 7.9     Chemistry:  Recent Labs     21  0622 21  0448 21  0441    137 137   K 3.5* 4.5 5.0    100 102   CO2 25 25 25   GLUCOSE 182* 186* 184*   BUN 28* 36* 36*   CREATININE 2.61* 2.90* 3.01*   MG 1.8  --   --    ANIONGAP 13 12 10   LABGLOM 18* 16* 15*   GFRAA 22* 19* 19*   CALCIUM 9.1 9.0 9.5     Recent Labs     21  0622 21  0656 21  2047 21  0448 21  0659 21  1122 21  1608 21  1942 21  0441 21  0652 21  1226   PROT 6.6  --   --  6.8  --   --   --   --  6.7  --   --    LABALBU 3.4*  --   --  3.5  --   --   --   --  3.5  --   --    AST 18  --   --  18  --   --   --   --  23  --   --    ALT 13  --   --  15  --   --   --   --  15  --   --    ALKPHOS 88  --   --  85  --   --   --   --  89  --   --    BILITOT 0.21*  --   --  0.18*  --   --   --   --  0.21*  --   --    POCGLU  --    < >   < >  --  171* 280* 119* 206*  --  174* 115*    < > = values in this interval not displayed. ABG:No results found for: POCPH, PHART, PH, POCPCO2, WEF0JJB, PCO2, POCPO2, PO2ART, PO2, POCHCO3, DAP8LLR, HCO3, NBEA, PBEA, BEART, BE, THGBART, THB, HOK8XMU, OCTU2SSW, O2GIUVWO, O2SAT, FIO2  Lab Results   Component Value Date/Time    SPECIAL NOT REPORTED 06/21/2021 04:25 PM     Lab Results   Component Value Date/Time    CULTURE ESCHERICHIA COLI 10 to 50,000 CFU/ML (A) 06/21/2021 04:25 PM       Radiology:  CT HEAD WO CONTRAST    Result Date: 6/22/2021  No acute intracranial abnormality. Senescent changes including chronic microvascular change and atherosclerotic disease of the major intracranial vessels. XR CHEST PORTABLE    Result Date: 6/23/2021  Right internal jugular central venous catheter terminates over the mid superior vena cava.  Hypoventilated chest.       Physical Examination:        General appearance:  alert, cooperative and no distress  Mental Status:  oriented to person, place and time and normal affect  Lungs:  clear to auscultation bilaterally, normal effort  Heart:  regular rate and rhythm, no murmur  Abdomen:  soft, nontender, nondistended, normal bowel sounds  Extremities:  no edema, redness, tenderness in the calves  Skin:  no gross lesions, rashes, induration    Assessment:        Hospital Problems         Last Modified POA    * (Principal) Acute cystitis without hematuria 6/20/2021 Yes    Acute renal failure ([address] 6/24/2021 Yes    Hydronephrosis of left kidney 6/20/2021 Yes    Hydroureter, left 6/20/2021 Yes    Type 2 diabetes mellitus, without long-term current use of insulin ([address] 6/20/2021 Yes    Essential hypertension 6/20/2021 Yes    Hypothyroidism 6/20/2021 Yes    Acute infective cystitis 6/20/2021 Yes    Uremia 6/24/2021 Yes          Plan:        Patient seen and examined today   No new acute issues overnight   Acute metabolic encephalopathy/ AMS secondary to uremia/uremic encephalopathy significantly improved, neurology signed off. Awake alert and oriented  No complain of chest pain, nausea, vomiting, diaphoresis   Creatinine 3 today, acute dialysis today, plan to decide on requiring long term dialysis in 1 to 2 days  Nephrology following   Blood sugars between 150-200 with occasional readings over 200    WERO consistent with obstructive  Nephropathy secondary to kidney stone.   She underwent cystoscopy with left ureteral stent placement on 06/21/2021 she is non oliguric hoping to get off of the dialysis, check BMP daily, strict intake and output,,   Hypertension on amlodipine and metoprolol   E coli UTI sensitive to penicillins currently on ceftriaxone 1 g every 24 hours. -treat for complicated UTI 77-67 days    Continue aspirin, Lipitor   Continue medium dose correction insulin  Synthroid 112 mcg   Continue to hold Cozaar   continue PT/OT   cardiology signed off             Darcie Villegas MD  6/29/2021  3:20 PM

## 2021-06-29 NOTE — PLAN OF CARE
Problem: Pain:  Goal: Pain level will decrease  Description: Pain level will decrease  6/29/2021 1642 by Chet Simmons RN  Outcome: Ongoing  6/29/2021 1531 by Chet Simmons RN  Outcome: Ongoing  6/29/2021 0350 by Zuly Monet RN  Outcome: Ongoing  Note: Patient given pain medication as ordered. Goal: Control of acute pain  Description: Control of acute pain  6/29/2021 1642 by Chet Simmons RN  Outcome: Ongoing  6/29/2021 1531 by Chet Simmons RN  Outcome: Ongoing  Goal: Control of chronic pain  Description: Control of chronic pain  6/29/2021 1642 by Chet Simmons RN  Outcome: Ongoing  6/29/2021 1531 by Chet Simmons RN  Outcome: Ongoing     Problem: Falls - Risk of:  Goal: Will remain free from falls  Description: Will remain free from falls  6/29/2021 1642 by Chet Simmons RN  Outcome: Ongoing  Note: Patient has remained free from falls at this time   6/29/2021 1531 by Chet Simmons RN  Outcome: Ongoing  Note: Patient has remained free from falls at this time. 6/29/2021 0350 by Zuly Monet RN  Outcome: Ongoing  Note: Patient remained free from falls. Call light within reach.    Goal: Absence of physical injury  Description: Absence of physical injury  6/29/2021 1642 by Chet Simmons RN  Outcome: Ongoing  Note: Patient has remained free from any physical injury at this time   6/29/2021 1531 by Chet Simmons RN  Outcome: Ongoing  Note: Patient has remained free from any physical injury at this time     Problem: Musculor/Skeletal Functional Status  Goal: Highest potential functional level  6/29/2021 1642 by Chet Simmons RN  Outcome: Ongoing  6/29/2021 1531 by Chet Simmons RN  Outcome: Ongoing  Goal: Absence of falls  6/29/2021 1642 by Chet Simmons RN  Outcome: Ongoing  Note: Patient has remained free from falls at this time   6/29/2021 1531 by Chet Simmons RN  Outcome: Ongoing  Note: Patient has remained free from falls at this time      Problem: Skin Integrity:  Goal:

## 2021-06-29 NOTE — PLAN OF CARE
Problem: Pain:  Goal: Pain level will decrease  Description: Pain level will decrease  6/29/2021 0350 by Naty Zavala RN  Outcome: Ongoing  Note: Patient given pain medication as ordered. Problem: Falls - Risk of:  Goal: Will remain free from falls  Description: Will remain free from falls  6/29/2021 0350 by Naty Zavala RN  Outcome: Ongoing  Note: Patient remained free from falls. Call light within reach. Problem: Skin Integrity:  Goal: Absence of new skin breakdown  Description: Absence of new skin breakdown  6/29/2021 0350 by Naty Zavala RN  Outcome: Ongoing  Note: No new alterations in skin integrity.

## 2021-06-29 NOTE — CARE COORDINATION
KAYLA received info from [facility] in admissions that this patient does have a Dialysis slot. Her slot is at [facility] on Monday, Wednesday, and Friday @ 11:30 AM.  KAYLA will need to know DC date in order to get a start date for dialysis.

## 2021-06-29 NOTE — PROGRESS NOTES
HEMODIALYSIS PRE-TREATMENT NOTE    Patient Identifiers prior to treatment: name//MRN    Isolation Required: no                      Isolation Type: N/A       (please document if patient is being managed as a PUI/COVID-19 patient)        Hepatitis status:                           Date Drawn                             Result  Hepatitis B Surface Antigen 2021     neg                     Hepatitis B Surface Antibody 2021 neg        Hepatitis B Core Antibody N/A N/A          How was Hepatitis Status verified: Epic chart     Was a copy of the labs you documented provided to facility for the patient's chart: yes    Hemodialysis orders verified: yes    Access Within normal limits ( I.e. s/s of infection,...): yes     Pre-Assessment completed: yes by Jeffrey Valenzuela RN    Pre-dialysis report received from: Shaniqua Jacobson                      Time: 09:55

## 2021-06-29 NOTE — PROGRESS NOTES
Physical Therapy        Physical Therapy Cancel Note      DATE: 2021    NAME: Lasha Cool  MRN: [id number]   : 1950      Patient not seen this date for Physical Therapy due to: Pt unavailable in dialysis. Will attempt to see later if possible.           Electronically signed by Sanjuanita Justice PTA on 2021 at 7:46 PM

## 2021-06-29 NOTE — FLOWSHEET NOTE
06/29/21 1501   Encounter Summary   Services provided to: Patient   Referral/Consult From: Joyce   Continue Visiting   (6/29/21V)   Volunteer Visit Yes   Complexity of Encounter Low   Length of Encounter 15 minutes   Routine   Type Follow up

## 2021-06-29 NOTE — PROGRESS NOTES
HEMODIALYSIS POST TREATMENT NOTE    Treatment time ordered: 2hrs    Actual treatment time: 2hrs    UltraFiltration Goal: 1Kg  UltraFiltration Removed: 1Kg      Pre Treatment weight: 95.9Kgs  Post Treatment weight: 94.9Kgs  Estimated Dry Weight: WERO N? A    Access used:     Central Venous Catheter:          Tunneled or Non-tunneled: Non-tunneled           Site: R neck          Access Flow: positional with high Arterial alarms throughout trx      Internal Access:       AV Fistula or AV Graft: N/A         Site: N/A       Access Flow: N/A       Sign and symptoms of infection: No       If YES: N/A    Medications or blood products given: N/A    Chronic outpatient schedule: WERO trx#2    Chronic outpatient unit: N/A    Summary of response to treatment: tolerated fairly well with high arterial alarms and continued aggitation throughout trx    Explain if orders NOT met, was physician notified:N/A      ACES flowsheet faxed to patient unit/ placed in patient chart: yes    Post assessment completed: yes by Damion Mcgovern RN    Report given to: Milton Michaels documented Safety Checks include the followin) Access and face visible at all times. 2) All connections and blood lines are secure with no kinks. 3) NVL alarm engaged. 4) Hemosafe device applied (if applicable). 5) No collapse of Arterial or Venous blood chambers. 6) All blood lines / pump segments in the air detectors.

## 2021-06-29 NOTE — PROGRESS NOTES
Department of Internal Medicine  Nephrology Mickie Kat MD  Progress Note    Reason for consultation: Management of acute kidney injury at nephrolithiasis. Consulting physician: Kimberly Goyal MD    Interval history: Patient was seen and examined today and she is in good spirits and is nonoliguric. Urine output documentation however is poor as she does not have a indwelling Cazares catheter. She has required acute intermittent hemodialysis for treatment of nonoliguric acute kidney injury complicating obstructive uropathy secondary to kidney stone. Renal function initially appeared to be improving but serum creatinine increased for the today and hence advised that she would need dialysis today. Last hemodialysis was on Friday, 6/25/2021 after having Jeffy catheter placed on 6/23/2021 for acute kidney injury complicating obstructive kidney stone. She denies flank pain and does not have shortness of breath. History of presenting illness: This is a 70 y.o. female with a significant past medical history of Type 2 diabetes mellitus [diagnosed in 2015], Coronary artery disease [s/p PTCA/stents], hyperlipidemia, hypothyroidism, systemic hypertension and Chronic kidney disease stage III [baseline serum creatinine 1.40 mg/dL in November 2018], who presented to the emergency department yesterday with sudden onset of severe left flank pain associated with nausea and vomiting. She had chills but did not have fever. Laboratory studies at presentation revealed serum creatinine 2.7 mg/dL which increased overnight to 5 mg/dL associated with hyperkalemia and serum potassium 5.9 mmol/L.  CT scan of the abdomen and pelvis showed:  1. Left-sided hydroureter and hydronephrosis with associated asymmetrical perinephric and periureteral fat stranding without obstructing calculus noted. Findings likely represents a recently passed stone. 2. Nonobstructing calculi right kidney, largest measuring 5 mm.    3. Small hiatal hernia. 4. Stable ectasia infrarenal abdominal aortic aneurysm measuring 2.7 cm. Scheduled Meds:   amLODIPine  10 mg Oral Daily    hydrALAZINE  10 mg Intravenous Q6H    ferrous sulfate  325 mg Oral Daily with breakfast    aspirin  81 mg Oral Daily    atorvastatin  10 mg Oral Daily    baclofen  10 mg Oral BID    buPROPion  300 mg Oral Daily    busPIRone  15 mg Oral BID    FLUoxetine  10 mg Oral Daily    isosorbide mononitrate  30 mg Oral Daily    levothyroxine  112 mcg Oral Daily    [Held by provider] losartan  50 mg Oral Daily    metoprolol succinate  100 mg Oral Daily    oxybutynin  15 mg Oral Daily    pantoprazole  40 mg Oral QAM AC    pramipexole  0.125 mg Oral Daily    vitamin B-12  500 mcg Oral Daily    insulin lispro  0-12 Units Subcutaneous TID WC    insulin lispro  0-6 Units Subcutaneous Nightly    sodium chloride flush  10 mL Intravenous 2 times per day    heparin (porcine)  5,000 Units Subcutaneous 3 times per day    cefTRIAXone (ROCEPHIN) IV  1,000 mg Intravenous Q24H     Continuous Infusions:   dextrose      sodium chloride       PRN Meds:. ALPRAZolam, metoprolol, sodium citrate, sodium citrate, perflutren lipid microspheres, hydrALAZINE, oxyCODONE-acetaminophen, albuterol sulfate HFA, glucose, dextrose, glucagon (rDNA), dextrose, sodium chloride flush, sodium chloride, ondansetron **OR** ondansetron, magnesium hydroxide, acetaminophen **OR** acetaminophen, morphine    Physical Exam:    VITALS:  /74   Pulse 65   Temp 97.8 °F (36.6 °C) (Oral)   Resp 18   Ht 5' 5\" (1.651 m)   Wt 211 lb 6.7 oz (95.9 kg)   LMP 03/19/1980   SpO2 95%   BMI 35.18 kg/m²   24HR INTAKE/OUTPUT:      Intake/Output Summary (Last 24 hours) at 6/29/2021 1035  Last data filed at 6/29/2021 6866  Gross per 24 hour   Intake 750 ml   Output 2750 ml   Net -2000 ml     Constitutional: Awake and alert; not in respiratory distress.     Skin: Skin color, texture, turgor normal. No rashes or lesions    Head: Normocephalic, without obvious abnormality, atraumatic     Cardiovascular/Edema: S1, S2 with 2/6 systolic murmur    Respiratory:  clear to auscultation bilaterally    Abdomen: soft, non-tender; bowel sounds normal; no masses,  no organomegaly    Back: Left costovertebral angle tenderness    Extremities: extremities normal, atraumatic, no cyanosis or edema    Neuro:  Awake and alert. CBC:   Recent Labs     06/27/21 0622 06/28/21 0448 06/29/21 0441   WBC 13.9* 12.3* 10.0   HGB 9.6* 9.2* 8.9*    205 236     BMP:    Recent Labs     06/27/21 0622 06/28/21 0448 06/29/21 0441    137 137   K 3.5* 4.5 5.0    100 102   CO2 25 25 25   BUN 28* 36* 36*   CREATININE 2.61* 2.90* 3.01*   GLUCOSE 182* 186* 184*       Lab Results   Component Value Date    NITRU NEGATIVE 06/22/2021    COLORU YELLOW 06/22/2021    PHUR 6.0 06/22/2021    WBCUA 5 TO 10 06/22/2021    RBCUA 5 TO 10 06/22/2021    MUCUS NOT REPORTED 06/22/2021    TRICHOMONAS NOT REPORTED 06/22/2021    YEAST NOT REPORTED 06/22/2021    BACTERIA NOT REPORTED 06/22/2021    SPECGRAV 1.006 06/22/2021    LEUKOCYTESUR MOD 06/22/2021    UROBILINOGEN Normal 06/22/2021    BILIRUBINUR NEGATIVE 06/22/2021    GLUCOSEU NEGATIVE 06/22/2021    KETUA NEGATIVE 06/22/2021    AMORPHOUS NOT REPORTED 06/22/2021     Urine Creatinine:     Lab Results   Component Value Date    LABCREA 48.7 06/21/2021     IMPRESSION/RECOMMENDATIONS:      1. Acute kidney injury - Most consistent with obstructive nephropathy secondary to kidney stone. She underwent cystoscopy with left ureteral stent insertion 6/21/2021. Although nonoliguric, clearance is not improving. Plan: Acute hemodialysis today. IV fluid 0.9 normal saline at 100 mL/h. Strict input and output documentation. Avoid nephrotoxic agents. Basic metabolic profile daily. Continue to hold angiotensin receptor blocker. 2.  Nephrolithiasis - Patient with family history [sister].  metabolic stone evaluation as outpatient since this is patient's first episode of kidney stone. S/p cystoscopy with left ureteral stent placement. Monitor urine output closely. 3.  Systemic hypertension - Blood pressure is adequately controlled on amlodipine 10 mg p.o. daily and metoprolol 100 mg p.o. daily. 4.  E. coli urinary tract infection - complicating nephrolithiasis. Continue IV ceftriaxone 1 g every 24 hours. Prognosis is guarded. We will monitor renal function for 24 to 48 hours more and if no definite trend of improvement, will place tunneled hemodialysis catheter and arrange for outpatient hemodialysis spot. However, if serum creatinine remains less than 3.0 mg/dL, patient will be discharged and observed as outpatient.     Abdelrahman Bernal MD FACP  Attending Nephrologist  6/29/2021 10:35 AM

## 2021-06-30 LAB
ABSOLUTE EOS #: 0.33 K/UL (ref 0–0.4)
ABSOLUTE IMMATURE GRANULOCYTE: ABNORMAL K/UL (ref 0–0.3)
ABSOLUTE LYMPH #: 2.86 K/UL (ref 1–4.8)
ABSOLUTE MONO #: 1.21 K/UL (ref 0.1–1.3)
ALBUMIN SERPL-MCNC: 3.5 G/DL (ref 3.5–5.2)
ALBUMIN/GLOBULIN RATIO: ABNORMAL (ref 1–2.5)
ALP BLD-CCNC: 82 U/L (ref 35–104)
ALT SERPL-CCNC: 14 U/L (ref 5–33)
ANION GAP SERPL CALCULATED.3IONS-SCNC: 10 MMOL/L (ref 9–17)
AST SERPL-CCNC: 16 U/L
BASOPHILS # BLD: 0 % (ref 0–2)
BASOPHILS ABSOLUTE: 0 K/UL (ref 0–0.2)
BILIRUB SERPL-MCNC: 0.21 MG/DL (ref 0.3–1.2)
BUN BLDV-MCNC: 21 MG/DL (ref 8–23)
BUN/CREAT BLD: ABNORMAL (ref 9–20)
CALCIUM SERPL-MCNC: 9.1 MG/DL (ref 8.6–10.4)
CHLORIDE BLD-SCNC: 105 MMOL/L (ref 98–107)
CO2: 26 MMOL/L (ref 20–31)
CREAT SERPL-MCNC: 2.21 MG/DL (ref 0.5–0.9)
DIFFERENTIAL TYPE: ABNORMAL
EOSINOPHILS RELATIVE PERCENT: 3 % (ref 0–4)
GFR AFRICAN AMERICAN: 27 ML/MIN
GFR NON-AFRICAN AMERICAN: 22 ML/MIN
GFR SERPL CREATININE-BSD FRML MDRD: ABNORMAL ML/MIN/{1.73_M2}
GFR SERPL CREATININE-BSD FRML MDRD: ABNORMAL ML/MIN/{1.73_M2}
GLUCOSE BLD-MCNC: 143 MG/DL (ref 65–105)
GLUCOSE BLD-MCNC: 145 MG/DL (ref 65–105)
GLUCOSE BLD-MCNC: 162 MG/DL (ref 70–99)
GLUCOSE BLD-MCNC: 178 MG/DL (ref 65–105)
GLUCOSE BLD-MCNC: 99 MG/DL (ref 65–105)
HCT VFR BLD CALC: 26.2 % (ref 36–46)
HEMOGLOBIN: 8.6 G/DL (ref 12–16)
IMMATURE GRANULOCYTES: ABNORMAL %
LYMPHOCYTES # BLD: 26 % (ref 24–44)
MCH RBC QN AUTO: 29.2 PG (ref 26–34)
MCHC RBC AUTO-ENTMCNC: 32.6 G/DL (ref 31–37)
MCV RBC AUTO: 89.6 FL (ref 80–100)
MONOCYTES # BLD: 11 % (ref 1–7)
MORPHOLOGY: ABNORMAL
NRBC AUTOMATED: ABNORMAL PER 100 WBC
PDW BLD-RTO: 13.7 % (ref 11.5–14.9)
PLATELET # BLD: 274 K/UL (ref 150–450)
PLATELET ESTIMATE: ABNORMAL
PMV BLD AUTO: 7.6 FL (ref 6–12)
POTASSIUM SERPL-SCNC: 4.5 MMOL/L (ref 3.7–5.3)
RBC # BLD: 2.93 M/UL (ref 4–5.2)
RBC # BLD: ABNORMAL 10*6/UL
SEG NEUTROPHILS: 60 % (ref 36–66)
SEGMENTED NEUTROPHILS ABSOLUTE COUNT: 6.6 K/UL (ref 1.3–9.1)
SODIUM BLD-SCNC: 141 MMOL/L (ref 135–144)
TOTAL PROTEIN: 6.5 G/DL (ref 6.4–8.3)
WBC # BLD: 11 K/UL (ref 3.5–11)
WBC # BLD: ABNORMAL 10*3/UL

## 2021-06-30 PROCEDURE — 6360000002 HC RX W HCPCS: Performed by: INTERNAL MEDICINE

## 2021-06-30 PROCEDURE — 6370000000 HC RX 637 (ALT 250 FOR IP): Performed by: UROLOGY

## 2021-06-30 PROCEDURE — 6360000002 HC RX W HCPCS: Performed by: UROLOGY

## 2021-06-30 PROCEDURE — 97116 GAIT TRAINING THERAPY: CPT

## 2021-06-30 PROCEDURE — 36415 COLL VENOUS BLD VENIPUNCTURE: CPT

## 2021-06-30 PROCEDURE — 2580000003 HC RX 258: Performed by: UROLOGY

## 2021-06-30 PROCEDURE — 82947 ASSAY GLUCOSE BLOOD QUANT: CPT

## 2021-06-30 PROCEDURE — 80053 COMPREHEN METABOLIC PANEL: CPT

## 2021-06-30 PROCEDURE — 6370000000 HC RX 637 (ALT 250 FOR IP): Performed by: FAMILY MEDICINE

## 2021-06-30 PROCEDURE — 97110 THERAPEUTIC EXERCISES: CPT

## 2021-06-30 PROCEDURE — 85025 COMPLETE CBC W/AUTO DIFF WBC: CPT

## 2021-06-30 PROCEDURE — 2580000003 HC RX 258: Performed by: INTERNAL MEDICINE

## 2021-06-30 PROCEDURE — 2060000000 HC ICU INTERMEDIATE R&B

## 2021-06-30 RX ADMIN — MAGNESIUM HYDROXIDE 30 ML: 400 SUSPENSION ORAL at 12:12

## 2021-06-30 RX ADMIN — ATORVASTATIN CALCIUM 10 MG: 10 TABLET, FILM COATED ORAL at 08:30

## 2021-06-30 RX ADMIN — FERROUS SULFATE TAB 325 MG (65 MG ELEMENTAL FE) 325 MG: 325 (65 FE) TAB at 08:30

## 2021-06-30 RX ADMIN — OXYBUTYNIN CHLORIDE 15 MG: 10 TABLET, EXTENDED RELEASE ORAL at 08:30

## 2021-06-30 RX ADMIN — FLUOXETINE 10 MG: 10 CAPSULE ORAL at 08:33

## 2021-06-30 RX ADMIN — OXYCODONE HYDROCHLORIDE AND ACETAMINOPHEN 1 TABLET: 5; 325 TABLET ORAL at 21:32

## 2021-06-30 RX ADMIN — INSULIN LISPRO 2 UNITS: 100 INJECTION, SOLUTION INTRAVENOUS; SUBCUTANEOUS at 09:23

## 2021-06-30 RX ADMIN — HEPARIN SODIUM 5000 UNITS: 5000 INJECTION INTRAVENOUS; SUBCUTANEOUS at 14:25

## 2021-06-30 RX ADMIN — ASPIRIN 81 MG: 81 TABLET, COATED ORAL at 08:30

## 2021-06-30 RX ADMIN — CYANOCOBALAMIN TAB 500 MCG 500 MCG: 500 TAB at 08:30

## 2021-06-30 RX ADMIN — BACLOFEN 10 MG: 10 TABLET ORAL at 20:50

## 2021-06-30 RX ADMIN — SODIUM CHLORIDE: 9 INJECTION, SOLUTION INTRAVENOUS at 00:41

## 2021-06-30 RX ADMIN — METOPROLOL SUCCINATE 100 MG: 100 TABLET, EXTENDED RELEASE ORAL at 20:50

## 2021-06-30 RX ADMIN — HEPARIN SODIUM 5000 UNITS: 5000 INJECTION INTRAVENOUS; SUBCUTANEOUS at 05:43

## 2021-06-30 RX ADMIN — BUPROPION HYDROCHLORIDE 300 MG: 300 TABLET, FILM COATED, EXTENDED RELEASE ORAL at 08:30

## 2021-06-30 RX ADMIN — AMLODIPINE BESYLATE 10 MG: 10 TABLET ORAL at 08:30

## 2021-06-30 RX ADMIN — HEPARIN SODIUM 5000 UNITS: 5000 INJECTION INTRAVENOUS; SUBCUTANEOUS at 20:51

## 2021-06-30 RX ADMIN — LEVOTHYROXINE SODIUM 112 MCG: 0.11 TABLET ORAL at 05:43

## 2021-06-30 RX ADMIN — HYDRALAZINE HYDROCHLORIDE 10 MG: 20 INJECTION INTRAMUSCULAR; INTRAVENOUS at 14:26

## 2021-06-30 RX ADMIN — BUSPIRONE HYDROCHLORIDE 15 MG: 15 TABLET ORAL at 20:50

## 2021-06-30 RX ADMIN — HYDRALAZINE HYDROCHLORIDE 10 MG: 20 INJECTION INTRAMUSCULAR; INTRAVENOUS at 20:50

## 2021-06-30 RX ADMIN — CEFTRIAXONE SODIUM 1000 MG: 1 INJECTION, POWDER, FOR SOLUTION INTRAMUSCULAR; INTRAVENOUS at 14:26

## 2021-06-30 RX ADMIN — BUSPIRONE HYDROCHLORIDE 15 MG: 15 TABLET ORAL at 08:30

## 2021-06-30 RX ADMIN — INSULIN LISPRO 1 UNITS: 100 INJECTION, SOLUTION INTRAVENOUS; SUBCUTANEOUS at 20:51

## 2021-06-30 RX ADMIN — BACLOFEN 10 MG: 10 TABLET ORAL at 08:30

## 2021-06-30 RX ADMIN — HYDRALAZINE HYDROCHLORIDE 10 MG: 20 INJECTION INTRAMUSCULAR; INTRAVENOUS at 02:43

## 2021-06-30 RX ADMIN — PANTOPRAZOLE SODIUM 40 MG: 40 TABLET, DELAYED RELEASE ORAL at 05:43

## 2021-06-30 RX ADMIN — HYDRALAZINE HYDROCHLORIDE 10 MG: 20 INJECTION INTRAMUSCULAR; INTRAVENOUS at 08:31

## 2021-06-30 RX ADMIN — SODIUM CHLORIDE: 9 INJECTION, SOLUTION INTRAVENOUS at 21:32

## 2021-06-30 RX ADMIN — PRAMIPEXOLE DIHYDROCHLORIDE 0.12 MG: 0.12 TABLET ORAL at 08:33

## 2021-06-30 RX ADMIN — SODIUM CHLORIDE: 9 INJECTION, SOLUTION INTRAVENOUS at 09:35

## 2021-06-30 RX ADMIN — ISOSORBIDE MONONITRATE 30 MG: 30 TABLET, EXTENDED RELEASE ORAL at 08:30

## 2021-06-30 RX ADMIN — INSULIN LISPRO 2 UNITS: 100 INJECTION, SOLUTION INTRAVENOUS; SUBCUTANEOUS at 18:42

## 2021-06-30 ASSESSMENT — PAIN SCALES - GENERAL
PAINLEVEL_OUTOF10: 6
PAINLEVEL_OUTOF10: 2
PAINLEVEL_OUTOF10: 6

## 2021-06-30 ASSESSMENT — PAIN DESCRIPTION - PROGRESSION: CLINICAL_PROGRESSION: GRADUALLY WORSENING

## 2021-06-30 ASSESSMENT — PAIN DESCRIPTION - LOCATION: LOCATION: NECK

## 2021-06-30 ASSESSMENT — PAIN DESCRIPTION - ORIENTATION: ORIENTATION: POSTERIOR

## 2021-06-30 ASSESSMENT — PAIN DESCRIPTION - PAIN TYPE: TYPE: CHRONIC PAIN

## 2021-06-30 NOTE — FLOWSHEET NOTE
06/30/21 1408   Encounter Summary   Services provided to: Patient   Referral/Consult From: Joyce   Continue Visiting   (6/30/21 V)   Volunteer Visit Yes   Complexity of Encounter Low   Length of Encounter 15 minutes   Spiritual/Orthodox   Type Spiritual support   Intervention Prayer

## 2021-06-30 NOTE — PLAN OF CARE
Problem: Pain:  Goal: Pain level will decrease  Description: Pain level will decrease  6/30/2021 1822 by Petey Wiley RN  Outcome: Met This Shift  Note: No c/o pain this shift   6/30/2021 0426 by Naty Zavala RN  Outcome: Ongoing  Note: Patient given pain medication as ordered. Goal: Control of acute pain  Description: Control of acute pain  Outcome: Met This Shift  Goal: Control of chronic pain  Description: Control of chronic pain  Outcome: Met This Shift     Problem: Falls - Risk of:  Goal: Will remain free from falls  Description: Will remain free from falls  6/30/2021 1822 by Petey Wiley RN  Outcome: Met This Shift  Note: Call light in reach. Gait steady. Patient understands limitations. 6/30/2021 0426 by Naty Zavala RN  Outcome: Ongoing  Note: Patient remained free from falls. Call light within reach. Goal: Absence of physical injury  Description: Absence of physical injury  Outcome: Met This Shift     Problem: Musculor/Skeletal Functional Status  Goal: Highest potential functional level  Outcome: Met This Shift  Note: Gait steady, patient up per self   Goal: Absence of falls  Outcome: Met This Shift     Problem: Skin Integrity:  Goal: Will show no infection signs and symptoms  Description: Will show no infection signs and symptoms  Outcome: Met This Shift  Note: No fever or chills noted   Goal: Absence of new skin breakdown  Description: Absence of new skin breakdown  6/30/2021 1822 by Petey Wiley RN  Outcome: Met This Shift  6/30/2021 0426 by Naty Zavala RN  Outcome: Ongoing  Note: No new alterations in skin integrity.       Problem: Nutrition  Goal: Optimal nutrition therapy  Outcome: Met This Shift  Note: Eats majority of meals

## 2021-06-30 NOTE — PROGRESS NOTES
clear to auscultation bilaterally    Abdomen: soft, non-tender; bowel sounds normal; no masses,  no organomegaly    Back: Left costovertebral angle tenderness    Extremities: extremities normal, atraumatic, no cyanosis or edema    Neuro:  Awake and alert. CBC:   Recent Labs     06/28/21 0448 06/29/21 0441 06/30/21 0449   WBC 12.3* 10.0 11.0   HGB 9.2* 8.9* 8.6*    236 274     BMP:    Recent Labs     06/28/21 0448 06/29/21 0441 06/30/21 0449    137 141   K 4.5 5.0 4.5    102 105   CO2 25 25 26   BUN 36* 36* 21   CREATININE 2.90* 3.01* 2.21*   GLUCOSE 186* 184* 162*       Lab Results   Component Value Date    NITRU NEGATIVE 06/22/2021    COLORU YELLOW 06/22/2021    PHUR 6.0 06/22/2021    WBCUA 5 TO 10 06/22/2021    RBCUA 5 TO 10 06/22/2021    MUCUS NOT REPORTED 06/22/2021    TRICHOMONAS NOT REPORTED 06/22/2021    YEAST NOT REPORTED 06/22/2021    BACTERIA NOT REPORTED 06/22/2021    SPECGRAV 1.006 06/22/2021    LEUKOCYTESUR MOD 06/22/2021    UROBILINOGEN Normal 06/22/2021    BILIRUBINUR NEGATIVE 06/22/2021    GLUCOSEU NEGATIVE 06/22/2021    KETUA NEGATIVE 06/22/2021    AMORPHOUS NOT REPORTED 06/22/2021     Urine Creatinine:     Lab Results   Component Value Date    LABCREA 48.7 06/21/2021     IMPRESSION/RECOMMENDATIONS:      1. Acute kidney injury - Most consistent with obstructive nephropathy secondary to kidney stone. She underwent cystoscopy with left ureteral stent insertion 6/21/2021. Patient is nonoliguric, patient was started on dialysis on 6/23/2021  Patient urine output has improved to 2.6 L      2. Nephrolithiasis - Patient with family history [sister]. metabolic stone evaluation as outpatient since this is patient's first episode of kidney stone. S/p cystoscopy with left ureteral stent placement. Monitor urine output closely. 3.  Systemic hypertension - Blood pressure is adequately controlled on amlodipine 10 mg p.o. daily and metoprolol 100 mg p.o. daily.     4.  E. coli urinary tract infection - complicating nephrolithiasis. Continue IV ceftriaxone 1 g every 24 hours. Plan  No plans for dialysis today.  I n next few days we will monitor renal function closely for renal recovery and to determine whether patient will need chronic dialysis or not     Elayne Bolivar MD   Attending Nephrologist  6/30/2021 11:22 AM

## 2021-06-30 NOTE — PLAN OF CARE
Problem: Pain:  Goal: Pain level will decrease  Description: Pain level will decrease  6/30/2021 0426 by Radha Gross RN  Outcome: Ongoing  Note: Patient given pain medication as ordered. Problem: Falls - Risk of:  Goal: Will remain free from falls  Description: Will remain free from falls  6/30/2021 0426 by Radha Gross RN  Outcome: Ongoing  Note: Patient remained free from falls. Call light within reach. Problem: Skin Integrity:  Goal: Absence of new skin breakdown  Description: Absence of new skin breakdown  6/30/2021 0426 by Radha Gross RN  Outcome: Ongoing  Note: No new alterations in skin integrity.

## 2021-06-30 NOTE — CARE COORDINATION
ONGOING DISCHARGE PLAN:    Patient is alert and oriented x4. Spoke with patient regarding discharge plan and patient confirms that plan is still to discharge to home with no needs   Patient refused vns   BUN/CRE improving   Following for dialysis slot  Pt may need a tunnel cath placed     Will continue to follow for additional discharge needs.     Electronically signed by Janene Acevedo RN on 6/30/2021 at 12:24 PM

## 2021-06-30 NOTE — PROGRESS NOTES
Hospitalist Progress Note  6/30/2021 6:51 PM  Subjective:   Admit Date: 6/20/2021  PCP: Quinton Stewart DO     Full Code      C/c:  Chief Complaint   Patient presents with    Flank Pain    Urinary Frequency         Interval History: doing better, no new complaintsh/o of non oliguric ac kidney injury requiring intermittent dialysis/h/o of diabetes    Diet: ADULT DIET; Easy to Chew; Low Potassium (Less than 3000 mg/day); Low Phosphorus (Less than 1000 mg)                                ip days:10  Medications:   Scheduled Meds:   amLODIPine  10 mg Oral Daily    hydrALAZINE  10 mg Intravenous Q6H    ferrous sulfate  325 mg Oral Daily with breakfast    aspirin  81 mg Oral Daily    atorvastatin  10 mg Oral Daily    baclofen  10 mg Oral BID    buPROPion  300 mg Oral Daily    busPIRone  15 mg Oral BID    FLUoxetine  10 mg Oral Daily    isosorbide mononitrate  30 mg Oral Daily    levothyroxine  112 mcg Oral Daily    [Held by provider] losartan  50 mg Oral Daily    metoprolol succinate  100 mg Oral Daily    oxybutynin  15 mg Oral Daily    pantoprazole  40 mg Oral QAM AC    pramipexole  0.125 mg Oral Daily    vitamin B-12  500 mcg Oral Daily    insulin lispro  0-12 Units Subcutaneous TID WC    insulin lispro  0-6 Units Subcutaneous Nightly    sodium chloride flush  10 mL Intravenous 2 times per day    heparin (porcine)  5,000 Units Subcutaneous 3 times per day    cefTRIAXone (ROCEPHIN) IV  1,000 mg Intravenous Q24H     Continuous Infusions:   sodium chloride 100 mL/hr at 06/30/21 0935    dextrose      sodium chloride       PRN Meds:. ALPRAZolam, metoprolol, sodium citrate, sodium citrate, perflutren lipid microspheres, hydrALAZINE, oxyCODONE-acetaminophen, albuterol sulfate HFA, glucose, dextrose, glucagon (rDNA), dextrose, sodium chloride flush, sodium chloride, ondansetron **OR** ondansetron, magnesium hydroxide, acetaminophen **OR** acetaminophen, morphine     CBC:   Recent Labs 06/28/21 0448 06/29/21 0441 06/30/21 0449   WBC 12.3* 10.0 11.0   HGB 9.2* 8.9* 8.6*    236 274     BMP:    Recent Labs     06/28/21 0448 06/29/21 0441 06/30/21 0449    137 141   K 4.5 5.0 4.5    102 105   CO2 25 25 26   BUN 36* 36* 21   CREATININE 2.90* 3.01* 2.21*   GLUCOSE 186* 184* 162*     Hepatic:   Recent Labs     06/28/21 0448 06/29/21 0441 06/30/21 0449   AST 18 23 16   ALT 15 15 14   BILITOT 0.18* 0.21* 0.21*   ALKPHOS 85 89 82     Troponin: No results for input(s): TROPONINI in the last 72 hours. BNP: No results for input(s): BNP in the last 72 hours. Lipids: No results for input(s): CHOL, HDL in the last 72 hours. Invalid input(s): LDLCALCU  INR: No results for input(s): INR in the last 72 hours. Objective:   Vitals: /67   Pulse 72   Temp 97.6 °F (36.4 °C) (Oral)   Resp 16   Ht 5' 5\" (1.651 m)   Wt 199 lb 11.8 oz (90.6 kg)   LMP 03/19/1980   SpO2 90%   BMI 33.24 kg/m²   General appearance: alert, appears stated age and cooperative  Skin: Skin color, texture, turgor normal. No rashes or lesions  Lungs: clear to auscultation bilaterally  Heart: regular rate and rhythm, S1, S2 normal, no murmur, click, rub or gallop  Abdomen: soft, non-tender; bowel sounds normal; no masses,  no organomegaly  Extremities: extremities normal, atraumatic, no cyanosis or edema  Neurologic: Mental status: Alert, oriented, thought content appropriate    Prophylaxis:   DVT with  [] lovenox        [x] heparin        [] Scd        [] none:     Radiology:  CT ABDOMEN PELVIS WO CONTRAST Additional Contrast? None    Result Date: 6/20/2021  EXAMINATION: CT OF THE ABDOMEN AND PELVIS WITHOUT CONTRAST 6/20/2021 1:11 pm TECHNIQUE: CT of the abdomen and pelvis was performed without the administration of intravenous contrast. Multiplanar reformatted images are provided for review.  Dose modulation, iterative reconstruction, and/or weight based adjustment of the mA/kV was utilized to reduce the radiation dose to as low as reasonably achievable. COMPARISON: CT abdomen and pelvis November 7, 2018. HISTORY: ORDERING SYSTEM PROVIDED HISTORY: left flank pain, r/o obstructing stone TECHNOLOGIST PROVIDED HISTORY: left flank pain, r/o obstructing stone Decision Support Exception - unselect if not a suspected or confirmed emergency medical condition->Emergency Medical Condition (MA) Reason for Exam: Flank Pain; Urinary Frequency Acuity: Acute Type of Exam: Initial Additional signs and symptoms: Pt c/o left flank pain since 8am; dry heaving FINDINGS: Lower Chest: Mild bibasilar atelectasis/scarring. Calcified granulomas. Organs: Suboptimal evaluation due to lack of IV contrast.  Liver gallbladder pancreas spleen and adrenal glands all appear unremarkable. Mild hydronephrosis and hydroureter with associated asymmetrical perinephric and periureteral fat stranding without obstructing calculus noted. Nonobstructing calculi identified involving the right kidney largest measuring 5 mm. No right renal calculus. Abdominal aorta demonstrates moderate calcification without aneurysm. Stable fusiform type ectasia of the infrarenal abdominal aorta measuring 2.7 cm. GI/Bowel: Small hiatal hernia. Distal stomach is grossly unremarkable. Small bowel appears nondilated. No acute colonic abnormality. Pelvis: Urinary bladder is grossly unremarkable. Uterus has been removed. No adnexal mass. Peritoneum/Retroperitoneum: No free air, free fluid or lymphadenopathy. Bones/Soft Tissues: Abdominal wall demonstrates no acute findings. Osseous structures demonstrate degenerative change. 1. Left-sided hydroureter and hydronephrosis with associated asymmetrical perinephric and periureteral fat stranding without obstructing calculus noted. Findings likely represents a recently passed stone. 2. Nonobstructing calculi right kidney, largest measuring 5 mm. 3. Small hiatal hernia.  4. Stable ectasia infrarenal abdominal aortic aneurysm measuring 2.7 cm. FLUORO FOR SURGICAL PROCEDURES    Result Date: 6/21/2021  Radiology exam is complete. No Radiologist dictation. Please follow up with ordering provider. Assessment :      1. Ac kidney injury requiirng intermittent dialysis  2. diabetes     Plan:   1. Will monitor  2. See order    Patient Active Problem List:     [facility] arthritis     Arthritis of left hip     Left hip pain     Chronic midline low back pain without sciatica     Degenerative lumbar spinal stenosis     Lumbar radiculopathy     DDD (degenerative disc disease), lumbar     Lumbosacral spondylosis without myelopathy     Primary osteoarthritis of left hip     Sacroiliitis (HCC)     DDD (degenerative disc disease), cervical     Neck pain     Cervical stenosis of spine     Osteoarthritis of spine with radiculopathy, cervical region     Acute renal failure (HCC)     Acute cystitis without hematuria     Hydronephrosis of left kidney     Hydroureter, left     Type 2 diabetes mellitus, without long-term current use of insulin ([facility] [address]     Essential hypertension     Hypothyroidism     Acute infective cystitis     Uremia      Anticipated Disposition upon discharge: [] Home                                                                         [] Home with Home Health                                                                         [] [facility]                                                                         [] [facility] [address]      Patient is admitted as inpatient status because of co-morbidities listed above, severity of signs and symptoms as outlined, requirement for current medical therapies and most importantly because of direct risk to patient if care not provided in a hospital setting.           Gloria Morales MD, MD  [facility], [place] Hospitalist

## 2021-06-30 NOTE — PROGRESS NOTES
Attempted x 2 to place a new IV site. Both sites vein blew with insertion of IV catheter. In formed [employer]. She informed Magen Bermudez and it was decided since current IV site is free of signs of infection and infusing well, flushes easily not to change at this time. This RN returned to patient and asked if patient would allow for one more attempt by me and she refused.

## 2021-07-01 LAB
ABSOLUTE EOS #: 0.19 K/UL (ref 0–0.4)
ABSOLUTE IMMATURE GRANULOCYTE: ABNORMAL K/UL (ref 0–0.3)
ABSOLUTE LYMPH #: 2.26 K/UL (ref 1–4.8)
ABSOLUTE MONO #: 0.66 K/UL (ref 0.1–1.3)
ALBUMIN SERPL-MCNC: 3.3 G/DL (ref 3.5–5.2)
ALBUMIN/GLOBULIN RATIO: ABNORMAL (ref 1–2.5)
ALP BLD-CCNC: 76 U/L (ref 35–104)
ALT SERPL-CCNC: 12 U/L (ref 5–33)
ANION GAP SERPL CALCULATED.3IONS-SCNC: 10 MMOL/L (ref 9–17)
AST SERPL-CCNC: 15 U/L
BASOPHILS # BLD: 0 % (ref 0–2)
BASOPHILS ABSOLUTE: 0 K/UL (ref 0–0.2)
BILIRUB SERPL-MCNC: 0.18 MG/DL (ref 0.3–1.2)
BUN BLDV-MCNC: 20 MG/DL (ref 8–23)
BUN/CREAT BLD: ABNORMAL (ref 9–20)
CALCIUM SERPL-MCNC: 8.9 MG/DL (ref 8.6–10.4)
CHLORIDE BLD-SCNC: 107 MMOL/L (ref 98–107)
CO2: 26 MMOL/L (ref 20–31)
CREAT SERPL-MCNC: 2.39 MG/DL (ref 0.5–0.9)
DIFFERENTIAL TYPE: ABNORMAL
EOSINOPHILS RELATIVE PERCENT: 2 % (ref 0–4)
GFR AFRICAN AMERICAN: 24 ML/MIN
GFR NON-AFRICAN AMERICAN: 20 ML/MIN
GFR SERPL CREATININE-BSD FRML MDRD: ABNORMAL ML/MIN/{1.73_M2}
GFR SERPL CREATININE-BSD FRML MDRD: ABNORMAL ML/MIN/{1.73_M2}
GLUCOSE BLD-MCNC: 139 MG/DL (ref 65–105)
GLUCOSE BLD-MCNC: 152 MG/DL (ref 65–105)
GLUCOSE BLD-MCNC: 153 MG/DL (ref 65–105)
GLUCOSE BLD-MCNC: 154 MG/DL (ref 65–105)
GLUCOSE BLD-MCNC: 164 MG/DL (ref 70–99)
HCT VFR BLD CALC: 24.8 % (ref 36–46)
HEMOGLOBIN: 8.1 G/DL (ref 12–16)
IMMATURE GRANULOCYTES: ABNORMAL %
LYMPHOCYTES # BLD: 24 % (ref 24–44)
MCH RBC QN AUTO: 29.1 PG (ref 26–34)
MCHC RBC AUTO-ENTMCNC: 32.5 G/DL (ref 31–37)
MCV RBC AUTO: 89.5 FL (ref 80–100)
MONOCYTES # BLD: 7 % (ref 1–7)
MORPHOLOGY: ABNORMAL
NRBC AUTOMATED: ABNORMAL PER 100 WBC
PDW BLD-RTO: 13.8 % (ref 11.5–14.9)
PLATELET # BLD: 255 K/UL (ref 150–450)
PLATELET ESTIMATE: ABNORMAL
PMV BLD AUTO: 8.3 FL (ref 6–12)
POTASSIUM SERPL-SCNC: 4.8 MMOL/L (ref 3.7–5.3)
RBC # BLD: 2.77 M/UL (ref 4–5.2)
RBC # BLD: ABNORMAL 10*6/UL
SEG NEUTROPHILS: 67 % (ref 36–66)
SEGMENTED NEUTROPHILS ABSOLUTE COUNT: 6.29 K/UL (ref 1.3–9.1)
SODIUM BLD-SCNC: 143 MMOL/L (ref 135–144)
TOTAL PROTEIN: 6.1 G/DL (ref 6.4–8.3)
WBC # BLD: 9.4 K/UL (ref 3.5–11)
WBC # BLD: ABNORMAL 10*3/UL

## 2021-07-01 PROCEDURE — 80053 COMPREHEN METABOLIC PANEL: CPT

## 2021-07-01 PROCEDURE — 2580000003 HC RX 258: Performed by: INTERNAL MEDICINE

## 2021-07-01 PROCEDURE — 6360000002 HC RX W HCPCS: Performed by: UROLOGY

## 2021-07-01 PROCEDURE — 2060000000 HC ICU INTERMEDIATE R&B

## 2021-07-01 PROCEDURE — 82947 ASSAY GLUCOSE BLOOD QUANT: CPT

## 2021-07-01 PROCEDURE — 6370000000 HC RX 637 (ALT 250 FOR IP): Performed by: UROLOGY

## 2021-07-01 PROCEDURE — 85025 COMPLETE CBC W/AUTO DIFF WBC: CPT

## 2021-07-01 PROCEDURE — 2580000003 HC RX 258: Performed by: UROLOGY

## 2021-07-01 PROCEDURE — 6360000002 HC RX W HCPCS: Performed by: FAMILY MEDICINE

## 2021-07-01 PROCEDURE — 6370000000 HC RX 637 (ALT 250 FOR IP): Performed by: FAMILY MEDICINE

## 2021-07-01 PROCEDURE — 6360000002 HC RX W HCPCS: Performed by: INTERNAL MEDICINE

## 2021-07-01 PROCEDURE — 36415 COLL VENOUS BLD VENIPUNCTURE: CPT

## 2021-07-01 RX ORDER — HYDRALAZINE HYDROCHLORIDE 20 MG/ML
10 INJECTION INTRAMUSCULAR; INTRAVENOUS EVERY 8 HOURS
Status: DISCONTINUED | OUTPATIENT
Start: 2021-07-01 | End: 2021-07-02 | Stop reason: HOSPADM

## 2021-07-01 RX ADMIN — METOPROLOL SUCCINATE 100 MG: 100 TABLET, EXTENDED RELEASE ORAL at 20:26

## 2021-07-01 RX ADMIN — BUSPIRONE HYDROCHLORIDE 15 MG: 15 TABLET ORAL at 20:19

## 2021-07-01 RX ADMIN — HYDRALAZINE HYDROCHLORIDE 10 MG: 20 INJECTION INTRAMUSCULAR; INTRAVENOUS at 22:03

## 2021-07-01 RX ADMIN — INSULIN LISPRO 2 UNITS: 100 INJECTION, SOLUTION INTRAVENOUS; SUBCUTANEOUS at 09:06

## 2021-07-01 RX ADMIN — ALPRAZOLAM 0.5 MG: 0.5 TABLET ORAL at 22:03

## 2021-07-01 RX ADMIN — CYANOCOBALAMIN TAB 500 MCG 500 MCG: 500 TAB at 08:43

## 2021-07-01 RX ADMIN — SODIUM CHLORIDE: 9 INJECTION, SOLUTION INTRAVENOUS at 06:05

## 2021-07-01 RX ADMIN — INSULIN LISPRO 2 UNITS: 100 INJECTION, SOLUTION INTRAVENOUS; SUBCUTANEOUS at 17:26

## 2021-07-01 RX ADMIN — HEPARIN SODIUM 5000 UNITS: 5000 INJECTION INTRAVENOUS; SUBCUTANEOUS at 20:20

## 2021-07-01 RX ADMIN — BACLOFEN 10 MG: 10 TABLET ORAL at 08:43

## 2021-07-01 RX ADMIN — BACLOFEN 10 MG: 10 TABLET ORAL at 20:19

## 2021-07-01 RX ADMIN — HYDRALAZINE HYDROCHLORIDE 10 MG: 20 INJECTION INTRAMUSCULAR; INTRAVENOUS at 02:48

## 2021-07-01 RX ADMIN — PRAMIPEXOLE DIHYDROCHLORIDE 0.12 MG: 0.12 TABLET ORAL at 08:46

## 2021-07-01 RX ADMIN — ASPIRIN 81 MG: 81 TABLET, COATED ORAL at 08:43

## 2021-07-01 RX ADMIN — HEPARIN SODIUM 5000 UNITS: 5000 INJECTION INTRAVENOUS; SUBCUTANEOUS at 14:52

## 2021-07-01 RX ADMIN — LEVOTHYROXINE SODIUM 112 MCG: 0.11 TABLET ORAL at 06:03

## 2021-07-01 RX ADMIN — INSULIN LISPRO 1 UNITS: 100 INJECTION, SOLUTION INTRAVENOUS; SUBCUTANEOUS at 20:20

## 2021-07-01 RX ADMIN — FERROUS SULFATE TAB 325 MG (65 MG ELEMENTAL FE) 325 MG: 325 (65 FE) TAB at 08:43

## 2021-07-01 RX ADMIN — AMLODIPINE BESYLATE 10 MG: 10 TABLET ORAL at 08:43

## 2021-07-01 RX ADMIN — BUSPIRONE HYDROCHLORIDE 15 MG: 15 TABLET ORAL at 08:43

## 2021-07-01 RX ADMIN — CEFTRIAXONE SODIUM 1000 MG: 1 INJECTION, POWDER, FOR SOLUTION INTRAMUSCULAR; INTRAVENOUS at 11:35

## 2021-07-01 RX ADMIN — ATORVASTATIN CALCIUM 10 MG: 10 TABLET, FILM COATED ORAL at 08:43

## 2021-07-01 RX ADMIN — ISOSORBIDE MONONITRATE 30 MG: 30 TABLET, EXTENDED RELEASE ORAL at 08:43

## 2021-07-01 RX ADMIN — HEPARIN SODIUM 5000 UNITS: 5000 INJECTION INTRAVENOUS; SUBCUTANEOUS at 06:05

## 2021-07-01 RX ADMIN — HYDRALAZINE HYDROCHLORIDE 10 MG: 20 INJECTION INTRAMUSCULAR; INTRAVENOUS at 10:00

## 2021-07-01 RX ADMIN — BUPROPION HYDROCHLORIDE 300 MG: 300 TABLET, FILM COATED, EXTENDED RELEASE ORAL at 08:43

## 2021-07-01 RX ADMIN — HYDRALAZINE HYDROCHLORIDE 10 MG: 20 INJECTION INTRAMUSCULAR; INTRAVENOUS at 14:52

## 2021-07-01 RX ADMIN — FLUOXETINE 10 MG: 10 CAPSULE ORAL at 08:46

## 2021-07-01 RX ADMIN — OXYBUTYNIN CHLORIDE 15 MG: 10 TABLET, EXTENDED RELEASE ORAL at 08:43

## 2021-07-01 RX ADMIN — OXYCODONE HYDROCHLORIDE AND ACETAMINOPHEN 1 TABLET: 5; 325 TABLET ORAL at 22:03

## 2021-07-01 RX ADMIN — PANTOPRAZOLE SODIUM 40 MG: 40 TABLET, DELAYED RELEASE ORAL at 06:03

## 2021-07-01 ASSESSMENT — PAIN SCALES - GENERAL
PAINLEVEL_OUTOF10: 8
PAINLEVEL_OUTOF10: 0

## 2021-07-01 ASSESSMENT — PAIN DESCRIPTION - PAIN TYPE: TYPE: CHRONIC PAIN

## 2021-07-01 ASSESSMENT — PAIN DESCRIPTION - LOCATION: LOCATION: BACK

## 2021-07-01 NOTE — CARE COORDINATION
ONGOING DISCHARGE PLAN:    Patient is alert and oriented x4. Spoke with patient regarding discharge plan and patient confirms that plan is still to go home with no needs. Dialysis on hold today. Patient not sure if she will need permanent dialysis or not. She is hoping to find this out today. In the meantime, LSW working on outpatient Dialysis slot jut in case. Rajni Dawkins as a tentative hold on [employer] at 11:30a.m. He can cancel this if she doesn't need it. Labs:  BUN 20, cre 2.39, K 4.8, hgb 8.1    Patient hoping she can discharge soon as she advised she needs to get her bills paid and go to the bank to get her user name and password reset as well as go the Vets to  her dogs heartworm medication. Will continue to follow for additional discharge needs.     Electronically signed by Margarette Estrada RN on 7/1/2021 at 2:11 PM

## 2021-07-01 NOTE — PLAN OF CARE
Problem: Pain:  Goal: Pain level will decrease  Description: Pain level will decrease  Outcome: Ongoing  Goal: Control of acute pain  Description: Control of acute pain  Outcome: Ongoing  Goal: Control of chronic pain  Description: Control of chronic pain  Outcome: Ongoing     Problem: Falls - Risk of:  Goal: Will remain free from falls  Description: Will remain free from falls  Outcome: Ongoing  Note: No falls this shift. Call light within reach and siderails x2. Bed in lowest position. Patient safety maintained. Goal: Absence of physical injury  Description: Absence of physical injury  Outcome: Ongoing  Note: No falls this shift. Call light within reach and siderails x2. Bed in lowest position. Patient safety maintained. Problem: Musculor/Skeletal Functional Status  Goal: Highest potential functional level  Outcome: Ongoing  Goal: Absence of falls  Outcome: Ongoing  Note: No falls this shift. Call light within reach and siderails x2. Bed in lowest position. Patient safety maintained. Problem: Skin Integrity:  Goal: Will show no infection signs and symptoms  Description: Will show no infection signs and symptoms  Outcome: Ongoing  Goal: Absence of new skin breakdown  Description: Absence of new skin breakdown  Outcome: Ongoing  Note: Skin assessment as charted. No new areas of breakdown.        Problem: Nutrition  Goal: Optimal nutrition therapy  Outcome: Ongoing

## 2021-07-01 NOTE — PROGRESS NOTES
Department of Internal Medicine  Nephrology [facility], MD  Progress Note    Reason for consultation: Management of acute kidney injury at nephrolithiasis. Consulting physician: Smith Monreal MD    Interval history:   Patient was seen and examined today, no new complaints today and is nonoliguric. Urine output 3.7 L yesterday in 24 hours since morning 1300 mL of urine. she has required acute intermittent hemodialysis for treatment of nonoliguric acute kidney injury complicating obstructive uropathy secondary to kidney stone. Patient had last dialysis on 6/29/2021   Jeffy catheter placed on 6/23/2021 for acute kidney injury complicating obstructive kidney stone. She denies flank pain and does not have shortness of breath. Serum creatinine is 2.3 mg/dL from 2.2 mg/dL yesterday    History of presenting illness: This is a 70 y.o. female with a significant past medical history of Type 2 diabetes mellitus [diagnosed in 2015], Coronary artery disease [s/p PTCA/stents], hyperlipidemia, hypothyroidism, systemic hypertension and Chronic kidney disease stage III [baseline serum creatinine 1.40 mg/dL in November 2018], who presented to the emergency department yesterday with sudden onset of severe left flank pain associated with nausea and vomiting. She had chills but did not have fever. Laboratory studies at presentation revealed serum creatinine 2.7 mg/dL which increased overnight to 5 mg/dL associated with hyperkalemia and serum potassium 5.9 mmol/L.  CT scan of the abdomen and pelvis showed:  1. Left-sided hydroureter and hydronephrosis with associated asymmetrical perinephric and periureteral fat stranding without obstructing calculus noted. Findings likely represents a recently passed stone. 2. Nonobstructing calculi right kidney, largest measuring 5 mm. 3. Small hiatal hernia. 4. Stable ectasia infrarenal abdominal aortic aneurysm measuring 2.7 cm.      Scheduled Meds:   hydrALAZINE  10 mg Intravenous Q8H    amLODIPine  10 mg Oral Daily    ferrous sulfate  325 mg Oral Daily with breakfast    aspirin  81 mg Oral Daily    atorvastatin  10 mg Oral Daily    baclofen  10 mg Oral BID    buPROPion  300 mg Oral Daily    busPIRone  15 mg Oral BID    FLUoxetine  10 mg Oral Daily    isosorbide mononitrate  30 mg Oral Daily    levothyroxine  112 mcg Oral Daily    [Held by provider] losartan  50 mg Oral Daily    metoprolol succinate  100 mg Oral Daily    oxybutynin  15 mg Oral Daily    pantoprazole  40 mg Oral QAM AC    pramipexole  0.125 mg Oral Daily    vitamin B-12  500 mcg Oral Daily    insulin lispro  0-12 Units Subcutaneous TID WC    insulin lispro  0-6 Units Subcutaneous Nightly    sodium chloride flush  10 mL Intravenous 2 times per day    heparin (porcine)  5,000 Units Subcutaneous 3 times per day    cefTRIAXone (ROCEPHIN) IV  1,000 mg Intravenous Q24H     Continuous Infusions:   sodium chloride 100 mL/hr at 07/01/21 0605    dextrose      sodium chloride       PRN Meds:. ALPRAZolam, metoprolol, sodium citrate, sodium citrate, perflutren lipid microspheres, hydrALAZINE, oxyCODONE-acetaminophen, albuterol sulfate HFA, glucose, dextrose, glucagon (rDNA), dextrose, sodium chloride flush, sodium chloride, ondansetron **OR** ondansetron, magnesium hydroxide, acetaminophen **OR** acetaminophen, morphine    Physical Exam:    VITALS:  /69   Pulse 82   Temp 97.5 °F (36.4 °C) (Oral)   Resp 18   Ht 5' 5\" (1.651 m)   Wt 208 lb 8.9 oz (94.6 kg)   LMP 03/19/1980   SpO2 95%   BMI 34.71 kg/m²   24HR INTAKE/OUTPUT:      Intake/Output Summary (Last 24 hours) at 7/1/2021 1509  Last data filed at 7/1/2021 1019  Gross per 24 hour   Intake 3527 ml   Output 3400 ml   Net 127 ml     Constitutional: Awake and alert; not in respiratory distress.     Skin: Skin color, texture, turgor normal. No rashes or lesions    Head: Normocephalic, without obvious abnormality, atraumatic is adequately controlled on amlodipine 10 mg p.o. daily and metoprolol 100 mg p.o. daily. 4.  E. coli urinary tract infection - complicating nephrolithiasis. Continue IV ceftriaxone 1 g every 24 hours. Plan  No plans for dialysis today.   Patient is nonoliguric   in next few days we will monitor renal function closely for renal recovery and to determine whether patient will need chronic dialysis or not     Laina Montes De Oca MD   Attending Nephrologist  7/1/2021 3:09 PM

## 2021-07-01 NOTE — PROGRESS NOTES
Physical Therapy          Physical Therapy Cancel Note      DATE: 2021    NAME: Jose Enrique Gaspar  MRN: [id number]   : 1950      Patient not seen this date for Physical Therapy due to:    Pt declined working with therapy.      Electronically signed by Vannessa Smith PTA on 2021 at 3:33 PM

## 2021-07-01 NOTE — PROGRESS NOTES
Physical Therapy  REVISED GOALS  [place]      Date: 21  Patient Name:  Emelina Garcia       Room:   MRN: [id number]  Account: [de-identified]   : 1950  (75 y.o.) Gender: female   Patient Height Height: 5' 5\" (165.1 cm)  Patient Weight 208 lb 8.9 oz (94.6 kg)      21 1635   Short term goals   Time Frame for Short term goals 5-7 treatments/ week- REVISED GOALS   Short term goal 1 pt to tolerate 1/2 hour of therapuetic exercise and activity   Short term goal 2 pt to demonstrate good technique for LE strengthening, balance activities and energy conservation techniques   Short term goal 3 pt to demonstrate 4/5 strength or better for LEs   Short term goal 4 pt to demonstrate independent rolling and supine <> sit   for position change   Short term goal 5 pt to demonstrate dangling at the EOB w/ supervision and progress sitting tolerance to 15  minutes   Short term goal 6 pt to demonstrate sit <> stand and bed <> chair transfers using wheeled walker w/ SBA x 1   Short term goal 7 pt to demonstrate gait w/ wheeled walker 150' x 1 w/ SBA x 1   Short term goal 8 pt to demonstrate fair + or better ambulatory balance using wheeled walker   Short term goal 9 pt to demonstrate ability to ascend/ descend 6-8\" platform step using rail and assistive device w/ CGA x 1 to SBA x 1

## 2021-07-01 NOTE — PLAN OF CARE
Problem: Pain:  Goal: Pain level will decrease  Description: Pain level will decrease  7/1/2021 0421 by Jemal Aguilar RN  Outcome: Ongoing  Note: Patient given pain medication as ordered. Problem: Falls - Risk of:  Goal: Will remain free from falls  Description: Will remain free from falls  7/1/2021 0421 by Jemal Aguilar RN  Outcome: Ongoing  Note: Patient remained free from falls. Call light within reach. Problem: Skin Integrity:  Goal: Absence of new skin breakdown  Description: Absence of new skin breakdown  7/1/2021 0421 by Jemal Aguilar RN  Outcome: Ongoing  Note: No new alterations in skin integrity.

## 2021-07-01 NOTE — PROGRESS NOTES
Physical Therapy  Facility/Department: [facility] PROGRESSIVE CARE  Daily Treatment Note  NAME: Marilyn Conde  : 1950  MRN: [id number]       21 1037   Restrictions/Precautions   Restrictions/Precautions Fall Risk;Up as Tolerated   Required Braces or Orthoses? No   Subjective   Subjective pt sitting in bedside chair looking out the window upon entry. Pt agreeable to therapy. General Comment   Comments Pt very pleasant and cooperative. Pain Assessment   Pain Assessment 0-10   Pain Level 6   Patient's Stated Pain Goal No pain   Pain Type Chronic pain   Pain Location Neck   Pain Orientation Posterior   Clinical Progression Gradually worsening   Response to Pain Intervention Patient Satisfied   Oxygen Therapy   SpO2 96 %   Pulse Oximeter Device Mode Intermittent   Pulse Oximeter Device Location Finger   O2 Device None (Room air)   Orientation   Orientation Level Oriented X4   Bed Mobility   Comment Pt sitting in bedside chair   Transfers   Sit to Stand Contact guard assistance   Stand to sit Contact guard assistance   Comment Patient demonstrated safety awareness with the following transfers. Denies lightheadness. Ambulation   Ambulation? Yes   Ambulation 1   Surface level tile   Device Rolling Walker   Other Apparatus   (IV Pole)   Assistance Contact guard assistance   Quality of Gait slow, steady, no LOB   Gait Deviations Slow Carolyne;Decreased step length;Decreased head and trunk rotation   Distance 130'   Comments VC required to stay within the RW 1x. 1 short standing rest break taken. Stairs/Curb   Stairs? Yes   Stairs   # Steps  1  (x 5)   Stairs Height 6\"   Rails Right ascending   Curbs 6\"   Device No Device   Assistance Contact guard assistance   Comment Pt SpO2 96% ,HR 71 at beginning of treatment with good return at end of session. Balance   Posture Fair   Sitting - Static Good   Sitting - Dynamic Fair;+   Standing - Static Fair   Standing - Dynamic Fair   Comments 1 U/E support required.  No signs of fatigue noted. Patient sat EOB performing dynamic U/E activities for 6 min. Exercises   Comments Deferred this date d/t pain and fatigue. Short term goals   Time Frame for Short term goals 5-7 treatments/ week   Short term goal 1 pt to tolerate 1/2 hour of therapuetic exercise   Short term goal 2 pt to demonstrate good technique for LE strengthening, balance activities and energy conservation techniques   Short term goal 3 pt to demonstrate 3/5 strength or better for LEs   Short term goal 4 pt to demonstrate rolling in bed w/ min x 1 for position change   Short term goal 5 advance to dangling at the EOB w/ min x 1 and progress sitting tolerance to 5 minutes   Conditions Requiring Skilled Therapeutic Intervention   Body structures, Functions, Activity limitations Decreased functional mobility ; Decreased balance;Decreased cognition;Decreased endurance   Treatment Diagnosis impaired mobility and cognition   Prognosis Guarded   Barriers to Learning cognition   REQUIRES PT FOLLOW UP Yes   Discharge Recommendations Patient would benefit from continued therapy after discharge   Activity Tolerance   Activity Tolerance Patient limited by cognitive status; Patient limited by endurance   Plan   Times per week 5-7 treatments/ week   Times per day   (5-7 treatments/ week)   Current Treatment Recommendations Strengthening; Safety Education & Training;ROM;Balance Training; Endurance Training;Patient/Caregiver Education & Training;Equipment Evaluation, Education, & procurement; Functional Mobility Training;Transfer Training;Gait Training;Positioning;Cognitive Reorientation   Safety Devices   Type of devices Call light within reach; Patient at risk for falls; Left in bed;Bed alarm in place   PT Whiteboard Notes   Therapy Whiteboard 6/30/21 Pt unavailable in dialysis, will attempt to see later if possible. Transfer/Amb with RW 65 ft x 2. There ex both L/E and U/E.  Reviewed and issued HEP both U/E and L/E x 10 Pt instructed to push off bed with his hand.  1037-11:08 (31 mins)    Visited by Christal De León

## 2021-07-02 VITALS
SYSTOLIC BLOOD PRESSURE: 100 MMHG | WEIGHT: 209.88 LBS | RESPIRATION RATE: 18 BRPM | OXYGEN SATURATION: 97 % | HEIGHT: 65 IN | HEART RATE: 67 BPM | TEMPERATURE: 97.7 F | BODY MASS INDEX: 34.97 KG/M2 | DIASTOLIC BLOOD PRESSURE: 77 MMHG

## 2021-07-02 LAB
ABSOLUTE EOS #: 0.3 K/UL (ref 0–0.4)
ABSOLUTE IMMATURE GRANULOCYTE: ABNORMAL K/UL (ref 0–0.3)
ABSOLUTE LYMPH #: 2 K/UL (ref 1–4.8)
ABSOLUTE MONO #: 1 K/UL (ref 0.1–1.3)
ALBUMIN SERPL-MCNC: 3.4 G/DL (ref 3.5–5.2)
ALBUMIN/GLOBULIN RATIO: ABNORMAL (ref 1–2.5)
ALP BLD-CCNC: 78 U/L (ref 35–104)
ALT SERPL-CCNC: 13 U/L (ref 5–33)
ANION GAP SERPL CALCULATED.3IONS-SCNC: 8 MMOL/L (ref 9–17)
AST SERPL-CCNC: 16 U/L
BASOPHILS # BLD: 1 % (ref 0–2)
BASOPHILS ABSOLUTE: 0.1 K/UL (ref 0–0.2)
BILIRUB SERPL-MCNC: 0.18 MG/DL (ref 0.3–1.2)
BUN BLDV-MCNC: 19 MG/DL (ref 8–23)
BUN/CREAT BLD: ABNORMAL (ref 9–20)
CALCIUM SERPL-MCNC: 8.9 MG/DL (ref 8.6–10.4)
CHLORIDE BLD-SCNC: 107 MMOL/L (ref 98–107)
CO2: 26 MMOL/L (ref 20–31)
CREAT SERPL-MCNC: 2.43 MG/DL (ref 0.5–0.9)
DIFFERENTIAL TYPE: ABNORMAL
EOSINOPHILS RELATIVE PERCENT: 3 % (ref 0–4)
GFR AFRICAN AMERICAN: 24 ML/MIN
GFR NON-AFRICAN AMERICAN: 20 ML/MIN
GFR SERPL CREATININE-BSD FRML MDRD: ABNORMAL ML/MIN/{1.73_M2}
GFR SERPL CREATININE-BSD FRML MDRD: ABNORMAL ML/MIN/{1.73_M2}
GLUCOSE BLD-MCNC: 121 MG/DL (ref 65–105)
GLUCOSE BLD-MCNC: 142 MG/DL (ref 65–105)
GLUCOSE BLD-MCNC: 148 MG/DL (ref 70–99)
HCT VFR BLD CALC: 24.8 % (ref 36–46)
HEMOGLOBIN: 8 G/DL (ref 12–16)
IMMATURE GRANULOCYTES: ABNORMAL %
LYMPHOCYTES # BLD: 23 % (ref 24–44)
MCH RBC QN AUTO: 29.2 PG (ref 26–34)
MCHC RBC AUTO-ENTMCNC: 32.3 G/DL (ref 31–37)
MCV RBC AUTO: 90.4 FL (ref 80–100)
MONOCYTES # BLD: 12 % (ref 1–7)
NRBC AUTOMATED: ABNORMAL PER 100 WBC
PDW BLD-RTO: 14.2 % (ref 11.5–14.9)
PLATELET # BLD: 270 K/UL (ref 150–450)
PLATELET ESTIMATE: ABNORMAL
PMV BLD AUTO: 8.4 FL (ref 6–12)
POTASSIUM SERPL-SCNC: 4.2 MMOL/L (ref 3.7–5.3)
RBC # BLD: 2.74 M/UL (ref 4–5.2)
RBC # BLD: ABNORMAL 10*6/UL
SEG NEUTROPHILS: 61 % (ref 36–66)
SEGMENTED NEUTROPHILS ABSOLUTE COUNT: 5.1 K/UL (ref 1.3–9.1)
SODIUM BLD-SCNC: 141 MMOL/L (ref 135–144)
TOTAL PROTEIN: 6.4 G/DL (ref 6.4–8.3)
WBC # BLD: 8.4 K/UL (ref 3.5–11)
WBC # BLD: ABNORMAL 10*3/UL

## 2021-07-02 PROCEDURE — 6370000000 HC RX 637 (ALT 250 FOR IP): Performed by: UROLOGY

## 2021-07-02 PROCEDURE — 6370000000 HC RX 637 (ALT 250 FOR IP): Performed by: FAMILY MEDICINE

## 2021-07-02 PROCEDURE — 85025 COMPLETE CBC W/AUTO DIFF WBC: CPT

## 2021-07-02 PROCEDURE — 6360000002 HC RX W HCPCS: Performed by: FAMILY MEDICINE

## 2021-07-02 PROCEDURE — 36415 COLL VENOUS BLD VENIPUNCTURE: CPT

## 2021-07-02 PROCEDURE — 6360000002 HC RX W HCPCS: Performed by: UROLOGY

## 2021-07-02 PROCEDURE — 82947 ASSAY GLUCOSE BLOOD QUANT: CPT

## 2021-07-02 PROCEDURE — 80053 COMPREHEN METABOLIC PANEL: CPT

## 2021-07-02 RX ORDER — FLUOXETINE 10 MG/1
10 CAPSULE ORAL DAILY
Qty: 30 CAPSULE | Refills: 3 | Status: ON HOLD | OUTPATIENT
Start: 2021-07-03 | End: 2021-07-17 | Stop reason: SDUPTHER

## 2021-07-02 RX ORDER — AMLODIPINE BESYLATE 10 MG/1
10 TABLET ORAL DAILY
Qty: 30 TABLET | Refills: 3 | Status: SHIPPED | OUTPATIENT
Start: 2021-07-03

## 2021-07-02 RX ORDER — FERROUS SULFATE 325(65) MG
325 TABLET ORAL
Qty: 30 TABLET | Refills: 3 | Status: SHIPPED | OUTPATIENT
Start: 2021-07-03

## 2021-07-02 RX ADMIN — OXYBUTYNIN CHLORIDE 15 MG: 10 TABLET, EXTENDED RELEASE ORAL at 07:41

## 2021-07-02 RX ADMIN — FERROUS SULFATE TAB 325 MG (65 MG ELEMENTAL FE) 325 MG: 325 (65 FE) TAB at 07:42

## 2021-07-02 RX ADMIN — BUSPIRONE HYDROCHLORIDE 15 MG: 15 TABLET ORAL at 07:42

## 2021-07-02 RX ADMIN — PANTOPRAZOLE SODIUM 40 MG: 40 TABLET, DELAYED RELEASE ORAL at 07:42

## 2021-07-02 RX ADMIN — HEPARIN SODIUM 5000 UNITS: 5000 INJECTION INTRAVENOUS; SUBCUTANEOUS at 07:50

## 2021-07-02 RX ADMIN — ISOSORBIDE MONONITRATE 30 MG: 30 TABLET, EXTENDED RELEASE ORAL at 07:42

## 2021-07-02 RX ADMIN — ASPIRIN 81 MG: 81 TABLET, COATED ORAL at 07:41

## 2021-07-02 RX ADMIN — BACLOFEN 10 MG: 10 TABLET ORAL at 07:42

## 2021-07-02 RX ADMIN — BUPROPION HYDROCHLORIDE 300 MG: 300 TABLET, FILM COATED, EXTENDED RELEASE ORAL at 07:41

## 2021-07-02 RX ADMIN — ATORVASTATIN CALCIUM 10 MG: 10 TABLET, FILM COATED ORAL at 07:42

## 2021-07-02 RX ADMIN — INSULIN LISPRO 2 UNITS: 100 INJECTION, SOLUTION INTRAVENOUS; SUBCUTANEOUS at 07:48

## 2021-07-02 RX ADMIN — FLUOXETINE 10 MG: 10 CAPSULE ORAL at 07:44

## 2021-07-02 RX ADMIN — PRAMIPEXOLE DIHYDROCHLORIDE 0.12 MG: 0.12 TABLET ORAL at 07:44

## 2021-07-02 RX ADMIN — HEPARIN SODIUM 5000 UNITS: 5000 INJECTION INTRAVENOUS; SUBCUTANEOUS at 14:21

## 2021-07-02 RX ADMIN — HYDRALAZINE HYDROCHLORIDE 10 MG: 20 INJECTION INTRAMUSCULAR; INTRAVENOUS at 07:42

## 2021-07-02 RX ADMIN — AMLODIPINE BESYLATE 10 MG: 10 TABLET ORAL at 07:42

## 2021-07-02 RX ADMIN — LEVOTHYROXINE SODIUM 112 MCG: 0.11 TABLET ORAL at 07:45

## 2021-07-02 RX ADMIN — CYANOCOBALAMIN TAB 500 MCG 500 MCG: 500 TAB at 07:42

## 2021-07-02 ASSESSMENT — PAIN SCALES - GENERAL: PAINLEVEL_OUTOF10: 0

## 2021-07-02 NOTE — PROGRESS NOTES
CVC and peripheral IV removed. D/c paperwork reviewed with pt. Reviewed follow up appts and out patient lab work. Reviewed next med due times. Pt leaving with all belongings. Pt left via wheelchair to family.

## 2021-07-02 NOTE — PROGRESS NOTES
Department of Internal Medicine  Nephrology [facility], MD  Progress Note    Reason for consultation: Management of acute kidney injury at nephrolithiasis. Consulting physician: Jaxson Covarrubias MD    Interval history:   Patient was seen and examined today, no new complaints today and is nonoliguric. Urine output 3.7 L yesterday in 24 hours since morning 1300 mL of urine. she has required acute intermittent hemodialysis for treatment of nonoliguric acute kidney injury complicating obstructive uropathy secondary to kidney stone. Patient had last dialysis on 6/29/2021   Ejffy catheter placed on 6/23/2021 for acute kidney injury complicating obstructive kidney stone. She denies flank pain and does not have shortness of breath. Serum creatinine is 2.3 mg/dL from 2.2 mg/dL yesterday    History of presenting illness: This is a 70 y.o. female with a significant past medical history of Type 2 diabetes mellitus [diagnosed in 2015], Coronary artery disease [s/p PTCA/stents], hyperlipidemia, hypothyroidism, systemic hypertension and Chronic kidney disease stage III [baseline serum creatinine 1.40 mg/dL in November 2018], who presented to the emergency department yesterday with sudden onset of severe left flank pain associated with nausea and vomiting. She had chills but did not have fever. Laboratory studies at presentation revealed serum creatinine 2.7 mg/dL which increased overnight to 5 mg/dL associated with hyperkalemia and serum potassium 5.9 mmol/L.  CT scan of the abdomen and pelvis showed:  1. Left-sided hydroureter and hydronephrosis with associated asymmetrical perinephric and periureteral fat stranding without obstructing calculus noted. Findings likely represents a recently passed stone. 2. Nonobstructing calculi right kidney, largest measuring 5 mm. 3. Small hiatal hernia. 4. Stable ectasia infrarenal abdominal aortic aneurysm measuring 2.7 cm.      Scheduled Meds:   hydrALAZINE  10 clear to auscultation bilaterally    Abdomen: soft, non-tender; bowel sounds normal; no masses,  no organomegaly    Back: Left costovertebral angle tenderness    Extremities: extremities normal, atraumatic, no cyanosis or edema    Neuro:  Awake and alert. CBC:   Recent Labs     06/30/21 0449 07/01/21 0439 07/02/21 0459   WBC 11.0 9.4 8.4   HGB 8.6* 8.1* 8.0*    255 270     BMP:    Recent Labs     06/30/21 0449 07/01/21 0439 07/02/21 0459    143 141   K 4.5 4.8 4.2    107 107   CO2 26 26 26   BUN 21 20 19   CREATININE 2.21* 2.39* 2.43*   GLUCOSE 162* 164* 148*       Lab Results   Component Value Date    NITRU NEGATIVE 06/22/2021    COLORU YELLOW 06/22/2021    PHUR 6.0 06/22/2021    WBCUA 5 TO 10 06/22/2021    RBCUA 5 TO 10 06/22/2021    MUCUS NOT REPORTED 06/22/2021    TRICHOMONAS NOT REPORTED 06/22/2021    YEAST NOT REPORTED 06/22/2021    BACTERIA NOT REPORTED 06/22/2021    SPECGRAV 1.006 06/22/2021    LEUKOCYTESUR MOD 06/22/2021    UROBILINOGEN Normal 06/22/2021    BILIRUBINUR NEGATIVE 06/22/2021    GLUCOSEU NEGATIVE 06/22/2021    KETUA NEGATIVE 06/22/2021    AMORPHOUS NOT REPORTED 06/22/2021     Urine Creatinine:     Lab Results   Component Value Date    LABCREA 48.7 06/21/2021     IMPRESSION/RECOMMENDATIONS:      1. Acute kidney injury - Most consistent with obstructive nephropathy secondary to kidney stone. She underwent cystoscopy with left ureteral stent insertion 6/21/2021. Patient is nonoliguric, patient was started on dialysis on 6/23/2021  Patient urine output has improved to 2.7 L  Serum creatinine 2.4 from 2.2 mg/dL      2. Nephrolithiasis - Patient with family history [sister]. metabolic stone evaluation as outpatient since this is patient's first episode of kidney stone. S/p cystoscopy with left ureteral stent placement. Monitor urine output closely.     3.  Systemic hypertension - Blood pressure is adequately controlled on amlodipine 10 mg p.o. daily and metoprolol 100 mg p.o. daily. 4.  E. coli urinary tract infection - complicating nephrolithiasis. Continue IV ceftriaxone 1 g every 24 hours. Plan  No indication for dialysis, patient is nonoliguric serum creatinine plateaued at 2.4 mg/dL  Patient is nonoliguric  Patient can be discharged patient will need to have blood work BMP every week to follow trend of kidney function  Patient needs close outpatient follow-up follow-up in clinic in 2 weeks  I explained to the patient of the trend creatinine continues to rise outpatient  and if it approaches between 3.5-4 then patient will need dialysis.   Patient understands the plan and will have blood work on Monday at outpatient lab and follow-up with us in clinic  Temporary dialysis catheter can be removed and patient can be discharged home    Felicity Salgado MD   Attending Nephrologist  7/2/2021 2:39 PM

## 2021-07-02 NOTE — PROGRESS NOTES
274 255     BMP:    Recent Labs     06/29/21 0441 06/30/21 0449 07/01/21  0439    141 143   K 5.0 4.5 4.8    105 107   CO2 25 26 26   BUN 36* 21 20   CREATININE 3.01* 2.21* 2.39*   GLUCOSE 184* 162* 164*     Hepatic:   Recent Labs     06/29/21 0441 06/30/21 0449 07/01/21  0439   AST 23 16 15   ALT 15 14 12   BILITOT 0.21* 0.21* 0.18*   ALKPHOS 89 82 76     Troponin: No results for input(s): TROPONINI in the last 72 hours. BNP: No results for input(s): BNP in the last 72 hours. Lipids: No results for input(s): CHOL, HDL in the last 72 hours. Invalid input(s): LDLCALCU  INR: No results for input(s): INR in the last 72 hours. Objective:   Vitals: BP (!) 155/70   Pulse 74   Temp 98.1 °F (36.7 °C) (Oral)   Resp 18   Ht 5' 5\" (1.651 m)   Wt 208 lb 8.9 oz (94.6 kg)   LMP 03/19/1980   SpO2 93%   BMI 34.71 kg/m²   General appearance: alert, appears stated age and cooperative  Skin: Skin color, texture, turgor normal. No rashes or lesions  Lungs: clear to auscultation bilaterally  Heart: regular rate and rhythm, S1, S2 normal, no murmur, click, rub or gallop  Abdomen: soft, non-tender; bowel sounds normal; no masses,  no organomegaly  Extremities: extremities normal, atraumatic, no cyanosis or edema  Neurologic: Mental status: Alert, oriented, thought content appropriate    Prophylaxis:   DVT with  [] lovenox        [x] heparin        [] Scd        [] none:     Radiology:  CT ABDOMEN PELVIS WO CONTRAST Additional Contrast? None    Result Date: 6/20/2021  EXAMINATION: CT OF THE ABDOMEN AND PELVIS WITHOUT CONTRAST 6/20/2021 1:11 pm TECHNIQUE: CT of the abdomen and pelvis was performed without the administration of intravenous contrast. Multiplanar reformatted images are provided for review. Dose modulation, iterative reconstruction, and/or weight based adjustment of the mA/kV was utilized to reduce the radiation dose to as low as reasonably achievable.  COMPARISON: CT abdomen and pelvis November 7, 2018. HISTORY: ORDERING SYSTEM PROVIDED HISTORY: left flank pain, r/o obstructing stone TECHNOLOGIST PROVIDED HISTORY: left flank pain, r/o obstructing stone Decision Support Exception - unselect if not a suspected or confirmed emergency medical condition->Emergency Medical Condition (MA) Reason for Exam: Flank Pain; Urinary Frequency Acuity: Acute Type of Exam: Initial Additional signs and symptoms: Pt c/o left flank pain since 8am; dry heaving FINDINGS: Lower Chest: Mild bibasilar atelectasis/scarring. Calcified granulomas. Organs: Suboptimal evaluation due to lack of IV contrast.  Liver gallbladder pancreas spleen and adrenal glands all appear unremarkable. Mild hydronephrosis and hydroureter with associated asymmetrical perinephric and periureteral fat stranding without obstructing calculus noted. Nonobstructing calculi identified involving the right kidney largest measuring 5 mm. No right renal calculus. Abdominal aorta demonstrates moderate calcification without aneurysm. Stable fusiform type ectasia of the infrarenal abdominal aorta measuring 2.7 cm. GI/Bowel: Small hiatal hernia. Distal stomach is grossly unremarkable. Small bowel appears nondilated. No acute colonic abnormality. Pelvis: Urinary bladder is grossly unremarkable. Uterus has been removed. No adnexal mass. Peritoneum/Retroperitoneum: No free air, free fluid or lymphadenopathy. Bones/Soft Tissues: Abdominal wall demonstrates no acute findings. Osseous structures demonstrate degenerative change. 1. Left-sided hydroureter and hydronephrosis with associated asymmetrical perinephric and periureteral fat stranding without obstructing calculus noted. Findings likely represents a recently passed stone. 2. Nonobstructing calculi right kidney, largest measuring 5 mm. 3. Small hiatal hernia. 4. Stable ectasia infrarenal abdominal aortic aneurysm measuring 2.7 cm.      FLUORO FOR SURGICAL PROCEDURES    Result Date: 6/21/2021  Radiology exam is complete. No Radiologist dictation. Please follow up with ordering provider. Assessment :   1. Renal calculus/intermittent dialysis for non oligouric ac kidney injury  2. stable     Plan:   1. See order  2. Possible d/c tomorrow    Patient Active Problem List:     [facility] arthritis     Arthritis of left hip     Left hip pain     Chronic midline low back pain without sciatica     Degenerative lumbar spinal stenosis     Lumbar radiculopathy     DDD (degenerative disc disease), lumbar     Lumbosacral spondylosis without myelopathy     Primary osteoarthritis of left hip     Sacroiliitis (HCC)     DDD (degenerative disc disease), cervical     Neck pain     Cervical stenosis of spine     Osteoarthritis of spine with radiculopathy, cervical region     Acute renal failure (HCC)     Acute cystitis without hematuria     Hydronephrosis of left kidney     Hydroureter, left     Type 2 diabetes mellitus, without long-term current use of insulin ([facility] [address]     Essential hypertension     Hypothyroidism     Acute infective cystitis     Uremia      Anticipated Disposition upon discharge: [] Home                                                                         [] Home with Home Health                                                                         [] [facility]                                                                         [] [address]      Patient is admitted as inpatient status because of co-morbidities listed above, severity of signs and symptoms as outlined, requirement for current medical therapies and most importantly because of direct risk to patient if care not provided in a hospital setting.           Sherita Dhillon MD, MD  Rounding Hospitalist

## 2021-07-02 NOTE — DISCHARGE INSTR - COC
Continuity of Care Form    Patient Name: Carol Ortiz   :  1950  MRN:  [id number]    Admit date:  2021  Discharge date:  ***    Code Status Order: Full Code   Advance Directives:   Advance Care Flowsheet Documentation       Date/Time Healthcare Directive Type of Healthcare Directive Copy in [facility] [address] Agent's Name Healthcare Agent's Phone Number    21 2200  Yes, patient has an advance directive for healthcare treatment  Living will;Durable power of  for health care  No, copy requested from medical records  --  Valdemar Saab  --            Admitting Physician:  Nichole Sanchez MD  PCP: Severiano Sexton, DO    Discharging Nurse: [facility] Unit/Room#: 2114/2114-01  Discharging Unit Phone Number: ***    Emergency Contact:   Extended Emergency Contact Information  Primary Emergency Contact: Karen Chapa [address] [place] [address] Phone: [phone]  Relation: Child  Secondary Emergency Contact: [address] [place] [address] Phone: [phone]  Work Phone: [phone]  Mobile Phone: [phone]  Relation: Brother/Sister    Past Surgical History:  Past Surgical History:   Procedure Laterality Date    R Simon Nair 53    1 STENT    CYSTOSCOPY Left 2021    CYSTOSCOPY URETERAL STENT INSERTION performed by Bronson Kang MD at [address] [place], [place] [address] [address] Left 2014    [place] ARTHOPLASTY LEFT THUMB    HYSTERECTOMY      WITH RT [address] Bilateral     PARTIAL-KNEES    OTHER SURGICAL HISTORY Right 2014    thumb repair    SALPINGO-OOPHORECTOMY Left 1987    SHOULDER SURGERY Left     SPURS    TOE OSTEOTOMY Right 2016    Right 5th digit adductory wedge osteotomy with K wire fixation        Immunization History:   Immunization History   Administered Date(s) Administered    COVID-19, Pfizer, PF, 30mcg/0.3mL 02/05/2021, 02/27/2021    Influenza Virus Vaccine 09/12/2011, 10/05/2014, 09/29/2015, 10/02/2020    Influenza, High Dose (Fluzone 65 yrs and older) 09/26/2016, 10/05/2017, 09/18/2018, 09/13/2019    Influenza, Marie Taveras, adjuvanted, 65 yrs +, IM, PF (Fluad) 10/02/2020    Pneumococcal Conjugate 13-valent (Glennda Adler) 10/29/2016, 10/02/2020    Pneumococcal Polysaccharide (Swnaurqcy89) 02/23/2018       Active Problems:  Patient Active Problem List   Diagnosis Code    [facility] arthritis M19.049    Arthritis of left hip M16.12    Left hip pain M25.552    Chronic midline low back pain without sciatica M54.5, G89.29    Degenerative lumbar spinal stenosis M48.061    Lumbar radiculopathy M54.16    DDD (degenerative disc disease), lumbar M51.36    Lumbosacral spondylosis without myelopathy M47.817    Primary osteoarthritis of left hip M16.12    Sacroiliitis (HCC) M46.1    DDD (degenerative disc disease), cervical M50.30    Neck pain M54.2    Cervical stenosis of spine M48.02    Osteoarthritis of spine with radiculopathy, cervical region M47.22    Acute renal failure (HCC) N17.9    Acute cystitis without hematuria N30.00    Hydronephrosis of left kidney N13.30    Hydroureter, left N13.4    Type 2 diabetes mellitus, without long-term current use of insulin (HCC) E11.9    Essential hypertension I10    Hypothyroidism E03.9    Acute infective cystitis N30.00    Uremia N19       Isolation/Infection:   Isolation            No Isolation          Patient Infection Status       None to display            Nurse Assessment:  Last Vital Signs: BP (!) 146/62   Pulse 67   Temp 97.7 °F (36.5 °C) (Oral)   Resp 18   Ht 5' 5\" (1.651 m)   Wt 209 lb 14.1 oz (95.2 kg)   LMP 03/19/1980   SpO2 97%   BMI 34.93 kg/m²     Last documented pain score (0-10 scale): Pain Level: 0  Last Weight:   Wt Readings from Last 1 Encounters:   07/02/21 209 lb 14.1 oz (95.2 kg)     Mental Status:  {IP PT MENTAL STATUS:20030}    IV Access:  [address] [facility] VXENUV:724711144}    Nursing Mobility/ADLs:  Walking   {CHP DME JFPS:511387793}  Transfer  {CHP DME JZHW:185296138}  Bathing  {CHP DME RSWO:455147600}  Dressing  {CHP DME XVRW:838074623}  Toileting  {CHP DME BTS}  Feeding  {CHP DME YMEU:736386977}  Med Admin  {P DME HHUV:563912249}  Med Delivery   {[facility] MED Delivery:082364659}    Wound Care Documentation and Therapy:        Elimination:  Continence: Bowel: {YES / GP:69550}  Bladder: {YES / NS:83486}  Urinary Catheter: {Urinary Catheter:523071385}   Colostomy/Ileostomy/Ileal Conduit: {YES / IN:48768}       Date of Last BM: ***    Intake/Output Summary (Last 24 hours) at 2021 1231  Last data filed at 2021 0826  Gross per 24 hour   Intake 4816 ml   Output 1325 ml   Net 3491 ml     I/O last 3 completed shifts: In: 56 [P.O.:1310;  I.V.:3506]  Out: 2675 [Urine:2675]    Safety Concerns:     [address] Safety Concerns:356926232}    Impairments/Disabilities:      [address] Impairments/Disabilities:623288262}    Nutrition Therapy:  Current Nutrition Therapy:   [address] Diet List:662698329}    Routes of Feeding: {[facility] DME Other Feedings:279471780}  Liquids: {Slp liquid thickness:71210}  Daily Fluid Restriction: {CHP DME Yes amt example:155328911}  Last Modified Barium Swallow with Video (Video Swallowing Test): {Done Not Done DBFT:033571459}    Treatments at the Time of Hospital Discharge:   Respiratory Treatments: ***  Oxygen Therapy:  {Therapy; copd oxygen:47240}  Ventilator:    { CC Vent NRPS:314797748}    Rehab Therapies: {THERAPEUTIC INTERVENTION:2392567679}  Weight Bearing Status/Restrictions: [address] [employer] Weight Bearin}  Other Medical Equipment (for information only, NOT a DME order):  {EQUIPMENT:361373663}  Other Treatments: ***    Patient's personal belongings (please select all that are sent with patient):  {ELLEN DME Belongings:649081220}    RN SIGNATURE:  {Esignature:229085783}    CASE MANAGEMENT/SOCIAL WORK SECTION    Inpatient Status Date: ***    Readmission Risk Assessment Score:  Readmission Risk              Risk of Unplanned Readmission:  16           Discharging to Facility/ Agency   Name:   Address:  Phone:  Fax:    Dialysis Facility (if applicable)   Name:  Address:  Dialysis Schedule:  Phone:  Fax:    / signature: {Esignature:411999760}    PHYSICIAN SECTION    Prognosis: Fair    Condition at Discharge: Stable    Rehab Potential (if transferring to Rehab): Good    Recommended Labs or Other Treatments After Discharge: NONE    Physician Certification: I certify the above information and transfer of June [address] Garcia  is necessary for the continuing treatment of the diagnosis listed and that she requires Home Care for greater 30 days.      Update Admission H&P: No change in H&P    PHYSICIAN SIGNATURE:  Electronically signed by Gunjan Williamson MD on 7/2/21 at 1:13 PM EDT

## 2021-07-02 NOTE — DISCHARGE INSTR - DIET

## 2021-07-02 NOTE — DISCHARGE SUMMARY
Hospitalist Discharge Summary    Gaudencio Carbajal  :  1950  MRN:  [id number]    Admit date:  2021  Discharge date:  21    Admitting Physician:  Charity Quintana MD    Discharge Diagnoses:   Patient Active Problem List   Diagnosis    [facility] arthritis    Arthritis of left hip    Left hip pain    Chronic midline low back pain without sciatica    Degenerative lumbar spinal stenosis    Lumbar radiculopathy    DDD (degenerative disc disease), lumbar    Lumbosacral spondylosis without myelopathy    Primary osteoarthritis of left hip    Sacroiliitis ([address]    DDD (degenerative disc disease), cervical    Neck pain    Cervical stenosis of spine    Osteoarthritis of spine with radiculopathy, cervical region    Acute renal failure ([address]    Acute cystitis without hematuria    Hydronephrosis of left kidney    Hydroureter, left    Type 2 diabetes mellitus, without long-term current use of insulin ([address]    Essential hypertension    Hypothyroidism    Acute infective cystitis    Uremia        Admission Condition:  fair      Discharged Condition:  fair    Hospital Course/Treatments   Admitted with oligouria, pt cr was slightly high, pt required dialysis, pt cr came down, pt was off dialysis, on third day cr was stable at around 2/3/pt will be d/c with holding metformin and losartan    Discharge Medications:       Long, Marilyn Cristy   Home Medication Instructions ILB:[id number]    Printed on:21 8099   Medication Information                      albuterol sulfate  (90 Base) MCG/ACT inhaler  INHALE TWO PUFFS BY MOUTH EVERY 4 TO 6 HOURS AS NEEDED FOR WHEEZING             ALPRAZolam (XANAX) 1 MG tablet  Take 1 mg by mouth as needed for Anxiety. amLODIPine (NORVASC) 10 MG tablet  Take 1 tablet by mouth daily             aspirin 81 MG EC tablet  Take 81 mg by mouth daily.  LAST DOSE 14             atorvastatin (LIPITOR) 40 MG tablet               baclofen (LIORESAL) 10 MG tablet  Take 10 mg DIETITIAN    Significant Diagnostic Studies:  CT ABDOMEN PELVIS WO CONTRAST Additional Contrast? None    Result Date: 6/20/2021  EXAMINATION: CT OF THE ABDOMEN AND PELVIS WITHOUT CONTRAST 6/20/2021 1:11 pm TECHNIQUE: CT of the abdomen and pelvis was performed without the administration of intravenous contrast. Multiplanar reformatted images are provided for review. Dose modulation, iterative reconstruction, and/or weight based adjustment of the mA/kV was utilized to reduce the radiation dose to as low as reasonably achievable. COMPARISON: CT abdomen and pelvis November 7, 2018. HISTORY: ORDERING SYSTEM PROVIDED HISTORY: left flank pain, r/o obstructing stone TECHNOLOGIST PROVIDED HISTORY: left flank pain, r/o obstructing stone Decision Support Exception - unselect if not a suspected or confirmed emergency medical condition->Emergency Medical Condition (MA) Reason for Exam: Flank Pain; Urinary Frequency Acuity: Acute Type of Exam: Initial Additional signs and symptoms: Pt c/o left flank pain since 8am; dry heaving FINDINGS: Lower Chest: Mild bibasilar atelectasis/scarring. Calcified granulomas. Organs: Suboptimal evaluation due to lack of IV contrast.  Liver gallbladder pancreas spleen and adrenal glands all appear unremarkable. Mild hydronephrosis and hydroureter with associated asymmetrical perinephric and periureteral fat stranding without obstructing calculus noted. Nonobstructing calculi identified involving the right kidney largest measuring 5 mm. No right renal calculus. Abdominal aorta demonstrates moderate calcification without aneurysm. Stable fusiform type ectasia of the infrarenal abdominal aorta measuring 2.7 cm. GI/Bowel: Small hiatal hernia. Distal stomach is grossly unremarkable. Small bowel appears nondilated. No acute colonic abnormality. Pelvis: Urinary bladder is grossly unremarkable. Uterus has been removed. No adnexal mass.  Peritoneum/Retroperitoneum: No free air, free fluid or lymphadenopathy. Bones/Soft Tissues: Abdominal wall demonstrates no acute findings. Osseous structures demonstrate degenerative change. 1. Left-sided hydroureter and hydronephrosis with associated asymmetrical perinephric and periureteral fat stranding without obstructing calculus noted. Findings likely represents a recently passed stone. 2. Nonobstructing calculi right kidney, largest measuring 5 mm. 3. Small hiatal hernia. 4. Stable ectasia infrarenal abdominal aortic aneurysm measuring 2.7 cm. FLUORO FOR SURGICAL PROCEDURES    Result Date: 6/21/2021  Radiology exam is complete. No Radiologist dictation. Please follow up with ordering provider. Disposition:   home    Discharge Instructions: Activity: activity as tolerated  Diet:  regular diet    Follow up with Cecilia Ross, DO in 1 weeks.     Signed:  Shari Galeazzi, MD  7/2/2021, 2:59 PM    Time spent in discharge of this pt is more than 30 minutes in examination,evaluvation,  counseling and review of medication and discharge plan

## 2021-07-02 NOTE — CARE COORDINATION
ONGOING DISCHARGE PLAN:    Patient is alert and oriented x4. Spoke with patient regarding discharge plan and patient confirms that plan is still to Return to home w/ Son, w/ No Needs. Denies VNS. Per Nursing, pt. Does not need an outpt Dialysis slot. Pt. To F/U w/ Nephro as outpt. DC today. Will continue to follow for additional discharge needs.     Electronically signed by Yaima Mcpherson RN on 7/2/2021 at 12:12 PM

## 2021-07-06 ENCOUNTER — HOSPITAL ENCOUNTER (OUTPATIENT)
Dept: WOMENS IMAGING | Age: 71
Discharge: HOME | End: 2021-07-08
Payer: MEDICARE

## 2021-07-06 DIAGNOSIS — Z12.39 SCREENING BREAST EXAMINATION: ICD-10-CM

## 2021-07-06 PROCEDURE — 77063 BREAST TOMOSYNTHESIS BI: CPT

## 2021-07-09 ENCOUNTER — HOSPITAL ENCOUNTER (OUTPATIENT)
Age: 71
Discharge: HOME | End: 2021-07-09
Payer: MEDICARE

## 2021-07-09 DIAGNOSIS — N17.9 ACUTE RENAL FAILURE, UNSPECIFIED ACUTE RENAL FAILURE TYPE (HCC): ICD-10-CM

## 2021-07-09 LAB
ANION GAP SERPL CALCULATED.3IONS-SCNC: 11 MMOL/L (ref 9–17)
BUN BLDV-MCNC: 27 MG/DL (ref 8–23)
BUN/CREAT BLD: ABNORMAL (ref 9–20)
CALCIUM SERPL-MCNC: 9.4 MG/DL (ref 8.6–10.4)
CHLORIDE BLD-SCNC: 106 MMOL/L (ref 98–107)
CO2: 25 MMOL/L (ref 20–31)
CREAT SERPL-MCNC: 2.69 MG/DL (ref 0.5–0.9)
GFR AFRICAN AMERICAN: 21 ML/MIN
GFR NON-AFRICAN AMERICAN: 17 ML/MIN
GFR SERPL CREATININE-BSD FRML MDRD: ABNORMAL ML/MIN/{1.73_M2}
GFR SERPL CREATININE-BSD FRML MDRD: ABNORMAL ML/MIN/{1.73_M2}
GLUCOSE BLD-MCNC: 167 MG/DL (ref 70–99)
POTASSIUM SERPL-SCNC: 5.3 MMOL/L (ref 3.7–5.3)
SODIUM BLD-SCNC: 142 MMOL/L (ref 135–144)

## 2021-07-09 PROCEDURE — 80048 BASIC METABOLIC PNL TOTAL CA: CPT

## 2021-07-09 PROCEDURE — 36415 COLL VENOUS BLD VENIPUNCTURE: CPT

## 2021-07-13 ENCOUNTER — HOSPITAL ENCOUNTER (OUTPATIENT)
Age: 71
Discharge: HOME | DRG: 690 | End: 2021-07-13
Payer: MEDICARE

## 2021-07-13 LAB
ABSOLUTE EOS #: 0.4 K/UL (ref 0–0.4)
ABSOLUTE IMMATURE GRANULOCYTE: ABNORMAL K/UL (ref 0–0.3)
ABSOLUTE LYMPH #: 2.1 K/UL (ref 1–4.8)
ABSOLUTE MONO #: 0.7 K/UL (ref 0.1–1.3)
ALBUMIN SERPL-MCNC: 4.2 G/DL (ref 3.5–5.2)
ALBUMIN/GLOBULIN RATIO: ABNORMAL (ref 1–2.5)
ALP BLD-CCNC: 78 U/L (ref 35–104)
ALT SERPL-CCNC: 16 U/L (ref 5–33)
ANION GAP SERPL CALCULATED.3IONS-SCNC: 18 MMOL/L (ref 9–17)
AST SERPL-CCNC: 22 U/L
BASOPHILS # BLD: 1 % (ref 0–2)
BASOPHILS ABSOLUTE: 0.1 K/UL (ref 0–0.2)
BILIRUB SERPL-MCNC: 0.26 MG/DL (ref 0.3–1.2)
BUN BLDV-MCNC: 31 MG/DL (ref 8–23)
BUN/CREAT BLD: ABNORMAL (ref 9–20)
CALCIUM SERPL-MCNC: 9.9 MG/DL (ref 8.6–10.4)
CHLORIDE BLD-SCNC: 102 MMOL/L (ref 98–107)
CO2: 19 MMOL/L (ref 20–31)
CREAT SERPL-MCNC: 3.03 MG/DL (ref 0.5–0.9)
DIFFERENTIAL TYPE: ABNORMAL
EKG ATRIAL RATE: 71 BPM
EKG P AXIS: 58 DEGREES
EKG P-R INTERVAL: 180 MS
EKG Q-T INTERVAL: 428 MS
EKG QRS DURATION: 84 MS
EKG QTC CALCULATION (BAZETT): 465 MS
EKG R AXIS: 57 DEGREES
EKG T AXIS: 59 DEGREES
EKG VENTRICULAR RATE: 71 BPM
EOSINOPHILS RELATIVE PERCENT: 4 % (ref 0–4)
GFR AFRICAN AMERICAN: 18 ML/MIN
GFR NON-AFRICAN AMERICAN: 15 ML/MIN
GFR SERPL CREATININE-BSD FRML MDRD: ABNORMAL ML/MIN/{1.73_M2}
GFR SERPL CREATININE-BSD FRML MDRD: ABNORMAL ML/MIN/{1.73_M2}
GLUCOSE BLD-MCNC: 151 MG/DL (ref 70–99)
HCT VFR BLD CALC: 29.5 % (ref 36–46)
HEMOGLOBIN: 9.8 G/DL (ref 12–16)
IMMATURE GRANULOCYTES: ABNORMAL %
LYMPHOCYTES # BLD: 20 % (ref 24–44)
MCH RBC QN AUTO: 30.1 PG (ref 26–34)
MCHC RBC AUTO-ENTMCNC: 33.2 G/DL (ref 31–37)
MCV RBC AUTO: 90.7 FL (ref 80–100)
MONOCYTES # BLD: 7 % (ref 1–7)
NRBC AUTOMATED: ABNORMAL PER 100 WBC
PDW BLD-RTO: 14.2 % (ref 11.5–14.9)
PLATELET # BLD: 323 K/UL (ref 150–450)
PLATELET ESTIMATE: ABNORMAL
PMV BLD AUTO: 9 FL (ref 6–12)
POTASSIUM SERPL-SCNC: 5 MMOL/L (ref 3.7–5.3)
RBC # BLD: 3.25 M/UL (ref 4–5.2)
RBC # BLD: ABNORMAL 10*6/UL
SEG NEUTROPHILS: 68 % (ref 36–66)
SEGMENTED NEUTROPHILS ABSOLUTE COUNT: 7.2 K/UL (ref 1.3–9.1)
SODIUM BLD-SCNC: 139 MMOL/L (ref 135–144)
TOTAL PROTEIN: 7.6 G/DL (ref 6.4–8.3)
WBC # BLD: 10.5 K/UL (ref 3.5–11)
WBC # BLD: ABNORMAL 10*3/UL

## 2021-07-13 PROCEDURE — 36415 COLL VENOUS BLD VENIPUNCTURE: CPT

## 2021-07-13 PROCEDURE — 85025 COMPLETE CBC W/AUTO DIFF WBC: CPT

## 2021-07-13 PROCEDURE — 80053 COMPREHEN METABOLIC PANEL: CPT

## 2021-07-14 ENCOUNTER — HOSPITAL ENCOUNTER (INPATIENT)
Age: 71
LOS: 2 days | Discharge: HOME HEALTH | DRG: 690 | End: 2021-07-17
Attending: EMERGENCY MEDICINE | Admitting: FAMILY MEDICINE
Payer: MEDICARE

## 2021-07-14 ENCOUNTER — APPOINTMENT (OUTPATIENT)
Dept: GENERAL RADIOLOGY | Age: 71
DRG: 690 | End: 2021-07-14
Payer: MEDICARE

## 2021-07-14 DIAGNOSIS — N12 PYELONEPHRITIS: Primary | ICD-10-CM

## 2021-07-14 DIAGNOSIS — N17.9 AKI (ACUTE KIDNEY INJURY) (HCC): ICD-10-CM

## 2021-07-14 LAB
-: ABNORMAL
ABSOLUTE EOS #: 0.6 K/UL (ref 0–0.4)
ABSOLUTE IMMATURE GRANULOCYTE: ABNORMAL K/UL (ref 0–0.3)
ABSOLUTE LYMPH #: 3.1 K/UL (ref 1–4.8)
ABSOLUTE MONO #: 1 K/UL (ref 0.1–1.3)
ALBUMIN SERPL-MCNC: 4.5 G/DL (ref 3.5–5.2)
ALBUMIN/GLOBULIN RATIO: ABNORMAL (ref 1–2.5)
ALP BLD-CCNC: 84 U/L (ref 35–104)
ALT SERPL-CCNC: 15 U/L (ref 5–33)
AMORPHOUS: ABNORMAL
ANION GAP SERPL CALCULATED.3IONS-SCNC: 16 MMOL/L (ref 9–17)
AST SERPL-CCNC: 19 U/L
BACTERIA: ABNORMAL
BASOPHILS # BLD: 1 % (ref 0–2)
BASOPHILS ABSOLUTE: 0.1 K/UL (ref 0–0.2)
BILIRUB SERPL-MCNC: 0.23 MG/DL (ref 0.3–1.2)
BILIRUBIN URINE: NEGATIVE
BUN BLDV-MCNC: 36 MG/DL (ref 8–23)
BUN/CREAT BLD: ABNORMAL (ref 9–20)
CALCIUM SERPL-MCNC: 10 MG/DL (ref 8.6–10.4)
CASTS UA: ABNORMAL /LPF
CHLORIDE BLD-SCNC: 101 MMOL/L (ref 98–107)
CO2: 21 MMOL/L (ref 20–31)
COLOR: YELLOW
COMMENT UA: ABNORMAL
CREAT SERPL-MCNC: 2.99 MG/DL (ref 0.5–0.9)
CRYSTALS, UA: ABNORMAL /HPF
DIFFERENTIAL TYPE: ABNORMAL
EOSINOPHILS RELATIVE PERCENT: 5 % (ref 0–4)
EPITHELIAL CELLS UA: ABNORMAL /HPF
GFR AFRICAN AMERICAN: 19 ML/MIN
GFR NON-AFRICAN AMERICAN: 15 ML/MIN
GFR SERPL CREATININE-BSD FRML MDRD: ABNORMAL ML/MIN/{1.73_M2}
GFR SERPL CREATININE-BSD FRML MDRD: ABNORMAL ML/MIN/{1.73_M2}
GLUCOSE BLD-MCNC: 137 MG/DL (ref 70–99)
GLUCOSE URINE: NEGATIVE
HCT VFR BLD CALC: 29 % (ref 36–46)
HEMOGLOBIN: 9.7 G/DL (ref 12–16)
IMMATURE GRANULOCYTES: ABNORMAL %
KETONES, URINE: NEGATIVE
LEUKOCYTE ESTERASE, URINE: ABNORMAL
LYMPHOCYTES # BLD: 28 % (ref 24–44)
MCH RBC QN AUTO: 30.4 PG (ref 26–34)
MCHC RBC AUTO-ENTMCNC: 33.4 G/DL (ref 31–37)
MCV RBC AUTO: 91 FL (ref 80–100)
MONOCYTES # BLD: 9 % (ref 1–7)
MUCUS: ABNORMAL
NITRITE, URINE: NEGATIVE
NRBC AUTOMATED: ABNORMAL PER 100 WBC
OTHER OBSERVATIONS UA: ABNORMAL
PDW BLD-RTO: 14.4 % (ref 11.5–14.9)
PH UA: 6 (ref 5–8)
PLATELET # BLD: 278 K/UL (ref 150–450)
PLATELET ESTIMATE: ABNORMAL
PMV BLD AUTO: 9 FL (ref 6–12)
POTASSIUM SERPL-SCNC: 4.8 MMOL/L (ref 3.7–5.3)
PROTEIN UA: ABNORMAL
RBC # BLD: 3.19 M/UL (ref 4–5.2)
RBC # BLD: ABNORMAL 10*6/UL
RBC UA: ABNORMAL /HPF
RENAL EPITHELIAL, UA: ABNORMAL /HPF
SEG NEUTROPHILS: 57 % (ref 36–66)
SEGMENTED NEUTROPHILS ABSOLUTE COUNT: 6.3 K/UL (ref 1.3–9.1)
SODIUM BLD-SCNC: 138 MMOL/L (ref 135–144)
SPECIFIC GRAVITY UA: 1.01 (ref 1–1.03)
TOTAL PROTEIN: 7.8 G/DL (ref 6.4–8.3)
TRICHOMONAS: ABNORMAL
TURBIDITY: ABNORMAL
URINE HGB: ABNORMAL
UROBILINOGEN, URINE: NORMAL
WBC # BLD: 11.1 K/UL (ref 3.5–11)
WBC # BLD: ABNORMAL 10*3/UL
WBC UA: ABNORMAL /HPF
YEAST: ABNORMAL

## 2021-07-14 PROCEDURE — 87086 URINE CULTURE/COLONY COUNT: CPT

## 2021-07-14 PROCEDURE — 93005 ELECTROCARDIOGRAM TRACING: CPT | Performed by: EMERGENCY MEDICINE

## 2021-07-14 PROCEDURE — 85025 COMPLETE CBC W/AUTO DIFF WBC: CPT

## 2021-07-14 PROCEDURE — 36415 COLL VENOUS BLD VENIPUNCTURE: CPT

## 2021-07-14 PROCEDURE — 99283 EMERGENCY DEPT VISIT LOW MDM: CPT

## 2021-07-14 PROCEDURE — 74018 RADEX ABDOMEN 1 VIEW: CPT

## 2021-07-14 PROCEDURE — 6370000000 HC RX 637 (ALT 250 FOR IP): Performed by: EMERGENCY MEDICINE

## 2021-07-14 PROCEDURE — 80053 COMPREHEN METABOLIC PANEL: CPT

## 2021-07-14 PROCEDURE — 81001 URINALYSIS AUTO W/SCOPE: CPT

## 2021-07-14 PROCEDURE — 87186 SC STD MICRODIL/AGAR DIL: CPT

## 2021-07-14 PROCEDURE — 87077 CULTURE AEROBIC IDENTIFY: CPT

## 2021-07-14 RX ORDER — ALPRAZOLAM 0.25 MG/1
1 TABLET ORAL ONCE
Status: COMPLETED | OUTPATIENT
Start: 2021-07-14 | End: 2021-07-14

## 2021-07-14 RX ADMIN — ALPRAZOLAM 1 MG: 0.25 TABLET ORAL at 23:08

## 2021-07-14 ASSESSMENT — ENCOUNTER SYMPTOMS
BACK PAIN: 0
EYE PAIN: 0
ABDOMINAL PAIN: 0
COLOR CHANGE: 0
SHORTNESS OF BREATH: 0

## 2021-07-14 ASSESSMENT — PAIN DESCRIPTION - PAIN TYPE: TYPE: ACUTE PAIN

## 2021-07-14 ASSESSMENT — PAIN DESCRIPTION - LOCATION: LOCATION: LEG

## 2021-07-15 ENCOUNTER — APPOINTMENT (OUTPATIENT)
Dept: NUCLEAR MEDICINE | Age: 71
DRG: 690 | End: 2021-07-15
Payer: MEDICARE

## 2021-07-15 PROBLEM — K21.9 GERD (GASTROESOPHAGEAL REFLUX DISEASE): Status: ACTIVE | Noted: 2021-07-15

## 2021-07-15 PROBLEM — D50.9 IRON DEFICIENCY ANEMIA: Status: ACTIVE | Noted: 2021-07-15

## 2021-07-15 PROBLEM — N10 ACUTE PYELONEPHRITIS: Status: ACTIVE | Noted: 2021-07-15

## 2021-07-15 LAB
ANION GAP SERPL CALCULATED.3IONS-SCNC: 13 MMOL/L (ref 9–17)
BUN BLDV-MCNC: 32 MG/DL (ref 8–23)
BUN/CREAT BLD: ABNORMAL (ref 9–20)
CALCIUM SERPL-MCNC: 9.6 MG/DL (ref 8.6–10.4)
CHLORIDE BLD-SCNC: 103 MMOL/L (ref 98–107)
CHLORIDE, UR: 76 MMOL/L
CO2: 23 MMOL/L (ref 20–31)
CREAT SERPL-MCNC: 3.01 MG/DL (ref 0.5–0.9)
CREATININE URINE: 55 MG/DL (ref 28–217)
EKG ATRIAL RATE: 71 BPM
EKG P AXIS: 49 DEGREES
EKG P-R INTERVAL: 164 MS
EKG Q-T INTERVAL: 406 MS
EKG QRS DURATION: 80 MS
EKG QTC CALCULATION (BAZETT): 441 MS
EKG R AXIS: 25 DEGREES
EKG T AXIS: 61 DEGREES
EKG VENTRICULAR RATE: 71 BPM
EOSINOPHIL,URINE: NORMAL
FERRITIN: 113 UG/L (ref 13–150)
GFR AFRICAN AMERICAN: 19 ML/MIN
GFR NON-AFRICAN AMERICAN: 15 ML/MIN
GFR SERPL CREATININE-BSD FRML MDRD: ABNORMAL ML/MIN/{1.73_M2}
GFR SERPL CREATININE-BSD FRML MDRD: ABNORMAL ML/MIN/{1.73_M2}
GLUCOSE BLD-MCNC: 117 MG/DL (ref 65–105)
GLUCOSE BLD-MCNC: 163 MG/DL (ref 70–99)
IRON SATURATION: 31 % (ref 20–55)
IRON: 74 UG/DL (ref 37–145)
POTASSIUM SERPL-SCNC: 4.9 MMOL/L (ref 3.7–5.3)
SODIUM BLD-SCNC: 139 MMOL/L (ref 135–144)
SODIUM,UR: 81 MMOL/L
TOTAL IRON BINDING CAPACITY: 235 UG/DL (ref 250–450)
TOTAL PROTEIN, URINE: 24 MG/DL
UNSATURATED IRON BINDING CAPACITY: 161 UG/DL (ref 112–347)
URINE TOTAL PROTEIN CREATININE RATIO: 0.44 (ref 0–0.2)

## 2021-07-15 PROCEDURE — 51798 US URINE CAPACITY MEASURE: CPT

## 2021-07-15 PROCEDURE — 3430000000 HC RX DIAGNOSTIC RADIOPHARMACEUTICAL: Performed by: INTERNAL MEDICINE

## 2021-07-15 PROCEDURE — 82436 ASSAY OF URINE CHLORIDE: CPT

## 2021-07-15 PROCEDURE — 2580000003 HC RX 258: Performed by: INTERNAL MEDICINE

## 2021-07-15 PROCEDURE — 80048 BASIC METABOLIC PNL TOTAL CA: CPT

## 2021-07-15 PROCEDURE — 84300 ASSAY OF URINE SODIUM: CPT

## 2021-07-15 PROCEDURE — 78708 K FLOW/FUNCT IMAGE W/DRUG: CPT

## 2021-07-15 PROCEDURE — 83550 IRON BINDING TEST: CPT

## 2021-07-15 PROCEDURE — 97165 OT EVAL LOW COMPLEX 30 MIN: CPT

## 2021-07-15 PROCEDURE — 6360000002 HC RX W HCPCS: Performed by: INTERNAL MEDICINE

## 2021-07-15 PROCEDURE — 2060000000 HC ICU INTERMEDIATE R&B

## 2021-07-15 PROCEDURE — A9562 TC99M MERTIATIDE: HCPCS | Performed by: INTERNAL MEDICINE

## 2021-07-15 PROCEDURE — 6370000000 HC RX 637 (ALT 250 FOR IP): Performed by: FAMILY MEDICINE

## 2021-07-15 PROCEDURE — 2580000003 HC RX 258: Performed by: EMERGENCY MEDICINE

## 2021-07-15 PROCEDURE — 82728 ASSAY OF FERRITIN: CPT

## 2021-07-15 PROCEDURE — 97161 PT EVAL LOW COMPLEX 20 MIN: CPT

## 2021-07-15 PROCEDURE — 84156 ASSAY OF PROTEIN URINE: CPT

## 2021-07-15 PROCEDURE — 82570 ASSAY OF URINE CREATININE: CPT

## 2021-07-15 PROCEDURE — 6360000002 HC RX W HCPCS: Performed by: EMERGENCY MEDICINE

## 2021-07-15 PROCEDURE — 82947 ASSAY GLUCOSE BLOOD QUANT: CPT

## 2021-07-15 PROCEDURE — 87205 SMEAR GRAM STAIN: CPT

## 2021-07-15 PROCEDURE — 36415 COLL VENOUS BLD VENIPUNCTURE: CPT

## 2021-07-15 PROCEDURE — 83540 ASSAY OF IRON: CPT

## 2021-07-15 PROCEDURE — 2580000003 HC RX 258: Performed by: FAMILY MEDICINE

## 2021-07-15 RX ORDER — AMLODIPINE BESYLATE 10 MG/1
10 TABLET ORAL DAILY
Status: DISCONTINUED | OUTPATIENT
Start: 2021-07-15 | End: 2021-07-17 | Stop reason: HOSPADM

## 2021-07-15 RX ORDER — SODIUM CHLORIDE 0.9 % (FLUSH) 0.9 %
10 SYRINGE (ML) INJECTION PRN
Status: DISCONTINUED | OUTPATIENT
Start: 2021-07-15 | End: 2021-07-17 | Stop reason: HOSPADM

## 2021-07-15 RX ORDER — MAGNESIUM SULFATE 1 G/100ML
1000 INJECTION INTRAVENOUS PRN
Status: DISCONTINUED | OUTPATIENT
Start: 2021-07-15 | End: 2021-07-16

## 2021-07-15 RX ORDER — ISOSORBIDE MONONITRATE 30 MG/1
30 TABLET, EXTENDED RELEASE ORAL DAILY
Status: DISCONTINUED | OUTPATIENT
Start: 2021-07-15 | End: 2021-07-17 | Stop reason: HOSPADM

## 2021-07-15 RX ORDER — POLYETHYLENE GLYCOL 3350 17 G/17G
17 POWDER, FOR SOLUTION ORAL DAILY PRN
Status: DISCONTINUED | OUTPATIENT
Start: 2021-07-15 | End: 2021-07-17 | Stop reason: HOSPADM

## 2021-07-15 RX ORDER — ALBUTEROL SULFATE 90 UG/1
2 AEROSOL, METERED RESPIRATORY (INHALATION) EVERY 4 HOURS PRN
Status: DISCONTINUED | OUTPATIENT
Start: 2021-07-15 | End: 2021-07-17 | Stop reason: HOSPADM

## 2021-07-15 RX ORDER — METOPROLOL SUCCINATE 100 MG/1
100 TABLET, EXTENDED RELEASE ORAL DAILY
Status: DISCONTINUED | OUTPATIENT
Start: 2021-07-15 | End: 2021-07-17 | Stop reason: HOSPADM

## 2021-07-15 RX ORDER — SODIUM CHLORIDE 9 MG/ML
25 INJECTION, SOLUTION INTRAVENOUS PRN
Status: DISCONTINUED | OUTPATIENT
Start: 2021-07-15 | End: 2021-07-17 | Stop reason: HOSPADM

## 2021-07-15 RX ORDER — ONDANSETRON 4 MG/1
4 TABLET, ORALLY DISINTEGRATING ORAL EVERY 8 HOURS PRN
Status: DISCONTINUED | OUTPATIENT
Start: 2021-07-15 | End: 2021-07-17 | Stop reason: HOSPADM

## 2021-07-15 RX ORDER — FLUOXETINE 10 MG/1
10 CAPSULE ORAL DAILY
Status: DISCONTINUED | OUTPATIENT
Start: 2021-07-15 | End: 2021-07-17

## 2021-07-15 RX ORDER — PRAMIPEXOLE DIHYDROCHLORIDE 0.12 MG/1
0.12 TABLET ORAL DAILY
Status: DISCONTINUED | OUTPATIENT
Start: 2021-07-15 | End: 2021-07-17 | Stop reason: HOSPADM

## 2021-07-15 RX ORDER — ACETAMINOPHEN 650 MG/1
650 SUPPOSITORY RECTAL EVERY 6 HOURS PRN
Status: DISCONTINUED | OUTPATIENT
Start: 2021-07-15 | End: 2021-07-17 | Stop reason: HOSPADM

## 2021-07-15 RX ORDER — ASPIRIN 81 MG/1
81 TABLET ORAL DAILY
Status: DISCONTINUED | OUTPATIENT
Start: 2021-07-15 | End: 2021-07-17 | Stop reason: HOSPADM

## 2021-07-15 RX ORDER — BUPROPION HYDROCHLORIDE 300 MG/1
300 TABLET ORAL DAILY
Status: DISCONTINUED | OUTPATIENT
Start: 2021-07-15 | End: 2021-07-17 | Stop reason: HOSPADM

## 2021-07-15 RX ORDER — SODIUM CHLORIDE 9 MG/ML
INJECTION, SOLUTION INTRAVENOUS CONTINUOUS
Status: DISCONTINUED | OUTPATIENT
Start: 2021-07-15 | End: 2021-07-17 | Stop reason: HOSPADM

## 2021-07-15 RX ORDER — BACLOFEN 10 MG/1
5 TABLET ORAL 2 TIMES DAILY
Status: DISCONTINUED | OUTPATIENT
Start: 2021-07-15 | End: 2021-07-15

## 2021-07-15 RX ORDER — ALPRAZOLAM 1 MG/1
1 TABLET ORAL DAILY PRN
Status: DISCONTINUED | OUTPATIENT
Start: 2021-07-15 | End: 2021-07-17 | Stop reason: HOSPADM

## 2021-07-15 RX ORDER — LEVOTHYROXINE SODIUM 112 UG/1
112 TABLET ORAL DAILY
Status: DISCONTINUED | OUTPATIENT
Start: 2021-07-15 | End: 2021-07-17 | Stop reason: HOSPADM

## 2021-07-15 RX ORDER — ACETAMINOPHEN 325 MG/1
650 TABLET ORAL EVERY 6 HOURS PRN
Status: DISCONTINUED | OUTPATIENT
Start: 2021-07-15 | End: 2021-07-17 | Stop reason: HOSPADM

## 2021-07-15 RX ORDER — SODIUM CHLORIDE 9 MG/ML
1000 INJECTION, SOLUTION INTRAVENOUS CONTINUOUS
Status: DISCONTINUED | OUTPATIENT
Start: 2021-07-15 | End: 2021-07-15

## 2021-07-15 RX ORDER — SODIUM CHLORIDE 0.9 % (FLUSH) 0.9 %
10 SYRINGE (ML) INJECTION EVERY 12 HOURS SCHEDULED
Status: DISCONTINUED | OUTPATIENT
Start: 2021-07-15 | End: 2021-07-17 | Stop reason: HOSPADM

## 2021-07-15 RX ORDER — POTASSIUM CHLORIDE 20 MEQ/1
40 TABLET, EXTENDED RELEASE ORAL PRN
Status: DISCONTINUED | OUTPATIENT
Start: 2021-07-15 | End: 2021-07-16

## 2021-07-15 RX ORDER — ONDANSETRON 2 MG/ML
4 INJECTION INTRAMUSCULAR; INTRAVENOUS EVERY 6 HOURS PRN
Status: DISCONTINUED | OUTPATIENT
Start: 2021-07-15 | End: 2021-07-17 | Stop reason: HOSPADM

## 2021-07-15 RX ORDER — FERROUS SULFATE 325(65) MG
325 TABLET ORAL
Status: DISCONTINUED | OUTPATIENT
Start: 2021-07-15 | End: 2021-07-17 | Stop reason: HOSPADM

## 2021-07-15 RX ORDER — NITROGLYCERIN 0.4 MG/1
0.4 TABLET SUBLINGUAL EVERY 5 MIN PRN
Status: DISCONTINUED | OUTPATIENT
Start: 2021-07-15 | End: 2021-07-17 | Stop reason: HOSPADM

## 2021-07-15 RX ORDER — ATORVASTATIN CALCIUM 40 MG/1
40 TABLET, FILM COATED ORAL NIGHTLY
Status: DISCONTINUED | OUTPATIENT
Start: 2021-07-15 | End: 2021-07-17 | Stop reason: HOSPADM

## 2021-07-15 RX ORDER — FUROSEMIDE 10 MG/ML
40 INJECTION INTRAMUSCULAR; INTRAVENOUS ONCE
Status: COMPLETED | OUTPATIENT
Start: 2021-07-15 | End: 2021-07-15

## 2021-07-15 RX ORDER — PANTOPRAZOLE SODIUM 40 MG/1
40 TABLET, DELAYED RELEASE ORAL
Status: DISCONTINUED | OUTPATIENT
Start: 2021-07-15 | End: 2021-07-17 | Stop reason: HOSPADM

## 2021-07-15 RX ORDER — POTASSIUM CHLORIDE 7.45 MG/ML
10 INJECTION INTRAVENOUS PRN
Status: DISCONTINUED | OUTPATIENT
Start: 2021-07-15 | End: 2021-07-16

## 2021-07-15 RX ADMIN — SODIUM CHLORIDE, PRESERVATIVE FREE 10 ML: 5 INJECTION INTRAVENOUS at 14:25

## 2021-07-15 RX ADMIN — AMLODIPINE BESYLATE 10 MG: 10 TABLET ORAL at 08:45

## 2021-07-15 RX ADMIN — Medication 3.3 MILLICURIE: at 14:04

## 2021-07-15 RX ADMIN — ATORVASTATIN CALCIUM 40 MG: 40 TABLET, FILM COATED ORAL at 20:58

## 2021-07-15 RX ADMIN — FERROUS SULFATE TAB 325 MG (65 MG ELEMENTAL FE) 325 MG: 325 (65 FE) TAB at 08:45

## 2021-07-15 RX ADMIN — METOPROLOL SUCCINATE 100 MG: 100 TABLET, EXTENDED RELEASE ORAL at 09:10

## 2021-07-15 RX ADMIN — FLUOXETINE 10 MG: 10 CAPSULE ORAL at 08:52

## 2021-07-15 RX ADMIN — SODIUM CHLORIDE 1000 ML: 9 INJECTION, SOLUTION INTRAVENOUS at 01:15

## 2021-07-15 RX ADMIN — PANTOPRAZOLE SODIUM 40 MG: 40 TABLET, DELAYED RELEASE ORAL at 08:51

## 2021-07-15 RX ADMIN — BUPROPION HYDROCHLORIDE 300 MG: 300 TABLET, FILM COATED, EXTENDED RELEASE ORAL at 08:44

## 2021-07-15 RX ADMIN — SODIUM CHLORIDE: 9 INJECTION, SOLUTION INTRAVENOUS at 15:08

## 2021-07-15 RX ADMIN — ASPIRIN 81 MG: 81 TABLET, COATED ORAL at 08:43

## 2021-07-15 RX ADMIN — SODIUM CHLORIDE, PRESERVATIVE FREE 10 ML: 5 INJECTION INTRAVENOUS at 14:04

## 2021-07-15 RX ADMIN — ISOSORBIDE MONONITRATE 30 MG: 30 TABLET, EXTENDED RELEASE ORAL at 08:44

## 2021-07-15 RX ADMIN — LEVOTHYROXINE SODIUM 112 MCG: 0.11 TABLET ORAL at 08:52

## 2021-07-15 RX ADMIN — ACETAMINOPHEN 650 MG: 325 TABLET ORAL at 21:05

## 2021-07-15 RX ADMIN — FUROSEMIDE 40 MG: 10 INJECTION, SOLUTION INTRAMUSCULAR; INTRAVENOUS at 14:25

## 2021-07-15 RX ADMIN — CEFTRIAXONE SODIUM 1000 MG: 1 INJECTION, POWDER, FOR SOLUTION INTRAMUSCULAR; INTRAVENOUS at 01:16

## 2021-07-15 RX ADMIN — APIXABAN 2.5 MG: 2.5 TABLET, FILM COATED ORAL at 20:58

## 2021-07-15 RX ADMIN — PRAMIPEXOLE DIHYDROCHLORIDE 0.12 MG: 0.12 TABLET ORAL at 08:52

## 2021-07-15 ASSESSMENT — ENCOUNTER SYMPTOMS
COLOR CHANGE: 0
BLOOD IN STOOL: 0
EYE ITCHING: 0
VOMITING: 0
ABDOMINAL PAIN: 0
TROUBLE SWALLOWING: 0
COUGH: 0
EYE REDNESS: 0
CHEST TIGHTNESS: 0
NAUSEA: 0
SHORTNESS OF BREATH: 0

## 2021-07-15 ASSESSMENT — PAIN DESCRIPTION - PAIN TYPE: TYPE: CHRONIC PAIN

## 2021-07-15 ASSESSMENT — PAIN SCALES - GENERAL
PAINLEVEL_OUTOF10: 0
PAINLEVEL_OUTOF10: 7
PAINLEVEL_OUTOF10: 3

## 2021-07-15 ASSESSMENT — PAIN DESCRIPTION - ORIENTATION: ORIENTATION: POSTERIOR

## 2021-07-15 ASSESSMENT — PAIN - FUNCTIONAL ASSESSMENT: PAIN_FUNCTIONAL_ASSESSMENT: ACTIVITIES ARE NOT PREVENTED

## 2021-07-15 ASSESSMENT — PAIN DESCRIPTION - DIRECTION: RADIATING_TOWARDS: L SIDE OF BODY

## 2021-07-15 ASSESSMENT — PAIN DESCRIPTION - PROGRESSION: CLINICAL_PROGRESSION: NOT CHANGED

## 2021-07-15 ASSESSMENT — PAIN DESCRIPTION - DESCRIPTORS: DESCRIPTORS: DULL

## 2021-07-15 ASSESSMENT — PAIN DESCRIPTION - ONSET: ONSET: ON-GOING

## 2021-07-15 ASSESSMENT — PAIN DESCRIPTION - LOCATION: LOCATION: NECK

## 2021-07-15 ASSESSMENT — PAIN DESCRIPTION - FREQUENCY: FREQUENCY: CONTINUOUS

## 2021-07-15 NOTE — PLAN OF CARE
Problem: Falls - Risk of:  Goal: Will remain free from falls  Description: Will remain free from falls  Outcome: Ongoing  Note: The patient remained free from falls this shift, call light within reach, bed in locked and lowest position. Side rails up x2. Continue to monitor closely. Problem: Sensory:  Goal: General experience of comfort will improve  Description: General experience of comfort will improve  Outcome: Ongoing     Problem: Urinary Elimination:  Goal: Signs and symptoms of infection will decrease  Description: Signs and symptoms of infection will decrease  Outcome: Ongoing  Note: Pt shows no signs or symptoms of infection at this time. VS stable, alert and oriented x4. Hand hygiene utilized upon entry and exit. Will continue to monitor.

## 2021-07-15 NOTE — PROGRESS NOTES
[address] [facility]    Occupational Therapy Evaluation  Date: 7/15/21  Patient Name: Gaudencio Carbajal       Room:   MRN: [id number]  Account: [de-identified]   : 1950  (75 y.o.) Gender: female     Discharge Recommendations: The patient's needs are being met with no further Occupational Therapy recommended at discharge. Referring Practitioner: Charity Quintana MD  Diagnosis: Acute pyelonephritis  Additional Pertinent Hx:  77-year-old female presents for complaint of abnormal labs. Patient reports that she was recently admitted for kidney stones and elevated creatinine, states that while she was here she had dialysis performed. Patient reports that since she has been home she been feeling back to normal, states that she followed up with her primary care physician on Monday and had labs drawn, states that her PCP called her today and told her to come for evaluation as her creatinine was up again. Patient reports that she has been urinating normally, denies any pain with urination, blood in the urine, or decrease in urination. Denies any swelling of the abdomen or legs, denies any associated shortness of breath, chest pain or flank or back pain. Past Medical History:  has a past medical history of Aortic valve stenosis, Arthritis, CAD (coronary artery disease), Diabetes mellitus ([facility] Heart murmur, Hyperlipidemia, MI, old, Pulmonary stenosis, Pulmonary valve stenosis, Sciatica, Thyroid disease, and Wears glasses. Past Surgical History:   has a past surgical history that includes Hysterectomy (); Salpingo-oophorectomy (Left, ); shoulder surgery (Left, ); cervical fusion (); joint replacement (Bilateral, ); Coronary angioplasty with stent (); Finger surgery (Left, 2014); other surgical history (Right, 2014); Toe Osteotomy (Right, 2016); and Cystoscopy (Left, 2021).     Restrictions  Restrictions/Precautions: Up as Tolerated  Implants present? : Metal implants (2 stents)  Required Braces or Orthoses?: No     Vitals  Temp: 98.1 °F (36.7 °C)  Pulse: 77  Resp: 18  BP: 135/76  Height: 5' 5\" (165.1 cm)  Weight: 190 lb (86.2 kg)  BMI (Calculated): 31.7  Oxygen Therapy  SpO2: 97 %  Pulse Oximeter Device Mode: Intermittent  Pulse Oximeter Device Location: Finger  O2 Device: None (Room air)  Level of Consciousness: Alert (0)    Subjective  Subjective: Pt sitting edge of bed upon arrival. Pt was pleasant and agreeable to OT/PT eval.   Comments: Ok per RN Duane Holts for OT/PT eval.   Overall Orientation Status: Within Functional Limits  Vision  Vision: Impaired  Vision Exceptions: Wears glasses at all times  Hearing  Hearing: Within functional limits  Social/Functional History  Lives With: Son  Type of Home: House  Home Layout: Two level, Bed/Bath upstairs, Laundry in basement  Home Access: Stairs to enter with rails  Entrance Stairs - Number of Steps: 3  Entrance Stairs - Rails: Left  Bathroom Shower/Tub: Tub/Shower unit, Shower chair with back, Curtain  Bathroom Toilet: Standard  Bathroom Equipment: Hand-held shower  Bathroom Accessibility: Accessible  Home Equipment: Rolling walker  ADL Assistance: Independent  Homemaking Assistance: Independent  Homemaking Responsibilities: Yes  Ambulation Assistance: Independent  Transfer Assistance: Independent  Active : Yes  Mode of Transportation: Car  Occupation: Retired  IADL Comments: Pt reports sleeping in regular flat bed  Additional Comments: Pt reports no home health services since discharge.  Pt reports that son does not work and is able to provide assistance as needed.,       Objective          Sensation  Overall Sensation Status: WFL (Pt denies)   ADL  Feeding: Independent  Grooming: Independent  UE Bathing: Independent  LE Bathing: Independent  UE Dressing: Independent  LE Dressing: Independent  Toileting: Independent  Additional Comments: ADL scores based on clinical reasoning, skilled observation, and patient report unless otherwise noted. Pt doffed/donned bilateral socks while sitting EOB without any difficulty. Pt reports no concerns with completing self care tasks at this time. UE Function           LUE Strength  Gross LUE Strength: WFL     LUE Tone: Normotonic     LUE AROM (degrees)  LUE AROM : WFL     Left Hand AROM (degrees)  Left Hand AROM: WFL  RUE Strength  Gross RUE Strength: WFL      RUE Tone: Normotonic     RUE AROM (degrees)  RUE AROM : WFL     Right Hand AROM (degrees)  Right Hand AROM: WFL    Fine Motor Skills  Coordination  Movements Are Fluid And Coordinated: Yes                           Mobility          Balance  Sitting Balance: Independent  Standing Balance: Independent  Standing Balance  Time: 1-2 minutes x 2  Activity: functional mobility  Comment: without device  Functional Mobility  Functional - Mobility Device: No device  Activity: Other  Assist Level: Independent  Functional Mobility Comments: Pt complete mobility to/from door without any difficulty or loss of balance noted. Bed mobility  Comment: Pt sitting EOB at the start of session and inrecliner chair at the end of session. Transfers  Sit to stand: Independent  Stand to sit: Independent  Functional Activity Tolerance  Functional Activity Tolerance: Tolerates 30 min exercise with multiple rests   Assessment  Assessment: Pt independent with functional transfers, functional mobility, and lower body dressing. Pt reports no concerns with completing self care tasks at this time. No further OT services needed.    Prognosis: Good  Decision Making: Low Complexity  REQUIRES OT FOLLOW UP: No  No Skilled OT: No OT goals identified, Safe to return home  Activity Tolerance: Patient Tolerated treatment well         Functional Outcome Measures  AM-PAC Daily Activity Inpatient   How much help for putting on and taking off regular lower body clothing?: None  How much help for Bathing?: None  How much help for Toileting?: None  How much help for putting on and taking off regular upper body clothing?: None  How much help for taking care of personal grooming?: None  How much help for eating meals?: None  AM-PAC Inpatient Daily Activity Raw Score: 24  AM-PAC Inpatient ADL T-Scale Score : 57.54  ADL Inpatient CMS 0-100% Score: 0  ADL Inpatient CMS G-Code Modifier : [address]       Goals  Short term goals  Time Frame for Short term goals: D/C OT    Plan        Plan  Times per week: D/C OT          OT Individual Minutes  Time In: 4833  Time Out: [facility]  Minutes: 15    Electronically signed by Stephane Sanders OT on 7/15/21 at 2:16 PM EDT

## 2021-07-15 NOTE — H&P
History and Physical      Name: Carol Ortiz  MRN: [id number]     Acct: [de-identified]  Room: 2090/2090-01    Admit Date: 7/14/2021  PCP: Severiano Sexton, DO      Chief Complaint:     Chief Complaint   Patient presents with    Flank Pain    Other     abnormal BUN and CRE       History Obtained From:     patient, electronic medical record    History of Present Illness:      Marilyn Porter is a  70 y.o.  female with hypertension and CKD presents with Flank Pain and Other (abnormal BUN and CRE)  Patient was recently discharged from the hospital due to WERO, left ureteral calculi s/p ureteral stenting presented to the ER per PCP advice due to abnormal lab work and elevated creatinine levels. Patient states that she has left flank pain that is 5 out of 10 achy nonradiating intermittent with no alleviating factors. Patient denies hematuria, shortness of breath chest pain palpitations, nausea vomiting leg swelling, extremity weakness, fever or chills.     Past Medical History:     Past Medical History:   Diagnosis Date    Aortic valve stenosis     mild per chart    Arthritis     CAD (coronary artery disease) 2000    1 STENT    Diabetes mellitus ([address]     Heart murmur     1962    Hyperlipidemia 2004    ON RX    MI, old     states many and they were mild    Pulmonary stenosis 1995    Pulmonary valve stenosis     mild/congenital per chart    Sciatica     right leg    Thyroid disease 2004    HYPOTHYROIDISM ON RX    Wears glasses         Past Surgical History:     Past Surgical History:   Procedure Laterality Date    CERVICAL FUSION  1993    CORONARY ANGIOPLASTY WITH STENT PLACEMENT  2000 1 STENT    CYSTOSCOPY Left 6/21/2021    CYSTOSCOPY URETERAL STENT INSERTION performed by Bronson Kang MD at [address] [facility] Left 03/21/2014    [place] ARTHOPLASTY LEFT THUMB    HYSTERECTOMY  1980    WITH RT SALPINGOOPHERECTOMY    JOINT REPLACEMENT Bilateral 1994    PARTIAL-KNEES    OTHER SURGICAL HISTORY Right 09/23/2014    thumb repair    SALPINGO-OOPHORECTOMY Left 1987    SHOULDER SURGERY Left 1993    SPURS    TOE OSTEOTOMY Right 6/8/2016    Right 5th digit adductory wedge osteotomy with K wire fixation         Medications Prior to Admission:       Prior to Admission medications    Medication Sig Start Date End Date Taking?  Authorizing Provider   FLUoxetine (PROZAC) 10 MG capsule Take 1 capsule by mouth daily 7/3/21   Shari Galeazzi, MD   amLODIPine (NORVASC) 10 MG tablet Take 1 tablet by mouth daily 7/3/21   Shari Galeazzi, MD   ferrous sulfate (IRON 325) 325 (65 Fe) MG tablet Take 1 tablet by mouth daily (with breakfast) 7/3/21   Shari Galeazzi, MD   buPROPion (WELLBUTRIN XL) 300 MG extended release tablet TAKE ONE TABLET BY MOUTH DAILY 11/16/20   Historical Provider, MD   clotrimazole (LOTRIMIN) 1 % vaginal cream Place 1 applicator vaginally 2 times daily 2/19/21   Historical Provider, MD   pramipexole (MIRAPEX) 0.125 MG tablet TAKE ONE TABLET BY MOUTH DAILY 10/2/20   Historical Provider, MD   albuterol sulfate  (90 Base) MCG/ACT inhaler INHALE TWO PUFFS BY MOUTH EVERY 4 TO 6 HOURS AS NEEDED FOR WHEEZING 10/20/20   Historical Provider, MD   nystatin ([address]) 927909 UNIT/GM powder APPLY TO AFFECTED AREA(S) FOUR TIMES A DAY 9/22/20   Historical Provider, MD   busPIRone (BUSPAR) 15 MG tablet Take 15 mg by mouth 2 times daily 11/9/20   Historical Provider, MD   isosorbide mononitrate (IMDUR) 30 MG extended release tablet Take 1 tablet by mouth daily 4/25/18   Shari Galeazzi, MD   pantoprazole (PROTONIX) 40 MG tablet Take 1 tablet by mouth every morning (before breakfast) 4/25/18   Shari Galeazzi, MD   baclofen (LIORESAL) 10 MG tablet Take 10 mg by mouth 2 times daily    Historical Provider, MD   levocetirizine (XYZAL) 5 MG tablet Take 5 mg by mouth nightly    Historical Provider, MD   nystatin (MYCOSTATIN) 403971 UNIT/GM cream Apply topically 2 times daily Apply topically 2 times daily. Historical Provider, MD   oxybutynin (DITROPAN XL) 15 MG extended release tablet Take 15 mg by mouth daily    Historical Provider, MD   levothyroxine (SYNTHROID) 112 MCG tablet TAKE ONE TABLET BY MOUTH DAILY 10/2/17   Historical Provider, MD   atorvastatin (LIPITOR) 40 MG tablet  7/2/17   Historical Provider, MD   nitroGLYCERIN (NITROSTAT) 0.4 MG SL tablet Place 0.4 mg under the tongue every 5 minutes as needed for Chest pain up to max of 3 total doses. If no relief after 1 dose, call 911. Historical Provider, MD   Multiple Vitamins-Minerals (THERAPEUTIC MULTIVITAMIN-MINERALS) tablet Take 1 tablet by mouth daily. Historical Provider, MD   ALPRAZolam Earlean Marylu) 1 MG tablet Take 1 mg by mouth as needed for Anxiety. Historical Provider, MD   aspirin 81 MG EC tablet Take 81 mg by mouth daily. LAST DOSE 9/8/14    Historical Provider, MD   metoprolol (TOPROL-XL) 100 MG XL tablet Take 100 mg by mouth daily. Historical Provider, MD   vitamin B-12 (CYANOCOBALAMIN) 500 MCG tablet Take 500 mcg by mouth daily. Historical Provider, MD        Allergies:       Pcn [penicillins], Heparin, Lamotrigine, Cyclobenzaprine, and Heparin (porcine)    Social History:     Tobacco:    reports that she quit smoking about 17 years ago. Her smoking use included cigarettes. She has a 30.00 pack-year smoking history. She has never used smokeless tobacco.  Alcohol:      reports current alcohol use. Drug Use:  reports no history of drug use.     Family History:     Family History   Problem Relation Age of Onset    Breast Cancer Mother 62        BREAST WITH METS T  LIVER AND LUNG    Other Father         AAA    Heart Disease Father     Asthma Sister     Diabetes Sister         NIDDM    Stroke Brother     Diabetes Brother         NIDDM    Stroke Maternal Grandmother     Asthma Son        Review of Systems:     Positive and Negative as described in HPI   all 10 systems are reviewed and negative except as Noted      Review of Systems   Constitutional: Negative for chills and fatigue. HENT: Negative for drooling, mouth sores, sneezing and trouble swallowing. Eyes: Negative for redness and itching. Respiratory: Negative for cough, chest tightness and shortness of breath. Cardiovascular: Negative for chest pain, palpitations and leg swelling. Gastrointestinal: Negative for abdominal pain, blood in stool, nausea and vomiting. Endocrine: Negative for heat intolerance and polyphagia. Genitourinary: Negative for difficulty urinating, flank pain and pelvic pain. Musculoskeletal: Negative for arthralgias, joint swelling and neck stiffness. Skin: Negative for color change and pallor. Allergic/Immunologic: Negative for food allergies. Neurological: Negative for dizziness, seizures and headaches. Hematological: Does not bruise/bleed easily. Psychiatric/Behavioral: Negative for agitation, behavioral problems and suicidal ideas. The patient is not hyperactive. Code Status:  Full Code    Physical Exam:     Vitals:  /76   Pulse 77   Temp 98.1 °F (36.7 °C) (Oral)   Resp 18   Ht 5' 5\" (1.651 m)   Wt 190 lb (86.2 kg)   LMP 1980   SpO2 97%   BMI 31.62 kg/m²   Temp (24hrs), Av °F (36.7 °C), Min:97.4 °F (36.3 °C), Max:98.7 °F (37.1 °C)        Physical Exam  Vitals reviewed. HENT:      Head: Normocephalic. Right Ear: External ear normal.      Left Ear: External ear normal.      Nose: Nose normal.      Mouth/Throat:      Mouth: Mucous membranes are moist.      Pharynx: Oropharynx is clear. Eyes:      Conjunctiva/sclera: Conjunctivae normal.   Cardiovascular:      Rate and Rhythm: Normal rate and regular rhythm. Pulses: Normal pulses. Heart sounds: Normal heart sounds. Pulmonary:      Effort: Pulmonary effort is normal.      Breath sounds: Normal breath sounds. Abdominal:      General: Bowel sounds are normal.      Palpations: Abdomen is soft.       Tenderness: There is no abdominal tenderness. There is left CVA tenderness. There is no right CVA tenderness. Musculoskeletal:         General: No deformity. Cervical back: Normal range of motion and neck supple. Right lower leg: No edema. Left lower leg: No edema. Skin:     General: Skin is warm. Capillary Refill: Capillary refill takes less than 2 seconds. Coloration: Skin is not jaundiced. Neurological:      General: No focal deficit present. Mental Status: She is alert. Mental status is at baseline.    Psychiatric:         Mood and Affect: Mood normal.         Behavior: Behavior normal.               Data:     Recent Results (from the past 24 hour(s))   EKG 12 Lead    Collection Time: 07/14/21 10:30 PM   Result Value Ref Range    Ventricular Rate 71 BPM    Atrial Rate 71 BPM    P-R Interval 164 ms    QRS Duration 80 ms    Q-T Interval 406 ms    QTc Calculation (Bazett) 441 ms    P Axis 49 degrees    R Axis 25 degrees    T Axis 61 degrees   Urinalysis Reflex to Culture    Collection Time: 07/14/21 10:38 PM    Specimen: Urine, clean catch   Result Value Ref Range    Color, UA YELLOW YELLOW    Turbidity UA CLOUDY (A) CLEAR    Glucose, Ur NEGATIVE NEGATIVE    Bilirubin Urine NEGATIVE NEGATIVE    Ketones, Urine NEGATIVE NEGATIVE    Specific Gravity, UA 1.012 1.000 - 1.030    Urine Hgb MOD (A) NEGATIVE    pH, UA 6.0 5.0 - 8.0    Protein, UA 1+ (A) NEGATIVE    Urobilinogen, Urine Normal Normal    Nitrite, Urine NEGATIVE NEGATIVE    Leukocyte Esterase, Urine LARGE (A) NEGATIVE    Urinalysis Comments NOT REPORTED    Microscopic Urinalysis    Collection Time: 07/14/21 10:38 PM   Result Value Ref Range    -          WBC, UA 50  /HPF    RBC, UA 20 TO 50 /HPF    Casts UA NOT REPORTED /LPF    Crystals, UA NOT REPORTED None /HPF    Epithelial Cells UA 5 TO 10 /HPF    Renal Epithelial, UA NOT REPORTED 0 /HPF    Bacteria, UA MODERATE (A) None    Mucus, UA NOT REPORTED None    Trichomonas, UA NOT Glucose Fingerstick    Collection Time: 07/15/21  6:50 AM   Result Value Ref Range    POC Glucose 117 (H) 65 - 105 mg/dL   BASIC METABOLIC PANEL    Collection Time: 07/15/21  9:41 AM   Result Value Ref Range    Glucose 163 (H) 70 - 99 mg/dL    BUN 32 (H) 8 - 23 mg/dL    CREATININE 3.01 (H) 0.50 - 0.90 mg/dL    Bun/Cre Ratio NOT REPORTED 9 - 20    Calcium 9.6 8.6 - 10.4 mg/dL    Sodium 139 135 - 144 mmol/L    Potassium 4.9 3.7 - 5.3 mmol/L    Chloride 103 98 - 107 mmol/L    CO2 23 20 - 31 mmol/L    Anion Gap 13 9 - 17 mmol/L    GFR Non-African American 15 (L) >60 mL/min    GFR  19 (L) >60 mL/min    GFR Comment          GFR Staging NOT REPORTED    Protein / Creatinine Ratio, Urine    Collection Time: 07/15/21  2:01 PM   Result Value Ref Range    Total Protein, Urine 24 mg/dL    Creatinine, Ur 55.0 28.0 - 217.0 mg/dL    Urine Total Protein Creatinine Ratio 0.44 (H) 0.00 - 0.20   EOSINOPHILS, URINE    Collection Time: 07/15/21  2:01 PM   Result Value Ref Range    Eosinophil, Ur FEW    CHLORIDE, URINE, RANDOM    Collection Time: 07/15/21  2:01 PM   Result Value Ref Range    Chloride, Ur 76 mmol/L   SODIUM, URINE, RANDOM    Collection Time: 07/15/21  2:01 PM   Result Value Ref Range    Sodium,Ur 81 mmol/L       Assesment:     Primary Problem  Acute pyelonephritis    Principal Problem:    Acute pyelonephritis  Active Problems:    WERO (acute kidney injury) (HCC)    Acute cystitis without hematuria    Essential hypertension    Hypothyroidism    Iron deficiency anemia    GERD (gastroesophageal reflux disease)  Resolved Problems:    * No resolved hospital problems. *      Plan:     1. IV normal saline at 100 mL/h  2. Nephrology consult  3. IV Rocephin 1 g daily  4. Urine culture  5. Monitor CMP  6. Consult urology  7. Abdominal x-ray report reviewed left ureteral stent in place  8. DVT prophylaxis Eliquis 2.5 mg p.o. twice daily  9. EPCs  10.  check and replace electrolytes per sliding scale  11.   restart home medications        Electronically signed by Lobo Small MD     Copy sent to Dr. Derick Lafleur DO

## 2021-07-15 NOTE — ED NOTES
Admission Dx: acute pyelonephritis    Pts Chief Complaints on Arrival: abnormal labs and back pain    ADL's - Partial assistance    Pending Diagnostics:  n/a    Residence PTA: single story home    Special Considerations/Circumstances:  n/a    Vitals: Current vital signs:  BP (!) 134/42   Pulse 70   Temp 97.4 °F (36.3 °C) (Oral)   Resp 16   Ht 5' 5\" (1.651 m)   Wt 190 lb (86.2 kg)   LMP 03/19/1980   SpO2 97%   BMI 31.62 kg/m²                MEWS Score: [id number] Meera Garcia RN  07/15/21 1564

## 2021-07-15 NOTE — CONSULTS
Department of Internal Medicine  Nephrology Deborah Hernandez MD   Consult Note      Reason for consultation: Management of acute kidney injury. Consulting physician: Inez Soni MD.    History of present illness: This is a 70 y.o. female with a significant past medical history of Type 2 diabetes mellitus [diagnosed in 2015], Coronary artery disease [s/p PTCA/stents], hyperlipidemia, hypothyroidism, systemic hypertension and Chronic kidney disease stage III [baseline serum creatinine 1.40 mg/dL in November 2018], who was sent in by the primary physician for further management of acute kidney injury. She had been admitted on 6/20/2021 after presenting with flank pain nausea and vomiting with CT scan of the abdomen and pelvis showing left-sided hydronephrosis associated with kidney stone. She had cystoscopy with left ureteral stent placement at that time but renal function was seen with serum creatinine increasing from 2.7 mg/dL present short-term to 5 mg/dL and she had Jeffy catheter placed and received acute intermittent hemodialysis. At discharge, serum creatinine was 2.43 mg/dL [7/2/2021]. She was not on dialysis at discharge. She had seen her primary physician on Monday, 7/12/2021 and laboratory studies were performed revealed serum creatinine 3.03 mg/dL and hence patient was sent to the hospital for admission and evaluation of acute kidney injury. Her baseline serum creatinine is 1.40 mg/dL. Today, she is nonoliguric and denies flank pain, gross hematuria or shortness of breath. She is afebrile.     Pcn [penicillins], Heparin, Lamotrigine, Cyclobenzaprine, and Heparin (porcine)    Past Medical History:   Diagnosis Date    Aortic valve stenosis     mild per chart    Arthritis     CAD (coronary artery disease) 2000    1 STENT    Diabetes mellitus ([facility] [address]     Heart murmur     1962    Hyperlipidemia 2004    ON RX    MI, old     states many and they were mild    Pulmonary stenosis 1995    Pulmonary valve stenosis     mild/congenital per chart    Sciatica     right leg    Thyroid disease 2004    HYPOTHYROIDISM ON RX    Wears glasses        Scheduled Meds:   [START ON 7/16/2021] cefTRIAXone (ROCEPHIN) IV  1,000 mg Intravenous Q24H    amLODIPine  10 mg Oral Daily    aspirin  81 mg Oral Daily    atorvastatin  40 mg Oral Nightly    baclofen  5 mg Oral BID    buPROPion  300 mg Oral Daily    ferrous sulfate  325 mg Oral Daily with breakfast    FLUoxetine  10 mg Oral Daily    isosorbide mononitrate  30 mg Oral Daily    levothyroxine  112 mcg Oral Daily    metoprolol succinate  100 mg Oral Daily    oxybutynin  15 mg Oral Daily    pantoprazole  40 mg Oral QAM AC    pramipexole  0.125 mg Oral Daily    sodium chloride flush  10 mL Intravenous 2 times per day     Continuous Infusions:   sodium chloride 1,000 mL (07/15/21 0115)    sodium chloride      sodium chloride 100 mL/hr at 07/15/21 0634     PRN Meds:.albuterol sulfate HFA, ALPRAZolam, nitroGLYCERIN, sodium chloride flush, sodium chloride, potassium chloride **OR** potassium alternative oral replacement **OR** potassium chloride, magnesium sulfate, ondansetron **OR** ondansetron, polyethylene glycol, acetaminophen **OR** acetaminophen    Family History   Problem Relation Age of Onset    Breast Cancer Mother 62        BREAST WITH METS T  LIVER AND LUNG    Other Father         AAA    Heart Disease Father     Asthma Sister     Diabetes Sister         NIDDM    Stroke Brother     Diabetes Brother         NIDDM    Stroke Maternal Grandmother     Asthma Son         Social History     Socioeconomic History    Marital status:       Spouse name: None    Number of children: 1    Years of education: None    Highest education level: None   Occupational History    None   Tobacco Use    Smoking status: Former Smoker     Packs/day: 1.00     Years: 30.00     Pack years: 30.00     Types: Cigarettes     Quit date: 3/19/2004     Years since quittin.3    Smokeless tobacco: Never Used   Vaping Use    Vaping Use: Never used   Substance and Sexual Activity    Alcohol use: Yes     Comment: socially, once a year    Drug use: No    Sexual activity: Not Currently   Other Topics Concern    None   Social History Narrative    None     Social Determinants of Health     Financial Resource Strain:     Difficulty of Paying Living Expenses:    Food Insecurity:     Worried About Running Out of Food in the Last Year:     Ran Out of Food in the Last Year:    Transportation Needs:     Lack of Transportation (Medical):  Lack of Transportation (Non-Medical):    Physical Activity:     Days of Exercise per Week:     Minutes of Exercise per Session:    Stress:     Feeling of Stress :    Social Connections:     Frequency of Communication with Friends and Family:     Frequency of Social Gatherings with Friends and Family:     Attends Yazdanism Services:     Active Member of Clubs or Organizations:     Attends Club or Organization Meetings:     Marital Status:    Intimate Partner Violence:     Fear of Current or Ex-Partner:     Emotionally Abused:     Physically Abused:     Sexually Abused:      Review of systems: CNS - no headache or dizziness; Cardiac - no chest pain; Respiratory - no shortness of breath; Gastrointestinal - No nausea, vomiting or diarrhea; Musculoskeletal - general body aches; Skin/Integument - no rashes.     Physical Exam:    VITALS:  BP (!) 132/57   Pulse 68   Temp 97.5 °F (36.4 °C) (Oral)   Resp 18   Ht 5' 5\" (1.651 m)   Wt 190 lb (86.2 kg)   LMP 1980   SpO2 97%   BMI 31.62 kg/m²   24HR INTAKE/OUTPUT:  No intake or output data in the 24 hours ending 07/15/21 0938    Constitutional: alert, appears stated age and cooperative    Skin: Skin color, texture, turgor normal. No rashes or lesions    Head: Normocephalic, without obvious abnormality, atraumatic     Cardiovascular/Edema: regular rate and rhythm, S1, S2 disease. Check iron studies. 3. Nephrolithiasis - for metabolic work-up. 4. Systemic hypertension - blood pressure is adequately controlled. Prognosis is guarded. Thank you very much for the courtesy of this consultation.     Alissa Ruiz MD FACP  Attending Nephrologist  7/15/2021 9:32 AM

## 2021-07-15 NOTE — FLOWSHEET NOTE
Pt arrived to floor via wheelchair from ED and was transfered to bed. Vitals taken. Telemetry applied. Admission and assessment in progress. No distress noted. Call light within reach, and pt educated on its use. Bed in lowest position, and locked. Side rails up x 2. Denied further questions or needs at this time. Will continue to monitor.

## 2021-07-15 NOTE — PROGRESS NOTES
Physical Therapy    Facility/Department: [facility] PROGRESSIVE CARE  Initial Assessment    NAME: Marilyn Toledo  : 1950  MRN: [id number]    Date of Service: 7/15/2021    Discharge Recommendations:      PT Equipment Recommendations  Equipment Needed: No    Assessment   Assessment: Pt mobilizes at IND level and has [facility] strength. No balance or endurance deficits noted. Will disc PT services at this time. Pt agrees  Treatment Diagnosis: acute pyelonephritis  Decision Making: Low Complexity  Exam: ROM, MMT, balance, endurance, mobility, social hx  Clinical Presentation: pleasant, no distress  PT Education: PT Role  Barriers to Learning: none  No Skilled PT: Independent with functional mobility   REQUIRES PT FOLLOW UP: No  Activity Tolerance  Activity Tolerance: Patient Tolerated treatment well       Patient Diagnosis(es): The encounter diagnosis was Pyelonephritis. has a past medical history of Aortic valve stenosis, Arthritis, CAD (coronary artery disease), Diabetes mellitus ([address] Heart murmur, Hyperlipidemia, MI, old, Pulmonary stenosis, Pulmonary valve stenosis, Sciatica, Thyroid disease, and Wears glasses. has a past surgical history that includes Hysterectomy (); Salpingo-oophorectomy (Left, ); shoulder surgery (Left, ); cervical fusion (); joint replacement (Bilateral, ); Coronary angioplasty with stent (); Finger surgery (Left, 2014); other surgical history (Right, 2014); Toe Osteotomy (Right, 2016); and Cystoscopy (Left, 2021).     Restrictions  Restrictions/Precautions  Restrictions/Precautions: Up as Tolerated  Required Braces or Orthoses?: No  Implants present? : Metal implants (2 stents)  Vision/Hearing  Vision: Impaired  Vision Exceptions: Wears glasses at all times  Hearing: Within functional limits     Subjective  General  Chart Reviewed: Yes  Patient assessed for rehabilitation services?: Yes  Additional Pertinent Hx: 69-year-old female presents for complaint of abnormal labs. Patient reports that she was recently admitted for kidney stones and elevated creatinine, states that while she was here she had dialysis performed. Patient reports that since she has been home she been feeling back to normal, states that she followed up with her primary care physician on Monday and had labs drawn, states that her PCP called her today and told her to come for evaluation as her creatinine was up again. Patient reports that she has been urinating normally, denies any pain with urination, blood in the urine, or decrease in urination. Denies any swelling of the abdomen or legs, denies any associated shortness of breath, chest pain or flank or back pain. Response To Previous Treatment: Patient with no complaints from previous session. Family / Caregiver Present: No  Diagnosis: acute pyelonephritis  Follows Commands: Within Functional Limits  Subjective  Subjective: Pt is sitting at EOB without balance deficits.  Pt is agreeable to PT/OT eval. Denies difficulty or concerns for d/c  Pain Screening  Patient Currently in Pain: Denies  Vital Signs  Patient Currently in Pain: Denies  Pre Treatment Pain Screening  Intervention List: Patient able to continue with treatment    Orientation  Orientation  Overall Orientation Status: Within Normal Limits  Social/Functional History  Social/Functional History  Lives With: Son  Type of Home: House  Home Layout: Two level, Bed/Bath upstairs, Laundry in basement  Home Access: Stairs to enter with rails  Entrance Stairs - Number of Steps: 3  Entrance Stairs - Rails: Left  Bathroom Shower/Tub: Tub/Shower unit, Shower chair with back, Curtain  Bathroom Toilet: Standard  Bathroom Equipment: Hand-held shower  Bathroom Accessibility: Accessible  Home Equipment: Rolling walker  ADL Assistance: Independent  Homemaking Assistance: Independent  Homemaking Responsibilities: Yes  Ambulation Assistance: Independent  Transfer Assistance: 1314)  Mobility Inpatient CMS 0-100% Score: 0 (07/15/21 1314)  Mobility Inpatient CMS G-Code Modifier : CH (07/15/21 1314)          Goals  Short term goals  Time Frame for Short term goals: No further PT intervention necessary       Therapy Time   Individual Concurrent Group Co-treatment   Time In       1048   Time Out       1103   Minutes       Cindy Kirk, PT

## 2021-07-15 NOTE — PROGRESS NOTES
Dr. Andrea Kohli notified [facility] results, no new orders at this time.  Electronically signed by Chloe Hayden RN on 7/15/2021 at 6:17 PM

## 2021-07-15 NOTE — CARE COORDINATION
CASE MANAGEMENT NOTE:    Admission Date:  7/14/2021 Marilyn Marcus is a 70 y.o.  female    Admitted for : Acute pyelonephritis [N10]    Met with:  Patient    PCP:  Dr Lisa Moreno:  [place]       Is patient alert and oriented at time of discussion:  Yes    Current Residence/ Living Arrangements:  independently at home             Current Services PTA:  No    Does patient go to outpatient dialysis: No  If yes, location and chair time:     Is patient agreeable to VNS: No    Freedom of choice provided:  NA    List of [address] provided: NA    VNS chosen:  No    DME:  shower chair    Home Oxygen: No    Nebulizer: No    CPAP/BIPAP: No    Supplier: N/A    Potential Assistance Needed: No    SNF needed: No    Freedom of choice and list provided: NA    Pharmacy:  [facility]       Does Patient want to use MEDS to BEDS? No    Is patient currently receiving oral anticoagulation therapy? No    Is the Patient an [facility] with Readmission Risk Score greater than 14%? No  If yes, pt needs a follow up appointment made within 7 days. Family Members/Caregivers that pt would like involved in their care:    Yes    If yes, list name here:  Mar Cazares    Transportation Provider:  Family             Discharge Plan:  7/15/21 - [employer] - Patient is from home with son. DME: Shower chair, Denies need for VNS, Admitted for abnormal lab results. Cre 2.99 , BUN 36, WBC 11.1, Plan is to return home with no needs. Will follow . //pf                 Electronically signed by: Sam Gallardo RN on 7/15/2021 at 2:31 PM

## 2021-07-15 NOTE — FLOWSHEET NOTE
07/15/21 1454   Encounter Summary   Services provided to: Patient   Referral/Consult From: Joyce   Continue Visiting   (7/15/21V)   Volunteer Visit Yes   Complexity of Encounter Low   Length of Encounter 15 minutes   Spiritual/Anglican   Type Spiritual support   Intervention Prayer

## 2021-07-15 NOTE — ED PROVIDER NOTES
EMERGENCY DEPARTMENT ENCOUNTER    Pt Name: Claritza Moreland  MRN: [id number]  [place] 1950  Date of evaluation: 7/14/21  CHIEF COMPLAINT       Chief Complaint   Patient presents with    Flank Pain    Other     abnormal BUN and CRE     HISTORY OF PRESENT ILLNESS   72-year-old female presents for complaint of abnormal labs. Patient reports that she was recently admitted for kidney stones and elevated creatinine, states that while she was here she had dialysis performed. Patient reports that since she has been home she been feeling back to normal, states that she followed up with her primary care physician on Monday and had labs drawn, states that her PCP called her today and told her to come for evaluation as her creatinine was up again. Patient reports that she has been urinating normally, denies any pain with urination, blood in the urine, or decrease in urination. Denies any swelling of the abdomen or legs, denies any associated shortness of breath, chest pain or flank or back pain. The history is provided by the patient. REVIEW OF SYSTEMS     Review of Systems   Constitutional: Negative for fever. HENT: Negative for congestion and ear pain. Eyes: Negative for pain. Respiratory: Negative for shortness of breath. Cardiovascular: Negative for chest pain, palpitations and leg swelling. Gastrointestinal: Negative for abdominal pain. Genitourinary: Negative for dysuria and flank pain. Musculoskeletal: Negative for back pain. Skin: Negative for color change. Neurological: Negative for numbness and headaches. Psychiatric/Behavioral: Negative for confusion. All other systems reviewed and are negative.     PASTMEDICAL HISTORY     Past Medical History:   Diagnosis Date    Aortic valve stenosis     mild per chart    Arthritis     CAD (coronary artery disease) 2000    1 STENT    Diabetes mellitus ([facility] [address]     Heart murmur     1962    Hyperlipidemia 2004    ON RX    MI, old states many and they were mild    Pulmonary stenosis 1995    Pulmonary valve stenosis     mild/congenital per chart    Sciatica     right leg    Thyroid disease 2004    HYPOTHYROIDISM ON RX    Wears glasses      Past Problem List  Patient Active Problem List   Diagnosis Code    [facility] arthritis M19.049    Arthritis of left hip M16.12    Left hip pain M25.552    Chronic midline low back pain without sciatica M54.5, G89.29    Degenerative lumbar spinal stenosis M48.061    Lumbar radiculopathy M54.16    DDD (degenerative disc disease), lumbar M51.36    Lumbosacral spondylosis without myelopathy M47.817    Primary osteoarthritis of left hip M16.12    Sacroiliitis (HCC) M46.1    DDD (degenerative disc disease), cervical M50.30    Neck pain M54.2    Cervical stenosis of spine M48.02    Osteoarthritis of spine with radiculopathy, cervical region M47.22    WERO (acute kidney injury) ([facility] [address] N17.9    Acute cystitis without hematuria N30.00    Hydronephrosis of left kidney N13.30    Hydroureter, left N13.4    Type 2 diabetes mellitus, without long-term current use of insulin (HCC) E11.9    Essential hypertension I10    Hypothyroidism E03.9    Acute infective cystitis N30.00    Uremia N19    Acute pyelonephritis N10    Iron deficiency anemia D50.9    GERD (gastroesophageal reflux disease) K21.9     SURGICAL HISTORY       Past Surgical History:   Procedure Laterality Date    CERVICAL FUSION  1993    CORONARY ANGIOPLASTY WITH STENT PLACEMENT  2000 1 STENT    CYSTOSCOPY Left 6/21/2021    CYSTOSCOPY URETERAL STENT INSERTION performed by Dontae Wloff MD at [address] Left 03/21/2014    [place] ARTHOPLASTY LEFT THUMB    HYSTERECTOMY  1980    WITH RT SALPINGOOPHERECTOMY    JOINT REPLACEMENT Bilateral 1994    PARTIAL-KNEES    OTHER SURGICAL HISTORY Right 09/23/2014    thumb repair    SALPINGO-OOPHORECTOMY Left 1987    SHOULDER SURGERY Left 1993    SPURS    TOE OSTEOTOMY Right 6/8/2016    Right 5th digit adductory wedge osteotomy with K wire fixation      CURRENT MEDICATIONS       Previous Medications    ALBUTEROL SULFATE  (90 BASE) MCG/ACT INHALER    INHALE TWO PUFFS BY MOUTH EVERY 4 TO 6 HOURS AS NEEDED FOR WHEEZING    ALPRAZOLAM (XANAX) 1 MG TABLET    Take 1 mg by mouth as needed for Anxiety. AMLODIPINE (NORVASC) 10 MG TABLET    Take 1 tablet by mouth daily    ASPIRIN 81 MG EC TABLET    Take 81 mg by mouth daily. LAST DOSE 9/8/14    ATORVASTATIN (LIPITOR) 40 MG TABLET        BACLOFEN (LIORESAL) 10 MG TABLET    Take 10 mg by mouth 2 times daily    BUPROPION (WELLBUTRIN XL) 300 MG EXTENDED RELEASE TABLET    TAKE ONE TABLET BY MOUTH DAILY    BUSPIRONE (BUSPAR) 15 MG TABLET    Take 15 mg by mouth 2 times daily    CLOTRIMAZOLE (LOTRIMIN) 1 % VAGINAL CREAM    Place 1 applicator vaginally 2 times daily    FERROUS SULFATE (IRON 325) 325 (65 FE) MG TABLET    Take 1 tablet by mouth daily (with breakfast)    FLUOXETINE (PROZAC) 10 MG CAPSULE    Take 1 capsule by mouth daily    ISOSORBIDE MONONITRATE (IMDUR) 30 MG EXTENDED RELEASE TABLET    Take 1 tablet by mouth daily    LEVOCETIRIZINE (XYZAL) 5 MG TABLET    Take 5 mg by mouth nightly    LEVOTHYROXINE (SYNTHROID) 112 MCG TABLET    TAKE ONE TABLET BY MOUTH DAILY    METOPROLOL (TOPROL-XL) 100 MG XL TABLET    Take 100 mg by mouth daily. MULTIPLE VITAMINS-MINERALS (THERAPEUTIC MULTIVITAMIN-MINERALS) TABLET    Take 1 tablet by mouth daily. NITROGLYCERIN (NITROSTAT) 0.4 MG SL TABLET    Place 0.4 mg under the tongue every 5 minutes as needed for Chest pain up to max of 3 total doses. If no relief after 1 dose, call 911. NYSTATIN (MYCOSTATIN) 461380 UNIT/GM CREAM    Apply topically 2 times daily Apply topically 2 times daily.     NYSTATIN (NYAMYC) 125395 UNIT/GM POWDER    APPLY TO AFFECTED AREA(S) FOUR TIMES A DAY    OXYBUTYNIN (DITROPAN XL) 15 MG EXTENDED RELEASE TABLET    Take 15 mg by mouth daily    PANTOPRAZOLE (PROTONIX) 40 MG TABLET    Take 1 tablet by mouth every morning (before breakfast)    PRAMIPEXOLE (MIRAPEX) 0.125 MG TABLET    TAKE ONE TABLET BY MOUTH DAILY    VITAMIN B-12 (CYANOCOBALAMIN) 500 MCG TABLET    Take 500 mcg by mouth daily. ALLERGIES     is allergic to pcn [penicillins], heparin, lamotrigine, cyclobenzaprine, and heparin (porcine). FAMILY HISTORY     She indicated that her mother is . She indicated that her father is . She indicated that her sister is alive. She indicated that her brother is alive. She indicated that her maternal grandmother is . She indicated that her son is alive. SOCIAL HISTORY       Social History     Tobacco Use    Smoking status: Former Smoker     Packs/day: 1.00     Years: 30.00     Pack years: 30.00     Types: Cigarettes     Quit date: 3/19/2004     Years since quittin.3    Smokeless tobacco: Never Used   Vaping Use    Vaping Use: Never used   Substance Use Topics    Alcohol use: Yes     Comment: socially, once a year    Drug use: No     PHYSICAL EXAM     INITIAL VITALS: /68   Pulse 69   Temp 97.8 °F (36.6 °C) (Oral)   Resp 22   Ht 5' 5\" (1.651 m)   Wt 190 lb (86.2 kg)   LMP 1980   SpO2 95%   BMI 31.62 kg/m²    Physical Exam  Vitals and nursing note reviewed. Constitutional:       General: She is not in acute distress. Appearance: Normal appearance. She is not toxic-appearing. HENT:      Head: Normocephalic and atraumatic. Nose: Nose normal.      Mouth/Throat:      Mouth: Mucous membranes are moist.      Pharynx: Oropharynx is clear. Eyes:      Extraocular Movements: Extraocular movements intact. Conjunctiva/sclera: Conjunctivae normal.   Cardiovascular:      Rate and Rhythm: Normal rate and regular rhythm. Pulses: Normal pulses. Heart sounds: Normal heart sounds. Pulmonary:      Effort: Pulmonary effort is normal.      Breath sounds: Normal breath sounds.    Abdominal:      General: Bowel sounds are normal. There is no distension. Palpations: Abdomen is soft. Tenderness: There is no abdominal tenderness. Musculoskeletal:         General: Normal range of motion. Cervical back: Normal range of motion. Skin:     General: Skin is warm and dry. Capillary Refill: Capillary refill takes less than 2 seconds. Neurological:      General: No focal deficit present. Mental Status: She is alert. Psychiatric:         Mood and Affect: Mood normal.         MEDICAL DECISION MAKIN-year-old female presents for complaint of abnormal lab values. Initial exam patient in no acute distress, vitals are stable, will recheck labs check urinalysis    Labs reviewed patient was found to have a white blood cell count of 11.1, creatinine of 2.9 and BUN of 36, patient noted to have large leuk esterase, moderate bacteria and  WBCs on urinalysis, consistent with infection, given increase in patient's creatinine and concern for infection, will start on IV antibiotics, will admit for further evaluation and urology evaluation    Discussed with Dr. Beto Urbina from urology, will evaluate patient in the morning      Discussed results with the patient, discussed need for admission, she is agreeable    Spoke with Dr. Liliam Bullock who accepts admission with urology consult. Patient demonstrates understanding and agreement with the plan, was given the opportunity to ask questions, and these questions were answered to the best of the provided information at this time. VS stable for transfer. This dictation was prepared using [employer] voice recognition software.          CRITICAL CARE:       PROCEDURES:    Procedures    DIAGNOSTIC RESULTS   EKG:All EKG's are interpreted by the Emergency Department Physician who either signs or Co-signs this chart in the absence of a cardiologist.        RADIOLOGY:All plain film, CT, MRI, and formal ultrasound images (except ED bedside ultrasound) are read by the radiologist, see reports below, unless otherwisenoted in MDM or here. XR ABDOMEN (KUB) (SINGLE AP VIEW)   Final Result   1. Left double-J ureteral stent in appropriate position. No calculi are   present along the course of the left double-J ureteral stent           LABS: All lab results were reviewed by myself, and all abnormals are listed below.   Labs Reviewed   CBC WITH AUTO DIFFERENTIAL - Abnormal; Notable for the following components:       Result Value    WBC 11.1 (*)     RBC 3.19 (*)     Hemoglobin 9.7 (*)     Hematocrit 29.0 (*)     Monocytes 9 (*)     Eosinophils % 5 (*)     Absolute Eos # 0.60 (*)     All other components within normal limits   COMPREHENSIVE METABOLIC PANEL W/ REFLEX TO MG FOR LOW K - Abnormal; Notable for the following components:    Glucose 137 (*)     BUN 36 (*)     CREATININE 2.99 (*)     Total Bilirubin 0.23 (*)     GFR Non- 15 (*)     GFR  19 (*)     All other components within normal limits   URINE RT REFLEX TO CULTURE - Abnormal; Notable for the following components:    Turbidity UA CLOUDY (*)     Urine Hgb MOD (*)     Protein, UA 1+ (*)     Leukocyte Esterase, Urine LARGE (*)     All other components within normal limits   MICROSCOPIC URINALYSIS - Abnormal; Notable for the following components:    Bacteria, UA MODERATE (*)     Yeast, UA MODERATE (*)     All other components within normal limits   CULTURE, URINE       EMERGENCY DEPARTMENTCOURSE:         Vitals:    Vitals:    07/14/21 2050 07/15/21 0117   BP: 130/68 136/68   Pulse: 78 69   Resp: 18 22   Temp: 98.7 °F (37.1 °C) 97.8 °F (36.6 °C)   TempSrc: Oral Oral   SpO2: 100% 95%   Weight: 190 lb (86.2 kg)    Height: 5' 5\" (1.651 m)        The patient was given the following medications while in the emergency department:  Orders Placed This Encounter   Medications    ALPRAZolam (XANAX) tablet 1 mg    cefTRIAXone (ROCEPHIN) 1000 mg IVPB in 50 mL D5W minibag     Order Specific Question:   Antimicrobial Indications Answer:   Urinary Tract Infection    0.9 % sodium chloride infusion     CONSULTS:  IP CONSULT TO FAMILY MEDICINE  IP CONSULT TO UROLOGY  IP CONSULT TO NEPHROLOGY  IP CONSULT TO NEPHROLOGY    FINAL IMPRESSION      1. Pyelonephritis          DISPOSITION/PLAN   DISPOSITION Admitted 07/15/2021 12:18:35 AM      PATIENT REFERRED TO:  No follow-up provider specified.   DISCHARGE MEDICATIONS:  New Prescriptions    No medications on file     Abbey Hillman DO  Attending Emergency Physician                  Abbey Hillman DO  07/15/21 [id number]

## 2021-07-15 NOTE — ED NOTES
Mode of arrival (squad #, walk in, police, etc) : Walked into triage      Chief complaint(s): Abnormal labs        Arrival Note (brief scenario, treatment PTA, etc). : States her family dr sent her in for abnormal labs. A/o x 3.                  C= \"Have you ever felt that you should Cut down on your drinking? \"  No  A= \"Have people Annoyed you by criticizing your drinking? \"  No  G= \"Have you ever felt bad or Guilty about your drinking? \"  No  E= \"Have you ever had a drink as an Eye-opener first thing in the morning to steady your nerves or to help a hangover? \"  No      Deferred []      Reason for deferring: N/A    *If yes to two or more: probable alcohol abuse. Elliott Parks RN  07/15/21 [id number]

## 2021-07-16 LAB
ABSOLUTE EOS #: 0.5 K/UL (ref 0–0.4)
ABSOLUTE IMMATURE GRANULOCYTE: ABNORMAL K/UL (ref 0–0.3)
ABSOLUTE LYMPH #: 2 K/UL (ref 1–4.8)
ABSOLUTE MONO #: 0.7 K/UL (ref 0.1–1.3)
ALBUMIN SERPL-MCNC: 3.9 G/DL (ref 3.5–5.2)
ALBUMIN/GLOBULIN RATIO: ABNORMAL (ref 1–2.5)
ALP BLD-CCNC: 70 U/L (ref 35–104)
ALT SERPL-CCNC: 12 U/L (ref 5–33)
ANION GAP SERPL CALCULATED.3IONS-SCNC: 13 MMOL/L (ref 9–17)
AST SERPL-CCNC: 15 U/L
BASOPHILS # BLD: 1 % (ref 0–2)
BASOPHILS ABSOLUTE: 0.1 K/UL (ref 0–0.2)
BILIRUB SERPL-MCNC: 0.16 MG/DL (ref 0.3–1.2)
BUN BLDV-MCNC: 29 MG/DL (ref 8–23)
BUN/CREAT BLD: ABNORMAL (ref 9–20)
CALCIUM SERPL-MCNC: 9.1 MG/DL (ref 8.6–10.4)
CHLORIDE BLD-SCNC: 104 MMOL/L (ref 98–107)
CO2: 22 MMOL/L (ref 20–31)
CREAT SERPL-MCNC: 2.73 MG/DL (ref 0.5–0.9)
DIFFERENTIAL TYPE: ABNORMAL
EOSINOPHILS RELATIVE PERCENT: 8 % (ref 0–4)
GFR AFRICAN AMERICAN: 21 ML/MIN
GFR NON-AFRICAN AMERICAN: 17 ML/MIN
GFR SERPL CREATININE-BSD FRML MDRD: ABNORMAL ML/MIN/{1.73_M2}
GFR SERPL CREATININE-BSD FRML MDRD: ABNORMAL ML/MIN/{1.73_M2}
GLUCOSE BLD-MCNC: 121 MG/DL (ref 70–99)
GLUCOSE BLD-MCNC: 130 MG/DL (ref 65–105)
GLUCOSE BLD-MCNC: 135 MG/DL (ref 65–105)
GLUCOSE BLD-MCNC: 155 MG/DL (ref 65–105)
GLUCOSE BLD-MCNC: 180 MG/DL (ref 65–105)
HCT VFR BLD CALC: 27.6 % (ref 36–46)
HEMOGLOBIN: 9 G/DL (ref 12–16)
IMMATURE GRANULOCYTES: ABNORMAL %
LYMPHOCYTES # BLD: 30 % (ref 24–44)
MCH RBC QN AUTO: 29.3 PG (ref 26–34)
MCHC RBC AUTO-ENTMCNC: 32.6 G/DL (ref 31–37)
MCV RBC AUTO: 89.8 FL (ref 80–100)
MONOCYTES # BLD: 11 % (ref 1–7)
NRBC AUTOMATED: ABNORMAL PER 100 WBC
PDW BLD-RTO: 14.4 % (ref 11.5–14.9)
PLATELET # BLD: 218 K/UL (ref 150–450)
PLATELET ESTIMATE: ABNORMAL
PMV BLD AUTO: 8.6 FL (ref 6–12)
POTASSIUM SERPL-SCNC: 4.3 MMOL/L (ref 3.7–5.3)
RBC # BLD: 3.07 M/UL (ref 4–5.2)
RBC # BLD: ABNORMAL 10*6/UL
SEG NEUTROPHILS: 50 % (ref 36–66)
SEGMENTED NEUTROPHILS ABSOLUTE COUNT: 3.3 K/UL (ref 1.3–9.1)
SODIUM BLD-SCNC: 139 MMOL/L (ref 135–144)
TOTAL PROTEIN: 6.5 G/DL (ref 6.4–8.3)
WBC # BLD: 6.7 K/UL (ref 3.5–11)
WBC # BLD: ABNORMAL 10*3/UL

## 2021-07-16 PROCEDURE — 2580000003 HC RX 258: Performed by: FAMILY MEDICINE

## 2021-07-16 PROCEDURE — 80053 COMPREHEN METABOLIC PANEL: CPT

## 2021-07-16 PROCEDURE — 82947 ASSAY GLUCOSE BLOOD QUANT: CPT

## 2021-07-16 PROCEDURE — 85025 COMPLETE CBC W/AUTO DIFF WBC: CPT

## 2021-07-16 PROCEDURE — 6370000000 HC RX 637 (ALT 250 FOR IP): Performed by: FAMILY MEDICINE

## 2021-07-16 PROCEDURE — 6360000002 HC RX W HCPCS: Performed by: FAMILY MEDICINE

## 2021-07-16 PROCEDURE — 1200000000 HC SEMI PRIVATE

## 2021-07-16 PROCEDURE — 36415 COLL VENOUS BLD VENIPUNCTURE: CPT

## 2021-07-16 RX ADMIN — PANTOPRAZOLE SODIUM 40 MG: 40 TABLET, DELAYED RELEASE ORAL at 05:24

## 2021-07-16 RX ADMIN — CEFTRIAXONE SODIUM 1000 MG: 1 INJECTION, POWDER, FOR SOLUTION INTRAMUSCULAR; INTRAVENOUS at 01:50

## 2021-07-16 RX ADMIN — BUPROPION HYDROCHLORIDE 300 MG: 300 TABLET, FILM COATED, EXTENDED RELEASE ORAL at 07:16

## 2021-07-16 RX ADMIN — APIXABAN 2.5 MG: 2.5 TABLET, FILM COATED ORAL at 07:16

## 2021-07-16 RX ADMIN — APIXABAN 2.5 MG: 2.5 TABLET, FILM COATED ORAL at 20:32

## 2021-07-16 RX ADMIN — ISOSORBIDE MONONITRATE 30 MG: 30 TABLET, EXTENDED RELEASE ORAL at 07:16

## 2021-07-16 RX ADMIN — CEFTRIAXONE SODIUM 1000 MG: 1 INJECTION, POWDER, FOR SOLUTION INTRAMUSCULAR; INTRAVENOUS at 23:30

## 2021-07-16 RX ADMIN — AMLODIPINE BESYLATE 10 MG: 10 TABLET ORAL at 07:16

## 2021-07-16 RX ADMIN — LEVOTHYROXINE SODIUM 112 MCG: 0.11 TABLET ORAL at 07:40

## 2021-07-16 RX ADMIN — FLUOXETINE 10 MG: 10 CAPSULE ORAL at 07:40

## 2021-07-16 RX ADMIN — ALPRAZOLAM 1 MG: 1 TABLET ORAL at 23:27

## 2021-07-16 RX ADMIN — SODIUM CHLORIDE: 9 INJECTION, SOLUTION INTRAVENOUS at 01:52

## 2021-07-16 RX ADMIN — ASPIRIN 81 MG: 81 TABLET, COATED ORAL at 07:16

## 2021-07-16 RX ADMIN — POLYETHYLENE GLYCOL 3350 17 G: 17 POWDER, FOR SOLUTION ORAL at 11:32

## 2021-07-16 RX ADMIN — ATORVASTATIN CALCIUM 40 MG: 40 TABLET, FILM COATED ORAL at 20:32

## 2021-07-16 RX ADMIN — FERROUS SULFATE TAB 325 MG (65 MG ELEMENTAL FE) 325 MG: 325 (65 FE) TAB at 07:16

## 2021-07-16 RX ADMIN — PRAMIPEXOLE DIHYDROCHLORIDE 0.12 MG: 0.12 TABLET ORAL at 07:40

## 2021-07-16 RX ADMIN — METOPROLOL SUCCINATE 100 MG: 100 TABLET, EXTENDED RELEASE ORAL at 07:16

## 2021-07-16 ASSESSMENT — ENCOUNTER SYMPTOMS
EYE PAIN: 0
ANAL BLEEDING: 0
APNEA: 0
STRIDOR: 0
EYE DISCHARGE: 0
SORE THROAT: 0
CHEST TIGHTNESS: 0
RECTAL PAIN: 0

## 2021-07-16 ASSESSMENT — PAIN SCALES - GENERAL
PAINLEVEL_OUTOF10: 0
PAINLEVEL_OUTOF10: 0

## 2021-07-16 NOTE — PLAN OF CARE
Problem: Falls - Risk of:  Goal: Will remain free from falls  Description: Will remain free from falls  7/16/2021 0355 by Patti Vargas RN  Outcome: Ongoing  Note: No falls noted this shift. Patient ambulates per self without difficulty. Bed kept in low position. Safe environment maintained. Bedside table & call light in reach. Uses call light appropriately when needing assistance. Problem: Urinary Elimination:  Goal: Signs and symptoms of infection will decrease  Description: Signs and symptoms of infection will decrease  7/16/2021 0355 by Patti Vargas RN  Outcome: Ongoing  Note: Pt reports urinary frequency.  Receiving IV Rocephin

## 2021-07-16 NOTE — CONSULTS
Urology Consultation    Patient:  Elisha Landin  MRN: [id number]  YOB: 1950    CHIEF COMPLAINT:  Pyelonephritis. HISTORY OF PRESENT ILLNESS:   The patient is a 70 y.o. female who presents with weakness in her lower extremities. She was seen here in June with severe left flank pain. She had a CT, which showed hydro, but no stones. She was treated and sent home. She comes back now with lower extremity weakness. Her flank pain has resolved. She has no dysuria or hematuria. She had a stent placed on the last admission, which did return a large amount of pus. Patient's old records, notes and chart reviewed and summarized above.     Past Medical History:    Past Medical History:   Diagnosis Date    Aortic valve stenosis     mild per chart    Arthritis     CAD (coronary artery disease) 2000    1 STENT    Diabetes mellitus ([address]     Heart murmur     1962    Hyperlipidemia 2004    ON RX    MI, old     states many and they were mild    Pulmonary stenosis 1995    Pulmonary valve stenosis     mild/congenital per chart    Sciatica     right leg    Thyroid disease 2004    HYPOTHYROIDISM ON RX    Wears glasses        Past Surgical History:    Past Surgical History:   Procedure Laterality Date    CERVICAL FUSION  1993    CORONARY ANGIOPLASTY WITH STENT PLACEMENT  2000    1 STENT    CYSTOSCOPY Left 6/21/2021    CYSTOSCOPY URETERAL STENT INSERTION performed by Mimi Mancini MD at [address] Left 03/21/2014    [place] ARTHOPLASTY LEFT THUMB    HYSTERECTOMY  1980    WITH RT SALPINGOOPHERECTOMY    JOINT REPLACEMENT Bilateral 1994    PARTIAL-KNEES    OTHER SURGICAL HISTORY Right 09/23/2014    thumb repair    SALPINGO-OOPHORECTOMY Left 1987    SHOULDER SURGERY Left 1993    SPURS    TOE OSTEOTOMY Right 6/8/2016    Right 5th digit adductory wedge osteotomy with K wire fixation      Previous  surgery: none   Medications:    Scheduled Meds:   cefTRIAXone (ROCEPHIN) IV 1,000 mg Intravenous Q24H    amLODIPine  10 mg Oral Daily    aspirin  81 mg Oral Daily    atorvastatin  40 mg Oral Nightly    buPROPion  300 mg Oral Daily    ferrous sulfate  325 mg Oral Daily with breakfast    FLUoxetine  10 mg Oral Daily    isosorbide mononitrate  30 mg Oral Daily    levothyroxine  112 mcg Oral Daily    metoprolol succinate  100 mg Oral Daily    pantoprazole  40 mg Oral QAM AC    pramipexole  0.125 mg Oral Daily    sodium chloride flush  10 mL Intravenous 2 times per day    apixaban  2.5 mg Oral BID     Continuous Infusions:   sodium chloride      sodium chloride 100 mL/hr at 21 0152     PRN Meds:.albuterol sulfate HFA, ALPRAZolam, nitroGLYCERIN, sodium chloride flush, sodium chloride, potassium chloride **OR** potassium alternative oral replacement **OR** potassium chloride, magnesium sulfate, ondansetron **OR** ondansetron, polyethylene glycol, acetaminophen **OR** acetaminophen, sodium chloride flush, sodium chloride flush    Allergies:  Pcn [penicillins], Heparin, Lamotrigine, Cyclobenzaprine, and Heparin (porcine)    Social History:    Social History     Socioeconomic History    Marital status:       Spouse name: Not on file    Number of children: 1    Years of education: Not on file    Highest education level: Not on file   Occupational History    Not on file   Tobacco Use    Smoking status: Former Smoker     Packs/day: 1.00     Years: 30.00     Pack years: 30.00     Types: Cigarettes     Quit date: 3/19/2004     Years since quittin.3    Smokeless tobacco: Never Used   Vaping Use    Vaping Use: Never used   Substance and Sexual Activity    Alcohol use: Yes     Comment: socially, once a year    Drug use: No    Sexual activity: Not Currently   Other Topics Concern    Not on file   Social History Narrative    Not on file     Social Determinants of Health     Financial Resource Strain:     Difficulty of Paying Living Expenses:    Food Insecurity:  Worried About [facility] in the Last Year:    [address] in the Last Year:    Transportation Needs:     Lack of Transportation (Medical):      Lack of Transportation (Non-Medical):    Physical Activity:     Days of Exercise per Week:     Minutes of Exercise per Session:    Stress:     Feeling of Stress :    Social Connections:     Frequency of Communication with Friends and Family:     Frequency of Social Gatherings with Friends and Family:     Attends [employer] Services:     Active Member of Clubs or Organizations:     Attends Club or Organization Meetings:     Marital Status:    Intimate Partner Violence:     Fear of Current or Ex-Partner:     Emotionally Abused:     Physically Abused:     Sexually Abused:        Family History:    Family History   Problem Relation Age of Onset    Breast Cancer Mother 62        BREAST WITH METS T  LIVER AND LUNG    Other Father         AAA    Heart Disease Father     Asthma Sister     Diabetes Sister         NIDDM    Stroke Brother     Diabetes Brother         NIDDM    Stroke Maternal Grandmother     Asthma Son      Previous Urologic Family history: none  REVIEW OF SYSTEMS:  Constitutional: negative  weakness  Eyes: negative  Respiratory: negative  Cardiovascular: negative  Gastrointestinal: negative  Genitourinary: see HPI  Musculoskeletal: negative  Skin: negative   Neurological: negative  Hematological/Lymphatic: negative  Psychological: negative    Physical Exam:      This a 70 y.o. female   Patient Vitals for the past 24 hrs:   BP Temp Temp src Pulse Resp SpO2 Weight   07/16/21 0700 (!) 121/52 98 °F (36.7 °C) Oral 68 16 97 % --   07/16/21 0407 -- -- -- -- -- -- 197 lb 12 oz (89.7 kg)   07/16/21 0149 (!) 148/64 97.7 °F (36.5 °C) Oral 68 18 95 % --   07/15/21 1923 134/64 97.9 °F (36.6 °C) Oral 68 16 97 % --   07/15/21 1230 135/76 98.1 °F (36.7 °C) Oral 77 18 97 % --   07/15/21 0910 (!) 132/57 -- -- 68 -- -- --   07/15/21 0745 100/74 97.5 °F (36.4 °C) Oral 69 18 97 % --     Constitutional: Patient in no acute distress. Neuro: Alert and oriented to person, place and time. Psych: mood and affect normal  HEENT negative  Lungs: Respiratory effort is normal  Cardiovascular: Normal peripheral pulses  Abdomen: Soft, non-tender, non-distended with no CVA, flank pain or hepatosplenomegaly. No hernias. Kidneys normal.  Lymphatics: No palpable lymphadenopathy. Bladder non-tender and not distended. Pelvic exam: deferred  Rectal exam not indicated    LABS:   Recent Labs     07/13/21  1247 07/14/21  2243 07/16/21  0524   WBC 10.5 11.1* 6.7   HGB 9.8* 9.7* 9.0*   HCT 29.5* 29.0* 27.6*   MCV 90.7 91.0 89.8    278 218     Recent Labs     07/14/21  2243 07/15/21  0941 07/16/21  0524    139 139   K 4.8 4.9 4.3    103 104   CO2 21 23 22   BUN 36* 32* 29*   CREATININE 2.99* 3.01* 2.73*       Additional Lab/culture results:    Urinalysis:   Recent Labs     07/14/21  2238   COLORU YELLOW   PHUR 6.0   WBCUA 50    RBCUA 20 TO 50   MUCUS NOT REPORTED   TRICHOMONAS NOT REPORTED   YEAST MODERATE*   BACTERIA MODERATE*   SPECGRAV 1.012   LEUKOCYTESUR LARGE*   UROBILINOGEN Normal   BILIRUBINUR NEGATIVE        -----------------------------------------------------------------  Imaging Results:    CT images reviewed. Hydro was noted with no obstructing stone. Renogram reviewed, no obstruction. KUB shows stent in good position.        Assessment and Plan   Impression:    Patient Active Problem List   Diagnosis    CMC arthritis    Arthritis of left hip    Left hip pain    Chronic midline low back pain without sciatica    Degenerative lumbar spinal stenosis    Lumbar radiculopathy    DDD (degenerative disc disease), lumbar    Lumbosacral spondylosis without myelopathy    Primary osteoarthritis of left hip    Sacroiliitis (HCC)    DDD (degenerative disc disease), cervical    Neck pain    Cervical stenosis of spine    Osteoarthritis of spine with radiculopathy, cervical region    WERO (acute kidney injury) ([address]    Acute cystitis without hematuria    Hydronephrosis of left kidney    Hydroureter, left    Type 2 diabetes mellitus, without long-term current use of insulin (HCC)    Essential hypertension    Hypothyroidism    Acute infective cystitis    Uremia    Acute pyelonephritis    Iron deficiency anemia    GERD (gastroesophageal reflux disease)       Plan:     70year-old female admitted in June with pyelo. She likely passed a stone, but she did not improve, so she required a stent. A large amount of pus was returned. Urine culture is pending. Stent is in good position. Can follow up with Dr Paul Chappell as outpatient for retrograde and possible ureteroscopy.          Eugenia Araujo MD  7:32 AM 7/16/2021

## 2021-07-16 NOTE — PROGRESS NOTES
Department of Internal Medicine  Nephrology Joana Cooney MD   Consult Note      Reason for consultation: Management of acute kidney injury. Consulting physician: Zohreh Calix MD.    History of present illness: This is a 70 y.o. female with a significant past medical history of Type 2 diabetes mellitus [diagnosed in 2015], Coronary artery disease [s/p PTCA/stents], hyperlipidemia, hypothyroidism, systemic hypertension and Chronic kidney disease stage III [baseline serum creatinine 1.40 mg/dL in November 2018], who was sent in by the primary physician for further management of acute kidney injury. She had been admitted on 6/20/2021 after presenting with flank pain nausea and vomiting with CT scan of the abdomen and pelvis showing left-sided hydronephrosis associated with kidney stone. She had cystoscopy with left ureteral stent placement at that time but renal function was seen with serum creatinine increasing from 2.7 mg/dL present short-term to 5 mg/dL and she had Jeffy catheter placed and received acute intermittent hemodialysis. At discharge, serum creatinine was 2.43 mg/dL [7/2/2021]. She was not on dialysis at discharge. She had seen her primary physician on Monday, 7/12/2021 and laboratory studies were performed revealed serum creatinine 3.03 mg/dL and hence patient was sent to the hospital for admission and evaluation of acute kidney injury. Her baseline serum creatinine is 1.40 mg/dL. Today, she is nonoliguric and denies flank pain, gross hematuria or shortness of breath. She is afebrile.     Interval history/subjective    Patient seen and examined patient is feeling better weakness in the legs is improved no dizziness  Serum creatinine slowly improving 2.7 mg/dL  Nonoliguric good urine output    Pcn [penicillins], Heparin, Lamotrigine, Cyclobenzaprine, and Heparin (porcine)    Past Medical History:   Diagnosis Date    Aortic valve stenosis     mild per chart    Arthritis     CAD (coronary artery disease) 2000    1 STENT    Diabetes mellitus [facility]     Heart murmur     1962    Hyperlipidemia 2004    ON RX    MI, old     states many and they were mild    Pulmonary stenosis 1995    Pulmonary valve stenosis     mild/congenital per chart    Sciatica     right leg    Thyroid disease 2004    HYPOTHYROIDISM ON RX    Wears glasses        Scheduled Meds:   cefTRIAXone (ROCEPHIN) IV  1,000 mg Intravenous Q24H    amLODIPine  10 mg Oral Daily    aspirin  81 mg Oral Daily    atorvastatin  40 mg Oral Nightly    buPROPion  300 mg Oral Daily    ferrous sulfate  325 mg Oral Daily with breakfast    FLUoxetine  10 mg Oral Daily    isosorbide mononitrate  30 mg Oral Daily    levothyroxine  112 mcg Oral Daily    metoprolol succinate  100 mg Oral Daily    pantoprazole  40 mg Oral QAM AC    pramipexole  0.125 mg Oral Daily    sodium chloride flush  10 mL Intravenous 2 times per day    apixaban  2.5 mg Oral BID     Continuous Infusions:   sodium chloride      sodium chloride 100 mL/hr at 07/16/21 0152     PRN Meds:.albuterol sulfate HFA, ALPRAZolam, nitroGLYCERIN, sodium chloride flush, sodium chloride, ondansetron **OR** ondansetron, polyethylene glycol, acetaminophen **OR** acetaminophen, sodium chloride flush, sodium chloride flush    Family History   Problem Relation Age of Onset    Breast Cancer Mother 62        BREAST WITH METS T  LIVER AND LUNG    Other Father         AAA    Heart Disease Father     Asthma Sister     Diabetes Sister         NIDDM    Stroke Brother     Diabetes Brother         NIDDM    Stroke Maternal Grandmother     Asthma Son             Physical Exam:    VITALS:  BP (!) 121/52   Pulse 68   Temp 98 °F (36.7 °C) (Oral)   Resp 16   Ht 5' 5\" (1.651 m)   Wt 197 lb 12 oz (89.7 kg)   LMP 03/19/1980   SpO2 97%   BMI 32.91 kg/m²   24HR INTAKE/OUTPUT:      Intake/Output Summary (Last 24 hours) at 7/16/2021 1323  Last data filed at 7/16/2021 0526  Gross per 24 hour   Intake 1837.09 ml   Output 1200 ml   Net 637.09 ml       Constitutional: alert, appears stated age and cooperative    Skin: Skin color, texture, turgor normal. No rashes or lesions    Head: Normocephalic, without obvious abnormality, atraumatic     Cardiovascular/Edema: regular rate and rhythm, S1, S2 normal, no murmur, click, rub or gallop    Respiratory: clear to auscultation bilaterally    Abdomen: soft, non-tender; bowel sounds normal; no masses,  no organomegaly    Back: symmetric, no curvature. ROM normal. No CVA tenderness. Extremities: extremities normal, atraumatic, no cyanosis or edema    Neuro:  Grossly normal    CBC:   Recent Labs     07/14/21 2243 07/16/21  0524   WBC 11.1* 6.7   HGB 9.7* 9.0*    218     BMP:    Recent Labs     07/14/21  2243 07/15/21  0941 07/16/21  0524    139 139   K 4.8 4.9 4.3    103 104   CO2 21 23 22   BUN 36* 32* 29*   CREATININE 2.99* 3.01* 2.73*   GLUCOSE 137* 163* 121*     Lab Results   Component Value Date    NITRU NEGATIVE 07/14/2021    COLORU YELLOW 07/14/2021    PHUR 6.0 07/14/2021    WBCUA 50  07/14/2021    RBCUA 20 TO 50 07/14/2021    MUCUS NOT REPORTED 07/14/2021    TRICHOMONAS NOT REPORTED 07/14/2021    YEAST MODERATE 07/14/2021    BACTERIA MODERATE 07/14/2021    SPECGRAV 1.012 07/14/2021    LEUKOCYTESUR LARGE 07/14/2021    UROBILINOGEN Normal 07/14/2021    BILIRUBINUR NEGATIVE 07/14/2021    GLUCOSEU NEGATIVE 07/14/2021    KETUA NEGATIVE 07/14/2021    AMORPHOUS NOT REPORTED 07/14/2021     Urine Sodium:     Lab Results   Component Value Date    JOANNA 81 07/15/2021     Urine Chloride:    Lab Results   Component Value Date    CLUR 76 07/15/2021     Urine Creatinine:     Lab Results   Component Value Date    LABCREA 55.0 07/15/2021     IMPRESSION/RECOMMENDATIONS:    1. Acute kidney injury superimposed on chronic kidney disease stage IV differentials include stent obstruction versus dehydration.  KUB abdominal x-ray performed at presentation showed left double-J ureteral stent in appropriate position. Serum creatinine slowly improving nonoliguric  Nuclear renal scan shows a split differential function is 68% on the left and 32% on the right    2. Normocytic anemia - likely secondary to chronic kidney disease. Check iron studies. 3. Nephrolithiasis - for metabolic work-up. 4. Systemic hypertension - blood pressure is adequately controlled. Plan    Continue IV fluid 0.9 normal saline at 100 mL/h. Avoid nephrotoxic agents. Basic metabolic profile daily. No indications for acute hemodialysis as patient is not volume overloaded and not uremic. Thank you very much for the courtesy of this consultation.     Elayne Bolivar MD   Attending Nephrologist  7/16/2021 1:23 PM

## 2021-07-16 NOTE — PROGRESS NOTES
Progress Note    7/16/2021   12:23 PM    Name:  Uriah Mccallum  MRN:    [id number]     Acct:     [de-identified]   Room:  2073/2073-01  IP Day: 1     Admit Date: 7/14/2021  9:30 PM  PCP: Annie Grajeda DO    Subjective:     C/C:   Chief Complaint   Patient presents with    Flank Pain    Other     abnormal BUN and CRE       Interval History: Status: not changed. Flank pain is controlled    ROS:   all 10 systems reviewed and are negative except as noted    Review of Systems   Constitutional: Negative for appetite change and fatigue. HENT: Negative for congestion, postnasal drip and sore throat. Eyes: Negative for pain and discharge. Respiratory: Negative for apnea, chest tightness and stridor. Cardiovascular: Negative for palpitations. Gastrointestinal: Negative for anal bleeding and rectal pain. Endocrine: Negative for polydipsia and polyphagia. Genitourinary: Negative for decreased urine volume, flank pain and hematuria. Musculoskeletal: Negative for joint swelling and neck stiffness. Skin: Negative for rash. Allergic/Immunologic: Negative for food allergies. Neurological: Negative for seizures, facial asymmetry and speech difficulty. Hematological: Does not bruise/bleed easily. Psychiatric/Behavioral: Negative for behavioral problems and suicidal ideas. The patient is not hyperactive. Medications: Allergies:    Allergies   Allergen Reactions    Pcn [Penicillins] Swelling and Hives    Heparin Other (See Comments)     MIGRAINE      Lamotrigine Other (See Comments), Itching, Nausea Only and Rash    Cyclobenzaprine      Dry mouth, jitters    Heparin (Porcine)      Other reaction(s): Migraine       Current Meds: cefTRIAXone (ROCEPHIN) 1000 mg IVPB in 50 mL D5W minibag, Q24H  albuterol sulfate  (90 Base) MCG/ACT inhaler 2 puff, Q4H PRN  ALPRAZolam (XANAX) tablet 1 mg, Daily PRN  amLODIPine (NORVASC) tablet 10 mg, Daily  aspirin EC tablet 81 mg, Daily  atorvastatin (LIPITOR) tablet 40 mg, Nightly  buPROPion (WELLBUTRIN XL) extended release tablet 300 mg, Daily  ferrous sulfate (IRON 325) tablet 325 mg, Daily with breakfast  FLUoxetine (PROZAC) capsule 10 mg, Daily  isosorbide mononitrate (IMDUR) extended release tablet 30 mg, Daily  levothyroxine (SYNTHROID) tablet 112 mcg, Daily  metoprolol succinate (TOPROL XL) extended release tablet 100 mg, Daily  nitroGLYCERIN (NITROSTAT) SL tablet 0.4 mg, Q5 Min PRN  pantoprazole (PROTONIX) tablet 40 mg, QAM AC  pramipexole (MIRAPEX) tablet 0.125 mg, Daily  sodium chloride flush 0.9 % injection 10 mL, 2 times per day  sodium chloride flush 0.9 % injection 10 mL, PRN  0.9 % sodium chloride infusion, PRN  potassium chloride (KLOR-CON M) extended release tablet 40 mEq, PRN   Or  potassium bicarb-citric acid (EFFER-K) effervescent tablet 40 mEq, PRN   Or  potassium chloride 10 mEq/100 mL IVPB (Peripheral Line), PRN  magnesium sulfate 1000 mg in dextrose 5% 100 mL IVPB, PRN  ondansetron (ZOFRAN-ODT) disintegrating tablet 4 mg, Q8H PRN   Or  ondansetron (ZOFRAN) injection 4 mg, Q6H PRN  polyethylene glycol (GLYCOLAX) packet 17 g, Daily PRN  acetaminophen (TYLENOL) tablet 650 mg, Q6H PRN   Or  acetaminophen (TYLENOL) suppository 650 mg, Q6H PRN  0.9 % sodium chloride infusion, Continuous  sodium chloride flush 0.9 % injection 10 mL, PRN  sodium chloride flush 0.9 % injection 10 mL, PRN  apixaban (ELIQUIS) tablet 2.5 mg, BID        Data:     Code Status:  Full Code    Family History   Problem Relation Age of Onset    Breast Cancer Mother 62        BREAST WITH METS T  LIVER AND LUNG    Other Father         AAA    Heart Disease Father     Asthma Sister     Diabetes Sister         NIDDM    Stroke Brother     Diabetes Brother         NIDDM    Stroke Maternal Grandmother     Asthma Son        Social History     Socioeconomic History    Marital status:       Spouse name: Not on file    Number of children: 1    Years of education: Not on file    Highest education level: Not on file   Occupational History    Not on file   Tobacco Use    Smoking status: Former Smoker     Packs/day: 1.00     Years: 30.00     Pack years: 30.00     Types: Cigarettes     Quit date: 3/19/2004     Years since quittin.3    Smokeless tobacco: Never Used   Vaping Use    Vaping Use: Never used   Substance and Sexual Activity    Alcohol use: Yes     Comment: socially, once a year    Drug use: No    Sexual activity: Not Currently   Other Topics Concern    Not on file   Social History Narrative    Not on file     Social Determinants of Health     Financial Resource Strain:     Difficulty of Paying Living Expenses:    Food Insecurity:     Worried About Running Out of Food in the Last Year:     [address] in the Last Year:    Transportation Needs:     Lack of Transportation (Medical):  Lack of Transportation (Non-Medical):    Physical Activity:     Days of Exercise per Week:     Minutes of Exercise per Session:    Stress:     Feeling of Stress :    Social Connections:     Frequency of Communication with Friends and Family:     Frequency of Social Gatherings with Friends and Family:     Attends [employer] Services:     Active Member of Clubs or Organizations:     Attends Club or Organization Meetings:     Marital Status:    Intimate Partner Violence:     Fear of Current or Ex-Partner:     Emotionally Abused:     Physically Abused:     Sexually Abused:        I/O (24Hr): Intake/Output Summary (Last 24 hours) at 2021 1223  Last data filed at 2021 0526  Gross per 24 hour   Intake 1837.09 ml   Output 1400 ml   Net 437.09 ml     Radiology:  XR ABDOMEN (KUB) (SINGLE AP VIEW)    Result Date: 2021  1. Left double-J ureteral stent in appropriate position. No calculi are present along the course of the left double-J ureteral stent     NM KIDNEY W FLOW AND FUNCTION W PHARMACOLOGICAL INTERVENTION    Result Date: 7/15/2021  1. Abnormal function of the left kidney. There is visible excretion and no dilated collecting system, but the delayed cortical retention suggests underlying acute kidney injury. No convincing evidence of obstruction. There was hydronephrosis and hydroureter on CT scan 06/20/2021. 2.  Diminished activity in the right kidney compared to the left with no excreted activity visualized. The time activity curve also suggests acute kidney injury. The collecting system appeared within normal limits on the prior CT scan. 3.  Split differential function is 68% on the left and 32% on the right.        Labs:  Recent Results (from the past 24 hour(s))   Protein / Creatinine Ratio, Urine    Collection Time: 07/15/21  2:01 PM   Result Value Ref Range    Total Protein, Urine 24 mg/dL    Creatinine, Ur 55.0 28.0 - 217.0 mg/dL    Urine Total Protein Creatinine Ratio 0.44 (H) 0.00 - 0.20   EOSINOPHILS, URINE    Collection Time: 07/15/21  2:01 PM   Result Value Ref Range    Eosinophil, Ur FEW    CHLORIDE, URINE, RANDOM    Collection Time: 07/15/21  2:01 PM   Result Value Ref Range    Chloride, Ur 76 mmol/L   SODIUM, URINE, RANDOM    Collection Time: 07/15/21  2:01 PM   Result Value Ref Range    Sodium,Ur 81 mmol/L   Comprehensive Metabolic Panel w/ Reflex to MG    Collection Time: 07/16/21  5:24 AM   Result Value Ref Range    Glucose 121 (H) 70 - 99 mg/dL    BUN 29 (H) 8 - 23 mg/dL    CREATININE 2.73 (H) 0.50 - 0.90 mg/dL    Bun/Cre Ratio NOT REPORTED 9 - 20    Calcium 9.1 8.6 - 10.4 mg/dL    Sodium 139 135 - 144 mmol/L    Potassium 4.3 3.7 - 5.3 mmol/L    Chloride 104 98 - 107 mmol/L    CO2 22 20 - 31 mmol/L    Anion Gap 13 9 - 17 mmol/L    Alkaline Phosphatase 70 35 - 104 U/L    ALT 12 5 - 33 U/L    AST 15 <32 U/L    Total Bilirubin 0.16 (L) 0.3 - 1.2 mg/dL    Total Protein 6.5 6.4 - 8.3 g/dL    Albumin 3.9 3.5 - 5.2 g/dL    Albumin/Globulin Ratio NOT REPORTED 1.0 - 2.5    GFR Non-African American 17 (L) >60 mL/min    GFR  American 21 (L) >60 mL/min    GFR Comment          GFR Staging NOT REPORTED    CBC auto differential    Collection Time: 21  5:24 AM   Result Value Ref Range    WBC 6.7 3.5 - 11.0 k/uL    RBC 3.07 (L) 4.0 - 5.2 m/uL    Hemoglobin 9.0 (L) 12.0 - 16.0 g/dL    Hematocrit 27.6 (L) 36 - 46 %    MCV 89.8 80 - 100 fL    MCH 29.3 26 - 34 pg    MCHC 32.6 31 - 37 g/dL    RDW 14.4 11.5 - 14.9 %    Platelets 328 389 - 759 k/uL    MPV 8.6 6.0 - 12.0 fL    NRBC Automated NOT REPORTED per 100 WBC    Differential Type NOT REPORTED     Immature Granulocytes NOT REPORTED 0 %    Absolute Immature Granulocyte NOT REPORTED 0.00 - 0.30 k/uL    WBC Morphology NOT REPORTED     RBC Morphology NOT REPORTED     Platelet Estimate NOT REPORTED     Seg Neutrophils 50 36 - 66 %    Lymphocytes 30 24 - 44 %    Monocytes 11 (H) 1 - 7 %    Eosinophils % 8 (H) 0 - 4 %    Basophils 1 0 - 2 %    Segs Absolute 3.30 1.3 - 9.1 k/uL    Absolute Lymph # 2.00 1.0 - 4.8 k/uL    Absolute Mono # 0.70 0.1 - 1.3 k/uL    Absolute Eos # 0.50 (H) 0.0 - 0.4 k/uL    Basophils Absolute 0.10 0.0 - 0.2 k/uL   POC Glucose Fingerstick    Collection Time: 21  7:33 AM   Result Value Ref Range    POC Glucose 135 (H) 65 - 105 mg/dL   POC Glucose Fingerstick    Collection Time: 21 11:17 AM   Result Value Ref Range    POC Glucose 180 (H) 65 - 105 mg/dL       Physical Examination:        Vitals:  BP (!) 121/52   Pulse 68   Temp 98 °F (36.7 °C) (Oral)   Resp 16   Ht 5' 5\" (1.651 m)   Wt 197 lb 12 oz (89.7 kg)   LMP 1980   SpO2 97%   BMI 32.91 kg/m²   Temp (24hrs), Av.9 °F (36.6 °C), Min:97.7 °F (36.5 °C), Max:98.1 °F (36.7 °C)    Recent Labs     07/15/21  0650 21  0733 21  1117   POCGLU 117* 135* 180*         Physical Exam  Vitals reviewed. Constitutional:       Appearance: She is well-developed. HENT:      Head: Normocephalic and atraumatic.       Nose: Nose normal.   Eyes:      General: Lids are normal.   Neck:      Trachea: No tracheal deviation. Cardiovascular:      Rate and Rhythm: Normal rate and regular rhythm. Heart sounds: Normal heart sounds, S1 normal and S2 normal.   Pulmonary:      Effort: Pulmonary effort is normal. No respiratory distress. Breath sounds: Normal breath sounds. Abdominal:      General: Bowel sounds are normal. There is no distension. Palpations: Abdomen is soft. Tenderness: There is no abdominal tenderness. There is no guarding. Musculoskeletal:         General: No tenderness or deformity. Lymphadenopathy:      Cervical: No cervical adenopathy. Upper Body:      Right upper body: No supraclavicular or epitrochlear adenopathy. Left upper body: No supraclavicular or epitrochlear adenopathy. Skin:     General: Skin is warm and dry. Capillary Refill: Capillary refill takes less than 2 seconds. Neurological:      Mental Status: She is alert. Motor: No abnormal muscle tone or seizure activity. Psychiatric:         Speech: Speech normal.         Behavior: Behavior normal.         Judgment: Judgment normal.         Assessment:        Primary Problem  Acute pyelonephritis     Principal Problem:    Acute pyelonephritis  Active Problems:    WERO (acute kidney injury) ([address]    Acute cystitis without hematuria    Essential hypertension    Hypothyroidism    Iron deficiency anemia    GERD (gastroesophageal reflux disease)  Resolved Problems:    * No resolved hospital problems. *      Past Medical History:   Diagnosis Date    Aortic valve stenosis     mild per chart    Arthritis     CAD (coronary artery disease) 2000    1 STENT    Diabetes mellitus ([address]     Heart murmur     1962    Hyperlipidemia 2004    ON RX    MI, old     states many and they were mild    Pulmonary stenosis 1995    Pulmonary valve stenosis     mild/congenital per chart    Sciatica     right leg    Thyroid disease 2004    HYPOTHYROIDISM ON RX    Wears glasses         Plan:        1.  Urology input noted  2. On iv rocephin  3. Wait for urine culture  4. Continue iv fluids, monitor bmp  1. PPI  2. DVT Prophylaxis on eliquis  3. EPCs  4. PT/OT to evaluate and treat  5. Pain control  6. Replace electrolytes as per sliding scale  7. Home medications reviewed and appropriate medications continued  8.  Reviewed labs and imaging studies from last 24 hours and results explained to patient      Electronically signed by Elba Alfred MD

## 2021-07-16 NOTE — CARE COORDINATION
ONGOING DISCHARGE PLAN:    Patient is alert and oriented x4. Spoke with patient regarding discharge plan and patient confirms that plan is still to go home with no needs. Labs;  BUN 29, Cre 2.73,     Urology notes  - Can follow up with Dr Villa Records as outpatient for retrograde and possible ureteroscopy. Already has stent placed. Remains on Iv fluids    Will continue to follow for additional discharge needs.     Electronically signed by Sheela Starks RN on 7/16/2021 at 2:13 PM

## 2021-07-17 VITALS
DIASTOLIC BLOOD PRESSURE: 60 MMHG | HEART RATE: 66 BPM | WEIGHT: 197.75 LBS | HEIGHT: 65 IN | TEMPERATURE: 97.7 F | SYSTOLIC BLOOD PRESSURE: 136 MMHG | OXYGEN SATURATION: 97 % | RESPIRATION RATE: 16 BRPM | BODY MASS INDEX: 32.95 KG/M2

## 2021-07-17 PROBLEM — N12 PYELONEPHRITIS: Status: ACTIVE | Noted: 2021-07-15

## 2021-07-17 LAB
ABSOLUTE EOS #: 0.5 K/UL (ref 0–0.4)
ABSOLUTE IMMATURE GRANULOCYTE: ABNORMAL K/UL (ref 0–0.3)
ABSOLUTE LYMPH #: 1.8 K/UL (ref 1–4.8)
ABSOLUTE MONO #: 0.8 K/UL (ref 0.1–1.3)
ALBUMIN SERPL-MCNC: 3.6 G/DL (ref 3.5–5.2)
ALBUMIN/GLOBULIN RATIO: ABNORMAL (ref 1–2.5)
ALP BLD-CCNC: 66 U/L (ref 35–104)
ALT SERPL-CCNC: 11 U/L (ref 5–33)
ANION GAP SERPL CALCULATED.3IONS-SCNC: 12 MMOL/L (ref 9–17)
AST SERPL-CCNC: 14 U/L
BASOPHILS # BLD: 1 % (ref 0–2)
BASOPHILS ABSOLUTE: 0.1 K/UL (ref 0–0.2)
BILIRUB SERPL-MCNC: <0.15 MG/DL (ref 0.3–1.2)
BUN BLDV-MCNC: 22 MG/DL (ref 8–23)
BUN/CREAT BLD: ABNORMAL (ref 9–20)
CALCIUM SERPL-MCNC: 8.3 MG/DL (ref 8.6–10.4)
CHLORIDE BLD-SCNC: 109 MMOL/L (ref 98–107)
CO2: 21 MMOL/L (ref 20–31)
CREAT SERPL-MCNC: 2.58 MG/DL (ref 0.5–0.9)
CULTURE: ABNORMAL
DIFFERENTIAL TYPE: ABNORMAL
EOSINOPHILS RELATIVE PERCENT: 8 % (ref 0–4)
GFR AFRICAN AMERICAN: 22 ML/MIN
GFR NON-AFRICAN AMERICAN: 18 ML/MIN
GFR SERPL CREATININE-BSD FRML MDRD: ABNORMAL ML/MIN/{1.73_M2}
GFR SERPL CREATININE-BSD FRML MDRD: ABNORMAL ML/MIN/{1.73_M2}
GLUCOSE BLD-MCNC: 116 MG/DL (ref 65–105)
GLUCOSE BLD-MCNC: 137 MG/DL (ref 65–105)
GLUCOSE BLD-MCNC: 137 MG/DL (ref 70–99)
GLUCOSE BLD-MCNC: 151 MG/DL (ref 65–105)
HCT VFR BLD CALC: 26.7 % (ref 36–46)
HEMOGLOBIN: 8.7 G/DL (ref 12–16)
IMMATURE GRANULOCYTES: ABNORMAL %
LYMPHOCYTES # BLD: 26 % (ref 24–44)
Lab: ABNORMAL
MCH RBC QN AUTO: 29.5 PG (ref 26–34)
MCHC RBC AUTO-ENTMCNC: 32.6 G/DL (ref 31–37)
MCV RBC AUTO: 90.4 FL (ref 80–100)
MONOCYTES # BLD: 11 % (ref 1–7)
NRBC AUTOMATED: ABNORMAL PER 100 WBC
PDW BLD-RTO: 14.2 % (ref 11.5–14.9)
PLATELET # BLD: 208 K/UL (ref 150–450)
PLATELET ESTIMATE: ABNORMAL
PMV BLD AUTO: 8.7 FL (ref 6–12)
POTASSIUM SERPL-SCNC: 4.4 MMOL/L (ref 3.7–5.3)
RBC # BLD: 2.95 M/UL (ref 4–5.2)
RBC # BLD: ABNORMAL 10*6/UL
SEG NEUTROPHILS: 54 % (ref 36–66)
SEGMENTED NEUTROPHILS ABSOLUTE COUNT: 3.8 K/UL (ref 1.3–9.1)
SODIUM BLD-SCNC: 142 MMOL/L (ref 135–144)
SPECIMEN DESCRIPTION: ABNORMAL
TOTAL PROTEIN: 6.1 G/DL (ref 6.4–8.3)
WBC # BLD: 7.1 K/UL (ref 3.5–11)
WBC # BLD: ABNORMAL 10*3/UL

## 2021-07-17 PROCEDURE — 80053 COMPREHEN METABOLIC PANEL: CPT

## 2021-07-17 PROCEDURE — 6370000000 HC RX 637 (ALT 250 FOR IP): Performed by: FAMILY MEDICINE

## 2021-07-17 PROCEDURE — 36415 COLL VENOUS BLD VENIPUNCTURE: CPT

## 2021-07-17 PROCEDURE — 99222 1ST HOSP IP/OBS MODERATE 55: CPT | Performed by: NURSE PRACTITIONER

## 2021-07-17 PROCEDURE — 82947 ASSAY GLUCOSE BLOOD QUANT: CPT

## 2021-07-17 PROCEDURE — 85025 COMPLETE CBC W/AUTO DIFF WBC: CPT

## 2021-07-17 PROCEDURE — 2580000003 HC RX 258: Performed by: FAMILY MEDICINE

## 2021-07-17 RX ORDER — LINEZOLID 600 MG/1
600 TABLET, FILM COATED ORAL EVERY 12 HOURS SCHEDULED
Qty: 14 TABLET | Refills: 0 | Status: SHIPPED | OUTPATIENT
Start: 2021-07-17 | End: 2021-07-24

## 2021-07-17 RX ORDER — LINEZOLID 600 MG/1
600 TABLET, FILM COATED ORAL EVERY 12 HOURS SCHEDULED
Status: DISCONTINUED | OUTPATIENT
Start: 2021-07-17 | End: 2021-07-17 | Stop reason: HOSPADM

## 2021-07-17 RX ORDER — FLUOXETINE 10 MG/1
10 CAPSULE ORAL DAILY
Qty: 30 CAPSULE | Refills: 3 | Status: SHIPPED | OUTPATIENT
Start: 2021-07-24 | End: 2022-06-29

## 2021-07-17 RX ORDER — FLUOXETINE 10 MG/1
10 CAPSULE ORAL DAILY
Status: DISCONTINUED | OUTPATIENT
Start: 2021-07-24 | End: 2021-07-17 | Stop reason: HOSPADM

## 2021-07-17 RX ADMIN — FERROUS SULFATE TAB 325 MG (65 MG ELEMENTAL FE) 325 MG: 325 (65 FE) TAB at 07:41

## 2021-07-17 RX ADMIN — ASPIRIN 81 MG: 81 TABLET, COATED ORAL at 07:41

## 2021-07-17 RX ADMIN — SODIUM CHLORIDE: 9 INJECTION, SOLUTION INTRAVENOUS at 07:41

## 2021-07-17 RX ADMIN — PRAMIPEXOLE DIHYDROCHLORIDE 0.12 MG: 0.12 TABLET ORAL at 07:40

## 2021-07-17 RX ADMIN — METOPROLOL SUCCINATE 100 MG: 100 TABLET, EXTENDED RELEASE ORAL at 07:41

## 2021-07-17 RX ADMIN — PANTOPRAZOLE SODIUM 40 MG: 40 TABLET, DELAYED RELEASE ORAL at 06:38

## 2021-07-17 RX ADMIN — FLUOXETINE 10 MG: 10 CAPSULE ORAL at 07:40

## 2021-07-17 RX ADMIN — ISOSORBIDE MONONITRATE 30 MG: 30 TABLET, EXTENDED RELEASE ORAL at 07:41

## 2021-07-17 RX ADMIN — BUPROPION HYDROCHLORIDE 300 MG: 300 TABLET, FILM COATED, EXTENDED RELEASE ORAL at 07:41

## 2021-07-17 RX ADMIN — LEVOTHYROXINE SODIUM 112 MCG: 0.11 TABLET ORAL at 07:40

## 2021-07-17 RX ADMIN — APIXABAN 2.5 MG: 2.5 TABLET, FILM COATED ORAL at 07:41

## 2021-07-17 RX ADMIN — SODIUM CHLORIDE: 9 INJECTION, SOLUTION INTRAVENOUS at 02:06

## 2021-07-17 RX ADMIN — AMLODIPINE BESYLATE 10 MG: 10 TABLET ORAL at 07:41

## 2021-07-17 ASSESSMENT — PAIN SCALES - GENERAL: PAINLEVEL_OUTOF10: 0

## 2021-07-17 NOTE — PROGRESS NOTES
Department of Internal Medicine  Nephrology [facility], [place]   Consult Note      Reason for consultation: Management of acute kidney injury. Consulting physician: Geroge Closs, MD.    History of present illness: This is a 70 y.o. female with a significant past medical history of Type 2 diabetes mellitus [diagnosed in 2015], Coronary artery disease [s/p PTCA/stents], hyperlipidemia, hypothyroidism, systemic hypertension and Chronic kidney disease stage III [baseline serum creatinine 1.40 mg/dL in November 2018], who was sent in by the primary physician for further management of acute kidney injury. She had been admitted on 6/20/2021 after presenting with flank pain nausea and vomiting with CT scan of the abdomen and pelvis showing left-sided hydronephrosis associated with kidney stone. She had cystoscopy with left ureteral stent placement at that time but renal function was seen with serum creatinine increasing from 2.7 mg/dL present short-term to 5 mg/dL and she had Jeffy catheter placed and received acute intermittent hemodialysis. At discharge, serum creatinine was 2.43 mg/dL [7/2/2021]. She was not on dialysis at discharge. She had seen her primary physician on Monday, 7/12/2021 and laboratory studies were performed revealed serum creatinine 3.03 mg/dL and hence patient was sent to the hospital for admission and evaluation of acute kidney injury. Her baseline serum creatinine is 1.40 mg/dL. Today, she is nonoliguric and denies flank pain, gross hematuria or shortness of breath. She is afebrile.     Interval history/subjective    Patient seen and examined patient is feeling better weakness in the legs is improved no dizziness  Serum creatinine slowly improving 2.5 mg/dL  Nonoliguric good urine output  Antibiotics for UTI    Pcn [penicillins], Heparin, Lamotrigine, Cyclobenzaprine, and Heparin (porcine)    Past Medical History:   Diagnosis Date    Aortic valve stenosis     mild per chart    Arthritis     CAD (coronary artery disease) 2000    1 STENT    Diabetes mellitus ([facility] [facility]     Heart murmur     1962    Hyperlipidemia 2004    ON RX    MI, old     states many and they were mild    Pulmonary stenosis 1995    Pulmonary valve stenosis     mild/congenital per chart    Sciatica     right leg    Thyroid disease 2004    HYPOTHYROIDISM ON RX    Wears glasses        Scheduled Meds:   linezolid  600 mg Oral 2 times per day    [START ON 7/24/2021] FLUoxetine  10 mg Oral Daily    amLODIPine  10 mg Oral Daily    aspirin  81 mg Oral Daily    atorvastatin  40 mg Oral Nightly    buPROPion  300 mg Oral Daily    ferrous sulfate  325 mg Oral Daily with breakfast    isosorbide mononitrate  30 mg Oral Daily    levothyroxine  112 mcg Oral Daily    metoprolol succinate  100 mg Oral Daily    pantoprazole  40 mg Oral QAM AC    pramipexole  0.125 mg Oral Daily    sodium chloride flush  10 mL Intravenous 2 times per day    apixaban  2.5 mg Oral BID     Continuous Infusions:   sodium chloride      sodium chloride 100 mL/hr at 07/17/21 0741     PRN Meds:.albuterol sulfate HFA, ALPRAZolam, nitroGLYCERIN, sodium chloride flush, sodium chloride, ondansetron **OR** ondansetron, polyethylene glycol, acetaminophen **OR** acetaminophen, sodium chloride flush, sodium chloride flush    Family History   Problem Relation Age of Onset    Breast Cancer Mother 62        BREAST WITH METS T  LIVER AND LUNG    Other Father         AAA    Heart Disease Father     Asthma Sister     Diabetes Sister         NIDDM    Stroke Brother     Diabetes Brother         NIDDM    Stroke Maternal Grandmother     Asthma Son             Physical Exam:    VITALS:  BP (!) 151/65   Pulse 72   Temp 98 °F (36.7 °C) (Oral)   Resp 16   Ht 5' 5\" (1.651 m)   Wt 197 lb 12 oz (89.7 kg)   LMP 03/19/1980   SpO2 94%   BMI 32.91 kg/m²   24HR INTAKE/OUTPUT:      Intake/Output Summary (Last 24 hours) at 7/17/2021 1324  Last data filed at 7/17/2021 0639  Gross per 24 hour   Intake 2510 ml   Output 2700 ml   Net -190 ml       Constitutional: alert, appears stated age and cooperative    Skin: Skin color, texture, turgor normal. No rashes or lesions    Head: Normocephalic, without obvious abnormality, atraumatic     Cardiovascular/Edema: regular rate and rhythm, S1, S2 normal, no murmur, click, rub or gallop    Respiratory: clear to auscultation bilaterally    Abdomen: soft, non-tender; bowel sounds normal; no masses,  no organomegaly    Back: symmetric, no curvature. ROM normal. No CVA tenderness. Extremities: extremities normal, atraumatic, no cyanosis or edema    Neuro:  Grossly normal    CBC:   Recent Labs     07/14/21  2243 07/16/21  0524 07/17/21  0609   WBC 11.1* 6.7 7.1   HGB 9.7* 9.0* 8.7*    218 208     BMP:    Recent Labs     07/15/21  0941 07/16/21  0524 07/17/21  0609    139 142   K 4.9 4.3 4.4    104 109*   CO2 23 22 21   BUN 32* 29* 22   CREATININE 3.01* 2.73* 2.58*   GLUCOSE 163* 121* 137*     Lab Results   Component Value Date    NITRU NEGATIVE 07/14/2021    COLORU YELLOW 07/14/2021    PHUR 6.0 07/14/2021    WBCUA 50  07/14/2021    RBCUA 20 TO 50 07/14/2021    MUCUS NOT REPORTED 07/14/2021    TRICHOMONAS NOT REPORTED 07/14/2021    YEAST MODERATE 07/14/2021    BACTERIA MODERATE 07/14/2021    SPECGRAV 1.012 07/14/2021    LEUKOCYTESUR LARGE 07/14/2021    UROBILINOGEN Normal 07/14/2021    BILIRUBINUR NEGATIVE 07/14/2021    GLUCOSEU NEGATIVE 07/14/2021    KETUA NEGATIVE 07/14/2021    AMORPHOUS NOT REPORTED 07/14/2021     Urine Sodium:     Lab Results   Component Value Date    JOANNA 81 07/15/2021     Urine Chloride:    Lab Results   Component Value Date    CLUR 76 07/15/2021     Urine Creatinine:     Lab Results   Component Value Date    LABCREA 55.0 07/15/2021     IMPRESSION/RECOMMENDATIONS:    1.  Acute kidney injury superimposed on chronic kidney disease stage IV differentials include stent obstruction versus dehydration. KUB abdominal x-ray performed at presentation showed left double-J ureteral stent in appropriate position. Serum creatinine slowly improving nonoliguric, serum creatinine down to 2.5 mg/dL  Nuclear renal scan shows a split differential function is 68% on the left and 32% on the right    2. Normocytic anemia - likely secondary to chronic kidney disease. Check iron studies. 3. Nephrolithiasis - for metabolic work-up. 4. Systemic hypertension - blood pressure is adequately controlled. 5.  UTI on antibiotics    Plan    Continue IV fluid 0.9 normal saline at 100 mL/h. Avoid nephrotoxic agents. Basic metabolic profile daily. No indications for acute hemodialysis as patient is not volume overloaded and not uremic. Antibiotics per ID      Thank you very much for the courtesy of this consultation.     Sara Angela MD   Attending Nephrologist  7/17/2021 1:24 PM

## 2021-07-17 NOTE — PROGRESS NOTES
Discharge     Dr. Daniele Patel notified of creatine level, Dr. Daniele Patel has no objections to discharge at this time.

## 2021-07-17 NOTE — CONSULTS
[facility] -   Infectious diseases evaluation  admission date 7/14/2021    reason for consultation:   UTI    Impression :   Current:  · Enterococcus Faecalis UTI. · Recent pyelonephritis s/p ureteral stent placement 6/21/21. · WERO  · Leukocytosis. · Allergy to PCN-Swelling, Hives. Other:  ·   Discussion / summary of stay / plan of care   · Lengthy discussion with patient regarding PCN allergy. States she received PCN in the 1980s after her son was born and had extensive swelling and rash. Patient states she thinks has tried Ampicillin since and has had the same results. Discussed a 7-day course of Zyvox with patient and potential side-effects of interaction with Prozac. Patient would like to try Zyvox and understands she will not be taking Prozac x 7 days. Prozac to resume 7/24/21. Recommendations   · Stop Ceftriaxone. Switch to Zyvox 600 mg po every 12 hours x 7 days. Stop Prozac. Resume 7/24 after completion of Zyvox. · Follow CBC and renal function closely. · Supportive care. · Discussed with patient, RN. Infection Control Recommendations   · [place] Precautions    Antimicrobial Stewardship Recommendations   · Simplification of therapy  · Targeted therapy    Coordination ofOutpatient Care:   · Estimated Length of IV antimicrobials:  · Patient will need Midline / picc Catheter Insertion:   · Patient will need SNF:  · Patient will need outpatient wound care:     History of Present Illness:   Initial history:  Marilyn Ventura is a 70y.o.-year-old female who preseted to the ED per PCP recommendation for left flank pain and elevated BUN/Cre levels. Recent admission to [facility] 6/20-7/2/21 for pyelonephritis. She underwent cystoscopy with ureteral stent placement 6/21/21. KUB 7/14/21 showing stents are in good position.  No obstruction per Renogram.   No associated symptoms of dysuria, hematuria, shortness of breath, chest, flank, back pain, n/v/d.  Initial labs:Cre: 2.99, WBC:11.1    Interval changes  7/17/2021   Afebrile. Feeling good. Denies any fever, chills, n/v/d. No dysuria, hematuria. Slight CVA tenderness on the left. Patient Vitals for the past 8 hrs:   BP Temp Temp src Pulse Resp SpO2   07/17/21 0730 (!) 151/65 98 °F (36.7 °C) Oral 72 16 94 %       Summary of relevant labs:  Labs:  Cre: 2.99-2.73-2.58  WBC: 11.1-6.7-7.1    Micro:  7/14 Urine cx-E. Faecalis-S Ampicillin, R-Cipro    Imaging:      I have personally reviewed the past medical history, past surgical history, medications, social history, and family history, and I haveupdated the database accordingly. Allergies:   Pcn [penicillins], Heparin, Lamotrigine, Cyclobenzaprine, and Heparin (porcine)     Review of Systems:     Review of Systems   All other systems reviewed and are negative. Physical Examination :       Physical Exam  Vitals and nursing note reviewed. Constitutional:       General: She is not in acute distress. Appearance: Normal appearance. HENT:      Head: Normocephalic and atraumatic. Right Ear: External ear normal.      Left Ear: External ear normal.      Nose: Nose normal.      Mouth/Throat:      Mouth: Mucous membranes are moist.      Pharynx: Oropharynx is clear. Eyes:      General: No scleral icterus. Extraocular Movements: Extraocular movements intact. Conjunctiva/sclera: Conjunctivae normal.      Pupils: Pupils are equal, round, and reactive to light. Cardiovascular:      Rate and Rhythm: Normal rate and regular rhythm. Pulses: Normal pulses. Heart sounds: Normal heart sounds. No murmur heard. Pulmonary:      Effort: Pulmonary effort is normal.      Breath sounds: Normal breath sounds. No wheezing. Abdominal:      General: Bowel sounds are normal. There is no distension. Palpations: Abdomen is soft. Tenderness: There is no abdominal tenderness. There is left CVA tenderness.       Comments: Slight CVA tenderness on the left. Genitourinary:     Comments: No hendrix. Musculoskeletal:         General: No swelling or tenderness. Normal range of motion. Cervical back: Normal range of motion and neck supple. Skin:     General: Skin is warm and dry. Capillary Refill: Capillary refill takes less than 2 seconds. Coloration: Skin is not jaundiced. Neurological:      Mental Status: She is alert and oriented to person, place, and time. Psychiatric:         Mood and Affect: Mood normal.         Behavior: Behavior normal.         Thought Content:  Thought content normal.         Judgment: Judgment normal.         Past Medical History:     Past Medical History:   Diagnosis Date    Aortic valve stenosis     mild per chart    Arthritis     CAD (coronary artery disease) 2000    1 STENT    Diabetes mellitus ([facility] [address]     Heart murmur     1962    Hyperlipidemia 2004    ON RX    MI, old     states many and they were mild    Pulmonary stenosis 1995    Pulmonary valve stenosis     mild/congenital per chart    Sciatica     right leg    Thyroid disease 2004    HYPOTHYROIDISM ON RX    Wears glasses        Past Surgical  History:     Past Surgical History:   Procedure Laterality Date    CERVICAL FUSION  1993    CORONARY ANGIOPLASTY WITH STENT PLACEMENT  2000    1 STENT    CYSTOSCOPY Left 6/21/2021    CYSTOSCOPY URETERAL STENT INSERTION performed by Libra Singh MD at [address] Left 03/21/2014    [place] ARTHOPLASTY LEFT THUMB    HYSTERECTOMY  1980    WITH RT SALPINGOOPHERECTOMY    JOINT REPLACEMENT Bilateral 1994    PARTIAL-KNEES    OTHER SURGICAL HISTORY Right 09/23/2014    thumb repair    SALPINGO-OOPHORECTOMY Left 1987    SHOULDER SURGERY Left 1993    SPURS    TOE OSTEOTOMY Right 6/8/2016    Right 5th digit adductory wedge osteotomy with K wire fixation        Medications:      cefTRIAXone (ROCEPHIN) IV  1,000 mg Intravenous Q24H    amLODIPine  10 mg Oral Daily    aspirin 81 mg Oral Daily    atorvastatin  40 mg Oral Nightly    buPROPion  300 mg Oral Daily    ferrous sulfate  325 mg Oral Daily with breakfast    FLUoxetine  10 mg Oral Daily    isosorbide mononitrate  30 mg Oral Daily    levothyroxine  112 mcg Oral Daily    metoprolol succinate  100 mg Oral Daily    pantoprazole  40 mg Oral QAM AC    pramipexole  0.125 mg Oral Daily    sodium chloride flush  10 mL Intravenous 2 times per day    apixaban  2.5 mg Oral BID       Social History:     Social History     Socioeconomic History    Marital status:      Spouse name: Not on file    Number of children: 1    Years of education: Not on file    Highest education level: Not on file   Occupational History    Not on file   Tobacco Use    Smoking status: Former Smoker     Packs/day: 1.00     Years: 30.00     Pack years: 30.00     Types: Cigarettes     Quit date: 3/19/2004     Years since quittin.3    Smokeless tobacco: Never Used   Vaping Use    Vaping Use: Never used   Substance and Sexual Activity    Alcohol use: Yes     Comment: socially, once a year    Drug use: No    Sexual activity: Not Currently   Other Topics Concern    Not on file   Social History Narrative    Not on file     Social Determinants of Health     Financial Resource Strain:     Difficulty of Paying Living Expenses:    Food Insecurity:     Worried About Running Out of Food in the Last Year:     [address] in the Last Year:    Transportation Needs:     Lack of Transportation (Medical):      Lack of Transportation (Non-Medical):    Physical Activity:     Days of Exercise per Week:     Minutes of Exercise per Session:    Stress:     Feeling of Stress :    Social Connections:     Frequency of Communication with Friends and Family:     Frequency of Social Gatherings with Friends and Family:     Attends [employer] Services:     Active Member of Clubs or Organizations:     Attends Club or Organization Meetings:     Marital Status:    Intimate Partner Violence:     Fear of Current or Ex-Partner:     Emotionally Abused:     Physically Abused:     Sexually Abused:        Family History:     Family History   Problem Relation Age of Onset    Breast Cancer Mother 62        BREAST WITH METS T  LIVER AND LUNG    Other Father         AAA    Heart Disease Father     Asthma Sister     Diabetes Sister         NIDDM    Stroke Brother     Diabetes Brother         NIDDM    Stroke Maternal Grandmother     Asthma Son       Medical Decision Making:   I have independently reviewed/ordered the following labs:    CBC with Differential:   Recent Labs     07/16/21  0524 07/17/21  0609   WBC 6.7 7.1   HGB 9.0* 8.7*   HCT 27.6* 26.7*    208   LYMPHOPCT 30 26   MONOPCT 11* 11*     BMP:  Recent Labs     07/16/21  0524 07/17/21  0609    142   K 4.3 4.4    109*   CO2 22 21   BUN 29* 22   CREATININE 2.73* 2.58*     Hepatic Function Panel:   Recent Labs     07/16/21  0524 07/17/21  0609   PROT 6.5 6.1*   LABALBU 3.9 3.6   BILITOT 0.16* <0.15*   ALKPHOS 70 66   ALT 12 11   AST 15 14     No results for input(s): RPR in the last 72 hours. No results for input(s): HIV in the last 72 hours. No results for input(s): BC in the last 72 hours. Lab Results   Component Value Date    CREATININE 2.58 07/17/2021    GLUCOSE 137 07/17/2021       Detailed results: Thank you for allowing us to participate in the care of this patient. Please call with questions. This note is created with the assistance of a speech recognition program.  While intending to generate adocument that actually reflects the content of the visit, the document can still have some errors including those of syntax and sound a like substitutions which may escape proof reading. It such instances, actual meaningcan be extrapolated by contextual diversion.     QI Garcia - ROSALVA  Office: [phone]  Perfect serve / office [phone]

## 2021-07-17 NOTE — PROGRESS NOTES
 cefTRIAXone (ROCEPHIN) IV  1,000 mg Intravenous Q24H    amLODIPine  10 mg Oral Daily    aspirin  81 mg Oral Daily    atorvastatin  40 mg Oral Nightly    buPROPion  300 mg Oral Daily    ferrous sulfate  325 mg Oral Daily with breakfast    FLUoxetine  10 mg Oral Daily    isosorbide mononitrate  30 mg Oral Daily    levothyroxine  112 mcg Oral Daily    metoprolol succinate  100 mg Oral Daily    pantoprazole  40 mg Oral QAM AC    pramipexole  0.125 mg Oral Daily    sodium chloride flush  10 mL Intravenous 2 times per day    apixaban  2.5 mg Oral BID     Continuous Infusions:    sodium chloride      sodium chloride 100 mL/hr at 07/17/21 0741     PRN Meds: albuterol sulfate HFA, ALPRAZolam, nitroGLYCERIN, sodium chloride flush, sodium chloride, ondansetron **OR** ondansetron, polyethylene glycol, acetaminophen **OR** acetaminophen, sodium chloride flush, sodium chloride flush    Data:     Past Medical History:   has a past medical history of Aortic valve stenosis, Arthritis, CAD (coronary artery disease), Diabetes mellitus ([facility] [address] Heart murmur, Hyperlipidemia, MI, old, Pulmonary stenosis, Pulmonary valve stenosis, Sciatica, Thyroid disease, and Wears glasses. Social History:   reports that she quit smoking about 17 years ago. Her smoking use included cigarettes. She has a 30.00 pack-year smoking history. She has never used smokeless tobacco. She reports current alcohol use. She reports that she does not use drugs.      Family History:   Family History   Problem Relation Age of Onset   Teresa Carrillo Breast Cancer Mother 62        BREAST WITH METS T  LIVER AND LUNG    Other Father         AAA    Heart Disease Father     Asthma Sister     Diabetes Sister         NIDDM    Stroke Brother     Diabetes Brother         NIDDM    Stroke Maternal Grandmother     Asthma Son        Vitals:  BP (!) 151/65   Pulse 72   Temp 98 °F (36.7 °C) (Oral)   Resp 16   Ht 5' 5\" (1.651 m)   Wt 197 lb 12 oz (89.7 kg)   LMP 1980   SpO2 94%   BMI 32.91 kg/m²   Temp (24hrs), Av.1 °F (36.7 °C), Min:97.9 °F (36.6 °C), Max:98.4 °F (36.9 °C)    Recent Labs     21  11121  1608 21  0636   POCGLU 180* 155* 130* 137*       I/O (24Hr):     Intake/Output Summary (Last 24 hours) at 2021 1005  Last data filed at 2021 1900  Gross per 24 hour   Intake 2510 ml   Output 2700 ml   Net -190 ml       Labs:  Hematology:  Recent Labs     21  0524 21  0609   WBC 11.1* 6.7 7.1   RBC 3.19* 3.07* 2.95*   HGB 9.7* 9.0* 8.7*   HCT 29.0* 27.6* 26.7*   MCV 91.0 89.8 90.4   MCH 30.4 29.3 29.5   MCHC 33.4 32.6 32.6   RDW 14.4 14.4 14.2    218 208   MPV 9.0 8.6 8.7     Chemistry:  Recent Labs     07/15/21  0941 21  0524 21  0609    139 142   K 4.9 4.3 4.4    104 109*   CO2 23 22 21   GLUCOSE 163* 121* 137*   BUN 32* 29* 22   CREATININE 3.01* 2.73* 2.58*   ANIONGAP 13 13 12   LABGLOM 15* 17* 18*   GFRAA 19* 21* 22*   CALCIUM 9.6 9.1 8.3*     Recent Labs     07/14/21  2243 07/15/21  0650 21  0524 21  0733 21  1117 21  1608 21  0609 21  0636   PROT 7.8  --  6.5  --   --   --   --  6.1*  --    LABALBU 4.5  --  3.9  --   --   --   --  3.6  --    AST 19  --  15  --   --   --   --  14  --    ALT 15  --  12  --   --   --   --  11  --    ALKPHOS 84  --  70  --   --   --   --  66  --    BILITOT 0.23*  --  0.16*  --   --   --   --  <0.15*  --    POCGLU  --  117*  --  135* 180* 155* 130*  --  137*     ABG:No results found for: POCPH, PHART, PH, POCPCO2, DSA9ILS, PCO2, POCPO2, PO2ART, PO2, POCHCO3, HNA4ECN, HCO3, NBEA, PBEA, BEART, BE, THGBART, THB, FHS6ULP, UITJ9UZF, A4DJEGEW, O2SAT, FIO2  Lab Results   Component Value Date/Time    SPECIAL NOT REPORTED 2021 09:41 AM     Lab Results   Component Value Date/Time    CULTURE ENTEROCOCCUS FAECALIS 50 to 100,000 CFU/ML (A) 2021 09:41 AM       Radiology:  XR ABDOMEN (KUB) (SINGLE AP VIEW)    Result Date: 7/14/2021  1. Left double-J ureteral stent in appropriate position. No calculi are present along the course of the left double-J ureteral stent     NM KIDNEY W FLOW AND FUNCTION W PHARMACOLOGICAL INTERVENTION    Result Date: 7/15/2021  1. Abnormal function of the left kidney. There is visible excretion and no dilated collecting system, but the delayed cortical retention suggests underlying acute kidney injury. No convincing evidence of obstruction. There was hydronephrosis and hydroureter on CT scan 06/20/2021. 2.  Diminished activity in the right kidney compared to the left with no excreted activity visualized. The time activity curve also suggests acute kidney injury. The collecting system appeared within normal limits on the prior CT scan. 3.  Split differential function is 68% on the left and 32% on the right. Physical Examination:        General appearance:  alert, cooperative and no distress  Mental Status:  oriented to person, place and time and normal affect  Lungs:  clear to auscultation bilaterally, normal effort  Heart:  regular rate and rhythm, no murmur  Abdomen:  soft, nontender, nondistended, normal bowel sounds, no masses, hepatomegaly, splenomegaly  Extremities:  no edema, redness, tenderness in the calves  Skin:  no gross lesions, rashes, induration    Assessment:        Principal Problem:    Pyelonephritis  Active Problems:    WERO (acute kidney injury) ([address]    Acute cystitis without hematuria    Essential hypertension    Hypothyroidism    Iron deficiency anemia    GERD (gastroesophageal reflux disease)    Leukocytosis    Allergy to penicillin  Resolved Problems:    * No resolved hospital problems. *        Plan:        Urology, nephrolgy on board  Stent with pus return  Urine cultures pending  IV fluid 0.9 normal saline at 100 mL/h.   Cr improving 2.58  K 4.4, no acidosis  Monitor Hb  suagrs 130  On rocephine  Urine cultures Enterococcus fecalis sensitive to penicillins and vancomycin  Pt allergic to penicillins  Consult ID          Gemma Acevedo MD  7/17/2021  10:05 AM

## 2021-07-17 NOTE — PROGRESS NOTES
A consult was placed for Dr Ligia Moreira, Nurse Pract. On call and was notified via perfect serve. .. thank you!

## 2021-07-18 NOTE — DISCHARGE SUMMARY
INPATIENT DISCHARGE SUMMARY        Patient Identification:  Marilyn Hernandez is a 70 y.o. female. :  1950  MRN: [id number]     Acct: [de-identified]   Admit Date:  2021  Discharge date and time: 2021  5:50 PM   Attending Provider: No att. providers found                                     Admission Diagnoses:   Acute pyelonephritis [N10]    Discharge Diagnoses:   Principal Problem:    Pyelonephritis  Active Problems:    WERO (acute kidney injury) ([address]    Acute cystitis without hematuria    Essential hypertension    Hypothyroidism    Iron deficiency anemia    GERD (gastroesophageal reflux disease)    Leukocytosis    Allergy to penicillin  Resolved Problems:    * No resolved hospital problems. *           Brief Inpatient course:    70 y.o.  female with hypertension and CKD presents with Flank Pain and Other (abnormal BUN and CRE)  Patient was recently discharged from the hospital due to WERO, left ureteral calculi s/p ureteral stenting presented to the ER per PCP advice due to abnormal lab work and elevated creatinine levels. Patient states that she has left flank pain that is 5 out of 10 achy nonradiating intermittent with no alleviating factors  Urology and nephrology consulted   The patient did receive IV fluids with improvement in creatinine. Patient was initially started on Rocephin for UTI, urine cultures grew Enterococcus faecalis sensitive to ampicillin and vancomycin. Patient allergic to penicillins. Id was consulted and  Recommended to give linezolid for 7 days. Prescription given. Patient discharged in stable condition. Follow with PCP in the coming week. Restart taking Prozac after completing the 7 day course of linezolid. Discussed with the patient.       Any Hospital Acquired Infections: none    Discharge Functional Status:  stable    Disposition: home    Patient Instructions:   Discharge Medication List as of 2021  5:31 PM      START taking these medications    Details linezolid (ZYVOX) 600 MG tablet Take 1 tablet by mouth every 12 hours for 7 days, Disp-14 tablet, R-0Normal         CONTINUE these medications which have CHANGED    Details   FLUoxetine (PROZAC) 10 MG capsule Take 1 capsule by mouth daily, Disp-30 capsule, R-3Normal         CONTINUE these medications which have NOT CHANGED    Details   amLODIPine (NORVASC) 10 MG tablet Take 1 tablet by mouth daily, Disp-30 tablet, R-3Normal      ferrous sulfate (IRON 325) 325 (65 Fe) MG tablet Take 1 tablet by mouth daily (with breakfast), Disp-30 tablet, R-3Normal      buPROPion (WELLBUTRIN XL) 300 MG extended release tablet TAKE ONE TABLET BY MOUTH DAILYHistorical Med      clotrimazole (LOTRIMIN) 1 % vaginal cream Place 1 applicator vaginally 2 times dailyHistorical Med      pramipexole (MIRAPEX) 0.125 MG tablet TAKE ONE TABLET BY MOUTH DAILYHistorical Med      albuterol sulfate  (90 Base) MCG/ACT inhaler INHALE TWO PUFFS BY MOUTH EVERY 4 TO 6 HOURS AS NEEDED FOR WHEEZINGHistorical Med      nystatin (NYAMYC) 335915 UNIT/GM powder APPLY TO AFFECTED AREA(S) FOUR TIMES A DAY, Historical Med      busPIRone (BUSPAR) 15 MG tablet Take 15 mg by mouth 2 times dailyHistorical Med      isosorbide mononitrate (IMDUR) 30 MG extended release tablet Take 1 tablet by mouth daily, Disp-30 tablet, R-3Normal      pantoprazole (PROTONIX) 40 MG tablet Take 1 tablet by mouth every morning (before breakfast), Disp-30 tablet, R-3Normal      levocetirizine (XYZAL) 5 MG tablet Take 5 mg by mouth nightlyHistorical Med      nystatin (MYCOSTATIN) 702434 UNIT/GM cream Apply topically 2 times daily Apply topically 2 times daily. , Topical, 2 TIMES DAILY, Historical Med      oxybutynin (DITROPAN XL) 15 MG extended release tablet Take 15 mg by mouth dailyHistorical Med      levothyroxine (SYNTHROID) 112 MCG tablet TAKE ONE TABLET BY MOUTH DAILYHistorical Med      atorvastatin (LIPITOR) 40 MG tablet Historical Med      nitroGLYCERIN (NITROSTAT) 0.4 MG SL tablet Place 0.4 mg under the tongue every 5 minutes as needed for Chest pain up to max of 3 total doses. If no relief after 1 dose, call 911. Historical Med      Multiple Vitamins-Minerals (THERAPEUTIC MULTIVITAMIN-MINERALS) tablet Take 1 tablet by mouth daily. ALPRAZolam (XANAX) 1 MG tablet Take 1 mg by mouth as needed for Anxiety. aspirin 81 MG EC tablet Take 81 mg by mouth daily. LAST DOSE 9/8/14      metoprolol (TOPROL-XL) 100 MG XL tablet Take 100 mg by mouth daily. vitamin B-12 (CYANOCOBALAMIN) 500 MCG tablet Take 500 mcg by mouth daily.          STOP taking these medications       baclofen (LIORESAL) 10 MG tablet Comments:   Reason for Stopping:               Activity: activity as tolerated    Diet: regular diet    Follow-up:    Zo Jnekins MD  [address]  [address]    Schedule an appointment as soon as possible for a visit  [address]  [phone]    Schedule an appointment as soon as possible for a visit  [facility] Visit    Jessica Engel MD  [address] [place] [address], [address]  [phone]    Schedule an appointment as soon as possible for a visit in 1 week        Follow up labs: BMP in 1 week    Follow up imaging: none    Note that over 32 minutes was spent in preparing discharge papers, discussing discharge with patient, medication review, etc.      Tiffanie Burks MD         7/18/2021, 7:11 PM

## 2021-07-21 NOTE — PROGRESS NOTES
Physician Progress Note      PATIENTFern Patel  CSN #:                  [id number]  :                       1950  ADMIT DATE:       2021 9:30 PM  [address] DATE:        2021 5:50 PM  RESPONDING  PROVIDER #:        Laurence Davis MD          QUERY TEXT:    Pt admitted with pyelonephritis and UTI. Pt noted to have ureteral stent in   place. If possible, please document in the progress notes and discharge   summary if you are evaluating and/or treating any of the following: The medical record reflects the following:  Risk Factors: 72yo, Hx recent admission for L ureteral calculi, had L ureteral   stent placed  Clinical Indicators: UA+ large LE, moderate bacteria, WBC ; UC+   ENTEROCOCCUS FAECALIS 50 to 100,000 CFU/ML; KUB-->  Ldouble J ureteral stent   in appropriate position  Treatment: IV Rocephin, monitoring, urology consult, hospital admission, urine   culture  Options provided:  -- UTI/Pyelonephritis due to ureteral stent  -- UTI/pyelonephritis not due to ureteral stent  -- Other - I will add my own diagnosis  -- Disagree - Not applicable / Not valid  -- Disagree - Clinically unable to determine / Unknown  -- Refer to Clinical Documentation Reviewer    PROVIDER RESPONSE TEXT:    UTI/pyelonephritis is not due to ureteral stent.     Query created by: Chelsey Hays on 2021 10:44 AM      Electronically signed by:  Laurence Davis MD 2021 12:36 PM

## 2021-07-28 ENCOUNTER — HOSPITAL ENCOUNTER (OUTPATIENT)
Age: 71
Discharge: HOME | End: 2021-07-28
Payer: MEDICARE

## 2021-07-28 DIAGNOSIS — N17.9 ACUTE RENAL FAILURE, UNSPECIFIED ACUTE RENAL FAILURE TYPE (HCC): ICD-10-CM

## 2021-07-28 LAB
ANION GAP SERPL CALCULATED.3IONS-SCNC: 14 MMOL/L (ref 9–17)
BUN BLDV-MCNC: 31 MG/DL (ref 8–23)
BUN/CREAT BLD: ABNORMAL (ref 9–20)
CALCIUM SERPL-MCNC: 9.7 MG/DL (ref 8.6–10.4)
CHLORIDE BLD-SCNC: 104 MMOL/L (ref 98–107)
CO2: 21 MMOL/L (ref 20–31)
CREAT SERPL-MCNC: 3 MG/DL (ref 0.5–0.9)
GFR AFRICAN AMERICAN: 19 ML/MIN
GFR NON-AFRICAN AMERICAN: 15 ML/MIN
GFR SERPL CREATININE-BSD FRML MDRD: ABNORMAL ML/MIN/{1.73_M2}
GFR SERPL CREATININE-BSD FRML MDRD: ABNORMAL ML/MIN/{1.73_M2}
GLUCOSE BLD-MCNC: 160 MG/DL (ref 70–99)
POTASSIUM SERPL-SCNC: 4.8 MMOL/L (ref 3.7–5.3)
SODIUM BLD-SCNC: 139 MMOL/L (ref 135–144)

## 2021-07-28 PROCEDURE — 80048 BASIC METABOLIC PNL TOTAL CA: CPT

## 2021-07-28 PROCEDURE — 36415 COLL VENOUS BLD VENIPUNCTURE: CPT

## 2021-08-05 ENCOUNTER — HOSPITAL ENCOUNTER (OUTPATIENT)
Age: 71
Discharge: HOME | End: 2021-08-05
Payer: MEDICARE

## 2021-08-05 DIAGNOSIS — N17.9 ACUTE RENAL FAILURE, UNSPECIFIED ACUTE RENAL FAILURE TYPE (HCC): ICD-10-CM

## 2021-08-05 DIAGNOSIS — N17.9 AKI (ACUTE KIDNEY INJURY) (HCC): ICD-10-CM

## 2021-08-05 LAB
ANION GAP SERPL CALCULATED.3IONS-SCNC: 10 MMOL/L (ref 9–17)
ANION GAP SERPL CALCULATED.3IONS-SCNC: 10 MMOL/L (ref 9–17)
BUN BLDV-MCNC: 37 MG/DL (ref 8–23)
BUN BLDV-MCNC: 37 MG/DL (ref 8–23)
BUN/CREAT BLD: ABNORMAL (ref 9–20)
BUN/CREAT BLD: ABNORMAL (ref 9–20)
CALCIUM SERPL-MCNC: 9.4 MG/DL (ref 8.6–10.4)
CALCIUM SERPL-MCNC: 9.4 MG/DL (ref 8.6–10.4)
CHLORIDE BLD-SCNC: 102 MMOL/L (ref 98–107)
CHLORIDE BLD-SCNC: 102 MMOL/L (ref 98–107)
CO2: 25 MMOL/L (ref 20–31)
CO2: 25 MMOL/L (ref 20–31)
CREAT SERPL-MCNC: 2.77 MG/DL (ref 0.5–0.9)
CREAT SERPL-MCNC: 2.77 MG/DL (ref 0.5–0.9)
GFR AFRICAN AMERICAN: 20 ML/MIN
GFR AFRICAN AMERICAN: 20 ML/MIN
GFR NON-AFRICAN AMERICAN: 17 ML/MIN
GFR NON-AFRICAN AMERICAN: 17 ML/MIN
GFR SERPL CREATININE-BSD FRML MDRD: ABNORMAL ML/MIN/{1.73_M2}
GLUCOSE BLD-MCNC: 145 MG/DL (ref 70–99)
GLUCOSE BLD-MCNC: 145 MG/DL (ref 70–99)
POTASSIUM SERPL-SCNC: 5.8 MMOL/L (ref 3.7–5.3)
POTASSIUM SERPL-SCNC: 5.8 MMOL/L (ref 3.7–5.3)
SODIUM BLD-SCNC: 137 MMOL/L (ref 135–144)
SODIUM BLD-SCNC: 137 MMOL/L (ref 135–144)

## 2021-08-05 PROCEDURE — 80048 BASIC METABOLIC PNL TOTAL CA: CPT

## 2021-08-05 PROCEDURE — 36415 COLL VENOUS BLD VENIPUNCTURE: CPT

## 2021-08-10 ENCOUNTER — OFFICE VISIT (OUTPATIENT)
Dept: INFECTIOUS DISEASES | Age: 71
End: 2021-08-10
Payer: MEDICARE

## 2021-08-10 ENCOUNTER — HOSPITAL ENCOUNTER (OUTPATIENT)
Age: 71
Discharge: HOME | End: 2021-08-10
Payer: MEDICARE

## 2021-08-10 VITALS
SYSTOLIC BLOOD PRESSURE: 132 MMHG | RESPIRATION RATE: 17 BRPM | DIASTOLIC BLOOD PRESSURE: 63 MMHG | BODY MASS INDEX: 32.82 KG/M2 | TEMPERATURE: 97.2 F | HEART RATE: 65 BPM | WEIGHT: 197 LBS | HEIGHT: 65 IN | OXYGEN SATURATION: 98 %

## 2021-08-10 DIAGNOSIS — N39.0 URINARY TRACT INFECTION WITHOUT HEMATURIA, SITE UNSPECIFIED: Primary | ICD-10-CM

## 2021-08-10 DIAGNOSIS — N39.0 URINARY TRACT INFECTION WITHOUT HEMATURIA, SITE UNSPECIFIED: ICD-10-CM

## 2021-08-10 LAB
-: ABNORMAL
AMORPHOUS: ABNORMAL
BACTERIA: ABNORMAL
BILIRUBIN URINE: NEGATIVE
CASTS UA: ABNORMAL /LPF
COLOR: YELLOW
COMMENT UA: ABNORMAL
CRYSTALS, UA: ABNORMAL /HPF
EPITHELIAL CELLS UA: ABNORMAL /HPF
GLUCOSE URINE: NEGATIVE
KETONES, URINE: NEGATIVE
LEUKOCYTE ESTERASE, URINE: ABNORMAL
MUCUS: ABNORMAL
NITRITE, URINE: NEGATIVE
OTHER OBSERVATIONS UA: ABNORMAL
PH UA: 6 (ref 5–8)
PROTEIN UA: ABNORMAL
RBC UA: ABNORMAL /HPF
RENAL EPITHELIAL, UA: ABNORMAL /HPF
SPECIFIC GRAVITY UA: 1.01 (ref 1–1.03)
TRICHOMONAS: ABNORMAL
TURBIDITY: ABNORMAL
URINE HGB: ABNORMAL
UROBILINOGEN, URINE: NORMAL
WBC UA: ABNORMAL /HPF
YEAST: ABNORMAL

## 2021-08-10 PROCEDURE — 87186 SC STD MICRODIL/AGAR DIL: CPT

## 2021-08-10 PROCEDURE — 87077 CULTURE AEROBIC IDENTIFY: CPT

## 2021-08-10 PROCEDURE — 99214 OFFICE O/P EST MOD 30 MIN: CPT | Performed by: INTERNAL MEDICINE

## 2021-08-10 PROCEDURE — 81001 URINALYSIS AUTO W/SCOPE: CPT

## 2021-08-10 PROCEDURE — 87086 URINE CULTURE/COLONY COUNT: CPT

## 2021-08-10 NOTE — PROGRESS NOTES
Infectious disease Consult Note      Patient: Shonna Simmons  : 1950  Acct#:  [de-identified]     Date:  8/10/2021    Subjective:       History of Present Illness  Patient is a 70 y.o.  female    Chief Complaint   Patient presents with    Follow-Up from Hospital     follow up from [place] [id number]     She was hospitalized at [facility] 2021 through 2021 with left flank pain was found to have acute renal failure. Urine culture on 2021 grew Enterococcus faecalis that was sensitive to ampicillin. Due to penicillin allergy, patient was discharged on oral Zyvox for 7 days that she finished. Today the patient is complaining of hesitancy and urgency with urination, denied burning, denied fever or chills, denied hematuria no other urinary symptoms. The patient still has a stent in place, going to follow-up with urology soon.   The patient had cystoscopy with ureteral stent placement on 2021 BUN was 37, creatinine 2.77  Past Medical History:   Diagnosis Date    Aortic valve stenosis     mild per chart    Arthritis     CAD (coronary artery disease)     1 STENT    Diabetes mellitus ([address]     Heart murmur         Hyperlipidemia 2004    ON RX    MI, old     states many and they were mild    Pulmonary stenosis     Pulmonary valve stenosis     mild/congenital per chart    Sciatica     right leg    Thyroid disease     HYPOTHYROIDISM ON RX    Wears glasses       Past Surgical History:   Procedure Laterality Date    CERVICAL FUSION      CORONARY ANGIOPLASTY WITH STENT PLACEMENT      1 STENT    CYSTOSCOPY Left 2021    CYSTOSCOPY URETERAL STENT INSERTION performed by William Thapa MD at [address] Left 2014    [place] ARTHOPLASTY LEFT THUMB    HYSTERECTOMY      WITH RT SALPINGOOPHERECTOMY    JOINT REPLACEMENT Bilateral     PARTIAL-KNEES    OTHER SURGICAL HISTORY Right 2014    thumb repair    SALPINGO-OOPHORECTOMY Left 1987    SHOULDER SURGERY Left 1993    SPURS    TOE OSTEOTOMY Right 6/8/2016    Right 5th digit adductory wedge osteotomy with K wire fixation           Admission Meds  Current Outpatient Medications on File Prior to Visit   Medication Sig Dispense Refill    FLUoxetine (PROZAC) 10 MG capsule Take 1 capsule by mouth daily 30 capsule 3    amLODIPine (NORVASC) 10 MG tablet Take 1 tablet by mouth daily 30 tablet 3    ferrous sulfate (IRON 325) 325 (65 Fe) MG tablet Take 1 tablet by mouth daily (with breakfast) 30 tablet 3    buPROPion (WELLBUTRIN XL) 300 MG extended release tablet TAKE ONE TABLET BY MOUTH DAILY      clotrimazole (LOTRIMIN) 1 % vaginal cream Place 1 applicator vaginally 2 times daily      pramipexole (MIRAPEX) 0.125 MG tablet TAKE ONE TABLET BY MOUTH DAILY      albuterol sulfate  (90 Base) MCG/ACT inhaler INHALE TWO PUFFS BY MOUTH EVERY 4 TO 6 HOURS AS NEEDED FOR WHEEZING      nystatin (NYAMYC) 330123 UNIT/GM powder APPLY TO AFFECTED AREA(S) FOUR TIMES A DAY      busPIRone (BUSPAR) 15 MG tablet Take 15 mg by mouth 2 times daily      isosorbide mononitrate (IMDUR) 30 MG extended release tablet Take 1 tablet by mouth daily 30 tablet 3    pantoprazole (PROTONIX) 40 MG tablet Take 1 tablet by mouth every morning (before breakfast) 30 tablet 3    levocetirizine (XYZAL) 5 MG tablet Take 5 mg by mouth nightly      nystatin (MYCOSTATIN) 374383 UNIT/GM cream Apply topically 2 times daily Apply topically 2 times daily.  oxybutynin (DITROPAN XL) 15 MG extended release tablet Take 15 mg by mouth daily      levothyroxine (SYNTHROID) 112 MCG tablet TAKE ONE TABLET BY MOUTH DAILY      atorvastatin (LIPITOR) 40 MG tablet       nitroGLYCERIN (NITROSTAT) 0.4 MG SL tablet Place 0.4 mg under the tongue every 5 minutes as needed for Chest pain up to max of 3 total doses. If no relief after 1 dose, call 911.       Multiple Vitamins-Minerals (THERAPEUTIC MULTIVITAMIN-MINERALS) tablet Take 1 tablet by mouth daily.  ALPRAZolam (XANAX) 1 MG tablet Take 1 mg by mouth as needed for Anxiety.  aspirin 81 MG EC tablet Take 81 mg by mouth daily. LAST DOSE 14      metoprolol (TOPROL-XL) 100 MG XL tablet Take 100 mg by mouth daily.  vitamin B-12 (CYANOCOBALAMIN) 500 MCG tablet Take 500 mcg by mouth daily. No current facility-administered medications on file prior to visit. Allergies  Allergies   Allergen Reactions    Pcn [Penicillins] Swelling and Hives    Heparin Other (See Comments)     MIGRAINE      Lamotrigine Other (See Comments), Itching, Nausea Only and Rash    Cyclobenzaprine      Dry mouth, jitters    Heparin (Porcine)      Other reaction(s): Migraine        Social   Social History     Tobacco Use    Smoking status: Former Smoker     Packs/day: 1.00     Years: 30.00     Pack years: 30.00     Types: Cigarettes     Quit date: 3/19/2004     Years since quittin.4    Smokeless tobacco: Never Used   Substance Use Topics    Alcohol use: Yes     Comment: socially, once a year         Family History   Problem Relation Age of Onset    Breast Cancer Mother 62        BREAST WITH METS T  LIVER AND LUNG    Other Father         AAA    Heart Disease Father     Asthma Sister     Diabetes Sister         NIDDM    Stroke Brother     Diabetes Brother         NIDDM    Stroke Maternal Grandmother     Asthma Son           Review of Systems    Other than above 12 systems reviewed were negative .              Physical Exam  /63   Pulse 65   Temp 97.2 °F (36.2 °C)   Resp 17   Ht 5' 5\" (1.651 m)   Wt 197 lb (89.4 kg)   LMP 1980   SpO2 98%   BMI 32.78 kg/m²           General Appearance: alert and oriented to person, place and time, well-developed and well-nourished, in no acute distress  Skin: warm and dry, no rash or erythema  Head: normocephalic and atraumatic  Eyes: pupils equal, round, and reactive to light, extraocular eye movements intact, conjunctivae normal  ENT: hearing grossly normal bilaterally. Neck: neck supple and non tender . Pulmonary/Chest: clear to auscultation bilaterally- no wheezes, rales or rhonchi, normal air movement, no respiratory distress  Cardiovascular: normal rate, regular rhythm, normal S1 and S2, no murmurs.   Abdomen: soft, non-tender, non-distended, normal bowel sounds, no masses or organomegaly  Extremities: no cyanosis, clubbing or edema  Musculoskeletal: normal range of motion, no joint swelling, deformity or tenderness  Neurologic: no cranial nerve deficit and muscle strength normal    Data Review:    WBC   Date Value Ref Range Status   07/17/2021 7.1 3.5 - 11.0 k/uL Final   07/16/2021 6.7 3.5 - 11.0 k/uL Final   07/14/2021 11.1 (H) 3.5 - 11.0 k/uL Final     Hemoglobin   Date Value Ref Range Status   07/17/2021 8.7 (L) 12.0 - 16.0 g/dL Final   07/16/2021 9.0 (L) 12.0 - 16.0 g/dL Final   07/14/2021 9.7 (L) 12.0 - 16.0 g/dL Final     Hematocrit   Date Value Ref Range Status   07/17/2021 26.7 (L) 36 - 46 % Final   07/16/2021 27.6 (L) 36 - 46 % Final   07/14/2021 29.0 (L) 36 - 46 % Final     MCV   Date Value Ref Range Status   07/17/2021 90.4 80 - 100 fL Final   07/16/2021 89.8 80 - 100 fL Final   07/14/2021 91.0 80 - 100 fL Final     Platelets   Date Value Ref Range Status   07/17/2021 208 150 - 450 k/uL Final   07/16/2021 218 150 - 450 k/uL Final   07/14/2021 278 150 - 450 k/uL Final     Sodium   Date Value Ref Range Status   08/05/2021 137 135 - 144 mmol/L Final   08/05/2021 137 135 - 144 mmol/L Final   07/28/2021 139 135 - 144 mmol/L Final     Potassium   Date Value Ref Range Status   08/05/2021 5.8 (H) 3.7 - 5.3 mmol/L Final   08/05/2021 5.8 (H) 3.7 - 5.3 mmol/L Final   07/28/2021 4.8 3.7 - 5.3 mmol/L Final     Chloride   Date Value Ref Range Status   08/05/2021 102 98 - 107 mmol/L Final   08/05/2021 102 98 - 107 mmol/L Final   07/28/2021 104 98 - 107 mmol/L Final     CO2   Date Value Ref Range Status   08/05/2021 25 20 - 31 mmol/L Final   08/05/2021 25 20 - 31 mmol/L Final   07/28/2021 21 20 - 31 mmol/L Final     BUN   Date Value Ref Range Status   08/05/2021 37 (H) 8 - 23 mg/dL Final   08/05/2021 37 (H) 8 - 23 mg/dL Final   07/28/2021 31 (H) 8 - 23 mg/dL Final     CREATININE   Date Value Ref Range Status   08/05/2021 2.77 (H) 0.50 - 0.90 mg/dL Final   08/05/2021 2.77 (H) 0.50 - 0.90 mg/dL Final   07/28/2021 3.00 (H) 0.50 - 0.90 mg/dL Final     AST   Date Value Ref Range Status   07/17/2021 14 <32 U/L Final   07/16/2021 15 <32 U/L Final   07/14/2021 19 <32 U/L Final     ALT   Date Value Ref Range Status   07/17/2021 11 5 - 33 U/L Final   07/16/2021 12 5 - 33 U/L Final   07/14/2021 15 5 - 33 U/L Final     Total Bilirubin   Date Value Ref Range Status   07/17/2021 <0.15 (L) 0.3 - 1.2 mg/dL Final   07/16/2021 0.16 (L) 0.3 - 1.2 mg/dL Final   07/14/2021 0.23 (L) 0.3 - 1.2 mg/dL Final     Alkaline Phosphatase   Date Value Ref Range Status   07/17/2021 66 35 - 104 U/L Final   07/16/2021 70 35 - 104 U/L Final   07/14/2021 84 35 - 104 U/L Final     Lipase   Date Value Ref Range Status   04/23/2018 44 13 - 60 U/L Final     Comment:     Performed at [facility]: ARNULFO MATHEW [address] [place] [address]   [phone]       No results found for: PROTIME, INR  No results found for: PTT  No results found for: OCCULTBLD  No results found for: GLUMET     Imaging Studies:                           All appropriate imaging studies and reports reviewed: Yes  XR ABDOMEN (KUB) (SINGLE AP VIEW)    Result Date: 7/14/2021  EXAMINATION: ONE SUPINE XRAY VIEW(S) OF THE ABDOMEN 7/14/2021 4:58 pm COMPARISON: CT scan abdomen pelvis dated June 20, 2021 HISTORY: ORDERING SYSTEM PROVIDED HISTORY: stent TECHNOLOGIST PROVIDED HISTORY: stent Reason for Exam: Abdominal tenderness. Stent placed 6/23/21 Acuity: Acute Type of Exam: Initial Additional signs and symptoms: Abdominal tenderness.   Stent placed 6/23/21 hydroureter on CT scan 06/20/2021. 2.  Diminished activity in the right kidney compared to the left with no excreted activity visualized. The time activity curve also suggests acute kidney injury. The collecting system appeared within normal limits on the prior CT scan. 3.  Split differential function is 68% on the left and 32% on the right. Assessment:      Diagnosis Orders   1. Urinary tract infection without hematuria, site unspecified  Urinalysis Reflex to Culture   · Recent pyelonephritis s/p ureteral stent placement 6/21/21. · [facility]  · Allergy to PCN-Swelling, Hives. Recommendations:   UA with reflex to culture  May need another course of antibiotics based on results. Orders Placed This Encounter   Procedures    Urinalysis Reflex to Culture     Standing Status:   Future     Standing Expiration Date:   8/10/2022     Order Specific Question:   SPECIFY(EX-CATH,MIDSTREAM,CYSTO,ETC)? Answer:   mid stream         Thank you for allowing me to participate in the care of your patient. Please feel free to contact me with any questions or concerns.      Gifty Marie MD

## 2021-08-11 ENCOUNTER — APPOINTMENT (OUTPATIENT)
Dept: CT IMAGING | Age: 71
End: 2021-08-11
Payer: MEDICARE

## 2021-08-11 ENCOUNTER — HOSPITAL ENCOUNTER (OUTPATIENT)
Dept: PAIN MANAGEMENT | Age: 71
Discharge: HOME | End: 2021-08-11
Payer: MEDICARE

## 2021-08-11 ENCOUNTER — HOSPITAL ENCOUNTER (EMERGENCY)
Age: 71
Discharge: HOME | End: 2021-08-11
Attending: EMERGENCY MEDICINE
Payer: MEDICARE

## 2021-08-11 VITALS
TEMPERATURE: 96.8 F | HEART RATE: 70 BPM | RESPIRATION RATE: 16 BRPM | WEIGHT: 202 LBS | SYSTOLIC BLOOD PRESSURE: 133 MMHG | HEIGHT: 65 IN | OXYGEN SATURATION: 97 % | BODY MASS INDEX: 33.66 KG/M2 | DIASTOLIC BLOOD PRESSURE: 40 MMHG

## 2021-08-11 DIAGNOSIS — M16.12 ARTHRITIS OF LEFT HIP: ICD-10-CM

## 2021-08-11 DIAGNOSIS — M54.50 CHRONIC MIDLINE LOW BACK PAIN WITHOUT SCIATICA: ICD-10-CM

## 2021-08-11 DIAGNOSIS — M54.16 LUMBAR RADICULOPATHY: ICD-10-CM

## 2021-08-11 DIAGNOSIS — N39.0 ACUTE UTI: Primary | ICD-10-CM

## 2021-08-11 DIAGNOSIS — M54.2 NECK PAIN: ICD-10-CM

## 2021-08-11 DIAGNOSIS — M16.12 PRIMARY OSTEOARTHRITIS OF LEFT HIP: ICD-10-CM

## 2021-08-11 DIAGNOSIS — M48.061 DEGENERATIVE LUMBAR SPINAL STENOSIS: ICD-10-CM

## 2021-08-11 DIAGNOSIS — M51.36 DDD (DEGENERATIVE DISC DISEASE), LUMBAR: ICD-10-CM

## 2021-08-11 DIAGNOSIS — M47.22 OSTEOARTHRITIS OF SPINE WITH RADICULOPATHY, CERVICAL REGION: ICD-10-CM

## 2021-08-11 DIAGNOSIS — M46.1 SACROILIITIS (HCC): Primary | ICD-10-CM

## 2021-08-11 DIAGNOSIS — M47.817 LUMBOSACRAL SPONDYLOSIS WITHOUT MYELOPATHY: ICD-10-CM

## 2021-08-11 DIAGNOSIS — M50.30 DDD (DEGENERATIVE DISC DISEASE), CERVICAL: ICD-10-CM

## 2021-08-11 DIAGNOSIS — G89.29 CHRONIC MIDLINE LOW BACK PAIN WITHOUT SCIATICA: ICD-10-CM

## 2021-08-11 PROBLEM — D64.9 ANEMIA: Status: ACTIVE | Noted: 2021-07-30

## 2021-08-11 LAB
-: ABNORMAL
ABSOLUTE EOS #: 0.3 K/UL (ref 0–0.4)
ABSOLUTE IMMATURE GRANULOCYTE: ABNORMAL K/UL (ref 0–0.3)
ABSOLUTE LYMPH #: 1.8 K/UL (ref 1–4.8)
ABSOLUTE MONO #: 0.9 K/UL (ref 0.1–1.3)
ALBUMIN SERPL-MCNC: 4.1 G/DL (ref 3.5–5.2)
ALBUMIN/GLOBULIN RATIO: ABNORMAL (ref 1–2.5)
ALP BLD-CCNC: 87 U/L (ref 35–104)
ALT SERPL-CCNC: 10 U/L (ref 5–33)
AMORPHOUS: ABNORMAL
ANION GAP SERPL CALCULATED.3IONS-SCNC: 13 MMOL/L (ref 9–17)
AST SERPL-CCNC: 15 U/L
BACTERIA: ABNORMAL
BASOPHILS # BLD: 1 % (ref 0–2)
BASOPHILS ABSOLUTE: 0.1 K/UL (ref 0–0.2)
BILIRUB SERPL-MCNC: 0.23 MG/DL (ref 0.3–1.2)
BILIRUBIN URINE: NEGATIVE
BUN BLDV-MCNC: 26 MG/DL (ref 8–23)
BUN/CREAT BLD: ABNORMAL (ref 9–20)
CALCIUM SERPL-MCNC: 9.7 MG/DL (ref 8.6–10.4)
CASTS UA: ABNORMAL /LPF
CHLORIDE BLD-SCNC: 101 MMOL/L (ref 98–107)
CO2: 25 MMOL/L (ref 20–31)
COLOR: YELLOW
COMMENT UA: ABNORMAL
CREAT SERPL-MCNC: 2.54 MG/DL (ref 0.5–0.9)
CRYSTALS, UA: ABNORMAL /HPF
DIFFERENTIAL TYPE: ABNORMAL
EOSINOPHILS RELATIVE PERCENT: 3 % (ref 0–4)
EPITHELIAL CELLS UA: ABNORMAL /HPF
GFR AFRICAN AMERICAN: 23 ML/MIN
GFR NON-AFRICAN AMERICAN: 19 ML/MIN
GFR SERPL CREATININE-BSD FRML MDRD: ABNORMAL ML/MIN/{1.73_M2}
GFR SERPL CREATININE-BSD FRML MDRD: ABNORMAL ML/MIN/{1.73_M2}
GLUCOSE BLD-MCNC: 148 MG/DL (ref 70–99)
GLUCOSE URINE: NEGATIVE
HCT VFR BLD CALC: 29.5 % (ref 36–46)
HEMOGLOBIN: 9.7 G/DL (ref 12–16)
IMMATURE GRANULOCYTES: ABNORMAL %
KETONES, URINE: NEGATIVE
LEUKOCYTE ESTERASE, URINE: ABNORMAL
LYMPHOCYTES # BLD: 18 % (ref 24–44)
MCH RBC QN AUTO: 29.9 PG (ref 26–34)
MCHC RBC AUTO-ENTMCNC: 33 G/DL (ref 31–37)
MCV RBC AUTO: 90.8 FL (ref 80–100)
MONOCYTES # BLD: 9 % (ref 1–7)
MUCUS: ABNORMAL
NITRITE, URINE: NEGATIVE
NRBC AUTOMATED: ABNORMAL PER 100 WBC
OTHER OBSERVATIONS UA: ABNORMAL
PDW BLD-RTO: 14.9 % (ref 11.5–14.9)
PH UA: 6 (ref 5–8)
PLATELET # BLD: 366 K/UL (ref 150–450)
PLATELET ESTIMATE: ABNORMAL
PMV BLD AUTO: 8.1 FL (ref 6–12)
POTASSIUM SERPL-SCNC: 4.5 MMOL/L (ref 3.7–5.3)
PROTEIN UA: ABNORMAL
RBC # BLD: 3.25 M/UL (ref 4–5.2)
RBC # BLD: ABNORMAL 10*6/UL
RBC UA: ABNORMAL /HPF
RENAL EPITHELIAL, UA: ABNORMAL /HPF
SEG NEUTROPHILS: 69 % (ref 36–66)
SEGMENTED NEUTROPHILS ABSOLUTE COUNT: 7.1 K/UL (ref 1.3–9.1)
SODIUM BLD-SCNC: 139 MMOL/L (ref 135–144)
SPECIFIC GRAVITY UA: 1.02 (ref 1–1.03)
TOTAL PROTEIN: 7.5 G/DL (ref 6.4–8.3)
TRICHOMONAS: ABNORMAL
TURBIDITY: ABNORMAL
URINE HGB: ABNORMAL
UROBILINOGEN, URINE: NORMAL
WBC # BLD: 10.2 K/UL (ref 3.5–11)
WBC # BLD: ABNORMAL 10*3/UL
WBC UA: ABNORMAL /HPF
YEAST: ABNORMAL

## 2021-08-11 PROCEDURE — 99213 OFFICE O/P EST LOW 20 MIN: CPT

## 2021-08-11 PROCEDURE — 74176 CT ABD & PELVIS W/O CONTRAST: CPT

## 2021-08-11 PROCEDURE — 93005 ELECTROCARDIOGRAM TRACING: CPT | Performed by: EMERGENCY MEDICINE

## 2021-08-11 PROCEDURE — 80053 COMPREHEN METABOLIC PANEL: CPT

## 2021-08-11 PROCEDURE — 81001 URINALYSIS AUTO W/SCOPE: CPT

## 2021-08-11 PROCEDURE — 99285 EMERGENCY DEPT VISIT HI MDM: CPT

## 2021-08-11 PROCEDURE — 36415 COLL VENOUS BLD VENIPUNCTURE: CPT

## 2021-08-11 PROCEDURE — 85025 COMPLETE CBC W/AUTO DIFF WBC: CPT

## 2021-08-11 RX ORDER — OLOPATADINE HYDROCHLORIDE 1 MG/ML
1 SOLUTION/ DROPS OPHTHALMIC PRN
COMMUNITY
Start: 2021-07-28

## 2021-08-11 RX ORDER — PANTOPRAZOLE SODIUM 20 MG/1
TABLET, DELAYED RELEASE ORAL
COMMUNITY
Start: 2021-07-29 | End: 2021-09-01

## 2021-08-11 RX ORDER — LINEZOLID 600 MG/1
600 TABLET, FILM COATED ORAL 2 TIMES DAILY
Qty: 14 TABLET | Refills: 0 | Status: SHIPPED | OUTPATIENT
Start: 2021-08-11 | End: 2021-08-18

## 2021-08-11 RX ORDER — HYDROCODONE BITARTRATE AND ACETAMINOPHEN 5; 325 MG/1; MG/1
1 TABLET ORAL EVERY 8 HOURS PRN
Qty: 90 TABLET | Refills: 0 | Status: SHIPPED | OUTPATIENT
Start: 2021-08-11 | End: 2021-08-26

## 2021-08-11 RX ORDER — FLUTICASONE PROPIONATE 50 MCG
1 SPRAY, SUSPENSION (ML) NASAL DAILY
COMMUNITY
End: 2021-09-07 | Stop reason: ALTCHOICE

## 2021-08-11 ASSESSMENT — ENCOUNTER SYMPTOMS
ABDOMINAL PAIN: 0
EYES NEGATIVE: 1
VOMITING: 1
COLOR CHANGE: 0
BACK PAIN: 0
PERSISTENT INFECTIONS: 0
NAUSEA: 1
EYE PAIN: 0
SHORTNESS OF BREATH: 0
RESPIRATORY NEGATIVE: 1

## 2021-08-11 NOTE — PROGRESS NOTES
Christi 89 PROGRESS NOTE      Patient  completed []  video visit   [x]   phone call:  13       Minutes :       [x]    to  review Medication Agreement    []  Follow up after procedure   []  Discuss treatment options      Location:  Provider:  working from    [x]    home    []   [facility] TER[facility] ,   patient at home       Chief Complaint:neck pain  She c/o neck pain, she had left cervical facet injection 3- C2-T1  with 75% relief. The pain is returning. She states can\"t afford to have another injection. If she lifts something heavy   Or pulls something it increases pain. She states had  Cervical spine surgery about 30 years ago, She reports she does home exercises. She sees a Chiropractor. She is able to sleep fairly well with a very flat pillow. She state was hospitalized x 2. She had ureteral stent put in 6-2021 and was hospitalized for 11 days  and then hospitalized in  July  for acurte renal failure and left flank pain and UTI, and she will be seeing a Nephrologist and  She saw  Glenn BROWN Infectious disease .      Neck Pain   This is a chronic problem. The problem occurs constantly. The problem has been unchanged. The pain is present in the midline (down  right arm to wrist). The quality of the pain is described as aching. The pain is at a severity of 7/10. The pain is moderate. Exacerbated by: lifting, pulling. The pain is same all the time. Associated symptoms include headaches. Treatments tried: chiropractor.              Treatment goals:  Functional status: decrease pain    Aberrancy:   Any alcoholic beverages  no          Any illegal drugs  no       Analgesia:     7                Adverse  Effects :n/a    ADL;s :not active    Data:    When was thelast UDS:   3-         Was the UDS appropriate:  [x] yes []   no      Record/Diagnostics Review:      As above, I did review the imaging     3/19/2021 12:22 PM - Jacob, Abbe Incoming Lab Results From [facility] Value Ref Range & Units Status Collected Lab   Pain Management Drug Panel Interp, Urine See Note   Final 03/15/2021 12:45 PM [facility]   (NOTE)   ________________________________________________________________   NO DRUGS PROVIDED   ________________________________________________________________   Please call [address] [place] [address] at [phone] for   Medical Director interpretation if applicable. Alternatively,   please consider the PAIN HYB U ([id number]) which does not require   medication information or provide compliance interpretation. ________________________________________________________________   INTERPRETIVE INFORMATION: Targeted drug profile Interp   Interpretation depends on accuracy and completeness of patient   medication information submitted by client. 6-Acetylmorphine, Ur Not Detected   Final 03/15/2021 12:45 PM [facility]   7-Aminoclonazepam, Urine Not Detected   Final 03/15/2021 12:45 PM [facility]   Alpha-OH-Alpraz, Urine Not Detected   Final 03/15/2021 12:45 PM [facility]   Alprazolam, Urine Not Detected   Final 03/15/2021 12:45 PM [facility]   Amphetamines, urine Not Detected   Final 03/15/2021 12:45 PM [facility]   Barbiturates, Ur Not Detected   Final 03/15/2021 12:45 PM [facility]   Benzoylecgonine, Ur Not Detected   Final 03/15/2021 12:45 PM [facility]   Buprenorphine Urine Not Detected   Final 03/15/2021 12:45 PM [facility]   Carisoprodol, Ur Not Detected   Final 03/15/2021 12:45 PM [facility]   (NOTE)   The carisoprodol immunoassay has cross-reactivity to carisoprodol   and meprobamate.     Clonazepam, Urine Not Detected   Final 03/15/2021 12:45 PM [facility]   Codeine, Urine Not Detected   Final 03/15/2021 12:45 PM [facility]   MDA, Ur Not Detected   Final 03/15/2021 12:45 PM [facility]   Diazepam, Urine Not Detected   Final 03/15/2021 12:45 PM [facility]   Ethyl Glucuronide Ur Not Detected   Final 03/15/2021 12:45 PM [facility]   Fentanyl, Ur Not Detected   Final 03/15/2021 12:45 PM [facility]   Hydrocodone, Urine Not Detected   Final 03/15/2021 12:45 PM [facility] Hydromorphone, Urine Not Detected   Final 03/15/2021 12:45 PM [facility]   Lorazepam, Urine Not Detected   Final 03/15/2021 12:45 PM [facility]   Marijuana Metab, Ur Not Detected   Final 03/15/2021 12:45 PM [facility]   MDEA, LISBETH, Ur Not Detected   Final 03/15/2021 12:45 PM [facility]   MDMA, Urine Not Detected   Final 03/15/2021 12:45 PM [facility]   Meperidine Metab, Ur Not Detected   Final 03/15/2021 12:45 PM [facility]   Methadone, Urine Not Detected   Final 03/15/2021 12:45 PM [facility]   Methamphetamine, Urine Not Detected   Final 03/15/2021 12:45 PM [facility]   Methylphenidate Not Detected   Final 03/15/2021 12:45 PM [facility]   Midazolam, Urine Not Detected   Final 03/15/2021 12:45 PM [facility]   Morphine Urine Not Detected   Final 03/15/2021 12:45 PM [facility]   Norbuprenorphine, Urine Not Detected   Final 03/15/2021 12:45 PM [facility]   Nordiazepam, Urine Not Detected   Final 03/15/2021 12:45 PM [facility]   Norfentanyl, Urine Not Detected   Final 03/15/2021 12:45 PM [facility]   NORHYDROCODONE, URINE Not Detected   Final 03/15/2021 12:45 PM [facility]   Noroxycodone, Urine Not Detected   Final 03/15/2021 12:45 PM [facility]   NOROXYMORPHONE, URINE Not Detected   Final 03/15/2021 12:45 PM [facility]   Oxazepam, Urine Not Detected   Final 03/15/2021 12:45 PM [facility]   Oxycodone Urine Not Detected   Final 03/15/2021 12:45 PM [facility]   Oxymorphone, Urine Not Detected   Final 03/15/2021 12:45 PM [facility]   PCP, Urine Not Detected   Final 03/15/2021 12:45 PM [facility]   Phentermine, Ur Not Detected   Final 03/15/2021 12:45 PM [facility]   Tapentadol-O-Sulfate, Urine Not Detected   Final 03/15/2021 12:45 PM [facility]   Tapentadol, Urine Not Detected   Final 03/15/2021 12:45 PM [facility]   Temazepam, Urine Not Detected   Final 03/15/2021 12:45 PM [facility]   Tramadol, Urine Not Detected   Final 03/15/2021 12:45 PM [facility]   Zolpidem, Urine Not Detected   Final 03/15/2021 12:45 PM [facility]   Drugs Expected, Ur UNKNOWN   Final 03/15/2021 12:45  [facility]   Creatinine, Ur 81.4  20.0 - 400.0 mg/dL Final 03/15/2021 12:45 PM [facility] Pain Mgt Drug Panel, Hi Res, Ur See Below   Final 03/15/2021 12:45 PM [facility]   (NOTE)   Methodology: Qualitative Enzyme Immunoassay and Qualitative Liquid   Chromatography-Tandem Mass Spectrometry, Quantitative   Spectrophotometry   The absence of expected drug(s) and/or drug metabolite(s) may   indicate non-compliance, inappropriate timing of specimen   collection relative to drug administration, poor drug absorption,   diluted/adulterated urine, or limitations of testing. The   concentration must be greater than or equal to the cutoff to be   reported as present.  If specific drug concentrations are   required, contact the laboratory within two weeks of specimen   collection to request quantification by a second analytical   technique. Interpretive questions should be directed to the   laboratory. Results based on immunoassay detection that do not match clinical   expectations should be   interpreted with caution. Confirmatory testing by mass   spectrometry for immunoassay-based results is available, if   ordered within two weeks of specimen collection. Additional   charges apply. For medical purposes only; not valid for forensic use. This test was developed and its performance characteristics   determined by [facility]. It has not been cleared or   approved by the [employer] and Drug Administration. This test was   performed in a CLIA certified laboratory and is intended for   clinical purposes. EER Hi Res Interp Ur See Note   Final 03/15/2021 12:45 PM [facility]   (NOTE)   Access [facility] Enhanced Report using the link below:   -Direct access: [email or web address]   Performed By: [facility] [address] [place] [address], [address] [place] [address]   : Edna Wagner.  Jc Reis MD    Naloxone Urine Not Detected   Final 03/15/2021 12:45 PM [facility]   Gabapentin Not Detected   Final 03/15/2021 12:45 PM [facility]   Pregabalin Not Detected   Final 03/15/2021 12:45 PM [facility] to uncovertebral joint hypertrophy and facet disease.       C6-C7: Mild left foraminal stenosis identified due to uncovertebral joint   hypertrophy.  Annular disc bulge flattens the thecal sac and results in mild   central canal stenosis.       C7-T1: There is no significant disc protrusion, spinal canal stenosis or   neural foraminal narrowing.           Impression   Fusion of the C4 and C5 vertebral bodies.       Moderate central canal stenosis at C3-4 with associated severe left foraminal   stenosis.       Moderate central canal stenosis at C5-6.  Severe right foraminal stenosis and   moderate left foraminal stenosis are noted at this level.       Mild central canal stenosis and mild left foraminal stenosis at C6-7.                   Pill count: appropriate    fill date : last filled 4-    Morphine equivalent dose as reported on OARRS:15     Review ofOARRS does not show any aberrant prescription behavior. Medication is helping the patient stay active. Patient denies any side effects and reports adequate analgesia. No sign of misuse/abuse.             Past Medical History:   Diagnosis Date    Aortic valve stenosis     mild per chart    Arthritis     CAD (coronary artery disease) 2000    1 STENT    Diabetes mellitus ([facility] [address]     Heart murmur     1962    Hyperlipidemia 2004    ON RX    MI, old     states many and they were mild    Pulmonary stenosis 1995    Pulmonary valve stenosis     mild/congenital per chart    Sciatica     right leg    Thyroid disease 2004    HYPOTHYROIDISM ON RX    Wears glasses        Past Surgical History:   Procedure Laterality Date    CERVICAL FUSION  1993    CORONARY ANGIOPLASTY WITH STENT PLACEMENT  2000    1 STENT    CYSTOSCOPY Left 6/21/2021    CYSTOSCOPY URETERAL STENT INSERTION performed by Mark Thomas [place] [address] [facility] [address] [place] 03/21/2014    [place] ARTHOPLASTY LEFT THUMB    HYSTERECTOMY  1980    WITH RT [address] Bilateral 1994    PARTIAL-KNEES    OTHER SURGICAL HISTORY Right 09/23/2014    thumb repair    SALPINGO-OOPHORECTOMY Left 1987    SHOULDER SURGERY Left 1993    SPURS    TOE OSTEOTOMY Right 6/8/2016    Right 5th digit adductory wedge osteotomy with K wire fixation        Allergies   Allergen Reactions    Pcn [Penicillins] Swelling and Hives    Heparin Other (See Comments)     MIGRAINE      Lamotrigine Other (See Comments), Itching, Nausea Only and Rash    Cyclobenzaprine      Dry mouth, jitters    Heparin (Porcine)      Other reaction(s): Migraine         Current Outpatient Medications:     metFORMIN (GLUCOPHAGE) 1000 MG tablet, , Disp: , Rfl:     pantoprazole (PROTONIX) 20 MG tablet, , Disp: , Rfl:     fluticasone (FLONASE) 50 MCG/ACT nasal spray, 1 spray by Nasal route daily, Disp: , Rfl:     olopatadine (PATANOL) 0.1 % ophthalmic solution, , Disp: , Rfl:     FLUoxetine (PROZAC) 10 MG capsule, Take 1 capsule by mouth daily, Disp: 30 capsule, Rfl: 3    amLODIPine (NORVASC) 10 MG tablet, Take 1 tablet by mouth daily, Disp: 30 tablet, Rfl: 3    ferrous sulfate (IRON 325) 325 (65 Fe) MG tablet, Take 1 tablet by mouth daily (with breakfast), Disp: 30 tablet, Rfl: 3    buPROPion (WELLBUTRIN XL) 300 MG extended release tablet, TAKE ONE TABLET BY MOUTH DAILY, Disp: , Rfl:     clotrimazole (LOTRIMIN) 1 % vaginal cream, Place 1 applicator vaginally 2 times daily, Disp: , Rfl:     pramipexole (MIRAPEX) 0.125 MG tablet, TAKE ONE TABLET BY MOUTH DAILY, Disp: , Rfl:     nystatin (NYAMYC) 106545 UNIT/GM powder, APPLY TO AFFECTED AREA(S) FOUR TIMES A DAY, Disp: , Rfl:     busPIRone (BUSPAR) 15 MG tablet, Take 15 mg by mouth 2 times daily, Disp: , Rfl:     isosorbide mononitrate (IMDUR) 30 MG extended release tablet, Take 1 tablet by mouth daily, Disp: 30 tablet, Rfl: 3    levocetirizine (XYZAL) 5 MG tablet, Take 5 mg by mouth nightly, Disp: , Rfl:     nystatin (MYCOSTATIN) 788247 UNIT/GM cream, Apply topically 2 times daily Apply topically 2 times daily. , Disp: , Rfl:     oxybutynin (DITROPAN XL) 15 MG extended release tablet, Take 15 mg by mouth daily, Disp: , Rfl:     levothyroxine (SYNTHROID) 112 MCG tablet, TAKE ONE TABLET BY MOUTH DAILY, Disp: , Rfl:     atorvastatin (LIPITOR) 40 MG tablet, , Disp: , Rfl:     Multiple Vitamins-Minerals (THERAPEUTIC MULTIVITAMIN-MINERALS) tablet, Take 1 tablet by mouth daily. , Disp: , Rfl:     aspirin 81 MG EC tablet, Take 81 mg by mouth daily. LAST DOSE 14, Disp: , Rfl:     metoprolol (TOPROL-XL) 100 MG XL tablet, Take 100 mg by mouth daily. , Disp: , Rfl:     vitamin B-12 (CYANOCOBALAMIN) 500 MCG tablet, Take 500 mcg by mouth daily. , Disp: , Rfl:     albuterol sulfate  (90 Base) MCG/ACT inhaler, INHALE TWO PUFFS BY MOUTH EVERY 4 TO 6 HOURS AS NEEDED FOR WHEEZING, Disp: , Rfl:     nitroGLYCERIN (NITROSTAT) 0.4 MG SL tablet, Place 0.4 mg under the tongue every 5 minutes as needed for Chest pain up to max of 3 total doses. If no relief after 1 dose, call 911., Disp: , Rfl:     ALPRAZolam (XANAX) 1 MG tablet, Take 1 mg by mouth as needed for Anxiety. , Disp: , Rfl:     Family History   Problem Relation Age of Onset    Breast Cancer Mother 62        BREAST WITH METS T  LIVER AND LUNG    Other Father         AAA    Heart Disease Father     Asthma Sister     Diabetes Sister         NIDDM    Stroke Brother     Diabetes Brother         NIDDM    Stroke Maternal Grandmother     Asthma Son        Social History     Socioeconomic History    Marital status:       Spouse name: Not on file    Number of children: 1    Years of education: Not on file    Highest education level: Not on file   Occupational History    Not on file   Tobacco Use    Smoking status: Former Smoker     Packs/day: 1.00     Years: 30.00     Pack years: 30.00     Types: Cigarettes     Quit date: 3/19/2004     Years since quittin.4    Smokeless tobacco: Never Used   Vaping Use    Vaping Use: Never used   Substance and Sexual Activity    Alcohol use: Yes     Comment: socially, once a year    Drug use: No    Sexual activity: Not Currently   Other Topics Concern    Not on file   Social History Narrative    Not on file     Social Determinants of Health     Financial Resource Strain:     Difficulty of Paying Living Expenses:    Food Insecurity:     Worried About Running Out of Food in the Last Year:     [address] in the Last Year:    Transportation Needs:     Lack of Transportation (Medical):  Lack of Transportation (Non-Medical):    Physical Activity:     Days of Exercise per Week:     Minutes of Exercise per Session:    Stress:     Feeling of Stress :    Social Connections:     Frequency of Communication with Friends and Family:     Frequency of Social Gatherings with Friends and Family:     Attends [employer] Services:     Active Member of Clubs or Organizations:     Attends Club or Organization Meetings:     Marital Status:    Intimate Partner Violence:     Fear of Current or Ex-Partner:     Emotionally Abused:     Physically Abused:     Sexually Abused:          Review of Systems:  Review of Systems   Constitutional: Negative. HENT: Negative. Eyes: Negative. Cardiovascular: Negative. Respiratory: Negative. Endocrine: Negative. Hematologic/Lymphatic: Negative. Skin: Negative. Musculoskeletal: Positive for joint pain and neck pain. Gastrointestinal: Positive for nausea and vomiting. Genitourinary:        Urgency, feels heavy genital area   Neurological: Positive for headaches. Psychiatric/Behavioral: Positive for depression. Managing   Allergic/Immunologic: Negative for persistent infections. Physical Exam:  [facility] 03/19/1980     Physical Exam  Skin:         Neurological:      Mental Status: She is alert and oriented to person, place, and time.    Psychiatric:         Mood and Affect: Mood normal.         Thought medication use and utilize pain medications only for uncontrolled rest pain or pain with exertional activities. I advised patient not to self-escalate painmedications without consulting with us. At each of patient's future visits we will try to taper pain medications, while adjusting the adjunct medications, and re-evaluating for Physical Therapy to improve spinal andjoint strength. We will continue to have discussions to decrease pain medications as tolerated. Counseled patient on effects their pain medication and /or their medical condition mayhave on their  ability to drive or operate machinery. Instructed not to drive or operate machinery if drowsy     I also discussed with the patient regarding the dangers of combining narcotic pain medication with tranquilizers, alcohol or illegal drugs or taking the medication any way other than prescribed. The dangers were discussed  including respiratory depression and death. Patient was told to tell  all  physicians regarding the medications he is getting from pain clinic. Patient is warned not to take any unprescribed medications over-the-countermedications that can depress breathing . Patient is advised to talk to the pharmacist or physicians if planning to take any over-the-counter medications before  takeing them. Patient is strongly advised to avoid tranquilizers or  relaxants, illegal drugs  or any medications that can depress breathing  Patient is also advised to tell us if there is any changes in their medications from other physicians.             TREATMENT OPTIONS:       Medication Agreement Requirements Met  Continue Opioid therapy  Script written for  hydrocodone  Follow up appointment made

## 2021-08-11 NOTE — ED TRIAGE NOTES
Mode of arrival (squad #, walk in, police, etc) : walk in        Chief complaint(s): abnormal lab        Arrival Note (brief scenario, treatment PTA, etc). : Pt states she was recently hospitalized in June and July for renal insuffiencey. Pt got lab work done on the 5th, showing hyperkalemia. Pt also had a urine done yesterday. Pt's kidney doctor told her to come in to the hospital because she is stage 4 kidney disease. Pt is A&Ox4, in no acute distress, respirations even and unlabored, ambulatory with steady gait. C= \"Have you ever felt that you should Cut down on your drinking? \"  No  A= \"Have people Annoyed you by criticizing your drinking? \"  No  G= \"Have you ever felt bad or Guilty about your drinking? \"  No  E= \"Have you ever had a drink as an Eye-opener first thing in the morning to steady your nerves or to help a hangover? \"  No      Deferred []      Reason for deferring: N/A    *If yes to two or more: probable alcohol abuse. *

## 2021-08-12 LAB
CULTURE: ABNORMAL
EKG ATRIAL RATE: 75 BPM
EKG P AXIS: 39 DEGREES
EKG P-R INTERVAL: 162 MS
EKG Q-T INTERVAL: 410 MS
EKG QRS DURATION: 80 MS
EKG QTC CALCULATION (BAZETT): 457 MS
EKG R AXIS: 26 DEGREES
EKG T AXIS: 53 DEGREES
EKG VENTRICULAR RATE: 75 BPM
Lab: ABNORMAL
SPECIMEN DESCRIPTION: ABNORMAL

## 2021-08-12 PROCEDURE — 93010 ELECTROCARDIOGRAM REPORT: CPT | Performed by: INTERNAL MEDICINE

## 2021-08-12 NOTE — ED PROVIDER NOTES
M19.049    Arthritis of left hip M16.12    Left hip pain M25.552    Chronic midline low back pain without sciatica M54.5, G89.29    Degenerative lumbar spinal stenosis M48.061    Lumbar radiculopathy M54.16    DDD (degenerative disc disease), lumbar M51.36    Lumbosacral spondylosis without myelopathy M47.817    Primary osteoarthritis of left hip M16.12    Sacroiliitis (HCC) M46.1    DDD (degenerative disc disease), cervical M50.30    Neck pain M54.2    Cervical stenosis of spine M48.02    Osteoarthritis of spine with radiculopathy, cervical region M47.22    WERO (acute kidney injury) ([facility] [address] N17.9    Acute cystitis without hematuria N30.00    Hydronephrosis of left kidney N13.30    Hydroureter, left N13.4    Type 2 diabetes mellitus, without long-term current use of insulin (HCC) E11.9    Essential hypertension I10    Hypothyroidism E03.9    Acute infective cystitis N30.00    Uremia N19    Pyelonephritis N12    Iron deficiency anemia D50.9    GERD (gastroesophageal reflux disease) K21.9    Leukocytosis D72.829    Allergy to penicillin Z88.0    Anemia D64.9     SURGICAL HISTORY       Past Surgical History:   Procedure Laterality Date    CERVICAL FUSION  1993    CORONARY ANGIOPLASTY WITH STENT PLACEMENT  2000    1 STENT    CYSTOSCOPY Left 6/21/2021    CYSTOSCOPY URETERAL STENT INSERTION [place] Dajuan Bran [place] [address] [facility] [place] Left 03/21/2014    [place] ARTHOPLASTY LEFT THUMB    HYSTERECTOMY  1980    WITH RT SALPINGOOPHERECTOMY    JOINT REPLACEMENT Bilateral 1994    PARTIAL-KNEES    OTHER SURGICAL HISTORY Right 09/23/2014    thumb repair    SALPINGO-OOPHORECTOMY Left 1987    SHOULDER SURGERY Left 1993    SPURS    TOE OSTEOTOMY Right 6/8/2016    Right 5th digit adductory wedge osteotomy with K wire fixation      CURRENT MEDICATIONS       Discharge Medication List as of 8/11/2021  9:36 PM      CONTINUE these medications which have NOT CHANGED    Details   metFORMIN (GLUCOPHAGE) 1000 MG tablet Historical Med      pantoprazole (PROTONIX) 20 MG tablet Historical Med      fluticasone (FLONASE) 50 MCG/ACT nasal spray 1 spray by Nasal route dailyHistorical Med      olopatadine (PATANOL) 0.1 % ophthalmic solution Historical Med      HYDROcodone-acetaminophen (NORCO) 5-325 MG per tablet Take 1 tablet by mouth every 8 hours as needed for Pain for up to 15 days. , Disp-90 tablet, R-0Normal      FLUoxetine (PROZAC) 10 MG capsule Take 1 capsule by mouth daily, Disp-30 capsule, R-3Normal      amLODIPine (NORVASC) 10 MG tablet Take 1 tablet by mouth daily, Disp-30 tablet, R-3Normal      ferrous sulfate (IRON 325) 325 (65 Fe) MG tablet Take 1 tablet by mouth daily (with breakfast), Disp-30 tablet, R-3Normal      buPROPion (WELLBUTRIN XL) 300 MG extended release tablet TAKE ONE TABLET BY MOUTH DAILYHistorical Med      clotrimazole (LOTRIMIN) 1 % vaginal cream Place 1 applicator vaginally 2 times dailyHistorical Med      pramipexole (MIRAPEX) 0.125 MG tablet TAKE ONE TABLET BY MOUTH DAILYHistorical Med      albuterol sulfate  (90 Base) MCG/ACT inhaler INHALE TWO PUFFS BY MOUTH EVERY 4 TO 6 HOURS AS NEEDED FOR WHEEZINGHistorical Med      nystatin (NYAMYC) 544795 UNIT/GM powder APPLY TO AFFECTED AREA(S) FOUR TIMES A DAY, Historical Med      busPIRone (BUSPAR) 15 MG tablet Take 15 mg by mouth 2 times dailyHistorical Med      isosorbide mononitrate (IMDUR) 30 MG extended release tablet Take 1 tablet by mouth daily, Disp-30 tablet, R-3Normal      levocetirizine (XYZAL) 5 MG tablet Take 5 mg by mouth nightlyHistorical Med      nystatin (MYCOSTATIN) 774508 UNIT/GM cream Apply topically 2 times daily Apply topically 2 times daily. , Topical, 2 TIMES DAILY, Historical Med      oxybutynin (DITROPAN XL) 15 MG extended release tablet Take 15 mg by mouth dailyHistorical Med      levothyroxine (SYNTHROID) 112 MCG tablet TAKE ONE TABLET BY MOUTH DAILYHistorical Med      atorvastatin (LIPITOR) 40 MG Pharynx: Oropharynx is clear. Eyes:      Extraocular Movements: Extraocular movements intact. Conjunctiva/sclera: Conjunctivae normal.   Cardiovascular:      Rate and Rhythm: Normal rate and regular rhythm. Pulses: Normal pulses. Heart sounds: Normal heart sounds. Pulmonary:      Effort: Pulmonary effort is normal.      Breath sounds: Normal breath sounds. Abdominal:      General: Bowel sounds are normal. There is no distension. Palpations: Abdomen is soft. Tenderness: There is no abdominal tenderness. Musculoskeletal:         General: Normal range of motion. Cervical back: Normal range of motion. Skin:     General: Skin is warm and dry. Capillary Refill: Capillary refill takes less than 2 seconds. Neurological:      General: No focal deficit present. Mental Status: She is alert.    Psychiatric:         Mood and Affect: Mood normal.         MEDICAL DECISION MAKIN-year-old female presents for reported hyperkalemia, on initial exam patient in no acute distress, vitals are stable, will recheck labs and urinalysis, will check CT abdomen pelvis without given her recent reported kidney stones and pyelonephritis    Labs are reviewed patient with creatinine at 2.5 noted to be at her baseline, repeat potassium noted at 4.5    Discussed with nephrology Dr. Fiona Ernandez, reports that as long as her potassium is back at baseline she can be discharged home with outpatient follow-up    Urinalysis was reviewed to stay with urinary tract infection, old culture was reviewed, patient was discharged from hospital on Zyvox, will repeat course of Zyvox secondary to patient's allergies and previous culture results, discussed with patient to hold her Prozac while taking the Zyvox    Discussed plan with patient, discussed need for follow-up with PCP, nephrology, and ID, discussed strict return precautions, patient voiced understanding is comfortable plan and discharge home    Patient/Guardian was informed of their diagnosis and told to follow up with PCP, nephrology, and ID in 1-3 days. Patient demonstrates understanding and agreement with the plan. They were given the opportunity to ask questions and those questions were answered to the best of our ability with the available information. Patient/Guardian told to return to the ED for any new, worsening, changing or persistent symptoms. This dictation was prepared using [employer] voice recognition software. CRITICAL CARE:       PROCEDURES:    Procedures    DIAGNOSTIC RESULTS   EKG:All EKG's are interpreted by the Emergency Department Physician who either signs or Co-signs this chart in the absence of a cardiologist.        RADIOLOGY:All plain film, CT, MRI, and formal ultrasound images (except ED bedside ultrasound) are read by the radiologist, see reports below, unless otherwisenoted in MDM or here. CT ABDOMEN PELVIS WO CONTRAST Additional Contrast? None   Final Result   Status post placement of left ureteral stent in adequate position. Interval   resolution of left hydronephrosis and left perinephric inflammatory changes. .      No acute intra-abdominal or intrapelvic abnormalities are noted. LABS: All lab results were reviewed by myself, and all abnormals are listed below.   Labs Reviewed   CBC WITH AUTO DIFFERENTIAL - Abnormal; Notable for the following components:       Result Value    RBC 3.25 (*)     Hemoglobin 9.7 (*)     Hematocrit 29.5 (*)     Seg Neutrophils 69 (*)     Lymphocytes 18 (*)     Monocytes 9 (*)     All other components within normal limits   COMPREHENSIVE METABOLIC PANEL W/ REFLEX TO MG FOR LOW K - Abnormal; Notable for the following components:    Glucose 148 (*)     BUN 26 (*)     CREATININE 2.54 (*)     Total Bilirubin 0.23 (*)     GFR Non- 19 (*)     GFR  23 (*)     All other components within normal limits   URINE RT REFLEX TO CULTURE - Abnormal; Notable for the following components:    Turbidity UA CLOUDY (*)     Urine Hgb MODERATE (*)     Protein, UA 2+ (*)     Leukocyte Esterase, Urine LARGE (*)     All other components within normal limits   MICROSCOPIC URINALYSIS - Abnormal; Notable for the following components:    Bacteria, UA MANY (*)     All other components within normal limits       EMERGENCY DEPARTMENTCOURSE:         Vitals:    Vitals:    08/11/21 1813   BP: (!) 133/40   Pulse: 70   Resp: 16   Temp: 96.8 °F (36 °C)   TempSrc: Temporal   SpO2: 97%   Weight: 202 lb (91.6 kg)   Height: 5' 5\" (1.651 m)       The patient was given the following medications while in the emergency department:  Orders Placed This Encounter   Medications    linezolid (ZYVOX) 600 MG tablet     Sig: Take 1 tablet by mouth 2 times daily for 7 days     Dispense:  14 tablet     Refill:  0     CONSULTS:  IP CONSULT TO NEPHROLOGY    FINAL IMPRESSION      1.  Acute UTI          DISPOSITION/PLAN   DISPOSITION Decision To Discharge 08/11/2021 09:36:17 PM      PATIENT REFERRED TO:  DO Leilani Fisher [address] [place] [address]  [phone]    Schedule an appointment as soon as possible for a visit       [facility] [address] [place] [id number]  [phone]    As needed, If symptoms worsen    [facility], [place]  [address] [facility].  Sukumar Barriososcar [address] [place] [address]  [phone]    Schedule an appointment as soon as possible for a visit       DISCHARGE MEDICATIONS:  Discharge Medication List as of 8/11/2021  9:36 PM      START taking these medications    Details   linezolid (ZYVOX) 600 MG tablet Take 1 tablet by mouth 2 times daily for 7 days, Disp-14 tablet, R-0Print           Sheeba Shipman DO  Attending Emergency Physician                  Sheeba Shipman DO  08/12/21 9382

## 2021-08-12 NOTE — ED NOTES
Pt given instructions for follow-up and discharge. Pt given education on prescriptions. Pt verbalizes understanding. Pt is A&O x4, PWD, eupneic, and ambulatory with steady, even gait upon discharge.       Diana Alves RN  08/11/21 2424

## 2021-08-16 ENCOUNTER — OFFICE VISIT (OUTPATIENT)
Dept: UROLOGY | Age: 71
End: 2021-08-16
Payer: MEDICARE

## 2021-08-16 VITALS
WEIGHT: 202 LBS | BODY MASS INDEX: 33.66 KG/M2 | HEART RATE: 66 BPM | SYSTOLIC BLOOD PRESSURE: 118 MMHG | HEIGHT: 65 IN | DIASTOLIC BLOOD PRESSURE: 62 MMHG | TEMPERATURE: 97.5 F

## 2021-08-16 DIAGNOSIS — N13.30 HYDRONEPHROSIS OF LEFT KIDNEY: Primary | ICD-10-CM

## 2021-08-16 DIAGNOSIS — R39.15 URGENCY OF URINATION: ICD-10-CM

## 2021-08-16 DIAGNOSIS — R10.9 LEFT FLANK PAIN: ICD-10-CM

## 2021-08-16 PROCEDURE — 99214 OFFICE O/P EST MOD 30 MIN: CPT | Performed by: UROLOGY

## 2021-08-16 ASSESSMENT — ENCOUNTER SYMPTOMS
BACK PAIN: 0
EYE REDNESS: 0
COUGH: 0
NAUSEA: 1
SHORTNESS OF BREATH: 0
VOMITING: 0
ABDOMINAL PAIN: 0
DIARRHEA: 0
EYE PAIN: 0
CONSTIPATION: 0
WHEEZING: 0

## 2021-08-16 NOTE — PROGRESS NOTES
08/11/2021     Lab Results   Component Value Date    CREATININE 2.54 (H) 08/11/2021       Additional Lab/Culture results: none    Reviewed during this Office Visit: none  (results were independently reviewed byphysician and radiology report verified)    PAST MEDICAL, FAMILY AND SOCIAL HISTORY:  Past Medical History:   Diagnosis Date    Aortic valve stenosis     mild per chart    Arthritis     CAD (coronary artery disease) 2000    1 STENT    Diabetes mellitus ([address]     Heart murmur     1962    Hyperlipidemia 2004    ON RX    MI, old     states many and they were mild    Pulmonary stenosis 1995    Pulmonary valve stenosis     mild/congenital per chart    Sciatica     right leg    Thyroid disease 2004    HYPOTHYROIDISM ON RX    Wears glasses      Past Surgical History:   Procedure Laterality Date    CERVICAL FUSION  1993    CORONARY ANGIOPLASTY WITH STENT PLACEMENT  2000 1 STENT    CYSTOSCOPY Left 6/21/2021    CYSTOSCOPY URETERAL STENT INSERTION performed by Lillian Milan MD at [address] [facility] [place] Left 03/21/2014    [facility] ARTHOPLASTY LEFT THUMB    HYSTERECTOMY  1980    WITH RT SALPINGOOPHERECTOMY    JOINT REPLACEMENT Bilateral 1994    PARTIAL-KNEES    OTHER SURGICAL HISTORY Right 09/23/2014    thumb repair    SALPINGO-OOPHORECTOMY Left 1987    SHOULDER SURGERY Left 1993    SPURS    TOE OSTEOTOMY Right 6/8/2016    Right 5th digit adductory wedge osteotomy with K wire fixation      Family History   Problem Relation Age of Onset    Breast Cancer Mother 62        BREAST WITH METS T  LIVER AND LUNG    Other Father         AAA    Heart Disease Father     Asthma Sister     Diabetes Sister         NIDDM    Stroke Brother     Diabetes Brother         NIDDM    Stroke Maternal Grandmother     Asthma Son      Outpatient Medications Marked as Taking for the 8/16/21 encounter (Office Visit) with Lillian Milan MD   Medication Sig Dispense Refill    metFORMIN (GLUCOPHAGE) 1000 MG tablet  pantoprazole (PROTONIX) 20 MG tablet       fluticasone (FLONASE) 50 MCG/ACT nasal spray 1 spray by Nasal route daily      olopatadine (PATANOL) 0.1 % ophthalmic solution       HYDROcodone-acetaminophen (NORCO) 5-325 MG per tablet Take 1 tablet by mouth every 8 hours as needed for Pain for up to 15 days. 90 tablet 0    linezolid (ZYVOX) 600 MG tablet Take 1 tablet by mouth 2 times daily for 7 days 14 tablet 0    FLUoxetine (PROZAC) 10 MG capsule Take 1 capsule by mouth daily 30 capsule 3    amLODIPine (NORVASC) 10 MG tablet Take 1 tablet by mouth daily 30 tablet 3    ferrous sulfate (IRON 325) 325 (65 Fe) MG tablet Take 1 tablet by mouth daily (with breakfast) 30 tablet 3    buPROPion (WELLBUTRIN XL) 300 MG extended release tablet TAKE ONE TABLET BY MOUTH DAILY      clotrimazole (LOTRIMIN) 1 % vaginal cream Place 1 applicator vaginally 2 times daily      pramipexole (MIRAPEX) 0.125 MG tablet TAKE ONE TABLET BY MOUTH DAILY      albuterol sulfate  (90 Base) MCG/ACT inhaler INHALE TWO PUFFS BY MOUTH EVERY 4 TO 6 HOURS AS NEEDED FOR WHEEZING      nystatin (NYAMYC) 954562 UNIT/GM powder APPLY TO AFFECTED AREA(S) FOUR TIMES A DAY      busPIRone (BUSPAR) 15 MG tablet Take 15 mg by mouth 2 times daily      isosorbide mononitrate (IMDUR) 30 MG extended release tablet Take 1 tablet by mouth daily 30 tablet 3    levocetirizine (XYZAL) 5 MG tablet Take 5 mg by mouth nightly      nystatin (MYCOSTATIN) 300605 UNIT/GM cream Apply topically 2 times daily Apply topically 2 times daily.  oxybutynin (DITROPAN XL) 15 MG extended release tablet Take 15 mg by mouth daily      levothyroxine (SYNTHROID) 112 MCG tablet TAKE ONE TABLET BY MOUTH DAILY      atorvastatin (LIPITOR) 40 MG tablet       nitroGLYCERIN (NITROSTAT) 0.4 MG SL tablet Place 0.4 mg under the tongue every 5 minutes as needed for Chest pain up to max of 3 total doses. If no relief after 1 dose, call 911.       Multiple Vitamins-Minerals (THERAPEUTIC MULTIVITAMIN-MINERALS) tablet Take 1 tablet by mouth daily.  ALPRAZolam (XANAX) 1 MG tablet Take 1 mg by mouth as needed for Anxiety.  aspirin 81 MG EC tablet Take 81 mg by mouth daily. LAST DOSE 14      metoprolol (TOPROL-XL) 100 MG XL tablet Take 100 mg by mouth daily.  vitamin B-12 (CYANOCOBALAMIN) 500 MCG tablet Take 500 mcg by mouth daily. Pcn [penicillins], Heparin, Lamotrigine, Cyclobenzaprine, and Heparin (porcine)  Social History     Tobacco Use   Smoking Status Former Smoker    Packs/day: 1.00    Years: 30.00    Pack years: 30.00    Types: Cigarettes    Quit date: 3/19/2004    Years since quittin.4   Smokeless Tobacco Never Used      (If patient a smoker, smoking cessation counseling offered)   Social History     Substance and Sexual Activity   Alcohol Use Yes    Comment: socially, once a year       REVIEW OF SYSTEMS:  Review of Systems    Physical Exam:    This a 70 y.o. female      Vitals:    21 1403   BP: 118/62   Pulse: 66   Temp: 97.5 °F (36.4 °C)     Body mass index is 33.61 kg/m². Physical Exam  Constitutional: Patient in no acute distress, ggod grooming, appropriately dressed  Neuro: Alert and oriented to person, place and time. Psych:Mood normal, affect normal          Assessment and Plan      1. Hydronephrosis of left kidney    2. Left flank pain    3. Urgency of urination            Plan:   Cysto, left stent removal, left retrograde pyelogram,  Will replace left stent if persistent hydro. Prescriptions Ordered:  No orders of the defined types were placed in this encounter. Orders Placed:  No orders of the defined types were placed in this encounter. Dajuan Bran MD    Agree with the ROS entered by the MA.

## 2021-08-21 ENCOUNTER — HOSPITAL ENCOUNTER (OUTPATIENT)
Age: 71
Discharge: HOME | End: 2021-08-21
Payer: MEDICARE

## 2021-08-21 DIAGNOSIS — I12.9 BENIGN HYPERTENSION WITH CHRONIC KIDNEY DISEASE, STAGE IV (HCC): ICD-10-CM

## 2021-08-21 DIAGNOSIS — N18.4 CKD (CHRONIC KIDNEY DISEASE), STAGE IV (HCC): ICD-10-CM

## 2021-08-21 DIAGNOSIS — N18.4 BENIGN HYPERTENSION WITH CHRONIC KIDNEY DISEASE, STAGE IV (HCC): ICD-10-CM

## 2021-08-21 LAB
-: ABNORMAL
ABSOLUTE EOS #: 0.4 K/UL (ref 0–0.4)
ABSOLUTE IMMATURE GRANULOCYTE: ABNORMAL K/UL (ref 0–0.3)
ABSOLUTE LYMPH #: 2.7 K/UL (ref 1–4.8)
ABSOLUTE MONO #: 0.8 K/UL (ref 0.1–1.3)
AMORPHOUS: ABNORMAL
ANION GAP SERPL CALCULATED.3IONS-SCNC: 12 MMOL/L (ref 9–17)
BACTERIA: ABNORMAL
BASOPHILS # BLD: 1 % (ref 0–2)
BASOPHILS ABSOLUTE: 0.1 K/UL (ref 0–0.2)
BILIRUBIN URINE: NEGATIVE
BUN BLDV-MCNC: 34 MG/DL (ref 8–23)
BUN/CREAT BLD: ABNORMAL (ref 9–20)
CALCIUM SERPL-MCNC: 9.6 MG/DL (ref 8.6–10.4)
CASTS UA: ABNORMAL /LPF
CHLORIDE BLD-SCNC: 103 MMOL/L (ref 98–107)
CO2: 25 MMOL/L (ref 20–31)
COLOR: YELLOW
COMMENT UA: ABNORMAL
CREAT SERPL-MCNC: 2.86 MG/DL (ref 0.5–0.9)
CRYSTALS, UA: ABNORMAL /HPF
DIFFERENTIAL TYPE: ABNORMAL
EOSINOPHILS RELATIVE PERCENT: 4 % (ref 0–4)
EPITHELIAL CELLS UA: ABNORMAL /HPF
GFR AFRICAN AMERICAN: 20 ML/MIN
GFR NON-AFRICAN AMERICAN: 16 ML/MIN
GFR SERPL CREATININE-BSD FRML MDRD: ABNORMAL ML/MIN/{1.73_M2}
GFR SERPL CREATININE-BSD FRML MDRD: ABNORMAL ML/MIN/{1.73_M2}
GLUCOSE BLD-MCNC: 202 MG/DL (ref 70–99)
GLUCOSE URINE: NEGATIVE
HCT VFR BLD CALC: 33.1 % (ref 36–46)
HEMOGLOBIN: 10.8 G/DL (ref 12–16)
IMMATURE GRANULOCYTES: ABNORMAL %
KETONES, URINE: NEGATIVE
LEUKOCYTE ESTERASE, URINE: ABNORMAL
LYMPHOCYTES # BLD: 25 % (ref 24–44)
MAGNESIUM: 1.9 MG/DL (ref 1.6–2.6)
MCH RBC QN AUTO: 29.6 PG (ref 26–34)
MCHC RBC AUTO-ENTMCNC: 32.7 G/DL (ref 31–37)
MCV RBC AUTO: 90.5 FL (ref 80–100)
MONOCYTES # BLD: 7 % (ref 1–7)
MUCUS: ABNORMAL
NITRITE, URINE: NEGATIVE
NRBC AUTOMATED: ABNORMAL PER 100 WBC
OTHER OBSERVATIONS UA: ABNORMAL
PDW BLD-RTO: 15.1 % (ref 11.5–14.9)
PH UA: 6.5 (ref 5–8)
PHOSPHORUS: 3.6 MG/DL (ref 2.6–4.5)
PLATELET # BLD: 298 K/UL (ref 150–450)
PLATELET ESTIMATE: ABNORMAL
PMV BLD AUTO: 7.9 FL (ref 6–12)
POTASSIUM SERPL-SCNC: 5.3 MMOL/L (ref 3.7–5.3)
PROTEIN UA: ABNORMAL
RBC # BLD: 3.65 M/UL (ref 4–5.2)
RBC # BLD: ABNORMAL 10*6/UL
RBC UA: ABNORMAL /HPF
RENAL EPITHELIAL, UA: ABNORMAL /HPF
SEG NEUTROPHILS: 63 % (ref 36–66)
SEGMENTED NEUTROPHILS ABSOLUTE COUNT: 6.9 K/UL (ref 1.3–9.1)
SODIUM BLD-SCNC: 140 MMOL/L (ref 135–144)
SPECIFIC GRAVITY UA: 1.01 (ref 1–1.03)
TRICHOMONAS: ABNORMAL
TURBIDITY: CLEAR
URINE HGB: ABNORMAL
UROBILINOGEN, URINE: NORMAL
WBC # BLD: 10.8 K/UL (ref 3.5–11)
WBC # BLD: ABNORMAL 10*3/UL
WBC UA: ABNORMAL /HPF
YEAST: ABNORMAL

## 2021-08-21 PROCEDURE — 84100 ASSAY OF PHOSPHORUS: CPT

## 2021-08-21 PROCEDURE — 80048 BASIC METABOLIC PNL TOTAL CA: CPT

## 2021-08-21 PROCEDURE — 85025 COMPLETE CBC W/AUTO DIFF WBC: CPT

## 2021-08-21 PROCEDURE — 36415 COLL VENOUS BLD VENIPUNCTURE: CPT

## 2021-08-21 PROCEDURE — 87086 URINE CULTURE/COLONY COUNT: CPT

## 2021-08-21 PROCEDURE — 81001 URINALYSIS AUTO W/SCOPE: CPT

## 2021-08-21 PROCEDURE — 83735 ASSAY OF MAGNESIUM: CPT

## 2021-08-22 LAB
CULTURE: NORMAL
Lab: NORMAL
SPECIMEN DESCRIPTION: NORMAL

## 2021-08-31 ENCOUNTER — TELEPHONE (OUTPATIENT)
Dept: UROLOGY | Age: 71
End: 2021-08-31

## 2021-09-07 ENCOUNTER — HOSPITAL ENCOUNTER (OUTPATIENT)
Dept: PAIN MANAGEMENT | Age: 71
Discharge: HOME | End: 2021-09-07
Payer: MEDICARE

## 2021-09-07 DIAGNOSIS — M48.061 DEGENERATIVE LUMBAR SPINAL STENOSIS: ICD-10-CM

## 2021-09-07 DIAGNOSIS — M51.36 DDD (DEGENERATIVE DISC DISEASE), LUMBAR: ICD-10-CM

## 2021-09-07 DIAGNOSIS — Z79.891 ENCOUNTER FOR LONG-TERM OPIATE ANALGESIC USE: Primary | ICD-10-CM

## 2021-09-07 DIAGNOSIS — G89.29 CHRONIC MIDLINE LOW BACK PAIN WITHOUT SCIATICA: ICD-10-CM

## 2021-09-07 DIAGNOSIS — M16.12 PRIMARY OSTEOARTHRITIS OF LEFT HIP: ICD-10-CM

## 2021-09-07 DIAGNOSIS — M50.30 DDD (DEGENERATIVE DISC DISEASE), CERVICAL: ICD-10-CM

## 2021-09-07 DIAGNOSIS — M54.50 CHRONIC MIDLINE LOW BACK PAIN WITHOUT SCIATICA: ICD-10-CM

## 2021-09-07 PROCEDURE — 99442 PR PHYS/QHP TELEPHONE EVALUATION 11-20 MIN: CPT | Performed by: NURSE PRACTITIONER

## 2021-09-07 PROCEDURE — 99213 OFFICE O/P EST LOW 20 MIN: CPT

## 2021-09-07 RX ORDER — HYDROCODONE BITARTRATE AND ACETAMINOPHEN 5; 325 MG/1; MG/1
1 TABLET ORAL EVERY 8 HOURS PRN
Qty: 90 TABLET | Refills: 0 | Status: SHIPPED | OUTPATIENT
Start: 2021-09-07 | End: 2021-10-08 | Stop reason: SDUPTHER

## 2021-09-07 ASSESSMENT — ENCOUNTER SYMPTOMS
CONSTIPATION: 0
COUGH: 0
SHORTNESS OF BREATH: 0

## 2021-09-07 NOTE — PROGRESS NOTES
Date    CERVICAL FUSION  1993    CORONARY ANGIOPLASTY WITH STENT PLACEMENT  2000 1 STENT    CYSTOSCOPY Left 6/21/2021    CYSTOSCOPY URETERAL STENT INSERTION performed by Ruiz Santa MD at [address] 03/21/2014    [place] ARTHOPLASTY LEFT THUMB    HYSTERECTOMY  1980    WITH RT SALPINGOOPHERECTOMY    JOINT REPLACEMENT Bilateral 1994    PARTIAL-KNEES    OTHER SURGICAL HISTORY Right 09/23/2014    thumb repair    SALPINGO-OOPHORECTOMY Left 1987    SHOULDER SURGERY Left 1993    SPURS    TOE OSTEOTOMY Right 6/8/2016    Right 5th digit adductory wedge osteotomy with K wire fixation        Allergies   Allergen Reactions    Pcn [Penicillins] Swelling and Hives    Heparin Other (See Comments)     MIGRAINE      Lamotrigine Other (See Comments), Itching, Nausea Only and Rash    Cyclobenzaprine      Dry mouth, jitters    Heparin (Porcine)      Other reaction(s): Migraine         Current Outpatient Medications:     ONETOUCH ULTRA strip, , Disp: , Rfl:     fluticasone (FLONASE) 50 MCG/ACT nasal spray, 1 spray by Nasal route daily , Disp: , Rfl:     olopatadine (PATANOL) 0.1 % ophthalmic solution, , Disp: , Rfl:     FLUoxetine (PROZAC) 10 MG capsule, Take 1 capsule by mouth daily, Disp: 30 capsule, Rfl: 3    amLODIPine (NORVASC) 10 MG tablet, Take 1 tablet by mouth daily, Disp: 30 tablet, Rfl: 3    ferrous sulfate (IRON 325) 325 (65 Fe) MG tablet, Take 1 tablet by mouth daily (with breakfast), Disp: 30 tablet, Rfl: 3    buPROPion (WELLBUTRIN XL) 300 MG extended release tablet, TAKE ONE TABLET BY MOUTH DAILY, Disp: , Rfl:     clotrimazole (LOTRIMIN) 1 % vaginal cream, Place 1 applicator vaginally 2 times daily, Disp: , Rfl:     pramipexole (MIRAPEX) 0.125 MG tablet, TAKE ONE TABLET BY MOUTH DAILY, Disp: , Rfl:     albuterol sulfate  (90 Base) MCG/ACT inhaler, INHALE TWO PUFFS BY MOUTH EVERY 4 TO 6 HOURS AS NEEDED FOR WHEEZING, Disp: , Rfl:     nystatin (NYAMYC) 259146 UNIT/GM powder, APPLY TO AFFECTED AREA(S) FOUR TIMES A DAY, Disp: , Rfl:     busPIRone (BUSPAR) 15 MG tablet, Take 15 mg by mouth 2 times daily, Disp: , Rfl:     isosorbide mononitrate (IMDUR) 30 MG extended release tablet, Take 1 tablet by mouth daily, Disp: 30 tablet, Rfl: 3    nystatin (MYCOSTATIN) 524836 UNIT/GM cream, Apply topically 2 times daily Apply topically 2 times daily. , Disp: , Rfl:     levothyroxine (SYNTHROID) 112 MCG tablet, TAKE ONE TABLET BY MOUTH DAILY, Disp: , Rfl:     atorvastatin (LIPITOR) 40 MG tablet, , Disp: , Rfl:     nitroGLYCERIN (NITROSTAT) 0.4 MG SL tablet, Place 0.4 mg under the tongue every 5 minutes as needed for Chest pain up to max of 3 total doses. If no relief after 1 dose, call 911. , Disp: , Rfl:     Multiple Vitamins-Minerals (THERAPEUTIC MULTIVITAMIN-MINERALS) tablet, Take 1 tablet by mouth daily. , Disp: , Rfl:     aspirin 81 MG EC tablet, Take 81 mg by mouth daily. LAST DOSE 14, Disp: , Rfl:     metoprolol (TOPROL-XL) 100 MG XL tablet, Take 100 mg by mouth daily. , Disp: , Rfl:     vitamin B-12 (CYANOCOBALAMIN) 500 MCG tablet, Take 500 mcg by mouth daily , Disp: , Rfl:     Family History   Problem Relation Age of Onset    Breast Cancer Mother 62        BREAST WITH METS T  LIVER AND LUNG    Other Father         AAA    Heart Disease Father     Asthma Sister     Diabetes Sister         NIDDM    Stroke Brother     Diabetes Brother         NIDDM    Stroke Maternal Grandmother     Asthma Son        Social History     Socioeconomic History    Marital status:       Spouse name: Not on file    Number of children: 1    Years of education: Not on file    Highest education level: Not on file   Occupational History    Not on file   Tobacco Use    Smoking status: Former Smoker     Packs/day: 1.00     Years: 30.00     Pack years: 30.00     Types: Cigarettes     Quit date: 3/19/2004     Years since quittin.4    Smokeless tobacco: Never Used   Vaping ---------------------------------  yes  Recent ER visits: -------------------------------------yes - elevated potassium  Pill count is appropriate: ---------------------------yes   Refills for prescriptions appropriate:---------- yes      Assessment:  Problem List Items Addressed This Visit     Chronic midline low back pain without sciatica    Relevant Medications    HYDROcodone-acetaminophen (NORCO) 5-325 MG per tablet    Degenerative lumbar spinal stenosis    Relevant Medications    HYDROcodone-acetaminophen (NORCO) 5-325 MG per tablet    DDD (degenerative disc disease), lumbar    Relevant Medications    HYDROcodone-acetaminophen (Francies Reek) 5-325 MG per tablet    Primary osteoarthritis of left hip    Relevant Medications    HYDROcodone-acetaminophen (NORCO) 5-325 MG per tablet    DDD (degenerative disc disease), cervical    Relevant Medications    HYDROcodone-acetaminophen (NORCO) 5-325 MG per tablet    Other Relevant Orders    DRUG SCREEN, PAIN      Other Visit Diagnoses     Encounter for long-term opiate analgesic use    -  Primary    Relevant Orders    DRUG SCREEN, PAIN             Treatment Plan:  Patient relates current medications are helping the pain. Patient reports taking pain medications as prescribed, denies obtaining medications from different sources and denies use of illegal drugs. Patient denies side effects from medications like nausea, vomiting, constipation or drowsiness. Patient reports current activities of daily living are possible due to medications and would like to continue them. As always, we encourage daily stretching and strengthening exercises, and recommend minimizing use of pain medications unless patient cannot get through daily activities due to pain. Contract requirements met. Continue opioid therapy.  Script written for norco  UDS for standard monitoring purposes  Follow up appointment made for 4 weeks    Marilyn Cristy Long, was evaluated through a synchronous (real-time) audio-video encounter. The patient (or guardian if applicable) is aware that this is a billable service. Verbal consent to proceed has been obtained within the past 12 months. The visit was conducted pursuant to the emergency declaration under the [address] [place] [address], [address] [place] [address] authority and the [employer] Act. Patient identification was verified, and a caregiver was present when appropriate. The patient was located in a state where the provider was credentialed to provide care. Total time spent for this encounter: 20 minutes      --QI Mandujano CNP on 9/7/2021 at 2:03 PM    An electronic signature was used to authenticate this note.

## 2021-09-10 ENCOUNTER — TELEPHONE (OUTPATIENT)
Dept: INFECTIOUS DISEASES | Age: 71
End: 2021-09-10

## 2021-09-10 NOTE — TELEPHONE ENCOUNTER
Amanuel Malik MD  9/7/2021 2:14 PM  Long, Marilyn A. 1950 called. She is having constant pain in her groin. Her urine is clear. What do you want her to do? KF  Read 9/10/2021 7:55 AM  9/10/2021 7:55 AM  Go to urgent care to be evaluated    Patient has been notified, she went to her family doctor.  She had an infection and was given ABX

## 2021-09-20 ENCOUNTER — HOSPITAL ENCOUNTER (EMERGENCY)
Age: 71
Discharge: LEFT BEFORE DISCHARGE | End: 2021-09-20
Payer: MEDICARE

## 2021-09-20 VITALS
DIASTOLIC BLOOD PRESSURE: 62 MMHG | TEMPERATURE: 97.5 F | HEIGHT: 65 IN | SYSTOLIC BLOOD PRESSURE: 153 MMHG | HEART RATE: 64 BPM | RESPIRATION RATE: 20 BRPM | OXYGEN SATURATION: 99 % | BODY MASS INDEX: 30.32 KG/M2 | WEIGHT: 182 LBS

## 2021-09-20 LAB
-: ABNORMAL
AMORPHOUS: ABNORMAL
BACTERIA: ABNORMAL
BILIRUBIN URINE: NEGATIVE
CASTS UA: ABNORMAL /LPF
COLOR: YELLOW
COMMENT UA: ABNORMAL
CRYSTALS, UA: ABNORMAL /HPF
EPITHELIAL CELLS UA: ABNORMAL /HPF
GLUCOSE URINE: NEGATIVE
KETONES, URINE: NEGATIVE
LEUKOCYTE ESTERASE, URINE: ABNORMAL
MUCUS: ABNORMAL
NITRITE, URINE: NEGATIVE
OTHER OBSERVATIONS UA: ABNORMAL
PH UA: 7 (ref 5–8)
PROTEIN UA: ABNORMAL
RBC UA: ABNORMAL /HPF
RENAL EPITHELIAL, UA: ABNORMAL /HPF
SPECIFIC GRAVITY UA: 1.01 (ref 1–1.03)
TRICHOMONAS: ABNORMAL
TURBIDITY: CLEAR
URINE HGB: ABNORMAL
UROBILINOGEN, URINE: NORMAL
WBC UA: ABNORMAL /HPF
YEAST: ABNORMAL

## 2021-09-20 PROCEDURE — 86403 PARTICLE AGGLUT ANTBDY SCRN: CPT

## 2021-09-20 PROCEDURE — 81001 URINALYSIS AUTO W/SCOPE: CPT

## 2021-09-20 PROCEDURE — 87077 CULTURE AEROBIC IDENTIFY: CPT

## 2021-09-20 PROCEDURE — 87086 URINE CULTURE/COLONY COUNT: CPT

## 2021-09-20 PROCEDURE — 4500000002 HC ER NO CHARGE

## 2021-09-20 PROCEDURE — 87186 SC STD MICRODIL/AGAR DIL: CPT

## 2021-09-20 ASSESSMENT — PAIN DESCRIPTION - PAIN TYPE: TYPE: ACUTE PAIN

## 2021-09-20 ASSESSMENT — PAIN DESCRIPTION - ONSET: ONSET: PROGRESSIVE

## 2021-09-20 ASSESSMENT — PAIN SCALES - GENERAL: PAINLEVEL_OUTOF10: 5

## 2021-09-20 ASSESSMENT — PAIN DESCRIPTION - FREQUENCY: FREQUENCY: INTERMITTENT

## 2021-09-20 ASSESSMENT — PAIN DESCRIPTION - LOCATION: LOCATION: ABDOMEN

## 2021-09-20 ASSESSMENT — PAIN DESCRIPTION - DESCRIPTORS: DESCRIPTORS: DULL;ACHING

## 2021-09-20 NOTE — ED TRIAGE NOTES
Patient updated on ED patient flow and current status of the Emergency Department. Patient verbalizes understanding. No acute distress noted.

## 2021-09-20 NOTE — ED TRIAGE NOTES
Labs done at [facility] on 9/18/21.   BUN 47, Creat 3.18, K+ 5.2; urine Moderate Hgb,

## 2021-09-22 ENCOUNTER — OFFICE VISIT (OUTPATIENT)
Dept: UROLOGY | Age: 71
End: 2021-09-22
Payer: MEDICARE

## 2021-09-22 ENCOUNTER — TELEPHONE (OUTPATIENT)
Dept: UROLOGY | Age: 71
End: 2021-09-22

## 2021-09-22 VITALS
HEART RATE: 62 BPM | TEMPERATURE: 98.3 F | HEIGHT: 65 IN | WEIGHT: 182 LBS | BODY MASS INDEX: 30.32 KG/M2 | SYSTOLIC BLOOD PRESSURE: 138 MMHG | DIASTOLIC BLOOD PRESSURE: 67 MMHG

## 2021-09-22 DIAGNOSIS — Z96.0 RETAINED URETERAL STENT: Primary | ICD-10-CM

## 2021-09-22 DIAGNOSIS — N17.9 AKI (ACUTE KIDNEY INJURY) (HCC): ICD-10-CM

## 2021-09-22 DIAGNOSIS — N30.01 ACUTE CYSTITIS WITH HEMATURIA: ICD-10-CM

## 2021-09-22 DIAGNOSIS — N13.30 HYDRONEPHROSIS OF LEFT KIDNEY: ICD-10-CM

## 2021-09-22 DIAGNOSIS — R39.14 FEELING OF INCOMPLETE BLADDER EMPTYING: ICD-10-CM

## 2021-09-22 LAB
CULTURE: ABNORMAL
CULTURE: ABNORMAL
Lab: ABNORMAL
SPECIMEN DESCRIPTION: ABNORMAL

## 2021-09-22 PROCEDURE — 99214 OFFICE O/P EST MOD 30 MIN: CPT | Performed by: NURSE PRACTITIONER

## 2021-09-22 PROCEDURE — 51798 US URINE CAPACITY MEASURE: CPT | Performed by: NURSE PRACTITIONER

## 2021-09-22 RX ORDER — LORATADINE 10 MG/1
10 TABLET ORAL DAILY PRN
COMMUNITY
Start: 2021-09-09 | End: 2021-09-23

## 2021-09-22 RX ORDER — DOXYCYCLINE HYCLATE 100 MG/1
100 CAPSULE ORAL 2 TIMES DAILY
COMMUNITY
Start: 2021-09-20 | End: 2021-09-23

## 2021-09-22 ASSESSMENT — ENCOUNTER SYMPTOMS
EYES NEGATIVE: 1
SHORTNESS OF BREATH: 0
CONSTIPATION: 0
EYE PAIN: 0
GASTROINTESTINAL NEGATIVE: 1
ABDOMINAL PAIN: 0
VOMITING: 0
BACK PAIN: 0
COUGH: 0
DIARRHEA: 0
EYE REDNESS: 0
NAUSEA: 0
RESPIRATORY NEGATIVE: 1
WHEEZING: 0

## 2021-09-22 NOTE — PROGRESS NOTES
Review of Systems   Constitutional: Negative. Negative for appetite change, chills and fever. Eyes: Negative. Negative for pain, redness and visual disturbance. Respiratory: Negative. Negative for cough, shortness of breath and wheezing. Cardiovascular: Negative. Negative for chest pain and leg swelling. Gastrointestinal: Negative. Negative for abdominal pain, constipation, diarrhea, nausea and vomiting. Genitourinary: Negative. Negative for difficulty urinating, dysuria, flank pain, frequency, hematuria and urgency. Musculoskeletal: Negative. Negative for back pain, joint swelling and myalgias. Skin: Negative. Negative for rash and wound. Neurological: Negative. Negative for dizziness, tremors and numbness. Hematological: Negative. Does not bruise/bleed easily.

## 2021-09-22 NOTE — PATIENT INSTRUCTIONS
1. Patient does not appear to be in any distress today. We discussed labs (elevated creatinine and urine results). She needs to keep f/u with Nephrology. 2. She is on atb's now. She will keep taking them. Her PVR is low, she is emptying. 3. cysto, left stent removal/possible exchange and left retrograde pyelogram set up for Friday at [facility]. V's with Dr. Paul Chappell. 4.  Thoroughly discussed signs of emergency: fever, altered mental status, inability to urinate, new onset flank pain. If any of these occur prior to Friday she will go to there ER. 5. Pt agreeable with plan and verbalized understanding.     Return for cysto, left stent removal/possible exchange and left retrograde pyelogram.

## 2021-09-22 NOTE — TELEPHONE ENCOUNTER
cysto, (LT) retrograde pyelogram, (LT) stent removal *possible* exchange @ [facility] 9/24/21 8:00am   PAT same day   Vaccinated          Spoke with patient in office, procedure given to patient

## 2021-09-22 NOTE — LETTER
[address] [address]  Phone: [phone]  Fax: [phone]    QI Lofton CNP    September 22, 2021     Naty Smart DO  [address] [place] [address]  [address] [place] [id number]    Patient: Marilyn Alvarez   MR Number: [id number]   YOB: 1950   Date of Visit: 9/22/2021       Dear Naty Smart: Thank you for referring Marilyn Mora to me for evaluation/treatment. Below are the relevant portions of my assessment and plan of care. 1. Retained ureteral stent    2. Hydronephrosis of left kidney    3. Acute cystitis with hematuria    4. WERO (acute kidney injury) ([facility] [address]    5. Feeling of incomplete bladder emptying       1. Patient does not appear to be in any distress today. We discussed labs (elevated creatinine and urine results). She needs to keep f/u with Nephrology. 2. She is on atb's now. She will keep taking them. Her PVR is low, she is emptying. 3. cysto, left stent removal/possible exchange and left retrograde pyelogram set up for Friday at [address]. V's with Dr. Jorgito Craig. 4.  Thoroughly discussed signs of emergency: fever, altered mental status, inability to urinate, new onset flank pain. If any of these occur prior to Friday she will go to there ER. 5. Pt agreeable with plan and verbalized understanding. Return for cysto, left stent removal/possible exchange and left retrograde pyelogram.      Prescriptions Ordered:  No orders of the defined types were placed in this encounter. Orders Placed:  Orders Placed This Encounter   Procedures    OR MEASUREMENT,POST-VOID RESIDUAL VOLUME BY US,NON-IMAGING         If you have questions, please do not hesitate to call me. I look forward to following June along with you.     Sincerely,      QI Lofton CNP

## 2021-09-22 NOTE — PROGRESS NOTES
[address] [address]  Dept: [address] Maur[address] [facility] Urology Office Note - Established    Patient:  Marilyn Rodriguez  YOB: 1950  Date: 9/22/2021    The patient is a 70 y.o. female whopresents today for evaluation of the following problems:   Chief Complaint   Patient presents with    Follow-Up from Hospital     hospital f/u- needs to see [facility] before she leaves       HPI  Patient here for hospital follow up. Seen in hospital in June for acute renal failure and left hydro, stent was placed and creatinine had improved- leyla= 2/5. She had a CT 8/11/21 which showed left ureteral stent remained in place and the left hydro had resolved. She then followed up as OP in August, was to be scheduled for cysto, left stent removal and left retrograde pyelogram (replacement of stent for hydro persisted), but was unable to get into contact with patient. Went to PCP 9/9/21 for dysuria, started on macrobid x7 days, nothing had improved a week later so was ordered a culture and switched to doxycycline. Her PCP then called her and told her to go to [facility] for creatinine of 3.18. Creatinine at ER was 2.96- received fluids and was told to continue her atb. No fever or chills. She continues to feel she is not emptying her bladder. No dysuria, no gross hematuria. No flank pain. Summary of old records: N/A    Additional History: N/A    Procedures Today: N/A    Urinalysis today:  No results found for this visit on 09/22/21.     Imaging Reviewed during this Office Visit: CT abd/pelvis 8/11/2021  (results were independently reviewed by physician and radiology report verified)  Last BUN and creatinine:  Lab Results   Component Value Date    BUN 34 (H) 08/21/2021     Lab Results   Component Value Date    CREATININE 2.86 (H) 08/21/2021       Additional Lab/Culture results:   9/18/21: CMP  9/20/21 UA and culture.   9/22/21: BMP and CBC        PAST MEDICAL, FAMILY AND SOCIAL HISTORY UPDATE:  Past Medical History:   Diagnosis Date    Aortic valve stenosis     mild per chart    Arthritis     CAD (coronary artery disease) 2000    1 STENT    Diabetes mellitus ([address]     Heart murmur     1962    Hyperlipidemia 2004    ON RX    MI, old     states many and they were mild    Pulmonary stenosis 1995    Pulmonary valve stenosis     mild/congenital per chart    Sciatica     right leg    Thyroid disease 2004    HYPOTHYROIDISM ON RX    Wears glasses      Past Surgical History:   Procedure Laterality Date    CERVICAL FUSION  1993    CORONARY ANGIOPLASTY WITH STENT PLACEMENT  2000    1 STENT    CYSTOSCOPY Left 6/21/2021    CYSTOSCOPY URETERAL STENT INSERTION performed by Theron Monsivais MD at [address] [facility] [place] 03/21/2014    [address] [facility] ARTHOPLASTY LEFT THUMB    HYSTERECTOMY  1980    WITH RT SALPINGOOPHERECTOMY    JOINT REPLACEMENT Bilateral 1994    PARTIAL-KNEES    OTHER SURGICAL HISTORY Right 09/23/2014    thumb repair    SALPINGO-OOPHORECTOMY Left 1987    SHOULDER SURGERY Left 1993    SPURS    TOE OSTEOTOMY Right 6/8/2016    Right 5th digit adductory wedge osteotomy with K wire fixation      Family History   Problem Relation Age of Onset    Breast Cancer Mother 62        BREAST WITH METS T  LIVER AND LUNG    Other Father         AAA    Heart Disease Father     Asthma Sister     Diabetes Sister         NIDDM    Stroke Brother     Diabetes Brother         NIDDM    Stroke Maternal Grandmother     Asthma Son      Outpatient Medications Marked as Taking for the 9/22/21 encounter (Office Visit) with QI Romero CNP   Medication Sig Dispense Refill    loratadine (CLARITIN) 10 MG tablet Take 10 mg by mouth daily as needed      doxycycline hyclate (VIBRAMYCIN) 100 MG capsule Take 100 mg by mouth 2 times daily      HYDROcodone-acetaminophen (NORCO) 5-325 MG per tablet Take 1 tablet by mouth every 8 hours as needed for Pain for up to 30 days. 90 tablet 0    ONETOUCH ULTRA strip       olopatadine (PATANOL) 0.1 % ophthalmic solution       FLUoxetine (PROZAC) 10 MG capsule Take 1 capsule by mouth daily 30 capsule 3    amLODIPine (NORVASC) 10 MG tablet Take 1 tablet by mouth daily 30 tablet 3    ferrous sulfate (IRON 325) 325 (65 Fe) MG tablet Take 1 tablet by mouth daily (with breakfast) 30 tablet 3    buPROPion (WELLBUTRIN XL) 300 MG extended release tablet TAKE ONE TABLET BY MOUTH DAILY      clotrimazole (LOTRIMIN) 1 % vaginal cream Place 1 applicator vaginally 2 times daily      nystatin (NYAMYC) 711250 UNIT/GM powder APPLY TO AFFECTED AREA(S) FOUR TIMES A DAY      busPIRone (BUSPAR) 15 MG tablet Take 15 mg by mouth 2 times daily      isosorbide mononitrate (IMDUR) 30 MG extended release tablet Take 1 tablet by mouth daily 30 tablet 3    nystatin (MYCOSTATIN) 212499 UNIT/GM cream Apply topically 2 times daily Apply topically 2 times daily.  levothyroxine (SYNTHROID) 112 MCG tablet TAKE ONE TABLET BY MOUTH DAILY      atorvastatin (LIPITOR) 40 MG tablet       nitroGLYCERIN (NITROSTAT) 0.4 MG SL tablet Place 0.4 mg under the tongue every 5 minutes as needed for Chest pain up to max of 3 total doses. If no relief after 1 dose, call 911.  Multiple Vitamins-Minerals (THERAPEUTIC MULTIVITAMIN-MINERALS) tablet Take 1 tablet by mouth daily.  aspirin 81 MG EC tablet Take 81 mg by mouth daily. LAST DOSE 9/8/14      metoprolol (TOPROL-XL) 100 MG XL tablet Take 100 mg by mouth daily.       vitamin B-12 (CYANOCOBALAMIN) 500 MCG tablet Take 500 mcg by mouth daily         (All medications reviewed and updated by provider sincelast office visit or hospitalization)   Pcn [penicillins], Heparin, Lamotrigine, Cyclobenzaprine, and Heparin (porcine)  Social History     Tobacco Use   Smoking Status Former Smoker    Packs/day: 1.00    Years: 30.00    Pack years: 30.00    Types: Cigarettes    Quit date: 3/19/2004    Years since quittin.5   Smokeless Tobacco Never Used      (If patient a smoker, smoking cessation counseling offered)     Social History     Substance and Sexual Activity   Alcohol Use Yes    Comment: socially, once a year       REVIEW OF SYSTEMS:  Review of Systems      Physical Exam:      Vitals:    21 1442   BP: 138/67   Pulse: 62   Temp: 98.3 °F (36.8 °C)     Body mass index is 30.29 kg/m². Patient is a 70 y.o. female in noacute distress and alert and oriented to person, place and time. Physical Exam  Constitutional: Patient in no acute distress. Neuro: Alert andoriented to person, place and time. Psych: Mood normal, affect normal  Skin: No rash noted  Lungs: Respiratory effort is normal  Cardiovascular: Warm & Pink  Abdomen: Soft, non-tender, non-distended   Bladder non-tender and not distended. PVR 79 ml  Musculoskeletal: Normal gait and station      and Plan      1. Retained ureteral stent    2. Hydronephrosis of left kidney    3. Acute cystitis with hematuria    4. WERO (acute kidney injury) ([address]    5. Feeling of incomplete bladder emptying           Plan:    1. Patient does not appear to be in any distress today. We discussed labs (elevated creatinine and urine results). She needs to keep f/u with Nephrology. 2. She is on atb's now. She will keep taking them. Her PVR is low, she is emptying. 3. cysto, left stent removal/possible exchange and left retrograde pyelogram set up for Friday at [facility]. V's with Dr. Mohsen Briseno. 4.  Thoroughly discussed signs of emergency: fever, altered mental status, inability to urinate, new onset flank pain. If any of these occur prior to Friday she will go to there ER. 5. Pt agreeable with plan and verbalized understanding. Return for cysto, left stent removal/possible exchange and left retrograde pyelogram.    Prescriptions Ordered:  No orders of the defined types were placed in this encounter.      Orders Placed:  Orders Placed This Encounter   Procedures    AK MEASUREMENT,POST-VOID RESIDUAL VOLUME BY US,NON-IMAGING            QI Smith - ROSALVA    Reviewed and agree with the ROS entered by the MA.

## 2021-09-22 NOTE — H&P
Pre-op History and Physical  Manuel Pink PA-C    Patient:  River Abbott  MRN: [id number]  YOB: 1950    HISTORY OF PRESENT ILLNESS:     The patient is a 70 y.o. female who presents with retained ureteral stent placed back in June for hydro. Here for cystoscopy, left stent removal and possible exchange after retrograde pyelogram.    Patient's old records, notes and chart reviewed and summarized above. Manuel Pink PA-C independently reviewed the images and verified the radiology reports from:    No results found. Past Medical History:    Past Medical History:   Diagnosis Date    Aortic valve stenosis     mild per chart    Arthritis     CAD (coronary artery disease) 2000    1 STENT    Diabetes mellitus ([address]     Heart murmur     1962    Hyperlipidemia 2004    ON RX    MI, old     states many and they were mild    Pulmonary stenosis 1995    Pulmonary valve stenosis     mild/congenital per chart    Sciatica     right leg    Thyroid disease 2004    HYPOTHYROIDISM ON RX    Wears glasses        Past Surgical History:    Past Surgical History:   Procedure Laterality Date    CERVICAL FUSION  1993    CORONARY ANGIOPLASTY WITH STENT PLACEMENT  2000    1 STENT    CYSTOSCOPY Left 6/21/2021    CYSTOSCOPY URETERAL STENT INSERTION performed by Jenae Watters MD at [address] Left 03/21/2014    [place] ARTHOPLASTY LEFT THUMB    HYSTERECTOMY  1980    WITH RT SALPINGOOPHERECTOMY    JOINT REPLACEMENT Bilateral 1994    PARTIAL-KNEES    OTHER SURGICAL HISTORY Right 09/23/2014    thumb repair    SALPINGO-OOPHORECTOMY Left 1987    SHOULDER SURGERY Left 1993    SPURS    TOE OSTEOTOMY Right 6/8/2016    Right 5th digit adductory wedge osteotomy with K wire fixation        Medications Prior to Admission:    Prior to Admission medications    Medication Sig Start Date End Date Taking?  Authorizing Provider   loratadine (CLARITIN) 10 MG tablet Take 10 mg by mouth daily as needed 9/9/21   Historical Provider, MD   doxycycline hyclate (VIBRAMYCIN) 100 MG capsule Take 100 mg by mouth 2 times daily 9/20/21 9/27/21  Historical Provider, MD   HYDROcodone-acetaminophen (NORCO) 5-325 MG per tablet Take 1 tablet by mouth every 8 hours as needed for Pain for up to 30 days. 9/7/21 10/7/21  QI Mccord - CNP   ONETOUCH ULTRA strip  8/26/21   Historical Provider, MD   olopatadine (PATANOL) 0.1 % ophthalmic solution  7/28/21   Historical Provider, MD   FLUoxetine (PROZAC) 10 MG capsule Take 1 capsule by mouth daily 7/24/21   Jono Romano MD   amLODIPine (NORVASC) 10 MG tablet Take 1 tablet by mouth daily 7/3/21   Stone Joshi MD   ferrous sulfate (IRON 325) 325 (65 Fe) MG tablet Take 1 tablet by mouth daily (with breakfast) 7/3/21   Stone Joshi MD   buPROPion (WELLBUTRIN XL) 300 MG extended release tablet TAKE ONE TABLET BY MOUTH DAILY 11/16/20   Historical Provider, MD   clotrimazole (LOTRIMIN) 1 % vaginal cream Place 1 applicator vaginally 2 times daily 2/19/21   Historical Provider, MD   nystatin ([facility]) 237956 UNIT/GM powder APPLY TO AFFECTED AREA(S) FOUR TIMES A DAY 9/22/20   Historical Provider, MD   busPIRone (BUSPAR) 15 MG tablet Take 15 mg by mouth 2 times daily 11/9/20   Historical Provider, MD   isosorbide mononitrate (IMDUR) 30 MG extended release tablet Take 1 tablet by mouth daily 4/25/18   Stone Joshi MD   nystatin (MYCOSTATIN) 620120 UNIT/GM cream Apply topically 2 times daily Apply topically 2 times daily. Historical Provider, MD   levothyroxine (SYNTHROID) 112 MCG tablet TAKE ONE TABLET BY MOUTH DAILY 10/2/17   Historical Provider, MD   atorvastatin (LIPITOR) 40 MG tablet  7/2/17   Historical Provider, MD   nitroGLYCERIN (NITROSTAT) 0.4 MG SL tablet Place 0.4 mg under the tongue every 5 minutes as needed for Chest pain up to max of 3 total doses. If no relief after 1 dose, call 911.      Historical Provider, MD   Multiple Vitamins-Minerals (THERAPEUTIC MULTIVITAMIN-MINERALS) tablet Take 1 tablet by mouth daily. Historical Provider, MD   aspirin 81 MG EC tablet Take 81 mg by mouth daily. LAST DOSE 14    Historical Provider, MD   metoprolol (TOPROL-XL) 100 MG XL tablet Take 100 mg by mouth daily. Historical Provider, MD   vitamin B-12 (CYANOCOBALAMIN) 500 MCG tablet Take 500 mcg by mouth daily     Historical Provider, MD       Allergies:  Pcn [penicillins], Heparin, Lamotrigine, Cyclobenzaprine, and Heparin (porcine)    Social History:    Social History     Socioeconomic History    Marital status:      Spouse name: Not on file    Number of children: 1    Years of education: Not on file    Highest education level: Not on file   Occupational History    Not on file   Tobacco Use    Smoking status: Former Smoker     Packs/day: 1.00     Years: 30.00     Pack years: 30.00     Types: Cigarettes     Quit date: 3/19/2004     Years since quittin.5    Smokeless tobacco: Never Used   Vaping Use    Vaping Use: Never used   Substance and Sexual Activity    Alcohol use: Yes     Comment: socially, once a year    Drug use: No    Sexual activity: Not Currently   Other Topics Concern    Not on file   Social History Narrative    Not on file     Social Determinants of Health     Financial Resource Strain:     Difficulty of Paying Living Expenses:    Food Insecurity:     Worried About Running Out of Food in the Last Year:     [address] in the Last Year:    Transportation Needs:     Lack of Transportation (Medical):      Lack of Transportation (Non-Medical):    Physical Activity:     Days of Exercise per Week:     Minutes of Exercise per Session:    Stress:     Feeling of Stress :    Social Connections:     Frequency of Communication with Friends and Family:     Frequency of Social Gatherings with Friends and Family:     Attends [employer] Services:     Active Member of Clubs or Organizations:     Attends Club or Organization Meetings:     Marital Status:    Intimate Partner Violence:     Fear of Current or Ex-Partner:     Emotionally Abused:     Physically Abused:     Sexually Abused:        Family History:    Family History   Problem Relation Age of Onset    Breast Cancer Mother 62        BREAST WITH METS T  LIVER AND LUNG    Other Father         AAA    Heart Disease Father     Asthma Sister     Diabetes Sister         NIDDM    Stroke Brother     Diabetes Brother         NIDDM    Stroke Maternal Grandmother     Asthma Son        REVIEW OF SYSTEMS:  Constitutional: negative  Eyes: negative  Respiratory: negative  Cardiovascular: negative  Gastrointestinal: negative  Genitourinary: no acute issues  Musculoskeletal: negative  Skin: negative   Neurological: negative  Hematological/Lymphatic: negative  Psychological: negative    PHYSICAL EXAM:    No data found. Constitutional: Patient in NAD  Neuro: Alert and oriented to person, place, and time  Psych: Mood and affect normal  Skin: Clean, dry, intact   Lungs: Respiratory effort normal, CTA  Cardiovascular:  Normal peripheral pulses; no murmur. Normal rhythm  Abdomen: Soft, non-tender, non-distended, no hepatosplenomegaly or hernia, CVA tenderness none  Bladder: Non-tender and non-disdended   : Non-tender, skin intact, no lesions       LABS:   No results for input(s): WBC, HGB, HCT, MCV, PLT in the last 72 hours. No results for input(s): NA, K, CL, CO2, PHOS, BUN, CREATININE in the last 72 hours.     Invalid input(s): CA  No results found for: PSA      Urinalysis:   Recent Labs     09/20/21  1427   COLORU YELLOW   PHUR 7.0   WBCUA 50    RBCUA 5 TO 10   MUCUS NOT REPORTED   TRICHOMONAS NOT REPORTED   YEAST NOT REPORTED   BACTERIA MODERATE*   SPECGRAV 1.015   LEUKOCYTESUR LARGE*   UROBILINOGEN Normal   BILIRUBINUR NEGATIVE        -----------------------------------------------------------------    ASSESSMENT AND PLAN:    Impression:    Left retained ureteral stent  Left hydronephrosis    Patient Active Problem List   Diagnosis    CMC arthritis    Arthritis of left hip    Left hip pain    Chronic midline low back pain without sciatica    Degenerative lumbar spinal stenosis    Lumbar radiculopathy    DDD (degenerative disc disease), lumbar    Lumbosacral spondylosis without myelopathy    Primary osteoarthritis of left hip    Sacroiliitis (HCC)    DDD (degenerative disc disease), cervical    Neck pain    Cervical stenosis of spine    Osteoarthritis of spine with radiculopathy, cervical region    WERO (acute kidney injury) ([facility]    Acute cystitis without hematuria    Hydronephrosis of left kidney    Hydroureter, left    Type 2 diabetes mellitus, without long-term current use of insulin (HCC)    Essential hypertension    Hypothyroidism    Acute infective cystitis    Uremia    Pyelonephritis    Iron deficiency anemia    GERD (gastroesophageal reflux disease)    Leukocytosis    Allergy to penicillin    Anemia    Encounter for long-term opiate analgesic use       Plan: Cystoscopy, retrograde pyelogram, ureteral stent removal/possible exchange LEFT in OR today. Consent obtained    The patient was counseled at length about the risks of arianna Covid-19 during their perioperative period and any recovery window from their procedure. The patient was made aware that arianna Covid-19  may worsen their prognosis for recovering from their procedure  and lend to a higher morbidity and/or mortality risk. All material risks, benefits, and reasonable alternatives including postponing the procedure were discussed. The patient does wish to proceed with the procedure at this time.         Marie Patton PA-C  4:24 PM 9/22/2021

## 2021-09-23 ENCOUNTER — ANESTHESIA EVENT (OUTPATIENT)
Dept: OPERATING ROOM | Age: 71
End: 2021-09-23
Payer: MEDICARE

## 2021-09-23 RX ORDER — PRAMIPEXOLE DIHYDROCHLORIDE 0.12 MG/1
0.12 TABLET ORAL NIGHTLY
COMMUNITY

## 2021-09-24 ENCOUNTER — ANESTHESIA (OUTPATIENT)
Dept: OPERATING ROOM | Age: 71
End: 2021-09-24
Payer: MEDICARE

## 2021-09-24 ENCOUNTER — HOSPITAL ENCOUNTER (OUTPATIENT)
Age: 71
Setting detail: OUTPATIENT SURGERY
Discharge: HOME | End: 2021-09-24
Attending: UROLOGY | Admitting: UROLOGY
Payer: MEDICARE

## 2021-09-24 ENCOUNTER — APPOINTMENT (OUTPATIENT)
Dept: GENERAL RADIOLOGY | Age: 71
End: 2021-09-24
Attending: UROLOGY
Payer: MEDICARE

## 2021-09-24 ENCOUNTER — TELEPHONE (OUTPATIENT)
Dept: UROLOGY | Age: 71
End: 2021-09-24

## 2021-09-24 VITALS
SYSTOLIC BLOOD PRESSURE: 103 MMHG | DIASTOLIC BLOOD PRESSURE: 49 MMHG | OXYGEN SATURATION: 100 % | RESPIRATION RATE: 13 BRPM

## 2021-09-24 VITALS
OXYGEN SATURATION: 99 % | DIASTOLIC BLOOD PRESSURE: 74 MMHG | BODY MASS INDEX: 31.65 KG/M2 | HEART RATE: 65 BPM | HEIGHT: 65 IN | RESPIRATION RATE: 17 BRPM | TEMPERATURE: 97.2 F | WEIGHT: 190 LBS | SYSTOLIC BLOOD PRESSURE: 143 MMHG

## 2021-09-24 LAB
GLUCOSE BLD-MCNC: 181 MG/DL (ref 74–100)
GLUCOSE BLD-MCNC: 187 MG/DL (ref 65–105)
POC POTASSIUM: 4.5 MMOL/L (ref 3.5–4.5)

## 2021-09-24 PROCEDURE — 2580000003 HC RX 258: Performed by: ANESTHESIOLOGY

## 2021-09-24 PROCEDURE — 2580000003 HC RX 258: Performed by: UROLOGY

## 2021-09-24 PROCEDURE — 84132 ASSAY OF SERUM POTASSIUM: CPT

## 2021-09-24 PROCEDURE — 82947 ASSAY GLUCOSE BLOOD QUANT: CPT

## 2021-09-24 PROCEDURE — 7100000041 HC SPAR PHASE II RECOVERY - ADDTL 15 MIN: Performed by: UROLOGY

## 2021-09-24 PROCEDURE — C1758 CATHETER, URETERAL: HCPCS | Performed by: UROLOGY

## 2021-09-24 PROCEDURE — 3600000012 HC SURGERY LEVEL 2 ADDTL 15MIN: Performed by: UROLOGY

## 2021-09-24 PROCEDURE — 3700000001 HC ADD 15 MINUTES (ANESTHESIA): Performed by: UROLOGY

## 2021-09-24 PROCEDURE — 3209999900 FLUORO FOR SURGICAL PROCEDURES

## 2021-09-24 PROCEDURE — 3600000002 HC SURGERY LEVEL 2 BASE: Performed by: UROLOGY

## 2021-09-24 PROCEDURE — 2709999900 HC NON-CHARGEABLE SUPPLY: Performed by: UROLOGY

## 2021-09-24 PROCEDURE — 3700000000 HC ANESTHESIA ATTENDED CARE: Performed by: UROLOGY

## 2021-09-24 PROCEDURE — 6360000002 HC RX W HCPCS: Performed by: SPECIALIST

## 2021-09-24 PROCEDURE — 7100000040 HC SPAR PHASE II RECOVERY - FIRST 15 MIN: Performed by: UROLOGY

## 2021-09-24 PROCEDURE — 2500000003 HC RX 250 WO HCPCS: Performed by: SPECIALIST

## 2021-09-24 PROCEDURE — 6360000004 HC RX CONTRAST MEDICATION: Performed by: UROLOGY

## 2021-09-24 RX ORDER — LIDOCAINE HYDROCHLORIDE 10 MG/ML
INJECTION, SOLUTION EPIDURAL; INFILTRATION; INTRACAUDAL; PERINEURAL PRN
Status: DISCONTINUED | OUTPATIENT
Start: 2021-09-24 | End: 2021-09-24 | Stop reason: SDUPTHER

## 2021-09-24 RX ORDER — ONDANSETRON 2 MG/ML
4 INJECTION INTRAMUSCULAR; INTRAVENOUS
Status: DISCONTINUED | OUTPATIENT
Start: 2021-09-24 | End: 2021-09-24 | Stop reason: HOSPADM

## 2021-09-24 RX ORDER — FENTANYL CITRATE 50 UG/ML
INJECTION, SOLUTION INTRAMUSCULAR; INTRAVENOUS PRN
Status: DISCONTINUED | OUTPATIENT
Start: 2021-09-24 | End: 2021-09-24 | Stop reason: SDUPTHER

## 2021-09-24 RX ORDER — MIDAZOLAM HYDROCHLORIDE 2 MG/2ML
1 INJECTION, SOLUTION INTRAMUSCULAR; INTRAVENOUS EVERY 10 MIN PRN
Status: DISCONTINUED | OUTPATIENT
Start: 2021-09-24 | End: 2021-09-24 | Stop reason: HOSPADM

## 2021-09-24 RX ORDER — SODIUM CHLORIDE, SODIUM LACTATE, POTASSIUM CHLORIDE, CALCIUM CHLORIDE 600; 310; 30; 20 MG/100ML; MG/100ML; MG/100ML; MG/100ML
INJECTION, SOLUTION INTRAVENOUS CONTINUOUS
Status: DISCONTINUED | OUTPATIENT
Start: 2021-09-24 | End: 2021-09-24 | Stop reason: HOSPADM

## 2021-09-24 RX ORDER — MAGNESIUM HYDROXIDE 1200 MG/15ML
LIQUID ORAL PRN
Status: DISCONTINUED | OUTPATIENT
Start: 2021-09-24 | End: 2021-09-24 | Stop reason: ALTCHOICE

## 2021-09-24 RX ORDER — ONDANSETRON 2 MG/ML
INJECTION INTRAMUSCULAR; INTRAVENOUS PRN
Status: DISCONTINUED | OUTPATIENT
Start: 2021-09-24 | End: 2021-09-24 | Stop reason: SDUPTHER

## 2021-09-24 RX ORDER — CIPROFLOXACIN 500 MG/1
500 TABLET, FILM COATED ORAL 2 TIMES DAILY
Qty: 6 TABLET | Refills: 0 | Status: SHIPPED | OUTPATIENT
Start: 2021-09-24 | End: 2021-09-27

## 2021-09-24 RX ORDER — PROPOFOL 10 MG/ML
INJECTION, EMULSION INTRAVENOUS PRN
Status: DISCONTINUED | OUTPATIENT
Start: 2021-09-24 | End: 2021-09-24 | Stop reason: SDUPTHER

## 2021-09-24 RX ORDER — MIDAZOLAM HYDROCHLORIDE 1 MG/ML
INJECTION INTRAMUSCULAR; INTRAVENOUS PRN
Status: DISCONTINUED | OUTPATIENT
Start: 2021-09-24 | End: 2021-09-24 | Stop reason: SDUPTHER

## 2021-09-24 RX ORDER — CIPROFLOXACIN 2 MG/ML
INJECTION, SOLUTION INTRAVENOUS PRN
Status: DISCONTINUED | OUTPATIENT
Start: 2021-09-24 | End: 2021-09-24 | Stop reason: SDUPTHER

## 2021-09-24 RX ORDER — MEPERIDINE HYDROCHLORIDE 50 MG/ML
12.5 INJECTION INTRAMUSCULAR; INTRAVENOUS; SUBCUTANEOUS EVERY 5 MIN PRN
Status: DISCONTINUED | OUTPATIENT
Start: 2021-09-24 | End: 2021-09-24 | Stop reason: HOSPADM

## 2021-09-24 RX ORDER — FENTANYL CITRATE 50 UG/ML
50 INJECTION, SOLUTION INTRAMUSCULAR; INTRAVENOUS EVERY 5 MIN PRN
Status: DISCONTINUED | OUTPATIENT
Start: 2021-09-24 | End: 2021-09-24 | Stop reason: HOSPADM

## 2021-09-24 RX ORDER — FENTANYL CITRATE 50 UG/ML
25 INJECTION, SOLUTION INTRAMUSCULAR; INTRAVENOUS EVERY 5 MIN PRN
Status: DISCONTINUED | OUTPATIENT
Start: 2021-09-24 | End: 2021-09-24 | Stop reason: HOSPADM

## 2021-09-24 RX ADMIN — PROPOFOL 30 MG: 10 INJECTION, EMULSION INTRAVENOUS at 08:02

## 2021-09-24 RX ADMIN — LIDOCAINE HYDROCHLORIDE 50 MG: 10 INJECTION, SOLUTION EPIDURAL; INFILTRATION; INTRACAUDAL; PERINEURAL at 07:58

## 2021-09-24 RX ADMIN — SODIUM CHLORIDE, POTASSIUM CHLORIDE, SODIUM LACTATE AND CALCIUM CHLORIDE: 600; 310; 30; 20 INJECTION, SOLUTION INTRAVENOUS at 07:48

## 2021-09-24 RX ADMIN — PROPOFOL 30 MG: 10 INJECTION, EMULSION INTRAVENOUS at 07:58

## 2021-09-24 RX ADMIN — ONDANSETRON 4 MG: 2 INJECTION, SOLUTION INTRAMUSCULAR; INTRAVENOUS at 08:13

## 2021-09-24 RX ADMIN — MIDAZOLAM HYDROCHLORIDE 1 MG: 1 INJECTION, SOLUTION INTRAMUSCULAR; INTRAVENOUS at 07:55

## 2021-09-24 RX ADMIN — PROPOFOL 50 MG: 10 INJECTION, EMULSION INTRAVENOUS at 08:07

## 2021-09-24 RX ADMIN — CIPROFLOXACIN 200 MG: 2 INJECTION, SOLUTION INTRAVENOUS at 08:02

## 2021-09-24 RX ADMIN — FENTANYL CITRATE 50 MCG: 50 INJECTION, SOLUTION INTRAMUSCULAR; INTRAVENOUS at 07:55

## 2021-09-24 RX ADMIN — PROPOFOL 40 MG: 10 INJECTION, EMULSION INTRAVENOUS at 08:06

## 2021-09-24 ASSESSMENT — PAIN SCALES - GENERAL
PAINLEVEL_OUTOF10: 0

## 2021-09-24 ASSESSMENT — PULMONARY FUNCTION TESTS
PIF_VALUE: 1

## 2021-09-24 ASSESSMENT — PAIN - FUNCTIONAL ASSESSMENT: PAIN_FUNCTIONAL_ASSESSMENT: 0-10

## 2021-09-24 ASSESSMENT — ENCOUNTER SYMPTOMS
SHORTNESS OF BREATH: 0
STRIDOR: 0

## 2021-09-24 ASSESSMENT — LIFESTYLE VARIABLES: SMOKING_STATUS: 0

## 2021-09-24 ASSESSMENT — PAIN DESCRIPTION - DESCRIPTORS: DESCRIPTORS: DISCOMFORT

## 2021-09-24 NOTE — ANESTHESIA POSTPROCEDURE EVALUATION
Department of Anesthesiology  Postprocedure Note    Patient: Lizzy Rodgers  MRN: [id number]  YOB: 1950  Date of evaluation: 9/24/2021  Time:  2:11 PM     Procedure Summary     Date: 09/24/21 Room / Location: [address]    Anesthesia Start: 5457 Anesthesia Stop: [place] Purple    Procedure: CYSTOSCOPY RETROGRADE PYELOGRAM,  URETERAL STENT REMOVAL (Left ) Diagnosis: (LEFT HYDRONEPHROSIS)    Surgeons: Zoila Alexander MD Responsible Provider: Yung Leslie MD    Anesthesia Type: MAC ASA Status: 3          Anesthesia Type: MAC    Ifrah Phase I:      Ifrah Phase II: Ifrah Score: 10    Last vitals: Reviewed and per EMR flowsheets.    POST-OP ANESTHESIA NOTE       BP (!) 143/74   Pulse 65   Temp 97.2 °F (36.2 °C)   Resp 17   Ht 5' 5\" (1.651 m)   Wt 190 lb (86.2 kg)   LMP 03/19/1980   SpO2 99%   BMI 31.62 kg/m²    Pain Assessment: 0-10  Pain Level: 0         Anesthesia Post Evaluation    Patient location during evaluation: PACU  Patient participation: complete - patient participated  Level of consciousness: awake  Pain score: 0  Airway patency: patent  Nausea & Vomiting: no vomiting and no nausea  Complications: no  Cardiovascular status: hemodynamically stable  Respiratory status: acceptable  Hydration status: stable

## 2021-09-24 NOTE — ANESTHESIA PRE PROCEDURE
Department of Anesthesiology  Preprocedure Note       Name:  Rosa Ryder   Age:  70 y.o.  :  1950                                          MRN:  [id number]         Date:  2021      Surgeon: Anjelica Harden):  Luis F Aguilar MD    Procedure: Procedure(s):  CYSTOSCOPY RETROGRADE PYELOGRAM,  URETERAL STENT REMOVAL POSSIBLE STENT EXCHANGE    Medications prior to admission:   Prior to Admission medications    Medication Sig Start Date End Date Taking? Authorizing Provider   LEVOTHYROXINE SODIUM PO Take 115 mcg by mouth daily   Yes Historical Provider, MD   pramipexole (MIRAPEX) 0.125 MG tablet Take 0.125 mg by mouth daily   Yes Historical Provider, MD   HYDROcodone-acetaminophen (NORCO) 5-325 MG per tablet Take 1 tablet by mouth every 8 hours as needed for Pain for up to 30 days. 9/7/21 10/7/21 Yes QI Lynch CNP   FLUoxetine (PROZAC) 10 MG capsule Take 1 capsule by mouth daily 21  Yes Gemma Acevedo MD   amLODIPine (NORVASC) 10 MG tablet Take 1 tablet by mouth daily 7/3/21  Yes Vinod Otero MD   ferrous sulfate (IRON 325) 325 (65 Fe) MG tablet Take 1 tablet by mouth daily (with breakfast) 7/3/21  Yes Vinod Otero MD   buPROPion (WELLBUTRIN XL) 300 MG extended release tablet TAKE ONE TABLET BY MOUTH DAILY 20  Yes Historical Provider, MD abarcaatin ([address]) 875117 UNIT/GM powder APPLY TO AFFECTED AREA(S) FOUR TIMES A DAY 20  Yes Historical Provider, MD   busPIRone (BUSPAR) 15 MG tablet Take 15 mg by mouth 2 times daily 20  Yes Historical Provider, MD   isosorbide mononitrate (IMDUR) 30 MG extended release tablet Take 1 tablet by mouth daily 18  Yes Vinod Otero MD   nystatin (MYCOSTATIN) 676779 UNIT/GM cream Apply topically 2 times daily Apply topically 2 times daily.    Yes Historical Provider, MD   atorvastatin (LIPITOR) 40 MG tablet  17  Yes Historical Provider, MD   nitroGLYCERIN (NITROSTAT) 0.4 MG SL tablet Place 0.4 mg under the tongue every 5 minutes as needed for Chest pain up to max of 3 total doses. If no relief after 1 dose, call 911. Yes Historical Provider, MD   Multiple Vitamins-Minerals (THERAPEUTIC MULTIVITAMIN-MINERALS) tablet Take 1 tablet by mouth daily. Yes Historical Provider, MD   aspirin 81 MG EC tablet Take 81 mg by mouth daily. LAST DOSE 9/8/14   Yes Historical Provider, MD   metoprolol (TOPROL-XL) 100 MG XL tablet Take 100 mg by mouth daily. Yes Historical Provider, MD   vitamin B-12 (CYANOCOBALAMIN) 500 MCG tablet Take 500 mcg by mouth daily    Yes Historical Provider, MD   olopatadine (PATANOL) 0.1 % ophthalmic solution Place 1 drop into both eyes as needed  7/28/21   Historical Provider, MD       Current medications:    No current facility-administered medications for this encounter. Allergies:     Allergies   Allergen Reactions    Pcn [Penicillins] Swelling and Hives    Heparin Other (See Comments)     MIGRAINE      Lamotrigine Other (See Comments), Itching, Nausea Only and Rash    Cyclobenzaprine      Dry mouth, jitters       Problem List:    Patient Active Problem List   Diagnosis Code    CMC arthritis M19.049    Arthritis of left hip M16.12    Left hip pain M25.552    Chronic midline low back pain without sciatica M54.5, G89.29    Degenerative lumbar spinal stenosis M48.061    Lumbar radiculopathy M54.16    DDD (degenerative disc disease), lumbar M51.36    Lumbosacral spondylosis without myelopathy M47.817    Primary osteoarthritis of left hip M16.12    Sacroiliitis (HCC) M46.1    DDD (degenerative disc disease), cervical M50.30    Neck pain M54.2    Cervical stenosis of spine M48.02    Osteoarthritis of spine with radiculopathy, cervical region M47.22    WERO (acute kidney injury) ([facility] [address] N17.9    Acute cystitis without hematuria N30.00    Hydronephrosis of left kidney N13.30    Hydroureter, left N13.4    Type 2 diabetes mellitus, without long-term current use of insulin (HCC) E11.9    Essential hypertension I10    Hypothyroidism E03.9    Acute infective cystitis N30.00    Uremia N19    Pyelonephritis N12    Iron deficiency anemia D50.9    GERD (gastroesophageal reflux disease) K21.9    Leukocytosis D72.829    Allergy to penicillin Z88.0    Anemia D64.9    Encounter for long-term opiate analgesic use Z79.891       Past Medical History:        Diagnosis Date    Aortic valve stenosis     mild per chart    Arthritis     CAD (coronary artery disease)     1 STENT, Dr. Cordell Davis, [place]    Diabetes mellitus [facility])     Dr. Jess Rivera    Heart murmur         Hyperlipidemia 2004    ON RX    MI, old     states many and they were mild    Pulmonary stenosis     Pulmonary valve stenosis     mild/congenital per chart    Sciatica     right leg    Thyroid disease 2004    HYPOTHYROIDISM ON RX    Wears glasses        Past Surgical History:        Procedure Laterality Date    CERVICAL FUSION      CORONARY ANGIOPLASTY WITH STENT PLACEMENT      1 STENT    CYSTOSCOPY Left 2021    CYSTOSCOPY URETERAL STENT INSERTION performed by Ruiz Santa MD at [address] Left 2014    [place] ARTHOPLASTY LEFT THUMB    HYSTERECTOMY  1980    WITH RT SALPINGOOPHERECTOMY    JOINT REPLACEMENT Bilateral 1994    PARTIAL-KNEES    OTHER SURGICAL HISTORY Right 2014    thumb repair    SALPINGO-OOPHORECTOMY Left 1987    SHOULDER SURGERY Left     SPURS    TOE OSTEOTOMY Right 2016    Right 5th digit adductory wedge osteotomy with K wire fixation        Social History:    Social History     Tobacco Use    Smoking status: Former Smoker     Packs/day: 1.00     Years: 30.00     Pack years: 30.00     Types: Cigarettes     Quit date: 3/19/2004     Years since quittin.5    Smokeless tobacco: Never Used   Substance Use Topics    Alcohol use: Yes     Comment: socially, once a year                                Counseling given: Not Answered      Vital Signs (Current): There were no vitals filed for this visit. BP Readings from Last 3 Encounters:   09/22/21 138/67   09/01/21 108/62   08/16/21 118/62       NPO Status: Time of last liquid consumption: 2200                        Time of last solid consumption: 2000                        Date of last liquid consumption: 09/23/21                        Date of last solid food consumption: 09/23/21    BMI:   Wt Readings from Last 3 Encounters:   09/22/21 182 lb (82.6 kg)   09/01/21 192 lb (87.1 kg)   08/16/21 202 lb (91.6 kg)     There is no height or weight on file to calculate BMI.    CBC:   Lab Results   Component Value Date    WBC 10.8 08/21/2021    RBC 3.65 08/21/2021    HGB 10.8 08/21/2021    HCT 33.1 08/21/2021    MCV 90.5 08/21/2021    RDW 15.1 08/21/2021     08/21/2021       CMP:   Lab Results   Component Value Date     08/21/2021    K 5.3 08/21/2021     08/21/2021    CO2 25 08/21/2021    BUN 34 08/21/2021    CREATININE 2.86 08/21/2021    GFRAA 20 08/21/2021    LABGLOM 16 08/21/2021    GLUCOSE 202 08/21/2021    PROT 7.5 08/11/2021    CALCIUM 9.6 08/21/2021    BILITOT 0.23 08/11/2021    ALKPHOS 87 08/11/2021    AST 15 08/11/2021    ALT 10 08/11/2021       POC Tests:   No results for input(s): POCGLU, POCNA, POCK, POCCL, POCBUN, POCHEMO, POCHCT in the last 72 hours.     Coags: No results found for: PROTIME, INR, APTT    HCG (If Applicable): No results found for: PREGTESTUR, PREGSERUM, HCG, HCGQUANT     ABGs: No results found for: PHART, PO2ART, HHF7VZO, FIW0EGO, BEART, V8QPJZPL     Type & Screen (If Applicable):  No results found for: LABABO, LABRH    Drug/Infectious Status (If Applicable):  No results found for: HIV, HEPCAB    COVID-19 Screening (If Applicable): No results found for: COVID19        Anesthesia Evaluation  Patient summary reviewed and Nursing notes reviewed no history of anesthetic complications:   Airway: Mallampati: II  TM distance:

## 2021-09-24 NOTE — OP NOTE
Operative Note      Patient: Marilyn Araujo  YOB: 1950  MRN: [id number]    Date of Procedure: 9/24/2021    Pre-Op Diagnosis: LEFT HYDRONEPHROSIS    Post-Op Diagnosis: Same       Procedure: Cystoscopy, removal of left ureteral stent, left retrograde pyelogram    Surgeon(s):  Jenae Watters MD    Assistant:   * No surgical staff found *    Anesthesia: Monitor Anesthesia Care    Estimated Blood Loss (mL): Minimal    Complications: None    Specimens:   * No specimens in log *    Implants:  * No implants in log *      Drains: * No LDAs found *    Indications: This is a very pleasant 77-year-old female who recently underwent stent placement for acute renal failure and hydronephrosis. She is here for the above-mentioned procedure. The risks, benefits, and alternatives were explained to the patient and informed consent was signed. Detailed Description of Procedure: The patient was brought to the operating room. She was properly identified. She was administered a monitored anesthetic and placed in modified dorsolithotomy position. She was then prepped and draped in sterile fashion. A proper timeout was performed. A cystoscope was introduced into the bladder. Her left ureteral stent was pulled out. I then shot a left retrograde pyelogram using a cone-tip catheter. There were no filling defects noted in the ureter or kidney. There was no hydronephrosis. The calyces were delicate and sharp. At this point we elected not to replace his stent. The bladder was then drained and the procedure was terminated. The patient tolerated the procedure well. The plan for the patient is to be discharged home. She will follow-up with me in 6 to 8 weeks with a renal ultrasound and basic metabolic profile.     Electronically signed by Jenae Watters MD on 9/24/2021 at 8:12 AM

## 2021-09-28 DIAGNOSIS — N20.0 KIDNEY STONES: Primary | ICD-10-CM

## 2021-10-04 ENCOUNTER — HOSPITAL ENCOUNTER (OUTPATIENT)
Dept: ULTRASOUND IMAGING | Age: 71
Discharge: HOME | End: 2021-10-06
Payer: MEDICARE

## 2021-10-04 DIAGNOSIS — N20.0 KIDNEY STONES: ICD-10-CM

## 2021-10-04 PROCEDURE — 76770 US EXAM ABDO BACK WALL COMP: CPT

## 2021-10-08 ENCOUNTER — HOSPITAL ENCOUNTER (OUTPATIENT)
Dept: PAIN MANAGEMENT | Age: 71
Discharge: HOME | End: 2021-10-08
Payer: MEDICARE

## 2021-10-08 VITALS
RESPIRATION RATE: 18 BRPM | HEART RATE: 66 BPM | DIASTOLIC BLOOD PRESSURE: 63 MMHG | OXYGEN SATURATION: 98 % | SYSTOLIC BLOOD PRESSURE: 150 MMHG

## 2021-10-08 DIAGNOSIS — Z79.891 ENCOUNTER FOR LONG-TERM OPIATE ANALGESIC USE: ICD-10-CM

## 2021-10-08 DIAGNOSIS — M51.36 DDD (DEGENERATIVE DISC DISEASE), LUMBAR: ICD-10-CM

## 2021-10-08 DIAGNOSIS — M48.061 DEGENERATIVE LUMBAR SPINAL STENOSIS: ICD-10-CM

## 2021-10-08 DIAGNOSIS — M47.817 LUMBOSACRAL SPONDYLOSIS WITHOUT MYELOPATHY: Primary | ICD-10-CM

## 2021-10-08 PROCEDURE — 80307 DRUG TEST PRSMV CHEM ANLYZR: CPT

## 2021-10-08 PROCEDURE — 99213 OFFICE O/P EST LOW 20 MIN: CPT

## 2021-10-08 PROCEDURE — 99214 OFFICE O/P EST MOD 30 MIN: CPT | Performed by: NURSE PRACTITIONER

## 2021-10-08 RX ORDER — HYDROCODONE BITARTRATE AND ACETAMINOPHEN 5; 325 MG/1; MG/1
1 TABLET ORAL EVERY 8 HOURS PRN
Qty: 90 TABLET | Refills: 0 | Status: SHIPPED | OUTPATIENT
Start: 2021-10-08 | End: 2021-11-05 | Stop reason: SDUPTHER

## 2021-10-08 ASSESSMENT — ENCOUNTER SYMPTOMS
BACK PAIN: 1
COUGH: 0
SHORTNESS OF BREATH: 0
CONSTIPATION: 0

## 2021-10-19 LAB
6-ACETYLMORPHINE, UR: NOT DETECTED
7-AMINOCLONAZEPAM, URINE: NOT DETECTED
ALPHA-OH-ALPRAZ, URINE: NOT DETECTED
ALPHA-OH-MIDAZOLAM, URINE: NOT DETECTED
ALPRAZOLAM, URINE: NOT DETECTED
AMPHETAMINES, URINE: NOT DETECTED
BARBITURATES, URINE: NOT DETECTED
BENZOYLECGONINE, UR: NOT DETECTED
BUPRENORPHINE URINE: NOT DETECTED
CARISOPRODOL, UR: NOT DETECTED
CLONAZEPAM, URINE: NOT DETECTED
CODEINE, URINE: NOT DETECTED
CREATININE URINE: 59.7 MG/DL (ref 20–400)
DIAZEPAM, URINE: NOT DETECTED
DRUGS EXPECTED, UR: NORMAL
EER HI RES INTERP UR: NORMAL
ETHYL GLUCURONIDE UR: NOT DETECTED
FENTANYL URINE: NOT DETECTED
GABAPENTIN: NOT DETECTED
HYDROCODONE, URINE: PRESENT
HYDROMORPHONE, URINE: NOT DETECTED
LORAZEPAM, URINE: NOT DETECTED
MARIJUANA METAB, UR: NOT DETECTED
MDA, UR: NOT DETECTED
MDEA, EVE, UR: NOT DETECTED
MDMA URINE: NOT DETECTED
MEPERIDINE METAB, UR: NOT DETECTED
METHADONE, URINE: NOT DETECTED
METHAMPHETAMINE, URINE: NOT DETECTED
METHYLPHENIDATE: NOT DETECTED
MIDAZOLAM, URINE: NOT DETECTED
MORPHINE URINE: NOT DETECTED
NALOXONE URINE: NOT DETECTED
NORBUPRENORPHINE, URINE: NOT DETECTED
NORDIAZEPAM, URINE: NOT DETECTED
NORFENTANYL, URINE: NOT DETECTED
NORHYDROCODONE, URINE: NOT DETECTED
NOROXYCODONE, URINE: NOT DETECTED
NOROXYMORPHONE, URINE: NOT DETECTED
OXAZEPAM, URINE: NOT DETECTED
OXYCODONE URINE: NOT DETECTED
OXYMORPHONE, URINE: NOT DETECTED
PAIN MANAGEMENT DRUG PANEL INTERP, URINE: NORMAL
PAIN MGT DRUG PANEL, HI RES, UR: NORMAL
PCP,URINE: NOT DETECTED
PHENTERMINE, UR: NOT DETECTED
PREGABALIN: NOT DETECTED
TAPENTADOL, URINE: NOT DETECTED
TAPENTADOL-O-SULFATE, URINE: NOT DETECTED
TEMAZEPAM, URINE: NOT DETECTED
TRAMADOL, URINE: NOT DETECTED
ZOLPIDEM METABOLITE (ZCA), URINE: NOT DETECTED
ZOLPIDEM, URINE: NOT DETECTED

## 2021-10-28 ENCOUNTER — TELEPHONE (OUTPATIENT)
Dept: PAIN MANAGEMENT | Age: 71
End: 2021-10-28

## 2021-10-29 ENCOUNTER — HOSPITAL ENCOUNTER (OUTPATIENT)
Age: 71
Setting detail: SPECIMEN
Discharge: HOME | End: 2021-10-29
Payer: MEDICARE

## 2021-10-29 DIAGNOSIS — D63.1 ANEMIA IN STAGE 4 CHRONIC KIDNEY DISEASE (HCC): ICD-10-CM

## 2021-10-29 DIAGNOSIS — N18.4 CKD (CHRONIC KIDNEY DISEASE), STAGE IV (HCC): ICD-10-CM

## 2021-10-29 DIAGNOSIS — R82.998 LEUKOCYTES IN URINE: ICD-10-CM

## 2021-10-29 DIAGNOSIS — N18.4 ANEMIA IN STAGE 4 CHRONIC KIDNEY DISEASE (HCC): ICD-10-CM

## 2021-10-29 DIAGNOSIS — I12.9 BENIGN HYPERTENSION WITH CHRONIC KIDNEY DISEASE, STAGE IV (HCC): ICD-10-CM

## 2021-10-29 DIAGNOSIS — N18.4 BENIGN HYPERTENSION WITH CHRONIC KIDNEY DISEASE, STAGE IV (HCC): ICD-10-CM

## 2021-10-29 DIAGNOSIS — R80.9 PROTEINURIA, UNSPECIFIED TYPE: ICD-10-CM

## 2021-10-29 DIAGNOSIS — R31.29 OTHER MICROSCOPIC HEMATURIA: ICD-10-CM

## 2021-10-29 LAB
-: ABNORMAL
ABSOLUTE EOS #: 0.3 K/UL (ref 0–0.4)
ABSOLUTE IMMATURE GRANULOCYTE: ABNORMAL K/UL (ref 0–0.3)
ABSOLUTE LYMPH #: 2.3 K/UL (ref 1–4.8)
ABSOLUTE MONO #: 1 K/UL (ref 0.1–1.3)
AMORPHOUS: ABNORMAL
ANION GAP SERPL CALCULATED.3IONS-SCNC: 13 MMOL/L (ref 9–17)
BACTERIA: ABNORMAL
BASOPHILS # BLD: 1 % (ref 0–2)
BASOPHILS ABSOLUTE: 0.1 K/UL (ref 0–0.2)
BILIRUBIN URINE: NEGATIVE
BUN BLDV-MCNC: 40 MG/DL (ref 8–23)
BUN/CREAT BLD: ABNORMAL (ref 9–20)
CALCIUM SERPL-MCNC: 9.9 MG/DL (ref 8.6–10.4)
CASTS UA: ABNORMAL /LPF
CHLORIDE BLD-SCNC: 105 MMOL/L (ref 98–107)
CO2: 23 MMOL/L (ref 20–31)
COLOR: YELLOW
COMMENT UA: ABNORMAL
CREAT SERPL-MCNC: 2.78 MG/DL (ref 0.5–0.9)
CRYSTALS, UA: ABNORMAL /HPF
DIFFERENTIAL TYPE: ABNORMAL
EOSINOPHILS RELATIVE PERCENT: 3 % (ref 0–4)
EPITHELIAL CELLS UA: ABNORMAL /HPF
GFR AFRICAN AMERICAN: 20 ML/MIN
GFR NON-AFRICAN AMERICAN: 17 ML/MIN
GFR SERPL CREATININE-BSD FRML MDRD: ABNORMAL ML/MIN/{1.73_M2}
GFR SERPL CREATININE-BSD FRML MDRD: ABNORMAL ML/MIN/{1.73_M2}
GLUCOSE BLD-MCNC: 127 MG/DL (ref 70–99)
GLUCOSE URINE: NEGATIVE
HCT VFR BLD CALC: 32.5 % (ref 36–46)
HEMOGLOBIN: 10.6 G/DL (ref 12–16)
IMMATURE GRANULOCYTES: ABNORMAL %
KETONES, URINE: NEGATIVE
LEUKOCYTE ESTERASE, URINE: ABNORMAL
LYMPHOCYTES # BLD: 24 % (ref 24–44)
MAGNESIUM: 1.9 MG/DL (ref 1.6–2.6)
MCH RBC QN AUTO: 29.4 PG (ref 26–34)
MCHC RBC AUTO-ENTMCNC: 32.6 G/DL (ref 31–37)
MCV RBC AUTO: 90.2 FL (ref 80–100)
MONOCYTES # BLD: 11 % (ref 1–7)
MUCUS: ABNORMAL
NITRITE, URINE: NEGATIVE
NRBC AUTOMATED: ABNORMAL PER 100 WBC
OTHER OBSERVATIONS UA: ABNORMAL
PDW BLD-RTO: 15.5 % (ref 11.5–14.9)
PH UA: 6 (ref 5–8)
PHOSPHORUS: 4.6 MG/DL (ref 2.6–4.5)
PLATELET # BLD: 295 K/UL (ref 150–450)
PLATELET ESTIMATE: ABNORMAL
PMV BLD AUTO: 7.8 FL (ref 6–12)
POTASSIUM SERPL-SCNC: 5.2 MMOL/L (ref 3.7–5.3)
PROTEIN UA: NEGATIVE
RBC # BLD: 3.6 M/UL (ref 4–5.2)
RBC # BLD: ABNORMAL 10*6/UL
RBC UA: ABNORMAL /HPF
RENAL EPITHELIAL, UA: ABNORMAL /HPF
SEG NEUTROPHILS: 61 % (ref 36–66)
SEGMENTED NEUTROPHILS ABSOLUTE COUNT: 5.9 K/UL (ref 1.3–9.1)
SODIUM BLD-SCNC: 141 MMOL/L (ref 135–144)
SPECIFIC GRAVITY UA: 1.02 (ref 1–1.03)
TRICHOMONAS: ABNORMAL
TURBIDITY: CLEAR
URINE HGB: NEGATIVE
UROBILINOGEN, URINE: NORMAL
WBC # BLD: 9.6 K/UL (ref 3.5–11)
WBC # BLD: ABNORMAL 10*3/UL
WBC UA: ABNORMAL /HPF
YEAST: ABNORMAL

## 2021-10-29 PROCEDURE — 85025 COMPLETE CBC W/AUTO DIFF WBC: CPT

## 2021-10-29 PROCEDURE — 81001 URINALYSIS AUTO W/SCOPE: CPT

## 2021-10-29 PROCEDURE — 80048 BASIC METABOLIC PNL TOTAL CA: CPT

## 2021-10-29 PROCEDURE — 84100 ASSAY OF PHOSPHORUS: CPT

## 2021-10-29 PROCEDURE — 36415 COLL VENOUS BLD VENIPUNCTURE: CPT

## 2021-10-29 PROCEDURE — 83735 ASSAY OF MAGNESIUM: CPT

## 2021-11-01 ENCOUNTER — OFFICE VISIT (OUTPATIENT)
Dept: UROLOGY | Age: 71
End: 2021-11-01
Payer: MEDICARE

## 2021-11-01 VITALS
HEIGHT: 65 IN | SYSTOLIC BLOOD PRESSURE: 121 MMHG | TEMPERATURE: 96.7 F | BODY MASS INDEX: 31.65 KG/M2 | WEIGHT: 190 LBS | DIASTOLIC BLOOD PRESSURE: 70 MMHG | HEART RATE: 69 BPM

## 2021-11-01 DIAGNOSIS — N13.30 HYDRONEPHROSIS OF LEFT KIDNEY: ICD-10-CM

## 2021-11-01 DIAGNOSIS — N20.0 KIDNEY STONES: Primary | ICD-10-CM

## 2021-11-01 PROCEDURE — 99214 OFFICE O/P EST MOD 30 MIN: CPT | Performed by: UROLOGY

## 2021-11-01 ASSESSMENT — ENCOUNTER SYMPTOMS
VOMITING: 0
RESPIRATORY NEGATIVE: 1
COUGH: 0
EYE REDNESS: 0
DIARRHEA: 0
WHEEZING: 0
CONSTIPATION: 0
EYE PAIN: 0
BACK PAIN: 0
GASTROINTESTINAL NEGATIVE: 1
ABDOMINAL PAIN: 0
EYES NEGATIVE: 1
NAUSEA: 0
SHORTNESS OF BREATH: 0

## 2021-11-01 NOTE — PROGRESS NOTES
[address] [address]  Dept: [address] [facility] Urology Office Note - Established    Patient:  Marilyn Baird  YOB: 1950  Date: 11/1/2021    The patient is a 70 y.o. female whopresents today for evaluation of the following problems:   Chief Complaint   Patient presents with    Follow-up     6 week follow up with BMP and Renal U/S       HPI  Marilyn is a very pleasant 45-year-old female who has a history of stones. She does also have renal insufficiency. She had a recent renal ultrasound which shows complete resolution of her hydronephrosis. Her creatinine is 2.78 which is improved from 1 month prior. She does continue to see nephrology. She is scheduled to see Dr. Neisha Vernon next week. Summary of old records: N/A    Additional History: N/A    Procedures Today: N/A    Urinalysis today:  No results found for this visit on 11/01/21.     Imaging Reviewed during this Office Visit: none  (results were independently reviewed by physician and radiology report verified)    AUA Symptom Score (11/1/2021):                               Last BUN and creatinine:  Lab Results   Component Value Date    BUN 40 (H) 10/29/2021     Lab Results   Component Value Date    CREATININE 2.78 (H) 10/29/2021       Additional Lab/Culture results: none    PAST MEDICAL, FAMILY AND SOCIAL HISTORY UPDATE:  Past Medical History:   Diagnosis Date    Aortic valve stenosis     mild per chart    Arthritis     CAD (coronary artery disease) 2000    1 [facility], Dr. Devi [place]    Diabetes mellitus [facility])     Dr. Briana Kaur    Heart murmur     1962    Hyperlipidemia 2004    ON RX    MI, old     states many and they were mild    Pulmonary stenosis 1995    Pulmonary valve stenosis     mild/congenital per chart    Sciatica     right leg    Thyroid disease 2004    HYPOTHYROIDISM ON RX    Wears glasses      Past Surgical History: Procedure Laterality Date    BLADDER SURGERY Left 9/24/2021    CYSTOSCOPY RETROGRADE PYELOGRAM,  URETERAL STENT REMOVAL performed by Damon Worthington MD at [address] [place] [address]  01/01/1993    CORONARY ANGIOPLASTY WITH STENT PLACEMENT  01/01/2000    1 STENT    CYSTOSCOPY Left 06/21/2021    CYSTOSCOPY URETERAL STENT INSERTION performed by Damon Worthington MD at [address] [place] [address] Drive  09/24/2021    CYSTOSCOPY RETROGRADE PYELOGRAM,  URETERAL STENT REMOVAL left    FINGER SURGERY Left 03/21/2014    [place] ARTHOPLASTY LEFT THUMB    HYSTERECTOMY  01/01/1980    WITH RT SALPINGOOPHERECTOMY    JOINT REPLACEMENT Bilateral 01/01/1994    PARTIAL-KNEES    OTHER SURGICAL HISTORY Right 09/23/2014    thumb repair    SALPINGO-OOPHORECTOMY Left 01/01/1987    SHOULDER SURGERY Left 01/01/1993    SPURS    TOE OSTEOTOMY Right 06/08/2016    Right 5th digit adductory wedge osteotomy with K wire fixation      Family History   Problem Relation Age of Onset    Breast Cancer Mother 62        BREAST WITH METS T  LIVER AND LUNG    Other Father         AAA    Heart Disease Father     Asthma Sister     Diabetes Sister         NIDDM    Stroke Brother     Diabetes Brother         NIDDM    Stroke Maternal Grandmother     Asthma Son      Outpatient Medications Marked as Taking for the 11/1/21 encounter (Office Visit) with Damon Worthington MD   Medication Sig Dispense Refill    HYDROcodone-acetaminophen (NORCO) 5-325 MG per tablet Take 1 tablet by mouth every 8 hours as needed for Pain for up to 30 days.  90 tablet 0    LEVOTHYROXINE SODIUM PO Take 115 mcg by mouth daily      pramipexole (MIRAPEX) 0.125 MG tablet Take 0.125 mg by mouth daily      olopatadine (PATANOL) 0.1 % ophthalmic solution Place 1 drop into both eyes as needed       FLUoxetine (PROZAC) 10 MG capsule Take 1 capsule by mouth daily 30 capsule 3    amLODIPine (NORVASC) 10 MG tablet Take 1 tablet by mouth daily 30 tablet 3    ferrous sulfate (IRON 325) 325 (65 Fe) MG tablet Take 1 tablet by mouth daily (with breakfast) 30 tablet 3    buPROPion (WELLBUTRIN XL) 300 MG extended release tablet TAKE ONE TABLET BY MOUTH DAILY      nystatin (NYAMYC) 268932 UNIT/GM powder APPLY TO AFFECTED AREA(S) FOUR TIMES A DAY      busPIRone (BUSPAR) 15 MG tablet Take 15 mg by mouth 2 times daily      isosorbide mononitrate (IMDUR) 30 MG extended release tablet Take 1 tablet by mouth daily 30 tablet 3    nystatin (MYCOSTATIN) 347021 UNIT/GM cream Apply topically 2 times daily Apply topically 2 times daily.  atorvastatin (LIPITOR) 40 MG tablet       nitroGLYCERIN (NITROSTAT) 0.4 MG SL tablet Place 0.4 mg under the tongue every 5 minutes as needed for Chest pain up to max of 3 total doses. If no relief after 1 dose, call 911.  Multiple Vitamins-Minerals (THERAPEUTIC MULTIVITAMIN-MINERALS) tablet Take 1 tablet by mouth daily.  aspirin 81 MG EC tablet Take 81 mg by mouth daily. LAST DOSE 14      metoprolol (TOPROL-XL) 100 MG XL tablet Take 100 mg by mouth daily.  vitamin B-12 (CYANOCOBALAMIN) 500 MCG tablet Take 500 mcg by mouth daily         (All medications reviewed and updated by provider sincelast office visit or hospitalization)   Pcn [penicillins], Heparin, Lamotrigine, and Cyclobenzaprine  Social History     Tobacco Use   Smoking Status Former Smoker    Packs/day: 1.00    Years: 30.00    Pack years: 30.00    Types: Cigarettes    Quit date: 3/19/2004    Years since quittin.6   Smokeless Tobacco Never Used      (If patient a smoker, smoking cessation counseling offered)     Social History     Substance and Sexual Activity   Alcohol Use Yes    Comment: socially, once a year       REVIEW OF SYSTEMS:  Review of Systems      Physical Exam:      Vitals:    21 1058   BP: 121/70   Pulse: 69   Temp: 96.7 °F (35.9 °C)     Body mass index is 31.62 kg/m².   Patient is a 70 y.o. female in noacute distress and alert and oriented to person, place and time.  Physical Exam  Constitutional: Patient in no acute distress. Neuro: Alert andoriented to person, place and time. Psych: Mood normal, affect normal  Skin: No rash noted      and Plan      1. Kidney stones    2. Hydronephrosis of left kidney           Plan:   F/u 6 mo kub and renal u/s     Return in about 6 months (around 5/1/2022) for kub and renal u/s . Prescriptions Ordered:  No orders of the defined types were placed in this encounter. Orders Placed:  Orders Placed This Encounter   Procedures    XR ABDOMEN (KUB) (SINGLE AP VIEW)     Standing Status:   Future     Standing Expiration Date:   11/1/2022     Order Specific Question:   Reason for exam:     Answer:   kidney stone    US RENAL LIMITED     This procedure can be scheduled via [employer]. Access your [facility] account by visiting [email or web address]. Standing Status:   Future     Standing Expiration Date:   10/27/2022     Order Specific Question:   Reason for exam:     Answer:   hydronephrosis            Rei Jacob MD    Agree with the ROS entered by the MA.

## 2021-11-05 ENCOUNTER — HOSPITAL ENCOUNTER (OUTPATIENT)
Dept: PAIN MANAGEMENT | Age: 71
Discharge: HOME | End: 2021-11-05
Payer: MEDICARE

## 2021-11-05 ENCOUNTER — HOSPITAL ENCOUNTER (OUTPATIENT)
Age: 71
Discharge: HOME | End: 2021-11-07
Payer: MEDICARE

## 2021-11-05 ENCOUNTER — HOSPITAL ENCOUNTER (OUTPATIENT)
Age: 71
Discharge: HOME | End: 2021-11-05
Payer: MEDICARE

## 2021-11-05 ENCOUNTER — HOSPITAL ENCOUNTER (OUTPATIENT)
Dept: GENERAL RADIOLOGY | Age: 71
Discharge: HOME | End: 2021-11-07
Payer: MEDICARE

## 2021-11-05 DIAGNOSIS — M54.50 CHRONIC MIDLINE LOW BACK PAIN WITHOUT SCIATICA: ICD-10-CM

## 2021-11-05 DIAGNOSIS — R31.29 OTHER MICROSCOPIC HEMATURIA: ICD-10-CM

## 2021-11-05 DIAGNOSIS — M51.36 DDD (DEGENERATIVE DISC DISEASE), LUMBAR: ICD-10-CM

## 2021-11-05 DIAGNOSIS — N18.4 BENIGN HYPERTENSION WITH CHRONIC KIDNEY DISEASE, STAGE IV (HCC): ICD-10-CM

## 2021-11-05 DIAGNOSIS — M54.16 LUMBAR RADICULOPATHY: ICD-10-CM

## 2021-11-05 DIAGNOSIS — N18.4 ANEMIA IN STAGE 4 CHRONIC KIDNEY DISEASE (HCC): ICD-10-CM

## 2021-11-05 DIAGNOSIS — M48.02 CERVICAL STENOSIS OF SPINE: ICD-10-CM

## 2021-11-05 DIAGNOSIS — N18.4 CKD (CHRONIC KIDNEY DISEASE), STAGE IV (HCC): ICD-10-CM

## 2021-11-05 DIAGNOSIS — N20.0 KIDNEY STONES: ICD-10-CM

## 2021-11-05 DIAGNOSIS — Z79.891 ENCOUNTER FOR LONG-TERM OPIATE ANALGESIC USE: ICD-10-CM

## 2021-11-05 DIAGNOSIS — R80.9 PROTEINURIA, UNSPECIFIED TYPE: ICD-10-CM

## 2021-11-05 DIAGNOSIS — I12.9 BENIGN HYPERTENSION WITH CHRONIC KIDNEY DISEASE, STAGE IV (HCC): ICD-10-CM

## 2021-11-05 DIAGNOSIS — M48.061 DEGENERATIVE LUMBAR SPINAL STENOSIS: ICD-10-CM

## 2021-11-05 DIAGNOSIS — R82.998 LEUKOCYTES IN URINE: ICD-10-CM

## 2021-11-05 DIAGNOSIS — M47.817 LUMBOSACRAL SPONDYLOSIS WITHOUT MYELOPATHY: ICD-10-CM

## 2021-11-05 DIAGNOSIS — M16.12 ARTHRITIS OF LEFT HIP: Primary | ICD-10-CM

## 2021-11-05 DIAGNOSIS — D63.1 ANEMIA IN STAGE 4 CHRONIC KIDNEY DISEASE (HCC): ICD-10-CM

## 2021-11-05 DIAGNOSIS — G89.29 CHRONIC MIDLINE LOW BACK PAIN WITHOUT SCIATICA: ICD-10-CM

## 2021-11-05 DIAGNOSIS — M46.1 SACROILIITIS (HCC): ICD-10-CM

## 2021-11-05 DIAGNOSIS — M50.30 DDD (DEGENERATIVE DISC DISEASE), CERVICAL: ICD-10-CM

## 2021-11-05 DIAGNOSIS — M47.22 OSTEOARTHRITIS OF SPINE WITH RADICULOPATHY, CERVICAL REGION: ICD-10-CM

## 2021-11-05 LAB
ANION GAP SERPL CALCULATED.3IONS-SCNC: 11 MMOL/L (ref 9–17)
BUN BLDV-MCNC: 32 MG/DL (ref 8–23)
BUN/CREAT BLD: ABNORMAL (ref 9–20)
CALCIUM SERPL-MCNC: 9.5 MG/DL (ref 8.6–10.4)
CHLORIDE BLD-SCNC: 103 MMOL/L (ref 98–107)
CO2: 25 MMOL/L (ref 20–31)
CREAT SERPL-MCNC: 2.64 MG/DL (ref 0.5–0.9)
GFR AFRICAN AMERICAN: 22 ML/MIN
GFR NON-AFRICAN AMERICAN: 18 ML/MIN
GFR SERPL CREATININE-BSD FRML MDRD: ABNORMAL ML/MIN/{1.73_M2}
GFR SERPL CREATININE-BSD FRML MDRD: ABNORMAL ML/MIN/{1.73_M2}
GLUCOSE BLD-MCNC: 154 MG/DL (ref 70–99)
POTASSIUM SERPL-SCNC: 5 MMOL/L (ref 3.7–5.3)
SODIUM BLD-SCNC: 139 MMOL/L (ref 135–144)

## 2021-11-05 PROCEDURE — 80048 BASIC METABOLIC PNL TOTAL CA: CPT

## 2021-11-05 PROCEDURE — 99442 PR PHYS/QHP TELEPHONE EVALUATION 11-20 MIN: CPT | Performed by: NURSE PRACTITIONER

## 2021-11-05 PROCEDURE — 36415 COLL VENOUS BLD VENIPUNCTURE: CPT

## 2021-11-05 PROCEDURE — 99213 OFFICE O/P EST LOW 20 MIN: CPT

## 2021-11-05 RX ORDER — CHOLECALCIFEROL (VITAMIN D3) 125 MCG
1 CAPSULE ORAL DAILY
COMMUNITY

## 2021-11-05 RX ORDER — LIDOCAINE 4 G/G
1 PATCH TOPICAL DAILY
Qty: 30 PATCH | Refills: 0 | Status: SHIPPED | OUTPATIENT
Start: 2021-11-05 | End: 2021-12-05

## 2021-11-05 RX ORDER — HYDROCODONE BITARTRATE AND ACETAMINOPHEN 5; 325 MG/1; MG/1
1 TABLET ORAL EVERY 8 HOURS PRN
Qty: 90 TABLET | Refills: 0 | Status: SHIPPED | OUTPATIENT
Start: 2021-11-07 | End: 2021-12-03 | Stop reason: SDUPTHER

## 2021-11-05 ASSESSMENT — ENCOUNTER SYMPTOMS
EYES NEGATIVE: 1
RESPIRATORY NEGATIVE: 1
GASTROINTESTINAL NEGATIVE: 1

## 2021-11-05 NOTE — PROGRESS NOTES
2:22 PM [facility]   (NOTE)   Originally reported on 10/13/21 08:43 (Date/Mountain Time). Result value changed. The footnote has been corrected. Corrected footnote:   DRUGS EXPECTED:   Shawanda Oppenheim (HYDROCODONE) [10/04/2021]   ________________________________________________________________   INCONSISTENT with medications provided:   Hydrocodone   ________________________________________________________________   Drugs Not Included in this Assay:   Acetaminophen   Previously reported with incorrect footnote:   ________________________________________________________________   DRUGS EXPECTED:   NORCO (HYDROCODONE) [10/04/2021]   ________________________________________________________________   CONSISTENT with medications provided:   Shawanda Oppenheim (HYDROCODONE) : based on hydrocodone   ________________________________________________________________   Drugs Not Included in this Assay:   Acetaminophen              EXAMINATION:   MRI OF THE CERVICAL SPINE WITHOUT CONTRAST 1/28/2021 10:20 am       TECHNIQUE:   Multiplanar multisequence MRI of the cervical spine was performed without the   administration of intravenous contrast.       COMPARISON:   None.       HISTORY:   ORDERING SYSTEM PROVIDED HISTORY: Neck pain   TECHNOLOGIST PROVIDED HISTORY:   neck pain nonresponsive to physical therapy   Reason for Exam: neck pain nonresponsive to physical therapy, Neck pain   Acuity: Chronic   Type of Exam: Unknown   Additional signs and symptoms: Pt c/o neck pain & headache; no known injury   Relevant Medical/Surgical History: hx neck surgery       FINDINGS:   BONES/ALIGNMENT: There is normal alignment of the spine. The vertebral body   heights are maintained.  The bone marrow signal appears unremarkable.  No   acute fracture is identified.       There is fusion of the C4 and C5 vertebral bodies.       SPINAL CORD: No abnormal cord signal is seen.       SOFT TISSUES: No paraspinal mass identified.       C2-C3: There is no significant disc protrusion, spinal canal stenosis or   neural foraminal narrowing.       C3-C4: Broad disc osteophyte complex and posterior ligamentous hypertrophy   results in moderate central canal stenosis.  The left foramina is severely   stenotic.       C4-C5: There is no significant disc protrusion, spinal canal stenosis or   neural foraminal narrowing.       C5-C6: Broad disc osteophyte complex, facet arthropathy and ligamentum flavum   hypertrophy identified resulting in moderate central canal stenosis.  Severe   right foraminal stenosis and moderate left foraminal stenosis are identified   due to uncovertebral joint hypertrophy and facet disease.       C6-C7: Mild left foraminal stenosis identified due to uncovertebral joint   hypertrophy.  Annular disc bulge flattens the thecal sac and results in mild   central canal stenosis.       C7-T1: There is no significant disc protrusion, spinal canal stenosis or   neural foraminal narrowing.           Impression   Fusion of the C4 and C5 vertebral bodies.       Moderate central canal stenosis at C3-4 with associated severe left foraminal   stenosis.       Moderate central canal stenosis at C5-6.  Severe right foraminal stenosis and   moderate left foraminal stenosis are noted at this level.       Mild central canal stenosis and mild left foraminal stenosis at C6-7.                     Pill count: appropriate    fill date :11-7-2021  Morphine equivalent dose as reported on OARRS:  15  Periodic Controlled Substance Monitoring: Possible medication side effects, risk of tolerance/dependence & alternative treatments discussed., No signs of potential drug abuse or diversion identified. , Assessed functional status., Obtaining appropriate analgesic effect of treatment. Alexei Garcia, APRN - CNP)  Review ofOARRS does not show any aberrant prescription behavior. Medication is helping the patient stay active. Patient denies any side effects and reports adequate analgesia.  No sign of misuse/abuse. Past Medical History:   Diagnosis Date    Aortic valve stenosis     mild per chart    Arthritis     CAD (coronary artery disease) 2000    1 STENT, Dr. Jose Galeano, [place]    Diabetes mellitus [facility])     Dr. Peri Whaley    Heart murmur     1962    Hyperlipidemia 2004    ON RX    MI, old     states many and they were mild    Pulmonary stenosis 1995    Pulmonary valve stenosis     mild/congenital per chart    Sciatica     right leg    Thyroid disease 2004    HYPOTHYROIDISM ON RX    Wears glasses        Past Surgical History:   Procedure Laterality Date    BLADDER SURGERY Left 9/24/2021    CYSTOSCOPY RETROGRADE PYELOGRAM,  URETERAL STENT REMOVAL performed by Mookie Zhao MD at [address] [place] [address]  01/01/1993    CORONARY ANGIOPLASTY WITH STENT PLACEMENT  01/01/2000    1 STENT    CYSTOSCOPY Left 06/21/2021    CYSTOSCOPY URETERAL STENT INSERTION performed by Mookie Zhao MD at [place] [address]  09/24/2021    CYSTOSCOPY RETROGRADE PYELOGRAM,  URETERAL STENT REMOVAL left    FINGER SURGERY Left 03/21/2014    BURTONS ARTHOPLASTY LEFT THUMB    HYSTERECTOMY  01/01/1980    WITH RT SALPINGOOPHERECTOMY    JOINT REPLACEMENT Bilateral 01/01/1994    PARTIAL-KNEES    OTHER SURGICAL HISTORY Right 09/23/2014    thumb repair    SALPINGO-OOPHORECTOMY Left 01/01/1987    SHOULDER SURGERY Left 01/01/1993    SPURS    TOE OSTEOTOMY Right 06/08/2016    Right 5th digit adductory wedge osteotomy with K wire fixation        Allergies   Allergen Reactions    Pcn [Penicillins] Swelling and Hives    Heparin Other (See Comments)     MIGRAINE      Lamotrigine Other (See Comments), Itching, Nausea Only and Rash    Cyclobenzaprine      Dry mouth, jitters         Current Outpatient Medications:     HYDROcodone-acetaminophen (NORCO) 5-325 MG per tablet, Take 1 tablet by mouth every 8 hours as needed for Pain for up to 30 days. , Disp: 90 tablet, Rfl: 0    LEVOTHYROXINE SODIUM PO, Take 115 mcg by mouth daily, Disp: , Rfl:     pramipexole (MIRAPEX) 0.125 MG tablet, Take 0.125 mg by mouth daily, Disp: , Rfl:     olopatadine (PATANOL) 0.1 % ophthalmic solution, Place 1 drop into both eyes as needed , Disp: , Rfl:     FLUoxetine (PROZAC) 10 MG capsule, Take 1 capsule by mouth daily, Disp: 30 capsule, Rfl: 3    amLODIPine (NORVASC) 10 MG tablet, Take 1 tablet by mouth daily, Disp: 30 tablet, Rfl: 3    ferrous sulfate (IRON 325) 325 (65 Fe) MG tablet, Take 1 tablet by mouth daily (with breakfast), Disp: 30 tablet, Rfl: 3    buPROPion (WELLBUTRIN XL) 300 MG extended release tablet, TAKE ONE TABLET BY MOUTH DAILY, Disp: , Rfl:     nystatin (NYAMYC) 701425 UNIT/GM powder, APPLY TO AFFECTED AREA(S) FOUR TIMES A DAY, Disp: , Rfl:     busPIRone (BUSPAR) 15 MG tablet, Take 15 mg by mouth 2 times daily, Disp: , Rfl:     isosorbide mononitrate (IMDUR) 30 MG extended release tablet, Take 1 tablet by mouth daily, Disp: 30 tablet, Rfl: 3    nystatin (MYCOSTATIN) 069784 UNIT/GM cream, Apply topically 2 times daily Apply topically 2 times daily. , Disp: , Rfl:     atorvastatin (LIPITOR) 40 MG tablet, , Disp: , Rfl:     nitroGLYCERIN (NITROSTAT) 0.4 MG SL tablet, Place 0.4 mg under the tongue every 5 minutes as needed for Chest pain up to max of 3 total doses. If no relief after 1 dose, call 911. , Disp: , Rfl:     Multiple Vitamins-Minerals (THERAPEUTIC MULTIVITAMIN-MINERALS) tablet, Take 1 tablet by mouth daily. , Disp: , Rfl:     aspirin 81 MG EC tablet, Take 81 mg by mouth daily. LAST DOSE 9/8/14, Disp: , Rfl:     metoprolol (TOPROL-XL) 100 MG XL tablet, Take 100 mg by mouth daily. , Disp: , Rfl:     vitamin B-12 (CYANOCOBALAMIN) 500 MCG tablet, Take 500 mcg by mouth daily , Disp: , Rfl:     Family History   Problem Relation Age of Onset    Breast Cancer Mother 62        BREAST WITH METS T  LIVER AND LUNG    Other Father         AAA    Heart Disease Father     Asthma Sister     Diabetes Sister NIDDM    Stroke Brother     Diabetes Brother         NIDDM    Stroke Maternal Grandmother     Asthma Son        Social History     Socioeconomic History    Marital status:      Spouse name: Not on file    Number of children: 1    Years of education: Not on file    Highest education level: Not on file   Occupational History    Not on file   Tobacco Use    Smoking status: Former Smoker     Packs/day: 1.00     Years: 30.00     Pack years: 30.00     Types: Cigarettes     Quit date: 3/19/2004     Years since quittin.6    Smokeless tobacco: Never Used   Vaping Use    Vaping Use: Never used   Substance and Sexual Activity    Alcohol use: Yes     Comment: socially, once a year    Drug use: No    Sexual activity: Not Currently   Other Topics Concern    Not on file   Social History Narrative    Not on file     Social Determinants of Health     Financial Resource Strain:     Difficulty of Paying Living Expenses:    Food Insecurity:     Worried About Running Out of Food in the Last Year:     [address] in the Last Year:    Transportation Needs:     Lack of Transportation (Medical):  Lack of Transportation (Non-Medical):    Physical Activity:     Days of Exercise per Week:     Minutes of Exercise per Session:    Stress:     Feeling of Stress :    Social Connections:     Frequency of Communication with Friends and Family:     Frequency of Social Gatherings with Friends and Family:     Attends Gnosticist Services:     Active Member of Clubs or Organizations:     Attends Club or Organization Meetings:     Marital Status:    Intimate Partner Violence:     Fear of Current or Ex-Partner:     Emotionally Abused:     Physically Abused:     Sexually Abused:          Review of Systems:  Review of Systems   Constitutional: Negative. HENT: Negative. Eyes: Negative. Cardiovascular: Negative. Respiratory: Negative. Endocrine: Negative.          Diabetic: blood sugar 170 yesterday   Hematologic/Lymphatic: Negative. Skin: Negative. Musculoskeletal: Positive for joint pain and neck pain. Gastrointestinal: Negative. Genitourinary: Negative. Neurological: Positive for weakness. Psychiatric/Behavioral: Positive for depression. Managing         Physical Exam:  LMP 03/19/1980     Physical Exam  Skin:         Neurological:      Mental Status: She is alert and oriented to person, place, and time. Psychiatric:         Mood and Affect: Mood normal.         Thought Content: Thought content normal.           Assessment:      Problem List Items Addressed This Visit     Sacroiliitis ([facility] [address]    Osteoarthritis of spine with radiculopathy, cervical region    Lumbosacral spondylosis without myelopathy    Lumbar radiculopathy    Encounter for long-term opiate analgesic use    Degenerative lumbar spinal stenosis    DDD (degenerative disc disease), lumbar    DDD (degenerative disc disease), cervical    Chronic midline low back pain without sciatica    Cervical stenosis of spine    Arthritis of left hip - Primary          Treatment Plan:  DISCUSSION: Treatment options discussed withpatient and all questions answered to patient's satisfaction. Possible side effects, risk of tolerance and or dependence and alternative treatments discussed    Obtaining appropriate analgesic effect of treatment   No signs of potential drug abuse or diversion identified    [x] Ill effects of being on chronic pain medications such as sleep disturbances, respiratory depression, hormonal changes, withdrawal symptoms, chronic opioid dependence and tolerance as well as risk of taking opioids with Benzodiazepines and taking opioids along with alcohol,  werediscussed with patient. I had asked the patient to minimize medication use and utilize pain medications only for uncontrolled rest pain or pain with exertional activities.  I advised patient not to self-escalate painmedications without consulting with us.  At each of patient's future visits we will try to taper pain medications, while adjusting the adjunct medications, and re-evaluating for Physical Therapy to improve spinal andjoint strength. We will continue to have discussions to decrease pain medications as tolerated. Counseled patient on effects their pain medication and /or their medical condition mayhave on their  ability to drive or operate machinery. Instructed not to drive or operate machinery if drowsy     I also discussed with the patient regarding the dangers of combining narcotic pain medication with tranquilizers, alcohol or illegal drugs or taking the medication any way other than prescribed. The dangers were discussed  including respiratory depression and death. Patient was told to tell  all  physicians regarding the medications he is getting from pain clinic. Patient is warned not to take any unprescribed medications over-the-countermedications that can depress breathing . Patient is advised to talk to the pharmacist or physicians if planning to take any over-the-counter medications before  takeing them. Patient is strongly advised to avoid tranquilizers or  relaxants, illegal drugs  or any medications that can depress breathing  Patient is also advised to tell us if there is any changes in their medications from other physicians. 1. Will order lidocaine patch for left shoulder pain, advised to call her PCP, she states may go to the ED    2.  Last UDT no metabolites, will repeat in 2-3 months    TREATMENT OPTIONS:       Medication Agreement Requirements Met  Continue Opioid therapy  Script written for  Hydrocodone, lidocaine patch  Follow up appointment made

## 2021-12-02 ENCOUNTER — HOSPITAL ENCOUNTER (EMERGENCY)
Age: 71
Discharge: HOME | End: 2021-12-02
Attending: EMERGENCY MEDICINE
Payer: MEDICARE

## 2021-12-02 ENCOUNTER — APPOINTMENT (OUTPATIENT)
Dept: GENERAL RADIOLOGY | Age: 71
End: 2021-12-02
Payer: MEDICARE

## 2021-12-02 VITALS
WEIGHT: 190 LBS | HEART RATE: 78 BPM | DIASTOLIC BLOOD PRESSURE: 63 MMHG | HEIGHT: 65 IN | SYSTOLIC BLOOD PRESSURE: 152 MMHG | BODY MASS INDEX: 31.65 KG/M2 | TEMPERATURE: 98.2 F | OXYGEN SATURATION: 97 % | RESPIRATION RATE: 20 BRPM

## 2021-12-02 DIAGNOSIS — S52.512A CLOSED DISPLACED FRACTURE OF STYLOID PROCESS OF LEFT RADIUS, INITIAL ENCOUNTER: Primary | ICD-10-CM

## 2021-12-02 PROCEDURE — 6370000000 HC RX 637 (ALT 250 FOR IP): Performed by: PHYSICIAN ASSISTANT

## 2021-12-02 PROCEDURE — 73100 X-RAY EXAM OF WRIST: CPT

## 2021-12-02 PROCEDURE — 99285 EMERGENCY DEPT VISIT HI MDM: CPT

## 2021-12-02 PROCEDURE — 29125 APPL SHORT ARM SPLINT STATIC: CPT

## 2021-12-02 PROCEDURE — 73110 X-RAY EXAM OF WRIST: CPT

## 2021-12-02 RX ORDER — DIAZEPAM 5 MG/1
5 TABLET ORAL ONCE
Status: COMPLETED | OUTPATIENT
Start: 2021-12-02 | End: 2021-12-02

## 2021-12-02 RX ADMIN — DIAZEPAM 5 MG: 5 TABLET ORAL at 18:03

## 2021-12-02 ASSESSMENT — PAIN DESCRIPTION - ORIENTATION: ORIENTATION: LEFT

## 2021-12-02 ASSESSMENT — PAIN SCALES - GENERAL: PAINLEVEL_OUTOF10: 6

## 2021-12-02 ASSESSMENT — PAIN DESCRIPTION - LOCATION: LOCATION: WRIST

## 2021-12-02 ASSESSMENT — PAIN DESCRIPTION - PAIN TYPE: TYPE: ACUTE PAIN

## 2021-12-02 NOTE — ED PROVIDER NOTES
Amaya Castañeda MD at [address] [place] [address]  09/24/2021    CYSTOSCOPY RETROGRADE PYELOGRAM,  URETERAL STENT REMOVAL left    FINGER SURGERY Left 03/21/2014    BUR[place]S ARTHOPLASTY LEFT THUMB    HYSTERECTOMY  01/01/1980    WITH RT SALPINGOOPHERECTOMY    JOINT REPLACEMENT Bilateral 01/01/1994    PARTIAL-KNEES    OTHER SURGICAL HISTORY Right 09/23/2014    thumb repair    SALPINGO-OOPHORECTOMY Left 01/01/1987    SHOULDER SURGERY Left 01/01/1993    SPURS    TOE OSTEOTOMY Right 06/08/2016    Right 5th digit adductory wedge osteotomy with K wire fixation      None otherwise stated in nurses notes    [address]       Discharge Medication List as of 12/2/2021  6:55 PM      CONTINUE these medications which have NOT CHANGED    Details   Ascorbic Acid (VITAMIN C PLUS WILD DON HIPS) 500 MG CHEW Take by mouthHistorical Med      lidocaine 4 % external patch Place 1 patch onto the skin daily Place on painful area, left shoulder, TransDERmal, DAILY Starting Fri 11/5/2021, Until Sun 12/5/2021, For 30 days, Disp-30 patch, R-0, Normal      HYDROcodone-acetaminophen (NORCO) 5-325 MG per tablet Take 1 tablet by mouth every 8 hours as needed for Pain for up to 30 days. , Disp-90 tablet, R-0Normal      LEVOTHYROXINE SODIUM PO Take 115 mcg by mouth dailyHistorical Med      pramipexole (MIRAPEX) 0.125 MG tablet Take 0.125 mg by mouth dailyHistorical Med      olopatadine (PATANOL) 0.1 % ophthalmic solution Place 1 drop into both eyes as needed Historical Med      FLUoxetine (PROZAC) 10 MG capsule Take 1 capsule by mouth daily, Disp-30 capsule, R-3Normal      amLODIPine (NORVASC) 10 MG tablet Take 1 tablet by mouth daily, Disp-30 tablet, R-3Normal      ferrous sulfate (IRON 325) 325 (65 Fe) MG tablet Take 1 tablet by mouth daily (with breakfast), Disp-30 tablet, R-3Normal      buPROPion (WELLBUTRIN XL) 300 MG extended release tablet TAKE ONE TABLET BY MOUTH DAILYHistorical Med      nystatin (NYAMYC) 381348 UNIT/GM powder APPLY TO AFFECTED AREA(S) FOUR TIMES A DAY, Historical Med      busPIRone (BUSPAR) 15 MG tablet Take 15 mg by mouth 2 times dailyHistorical Med      isosorbide mononitrate (IMDUR) 30 MG extended release tablet Take 1 tablet by mouth daily, Disp-30 tablet, R-3Normal      nystatin (MYCOSTATIN) 788710 UNIT/GM cream Apply topically 2 times daily Apply topically 2 times daily. , Topical, 2 TIMES DAILY, Historical Med      atorvastatin (LIPITOR) 40 MG tablet Historical Med      nitroGLYCERIN (NITROSTAT) 0.4 MG SL tablet Place 0.4 mg under the tongue every 5 minutes as needed for Chest pain up to max of 3 total doses. If no relief after 1 dose, call 911. Historical Med      Multiple Vitamins-Minerals (THERAPEUTIC MULTIVITAMIN-MINERALS) tablet Take 1 tablet by mouth daily. aspirin 81 MG EC tablet Take 81 mg by mouth daily. LAST DOSE 14      metoprolol (TOPROL-XL) 100 MG XL tablet Take 100 mg by mouth daily. vitamin B-12 (CYANOCOBALAMIN) 500 MCG tablet Take 500 mcg by mouth daily Historical Med             ALLERGIES     Pcn [penicillins], Heparin, Lamotrigine, and Cyclobenzaprine    FAMILY HISTORY           Problem Relation Age of Onset    Breast Cancer Mother 62        BREAST WITH METS T  LIVER AND LUNG    Other Father         AAA    Heart Disease Father     Asthma Sister     Diabetes Sister         NIDDM    Stroke Brother     Diabetes Brother         NIDDM    Stroke Maternal Grandmother     Asthma Son      Family Status   Relation Name Status    Mother      Father      Sister GERT Alive    Brother YASH Alive    MGM      Son [employer]      None otherwise stated in nurses notes    SOCIAL HISTORY      reports that she quit smoking about 17 years ago. Her smoking use included cigarettes. She has a 30.00 pack-year smoking history. She has never used smokeless tobacco. She reports current alcohol use. She reports that she does not use drugs.    lives at home with others PHYSICAL EXAM    (up to 7 for level 4, 8 or more for level 5)     ED Triage Vitals [12/02/21 1649]   BP Temp Temp Source Pulse Resp SpO2 Height Weight   (!) 152/63 98.2 °F (36.8 °C) Oral 78 20 97 % 5' 5\" (1.651 m) 190 lb (86.2 kg)       Physical Exam   Nursing note and vitals reviewed. Constitutional: well-developed, well-nourished, nontoxic, well appearing, not distressed  HEENT:  normocephalic atraumatic, external ears normal appearance, no nasal deformity, no neck masses or edema, patient protecting airway, no stridor, phonating well  Eyes: pupils equal, sclera non-icteric, no discharge  Cardiovascular: no JVD  Respiratory: non-labored breathing, effort normal, no accessory muscle use visulized, no audible wheezing  Gastrointestinal: Abdomen not distended  Musculoskeletal: Examination of left wrist reveals deformity. There is DROM. Generalized tenderness. No pain in hand. Distal sensation intact. Brisk cap refill. 2/2 radial pulse. Skin: no pallor, no rashes visible  Neuro: alert and oriented times 3, GCS 15, normal coordination, no dysarthria or aphasia  Psych: normal mood and affect, cooperative, normal thought content              DIAGNOSTIC RESULTS     EKG: All EKG's are interpreted by the Emergency Department Physician who either signs or Co-signs this chart in the absence of a cardiologist.        RADIOLOGY:   All plain film, CT, MRI, and formal ultrasound images (except ED bedside ultrasound) are read by the radiologist, see reports below, unless otherwise noted in MDM or here. XR WRIST LEFT (2 VIEWS)   Final Result   Intra-articular distal radius fracture with persistent dorsal displacement of   the long axis of the carpal bones in relation to the radius. XR WRIST LEFT (MIN 3 VIEWS)   Final Result   Displaced intra-articular fracture of the radial styloid process with soft   tissue swelling and ventral angulation at the fracture site. Arthritic   changes.              No results found.          LABS:  Labs Reviewed - No data to display    All other labs were within normal range or not returned as of this dictation. EMERGENCY DEPARTMENT COURSE and DIFFERENTIAL DIAGNOSIS/MDM:   Vitals:    Vitals:    12/02/21 1649   BP: (!) 152/63   Pulse: 78   Resp: 20   Temp: 98.2 °F (36.8 °C)   TempSrc: Oral   SpO2: 97%   Weight: 190 lb (86.2 kg)   Height: 5' 5\" (1.651 m)         Patient instructed to return to the emergency room if symptoms worsen, return, or any other concern right away which is agreed by the patient    ED MEDS:  Orders Placed This Encounter   Medications    diazePAM (VALIUM) tablet 5 mg         CONSULTS:  None    PROCEDURES:  After application of thumb splica splint to left arm by RN, Post application exam shows:  cap refill less than 2 seconds  there is no swelling or discoloration  Sensation intact and able to move distally  Splint is appropriate        FINAL IMPRESSION      1. Closed displaced fracture of styloid process of left radius, initial encounter          DISPOSITION/PLAN   DISPOSITION     PATIENT REFERRED TO:  Esperanza Keen DO  [address] [facility] [address]  [address] [place] [address] [id number] [place] [address] [address] [place] [id number]  [phone]    Call         DISCHARGE MEDICATIONS:  Discharge Medication List as of 12/2/2021  6:55 PM            Summation      Patient Course:    Fall PTA. Landed on left arm. Left wrist injury. Deformity with DROM note. Pulses intact. Xray shows displaced distal radius fracture. Dr. Margie Spears informed. Dr. Ene Pineda and resident at bedside performing hematoma block and reduction. Wrist appears to be back in alignment per resident and dr. Margie Spears who performed reduction. Will place in thumb splica and repeat imaging. Dr. Ene Pineda evaluated post reduction film at bedside. States pt is ready for dc home.      Pt referred to ortho.  Given pain medication. Discussed results and plan with the pt. They expressed appropriate understanding. Pt given close follow up, supportive care instructions and strict return instructions at the bedside. The care is provided during an unprecedented national emergency due to the novel coronavirus, COVID-19. ED Medications administered this visit:    Medications   diazePAM (VALIUM) tablet 5 mg (5 mg Oral Given 12/2/21 3569)       New Prescriptions from this visit:    Discharge Medication List as of 12/2/2021  6:55 PM          Follow-up:  DO Leilani Donahue [address] [place] [id number]  [address] [place] [address] [phone] Justin Mims  [phone]    Call           Final Impression:   1.  Closed displaced fracture of styloid process of left radius, initial encounter               (Please note that portions of this note were completed with a voice recognition program )        Bernarda Springer, PA-C  12/03/21 [id number]

## 2021-12-02 NOTE — PROCEDURES
Because of this patient's pain at the site of the fracture. I performed a local hematoma block to help alleviate the pain and allow for the possibility of closed reduction of this patient's fracture. Before doing this procedure, I did check the patient's distal neurovascular status. There is some mild decreased range of motion which I attributed to the pain but no evidence of obvious vascular or neurological compromise. Then, I identified the appropriate landmarks, and I sterilely prepped the skin at the insertion site. Using plain Lidocaine, I inserted the needle and withdrew to make sure I was not in a blood vessel, then I slowly injected approximately 4 or 5 mls in the region of the fracture site. The patient appeared to tolerate the procedure well and her pain was improved. Repeat evaluation of the distal extremity revealed no change or compromise in the vascular integrity.     Electronically signed by Kaila Murray DO on 12/4/2021 at 6:26 AM

## 2021-12-03 ENCOUNTER — HOSPITAL ENCOUNTER (EMERGENCY)
Age: 71
Discharge: HOME | End: 2021-12-03
Attending: EMERGENCY MEDICINE
Payer: MEDICARE

## 2021-12-03 ENCOUNTER — APPOINTMENT (OUTPATIENT)
Dept: GENERAL RADIOLOGY | Age: 71
End: 2021-12-03
Payer: MEDICARE

## 2021-12-03 ENCOUNTER — HOSPITAL ENCOUNTER (OUTPATIENT)
Dept: PAIN MANAGEMENT | Age: 71
Discharge: HOME | End: 2021-12-03
Payer: MEDICARE

## 2021-12-03 VITALS
OXYGEN SATURATION: 94 % | WEIGHT: 190 LBS | HEIGHT: 65 IN | TEMPERATURE: 97.7 F | BODY MASS INDEX: 31.65 KG/M2 | HEART RATE: 102 BPM | DIASTOLIC BLOOD PRESSURE: 53 MMHG | SYSTOLIC BLOOD PRESSURE: 180 MMHG | RESPIRATION RATE: 20 BRPM

## 2021-12-03 DIAGNOSIS — M48.061 DEGENERATIVE LUMBAR SPINAL STENOSIS: ICD-10-CM

## 2021-12-03 DIAGNOSIS — M47.817 LUMBOSACRAL SPONDYLOSIS WITHOUT MYELOPATHY: ICD-10-CM

## 2021-12-03 DIAGNOSIS — M51.36 DDD (DEGENERATIVE DISC DISEASE), LUMBAR: ICD-10-CM

## 2021-12-03 DIAGNOSIS — S52.592D OTHER CLOSED FRACTURE OF DISTAL END OF LEFT RADIUS WITH ROUTINE HEALING, SUBSEQUENT ENCOUNTER: Primary | ICD-10-CM

## 2021-12-03 DIAGNOSIS — M54.16 LUMBAR RADICULOPATHY: Primary | ICD-10-CM

## 2021-12-03 DIAGNOSIS — M47.22 OSTEOARTHRITIS OF SPINE WITH RADICULOPATHY, CERVICAL REGION: ICD-10-CM

## 2021-12-03 DIAGNOSIS — M50.30 DDD (DEGENERATIVE DISC DISEASE), CERVICAL: ICD-10-CM

## 2021-12-03 PROCEDURE — 96374 THER/PROPH/DIAG INJ IV PUSH: CPT

## 2021-12-03 PROCEDURE — 6360000002 HC RX W HCPCS: Performed by: PHYSICIAN ASSISTANT

## 2021-12-03 PROCEDURE — 99213 OFFICE O/P EST LOW 20 MIN: CPT | Performed by: NURSE PRACTITIONER

## 2021-12-03 PROCEDURE — 99283 EMERGENCY DEPT VISIT LOW MDM: CPT

## 2021-12-03 PROCEDURE — 73100 X-RAY EXAM OF WRIST: CPT

## 2021-12-03 PROCEDURE — 99213 OFFICE O/P EST LOW 20 MIN: CPT

## 2021-12-03 PROCEDURE — 73110 X-RAY EXAM OF WRIST: CPT

## 2021-12-03 PROCEDURE — 25675 CLTX DSTL RAD/ULN DISLC MNPJ: CPT

## 2021-12-03 RX ORDER — HYDROCODONE BITARTRATE AND ACETAMINOPHEN 5; 325 MG/1; MG/1
1 TABLET ORAL EVERY 8 HOURS PRN
Qty: 90 TABLET | Refills: 0 | Status: SHIPPED | OUTPATIENT
Start: 2021-12-07 | End: 2022-01-11 | Stop reason: SDUPTHER

## 2021-12-03 RX ORDER — LIDOCAINE HYDROCHLORIDE 10 MG/ML
5 INJECTION, SOLUTION INFILTRATION; PERINEURAL ONCE
Status: DISCONTINUED | OUTPATIENT
Start: 2021-12-03 | End: 2021-12-03 | Stop reason: HOSPADM

## 2021-12-03 RX ADMIN — HYDROMORPHONE HYDROCHLORIDE 1 MG: 1 INJECTION, SOLUTION INTRAMUSCULAR; INTRAVENOUS; SUBCUTANEOUS at 15:14

## 2021-12-03 ASSESSMENT — PAIN DESCRIPTION - PAIN TYPE: TYPE: ACUTE PAIN

## 2021-12-03 ASSESSMENT — PAIN DESCRIPTION - LOCATION: LOCATION: WRIST

## 2021-12-03 ASSESSMENT — ENCOUNTER SYMPTOMS
SHORTNESS OF BREATH: 0
COUGH: 0
CONSTIPATION: 0

## 2021-12-03 ASSESSMENT — PAIN SCALES - GENERAL
PAINLEVEL_OUTOF10: 10
PAINLEVEL_OUTOF10: 10

## 2021-12-03 ASSESSMENT — PAIN DESCRIPTION - ORIENTATION: ORIENTATION: LEFT

## 2021-12-03 NOTE — PROGRESS NOTES
Patient completed a telephone visit today to review medication contract. Chief Complaint: neck pain    PMH Pt complains of neck pain. MRI with moderate stenosis. She had cervical facet injections with moderate relief but states she cannot afford to repeat at this time. Last injection was 3/2021. She sees a chiropractor with benefit. She is opioid dependant for pain control and takes norco 5 mg TID.        She is a candidate for cervical spine surgery but her cardiologist will not clear her to have this done.      She cannot take gabapentin due to kidney issues. She broke her left wrist yesterday - fell down her front steps-  and in a lot of pain this morning. She did not sleep well last night due to pain. She will see orthopedics today. Neck Pain   This is a chronic problem. The current episode started more than 1 year ago. The problem occurs constantly. The problem has been unchanged. The pain is present in the midline. The quality of the pain is described as aching. The pain is at a severity of 9/10. The pain is moderate. The symptoms are aggravated by position and twisting. Pertinent negatives include no chest pain, fever or headaches. She has tried oral narcotics and bed rest for the symptoms. The treatment provided mild relief. Patient denies any new neurological symptoms. No bowel or bladder incontinence, no weakness, and no falling. Pill count: appropriate due 12/7    Morphine equivalent: 15    Periodic Controlled Substance Monitoring: Possible medication side effects, risk of tolerance/dependence & alternative treatments discussed., No signs of potential drug abuse or diversion identified., Obtaining appropriate analgesic effect of treatment.  Mook Denis, APRN - CNP)      Past Medical History:   Diagnosis Date    Aortic valve stenosis     mild per chart    Arthritis     CAD (coronary artery disease) 2000    1 STENT, Dr. Dang Pepe [place]    Diabetes mellitus ([address] Dr. Claudeen Rinks    Heart murmur     1962    Hyperlipidemia 2004    ON RX    MI, old     states many and they were mild    Pulmonary stenosis 1995    Pulmonary valve stenosis     mild/congenital per chart    Sciatica     right leg    Thyroid disease 2004    HYPOTHYROIDISM ON RX    Wears glasses        Past Surgical History:   Procedure Laterality Date    BLADDER SURGERY Left 9/24/2021    CYSTOSCOPY RETROGRADE PYELOGRAM,  URETERAL STENT REMOVAL performed by Damon Worthington MD at [address] [place] [address]  01/01/1993    CORONARY ANGIOPLASTY WITH STENT PLACEMENT  01/01/2000    1 STENT    CYSTOSCOPY Left 06/21/2021    CYSTOSCOPY URETERAL STENT INSERTION performed by Damon Worthington MD at [address] [place] [address]  09/24/2021    CYSTOSCOPY RETROGRADE PYELOGRAM,  URETERAL STENT REMOVAL left    FINGER SURGERY Left 03/21/2014    BURTONS ARTHOPLASTY LEFT THUMB    HYSTERECTOMY  01/01/1980    WITH RT SALPINGOOPHERECTOMY    JOINT REPLACEMENT Bilateral 01/01/1994    PARTIAL-KNEES    OTHER SURGICAL HISTORY Right 09/23/2014    thumb repair    SALPINGO-OOPHORECTOMY Left 01/01/1987    SHOULDER SURGERY Left 01/01/1993    SPURS    TOE OSTEOTOMY Right 06/08/2016    Right 5th digit adductory wedge osteotomy with K wire fixation        Allergies   Allergen Reactions    Pcn [Penicillins] Swelling and Hives    Heparin Other (See Comments)     MIGRAINE      Lamotrigine Other (See Comments), Itching, Nausea Only and Rash    Cyclobenzaprine      Dry mouth, jitters         Current Outpatient Medications:     Ascorbic Acid (VITAMIN C PLUS WILD DON HIPS) 500 MG CHEW, Take by mouth, Disp: , Rfl:     HYDROcodone-acetaminophen (NORCO) 5-325 MG per tablet, Take 1 tablet by mouth every 8 hours as needed for Pain for up to 30 days. , Disp: 90 tablet, Rfl: 0    lidocaine 4 % external patch, Place 1 patch onto the skin daily Place on painful area, left shoulder, Disp: 30 patch, Rfl: 0    LEVOTHYROXINE SODIUM PO, Take 115 mcg by mouth daily, Disp: , Rfl:     pramipexole (MIRAPEX) 0.125 MG tablet, Take 0.125 mg by mouth daily, Disp: , Rfl:     olopatadine (PATANOL) 0.1 % ophthalmic solution, Place 1 drop into both eyes as needed , Disp: , Rfl:     FLUoxetine (PROZAC) 10 MG capsule, Take 1 capsule by mouth daily, Disp: 30 capsule, Rfl: 3    amLODIPine (NORVASC) 10 MG tablet, Take 1 tablet by mouth daily, Disp: 30 tablet, Rfl: 3    ferrous sulfate (IRON 325) 325 (65 Fe) MG tablet, Take 1 tablet by mouth daily (with breakfast), Disp: 30 tablet, Rfl: 3    buPROPion (WELLBUTRIN XL) 300 MG extended release tablet, TAKE ONE TABLET BY MOUTH DAILY, Disp: , Rfl:     nystatin (NYAMYC) 848737 UNIT/GM powder, APPLY TO AFFECTED AREA(S) FOUR TIMES A DAY, Disp: , Rfl:     busPIRone (BUSPAR) 15 MG tablet, Take 15 mg by mouth 2 times daily, Disp: , Rfl:     isosorbide mononitrate (IMDUR) 30 MG extended release tablet, Take 1 tablet by mouth daily, Disp: 30 tablet, Rfl: 3    nystatin (MYCOSTATIN) 018891 UNIT/GM cream, Apply topically 2 times daily Apply topically 2 times daily. , Disp: , Rfl:     atorvastatin (LIPITOR) 40 MG tablet, , Disp: , Rfl:     nitroGLYCERIN (NITROSTAT) 0.4 MG SL tablet, Place 0.4 mg under the tongue every 5 minutes as needed for Chest pain up to max of 3 total doses. If no relief after 1 dose, call 911., Disp: , Rfl:     Multiple Vitamins-Minerals (THERAPEUTIC MULTIVITAMIN-MINERALS) tablet, Take 1 tablet by mouth daily. , Disp: , Rfl:     aspirin 81 MG EC tablet, Take 81 mg by mouth daily. LAST DOSE 9/8/14, Disp: , Rfl:     metoprolol (TOPROL-XL) 100 MG XL tablet, Take 100 mg by mouth daily. , Disp: , Rfl:     vitamin B-12 (CYANOCOBALAMIN) 500 MCG tablet, Take 500 mcg by mouth daily , Disp: , Rfl:     Family History   Problem Relation Age of Onset    Breast Cancer Mother 62        BREAST WITH METS T  LIVER AND LUNG    Other Father         AAA    Heart Disease Father     Asthma Sister     Diabetes Sister         NIDDM  Stroke Brother     Diabetes Brother         NIDDM    Stroke Maternal Grandmother     Asthma Son        Social History     Socioeconomic History    Marital status:      Spouse name: Not on file    Number of children: 1    Years of education: Not on file    Highest education level: Not on file   Occupational History    Not on file   Tobacco Use    Smoking status: Former Smoker     Packs/day: 1.00     Years: 30.00     Pack years: 30.00     Types: Cigarettes     Quit date: 3/19/2004     Years since quittin.7    Smokeless tobacco: Never Used   Vaping Use    Vaping Use: Never used   Substance and Sexual Activity    Alcohol use: Yes     Comment: socially, once a year    Drug use: No    Sexual activity: Not Currently   Other Topics Concern    Not on file   Social History Narrative    Not on file     Social Determinants of Health     Financial Resource Strain:     Difficulty of Paying Living Expenses: Not on file   Food Insecurity:     Worried About Running Out of Food in the Last Year: Not on file    Marielle of Food in the Last Year: Not on file   Transportation Needs:     Lack of Transportation (Medical): Not on file    Lack of Transportation (Non-Medical):  Not on file   Physical Activity:     Days of Exercise per Week: Not on file    Minutes of Exercise per Session: Not on file   Stress:     Feeling of Stress : Not on file   Social Connections:     Frequency of Communication with Friends and Family: Not on file    Frequency of Social Gatherings with Friends and Family: Not on file    Attends Church Services: Not on file    Active Member of Clubs or Organizations: Not on file    Attends Club or Organization Meetings: Not on file    Marital Status: Not on file   Intimate Partner Violence:     Fear of Current or Ex-Partner: Not on file    Emotionally Abused: Not on file    Physically Abused: Not on file    Sexually Abused: Not on file   Housing Stability:     Unable to Pay for Housing in the Last Year: Not on file    Number of Places Lived in the Last Year: Not on file    Unstable Housing in the Last Year: Not on file       Review of Systems:  Review of Systems   Constitutional: Negative for chills and fever. Cardiovascular: Negative for chest pain and palpitations. Respiratory: Negative for cough and shortness of breath. Musculoskeletal: Positive for joint pain and neck pain. Gastrointestinal: Negative for constipation. Neurological: Negative for disturbances in coordination, headaches and loss of balance. Physical Exam:  LMP 03/19/1980     Physical Exam  Neurological:      Mental Status: She is alert.    Psychiatric:         Mood and Affect: Mood normal.         Record/Diagnostics Review:    Last miles 10/2021 and was +norco but no metabolites - UDS 3/2021 negative for opiates - was supposed to come in for in person appt today and UDS but she broke her wrist yesterday and could not drive    [facility]ORS SINCE LAST VISIT  Lost rx/pills:------------------------------------------ no  Taking  medication as prescribed: ----------- yes  Urine Drug Screen ---------------------------------Renate Seip  Recent ER visits: -------------------------------------no  Pill count is appropriate: ---------------------------yes   Refills for prescriptions appropriate:---------- yes      Assessment:  Problem List Items Addressed This Visit     Degenerative lumbar spinal stenosis    Relevant Medications    HYDROcodone-acetaminophen ([address]) 5-325 MG per tablet (Start on 12/7/2021)    Lumbar radiculopathy - Primary    DDD (degenerative disc disease), lumbar    Relevant Medications    HYDROcodone-acetaminophen ([address]) 5-325 MG per tablet (Start on 12/7/2021)    Lumbosacral spondylosis without myelopathy    Relevant Medications    HYDROcodone-acetaminophen ([address]) 5-325 MG per tablet (Start on 12/7/2021)    DDD (degenerative disc disease), cervical    Relevant Medications HYDROcodone-acetaminophen (NORCO) 5-325 MG per tablet (Start on 12/7/2021)    Osteoarthritis of spine with radiculopathy, cervical region    Relevant Medications    HYDROcodone-acetaminophen (NORCO) 5-325 MG per tablet (Start on 12/7/2021)             Treatment Plan:  Patient relates current medications are helping the pain. Patient reports taking pain medications as prescribed, denies obtaining medications from different sources and denies use of illegal drugs. Patient denies side effects from medications like nausea, vomiting, constipation or drowsiness. Patient reports current activities of daily living are possible due to medications and would like to continue them. As always, we encourage daily stretching and strengthening exercises, and recommend minimizing use of pain medications unless patient cannot get through daily activities due to pain. Contract requirements met. Continue opioid therapy. Script written for norco  UDS not appropriate - will repeat in near future  Follow up appointment made for 4 weeks in person visit    June Ann Morgan, was evaluated through a synchronous (real-time) audio-video encounter. The patient (or guardian if applicable) is aware that this is a billable service. Verbal consent to proceed has been obtained within the past 12 months. The visit was conducted pursuant to the emergency declaration under the [facility], [address] [place] [address] authority and the [employer] General Act. Patient identification was verified, and a caregiver was present when appropriate. The patient was located in a state where the provider was credentialed to provide care. Total time spent for this encounter: 20 minutes    --QI Urbano CNP on 12/3/2021 at 8:55 AM    An electronic signature was used to authenticate this note.

## 2021-12-03 NOTE — ED PROVIDER NOTES
[address] ED  eMERGENCY dEPARTMENT eNCOUnter      Pt Name: Ирина Avilez  MRN: [id number]  [place] 1950  Date of evaluation: 12/3/2021  Provider: Abdirahman Chavira PA-C    CHIEF COMPLAINT       Chief Complaint   Patient presents with    Wrist Pain           HISTORY OF PRESENT ILLNESS  (Location/Symptom, Timing/Onset, Context/Setting, Quality, Duration, Modifying Factors, Severity.)   Marilyn Sheth is a 70 y.o. female who presents to the emergency department for evaluation of left wrist pain. Pt was seen in our ED for displaced left radius fracture yesterday. Reduction performed by resident physician and attending. Pt returns with severe pain. Denies numbness. Does take norco at home with no relief. No other complaints. Nursing Notes were reviewed. REVIEW OF SYSTEMS    (2-9 systems for level 4, 10 or more for level 5)     Review of Systems   Left wrist pain     Except as noted above the remainder of the review of systems was reviewed and negative.        PAST MEDICAL HISTORY     Past Medical History:   Diagnosis Date    Aortic valve stenosis     mild per chart    Arthritis     CAD (coronary artery disease) 2000    1 STENT, Dr. Jamaal Gip[place], [place]    Diabetes mellitus [facility])     Dr. Bety José    Heart murmur     1962    Hyperlipidemia 2004    ON RX    MI, old     states many and they were mild    Pulmonary stenosis 1995    Pulmonary valve stenosis     mild/congenital per chart    Sciatica     right leg    Thyroid disease 2004    HYPOTHYROIDISM ON RX    Wears glasses      None otherwise stated in nurses notes    SURGICAL HISTORY       Past Surgical History:   Procedure Laterality Date    BLADDER SURGERY Left 9/24/2021    CYSTOSCOPY RETROGRADE PYELOGRAM,  URETERAL STENT REMOVAL performed by Yandel Delgado MD at [address] [place] [address]  01/01/1993    CORONARY ANGIOPLASTY WITH STENT PLACEMENT  01/01/2000    1 STENT    CYSTOSCOPY Left 06/21/2021    CYSTOSCOPY [facility]TERAL STENT [facility]  [facility]/2021    CYSTOSCOPY RETROGRADE PYELOGRAM,  URETERAL STENT REMOVAL left    FINGER SURGERY Left 03/21/2014    BUR[place]S ARTHOPLASTY LEFT THUMB    HYSTERECTOMY  01/01/1980    WITH RT SALPINGOOPHERECTOMY    JOINT REPLACEMENT Bilateral 01/01/1994    PARTIAL-KNEES    OTHER SURGICAL HISTORY Right 09/23/2014    thumb repair    SALPINGO-OOPHORECTOMY Left 01/01/1987    SHOULDER SURGERY Left 01/01/1993    SPURS    TOE OSTEOTOMY Right 06/08/2016    Right 5th digit adductory wedge osteotomy with K wire fixation      None otherwise stated in nurses notes    CURRENT MEDICATIONS       Discharge Medication List as of 12/3/2021  4:09 PM      CONTINUE these medications which have NOT CHANGED    Details   HYDROcodone-acetaminophen (NORCO) 5-325 MG per tablet Take 1 tablet by mouth every 8 hours as needed for Pain for up to 30 days. , Disp-90 tablet, R-0Normal      Ascorbic Acid (VITAMIN C PLUS WILD DON HIPS) 500 MG CHEW Take by mouthHistorical Med      lidocaine 4 % external patch Place 1 patch onto the skin daily Place on painful area, left shoulder, TransDERmal, DAILY Starting Fri 11/5/2021, Until Sun 12/5/2021, For 30 days, Disp-30 patch, R-0, Normal      LEVOTHYROXINE SODIUM PO Take 115 mcg by mouth dailyHistorical Med      pramipexole (MIRAPEX) 0.125 MG tablet Take 0.125 mg by mouth dailyHistorical Med      olopatadine (PATANOL) 0.1 % ophthalmic solution Place 1 drop into both eyes as needed Historical Med      FLUoxetine (PROZAC) 10 MG capsule Take 1 capsule by mouth daily, Disp-30 capsule, R-3Normal      amLODIPine (NORVASC) 10 MG tablet Take 1 tablet by mouth daily, Disp-30 tablet, R-3Normal      ferrous sulfate (IRON 325) 325 (65 Fe) MG tablet Take 1 tablet by mouth daily (with breakfast), Disp-30 tablet, R-3Normal      buPROPion (WELLBUTRIN XL) 300 MG extended release tablet TAKE ONE TABLET BY MOUTH DAILYHistorical Med      nystatin (NYAMYC) 861230 UNIT/GM powder APPLY TO AFFECTED AREA(S) FOUR TIMES A DAY, Historical Med      busPIRone (BUSPAR) 15 MG tablet Take 15 mg by mouth 2 times dailyHistorical Med      isosorbide mononitrate (IMDUR) 30 MG extended release tablet Take 1 tablet by mouth daily, Disp-30 tablet, R-3Normal      nystatin (MYCOSTATIN) 467698 UNIT/GM cream Apply topically 2 times daily Apply topically 2 times daily. , Topical, 2 TIMES DAILY, Historical Med      atorvastatin (LIPITOR) 40 MG tablet Historical Med      nitroGLYCERIN (NITROSTAT) 0.4 MG SL tablet Place 0.4 mg under the tongue every 5 minutes as needed for Chest pain up to max of 3 total doses. If no relief after 1 dose, call 911. Historical Med      Multiple Vitamins-Minerals (THERAPEUTIC MULTIVITAMIN-MINERALS) tablet Take 1 tablet by mouth daily. aspirin 81 MG EC tablet Take 81 mg by mouth daily. LAST DOSE 14      metoprolol (TOPROL-XL) 100 MG XL tablet Take 100 mg by mouth daily. vitamin B-12 (CYANOCOBALAMIN) 500 MCG tablet Take 500 mcg by mouth daily Historical Med             ALLERGIES     Pcn [penicillins], Heparin, Lamotrigine, and Cyclobenzaprine    FAMILY HISTORY           Problem Relation Age of Onset    Breast Cancer Mother 62        BREAST WITH METS T  LIVER AND LUNG    Other Father         AAA    Heart Disease Father     Asthma Sister     Diabetes Sister         NIDDM    Stroke Brother     Diabetes Brother         NIDDM    Stroke Maternal Grandmother     Asthma Son      Family Status   Relation Name Status    Mother      Father      Sister GERT Alive    Brother YASH Alive    MGM      Son [employer]      None otherwise stated in nurses notes    SOCIAL HISTORY      reports that she quit smoking about 17 years ago. Her smoking use included cigarettes. She has a 30.00 pack-year smoking history. She has never used smokeless tobacco. She reports current alcohol use. She reports that she does not use drugs.    lives at home with others     PHYSICAL EXAM    (up to 7 for level 4, 8 or more for level 5)     ED Triage Vitals [12/03/21 1328]   BP Temp Temp Source Pulse Resp SpO2 Height Weight   (!) 180/53 97.7 °F (36.5 °C) Oral 102 20 94 % 5' 5\" (1.651 m) 190 lb (86.2 kg)       Physical Exam   Nursing note and vitals reviewed. Constitutional: well-developed, well-nourished, nontoxic, well appearing, not distressed  HEENT:  normocephalic atraumatic, external ears normal appearance, no nasal deformity, no neck masses or edema, patient protecting airway, no stridor, phonating well  Eyes: pupils equal, sclera non-icteric, no discharge  Cardiovascular: no JVD  Respiratory: non-labored breathing, effort normal, no accessory muscle use visulized, no audible wheezing  Gastrointestinal: Abdomen not distended  Musculoskeletal: Examination of left wrist reveals bruising, swelling, deformity. Tenderness over general wrist.  Snuff box tenderness. 2/2 radial pulse. Brisk cap refill. Distal sensation intact. DROM. Compartments are soft. Skin: no pallor, no rashes visible  Neuro: alert and oriented times 3, GCS 15, normal coordination, no dysarthria or aphasia  Psych: normal mood and affect, cooperative, normal thought content              DIAGNOSTIC RESULTS     EKG: All EKG's are interpreted by the Emergency Department Physician who either signs or Co-signs this chart in the absence of a cardiologist.        RADIOLOGY:   All plain film, CT, MRI, and formal ultrasound images (except ED bedside ultrasound) are read by the radiologist, see reports below, unless otherwise noted in MDM or here. XR WRIST LEFT (2 VIEWS)   Final Result   Redemonstration of intra-articular fracture of the distal radius, appearing   similar to the prior study status post splint placement.          XR WRIST LEFT (MIN 3 VIEWS)   Final Result   Comminuted intra-articular fracture of the distal radial metaphysis with   persistent dorsal subluxation of the carpus relative to the articular surface   of the distal radius. XR WRIST LEFT (2 VIEWS)    Result Date: 12/3/2021  EXAMINATION: 2 XRAY VIEWS OF THE LEFT WRIST 12/2/2021 7:14 pm COMPARISON: 12/02/2021 HISTORY: ORDERING SYSTEM PROVIDED HISTORY: post reduction TECHNOLOGIST PROVIDED HISTORY: post reduction Reason for Exam: Acuity: Acute Type of Exam: Subsequent/Follow-up Mechanism of Injury:  Post reduction Relevant Medical/Surgical History: Post reduction FINDINGS: Again seen is a displaced intra-articular distal radius fracture. The long axis of the carpal bones remains dorsally displaced in relation to the long axis of the radius. 1st CMC and triscaphe joint degenerative changes again noted. Intra-articular distal radius fracture with persistent dorsal displacement of the long axis of the carpal bones in relation to the radius. XR WRIST LEFT (MIN 3 VIEWS)    Result Date: 12/2/2021  EXAMINATION: 3 XRAY VIEWS OF THE LEFT WRIST 12/2/2021 4:57 pm COMPARISON: None. HISTORY: ORDERING SYSTEM PROVIDED HISTORY: fall TECHNOLOGIST PROVIDED HISTORY: fall Reason for Exam: fall Acuity: Acute Type of Exam: Initial FINDINGS: A fracture of the radial styloid process is noted with offset of the fracture distal fracture fragment displaced dorsally by 11 mm with ventral angulation. Subchondral cysts scattered throughout the carpal bones. The patient is status post removal of the greater multangular bone. Arthritic changes are present in the thumb and the navicular multangular joints. Displaced intra-articular fracture of the radial styloid process with soft tissue swelling and ventral angulation at the fracture site. Arthritic changes. LABS:  Labs Reviewed - No data to display    All other labs were within normal range or not returned as of this dictation.     EMERGENCY DEPARTMENT COURSE and DIFFERENTIAL DIAGNOSIS/MDM:   Vitals:    Vitals:    12/03/21 1328   BP: (!) 180/53   Pulse: 102   Resp: 20   Temp: 97.7 °F (36.5 °C)   TempSrc: Oral   SpO2: 94%   Weight: 190 lb (86.2 kg)   Height: 5' 5\" (1.651 m)         Patient instructed to return to the emergency room if symptoms worsen, return, or any other concern right away which is agreed by the patient    ED MEDS:  Orders Placed This Encounter   Medications    HYDROmorphone (DILAUDID) injection 1 mg    lidocaine 1 % injection 5 mL         CONSULTS:  None    PROCEDURES:  None      FINAL IMPRESSION      1. Other closed fracture of distal end of left radius with routine healing, subsequent encounter          DISPOSITION/PLAN   DISPOSITION Decision To Discharge    PATIENT REFERRED TO:  Maicol Faulkner MD  [facility] [address] [facility]  [address] [address]  [phone]    Schedule an appointment as soon as possible for a visit in 1 day        DISCHARGE MEDICATIONS:  Discharge Medication List as of 12/3/2021  4:09 PM            Summation      Patient Course:    Left wrist pain. Fractured yesterday. Displaced. Reduction performed. Placed in splint. Splint removed. Pt is  Neurovascularly intact. No compartment syndrome. Wrist does look deformed. Xray are showing displacement. Discussed with dr. Audra Orona. He attempted reduction. Unsuccessful on repeat imaging. Dr. Chris Trotter placed splint. Recommend follow up with ortho for possible surgical reduction. Discussed results and plan with the pt. They expressed appropriate understanding. Pt given close follow up, supportive care instructions and strict return instructions at the bedside. The care is provided during an unprecedented national emergency due to the novel coronavirus, COVID-19.       ED Medications administered this visit:    Medications   lidocaine 1 % injection 5 mL (has no administration in time range)   HYDROmorphone (DILAUDID) injection 1 mg (1 mg IntraVENous Given 12/3/21 1834)       New Prescriptions from this visit:    Discharge Medication List as of 12/3/2021  4:09 PM Follow-up:  MD Kenia Root [address], [address] [address] [address] [address]  [phone]    Schedule an appointment as soon as possible for a visit in 1 day          Final Impression:   1.  Other closed fracture of distal end of left radius with routine healing, subsequent encounter               (Please note that portions of this note were completed with a voice recognition program )        Bernarda Mcdonald 82, PA-C  12/03/21 0990

## 2021-12-03 NOTE — ED PROVIDER NOTES
EMERGENCY DEPARTMENT ENCOUNTER   ATTENDING ATTESTATION     Pt Name: Shelia Welch  MRN: [id number]  [place] 1950  Date of evaluation: 12/3/21   Marilyn Rito Prieto is a 70 y.o. female with CC: Wrist Pain    MDM:   Terrance Renan down three steps yesterday, MAIN LINE [facility]  Left wrist fx  Had partial reduction here  Still having pain  On norco at home, goes to pain clinic    I do not think the patient has compartment syndrome at this time. No Pain out of proportion   No Persistent deep ache or burning pain   No Paresthesias  No Pain with passive stretch of muscles in the affected compartment   No Tense compartments  No Pallor from vascular insufficiency   No Diminished sensation  No Muscle weakness   No Paralysis       Left hand nv intact MRU nerve dist           RADIOLOGY:All plain film, CT, MRI, and formal ultrasound images (except ED bedside ultrasound) are read by the radiologist, see reports below, unless otherwise noted in MDM or here. XR WRIST LEFT (2 VIEWS)   Final Result   Redemonstration of intra-articular fracture of the distal radius, appearing   similar to the prior study status post splint placement. XR WRIST LEFT (MIN 3 VIEWS)   Final Result   Comminuted intra-articular fracture of the distal radial metaphysis with   persistent dorsal subluxation of the carpus relative to the articular surface   of the distal radius. PROCEDURE:  PATIENT ASKING FOR COMPLETE REDUCTION OF THE LEFT WRIST, THEY WERE ONLY ABLE TO ACHIEVE PARTIAL REDUCTION YESTERDAY  ED MD completes this procedure with ED resident. Ortho Injury    Date/Time: 12/3/2021 3:40 PM  Performed by: Maximo Araiza MD  Authorized by:  Maximo Araiza MD   Consent given by: patient  Patient identity confirmed: verbally with patient  Injury location: wrist  Location details: left wrist  Injury type: fracture-dislocation  Pre-procedure neurovascular assessment: neurovascularly intact  Pre-procedure distal perfusion: normal  Pre-procedure neurological function: normal  Pre-procedure range of motion: reduced  Anesthesia: hematoma block    Anesthesia:  Local anesthesia used: yes  Local Anesthetic: lidocaine 1% without epinephrine  Anesthetic total: 9 mL    Sedation:  Patient sedated: no    Manipulation performed: yes  Skeletal traction used: yes  X-ray confirmed reduction: yes  Immobilization: sling and splint (sugar tong splint)  Splint type: sugar tong  Supplies used: cotton padding  Post-procedure neurovascular assessment: post-procedure neurovascularly intact  Post-procedure distal perfusion: normal  Post-procedure neurological function: normal  Post-procedure range of motion: normal  Patient tolerance: patient tolerated the procedure well with no immediate complications  Comments: ED resident Dr Yg Soriano assists with reduction        4:17 PM EST  Xray left wrist reviewed, fx remains with mild dislocation  dw patient, she has an appointment with ortho Dr Silver Diaz on Monday  She has norco at home  She is placed in sling and splint  Discussed with patient anticipatory guidance, discharge instructions    FINAL IMPRESSION      1.  Other closed fracture of distal end of left radius with routine healing, subsequent encounter                     With partial dislocation, subsequent encounter      PASTMEDICAL HISTORY     Past Medical History:   Diagnosis Date    Aortic valve stenosis     mild per chart    Arthritis     CAD (coronary artery disease) 2000    1 STENT, Dr. Sahara Brasher, [place]    Diabetes mellitus [facility])     Dr. Juan Moreno    Heart murmur     1962    Hyperlipidemia 2004    ON RX    MI, old     states many and they were mild    Pulmonary stenosis 1995    Pulmonary valve stenosis     mild/congenital per chart    Sciatica     right leg    Thyroid disease 2004    HYPOTHYROIDISM ON RX    Wears glasses      SURGICAL HISTORY       Past Surgical History:   Procedure Laterality Date    BLADDER SURGERY Left 9/24/2021    CYSTOSCOPY RETROGRADE PYELOGRAM, URETERAL STENT REMOVAL performed by Kia Leach MD at [place] [address]  01/01/1993    CORONARY ANGIOPLASTY WITH STENT PLACEMENT  01/01/2000    1 STENT    CYSTOSCOPY Left 06/21/2021    CYSTOSCOPY URETERAL STENT INSERTION performed by [facility]ach [facility]  09/24/2021    CYSTOSCOPY RETROGRADE PYELOGRAM,  URETERAL STENT REMOVAL left    FINGER SURGERY Left 03/21/2014    [place] ARTHOPLASTY LEFT THUMB    HYSTERECTOMY  01/01/1980    WITH RT SALPINGOOPHERECTOMY    JOINT REPLACEMENT Bilateral 01/01/1994    PARTIAL-KNEES    OTHER SURGICAL HISTORY Right 09/23/2014    thumb repair    SALPINGO-OOPHORECTOMY Left 01/01/1987    SHOULDER SURGERY Left 01/01/1993    SPURS    TOE OSTEOTOMY Right 06/08/2016    Right 5th digit adductory wedge osteotomy with K wire fixation      CURRENT MEDICATIONS       Previous Medications    AMLODIPINE (NORVASC) 10 MG TABLET    Take 1 tablet by mouth daily    ASCORBIC ACID (VITAMIN C PLUS WILD DON HIPS) 500 MG CHEW    Take by mouth    ASPIRIN 81 MG EC TABLET    Take 81 mg by mouth daily. LAST DOSE 9/8/14    ATORVASTATIN (LIPITOR) 40 MG TABLET        BUPROPION (WELLBUTRIN XL) 300 MG EXTENDED RELEASE TABLET    TAKE ONE TABLET BY MOUTH DAILY    BUSPIRONE (BUSPAR) 15 MG TABLET    Take 15 mg by mouth 2 times daily    FERROUS SULFATE (IRON 325) 325 (65 FE) MG TABLET    Take 1 tablet by mouth daily (with breakfast)    FLUOXETINE (PROZAC) 10 MG CAPSULE    Take 1 capsule by mouth daily    HYDROCODONE-ACETAMINOPHEN (NORCO) 5-325 MG PER TABLET    Take 1 tablet by mouth every 8 hours as needed for Pain for up to 30 days. ISOSORBIDE MONONITRATE (IMDUR) 30 MG EXTENDED RELEASE TABLET    Take 1 tablet by mouth daily    LEVOTHYROXINE SODIUM PO    Take 115 mcg by mouth daily    LIDOCAINE 4 % EXTERNAL PATCH    Place 1 patch onto the skin daily Place on painful area, left shoulder    METOPROLOL (TOPROL-XL) 100 MG XL TABLET    Take 100 mg by mouth daily.     MULTIPLE VITAMINS-MINERALS (THERAPEUTIC MULTIVITAMIN-MINERALS) TABLET    Take 1 tablet by mouth daily. NITROGLYCERIN (NITROSTAT) 0.4 MG SL TABLET    Place 0.4 mg under the tongue every 5 minutes as needed for Chest pain up to max of 3 total doses. If no relief after 1 dose, call 911. NYSTATIN (MYCOSTATIN) 819472 UNIT/GM CREAM    Apply topically 2 times daily Apply topically 2 times daily. NYSTATIN (NYAMYC) 916699 UNIT/GM POWDER    APPLY TO AFFECTED AREA(S) FOUR TIMES A DAY    OLOPATADINE (PATANOL) 0.1 % OPHTHALMIC SOLUTION    Place 1 drop into both eyes as needed     PRAMIPEXOLE (MIRAPEX) 0.125 MG TABLET    Take 0.125 mg by mouth daily    VITAMIN B-12 (CYANOCOBALAMIN) 500 MCG TABLET    Take 500 mcg by mouth daily      ALLERGIES     is allergic to pcn [penicillins], heparin, lamotrigine, and cyclobenzaprine. FAMILY HISTORY     She indicated that her mother is . She indicated that her father is . She indicated that her sister is alive. She indicated that her brother is alive. She indicated that her maternal grandmother is . She indicated that her son is alive. SOCIAL HISTORY       Social History     Tobacco Use    Smoking status: Former Smoker     Packs/day: 1.00     Years: 30.00     Pack years: 30.00     Types: Cigarettes     Quit date: 3/19/2004     Years since quittin.7    Smokeless tobacco: Never Used   Vaping Use    Vaping Use: Never used   Substance Use Topics    Alcohol use: Yes     Comment: socially, once a year    Drug use: No       I personally evaluated and examined the patient in conjunction with the APC and agree with the assessment, treatment plan, and disposition of the patient as recorded by the APC. Ana Connors MD  The care is provided during an unprecedented national emergency due to the novel coronavirus, COVID 19.   Attending Emergency Physician          Ana Connors MD  [phone]

## 2021-12-06 ENCOUNTER — OFFICE VISIT (OUTPATIENT)
Dept: ORTHOPEDIC SURGERY | Age: 71
End: 2021-12-06
Payer: MEDICARE

## 2021-12-06 VITALS — BODY MASS INDEX: 31.65 KG/M2 | HEIGHT: 65 IN | WEIGHT: 190 LBS

## 2021-12-06 DIAGNOSIS — S52.502A CLOSED FRACTURE OF DISTAL END OF LEFT RADIUS, UNSPECIFIED FRACTURE MORPHOLOGY, INITIAL ENCOUNTER: Primary | ICD-10-CM

## 2021-12-06 PROCEDURE — 99213 OFFICE O/P EST LOW 20 MIN: CPT | Performed by: ORTHOPAEDIC SURGERY

## 2021-12-06 RX ORDER — HYDROCODONE BITARTRATE AND ACETAMINOPHEN 5; 325 MG/1; MG/1
1 TABLET ORAL EVERY 6 HOURS PRN
Qty: 10 TABLET | Refills: 0 | Status: SHIPPED | OUTPATIENT
Start: 2021-12-06 | End: 2021-12-09

## 2021-12-06 NOTE — LETTER
12/6/2021    [place], [place] [address]    RE: Shelia Welch    Dear Dr. Lisy Freedman,    Thank you for allowing me to participate in the care of Ms. Mora. I had the opportunity to evaluate the patient on 12/6/2021. Attached you will find my evaluation and recommendations. Thanks again for the confidence you have expressed in me by allowing my participation in the care of your patient. I will keep you apprised of further developments in the patients treatment course as it progresses. If I can be of further assistance in any fashion, please feel free to contact me at your convenience.     Sincerely,         Ernesto Morales  Shoulder and Elbow Surgery

## 2021-12-06 NOTE — PROGRESS NOTES
ORTHOPEDIC PATIENT EVALUATION      HPI / Chief Complaint  Marilyn Noland is a 70 y.o. right-hand-dominant female presenting for evaluation of her left wrist.  On 12/2/2021 she indicates that she slipped and fell down a couple of steps and landed on her tailbone and left wrist.  She had immediate pain in her left wrist and was seen in emergency department where she was diagnosed with a distal radius fracture. She was placed in a sling and presents today for further evaluation and treatment. Her pain is primarily localized to the wrist.  She denies having any associated numbness or tingling. She lives alone with her son. Past Medical History  Marilyn  has a past medical history of Aortic valve stenosis, Arthritis, CAD (coronary artery disease), Diabetes mellitus ([address] Heart murmur, Hyperlipidemia, MI, old, Pulmonary stenosis, Pulmonary valve stenosis, Sciatica, Thyroid disease, and Wears glasses. Past Surgical History  Marilyn  has a past surgical history that includes Hysterectomy (01/01/1980); Salpingo-oophorectomy (Left, 01/01/1987); shoulder surgery (Left, 01/01/1993); cervical fusion (01/01/1993); joint replacement (Bilateral, 01/01/1994); Coronary angioplasty with stent (01/01/2000); Finger surgery (Left, 03/21/2014); other surgical history (Right, 09/23/2014); Toe Osteotomy (Right, 06/08/2016); Cystoscopy (Left, 06/21/2021); Cystocopy (09/24/2021); and Bladder surgery (Left, 9/24/2021). Current Medications  Current Outpatient Medications   Medication Sig Dispense Refill    HYDROcodone-acetaminophen (NORCO) 5-325 MG per tablet Take 1 tablet by mouth every 6 hours as needed for Pain for up to 3 days. Intended supply: 3 days. Take lowest dose possible to manage pain 10 tablet 0    [START ON 12/7/2021] HYDROcodone-acetaminophen (NORCO) 5-325 MG per tablet Take 1 tablet by mouth every 8 hours as needed for Pain for up to 30 days.  90 tablet 0    Ascorbic Acid (VITAMIN C PLUS WILD DON HIPS) 500 MG CHEW Take by mouth      LEVOTHYROXINE SODIUM PO Take 115 mcg by mouth daily      pramipexole (MIRAPEX) 0.125 MG tablet Take 0.125 mg by mouth daily      olopatadine (PATANOL) 0.1 % ophthalmic solution Place 1 drop into both eyes as needed       FLUoxetine (PROZAC) 10 MG capsule Take 1 capsule by mouth daily 30 capsule 3    amLODIPine (NORVASC) 10 MG tablet Take 1 tablet by mouth daily 30 tablet 3    ferrous sulfate (IRON 325) 325 (65 Fe) MG tablet Take 1 tablet by mouth daily (with breakfast) 30 tablet 3    buPROPion (WELLBUTRIN XL) 300 MG extended release tablet TAKE ONE TABLET BY MOUTH DAILY      nystatin (NYAMYC) 276039 UNIT/GM powder APPLY TO AFFECTED AREA(S) FOUR TIMES A DAY      busPIRone (BUSPAR) 15 MG tablet Take 15 mg by mouth 2 times daily      isosorbide mononitrate (IMDUR) 30 MG extended release tablet Take 1 tablet by mouth daily 30 tablet 3    nystatin (MYCOSTATIN) 823391 UNIT/GM cream Apply topically 2 times daily Apply topically 2 times daily.  atorvastatin (LIPITOR) 40 MG tablet       nitroGLYCERIN (NITROSTAT) 0.4 MG SL tablet Place 0.4 mg under the tongue every 5 minutes as needed for Chest pain up to max of 3 total doses. If no relief after 1 dose, call 911.  Multiple Vitamins-Minerals (THERAPEUTIC MULTIVITAMIN-MINERALS) tablet Take 1 tablet by mouth daily.  aspirin 81 MG EC tablet Take 81 mg by mouth daily. LAST DOSE 9/8/14      metoprolol (TOPROL-XL) 100 MG XL tablet Take 100 mg by mouth daily.  vitamin B-12 (CYANOCOBALAMIN) 500 MCG tablet Take 500 mcg by mouth daily        No current facility-administered medications for this visit. Allergies  Allergies have been reviewed. Marilyn is allergic to pcn [penicillins], heparin, lamotrigine, and cyclobenzaprine. Social History  Marilyn  reports that she quit smoking about 17 years ago. Her smoking use included cigarettes. She has a 30.00 pack-year smoking history.  She has never used smokeless tobacco. She reports current alcohol use. She reports that she does not use drugs. Family History  Marilyn's family history includes Asthma in her sister and son; Breast Cancer (age of onset: 62) in her mother; Diabetes in her brother and sister; Heart Disease in her father; Other in her father; Stroke in her brother and maternal grandmother. Review of Systems   History obtained from the patient. REVIEW OF SYSTEMS:   Constitution: negative for fever, chills, weight loss or malaise   Musculoskeletal: As noted in the HPI   Neurologic: As noted in the HPI    Physical Exam  Ht 5' 5\" (1.651 m)   Wt 190 lb (86.2 kg)   LMP 03/19/1980   BMI 31.62 kg/m²    General Appearance: alert, well appearing, and in no distress  Mental Status: alert, oriented to person, place, and time  Evaluation patient's left wrist and upper extremity demonstrates intact skin with moderate swelling at the wrist extending into her hand and fingers. She has limited active flexion and extension through her fingers as a result of pain. Sensation is grossly intact light touch diffusely. She has a 2+ radial pulse with brisk cap refill in her fingers. Imaging Studies  X-rays of the left wrist completed on 12/3/2021 were reviewed demonstrating what appears to be a radial styloid fracture with some displacement. No obvious dislocation or subluxation noted. Assessment and Plan  Marilyn Barrera is a 70 y.o. old female with a closed left distal radius fracture primarily involving the radial styloid. There is some displacement as outlined above. I had a discussion with the patient educating her about this condition as well as treatment options available to her including nonoperative and operative intervention. I believe that she would benefit from surgical stabilization of this fracture by way of an open reduction internal fixation and so I would like her to be seen by our orthopedic traumatologist Dr. Flip Nidaye for this procedure.   A referral was made  And we will facilitate her getting an appointment as soon as possible. I will have her follow-up my clinic as needed but she was certainly encouraged to return or call at anytime with questions or concerns. This note is created with the assistance of a speech recognition program.  While intending to generate adocument that actually reflects the content of the visit, the document can still have some errors including those of syntax and sound a like substitutions which may escape proof reading. It such instances, actual meaningcan be extrapolated by contextual diversion.

## 2021-12-07 ENCOUNTER — HOSPITAL ENCOUNTER (OUTPATIENT)
Dept: PREADMISSION TESTING | Age: 71
Setting detail: OUTPATIENT SURGERY
Discharge: HOME | End: 2021-12-11
Payer: MEDICARE

## 2021-12-07 ENCOUNTER — OFFICE VISIT (OUTPATIENT)
Dept: ORTHOPEDIC SURGERY | Age: 71
End: 2021-12-07
Payer: MEDICARE

## 2021-12-07 ENCOUNTER — HOSPITAL ENCOUNTER (OUTPATIENT)
Dept: GENERAL RADIOLOGY | Age: 71
Discharge: HOME | End: 2021-12-09
Payer: MEDICARE

## 2021-12-07 VITALS
TEMPERATURE: 96.8 F | RESPIRATION RATE: 18 BRPM | DIASTOLIC BLOOD PRESSURE: 73 MMHG | HEART RATE: 91 BPM | HEIGHT: 65 IN | SYSTOLIC BLOOD PRESSURE: 144 MMHG | WEIGHT: 195 LBS | BODY MASS INDEX: 32.49 KG/M2 | OXYGEN SATURATION: 97 %

## 2021-12-07 DIAGNOSIS — Z01.818 PRE-OP TESTING: ICD-10-CM

## 2021-12-07 DIAGNOSIS — Z01.818 PRE-OP TESTING: Primary | ICD-10-CM

## 2021-12-07 DIAGNOSIS — S52.502A CLOSED FRACTURE OF DISTAL END OF LEFT RADIUS, UNSPECIFIED FRACTURE MORPHOLOGY, INITIAL ENCOUNTER: Primary | ICD-10-CM

## 2021-12-07 LAB
-: NORMAL
ALBUMIN SERPL-MCNC: 4.1 G/DL (ref 3.5–5.2)
ALBUMIN/GLOBULIN RATIO: 1.2 (ref 1–2.5)
ALP BLD-CCNC: 91 U/L (ref 35–104)
ALT SERPL-CCNC: 12 U/L (ref 5–33)
AMORPHOUS: NORMAL
ANION GAP SERPL CALCULATED.3IONS-SCNC: 16 MMOL/L (ref 9–17)
AST SERPL-CCNC: 15 U/L
BACTERIA: NORMAL
BILIRUB SERPL-MCNC: 0.28 MG/DL (ref 0.3–1.2)
BILIRUBIN URINE: NEGATIVE
BUN BLDV-MCNC: 34 MG/DL (ref 8–23)
BUN/CREAT BLD: ABNORMAL (ref 9–20)
CALCIUM SERPL-MCNC: 9.8 MG/DL (ref 8.6–10.4)
CASTS UA: NORMAL /LPF (ref 0–8)
CHLORIDE BLD-SCNC: 96 MMOL/L (ref 98–107)
CO2: 24 MMOL/L (ref 20–31)
COLOR: YELLOW
COMMENT UA: ABNORMAL
CREAT SERPL-MCNC: 2.76 MG/DL (ref 0.5–0.9)
CRYSTALS, UA: NORMAL /HPF
EPITHELIAL CELLS UA: NORMAL /HPF (ref 0–5)
GFR AFRICAN AMERICAN: 21 ML/MIN
GFR NON-AFRICAN AMERICAN: 17 ML/MIN
GFR SERPL CREATININE-BSD FRML MDRD: ABNORMAL ML/MIN/{1.73_M2}
GFR SERPL CREATININE-BSD FRML MDRD: ABNORMAL ML/MIN/{1.73_M2}
GLUCOSE BLD-MCNC: 200 MG/DL (ref 70–99)
GLUCOSE URINE: NEGATIVE
HCT VFR BLD CALC: 34.5 % (ref 36.3–47.1)
HEMOGLOBIN: 10.8 G/DL (ref 11.9–15.1)
KETONES, URINE: ABNORMAL
LEUKOCYTE ESTERASE, URINE: ABNORMAL
MCH RBC QN AUTO: 29.8 PG (ref 25.2–33.5)
MCHC RBC AUTO-ENTMCNC: 31.3 G/DL (ref 28.4–34.8)
MCV RBC AUTO: 95 FL (ref 82.6–102.9)
MUCUS: NORMAL
NITRITE, URINE: NEGATIVE
NRBC AUTOMATED: 0 PER 100 WBC
OTHER OBSERVATIONS UA: NORMAL
PDW BLD-RTO: 13.5 % (ref 11.8–14.4)
PH UA: 5 (ref 5–8)
PLATELET # BLD: 323 K/UL (ref 138–453)
PMV BLD AUTO: 10.3 FL (ref 8.1–13.5)
POTASSIUM SERPL-SCNC: 4.8 MMOL/L (ref 3.7–5.3)
PROTEIN UA: ABNORMAL
RBC # BLD: 3.63 M/UL (ref 3.95–5.11)
RBC UA: NORMAL /HPF (ref 0–4)
RENAL EPITHELIAL, UA: NORMAL /HPF
SODIUM BLD-SCNC: 136 MMOL/L (ref 135–144)
SPECIFIC GRAVITY UA: 1.02 (ref 1–1.03)
TOTAL PROTEIN: 7.5 G/DL (ref 6.4–8.3)
TRICHOMONAS: NORMAL
TURBIDITY: CLEAR
URINE HGB: NEGATIVE
UROBILINOGEN, URINE: NORMAL
WBC # BLD: 10.4 K/UL (ref 3.5–11.3)
WBC UA: NORMAL /HPF (ref 0–5)
YEAST: NORMAL

## 2021-12-07 PROCEDURE — 99213 OFFICE O/P EST LOW 20 MIN: CPT | Performed by: STUDENT IN AN ORGANIZED HEALTH CARE EDUCATION/TRAINING PROGRAM

## 2021-12-07 PROCEDURE — 36415 COLL VENOUS BLD VENIPUNCTURE: CPT

## 2021-12-07 PROCEDURE — 81001 URINALYSIS AUTO W/SCOPE: CPT

## 2021-12-07 PROCEDURE — 71046 X-RAY EXAM CHEST 2 VIEWS: CPT

## 2021-12-07 PROCEDURE — 80053 COMPREHEN METABOLIC PANEL: CPT

## 2021-12-07 PROCEDURE — 85027 COMPLETE CBC AUTOMATED: CPT

## 2021-12-07 PROCEDURE — 93005 ELECTROCARDIOGRAM TRACING: CPT

## 2021-12-07 RX ORDER — OXYBUTYNIN CHLORIDE 10 MG/1
10 TABLET, EXTENDED RELEASE ORAL DAILY
COMMUNITY
Start: 2021-11-12 | End: 2022-01-11

## 2021-12-07 ASSESSMENT — PAIN DESCRIPTION - PAIN TYPE: TYPE: ACUTE PAIN

## 2021-12-07 ASSESSMENT — PAIN DESCRIPTION - LOCATION: LOCATION: ARM;SHOULDER

## 2021-12-07 ASSESSMENT — PAIN DESCRIPTION - ORIENTATION: ORIENTATION: LEFT

## 2021-12-07 NOTE — PROGRESS NOTES
[facility]  [facility]  [address] [facility] [address]  Dept: [phone]    Orthopaedic Trauma New Patient      CHIEF COMPLAINT:    Chief Complaint   Patient presents with    Wrist Injury     left wrist 12/2/21       HISTORY OF PRESENT ILLNESS:    The patient is a RHD 70 y.o. female who is being seen as a new patient for left wrist pain after sustaining a fall 5 days ago. She states that she was attempting to take down her American flag outside of her house and had a fall. She initially was taken to [facility] emergency department where an attempted closed reduction was performed. Patient was splinted and instructed to follow-up with Dr. Bandar Mckeon. After seeing Dr. Bandar Mckeon he was concerned that this may need operative fixation and was referred to our clinic today. Patient states that she lives independently and cares for herself. She is currently retired as an occupation. She notes some dysesthesias in the second and third digits. Patient does admit to prior carpal tunnel release bilaterally. She has had no prior fractures to the left upper extremity.       Past Medical History:    Past Medical History:   Diagnosis Date    Aortic valve stenosis     mild per chart    Arthritis     CAD (coronary artery disease) 2000    1 STENT, Dr. Jame Cleaning, [place]    Diabetes mellitus [facility])     Dr. Zen Gonzalez    Heart murmur     1962    Hyperlipidemia 2004    ON RX    MI, old     states many and they were mild    Pulmonary stenosis 1995    Pulmonary valve stenosis     mild/congenital per chart    Sciatica     right leg    Thyroid disease 2004    HYPOTHYROIDISM ON RX    Wears glasses        Past Surgical History:    Past Surgical History:   Procedure Laterality Date    BLADDER SURGERY Left 9/24/2021    CYSTOSCOPY RETROGRADE PYELOGRAM,  URETERAL STENT REMOVAL performed by Rhona Urena MD at [address] [place] [address]  01/01/1993   [place] [address] 01/01/2000    1 STENT    CYSTOSCOPY Left 06/21/2021    CYSTOSCOPY URETERAL STENT INSERTION performed by Patience Dunbar [facility] at [facility]  09/24/2021    CYSTOSCOPY RETROGRADE PYELOGRAM,  URETERAL STENT REMOVAL left    FINGER SURGERY Left 03/21/2014    [place] ARTHOPLASTY LEFT THUMB    HYSTERECTOMY  01/01/1980    WITH RT SALPINGOOPHERECTOMY    JOINT REPLACEMENT Bilateral 01/01/1994    PARTIAL-KNEES    OTHER SURGICAL HISTORY Right 09/23/2014    thumb repair    SALPINGO-OOPHORECTOMY Left 01/01/1987    SHOULDER SURGERY Left 01/01/1993    SPURS    TOE OSTEOTOMY Right 06/08/2016    Right 5th digit adductory wedge osteotomy with K wire fixation        Current Medications:   Current Outpatient Medications   Medication Sig Dispense Refill    HYDROcodone-acetaminophen (NORCO) 5-325 MG per tablet Take 1 tablet by mouth every 6 hours as needed for Pain for up to 3 days. Intended supply: 3 days. Take lowest dose possible to manage pain 10 tablet 0    HYDROcodone-acetaminophen (NORCO) 5-325 MG per tablet Take 1 tablet by mouth every 8 hours as needed for Pain for up to 30 days.  90 tablet 0    Ascorbic Acid (VITAMIN C PLUS WILD DON HIPS) 500 MG CHEW Take by mouth      LEVOTHYROXINE SODIUM PO Take 115 mcg by mouth daily      pramipexole (MIRAPEX) 0.125 MG tablet Take 0.125 mg by mouth daily      olopatadine (PATANOL) 0.1 % ophthalmic solution Place 1 drop into both eyes as needed       FLUoxetine (PROZAC) 10 MG capsule Take 1 capsule by mouth daily 30 capsule 3    amLODIPine (NORVASC) 10 MG tablet Take 1 tablet by mouth daily 30 tablet 3    ferrous sulfate (IRON 325) 325 (65 Fe) MG tablet Take 1 tablet by mouth daily (with breakfast) 30 tablet 3    buPROPion (WELLBUTRIN XL) 300 MG extended release tablet TAKE ONE TABLET BY MOUTH DAILY      nystatin (NYAMYC) 285495 UNIT/GM powder APPLY TO AFFECTED AREA(S) FOUR TIMES A DAY      busPIRone (BUSPAR) 15 MG tablet Take 15 mg by mouth 2 times daily      isosorbide mononitrate (IMDUR) 30 MG extended release tablet Take 1 tablet by mouth daily 30 tablet 3    nystatin (MYCOSTATIN) 390549 UNIT/GM cream Apply topically 2 times daily Apply topically 2 times daily.  atorvastatin (LIPITOR) 40 MG tablet       nitroGLYCERIN (NITROSTAT) 0.4 MG SL tablet Place 0.4 mg under the tongue every 5 minutes as needed for Chest pain up to max of 3 total doses. If no relief after 1 dose, call 911.  Multiple Vitamins-Minerals (THERAPEUTIC MULTIVITAMIN-MINERALS) tablet Take 1 tablet by mouth daily.  aspirin 81 MG EC tablet Take 81 mg by mouth daily. LAST DOSE 14      metoprolol (TOPROL-XL) 100 MG XL tablet Take 100 mg by mouth daily.  vitamin B-12 (CYANOCOBALAMIN) 500 MCG tablet Take 500 mcg by mouth daily        No current facility-administered medications for this visit. Allergies:    Pcn [penicillins], Heparin, Lamotrigine, and Cyclobenzaprine    Social History:   Social History     Socioeconomic History    Marital status:       Spouse name: Not on file    Number of children: 1    Years of education: Not on file    Highest education level: Not on file   Occupational History    Not on file   Tobacco Use    Smoking status: Former Smoker     Packs/day: 1.00     Years: 30.00     Pack years: 30.00     Types: Cigarettes     Quit date: 3/19/2004     Years since quittin.7    Smokeless tobacco: Never Used   Vaping Use    Vaping Use: Never used   Substance and Sexual Activity    Alcohol use: Yes     Comment: socially, once a year    Drug use: No    Sexual activity: Not Currently   Other Topics Concern    Not on file   Social History Narrative    Not on file     Social Determinants of Health     Financial Resource Strain:     Difficulty of Paying Living Expenses: Not on file   Food Insecurity:     Worried About Running Out of Food in the Last Year: Not on file    Marielle of Food in the Last Year: Not on file   Transportation Needs:     Lack of Transportation (Medical): Not on file    Lack of Transportation (Non-Medical): Not on file   Physical Activity:     Days of Exercise per Week: Not on file    Minutes of Exercise per Session: Not on file   Stress:     Feeling of Stress : Not on file   Social Connections:     Frequency of Communication with Friends and Family: Not on file    Frequency of Social Gatherings with Friends and Family: Not on file    Attends Scientologist Services: Not on file    Active Member of [address] [place] [address] Fam[employer] or Organizations: Not on file    Attends Club or Organization Meetings: Not on file    Marital Status: Not on file   Intimate Partner Violence:     Fear of Current or Ex-Partner: Not on file    Emotionally Abused: Not on file    Physically Abused: Not on file    Sexually Abused: Not on file   Housing Stability:     Unable to Pay for Housing in the Last Year: Not on file    Number of [place] in the Last Year: Not on file    Unstable Housing in the Last Year: Not on file       Family History:  Family History   Problem Relation Age of Onset    Breast Cancer Mother 62        BREAST WITH METS T  LIVER AND LUNG    Other Father         AAA    Heart Disease Father     Asthma Sister     Diabetes Sister         NIDDM    Stroke Brother     Diabetes Brother         NIDDM    Stroke Maternal Grandmother     Asthma Son          REVIEW OF SYSTEMS:  Review of Systems    Gen: no fever, chills, malaise  CV: no chest pain or palpitations  Resp: no cough or shortness of breath  GI: no nausea, vomiting, diarrhea, or constipation  Neuro: no seizures, vertigo, or headaches  Msk: Left wrist pain  10 remaining systems reviewed and negative      PHYSICAL EXAM:  [facility] 03/19/1980 There is no height or weight on file to calculate BMI. Physical Exam  Gen: alert and oriented, no acute distress  Psych: Appropriate affect; Appropriate knowledge base; Appropriate mood;  No hallucinations  Head: normocephalic atraumatic   Chest: symmetric chest excursion  Ortho Exam  MSK: Left upper extremity: Splint in place. Fingers appear to be swollen. Gross sensation is intact to all digits. Patient is able to flex and extend all digits without restriction. No tenderness to the shoulder or arm. Fingers are warm and well-perfused with BCR. Radiology:   No new radiographs taken at today's visit. Radiographs from 12/3/2021 were reviewed which did demonstrate a radial styloid fracture with residual dorsal dislocation after attempted reduction. ASSESSMENT:  70 y.o. female with left distal radius fracture    PLAN:  Had a long discussion with the patient about the nature and etiology of her left distal radius fracture. Discussed treatment options both conservative nonoperative management as well as surgical management of her left distal radius fracture. Due to the persistent subluxation of the fracture discussed that this would likely be unstable and cause worsening deformity and minimal function of the wrist if treated conservatively. At this time we are recommending surgical intervention for open reduction internal fixation of the left distal radius with a dorsal spanning plate. Patient was in agreement to move forward with surgical intervention. We will plan for operative intervention later this week. All questions were addressed and informed consent was then signed at today's visit. We will plan for follow-up 2 weeks postop. Electronically signed by Cassidy Leblanc DO on 12/7/2021 at 9:29 AM    This note is created with the assistance of a speech recognition program.  While intending to generate a document that actually reflects the content of the visit, the document can still have some errors including those of syntax and sound a like substitutions which may escape proof reading.   In such instances, actual meaning can be extrapolated by contextual diversion

## 2021-12-07 NOTE — H&P
History and Physical    Pt Name: Jeanne Blanca  MRN: [id number]  YOB: 1950  Date of evaluation: 12/7/2021  Primary Care Physician: Lynne Stewart DO    SUBJECTIVE:   History of Chief Complaint:    Marilyn Page is a 70 y.o. female who presents for PAT appointment. Patient complains of left distal radius fracture. She complains of left wrist pain after sustaining a fall 5 days ago. She states it was after she took down melvin American flag outside of her house and had a fall, landing on her left wrist and bottom. She initially was taken to [facility] emergency department where an attempted closed reduction was performed. Patient was splinted and instructed to follow-up with Dr. Patel Fischer. After seeing Dr. Patel Fischer he was concerned that this may need operative fixation and was referred to Dr. Milli Ott. Patient has been scheduled for ORIF DISTAL RADIUS- LEFT. She rates her left wrist pain an 8/10. Allergies  is allergic to pcn [penicillins], heparin, lamotrigine, and cyclobenzaprine. Medications  Prior to Admission medications    Medication Sig Start Date End Date Taking?  Authorizing Provider   Ascorbic Acid (VITAMIN C PLUS WILD DON HIPS) 500 MG CHEW Take 1 tablet by mouth daily    Yes Historical Provider, MD   LEVOTHYROXINE SODIUM PO Take 112 mcg by mouth daily    Yes Historical Provider, MD   pramipexole (MIRAPEX) 0.125 MG tablet Take 0.125 mg by mouth daily   Yes Historical Provider, MD   olopatadine (PATANOL) 0.1 % ophthalmic solution Place 1 drop into both eyes as needed  7/28/21  Yes Historical Provider, MD   FLUoxetine (PROZAC) 10 MG capsule Take 1 capsule by mouth daily 7/24/21  Yes Jhoan Zamora MD   amLODIPine (NORVASC) 10 MG tablet Take 1 tablet by mouth daily 7/3/21  Yes Salbador Contreras MD   ferrous sulfate (IRON 325) 325 (65 Fe) MG tablet Take 1 tablet by mouth daily (with breakfast) 7/3/21  Yes Salbador Contreras MD   buPROPion (WELLBUTRIN XL) 300 MG extended release tablet TAKE ONE TABLET BY MOUTH DAILY 11/16/20  Yes Historical Provider, MD   nystatin ([address]) 732401 UNIT/GM powder APPLY TO AFFECTED AREA(S) FOUR TIMES A DAY 9/22/20  Yes Historical Provider, MD   busPIRone (BUSPAR) 15 MG tablet Take 15 mg by mouth 2 times daily 11/9/20  Yes Historical Provider, MD   isosorbide mononitrate (IMDUR) 30 MG extended release tablet Take 1 tablet by mouth daily 4/25/18  Yes Gustavo Bran MD   atorvastatin (LIPITOR) 40 MG tablet Take 40 mg by mouth daily  7/2/17  Yes Historical Provider, MD   nitroGLYCERIN (NITROSTAT) 0.4 MG SL tablet Place 0.4 mg under the tongue every 5 minutes as needed for Chest pain up to max of 3 total doses. If no relief after 1 dose, call 911. Yes Historical Provider, MD   Multiple Vitamins-Minerals (THERAPEUTIC MULTIVITAMIN-MINERALS) tablet Take 1 tablet by mouth daily. Yes Historical Provider, MD   aspirin 81 MG EC tablet Take 81 mg by mouth daily. LAST DOSE 9/8/14   Yes Historical Provider, MD   metoprolol (TOPROL-XL) 100 MG XL tablet Take 100 mg by mouth daily. Yes Historical Provider, MD   vitamin B-12 (CYANOCOBALAMIN) 500 MCG tablet Take 500 mcg by mouth daily    Yes Historical Provider, MD   oxybutynin (DITROPAN-XL) 10 MG extended release tablet Take 10 mg by mouth daily 11/12/21   Historical Provider, MD   HYDROcodone-acetaminophen (NORCO) 5-325 MG per tablet Take 1 tablet by mouth every 6 hours as needed for Pain for up to 3 days. Intended supply: 3 days. Take lowest dose possible to manage pain 12/6/21 12/9/21  Sonia Holden MD   HYDROcodone-acetaminophen (NORCO) 5-325 MG per tablet Take 1 tablet by mouth every 8 hours as needed for Pain for up to 30 days. 12/7/21 1/6/22  Wendell Goldberg Vriezelaar, APRN - CNP   nystatin (MYCOSTATIN) 696595 UNIT/GM cream Apply topically 2 times daily Apply topically 2 times daily.     Historical Provider, MD     Past Medical History    has a past medical history of Aortic valve stenosis, Arthritis, CAD (coronary artery disease), Chronic kidney disease, Diabetes mellitus ([facility] [address] GERD (gastroesophageal reflux disease), Heart murmur, History of echocardiogram, History of kidney stones, Hyperlipidemia, MI, old, Pulmonary stenosis, Pulmonary valve stenosis, Sciatica, Thyroid disease, Under care of team, Under care of team, Under care of team, and Wears glasses. Past Surgical History   has a past surgical history that includes Hysterectomy (1980); Salpingo-oophorectomy (Left, 1987); shoulder surgery (Left, 1993); cervical fusion (1993); joint replacement (Bilateral, 1994); Coronary angioplasty with stent (2000); Finger surgery (Left, 2014); other surgical history (Right, 2014); Toe Osteotomy (Right, 2016); Cystoscopy (Left, 2021); Cystocopy (2021); Bladder surgery (Left, 2021); Carpal tunnel release; and cardiovascular stress test (). Social History   reports that she quit smoking about 17 years ago. Her smoking use included cigarettes. She has a 30.00 pack-year smoking history. She has never used smokeless tobacco.    reports current alcohol use. reports no history of drug use. 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     Review of Systems:  CONSTITUTIONAL:   negative for fevers, chills, fatigue and malaise    EYES:   negative for double vision, blurred vision and photophobia +glasses   HEENT:   negative for tinnitus, epistaxis and sore throat     RESPIRATORY:   negative for cough, shortness of breath, wheezing     CARDIOVASCULAR:   negative for chest pain, palpitations, syncope, edema     GASTROINTESTINAL:   negative for nausea, vomiting     GENITOURINARY:   negative for incontinence     MUSCULOSKELETAL:   See HPI   NEUROLOGICAL:   Negative for weakness and tingling  negative for headaches and dizziness     PSYCHIATRIC:   negative for anxiety       OBJECTIVE:   VITALS:  height is 5' 5\" (1.651 m) and weight is 195 lb (88.5 kg). Her temporal temperature is 96.8 °F (36 °C). Her blood pressure is 144/73 (abnormal) and her pulse is 91. Her respiration is 18 and oxygen saturation is 97%. CONSTITUTIONAL:alert & oriented x 3, no acute distress. Friendly. SKIN:  Warm and dry, no rashes on exposed areas of skin   HEAD:  Normocephalic, atraumatic   EYES: EOMs intact. PERRL. Wearing glasses. EARS:  Equal bilaterally, no edema or thickening, skin is intact without lumps or lesions. No discharge. Hearing grossly WNL. NOSE:  Nares patent. No rhinorrhea   MOUTH/THROAT:  Mucous membranes moist, tongue is pink, uvula midline, teeth appear to be intact  NECK:supple, full ROM  LUNGS: Respirations even and non-labored. Clear to auscultation bilaterally, no wheezes, rales, or rhonchi. CARDIOVASCULAR: Regular rate and rhythm, no murmurs/rubs/gallops   ABDOMEN: soft, non-tender, non-distended, bowel sounds active x 4   EXTREMITIES: No edema bilateral lower extremities. + varicosities bilateral lower extremities. Left wrist splinted, exposed fingers warm to touch and sensation intact. NEUROLOGIC: CN II-XII are grossly intact. Gait not assessed, in W/C today.      Testing:   EK21  Labs pending: drawn 2021   Chest XRay:  21  IMPRESSIONS:   Left distal radius fracture  PLANS:   ORIF DISTAL RADIUS- LEFT    QI BLANCAS CNP  Electronically signed 2021 at 12:49 PM

## 2021-12-07 NOTE — PROGRESS NOTES
Anesthesia Focused Assessment    Hx of anesthesia complications:  no  Family hx of anesthesia complications:  no      Prior + Covid-19 test? no      STOP-BANG Sleep Apnea Questionnaire    SNORE loudly (heard through closed doors)? No  TIRED, fatigued, sleepy during daytime? No  OBSERVED stopping breathing during sleep? No  High blood PRESSURE or being treated? No    BMI over 35? No  AGE over 48? Yes  NECK circumference over 16\"? No  GENDER (male)? No             Total one  High risk 5-8  Intermediate risk 3-4  Low risk 0-2    ----------------------------------------------------------------------------------------------------------------------  SELVIN:                                             no  If yes, machine?:                              Type 1 DM:                                   no  T2DM:                                           yes    Coronary Artery Disease:             Hx Stent x 1 in 2000. States used to follow with Dr. Brandt Coleman, but not in a while \"over 6 months\". [facility] She denies cardiopulmonary complaints. EKG today NSR. Stress from 2019:  Narrative    · Normally contractile left ventricle. · No evidence of ischemia or infarct marked    Echo from 6/20/21:  Summary  Left ventricle is normal in size and wall thickness. Global left ventricular systolic function is normal. Estimated LV EF 65-70  %. No obvious wall motion abnormality seen. Evidence of moderate (grade II)  diastolic dysfunction. Both atria are normal in size. Normal right ventricular size and function. Mild tricuspid regurgitation. Mild pulmonary hypertension.  Estimated right ventricular systolic pressure  is 28YJLU.     Signature  ----------------------------------------------------------------------------   Electronically signed by Pohorecki,Hailee(Sonographer) on 06/23/2021 02:38   PM      Hypertension:                               No, on RX  Defib / AICD / Pacemaker:           no    Renal Failure:                               no  If yes, on dialysis? :                           Active smoker:                              no  Drinks Alcohol:                              occ  Illicit drugs:                                    no    Dentition:                                      Benign     Past Medical History:   Diagnosis Date    Aortic valve stenosis     mild per chart    Arthritis     CAD (coronary artery disease) 2000    1 STENT, Dr. Sandeep Pop, [place]    Diabetes mellitus [facility])     Dr. Nasreen Spencer    Heart murmur     1962    Hyperlipidemia 2004    ON RX    MI, old     states many and they were mild    Pulmonary stenosis 1995    Pulmonary valve stenosis     mild/congenital per chart    Sciatica     right leg    Thyroid disease 2004    HYPOTHYROIDISM ON RX    Wears glasses          Patient was evaluated in PAT & anesthesia guidelines were applied. NPO guidelines, medication instructions and scheduled arrival time were reviewed with patient. Anesthesia contacted:   yes  I spoke with Dr. Tapan Mcpherson today 12/8/21. Patient history and pertinent findings reviewed (as documented above). Instructed to obtain  clearance from cardiac clearance from Dr. Ginger Jaimes. Medical or cardiac clearance ordered: cardiac.     QI Jang - CNP   12/7/21  10:54 AM

## 2021-12-08 LAB
EKG ATRIAL RATE: 78 BPM
EKG P AXIS: 60 DEGREES
EKG P-R INTERVAL: 158 MS
EKG Q-T INTERVAL: 404 MS
EKG QRS DURATION: 78 MS
EKG QTC CALCULATION (BAZETT): 460 MS
EKG R AXIS: 81 DEGREES
EKG T AXIS: 38 DEGREES
EKG VENTRICULAR RATE: 78 BPM

## 2021-12-08 PROCEDURE — 93010 ELECTROCARDIOGRAM REPORT: CPT | Performed by: INTERNAL MEDICINE

## 2021-12-10 ENCOUNTER — HOSPITAL ENCOUNTER (OUTPATIENT)
Age: 71
Setting detail: OUTPATIENT SURGERY
Discharge: HOME | End: 2021-12-10
Attending: ORTHOPAEDIC SURGERY | Admitting: ORTHOPAEDIC SURGERY
Payer: MEDICARE

## 2021-12-10 ENCOUNTER — APPOINTMENT (OUTPATIENT)
Dept: GENERAL RADIOLOGY | Age: 71
End: 2021-12-10
Attending: ORTHOPAEDIC SURGERY
Payer: MEDICARE

## 2021-12-10 ENCOUNTER — ANESTHESIA EVENT (OUTPATIENT)
Dept: OPERATING ROOM | Age: 71
End: 2021-12-10
Payer: MEDICARE

## 2021-12-10 ENCOUNTER — ANESTHESIA (OUTPATIENT)
Dept: OPERATING ROOM | Age: 71
End: 2021-12-10
Payer: MEDICARE

## 2021-12-10 VITALS
BODY MASS INDEX: 32.49 KG/M2 | DIASTOLIC BLOOD PRESSURE: 80 MMHG | OXYGEN SATURATION: 95 % | WEIGHT: 195 LBS | TEMPERATURE: 97.2 F | HEIGHT: 65 IN | RESPIRATION RATE: 17 BRPM | SYSTOLIC BLOOD PRESSURE: 131 MMHG | HEART RATE: 94 BPM

## 2021-12-10 VITALS — OXYGEN SATURATION: 100 % | SYSTOLIC BLOOD PRESSURE: 101 MMHG | TEMPERATURE: 92.4 F | DIASTOLIC BLOOD PRESSURE: 62 MMHG

## 2021-12-10 DIAGNOSIS — S63.025D: Primary | ICD-10-CM

## 2021-12-10 LAB
GLUCOSE BLD-MCNC: 192 MG/DL (ref 65–105)
GLUCOSE BLD-MCNC: 206 MG/DL (ref 65–105)

## 2021-12-10 PROCEDURE — 6370000000 HC RX 637 (ALT 250 FOR IP): Performed by: ANESTHESIOLOGY

## 2021-12-10 PROCEDURE — 73110 X-RAY EXAM OF WRIST: CPT

## 2021-12-10 PROCEDURE — 2500000003 HC RX 250 WO HCPCS: Performed by: SPECIALIST

## 2021-12-10 PROCEDURE — 7100000001 HC PACU RECOVERY - ADDTL 15 MIN: Performed by: ORTHOPAEDIC SURGERY

## 2021-12-10 PROCEDURE — 64415 NJX AA&/STRD BRCH PLXS IMG: CPT | Performed by: ANESTHESIOLOGY

## 2021-12-10 PROCEDURE — 3600000004 HC SURGERY LEVEL 4 BASE: Performed by: ORTHOPAEDIC SURGERY

## 2021-12-10 PROCEDURE — 73090 X-RAY EXAM OF FOREARM: CPT

## 2021-12-10 PROCEDURE — 3700000000 HC ANESTHESIA ATTENDED CARE: Performed by: ORTHOPAEDIC SURGERY

## 2021-12-10 PROCEDURE — 7100000040 HC SPAR PHASE II RECOVERY - FIRST 15 MIN: Performed by: ORTHOPAEDIC SURGERY

## 2021-12-10 PROCEDURE — 7100000000 HC PACU RECOVERY - FIRST 15 MIN: Performed by: ORTHOPAEDIC SURGERY

## 2021-12-10 PROCEDURE — 3209999900 FLUORO FOR SURGICAL PROCEDURES

## 2021-12-10 PROCEDURE — 2709999900 HC NON-CHARGEABLE SUPPLY: Performed by: ORTHOPAEDIC SURGERY

## 2021-12-10 PROCEDURE — 6360000002 HC RX W HCPCS: Performed by: SPECIALIST

## 2021-12-10 PROCEDURE — 2580000003 HC RX 258: Performed by: ORTHOPAEDIC SURGERY

## 2021-12-10 PROCEDURE — C1713 ANCHOR/SCREW BN/BN,TIS/BN: HCPCS | Performed by: ORTHOPAEDIC SURGERY

## 2021-12-10 PROCEDURE — 3700000001 HC ADD 15 MINUTES (ANESTHESIA): Performed by: ORTHOPAEDIC SURGERY

## 2021-12-10 PROCEDURE — 2580000003 HC RX 258: Performed by: SPECIALIST

## 2021-12-10 PROCEDURE — 82947 ASSAY GLUCOSE BLOOD QUANT: CPT

## 2021-12-10 PROCEDURE — 2500000003 HC RX 250 WO HCPCS: Performed by: ANESTHESIOLOGY

## 2021-12-10 PROCEDURE — 3600000014 HC SURGERY LEVEL 4 ADDTL 15MIN: Performed by: ORTHOPAEDIC SURGERY

## 2021-12-10 PROCEDURE — 6360000002 HC RX W HCPCS

## 2021-12-10 PROCEDURE — 25608 OPTX DST RD XART FX/EPI SEP2: CPT | Performed by: ORTHOPAEDIC SURGERY

## 2021-12-10 DEVICE — PLATE BNE STR 2.4X170 MM WRST SS STRL LCP
Type: IMPLANTABLE DEVICE | Site: RADIUS | Status: NON-FUNCTIONAL
Removed: 2022-02-16

## 2021-12-10 DEVICE — SCREW BNE L16MM DIA2.7MM CORT S STL ST T8 STARDRV RECESS
Type: IMPLANTABLE DEVICE | Site: RADIUS | Status: NON-FUNCTIONAL
Removed: 2022-02-16

## 2021-12-10 DEVICE — SCREW BNE L14MM DIA2.7MM CORT S STL ST T8 STARDRV RECESS
Type: IMPLANTABLE DEVICE | Site: RADIUS | Status: NON-FUNCTIONAL
Removed: 2022-02-16

## 2021-12-10 DEVICE — SCREW BNE L18MM DIA2.7MM CORT S STL ST T8 STARDRV RECESS
Type: IMPLANTABLE DEVICE | Site: RADIUS | Status: NON-FUNCTIONAL
Removed: 2022-02-16

## 2021-12-10 RX ORDER — MIDAZOLAM HYDROCHLORIDE 2 MG/2ML
2 INJECTION, SOLUTION INTRAMUSCULAR; INTRAVENOUS ONCE
Status: COMPLETED | OUTPATIENT
Start: 2021-12-10 | End: 2021-12-10

## 2021-12-10 RX ORDER — OXYCODONE HYDROCHLORIDE AND ACETAMINOPHEN 5; 325 MG/1; MG/1
1 TABLET ORAL EVERY 6 HOURS PRN
Qty: 28 TABLET | Refills: 0 | Status: SHIPPED | OUTPATIENT
Start: 2021-12-10 | End: 2021-12-17

## 2021-12-10 RX ORDER — OXYCODONE HYDROCHLORIDE AND ACETAMINOPHEN 5; 325 MG/1; MG/1
1 TABLET ORAL
Status: COMPLETED | OUTPATIENT
Start: 2021-12-10 | End: 2021-12-10

## 2021-12-10 RX ORDER — FENTANYL CITRATE 50 UG/ML
100 INJECTION, SOLUTION INTRAMUSCULAR; INTRAVENOUS ONCE
Status: COMPLETED | OUTPATIENT
Start: 2021-12-10 | End: 2021-12-10

## 2021-12-10 RX ORDER — MAGNESIUM HYDROXIDE 1200 MG/15ML
LIQUID ORAL CONTINUOUS PRN
Status: COMPLETED | OUTPATIENT
Start: 2021-12-10 | End: 2021-12-10

## 2021-12-10 RX ORDER — BUPIVACAINE HYDROCHLORIDE 5 MG/ML
INJECTION, SOLUTION EPIDURAL; INTRACAUDAL
Status: COMPLETED | OUTPATIENT
Start: 2021-12-10 | End: 2021-12-10

## 2021-12-10 RX ORDER — SODIUM CHLORIDE, SODIUM LACTATE, POTASSIUM CHLORIDE, CALCIUM CHLORIDE 600; 310; 30; 20 MG/100ML; MG/100ML; MG/100ML; MG/100ML
INJECTION, SOLUTION INTRAVENOUS CONTINUOUS PRN
Status: DISCONTINUED | OUTPATIENT
Start: 2021-12-10 | End: 2021-12-10 | Stop reason: SDUPTHER

## 2021-12-10 RX ORDER — MIDAZOLAM HYDROCHLORIDE 1 MG/ML
INJECTION INTRAMUSCULAR; INTRAVENOUS
Status: COMPLETED
Start: 2021-12-10 | End: 2021-12-10

## 2021-12-10 RX ORDER — PROPOFOL 10 MG/ML
INJECTION, EMULSION INTRAVENOUS CONTINUOUS PRN
Status: DISCONTINUED | OUTPATIENT
Start: 2021-12-10 | End: 2021-12-10 | Stop reason: SDUPTHER

## 2021-12-10 RX ORDER — DEXAMETHASONE SODIUM PHOSPHATE 10 MG/ML
10 INJECTION INTRAMUSCULAR; INTRAVENOUS ONCE
Status: DISCONTINUED | OUTPATIENT
Start: 2021-12-10 | End: 2021-12-10 | Stop reason: HOSPADM

## 2021-12-10 RX ORDER — GLYCOPYRROLATE 1 MG/5 ML
SYRINGE (ML) INTRAVENOUS PRN
Status: DISCONTINUED | OUTPATIENT
Start: 2021-12-10 | End: 2021-12-10 | Stop reason: SDUPTHER

## 2021-12-10 RX ORDER — MIDAZOLAM HYDROCHLORIDE 1 MG/ML
INJECTION INTRAMUSCULAR; INTRAVENOUS PRN
Status: DISCONTINUED | OUTPATIENT
Start: 2021-12-10 | End: 2021-12-10 | Stop reason: SDUPTHER

## 2021-12-10 RX ORDER — KETAMINE HYDROCHLORIDE 10 MG/ML
INJECTION, SOLUTION INTRAMUSCULAR; INTRAVENOUS PRN
Status: DISCONTINUED | OUTPATIENT
Start: 2021-12-10 | End: 2021-12-10 | Stop reason: SDUPTHER

## 2021-12-10 RX ORDER — CEFAZOLIN SODIUM 2 G/50ML
SOLUTION INTRAVENOUS PRN
Status: DISCONTINUED | OUTPATIENT
Start: 2021-12-10 | End: 2021-12-10 | Stop reason: SDUPTHER

## 2021-12-10 RX ORDER — FENTANYL CITRATE 50 UG/ML
INJECTION, SOLUTION INTRAMUSCULAR; INTRAVENOUS
Status: COMPLETED
Start: 2021-12-10 | End: 2021-12-10

## 2021-12-10 RX ADMIN — FENTANYL CITRATE 100 MCG: 50 INJECTION INTRAMUSCULAR; INTRAVENOUS at 08:14

## 2021-12-10 RX ADMIN — CEFAZOLIN SODIUM 2000 MG: 2 SOLUTION INTRAVENOUS at 08:48

## 2021-12-10 RX ADMIN — KETAMINE HYDROCHLORIDE 10 MG: 10 INJECTION INTRAMUSCULAR; INTRAVENOUS at 09:42

## 2021-12-10 RX ADMIN — BUPIVACAINE HYDROCHLORIDE 20 ML: 5 INJECTION, SOLUTION EPIDURAL; INTRACAUDAL; PERINEURAL at 08:24

## 2021-12-10 RX ADMIN — MIDAZOLAM HYDROCHLORIDE 1 MG: 1 INJECTION, SOLUTION INTRAMUSCULAR; INTRAVENOUS at 08:42

## 2021-12-10 RX ADMIN — KETAMINE HYDROCHLORIDE 10 MG: 10 INJECTION INTRAMUSCULAR; INTRAVENOUS at 09:47

## 2021-12-10 RX ADMIN — KETAMINE HYDROCHLORIDE 10 MG: 10 INJECTION INTRAMUSCULAR; INTRAVENOUS at 09:52

## 2021-12-10 RX ADMIN — KETAMINE HYDROCHLORIDE 10 MG: 10 INJECTION INTRAMUSCULAR; INTRAVENOUS at 09:06

## 2021-12-10 RX ADMIN — KETAMINE HYDROCHLORIDE 10 MG: 10 INJECTION INTRAMUSCULAR; INTRAVENOUS at 09:21

## 2021-12-10 RX ADMIN — FENTANYL CITRATE 100 MCG: 50 INJECTION, SOLUTION INTRAMUSCULAR; INTRAVENOUS at 08:14

## 2021-12-10 RX ADMIN — SODIUM CHLORIDE, POTASSIUM CHLORIDE, SODIUM LACTATE AND CALCIUM CHLORIDE: 600; 310; 30; 20 INJECTION, SOLUTION INTRAVENOUS at 07:00

## 2021-12-10 RX ADMIN — MIDAZOLAM HYDROCHLORIDE 2 MG: 1 INJECTION, SOLUTION INTRAMUSCULAR; INTRAVENOUS at 08:14

## 2021-12-10 RX ADMIN — KETAMINE HYDROCHLORIDE 10 MG: 10 INJECTION INTRAMUSCULAR; INTRAVENOUS at 09:29

## 2021-12-10 RX ADMIN — PROPOFOL 75 MCG/KG/MIN: 10 INJECTION, EMULSION INTRAVENOUS at 08:42

## 2021-12-10 RX ADMIN — Medication 0.2 MG: at 08:38

## 2021-12-10 RX ADMIN — OXYCODONE HYDROCHLORIDE AND ACETAMINOPHEN 1 TABLET: 5; 325 TABLET ORAL at 12:32

## 2021-12-10 RX ADMIN — KETAMINE HYDROCHLORIDE 10 MG: 10 INJECTION INTRAMUSCULAR; INTRAVENOUS at 10:19

## 2021-12-10 RX ADMIN — KETAMINE HYDROCHLORIDE 10 MG: 10 INJECTION INTRAMUSCULAR; INTRAVENOUS at 08:42

## 2021-12-10 RX ADMIN — MIDAZOLAM HYDROCHLORIDE 1 MG: 1 INJECTION, SOLUTION INTRAMUSCULAR; INTRAVENOUS at 08:38

## 2021-12-10 RX ADMIN — MIDAZOLAM HYDROCHLORIDE 2 MG: 2 INJECTION, SOLUTION INTRAMUSCULAR; INTRAVENOUS at 08:14

## 2021-12-10 RX ADMIN — KETAMINE HYDROCHLORIDE 10 MG: 10 INJECTION INTRAMUSCULAR; INTRAVENOUS at 10:09

## 2021-12-10 ASSESSMENT — PULMONARY FUNCTION TESTS
PIF_VALUE: 0
PIF_VALUE: 1
PIF_VALUE: 0
PIF_VALUE: 1
PIF_VALUE: 2
PIF_VALUE: 1
PIF_VALUE: 0
PIF_VALUE: 1
PIF_VALUE: 0
PIF_VALUE: 1

## 2021-12-10 ASSESSMENT — PAIN SCALES - GENERAL
PAINLEVEL_OUTOF10: 3
PAINLEVEL_OUTOF10: 0
PAINLEVEL_OUTOF10: 0
PAINLEVEL_OUTOF10: 3
PAINLEVEL_OUTOF10: 0

## 2021-12-10 ASSESSMENT — PAIN DESCRIPTION - DESCRIPTORS: DESCRIPTORS: CONSTANT

## 2021-12-10 ASSESSMENT — PAIN - FUNCTIONAL ASSESSMENT: PAIN_FUNCTIONAL_ASSESSMENT: 0-10

## 2021-12-10 NOTE — ANESTHESIA PROCEDURE NOTES
Peripheral Block    Patient location during procedure: pre-op  Start time: 12/10/2021 8:14 AM  End time: 12/10/2021 8:19 AM  Staffing  Performed: anesthesiologist   Anesthesiologist: Lizy Wasserman MD  Preanesthetic Checklist  Completed: patient identified, IV checked, site marked, risks and benefits discussed, surgical consent, monitors and equipment checked, pre-op evaluation, timeout performed, anesthesia consent given, oxygen available and patient being monitored  Peripheral Block  Patient position: sitting  Prep: ChloraPrep  Patient monitoring: cardiac monitor, continuous pulse ox, frequent blood pressure checks and IV access  Block type: Brachial plexus  Laterality: left  Injection technique: single-shot  Guidance: ultrasound guided  Local infiltration: lidocaine  Infiltration strength: 1 %  Dose: 3 mL  Supraclavicular  Provider prep: mask and sterile gloves  Local infiltration: lidocaine  Needle  Needle gauge: 21 G  Needle length: 10 cm  Needle localization: ultrasound guidance  Test dose: negative  Assessment  Injection assessment: negative aspiration for heme, no paresthesia on injection and local visualized surrounding nerve on ultrasound  Paresthesia pain: none  Slow fractionated injection: yes  Hemodynamics: stable  Additional Notes  Immediately prior to procedure a \"time out\" was called to verify the correct patient, allergies, laterality, procedure and equipment. Time out performed with [facility]    Local Anesthetic: 0.5 %  Bupivacaine   Amount: 30 ml  in 5 ml increments after negative aspiration each time.             Medications Administered  Bupivacaine (MARCAINE) PF injection 0.5%, 20 mL  Reason for block: post-op pain management and at surgeon's request

## 2021-12-10 NOTE — OP NOTE
[address]                  [facility]                                OPERATIVE REPORT    PATIENT NAME: ADI MALDONADO                      :        1950  MED REC NO:   [id number]                             ROOM:  ACCOUNT NO:   [de-identified]                           ADMIT DATE: 12/10/2021  PROVIDER:     Rolly Montaño    DATE OF PROCEDURE:  12/10/2021    PREOPERATIVE DIAGNOSIS:  Left transstyloid radiocarpal fracture  dislocation. POSTOPERATIVE DIAGNOSIS:  Left transstyloid radiocarpal fracture  dislocation. PROCEDURE PERFORMED:  Open reduction and internal fixation of left  distal radius, CPT [id number]. ATTENDING SURGEON:  Rolly Montaño DO.    ASSISTANTS:  1. Jemal Rutherford, PGY-3.  [facility]2. ANESTHESIA:  MAC and regional.    ESTIMATED BLOOD LOSS:  50 mL. IV FLUIDS:  900 mL of crystalloid. COMPLICATIONS:  None. SPECIMEN:  None. IMPLANTS:  Synthes dorsal spanning plate. INDICATIONS:  The patient is a 70-year-old female who sustained a left  transstyloid radiocarpal fracture dislocation. In the ED, was splinted  in situ and presented to the outpatient clinic. We discussed operative  and nonoperative management with the patient including the risks,  benefits, and alternatives of both. The patient did have extensive past  medical history; however, she elected to undergo reduction and dorsal  spanning plate application. Due to her poor bone quality and small size  of the styloid fragment, I felt that the direct fixation of this  fragment alone would not provide enough stability throughout her healing  and spanning plate was favorable over external fixator due to all  implants being subcutaneous and much more tolerable with day to day  activities.   The patient did receive appropriate preoperative clearance  from previous testing and clearance from the cardiologist.    OPERATIVE PROCEDURE:  The the radiocarpal joint was still reduced and styloid  was ideally positioned. We then secured the plate with three additional  2.7 mm cortical screws proximal.  Provisional K-wire fixation was  removed. Final AP, lateral, and oblique radiographs showed appropriate  reduction, and safe and appropriate implant placement in all views. We  then copiously irrigated the wounds. We closed the subcutaneous tissues  with absorbable suture and skin was approximated using 3-0 nylon in a  running subcuticular fashion. Skin was cleaned and dried, and the  incision sites sealed with Dermabond glue. Soft dressing was applied. The patient was successfully extubated and transported to the recovery  room by the anesthesia providers. POSTOPERATIVE PLAN:  We will obtain digital x-rays of the left wrist in  the recovery room. The patient does not require outpatient antibiotics  or DVT prophylaxis for this isolated upper extremity injury. She will  be discharged home with prescriptions consistent with multimodal pain  protocol. She will be instructed to follow up in the outpatient clinic  in approximately 10 to 14 days from the date of this procedure for a  wound check.         Ayana Baird    D: 12/10/2021 10:53:49       T: 12/10/2021 14:23:32     [email or web address]  Job#: [id number]     Doc#: [id number]    CC:

## 2021-12-10 NOTE — H&P
Pt Name: Radha Corey  MRN: [id number]  YOB: 1950  Date of evaluation: 12/10/2021    I have reviewed the patient's history and physical examination completed in pre-admission testing. Changes to history or on examination, if any, are as follows:  None. Cardiac clearance is in the chart. Yair Ly  12/10/21  6:55 AM      History and Physical     Pt Name: Radha Corey  MRN: [id number]  YOB: 1950  Date of evaluation: 12/7/2021  Primary Care Physician: Radha Sierra DO     SUBJECTIVE:   History of Chief Complaint:    Marilyn Avendano is a 70 y.o. female who presents for PAT appointment. Patient complains of left distal radius fracture.  She complains of left wrist pain after sustaining a fall 5 days ago.  She states it was after she took down melvin American flag outside of her house and had a fall, landing on her left wrist and bottom.  She initially was taken to [facility] emergency department where an attempted closed reduction was performed.  Patient was splinted and instructed to follow-up with Dr. Dc Melvin seeing Dr. Arely Kennedy was concerned that this may need operative fixation and was referred to Dr. Abad Muñoz. Patient has been scheduled for ORIF DISTAL RADIUS- LEFT. She rates her left wrist pain an 8/10. Allergies  is allergic to pcn [penicillins], heparin, lamotrigine, and cyclobenzaprine. Medications  Home Medications           Prior to Admission medications    Medication Sig Start Date End Date Taking?  Authorizing Provider   Ascorbic Acid (VITAMIN C PLUS WILD DON HIPS) 500 MG CHEW Take 1 tablet by mouth daily      Yes Historical Provider, MD   LEVOTHYROXINE SODIUM PO Take 112 mcg by mouth daily      Yes Historical Provider, MD   pramipexole (MIRAPEX) 0.125 MG tablet Take 0.125 mg by mouth daily     Yes Historical Provider, MD   olopatadine (PATANOL) 0.1 % ophthalmic solution Place 1 drop into both eyes as needed  7/28/21   Yes Historical Provider, MD   FLUoxetine (PROZAC) 10 MG capsule Take 1 capsule by mouth daily 7/24/21   Yes Ivis Rodriguez MD   amLODIPine (NORVASC) 10 MG tablet Take 1 tablet by mouth daily 7/3/21   Yes Yinka Johnson MD   ferrous sulfate (IRON 325) 325 (65 Fe) MG tablet Take 1 tablet by mouth daily (with breakfast) 7/3/21   Yes Yinka Johnson MD   buPROPion (WELLBUTRIN XL) 300 MG extended release tablet TAKE ONE TABLET BY MOUTH DAILY 11/16/20   Yes Historical Provider, MD   nystatin ([address]) 957697 UNIT/GM powder APPLY TO AFFECTED AREA(S) FOUR TIMES A DAY 9/22/20   Yes Historical Provider, MD   busPIRone (BUSPAR) 15 MG tablet Take 15 mg by mouth 2 times daily 11/9/20   Yes Historical Provider, MD   isosorbide mononitrate (IMDUR) 30 MG extended release tablet Take 1 tablet by mouth daily 4/25/18   Yes Yinka Johnson MD   atorvastatin (LIPITOR) 40 MG tablet Take 40 mg by mouth daily  7/2/17   Yes Historical Provider, MD   nitroGLYCERIN (NITROSTAT) 0.4 MG SL tablet Place 0.4 mg under the tongue every 5 minutes as needed for Chest pain up to max of 3 total doses. If no relief after 1 dose, call 911.     Yes Historical Provider, MD   Multiple Vitamins-Minerals (THERAPEUTIC MULTIVITAMIN-MINERALS) tablet Take 1 tablet by mouth daily.     Yes Historical Provider, MD   aspirin 81 MG EC tablet Take 81 mg by mouth daily. LAST DOSE 9/8/14     Yes Historical Provider, MD   metoprolol (TOPROL-XL) 100 MG XL tablet Take 100 mg by mouth daily.     Yes Historical Provider, MD   vitamin B-12 (CYANOCOBALAMIN) 500 MCG tablet Take 500 mcg by mouth daily      Yes Historical Provider, MD   oxybutynin (DITROPAN-XL) 10 MG extended release tablet Take 10 mg by mouth daily 11/12/21     Historical Provider, MD   HYDROcodone-acetaminophen (NORCO) 5-325 MG per tablet Take 1 tablet by mouth every 6 hours as needed for Pain for up to 3 days. Intended supply: 3 days.  Take lowest dose possible to manage pain 12/6/21 12/9/21   Jad Sanz MD HYDROcodone-acetaminophen (NORCO) 5-325 MG per tablet Take 1 tablet by mouth every 8 hours as needed for Pain for up to 30 days. 21   QI Branhc CNP   nystatin (MYCOSTATIN) 257563 UNIT/GM cream Apply topically 2 times daily Apply topically 2 times daily.       Historical Provider, MD         Past Medical History    has a past medical history of Aortic valve stenosis, Arthritis, CAD (coronary artery disease), Chronic kidney disease, Diabetes mellitus ([facility] [address] GERD (gastroesophageal reflux disease), Heart murmur, History of echocardiogram, History of kidney stones, Hyperlipidemia, MI, old, Pulmonary stenosis, Pulmonary valve stenosis, Sciatica, Thyroid disease, Under care of team, Under care of team, Under care of team, and Wears glasses. Past Surgical History   has a past surgical history that includes Hysterectomy (1980); Salpingo-oophorectomy (Left, 1987); shoulder surgery (Left, 1993); cervical fusion (1993); joint replacement (Bilateral, 1994); Coronary angioplasty with stent (2000); Finger surgery (Left, 2014); other surgical history (Right, 2014); Toe Osteotomy (Right, 2016); Cystoscopy (Left, 2021); Cystocopy (2021); Bladder surgery (Left, 2021); Carpal tunnel release; and cardiovascular stress test (). Social History   reports that she quit smoking about 17 years ago. Her smoking use included cigarettes. She has a 30.00 pack-year smoking history. She has never used smokeless tobacco.    reports current alcohol use. reports no history of drug use.    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     Review of Systems:  CONSTITUTIONAL:   negative for fevers, chills, fatigue and malaise    EYES:   negative for double vision, blurred vision and photophobia +glasses   HEENT:   negative for tinnitus, epistaxis and sore throat     RESPIRATORY:   negative for cough, shortness of breath, wheezing     CARDIOVASCULAR:   negative for chest pain, palpitations, syncope, edema     GASTROINTESTINAL:   negative for nausea, vomiting     GENITOURINARY:   negative for incontinence     MUSCULOSKELETAL:   See HPI   NEUROLOGICAL:   Negative for weakness and tingling  negative for headaches and dizziness     PSYCHIATRIC:   negative for anxiety        OBJECTIVE:   VITALS:  height is 5' 5\" (1.651 m) and weight is 195 lb (88.5 kg). Her temporal temperature is 96.8 °F (36 °C). Her blood pressure is 144/73 (abnormal) and her pulse is 91. Her respiration is 18 and oxygen saturation is 97%. CONSTITUTIONAL:alert & oriented x 3, no acute distress. Friendly. SKIN:  Warm and dry, no rashes on exposed areas of skin   HEAD:  Normocephalic, atraumatic   EYES: EOMs intact. PERRL. Wearing glasses. EARS:  Equal bilaterally, no edema or thickening, skin is intact without lumps or lesions. No discharge. Hearing grossly WNL. NOSE:  Nares patent. No rhinorrhea   MOUTH/THROAT:  Mucous membranes moist, tongue is pink, uvula midline, teeth appear to be intact  NECK:supple, full ROM  LUNGS: Respirations even and non-labored. Clear to auscultation bilaterally, no wheezes, rales, or rhonchi. CARDIOVASCULAR: Regular rate and rhythm, no murmurs/rubs/gallops   ABDOMEN: soft, non-tender, non-distended, bowel sounds active x 4   EXTREMITIES: No edema bilateral lower extremities. + varicosities bilateral lower extremities.  Left wrist splinted, exposed fingers warm to touch and sensation intact. NEUROLOGIC: CN II-XII are grossly intact.   Gait not assessed, in W/C today.      Testing:   EK21  Labs pending: drawn 2021   Chest XRay:  21  IMPRESSIONS:   Left distal radius fracture  PLANS:   ORIF DISTAL RADIUS- LEFT     QI BLANCAS - CNP  Electronically signed 2021 at 12:49 PM

## 2021-12-10 NOTE — BRIEF OP NOTE
Brief Postoperative Note      Patient: Marilyn [facility]  YOB: 1950  MRN: [id number]   CSN: [id number]     Date of Procedure: 12/10/2021    Pre-Op Diagnosis: Left transstyloid radiocarpal fracture dislocation    Post-Op Diagnosis: Same       Procedure(s):  OPEN REDUCTION INTERNAL FIXATION LEFT DISTAL RADIUS; CPT 50267    Surgeon(s):  Jacqui Yao DO    Assistant:  Resident: Delores Perry DO; Clyde Kelly DO    Anesthesia: MAC and regional    Estimated Blood Loss (mL): 50 cc    IVF: 900 cc crystalloid    Complications: None    Specimens:   * No specimens in log *    Implants:  Synthes dorsal spanning plate  Implant Name Type Inv.  Item Serial No.  Lot No. LRB No. Used Action   PLATE BNE STR [id number] MM WRST SS STRL LCP  PLATE BNE STR [id number] MM WRST SS STRL LCP  DEPUY SYNTHES [facility] [id number] Left 1 Implanted   SCREW BNE L14MM DIA2.7MM MAUREEN S STL ST T8 STARDRV RECESS  SCREW BNE L14MM DIA2.7MM MAUREEN S STL ST T8 STARDRV RECESS  DEPUY SYNTHES [place]-[employer]  Left 2 Implanted   SCREW BNE L16MM DIA2.7MM MAUREEN S STL ST T8 STARDRV RECESS  SCREW BNE L16MM DIA2.7MM MAUREEN S STL ST T8 STARDRV RECESS  DEPUY SYNTHES [employer]-WD  Left 3 Implanted   SCREW BNE L18MM DIA2.7MM MAUREEN S STL ST T8 STARDRV RECESS  SCREW BNE L18MM DIA2.7MM MAUREEN S STL ST T8 STARDRV RECESS  DEPUY [employer]-WD  Left 1 Implanted         Drains: * No LDAs found *    Findings: L transstyloid radiocarpal fracture dislocation    Electronically signed by Jacqui Yao DO on 12/10/2021 at 10:22 AM

## 2021-12-10 NOTE — ANESTHESIA PRE PROCEDURE
Department of Anesthesiology  Preprocedure Note       Name:  Nichole Jama   Age:  70 y.o.  :  1950                                          MRN:  [id number]         Date:  12/10/2021      Surgeon: Shyanne Rodriguez):  Emil Hopson DO    Procedure: Procedure(s):  ORIF DISTAL RADIUS, SYNTHES DORSAL SPANNING PLATE, 9445 HAND TABLE, SUPINE, C-ARM    Medications prior to admission:   Prior to Admission medications    Medication Sig Start Date End Date Taking? Authorizing Provider   oxybutynin (DITROPAN-XL) 10 MG extended release tablet Take 10 mg by mouth daily 21  Yes Historical Provider, MD   HYDROcodone-acetaminophen (NORCO) 5-325 MG per tablet Take 1 tablet by mouth every 8 hours as needed for Pain for up to 30 days.  21 Yes Ellie Gunderson, APRN - CNP   Ascorbic Acid (VITAMIN C PLUS WILD DON HIPS) 500 MG CHEW Take 1 tablet by mouth daily    Yes Historical Provider, MD   LEVOTHYROXINE SODIUM PO Take 112 mcg by mouth daily    Yes Historical Provider, MD   pramipexole (MIRAPEX) 0.125 MG tablet Take 0.125 mg by mouth daily   Yes Historical Provider, MD   olopatadine (PATANOL) 0.1 % ophthalmic solution Place 1 drop into both eyes as needed  21  Yes Historical Provider, MD   FLUoxetine (PROZAC) 10 MG capsule Take 1 capsule by mouth daily 21  Yes Phuong Antunez MD   amLODIPine (NORVASC) 10 MG tablet Take 1 tablet by mouth daily 7/3/21  Yes Lloyd Nayak MD   ferrous sulfate (IRON 325) 325 (65 Fe) MG tablet Take 1 tablet by mouth daily (with breakfast) 7/3/21  Yes Lloyd Nayak MD   nystatin [facility]) 875890 UNIT/GM powder APPLY TO AFFECTED AREA(S) FOUR TIMES A DAY 20  Yes Historical Provider, MD   busPIRone (BUSPAR) 15 MG tablet Take 15 mg by mouth 2 times daily 20  Yes Historical Provider, MD   isosorbide mononitrate (IMDUR) 30 MG extended release tablet Take 1 tablet by mouth daily 18  Yes Lloyd Nayak MD   nystatin (MYCOSTATIN) 082761 UNIT/GM cream Apply topically 2 times daily Apply topically 2 times daily. Yes Historical Provider, MD   atorvastatin (LIPITOR) 40 MG tablet Take 40 mg by mouth daily  7/2/17  Yes Historical Provider, MD   Multiple Vitamins-Minerals (THERAPEUTIC MULTIVITAMIN-MINERALS) tablet Take 1 tablet by mouth daily. Yes Historical Provider, MD   metoprolol (TOPROL-XL) 100 MG XL tablet Take 100 mg by mouth daily. Yes Historical Provider, MD   vitamin B-12 (CYANOCOBALAMIN) 500 MCG tablet Take 500 mcg by mouth daily    Yes Historical Provider, MD   buPROPion (WELLBUTRIN XL) 300 MG extended release tablet TAKE ONE TABLET BY MOUTH DAILY 11/16/20   Historical Provider, MD   nitroGLYCERIN (NITROSTAT) 0.4 MG SL tablet Place 0.4 mg under the tongue every 5 minutes as needed for Chest pain up to max of 3 total doses. If no relief after 1 dose, call 911. Historical Provider, MD   aspirin 81 MG EC tablet Take 81 mg by mouth daily. LAST DOSE 9/8/14    Historical Provider, MD       Current medications:    Current Facility-Administered Medications   Medication Dose Route Frequency Provider Last Rate Last Admin    fentaNYL (SUBLIMAZE) injection 100 mcg  100 mcg IntraVENous Once Leslee Souza MD        midazolam PF (VERSED) injection 2 mg  2 mg IntraVENous Once Leslee Souza MD           Allergies:     Allergies   Allergen Reactions    Pcn [Penicillins] Swelling and Hives    Heparin Other (See Comments)     MIGRAINE      Lamotrigine Other (See Comments), Itching, Nausea Only and Rash    Cyclobenzaprine      Dry mouth, jitters       Problem List:    Patient Active Problem List   Diagnosis Code    CMC arthritis M19.049    Arthritis of left hip M16.12    Left hip pain M25.552    Chronic midline low back pain without sciatica M54.50, G89.29    Degenerative lumbar spinal stenosis M48.061    Lumbar radiculopathy M54.16    DDD (degenerative disc disease), lumbar M51.36    Lumbosacral spondylosis without myelopathy M47.817    Primary osteoarthritis of left hip M16.12    Sacroiliitis (HCC) M46.1    DDD (degenerative disc disease), cervical M50.30    Neck pain M54.2    Cervical stenosis of spine M48.02    Osteoarthritis of spine with radiculopathy, cervical region M47.22    WERO (acute kidney injury) ([facility] [address] N17.9    Acute cystitis without hematuria N30.00    Hydronephrosis of left kidney N13.30    Hydroureter, left N13.4    Type 2 diabetes mellitus, without long-term current use of insulin ([facility]) E11.9    Essential hypertension I10    Hypothyroidism E03.9    Acute infective cystitis N30.00    Uremia N19    Pyelonephritis N12    Iron deficiency anemia D50.9    GERD (gastroesophageal reflux disease) K21.9    Leukocytosis D72.829    Allergy to penicillin Z88.0    Anemia D64.9    Encounter for long-term opiate analgesic use Z79.891       Past Medical History:        Diagnosis Date    Aortic valve stenosis     mild per chart    Arthritis     CAD (coronary artery disease) 2000    1 STENT, Dr. Dang Pe[place], [place]    Chronic kidney disease     Diabetes mellitus ([facility] [address]     Dr. Juanis Poe    GERD (gastroesophageal reflux disease)     Heart murmur     1962    History of echocardiogram 06/23/2021    in epic.     History of kidney stones     Hyperlipidemia 2004    ON RX    MI, old     states many and they were mild    Pulmonary stenosis 1995    Pulmonary valve stenosis     mild/congenital per chart    Sciatica     right leg    Thyroid disease 2004    HYPOTHYROIDISM ON RX    Under care of team 12/07/2021    cadiology-Dr Kaitlynn Buchanan visit june 2021    Under care of team 12/07/2021    nephrology-Dr [place]-last visit nov 2021    Under care of team 12/07/2021    [place]last visit nov 2021    Wears glasses        Past Surgical History:        Procedure Laterality Date    BLADDER SURGERY Left 09/24/2021    CYSTOSCOPY RETROGRADE PYELOGRAM,  URETERAL STENT REMOVAL performed by Yahaira Lara Zuri Harper MD at [address]  2019    in CE, negative   [address] CERVICAL FUSION  1993    CORONARY ANGIOPLASTY WITH STENT PLACEMENT  2000    1 STENT    CYSTOSCOPY Left 2021    CYSTOSCOPY URETERAL STENT INSERTION performed by Eliud Oliveros MD at [address]  2021    CYSTOSCOPY RETROGRADE PYELOGRAM,  URETERAL STENT REMOVAL left    FINGER SURGERY Left 2014    [place] ARTHOPLASTY LEFT THUMB    HYSTERECTOMY  1980    WITH RT SALPINGOOPHERECTOMY    JOINT REPLACEMENT Bilateral 1994    PARTIAL-KNEES    OTHER SURGICAL HISTORY Right 2014    thumb repair    SALPINGO-OOPHORECTOMY Left 1987    SHOULDER SURGERY Left 1993    SPURS    TOE OSTEOTOMY Right 2016    Right 5th digit adductory wedge osteotomy with K wire fixation        Social History:    Social History     Tobacco Use    Smoking status: Former Smoker     Packs/day: 1.00     Years: 30.00     Pack years: 30.00     Types: Cigarettes     Quit date: 3/19/2004     Years since quittin.7    Smokeless tobacco: Never Used   Substance Use Topics    Alcohol use: Yes     Comment: socially, once a year                                Counseling given: Not Answered      Vital Signs (Current):   Vitals:    12/10/21 0646   BP: (!) 170/76   Pulse: 75   Resp: 16   Temp: 96.8 °F (36 °C)   TempSrc: Temporal   SpO2: 98%   Weight: 195 lb (88.5 kg)   Height: 5' 5\" (1.651 m)                                              BP Readings from Last 3 Encounters:   12/10/21 (!) 170/76   21 (!) 144/73   21 (!) 180/53       NPO Status: Time of last liquid consumption:                         Time of last solid consumption:                         Date of last liquid consumption: 21                        Date of last solid food consumption: 21    BMI:   Wt Readings from Last 3 Encounters:   12/10/21 195 lb (88.5 kg)   21 195 lb (88.5 kg)   12/06/21 190 lb (86.2 kg)     Body mass index is 32.45 kg/m².     CBC:   Lab Results   Component Value Date    WBC 10.4 12/07/2021    RBC 3.63 12/07/2021    HGB 10.8 12/07/2021    HCT 34.5 12/07/2021    MCV 95.0 12/07/2021    RDW 13.5 12/07/2021     12/07/2021       CMP:   Lab Results   Component Value Date     12/07/2021    K 4.8 12/07/2021    CL 96 12/07/2021    CO2 24 12/07/2021    BUN 34 12/07/2021    CREATININE 2.76 12/07/2021    GFRAA 21 12/07/2021    LABGLOM 17 12/07/2021    GLUCOSE 200 12/07/2021    PROT 7.5 12/07/2021    CALCIUM 9.8 12/07/2021    BILITOT 0.28 12/07/2021    ALKPHOS 91 12/07/2021    AST 15 12/07/2021    ALT 12 12/07/2021       POC Tests:   Recent Labs     12/10/21  0656   POCGLU 206*       Coags: No results found for: PROTIME, INR, APTT    HCG (If Applicable): No results found for: PREGTESTUR, PREGSERUM, HCG, HCGQUANT     ABGs: No results found for: PHART, PO2ART, HSS0VTS, UFC5AGK, BEART, Y3YBMHOH     Type & Screen (If Applicable):  No results found for: LABABO, LABRH    Drug/Infectious Status (If Applicable):  No results found for: HIV, HEPCAB    COVID-19 Screening (If Applicable): No results found for: COVID19        Anesthesia Evaluation  Patient summary reviewed and Nursing notes reviewed no history of anesthetic complications:   Airway: Mallampati: II  TM distance: >3 FB   Neck ROM: full  Mouth opening: > = 3 FB Dental:          Pulmonary:Negative Pulmonary ROS and normal exam                               Cardiovascular:  Exercise tolerance: good (>4 METS),   (+) hypertension:, past MI: > 6 months and no interval change, CAD:,       ECG reviewed  Rhythm: regular  Rate: normal  Echocardiogram reviewed         Beta Blocker:  Not on Beta Blocker         Neuro/Psych:   (+) neuromuscular disease:,             GI/Hepatic/Renal:   (+) GERD:,           Endo/Other:    (+) DiabetesType II DM, , hypothyroidism: arthritis:., .                 Abdominal:             Vascular: Other Findings:           Anesthesia Plan      MAC, TIVA and regional     ASA 3       Induction: intravenous. MIPS: Postoperative opioids intended and Prophylactic antiemetics administered. Anesthetic plan and risks discussed with patient.       Plan discussed with CRNA and surgical team.                  Brian Mahmood MD   12/10/2021

## 2021-12-10 NOTE — FLOWSHEET NOTE
Dr Bhavesh Wiggins to the bedside, time out performed @ 0814 , Pt monitored, 02, interscalene nerve block completed using 30 mL 0.5% Bupivacaine,   pt tolerated procedure well,  Site CDI, (see charting)  Versed Given: 2 mg  Fentanyl Given: 100 mcg     Procedure Start: 0662  Procedure End: 4437

## 2021-12-10 NOTE — ANESTHESIA POSTPROCEDURE EVALUATION
Department of Anesthesiology  Postprocedure Note    Patient: Sona Louis  MRN: [id number]  YOB: 1950  Date of evaluation: 12/10/2021  Time:  11:13 AM     Procedure Summary     Date: 12/10/21 Room / Location: [address]    Anesthesia Start: 0681 Anesthesia Stop: 4155    Procedure: OPEN REDUCTION INTERNAL FIXATION LEFT DISTAL RADIUS (Left Arm Lower) Diagnosis: (FRACTURED LEFT DISTAL RADIUS)    Surgeons: Blair Echavarria DO Responsible Provider: Lima Hernandez MD    Anesthesia Type: MAC, TIVA, regional ASA Status: 3          Anesthesia Type: MAC, TIVA, regional    Ifrah Phase I:      Ifrah Phase II:      Last vitals: Reviewed and per EMR flowsheets.        Anesthesia Post Evaluation    Patient location during evaluation: PACU  Patient participation: complete - patient participated  Level of consciousness: awake and alert  Pain score: 0  Airway patency: patent  Nausea & Vomiting: no nausea and no vomiting  Complications: no  Cardiovascular status: hemodynamically stable  Respiratory status: nasal cannula  Hydration status: euvolemic

## 2021-12-23 ENCOUNTER — OFFICE VISIT (OUTPATIENT)
Dept: ORTHOPEDIC SURGERY | Age: 71
End: 2021-12-23

## 2021-12-23 VITALS — HEIGHT: 65 IN | BODY MASS INDEX: 32.49 KG/M2 | WEIGHT: 195 LBS

## 2021-12-23 DIAGNOSIS — S52.502A CLOSED FRACTURE OF DISTAL END OF LEFT RADIUS, UNSPECIFIED FRACTURE MORPHOLOGY, INITIAL ENCOUNTER: Primary | ICD-10-CM

## 2021-12-23 PROCEDURE — 99024 POSTOP FOLLOW-UP VISIT: CPT | Performed by: ORTHOPAEDIC SURGERY

## 2021-12-23 RX ORDER — OXYCODONE HYDROCHLORIDE AND ACETAMINOPHEN 5; 325 MG/1; MG/1
1 TABLET ORAL EVERY 8 HOURS PRN
Qty: 21 TABLET | Refills: 0 | Status: SHIPPED | OUTPATIENT
Start: 2021-12-23 | End: 2021-12-30

## 2021-12-23 NOTE — PROGRESS NOTES
[facility]  [facility]  [id number] [address]  Dept: [phone]  Dept Fax: [phone]        [facility] Follow Up      Subjective:   Date of Surgery: 12/10/2021    Marilyn Cooper is a 70y.o. year old female who presents to the clinic today for routine follow up 2 weeks post-operatively from an ORIF with dorsal spanning plate of her left distal radius on 12/10/21. Patient reports moderate tenderness over incision sites. She reports swelling of the wrist but states it is slowly improving. She is apprehensive about going into a removable soft brace as she is scared it will cause more pain and be unstable. Denies any numbness or tingling. Denies any new falls or injuries. Review of Systems  Gen: no fever, chills, malaise  CV: no chest pain or palpitations  Resp: no cough or shortness of breath  GI: no nausea, vomiting, diarrhea, or constipation  Neuro: no seizures, vertigo, or headache  Msk: left wrist pain   10 remaining systems reviewed and negative    Objective : There were no vitals filed for this visit. Body mass index is 32.45 kg/m². General: No acute distress, resting comfortably in the clinic  Neuro: alert. oriented  Eyes: Extra-ocular muscles intact  Pulm: Respirations unlabored and regular. Skin: warm, well perfused  Psych:   Patient has good fund of knowledge and displays understanding of exam, diagnosis, and plan. LUE:  Skin intact, incisions healing without evidence of drainage or erythema. Swelling and ecchymosis noted to volar aspect of wrist.  Moderate TTP over both incision sites. No flexion of the wrist and minimal extension as expected due to plate. Full range of motion of all digits. Normal supination pronation. Compartments soft. 2+ rad pulse. Median/Radial/Ulnar/AIN/PIN motor intact. Median/Radial/Ulnar nerve SILT. Dysesthesia in 3rd digit unchanged from previous exam.       Radiology:  No radiographs obtained today. Assessment:   70y.o. year old female with left distal radius fracture s/p ORIF; DOS: 12/10/21. Plan:   -Splint removed and removable wrist brace applied. Instructed patient that the brace can be removed for hygiene and when lying around the house. -Encouraged patient to begin gentle ROM but still no lifting, pushing or pulling > 5lbs. -Refill sent for percocet per patient request.  -F/u in 4 weeks with radiographs. Anticipate leaving hardware in place for about 10 weeks based on healing. Follow up:Return in about 4 weeks (around 1/20/2022) for x-rays out of cast/splint/brace. No orders of the defined types were placed in this encounter. No orders of the defined types were placed in this encounter. Electronically signed by QI Casarez CNP on 12/23/2021 at 12:44 PM    This note is created with the assistance of a speech recognition program.  While intending to generate a document that actually reflects the content of the visit, the document can still have some errors including those of syntax and sound a like substitutions which may escape proof reading.   In such instances, actual meaning can be extrapolated by contextual diversion

## 2022-01-03 ENCOUNTER — TELEPHONE (OUTPATIENT)
Dept: ORTHOPEDIC SURGERY | Age: 72
End: 2022-01-03

## 2022-01-03 NOTE — TELEPHONE ENCOUNTER
Patient called in for a refill of pain med. It looks as if she is getting Argelia Ortiz from [facility]   But we have her on PERCOCET, POST OP. Please advise / or send in appropriate mediation.     PROCEDURE :  12/10/2021:  Open reduction and internal fixation of left  distal radius  DX:  Left transstyloid radiocarpal fracture  dislocation

## 2022-01-11 ENCOUNTER — HOSPITAL ENCOUNTER (OUTPATIENT)
Dept: PAIN MANAGEMENT | Age: 72
Discharge: HOME | End: 2022-01-11
Payer: MEDICARE

## 2022-01-11 VITALS
HEART RATE: 57 BPM | SYSTOLIC BLOOD PRESSURE: 153 MMHG | HEIGHT: 65 IN | BODY MASS INDEX: 29.99 KG/M2 | DIASTOLIC BLOOD PRESSURE: 55 MMHG | TEMPERATURE: 98.4 F | WEIGHT: 180 LBS | OXYGEN SATURATION: 100 % | RESPIRATION RATE: 20 BRPM

## 2022-01-11 DIAGNOSIS — M51.36 DDD (DEGENERATIVE DISC DISEASE), LUMBAR: ICD-10-CM

## 2022-01-11 DIAGNOSIS — M48.02 CERVICAL STENOSIS OF SPINE: ICD-10-CM

## 2022-01-11 DIAGNOSIS — M50.30 DDD (DEGENERATIVE DISC DISEASE), CERVICAL: ICD-10-CM

## 2022-01-11 DIAGNOSIS — M47.817 LUMBOSACRAL SPONDYLOSIS WITHOUT MYELOPATHY: Primary | ICD-10-CM

## 2022-01-11 DIAGNOSIS — M48.061 DEGENERATIVE LUMBAR SPINAL STENOSIS: ICD-10-CM

## 2022-01-11 DIAGNOSIS — Z79.891 ENCOUNTER FOR LONG-TERM OPIATE ANALGESIC USE: ICD-10-CM

## 2022-01-11 DIAGNOSIS — M47.22 OSTEOARTHRITIS OF SPINE WITH RADICULOPATHY, CERVICAL REGION: ICD-10-CM

## 2022-01-11 PROCEDURE — 99213 OFFICE O/P EST LOW 20 MIN: CPT

## 2022-01-11 PROCEDURE — 99213 OFFICE O/P EST LOW 20 MIN: CPT | Performed by: NURSE PRACTITIONER

## 2022-01-11 RX ORDER — HYDROCODONE BITARTRATE AND ACETAMINOPHEN 5; 325 MG/1; MG/1
1 TABLET ORAL EVERY 8 HOURS PRN
Qty: 90 TABLET | Refills: 0 | Status: SHIPPED | OUTPATIENT
Start: 2022-01-11 | End: 2022-02-08 | Stop reason: SDUPTHER

## 2022-01-11 ASSESSMENT — ENCOUNTER SYMPTOMS
COUGH: 0
CONSTIPATION: 0
SHORTNESS OF BREATH: 0

## 2022-01-11 NOTE — PROGRESS NOTES
Patient is here today to review medication contract. Chief Complaint: neck pain    [facility] Pt complains of neck pain. MRI with moderate stenosis. She had cervical facet injections with moderate relief but states she cannot afford to repeat at this time. Last injection was 3/2021. She sees a chiropractor with benefit. She is opioid dependant for pain control and takes norco 5 mg TID.        She is a candidate for cervical spine surgery and will see Dr. Messi Zabala this month to discuss. She needs clearance from cardiology to schedule the surgery.       She cannot take gabapentin due to kidney issues.      She broke her left wrist last month - fell down her front steps. She had surgery on the wrist 12/10/21 and is healing well. She continues to follow with orthopedics         Neck Pain   This is a chronic problem. The current episode started more than 1 year ago. The problem occurs constantly. The problem has been unchanged. The pain is present in the midline. The quality of the pain is described as aching and stabbing. The pain is at a severity of 8/10. The pain is moderate. The symptoms are aggravated by position and twisting. Associated symptoms include headaches. Pertinent negatives include no chest pain or fever. She has tried bed rest, chiropractic manipulation and oral narcotics for the symptoms. The treatment provided mild relief. Patient denies any new neurological symptoms. No bowel or bladder incontinence, no weakness, and no falling. Pill count: appropriate no refill since 11/7 - had pain medication from orthopedic surgeon following wrist surgery due to fx. Morphine equivalent: 15    Periodic Controlled Substance Monitoring: Possible medication side effects, risk of tolerance/dependence & alternative treatments discussed. ,No signs of potential drug abuse or diversion identified. ,Obtaining appropriate analgesic effect of treatment.  Christina Julien, APRCRISTELA - CNP)      Past Medical History: Diagnosis Date    Aortic valve stenosis     mild per chart    Arthritis     CAD (coronary artery disease) 2000    1 STENT, Dr. Ava Bryan, [place]    Chronic kidney disease     Diabetes mellitus ([address]     Dr. Lou Cates    GERD (gastroesophageal reflux disease)     Heart murmur     1962    History of echocardiogram 06/23/2021    in epic.     History of kidney stones     Hyperlipidemia 2004    ON RX    MI, old     states many and they were mild    Pulmonary stenosis 1995    Pulmonary valve stenosis     mild/congenital per chart    Sciatica     right leg    Thyroid disease 2004    HYPOTHYROIDISM ON RX    Under care of team 12/07/2021    cadiology-[facility] visit june 2021    Under care of team 12/07/2021    nephrology-Dr [place]-last visit nov 2021    Under care of team 12/07/2021    pcp-Dr Lowe-last visit nov 2021    Wears glasses        Past Surgical History:   Procedure Laterality Date    BLADDER SURGERY Left 09/24/2021    CYSTOSCOPY RETROGRADE PYELOGRAM,  URETERAL STENT REMOVAL performed by Nataliia Bolton MD at [address] [address]    in CE, negative    CARPAL TUNNEL RELEASE      CERVICAL FUSION  01/01/1993    CORONARY ANGIOPLASTY WITH STENT PLACEMENT  01/01/2000    1 STENT    CYSTOSCOPY Left 06/21/2021    CYSTOSCOPY URETERAL STENT INSERTION performed by Nataliia Bolton MD at [facility]  09/24/2021    CYSTOSCOPY RETROGRADE PYELOGRAM,  URETERAL STENT REMOVAL left    FINGER SURGERY Left 03/21/2014    [place] ARTHOPLASTY LEFT THUMB    FOREARM SURGERY Left 12/10/2021    OPEN REDUCTION INTERNAL FIXATION LEFT DISTAL RADIUS performed by Silviano Arita DO at [address] [place] [address]  01/01/1980    WITH RT SALPINGOOPHERECTOMY    JOINT REPLACEMENT Bilateral 01/01/1994    PARTIAL-KNEES    OTHER SURGICAL HISTORY Right 09/23/2014    thumb repair    SALPINGO-OOPHORECTOMY Left 01/01/1987    SHOULDER SURGERY Left 01/01/1993 SPURS    TOE OSTEOTOMY Right 06/08/2016    Right 5th digit adductory wedge osteotomy with K wire fixation     WRIST FRACTURE SURGERY Left 12/10/2021    ORIF       Allergies   Allergen Reactions    Pcn [Penicillins] Swelling and Hives    Heparin Other (See Comments)     MIGRAINE      Lamotrigine Other (See Comments), Itching, Nausea Only and Rash    Cyclobenzaprine      Dry mouth, jitters         Current Outpatient Medications:     oxybutynin (DITROPAN-XL) 10 MG extended release tablet, Take 10 mg by mouth daily, Disp: , Rfl:     Ascorbic Acid (VITAMIN C PLUS WILD DON HIPS) 500 MG CHEW, Take 1 tablet by mouth daily , Disp: , Rfl:     LEVOTHYROXINE SODIUM PO, Take 112 mcg by mouth daily , Disp: , Rfl:     pramipexole (MIRAPEX) 0.125 MG tablet, Take 0.125 mg by mouth daily, Disp: , Rfl:     olopatadine (PATANOL) 0.1 % ophthalmic solution, Place 1 drop into both eyes as needed , Disp: , Rfl:     FLUoxetine (PROZAC) 10 MG capsule, Take 1 capsule by mouth daily, Disp: 30 capsule, Rfl: 3    amLODIPine (NORVASC) 10 MG tablet, Take 1 tablet by mouth daily, Disp: 30 tablet, Rfl: 3    ferrous sulfate (IRON 325) 325 (65 Fe) MG tablet, Take 1 tablet by mouth daily (with breakfast), Disp: 30 tablet, Rfl: 3    buPROPion (WELLBUTRIN XL) 300 MG extended release tablet, TAKE ONE TABLET BY MOUTH DAILY, Disp: , Rfl:     nystatin (NYAMYC) 408912 UNIT/GM powder, APPLY TO AFFECTED AREA(S) FOUR TIMES A DAY, Disp: , Rfl:     busPIRone (BUSPAR) 15 MG tablet, Take 15 mg by mouth 2 times daily, Disp: , Rfl:     isosorbide mononitrate (IMDUR) 30 MG extended release tablet, Take 1 tablet by mouth daily, Disp: 30 tablet, Rfl: 3    nystatin (MYCOSTATIN) 450984 UNIT/GM cream, Apply topically 2 times daily Apply topically 2 times daily. , Disp: , Rfl:     atorvastatin (LIPITOR) 40 MG tablet, Take 40 mg by mouth daily , Disp: , Rfl:     nitroGLYCERIN (NITROSTAT) 0.4 MG SL tablet, Place 0.4 mg under the tongue every 5 minutes as needed for Chest pain up to max of 3 total doses. If no relief after 1 dose, call 911., Disp: , Rfl:     Multiple Vitamins-Minerals (THERAPEUTIC MULTIVITAMIN-MINERALS) tablet, Take 1 tablet by mouth daily. , Disp: , Rfl:     aspirin 81 MG EC tablet, Take 81 mg by mouth daily. LAST DOSE 14, Disp: , Rfl:     metoprolol (TOPROL-XL) 100 MG XL tablet, Take 100 mg by mouth daily. , Disp: , Rfl:     vitamin B-12 (CYANOCOBALAMIN) 500 MCG tablet, Take 500 mcg by mouth daily , Disp: , Rfl:     Family History   Problem Relation Age of Onset    Breast Cancer Mother 62        BREAST WITH METS T  LIVER AND LUNG    Other Father         AAA    Heart Disease Father     Asthma Sister     Diabetes Sister         NIDDM    Stroke Brother     Diabetes Brother         NIDDM    Stroke Maternal Grandmother     Asthma Son        Social History     Socioeconomic History    Marital status:      Spouse name: Not on file    Number of children: 1    Years of education: Not on file    Highest education level: Not on file   Occupational History    Not on file   Tobacco Use    Smoking status: Former Smoker     Packs/day: 1.00     Years: 30.00     Pack years: 30.00     Types: Cigarettes     Quit date: 3/19/2004     Years since quittin.8    Smokeless tobacco: Never Used   Vaping Use    Vaping Use: Never used   Substance and Sexual Activity    Alcohol use: Yes     Comment: socially, once a year    Drug use: No    Sexual activity: Not Currently   Other Topics Concern    Not on file   Social History Narrative    Not on file     Social Determinants of Health     Financial Resource Strain:     Difficulty of Paying Living Expenses: Not on file   Food Insecurity:     Worried About Running Out of Food in the Last Year: Not on file    Marielle of Food in the Last Year: Not on file   Transportation Needs:     Lack of Transportation (Medical): Not on file    Lack of Transportation (Non-Medical):  Not on file   Physical Activity:     Days of Exercise per Week: Not on file    Minutes of Exercise per Session: Not on file   Stress:     Feeling of Stress : Not on file   Social Connections:     Frequency of Communication with Friends and Family: Not on file    Frequency of Social Gatherings with Friends and Family: Not on file    Attends [employer] Services: Not on file    Active Member of [address] [place] [address] or Organizations: Not on file    Attends Club or Organization Meetings: Not on file    Marital Status: Not on file   Intimate Partner Violence:     Fear of Current or Ex-Partner: Not on file    Emotionally Abused: Not on file    Physically Abused: Not on file    Sexually Abused: Not on file   Housing Stability:     Unable to Pay for Housing in the Last Year: Not on file    Number of [place] in the Last Year: Not on file    Unstable Housing in the Last Year: Not on file       Review of Systems:  Review of Systems   Constitutional: Negative for chills and fever. Cardiovascular: Negative for chest pain and palpitations. Respiratory: Negative for cough and shortness of breath. Musculoskeletal: Positive for neck pain. Gastrointestinal: Negative for constipation. Neurological: Positive for headaches. Negative for disturbances in coordination and loss of balance. Physical Exam:  [facility] 03/19/1980     Physical Exam  HENT:      Head: Normocephalic. Pulmonary:      Effort: Pulmonary effort is normal.   Musculoskeletal:         General: Normal range of motion. Cervical back: Normal range of motion. Tenderness present. Back:    Skin:     General: Skin is warm and dry. Neurological:      Mental Status: She is alert and oriented to person, place, and time.          Record/Diagnostics Review:    Last miles 10/2021 and was appropriate - no metabolites    Assessment:  Problem List Items Addressed This Visit     Degenerative lumbar spinal stenosis    Relevant Medications    HYDROcodone-acetaminophen (NORCO) 5-325 MG per tablet    DDD (degenerative disc disease), lumbar    Relevant Medications    HYDROcodone-acetaminophen (NORCO) 5-325 MG per tablet    Lumbosacral spondylosis without myelopathy - Primary    Relevant Medications    HYDROcodone-acetaminophen (NORCO) 5-325 MG per tablet    DDD (degenerative disc disease), cervical    Relevant Medications    HYDROcodone-acetaminophen (NORCO) 5-325 MG per tablet    Cervical stenosis of spine    Osteoarthritis of spine with radiculopathy, cervical region    Relevant Medications    HYDROcodone-acetaminophen (NORCO) 5-325 MG per tablet    Encounter for long-term opiate analgesic use             Treatment Plan:  Patient relates current medications are helping the pain. Patient reports taking pain medications as prescribed, denies obtaining medications from different sources and denies use of illegal drugs. Patient denies side effects from medications like nausea, vomiting, constipation or drowsiness. Patient reports current activities of daily living are possible due to medications and would like to continue them. As always, we encourage daily stretching and strengthening exercises, and recommend minimizing use of pain medications unless patient cannot get through daily activities due to pain. Contract requirements met. Continue opioid therapy. Script written for raffi  Will see Dr. Jeffrey Stewart this month to discuss neck surgery.   Continues to follow with orthopedics following left wrist fracture and surgery  Follow up appointment made for 4 weeks

## 2022-01-13 ENCOUNTER — OFFICE VISIT (OUTPATIENT)
Dept: ORTHOPEDIC SURGERY | Age: 72
End: 2022-01-13
Payer: MEDICARE

## 2022-01-13 VITALS — WEIGHT: 180 LBS | BODY MASS INDEX: 29.99 KG/M2 | HEIGHT: 65 IN | RESPIRATION RATE: 14 BRPM

## 2022-01-13 DIAGNOSIS — M48.02 CERVICAL STENOSIS OF SPINE: Primary | ICD-10-CM

## 2022-01-13 DIAGNOSIS — M50.30 DDD (DEGENERATIVE DISC DISEASE), CERVICAL: ICD-10-CM

## 2022-01-13 PROCEDURE — 99213 OFFICE O/P EST LOW 20 MIN: CPT | Performed by: ORTHOPAEDIC SURGERY

## 2022-01-13 NOTE — PROGRESS NOTES
Patient ID: aT Abbasi is a 70 y.o. female    Chief Compliant:  Chief Complaint   Patient presents with    Neck Pain        Diagnostic imaging:    MRI is again reviewed patient cervical stenosis C3-4 and C5-6 history of C4-5 anterior cervical discectomy and fusion    Assessment and Plan:  1. Cervical stenosis of spine    2. DDD (degenerative disc disease), cervical      Patient has failure of cervical epidurals therapy etc.  patient with moderate spinal stenosis C3-4 and C5-6    We will proceed with ACDF C3-4 and C5-6     We discussed risks of surgery and recovery process. Risks include infection, bleeding, neurologic injury, systemic and anesthetic complications, no relief of pain, need for future surgery. Follow up 2 weeks after surgery    HPI:  This is a 70 y.o. female who presents to the clinic today for neck pain. Patient with ongoing neck pain that has worsened since the last visit. Pain is beginning to radiate to her shoulders. She is interested in proceeding with surgery at this time. Review of Systems   All other systems reviewed and are negative. Past History:    Current Outpatient Medications:     Bioflavonoid Products (BIOFLEX PO), Take by mouth, Disp: , Rfl:     HYDROcodone-acetaminophen (NORCO) 5-325 MG per tablet, Take 1 tablet by mouth every 8 hours as needed for Pain for up to 30 days. , Disp: 90 tablet, Rfl: 0    Ascorbic Acid (VITAMIN C PLUS WILD DON HIPS) 500 MG CHEW, Take 1 tablet by mouth daily , Disp: , Rfl:     LEVOTHYROXINE SODIUM PO, Take 112 mcg by mouth daily , Disp: , Rfl:     pramipexole (MIRAPEX) 0.125 MG tablet, Take 0.125 mg by mouth daily, Disp: , Rfl:     olopatadine (PATANOL) 0.1 % ophthalmic solution, Place 1 drop into both eyes as needed , Disp: , Rfl:     FLUoxetine (PROZAC) 10 MG capsule, Take 1 capsule by mouth daily, Disp: 30 capsule, Rfl: 3    amLODIPine (NORVASC) 10 MG tablet, Take 1 tablet by mouth daily, Disp: 30 tablet, Rfl: 3    ferrous sulfate (IRON 325) 325 (65 Fe) MG tablet, Take 1 tablet by mouth daily (with breakfast), Disp: 30 tablet, Rfl: 3    buPROPion (WELLBUTRIN XL) 300 MG extended release tablet, TAKE ONE TABLET BY MOUTH DAILY, Disp: , Rfl:     nystatin (NYAMYC) 914662 UNIT/GM powder, APPLY TO AFFECTED AREA(S) FOUR TIMES A DAY, Disp: , Rfl:     busPIRone (BUSPAR) 15 MG tablet, Take 15 mg by mouth 2 times daily, Disp: , Rfl:     isosorbide mononitrate (IMDUR) 30 MG extended release tablet, Take 1 tablet by mouth daily, Disp: 30 tablet, Rfl: 3    nystatin (MYCOSTATIN) 629323 UNIT/GM cream, Apply topically 2 times daily Apply topically 2 times daily. , Disp: , Rfl:     atorvastatin (LIPITOR) 40 MG tablet, Take 40 mg by mouth daily , Disp: , Rfl:     nitroGLYCERIN (NITROSTAT) 0.4 MG SL tablet, Place 0.4 mg under the tongue every 5 minutes as needed for Chest pain up to max of 3 total doses. If no relief after 1 dose, call 911., Disp: , Rfl:     Multiple Vitamins-Minerals (THERAPEUTIC MULTIVITAMIN-MINERALS) tablet, Take 1 tablet by mouth daily. , Disp: , Rfl:     aspirin 81 MG EC tablet, Take 81 mg by mouth daily. LAST DOSE 9/8/14, Disp: , Rfl:     metoprolol (TOPROL-XL) 100 MG XL tablet, Take 100 mg by mouth daily. , Disp: , Rfl:     vitamin B-12 (CYANOCOBALAMIN) 500 MCG tablet, Take 500 mcg by mouth daily , Disp: , Rfl:   Allergies   Allergen Reactions    Pcn [Penicillins] Swelling and Hives    Heparin Other (See Comments)     MIGRAINE      Lamotrigine Other (See Comments), Itching, Nausea Only and Rash    Cyclobenzaprine      Dry mouth, jitters     Social History     Socioeconomic History    Marital status:       Spouse name: Not on file    Number of children: 1    Years of education: Not on file    Highest education level: Not on file   Occupational History    Not on file   Tobacco Use    Smoking status: Former Smoker     Packs/day: 1.00     Years: 30.00     Pack years: 30.00     Types: Cigarettes     Quit date: 3/19/2004     Years since quittin.8    Smokeless tobacco: Never Used   Vaping Use    Vaping Use: Never used   Substance and Sexual Activity    Alcohol use: Yes     Comment: socially, once a year    Drug use: No    Sexual activity: Not Currently   Other Topics Concern    Not on file   Social History Narrative    Not on file     Social Determinants of Health     Financial Resource Strain:     Difficulty of Paying Living Expenses: Not on file   Food Insecurity:     Worried About Running Out of Food in the Last Year: Not on file    Marielle of Food in the Last Year: Not on file   Transportation Needs:     Lack of Transportation (Medical): Not on file    Lack of Transportation (Non-Medical):  Not on file   Physical Activity:     Days of Exercise per Week: Not on file    Minutes of Exercise per Session: Not on file   Stress:     Feeling of Stress : Not on file   Social Connections:     Frequency of Communication with Friends and Family: Not on file    Frequency of Social Gatherings with Friends and Family: Not on file    Attends Yarsani Services: Not on file    Active Member of [address] [place] [address] or Organizations: Not on file    Attends Club or Organization Meetings: Not on file    Marital Status: Not on file   Intimate Partner Violence:     Fear of Current or Ex-Partner: Not on file    Emotionally Abused: Not on file    Physically Abused: Not on file    Sexually Abused: Not on file   Housing Stability:     Unable to Pay for Housing in the Last Year: Not on file    Number of [place] in the Last Year: Not on file    Unstable Housing in the Last Year: Not on file     Past Medical History:   Diagnosis Date    Aortic valve stenosis     mild per chart    Arthritis     CAD (coronary artery disease)     1 STENT, Dr. Rocio Vu [place]    Chronic kidney disease     Diabetes mellitus ([facility] [address]     Dr. Ronald Asencio    GERD (gastroesophageal reflux disease)     Heart murmur         History of echocardiogram 06/23/2021    in epic.     History of kidney stones     Hyperlipidemia 2004    ON RX    MI, old     states many and they were mild    Pulmonary stenosis 1995    Pulmonary valve stenosis     mild/congenital per chart    Sciatica     right leg    Thyroid disease 2004    HYPOTHYROIDISM ON RX    Under care of team 12/07/2021    cadiology-Dr Kait[address] visit june 2021    Under care of team 12/07/2021    nephrology-Dr [place]-last visit nov 2021    Under care of team 12/07/2021    pcp-Dr [address] visit nov 2021    Wears glasses      Past Surgical History:   Procedure Laterality Date    BLADDER SURGERY Left 09/24/2021    CYSTOSCOPY RETROGRADE PYELOGRAM,  URETERAL STENT REMOVAL performed by Blessing Saxena MD at [address]  2019    in CE, negative    CARPAL TUNNEL RELEASE      CERVICAL FUSION  01/01/1993    CORONARY ANGIOPLASTY WITH STENT PLACEMENT  01/01/2000    1 STENT    CYSTOSCOPY Left 06/21/2021    CYSTOSCOPY URETERAL STENT INSERTION performed by Blessing Saxena MD at [address]  09/24/2021    CYSTOSCOPY RETROGRADE PYELOGRAM,  URETERAL STENT REMOVAL left    FINGER SURGERY Left 03/21/2014    [facility] ARTHOPLASTY LEFT THUMB    FOREARM SURGERY Left 12/10/2021    OPEN REDUCTION INTERNAL FIXATION LEFT DISTAL RADIUS performed by Waleska Piedra DO at [address] [facility]  01/01/1980    WITH RT [address] Bilateral 01/01/1994    PARTIAL-KNEES    OTHER SURGICAL HISTORY Right 09/23/2014    thumb repair    SALPINGO-OOPHORECTOMY Left 01/01/1987    SHOULDER SURGERY Left 01/01/1993    SPURS    TOE OSTEOTOMY Right 06/08/2016    Right 5th digit adductory wedge osteotomy with K wire fixation     WRIST FRACTURE SURGERY Left 12/10/2021    ORIF     Family History   Problem Relation Age of Onset    Breast Cancer Mother 62        BREAST WITH METS T  LIVER AND LUNG    Other Father         AAA    Heart Disease Father     Asthma Sister     Diabetes Sister         NIDDM    Stroke Brother     Diabetes Brother         NIDDM    Stroke Maternal Grandmother     Asthma Son         Physical Exam:  Vitals signs and nursing note reviewed. Constitutional:       Appearance: well-developed. HENT:      Head: Normocephalic and atraumatic. Nose: Nose normal.   Eyes:      Conjunctiva/sclera: Conjunctivae normal.   Neck:      Musculoskeletal: Normal range of motion and neck supple. Pulmonary:      Effort: Pulmonary effort is normal. No respiratory distress. Musculoskeletal:      Comments: Normal gait     Skin:     General: Skin is warm and dry. Neurological:      Mental Status: Alert and oriented to person, place, and time. Sensory: No sensory deficit. Psychiatric:         Behavior: Behavior normal.         Thought Content: Thought content normal.        Provider Attestation:  Brian Mack, personally performed the services described in this documentation. All medical record entries made by the scribe were at my direction and in my presence. I have reviewed the chart and discharge instructions and agree that the records reflect my personal performance and is accurate and complete. Raquel Webb MD 1/13/22     Scribe Attestation:  By signing my name below, Sona Merritt, attest that this documentation has been prepared under the direction and in the presence of Dr. Art Kessler. Electronically signed: Akilah Obando, 1/13/22     Please note that this chart was generated using voice recognition Dragon dictation software. Although every effort was made to ensure the accuracy of this automated transcription, some errors in transcription may have occurred.

## 2022-01-19 ENCOUNTER — TELEPHONE (OUTPATIENT)
Dept: ORTHOPEDIC SURGERY | Age: 72
End: 2022-01-19

## 2022-01-19 NOTE — TELEPHONE ENCOUNTER
If she iis not having have redness, opening of the skin where the plate is, and no increased pain, I would say she is fine to wait until her appointment next week. Encourage ice, elevation and use of wrist brace. Maybe she was overdoing it with activity? She should still be nonweightbearing/ no pushing/pulling/lifting > 5lbs until we see her.  Thanks

## 2022-01-19 NOTE — TELEPHONE ENCOUNTER
Patient called in concerned  with swelling of knuckle, on left hand. Patient has an upcoming appointment on 01/25/2022. Patient is POST OP - ORIF with dorsal spanning plate on 17/25/4113. She states she has been moving her fingers, no signs of redness, no openings of the skin, no fever, no chills. She denies elevated her left wrist.  I encouraged patient to elevate, and continue to move her fingers. I also stated - if she feels like she can not wait until next week to be seen or things get worse before her scheduled appointment, she should go to the ER . Any other recommendations? Please advise?

## 2022-01-21 DIAGNOSIS — S52.502A CLOSED FRACTURE OF DISTAL END OF LEFT RADIUS, UNSPECIFIED FRACTURE MORPHOLOGY, INITIAL ENCOUNTER: Primary | ICD-10-CM

## 2022-01-25 ENCOUNTER — OFFICE VISIT (OUTPATIENT)
Dept: ORTHOPEDIC SURGERY | Age: 72
End: 2022-01-25

## 2022-01-25 DIAGNOSIS — S52.502A CLOSED FRACTURE OF DISTAL END OF LEFT RADIUS, UNSPECIFIED FRACTURE MORPHOLOGY, INITIAL ENCOUNTER: Primary | ICD-10-CM

## 2022-01-25 PROCEDURE — 99024 POSTOP FOLLOW-UP VISIT: CPT | Performed by: ORTHOPAEDIC SURGERY

## 2022-01-25 NOTE — PROGRESS NOTES
[facility]  [id number] [facility]  Dept Phone: [phone]  Dept Fax: [phone]      [facility] Follow Up      Subjective:   Date of Surgery: 12/10/21    Marilyn Richards is a 70y.o. year old female who presents to the clinic today for routine follow up 6 weeks (5 days) post op from left distal radius fracture treated with dorsal spanning plate. She has been doing well postoperatively. She does endorse some new onset burning pain in the dorsum of her index finger, and persistent numbness over the dorsum of her middle finger. She has no issues with her other fingers, save stiffness, especially in the index. Otherwise she is doing well. Recently, she lost a close friend. Review of Systems  Gen: no fever, chills, malaise  CV: no chest pain or palpitations  Resp: no cough or shortness of breath  GI: no nausea, vomiting, diarrhea, or constipation  Neuro: no seizures, vertigo, or headache  Msk: See HPI  10 remaining systems reviewed and negative    Objective : There were no vitals filed for this visit. There is no height or weight on file to calculate BMI. General: No acute distress, resting comfortably in the clinic  Neuro: alert. oriented  Eyes: Extra-ocular muscles intact  Pulm: Respirations unlabored and regular. Skin: warm, well perfused  Psych:   Patient has good fund of knowledge and displays understanding of exam, diagnosis, and plan. MSK:  Left upper extremity: Surgical incisions are healing well without any signs of infection or dehiscence. No range of motion of the wrist, as expected. Limited range of motion with full fist.  Intrinsic minus fist is also limited. Dysesthesias over the superficial radial nerve distribution affecting the index and middle fingers. Radial pulse 2+.     Radiology:  History: Left radial styloid fx with radiocarpal instability s/p dorsal spanning fixation    Comparison: 12/23/21    Findings: 3 views of the left wrist (AP, lateral, oblique) in a skeletally mature patient showing evidence of left distal radial styloid fracture s/p spanning dorsal plate. No change in hardware alignment, position, or fixation (including radiolucency about plates/screws). Fracture alignment remains well positioned. No loss of radiocarpal alignment. Impression: Left radial styloid fracture and radiocarpal instability s/p dorsal spanning plate without complications     Assessment:   70y.o. year old female with left radial styloid fracture and radiocarpal instability status post dorsal spanning plate fixation  Plan:   Plan for elective removal of her dorsal spanning plate at her earliest convenience, she recently lost a friend so would like to postpone removal until after the   Recommended OT for her stiff fingers, but she declined. Pain being managed by pain management  She may call and schedule for surgery when she is ready  We will see her 2 weeks postoperatively. Follow up:Return for 2 weeks post-op. No orders of the defined types were placed in this encounter. No orders of the defined types were placed in this encounter. Electronically signed by Sherly Blanco MD on 2022 at 11:21 AM    This note is created with the assistance of a speech recognition program.  While intending to generate a document that actually reflects the content of the visit, the document can still have some errors including those of syntax and sound a like substitutions which may escape proof reading.   In such instances, actual meaning can be extrapolated by contextual diversion

## 2022-02-07 RX ORDER — PANTOPRAZOLE SODIUM 20 MG/1
20 TABLET, DELAYED RELEASE ORAL NIGHTLY
COMMUNITY
Start: 2022-01-03

## 2022-02-07 NOTE — PROGRESS NOTES
Christi 89 PROGRESS NOTE      Patient  completed []  video visit   []   phone call:         Minutes :   [x] in person visit to pain clinic    [x]    to  review Medication Agreement    []  Follow up after procedure   []  Discuss treatment options      Location:  Provider:  working from    []    home    [x]   [facility] - TERESA RIVERA ,   patient at   [x] pain clinic     []  home         Chief Complaint:   Neck  And low back pain    She c/o neck pain down both arms , more on the left side to between  Shoulder blades. She is having cervical fusion  2-28-22. She has history of cervical fusion C4-C5. She had right cervical  facet injection on 3- C2-T1 with 75% relief, Her last cervical MRI  1-28-21read as\" moderate central canal stenosis at C3-C4, C5-C6\" She c/o low back pain  Radiating to ankles. She states had injections which helped for a few weeks  But she can no longer afford. She will be having luiza taken out of left wrist, possibly for  A fracture 6 weeks ago,. She does not sleep well. Neck Pain   This is a chronic problem. The current episode started more than 1 year ago. The problem occurs constantly. The problem has been gradually worsening. The pain is associated with nothing. The pain is present in the midline (down arms). The quality of the pain is described as aching. The pain is at a severity of 8/10. The pain is severe. Exacerbated by: turning head a certain way, lifting, carrying. The pain is same all the time. Stiffness is present: varies. Associated symptoms include headaches. She has tried heat and oral narcotics for the symptoms. The treatment provided mild relief. Back Pain  This is a chronic problem. The current episode started more than 1 year ago. The problem occurs constantly. The problem has been gradually improving since onset. The pain is present in the lumbar spine. The quality of the pain is described as aching. Radiates to: down legs.  The pain is at a severity of 6/10. The pain is moderate. The pain is the same all the time. The symptoms are aggravated by bending and standing (steps,). Associated symptoms include headaches. (Weakness legs) She has tried analgesics, heat and ice (towl arch of back) for the symptoms. The treatment provided mild relief. Treatment goals:  Functional status: get surgery on neck      Aberrancy:   Any alcoholic beverages  no          Any illegal drugs   no      Analgesia:       6,8              Adverse  Effects :no      ADL;s :paces activities      Data:    When was thelast UDS:10-8-21            Was the UDS appropriate:  [] yes []   no      Record/Diagnostics Review:      As above, I did review the imaging     DRUG SCREEN, PAIN  Order: [id number]  Status: Edited Result - FINAL    Visible to patient: Yes (not seen)    Next appt: 02/08/2022 at 10:20 AM in Pain Management QI Powell CNP)    0 Result Notes    Component Ref Range & Units 10/8/21 1422 7/15/21 1401 6/21/21 1835 3/15/21 1245 8/14/17 1419   Pain Management Drug Panel Interp, Urine  Inconsistent VCARUP    See Note CMARUP  Inconsistent CM    Comment: (NOTE)   Originally reported on 10/13/21 08:43 (Date/Mountain Time). Result value changed. The footnote has been corrected.    Corrected footnote:   DRUGS EXPECTED:   [address] (HYDROCODONE) [10/04/2021]   ________________________________________________________________   INCONSISTENT with medications provided:   Hydrocodone   ________________________________________________________________   Drugs Not Included in this Assay:   Acetaminophen   Previously reported with incorrect footnote:   ________________________________________________________________   DRUGS EXPECTED:   NORCO (HYDROCODONE) [10/04/2021]   ________________________________________________________________   CONSISTENT with medications provided:   NORCO (HYDROCODONE) : based on hydrocodone ________________________________________________________________   Drugs Not Included in this Assay:           EXAMINATION:   MRI OF THE CERVICAL SPINE WITHOUT CONTRAST 1/28/2021 10:20 am       TECHNIQUE:   Multiplanar multisequence MRI of the cervical spine was performed without the   administration of intravenous contrast.       COMPARISON:   None.       HISTORY:   ORDERING SYSTEM PROVIDED HISTORY: Neck pain   TECHNOLOGIST PROVIDED HISTORY:   neck pain nonresponsive to physical therapy   Reason for Exam: neck pain nonresponsive to physical therapy, Neck pain   Acuity: Chronic   Type of Exam: Unknown   Additional signs and symptoms: Pt c/o neck pain & headache; no known injury   Relevant Medical/Surgical History: hx neck surgery       FINDINGS:   BONES/ALIGNMENT: There is normal alignment of the spine. The vertebral body   heights are maintained.  The bone marrow signal appears unremarkable.  No   acute fracture is identified.       There is fusion of the C4 and C5 vertebral bodies.       SPINAL CORD: No abnormal cord signal is seen.       SOFT TISSUES: No paraspinal mass identified.       C2-C3: There is no significant disc protrusion, spinal canal stenosis or   neural foraminal narrowing.       C3-C4: Broad disc osteophyte complex and posterior ligamentous hypertrophy   results in moderate central canal stenosis.  The left foramina is severely   stenotic.       C4-C5: There is no significant disc protrusion, spinal canal stenosis or   neural foraminal narrowing.       C5-C6: Broad disc osteophyte complex, facet arthropathy and ligamentum flavum   hypertrophy identified resulting in moderate central canal stenosis.  Severe   right foraminal stenosis and moderate left foraminal stenosis are identified   due to uncovertebral joint hypertrophy and facet disease.       C6-C7: Mild left foraminal stenosis identified due to uncovertebral joint   hypertrophy.  Annular disc bulge flattens the thecal sac and results in mild   central canal stenosis.       C7-T1: There is no significant disc protrusion, spinal canal stenosis or   neural foraminal narrowing.           Impression   Fusion of the C4 and C5 vertebral bodies.       Moderate central canal stenosis at C3-4 with associated severe left foraminal   stenosis.       Moderate central canal stenosis at C5-6.  Severe right foraminal stenosis and   moderate left foraminal stenosis are noted at this level.       Mild central canal stenosis and mild left foraminal stenosis at C6-7.                       Pill count: appropriate    fill date : 2-10-22  4 hydrocodone tabs   Morphine equivalent dose as reported on OARRS:15  Periodic Controlled Substance Monitoring: Possible medication side effects, risk of tolerance/dependence & alternative treatments discussed. ,No signs of potential drug abuse or diversion identified. ,Assessed functional status. ,Obtaining appropriate analgesic effect of treatment. Day Paredes, APRN - CNP)  Review ofOARRS does not show any aberrant prescription behavior. Medication is helping the patient stay active. Patient denies any side effects and reports adequate analgesia. No sign of misuse/abuse. Past Medical History:   Diagnosis Date    Aortic valve stenosis     mild per chart    Arthritis     CAD (coronary artery disease) 2000 1 STENT, Dr. Elli Gamez, [place]    Chronic kidney disease     Diabetes mellitus ([facility] [address]     Dr. Cristi Sandhoff    GERD (gastroesophageal reflux disease)     Heart murmur     1962    History of echocardiogram 06/23/2021    in epic.     History of kidney stones     Hyperlipidemia 2004    ON RX    MI, old     states many and they were mild    Pulmonary stenosis 1995    Pulmonary valve stenosis     mild/congenital per chart    Sciatica     right leg    Thyroid disease 2004    HYPOTHYROIDISM ON RX    Under care of team 12/07/2021    cadiology- [facility] visit june 2021    Under care of team 12/07/2021 nephrology-Dr [place]-last visit nov 2021    Under care of team 12/07/2021    pcp-Dr Dixon 44 visit nov 2021    Wears glasses        Past Surgical History:   Procedure Laterality Date    BLADDER SURGERY Left 09/24/2021    CYSTOSCOPY RETROGRADE PYELOGRAM,  URETERAL STENT REMOVAL performed by [facility]rely Kurtz [facility] at [facility] [place]  2019    in CE, negative    CARPAL TUNNEL RELEASE      CERVICAL FUSION  01/01/1993    CORONARY ANGIOPLASTY WITH STENT PLACEMENT  01/01/2000    1 STENT    CYSTOSCOPY Left 06/21/2021    CYSTOSCOPY URETERAL STENT INSERTION performed by Arely Kurtz MD at [place] [address]  09/24/2021    CYSTOSCOPY RETROGRADE PYELOGRAM,  URETERAL STENT REMOVAL left    FINGER SURGERY Left 03/21/2014    [place] ARTHOPLASTY LEFT THUMB    FOREARM SURGERY Left 12/10/2021    OPEN REDUCTION INTERNAL FIXATION LEFT DISTAL RADIUS performed by Ousmane Lieberman DO at [address]  01/01/1980    WITH RT SALPINGOOPHERECTOMY    JOINT REPLACEMENT Bilateral 01/01/1994    PARTIAL-KNEES    OTHER SURGICAL HISTORY Right 09/23/2014    thumb repair    SALPINGO-OOPHORECTOMY Left 01/01/1987    SHOULDER SURGERY Left 01/01/1993    SPURS    TOE OSTEOTOMY Right 06/08/2016    Right 5th digit adductory wedge osteotomy with K wire fixation     WRIST FRACTURE SURGERY Left 12/10/2021    ORIF       Allergies   Allergen Reactions    Pcn [Penicillins] Swelling and Hives    Heparin Other (See Comments)     MIGRAINE      Lamotrigine Other (See Comments), Itching, Nausea Only and Rash    Cyclobenzaprine      Dry mouth, jitters         Current Outpatient Medications:     [START ON 2/10/2022] HYDROcodone-acetaminophen (NORCO) 5-325 MG per tablet, Take 1 tablet by mouth every 8 hours as needed for Pain for up to 30 days. , Disp: 90 tablet, Rfl: 0    pantoprazole (PROTONIX) 20 MG tablet, , Disp: , Rfl:     Bioflavonoid Products (BIOFLEX PO), Take by mouth, Disp: , Rfl:    Ascorbic Acid (VITAMIN C PLUS WILD DON HIPS) 500 MG CHEW, Take 1 tablet by mouth daily , Disp: , Rfl:     LEVOTHYROXINE SODIUM PO, Take 112 mcg by mouth daily , Disp: , Rfl:     pramipexole (MIRAPEX) 0.125 MG tablet, Take 0.125 mg by mouth daily, Disp: , Rfl:     FLUoxetine (PROZAC) 10 MG capsule, Take 1 capsule by mouth daily, Disp: 30 capsule, Rfl: 3    amLODIPine (NORVASC) 10 MG tablet, Take 1 tablet by mouth daily, Disp: 30 tablet, Rfl: 3    ferrous sulfate (IRON 325) 325 (65 Fe) MG tablet, Take 1 tablet by mouth daily (with breakfast), Disp: 30 tablet, Rfl: 3    buPROPion (WELLBUTRIN XL) 300 MG extended release tablet, TAKE ONE TABLET BY MOUTH DAILY, Disp: , Rfl:     busPIRone (BUSPAR) 15 MG tablet, Take 15 mg by mouth 2 times daily, Disp: , Rfl:     isosorbide mononitrate (IMDUR) 30 MG extended release tablet, Take 1 tablet by mouth daily, Disp: 30 tablet, Rfl: 3    atorvastatin (LIPITOR) 40 MG tablet, Take 40 mg by mouth daily , Disp: , Rfl:     Multiple Vitamins-Minerals (THERAPEUTIC MULTIVITAMIN-MINERALS) tablet, Take 1 tablet by mouth daily. , Disp: , Rfl:     aspirin 81 MG EC tablet, Take 81 mg by mouth daily. LAST DOSE 9/8/14, Disp: , Rfl:     metoprolol (TOPROL-XL) 100 MG XL tablet, Take 100 mg by mouth daily. , Disp: , Rfl:     vitamin B-12 (CYANOCOBALAMIN) 500 MCG tablet, Take 500 mcg by mouth daily , Disp: , Rfl:     olopatadine (PATANOL) 0.1 % ophthalmic solution, Place 1 drop into both eyes as needed , Disp: , Rfl:     nystatin (NYAMYC) 393276 UNIT/GM powder, APPLY TO AFFECTED AREA(S) FOUR TIMES A DAY, Disp: , Rfl:     nystatin (MYCOSTATIN) 786889 UNIT/GM cream, Apply topically 2 times daily Apply topically 2 times daily. , Disp: , Rfl:     nitroGLYCERIN (NITROSTAT) 0.4 MG SL tablet, Place 0.4 mg under the tongue every 5 minutes as needed for Chest pain up to max of 3 total doses.  If no relief after 1 dose, call 911., Disp: , Rfl:     Family History   Problem Relation Age of Onset  Breast Cancer Mother 62        BREAST WITH METS T  LIVER AND LUNG    Other Father         AAA    Heart Disease Father     Asthma Sister     Diabetes Sister         NIDDM    Stroke Brother     Diabetes Brother         NIDDM    Stroke Maternal Grandmother     Asthma Son        Social History     Socioeconomic History    Marital status:      Spouse name: Not on file    Number of children: 1    Years of education: Not on file    Highest education level: Not on file   Occupational History    Not on file   Tobacco Use    Smoking status: Former Smoker     Packs/day: 1.00     Years: 30.00     Pack years: 30.00     Types: Cigarettes     Quit date: 3/19/2004     Years since quittin.9    Smokeless tobacco: Never Used   Vaping Use    Vaping Use: Never used   Substance and Sexual Activity    Alcohol use: Yes     Comment: socially, once a year    Drug use: No    Sexual activity: Not Currently   Other Topics Concern    Not on file   Social History Narrative    Not on file     Social Determinants of Health     Financial Resource Strain:     Difficulty of Paying Living Expenses: Not on file   Food Insecurity:     Worried About Running Out of Food in the Last Year: Not on file    Marielle of Food in the Last Year: Not on file   Transportation Needs:     Lack of Transportation (Medical): Not on file    Lack of Transportation (Non-Medical):  Not on file   Physical Activity:     Days of Exercise per Week: Not on file    Minutes of Exercise per Session: Not on file   Stress:     Feeling of Stress : Not on file   Social Connections:     Frequency of Communication with Friends and Family: Not on file    Frequency of Social Gatherings with Friends and Family: Not on file    Attends Yazdanism Services: Not on file    Active Member of Clubs or Organizations: Not on file    Attends Club or Organization Meetings: Not on file    Marital Status: Not on file   Intimate Partner Violence:     Fear of Current or Ex-Partner: Not on file    Emotionally Abused: Not on file    Physically Abused: Not on file    Sexually Abused: Not on file   Housing Stability:     Unable to Pay for Housing in the Last Year: Not on file    Number of Places Lived in the Last Year: Not on file    Unstable Housing in the Last Year: Not on file         Review of Systems:  Review of Systems   Constitutional: Negative. HENT: Negative. Eyes:        Glasses   Cardiovascular: Negative. Respiratory: Negative. Endocrine:        Diabetic   Hematologic/Lymphatic: Bruises/bleeds easily. Skin: Negative. Musculoskeletal: Positive for back pain and neck pain. Right hip   Gastrointestinal: Negative. Genitourinary: Negative. Neurological: Positive for headaches and loss of balance. Psychiatric/Behavioral: The patient is nervous/anxious. Managing,anxiety attacks         Physical Exam:  BP (!) 114/50   Pulse 63   Temp 96 °F (35.6 °C) (Skin)   Resp 16   Ht 5' 5\" (1.651 m)   Wt 185 lb (83.9 kg)   LMP 03/19/1980   SpO2 95%   BMI 30.79 kg/m²     Physical Exam  Pulmonary:      Effort: Pulmonary effort is normal.   Skin:            Comments: Splint left wrist, left wrist tender    Tender cervical and lumbar paraspinal muscles   Neurological:      Mental Status: She is alert and oriented to person, place, and time. Psychiatric:         Mood and Affect: Mood normal.         Thought Content:  Thought content normal.           Assessment:        Problem List Items Addressed This Visit     Sacroiliitis ([facility] [address]    Relevant Medications    HYDROcodone-acetaminophen (NORCO) 5-325 MG per tablet (Start on 2/10/2022)    Osteoarthritis of spine with radiculopathy, cervical region    Relevant Medications    HYDROcodone-acetaminophen (NORCO) 5-325 MG per tablet (Start on 2/10/2022)    Lumbosacral spondylosis without myelopathy    Relevant Medications    HYDROcodone-acetaminophen (NORCO) 5-325 MG per tablet (Start on condition mayhave on their  ability to drive or operate machinery. Instructed not to drive or operate machinery if drowsy     I also discussed with the patient regarding the dangers of combining narcotic pain medication with tranquilizers, alcohol or illegal drugs or taking the medication any way other than prescribed. The dangers were discussed  including respiratory depression and death. Patient was told to tell  all  physicians regarding the medications he is getting from pain clinic. Patient is warned not to take any unprescribed medications over-the-countermedications that can depress breathing . Patient is advised to talk to the pharmacist or physicians if planning to take any over-the-counter medications before  takeing them. Patient is strongly advised to avoid tranquilizers or  relaxants, illegal drugs  or any medications that can depress breathing  Patient is also advised to tell us if there is any changes in their medications from other physicians.         TREATMENT OPTIONS:       Medication Agreement Requirements Met  Continue Opioid therapy  Script written for  Hydrocodone  Follow up appointment made

## 2022-02-08 ENCOUNTER — HOSPITAL ENCOUNTER (OUTPATIENT)
Dept: PAIN MANAGEMENT | Age: 72
Discharge: HOME | End: 2022-02-08
Payer: MEDICARE

## 2022-02-08 VITALS
DIASTOLIC BLOOD PRESSURE: 50 MMHG | OXYGEN SATURATION: 95 % | HEART RATE: 63 BPM | HEIGHT: 65 IN | WEIGHT: 185 LBS | TEMPERATURE: 96 F | SYSTOLIC BLOOD PRESSURE: 114 MMHG | RESPIRATION RATE: 16 BRPM | BODY MASS INDEX: 30.82 KG/M2

## 2022-02-08 DIAGNOSIS — G89.29 CHRONIC MIDLINE LOW BACK PAIN WITHOUT SCIATICA: ICD-10-CM

## 2022-02-08 DIAGNOSIS — M47.817 LUMBOSACRAL SPONDYLOSIS WITHOUT MYELOPATHY: ICD-10-CM

## 2022-02-08 DIAGNOSIS — M50.30 DDD (DEGENERATIVE DISC DISEASE), CERVICAL: ICD-10-CM

## 2022-02-08 DIAGNOSIS — M47.22 OSTEOARTHRITIS OF SPINE WITH RADICULOPATHY, CERVICAL REGION: ICD-10-CM

## 2022-02-08 DIAGNOSIS — M54.16 LUMBAR RADICULOPATHY: ICD-10-CM

## 2022-02-08 DIAGNOSIS — M48.02 CERVICAL STENOSIS OF SPINE: Primary | ICD-10-CM

## 2022-02-08 DIAGNOSIS — M48.061 DEGENERATIVE LUMBAR SPINAL STENOSIS: ICD-10-CM

## 2022-02-08 DIAGNOSIS — M54.50 CHRONIC MIDLINE LOW BACK PAIN WITHOUT SCIATICA: ICD-10-CM

## 2022-02-08 DIAGNOSIS — M51.36 DDD (DEGENERATIVE DISC DISEASE), LUMBAR: ICD-10-CM

## 2022-02-08 DIAGNOSIS — Z79.891 ENCOUNTER FOR LONG-TERM OPIATE ANALGESIC USE: ICD-10-CM

## 2022-02-08 DIAGNOSIS — M46.1 SACROILIITIS (HCC): ICD-10-CM

## 2022-02-08 PROCEDURE — 99213 OFFICE O/P EST LOW 20 MIN: CPT

## 2022-02-08 PROCEDURE — 99213 OFFICE O/P EST LOW 20 MIN: CPT | Performed by: NURSE PRACTITIONER

## 2022-02-08 RX ORDER — HYDROCODONE BITARTRATE AND ACETAMINOPHEN 5; 325 MG/1; MG/1
1 TABLET ORAL EVERY 8 HOURS PRN
Qty: 90 TABLET | Refills: 0 | Status: SHIPPED | OUTPATIENT
Start: 2022-02-10 | End: 2022-03-09 | Stop reason: SDUPTHER

## 2022-02-08 ASSESSMENT — PAIN - FUNCTIONAL ASSESSMENT: PAIN_FUNCTIONAL_ASSESSMENT: PREVENTS OR INTERFERES SOME ACTIVE ACTIVITIES AND ADLS

## 2022-02-08 ASSESSMENT — PAIN DESCRIPTION - ONSET: ONSET: ON-GOING

## 2022-02-08 ASSESSMENT — PAIN DESCRIPTION - PAIN TYPE: TYPE: CHRONIC PAIN

## 2022-02-08 ASSESSMENT — PAIN DESCRIPTION - ORIENTATION: ORIENTATION: LOWER;UPPER

## 2022-02-08 ASSESSMENT — PAIN DESCRIPTION - DESCRIPTORS: DESCRIPTORS: ACHING;DULL;SHARP

## 2022-02-08 ASSESSMENT — ENCOUNTER SYMPTOMS
BACK PAIN: 1
GASTROINTESTINAL NEGATIVE: 1
RESPIRATORY NEGATIVE: 1

## 2022-02-08 ASSESSMENT — PAIN DESCRIPTION - PROGRESSION: CLINICAL_PROGRESSION: GRADUALLY WORSENING

## 2022-02-08 ASSESSMENT — PAIN SCALES - GENERAL: PAINLEVEL_OUTOF10: 9

## 2022-02-08 ASSESSMENT — PAIN DESCRIPTION - FREQUENCY: FREQUENCY: CONTINUOUS

## 2022-02-08 ASSESSMENT — PAIN DESCRIPTION - LOCATION: LOCATION: NECK;BACK

## 2022-02-10 ENCOUNTER — OFFICE VISIT (OUTPATIENT)
Dept: ORTHOPEDIC SURGERY | Age: 72
End: 2022-02-10

## 2022-02-10 DIAGNOSIS — S52.502A CLOSED FRACTURE OF DISTAL END OF LEFT RADIUS, UNSPECIFIED FRACTURE MORPHOLOGY, INITIAL ENCOUNTER: Primary | ICD-10-CM

## 2022-02-10 DIAGNOSIS — M25.522 LEFT ELBOW PAIN: ICD-10-CM

## 2022-02-10 PROCEDURE — 99024 POSTOP FOLLOW-UP VISIT: CPT | Performed by: ORTHOPAEDIC SURGERY

## 2022-02-10 NOTE — PROGRESS NOTES
[facility]  [id number] [facility]  Dept Phone: [phone]  Dept Fax: [phone]      [facility]th[facility] Clinic Follow Up      Subjective:   Date of Surgery: 12/10/21    Marilyn Jero Headley is a 70y.o. year old female who presents to the clinic today for routine follow up 9 weeks post op from left distal radius fracture treated with dorsal spanning plate. She has been doing well postoperatively. Burning over her index finger and thumb have resolved, although she endorses some tenderness over her MCP joints of the 1st and 2nd digits. Numbness over the dorsum of her middle finger remains persistent. ROM has significantly improved. She is still planning on undergoing C spine surgery and attending the  of her friend. Review of Systems  Gen: no fever, chills, malaise  CV: no chest pain or palpitations  Resp: no cough or shortness of breath  GI: no nausea, vomiting, diarrhea, or constipation  Neuro: no seizures, vertigo, or headache  Msk: See HPI  10 remaining systems reviewed and negative    Objective : There were no vitals filed for this visit. There is no height or weight on file to calculate BMI. General: No acute distress, resting comfortably in the clinic  Neuro: alert. oriented  Eyes: Extra-ocular muscles intact  Pulm: Respirations unlabored and regular. Skin: warm, well perfused  Psych:   Patient has good fund of knowledge and displays understanding of exam, diagnosis, and plan. MSK:  Left upper extremity: Surgical incisions are healing well without any signs of infection or dehiscence. No range of motion of the wrist, as expected. ROM of index finger is markedly improved, as are the other digits. Nearly making a full fist. Intrinsic minus fist ROM has also significantly improved. Dysthesias remain present over the middle finger. Radial pulse 2+.     Radiology:  History: Left radial styloid fx with radiocarpal instability s/p dorsal spanning

## 2022-02-14 ENCOUNTER — HOSPITAL ENCOUNTER (OUTPATIENT)
Dept: PREADMISSION TESTING | Age: 72
Discharge: HOME | End: 2022-02-18
Payer: MEDICARE

## 2022-02-14 ENCOUNTER — TELEPHONE (OUTPATIENT)
Dept: ORTHOPEDIC SURGERY | Age: 72
End: 2022-02-14

## 2022-02-14 VITALS
SYSTOLIC BLOOD PRESSURE: 134 MMHG | HEIGHT: 65 IN | OXYGEN SATURATION: 98 % | HEART RATE: 66 BPM | WEIGHT: 198 LBS | DIASTOLIC BLOOD PRESSURE: 57 MMHG | TEMPERATURE: 97.6 F | BODY MASS INDEX: 32.99 KG/M2 | RESPIRATION RATE: 18 BRPM

## 2022-02-14 DIAGNOSIS — Z01.818 PREOP EXAMINATION: ICD-10-CM

## 2022-02-14 LAB
ABSOLUTE EOS #: 0.2 K/UL (ref 0–0.4)
ABSOLUTE IMMATURE GRANULOCYTE: ABNORMAL K/UL (ref 0–0.3)
ABSOLUTE LYMPH #: 2.5 K/UL (ref 1–4.8)
ABSOLUTE MONO #: 1 K/UL (ref 0.1–1.3)
ANION GAP SERPL CALCULATED.3IONS-SCNC: 14 MMOL/L (ref 9–17)
BASOPHILS # BLD: 0 % (ref 0–2)
BASOPHILS ABSOLUTE: 0 K/UL (ref 0–0.2)
BUN BLDV-MCNC: 37 MG/DL (ref 8–23)
BUN/CREAT BLD: ABNORMAL (ref 9–20)
CALCIUM SERPL-MCNC: 9.2 MG/DL (ref 8.6–10.4)
CHLORIDE BLD-SCNC: 105 MMOL/L (ref 98–107)
CO2: 25 MMOL/L (ref 20–31)
CREAT SERPL-MCNC: 2.71 MG/DL (ref 0.5–0.9)
DIFFERENTIAL TYPE: ABNORMAL
EOSINOPHILS RELATIVE PERCENT: 2 % (ref 0–4)
GFR AFRICAN AMERICAN: 21 ML/MIN
GFR NON-AFRICAN AMERICAN: 17 ML/MIN
GFR SERPL CREATININE-BSD FRML MDRD: ABNORMAL ML/MIN/{1.73_M2}
GFR SERPL CREATININE-BSD FRML MDRD: ABNORMAL ML/MIN/{1.73_M2}
GLUCOSE BLD-MCNC: 217 MG/DL (ref 70–99)
HCT VFR BLD CALC: 31.6 % (ref 36–46)
HEMOGLOBIN: 10.4 G/DL (ref 12–16)
IMMATURE GRANULOCYTES: ABNORMAL %
LYMPHOCYTES # BLD: 27 % (ref 24–44)
MCH RBC QN AUTO: 29.6 PG (ref 26–34)
MCHC RBC AUTO-ENTMCNC: 32.8 G/DL (ref 31–37)
MCV RBC AUTO: 90.2 FL (ref 80–100)
MONOCYTES # BLD: 11 % (ref 1–7)
NRBC AUTOMATED: ABNORMAL PER 100 WBC
PDW BLD-RTO: 14.5 % (ref 11.5–14.9)
PLATELET # BLD: 253 K/UL (ref 150–450)
PLATELET ESTIMATE: ABNORMAL
PMV BLD AUTO: 8.7 FL (ref 6–12)
POTASSIUM SERPL-SCNC: 4.6 MMOL/L (ref 3.7–5.3)
RBC # BLD: 3.51 M/UL (ref 4–5.2)
RBC # BLD: ABNORMAL 10*6/UL
SEG NEUTROPHILS: 60 % (ref 36–66)
SEGMENTED NEUTROPHILS ABSOLUTE COUNT: 5.6 K/UL (ref 1.3–9.1)
SODIUM BLD-SCNC: 144 MMOL/L (ref 135–144)
WBC # BLD: 9.3 K/UL (ref 3.5–11)
WBC # BLD: ABNORMAL 10*3/UL

## 2022-02-14 PROCEDURE — 36415 COLL VENOUS BLD VENIPUNCTURE: CPT

## 2022-02-14 PROCEDURE — 85025 COMPLETE CBC W/AUTO DIFF WBC: CPT

## 2022-02-14 PROCEDURE — 99203 OFFICE O/P NEW LOW 30 MIN: CPT | Performed by: NURSE PRACTITIONER

## 2022-02-14 PROCEDURE — 80048 BASIC METABOLIC PNL TOTAL CA: CPT

## 2022-02-14 RX ORDER — BACLOFEN 10 MG/1
10 TABLET ORAL NIGHTLY PRN
COMMUNITY

## 2022-02-14 ASSESSMENT — ENCOUNTER SYMPTOMS
SORE THROAT: 0
COUGH: 0
CONSTIPATION: 0
TROUBLE SWALLOWING: 0
NAUSEA: 0
RHINORRHEA: 0
APNEA: 0
ABDOMINAL PAIN: 0
SHORTNESS OF BREATH: 0
DIARRHEA: 0
VOMITING: 0
BLOOD IN STOOL: 0
WHEEZING: 0
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
## 2022-02-14 NOTE — TELEPHONE ENCOUNTER
Patient called and stated she did do PT through family PCP (Dr Zhang Epps)- pt would like to r/s for surgery with Dr Fran Cuevas- and [facility] dispute line needs a call as well call them at [phone] due to not wanting to  [facility] the surgery please reach out to pt asap [phone], thank you

## 2022-02-15 NOTE — TELEPHONE ENCOUNTER
Spoke with patient and let her know that Dr Agusto Ross is not willing to write the letter as we have no proof of her attending any outpatient PT visits and therefore can not state she did. She was going to try to get her PCP to write the letter.

## 2022-02-16 ENCOUNTER — HOSPITAL ENCOUNTER (OUTPATIENT)
Age: 72
Setting detail: OUTPATIENT SURGERY
Discharge: HOME | End: 2022-02-16
Attending: ORTHOPAEDIC SURGERY | Admitting: ORTHOPAEDIC SURGERY
Payer: MEDICARE

## 2022-02-16 ENCOUNTER — ANESTHESIA EVENT (OUTPATIENT)
Dept: OPERATING ROOM | Age: 72
End: 2022-02-16
Payer: MEDICARE

## 2022-02-16 ENCOUNTER — HOSPITAL ENCOUNTER (OUTPATIENT)
Dept: PHYSICAL THERAPY | Age: 72
Setting detail: THERAPIES SERIES
Discharge: HOME | End: 2022-02-16
Payer: MEDICARE

## 2022-02-16 ENCOUNTER — ANESTHESIA (OUTPATIENT)
Dept: OPERATING ROOM | Age: 72
End: 2022-02-16
Payer: MEDICARE

## 2022-02-16 ENCOUNTER — APPOINTMENT (OUTPATIENT)
Dept: GENERAL RADIOLOGY | Age: 72
End: 2022-02-16
Attending: ORTHOPAEDIC SURGERY
Payer: MEDICARE

## 2022-02-16 VITALS — DIASTOLIC BLOOD PRESSURE: 62 MMHG | SYSTOLIC BLOOD PRESSURE: 141 MMHG | OXYGEN SATURATION: 98 %

## 2022-02-16 VITALS
OXYGEN SATURATION: 100 % | HEIGHT: 65 IN | WEIGHT: 198 LBS | RESPIRATION RATE: 20 BRPM | TEMPERATURE: 96.8 F | DIASTOLIC BLOOD PRESSURE: 59 MMHG | SYSTOLIC BLOOD PRESSURE: 134 MMHG | BODY MASS INDEX: 32.99 KG/M2 | HEART RATE: 66 BPM

## 2022-02-16 DIAGNOSIS — G89.18 POST-OP PAIN: Primary | ICD-10-CM

## 2022-02-16 LAB
GLUCOSE BLD-MCNC: 207 MG/DL (ref 65–105)
GLUCOSE BLD-MCNC: 209 MG/DL (ref 74–100)
POC POTASSIUM: 4 MMOL/L (ref 3.5–4.5)

## 2022-02-16 PROCEDURE — 82947 ASSAY GLUCOSE BLOOD QUANT: CPT

## 2022-02-16 PROCEDURE — 2500000003 HC RX 250 WO HCPCS: Performed by: STUDENT IN AN ORGANIZED HEALTH CARE EDUCATION/TRAINING PROGRAM

## 2022-02-16 PROCEDURE — 3700000000 HC ANESTHESIA ATTENDED CARE: Performed by: ORTHOPAEDIC SURGERY

## 2022-02-16 PROCEDURE — 2709999900 HC NON-CHARGEABLE SUPPLY: Performed by: ORTHOPAEDIC SURGERY

## 2022-02-16 PROCEDURE — A4217 STERILE WATER/SALINE, 500 ML: HCPCS | Performed by: ORTHOPAEDIC SURGERY

## 2022-02-16 PROCEDURE — 3600000014 HC SURGERY LEVEL 4 ADDTL 15MIN: Performed by: ORTHOPAEDIC SURGERY

## 2022-02-16 PROCEDURE — 3600000004 HC SURGERY LEVEL 4 BASE: Performed by: ORTHOPAEDIC SURGERY

## 2022-02-16 PROCEDURE — 3209999900 FLUORO FOR SURGICAL PROCEDURES

## 2022-02-16 PROCEDURE — 6360000002 HC RX W HCPCS: Performed by: ANESTHESIOLOGY

## 2022-02-16 PROCEDURE — 7100000040 HC SPAR PHASE II RECOVERY - FIRST 15 MIN: Performed by: ORTHOPAEDIC SURGERY

## 2022-02-16 PROCEDURE — 3700000001 HC ADD 15 MINUTES (ANESTHESIA): Performed by: ORTHOPAEDIC SURGERY

## 2022-02-16 PROCEDURE — 97161 PT EVAL LOW COMPLEX 20 MIN: CPT

## 2022-02-16 PROCEDURE — 6360000002 HC RX W HCPCS: Performed by: NURSE ANESTHETIST, CERTIFIED REGISTERED

## 2022-02-16 PROCEDURE — 2500000003 HC RX 250 WO HCPCS: Performed by: NURSE ANESTHETIST, CERTIFIED REGISTERED

## 2022-02-16 PROCEDURE — 2580000003 HC RX 258: Performed by: NURSE ANESTHETIST, CERTIFIED REGISTERED

## 2022-02-16 PROCEDURE — 7100000041 HC SPAR PHASE II RECOVERY - ADDTL 15 MIN: Performed by: ORTHOPAEDIC SURGERY

## 2022-02-16 PROCEDURE — 97110 THERAPEUTIC EXERCISES: CPT

## 2022-02-16 PROCEDURE — 84132 ASSAY OF SERUM POTASSIUM: CPT

## 2022-02-16 PROCEDURE — 2580000003 HC RX 258: Performed by: ORTHOPAEDIC SURGERY

## 2022-02-16 PROCEDURE — 20680 REMOVAL OF IMPLANT DEEP: CPT | Performed by: ORTHOPAEDIC SURGERY

## 2022-02-16 PROCEDURE — 2500000003 HC RX 250 WO HCPCS: Performed by: ORTHOPAEDIC SURGERY

## 2022-02-16 RX ORDER — SODIUM CHLORIDE, SODIUM LACTATE, POTASSIUM CHLORIDE, CALCIUM CHLORIDE 600; 310; 30; 20 MG/100ML; MG/100ML; MG/100ML; MG/100ML
INJECTION, SOLUTION INTRAVENOUS CONTINUOUS
Status: CANCELLED | OUTPATIENT
Start: 2022-02-16

## 2022-02-16 RX ORDER — FENTANYL CITRATE 50 UG/ML
INJECTION, SOLUTION INTRAMUSCULAR; INTRAVENOUS PRN
Status: DISCONTINUED | OUTPATIENT
Start: 2022-02-16 | End: 2022-02-16 | Stop reason: SDUPTHER

## 2022-02-16 RX ORDER — HYDROCODONE BITARTRATE AND ACETAMINOPHEN 5; 325 MG/1; MG/1
1 TABLET ORAL EVERY 6 HOURS PRN
Qty: 20 TABLET | Refills: 0 | Status: SHIPPED | OUTPATIENT
Start: 2022-02-16 | End: 2022-02-21

## 2022-02-16 RX ORDER — FENTANYL CITRATE 50 UG/ML
25 INJECTION, SOLUTION INTRAMUSCULAR; INTRAVENOUS EVERY 5 MIN PRN
Status: DISCONTINUED | OUTPATIENT
Start: 2022-02-16 | End: 2022-02-16 | Stop reason: HOSPADM

## 2022-02-16 RX ORDER — MORPHINE SULFATE 2 MG/ML
2 INJECTION, SOLUTION INTRAMUSCULAR; INTRAVENOUS EVERY 5 MIN PRN
Status: DISCONTINUED | OUTPATIENT
Start: 2022-02-16 | End: 2022-02-16 | Stop reason: HOSPADM

## 2022-02-16 RX ORDER — MIDAZOLAM HYDROCHLORIDE 1 MG/ML
INJECTION INTRAMUSCULAR; INTRAVENOUS PRN
Status: DISCONTINUED | OUTPATIENT
Start: 2022-02-16 | End: 2022-02-16 | Stop reason: SDUPTHER

## 2022-02-16 RX ORDER — SODIUM CHLORIDE 0.9 % (FLUSH) 0.9 %
5-40 SYRINGE (ML) INJECTION EVERY 12 HOURS SCHEDULED
Status: DISCONTINUED | OUTPATIENT
Start: 2022-02-16 | End: 2022-02-16 | Stop reason: HOSPADM

## 2022-02-16 RX ORDER — SODIUM CHLORIDE, SODIUM LACTATE, POTASSIUM CHLORIDE, CALCIUM CHLORIDE 600; 310; 30; 20 MG/100ML; MG/100ML; MG/100ML; MG/100ML
INJECTION, SOLUTION INTRAVENOUS CONTINUOUS PRN
Status: DISCONTINUED | OUTPATIENT
Start: 2022-02-16 | End: 2022-02-16 | Stop reason: SDUPTHER

## 2022-02-16 RX ORDER — MAGNESIUM HYDROXIDE 1200 MG/15ML
LIQUID ORAL CONTINUOUS PRN
Status: COMPLETED | OUTPATIENT
Start: 2022-02-16 | End: 2022-02-16

## 2022-02-16 RX ORDER — PROPOFOL 10 MG/ML
INJECTION, EMULSION INTRAVENOUS CONTINUOUS PRN
Status: DISCONTINUED | OUTPATIENT
Start: 2022-02-16 | End: 2022-02-16 | Stop reason: SDUPTHER

## 2022-02-16 RX ORDER — DIPHENHYDRAMINE HYDROCHLORIDE 50 MG/ML
12.5 INJECTION INTRAMUSCULAR; INTRAVENOUS
Status: DISCONTINUED | OUTPATIENT
Start: 2022-02-16 | End: 2022-02-16 | Stop reason: HOSPADM

## 2022-02-16 RX ORDER — BUPIVACAINE HYDROCHLORIDE 5 MG/ML
INJECTION, SOLUTION EPIDURAL; INTRACAUDAL PRN
Status: DISCONTINUED | OUTPATIENT
Start: 2022-02-16 | End: 2022-02-16 | Stop reason: ALTCHOICE

## 2022-02-16 RX ORDER — ONDANSETRON 2 MG/ML
4 INJECTION INTRAMUSCULAR; INTRAVENOUS
Status: DISCONTINUED | OUTPATIENT
Start: 2022-02-16 | End: 2022-02-16 | Stop reason: HOSPADM

## 2022-02-16 RX ORDER — SODIUM CHLORIDE 9 MG/ML
25 INJECTION, SOLUTION INTRAVENOUS PRN
Status: DISCONTINUED | OUTPATIENT
Start: 2022-02-16 | End: 2022-02-16 | Stop reason: HOSPADM

## 2022-02-16 RX ORDER — LIDOCAINE HYDROCHLORIDE 10 MG/ML
INJECTION, SOLUTION EPIDURAL; INFILTRATION; INTRACAUDAL; PERINEURAL PRN
Status: DISCONTINUED | OUTPATIENT
Start: 2022-02-16 | End: 2022-02-16 | Stop reason: SDUPTHER

## 2022-02-16 RX ORDER — SODIUM CHLORIDE 0.9 % (FLUSH) 0.9 %
5-40 SYRINGE (ML) INJECTION PRN
Status: DISCONTINUED | OUTPATIENT
Start: 2022-02-16 | End: 2022-02-16 | Stop reason: HOSPADM

## 2022-02-16 RX ORDER — CLINDAMYCIN PHOSPHATE 900 MG/50ML
900 INJECTION INTRAVENOUS
Status: COMPLETED | OUTPATIENT
Start: 2022-02-16 | End: 2022-02-16

## 2022-02-16 RX ADMIN — FENTANYL CITRATE 25 MCG: 50 INJECTION, SOLUTION INTRAMUSCULAR; INTRAVENOUS at 09:24

## 2022-02-16 RX ADMIN — FENTANYL CITRATE 25 MCG: 50 INJECTION, SOLUTION INTRAMUSCULAR; INTRAVENOUS at 08:11

## 2022-02-16 RX ADMIN — MIDAZOLAM HYDROCHLORIDE 1 MG: 1 INJECTION, SOLUTION INTRAMUSCULAR; INTRAVENOUS at 08:03

## 2022-02-16 RX ADMIN — PROPOFOL 100 MCG/KG/MIN: 10 INJECTION, EMULSION INTRAVENOUS at 07:41

## 2022-02-16 RX ADMIN — FENTANYL CITRATE 25 MCG: 50 INJECTION, SOLUTION INTRAMUSCULAR; INTRAVENOUS at 08:05

## 2022-02-16 RX ADMIN — CLINDAMYCIN IN 5 PERCENT DEXTROSE 900 MG: 18 INJECTION, SOLUTION INTRAVENOUS at 07:56

## 2022-02-16 RX ADMIN — SODIUM CHLORIDE, POTASSIUM CHLORIDE, SODIUM LACTATE AND CALCIUM CHLORIDE: 600; 310; 30; 20 INJECTION, SOLUTION INTRAVENOUS at 07:28

## 2022-02-16 RX ADMIN — FENTANYL CITRATE 25 MCG: 50 INJECTION, SOLUTION INTRAMUSCULAR; INTRAVENOUS at 07:52

## 2022-02-16 RX ADMIN — FENTANYL CITRATE 25 MCG: 50 INJECTION, SOLUTION INTRAMUSCULAR; INTRAVENOUS at 07:44

## 2022-02-16 RX ADMIN — LIDOCAINE HYDROCHLORIDE 50 MG: 10 INJECTION, SOLUTION EPIDURAL; INFILTRATION; INTRACAUDAL; PERINEURAL at 07:41

## 2022-02-16 RX ADMIN — LIDOCAINE HYDROCHLORIDE 30 MG: 10 INJECTION, SOLUTION EPIDURAL; INFILTRATION; INTRACAUDAL; PERINEURAL at 07:46

## 2022-02-16 RX ADMIN — FENTANYL CITRATE 25 MCG: 50 INJECTION, SOLUTION INTRAMUSCULAR; INTRAVENOUS at 09:19

## 2022-02-16 RX ADMIN — MIDAZOLAM HYDROCHLORIDE 1 MG: 1 INJECTION, SOLUTION INTRAMUSCULAR; INTRAVENOUS at 07:54

## 2022-02-16 ASSESSMENT — PULMONARY FUNCTION TESTS
PIF_VALUE: 1
PIF_VALUE: 0
PIF_VALUE: 1
PIF_VALUE: 0
PIF_VALUE: 1
PIF_VALUE: 0
PIF_VALUE: 1
PIF_VALUE: 0
PIF_VALUE: 1
PIF_VALUE: 1
PIF_VALUE: 0
PIF_VALUE: 1

## 2022-02-16 ASSESSMENT — PAIN SCALES - GENERAL
PAINLEVEL_OUTOF10: 6
PAINLEVEL_OUTOF10: 5

## 2022-02-16 ASSESSMENT — LIFESTYLE VARIABLES: SMOKING_STATUS: 0
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
## 2022-02-16 NOTE — BRIEF OP NOTE
Brief Postoperative Note      Patient: Marilyn Connors  YOB: 1950  MRN: [id number]   CSN: [id number]     Date of Procedure: 2/16/2022    Pre-Op Diagnosis: Retained dorsal spanning plate to left wrist    Post-Op Diagnosis: Same       Procedure(s):  - [facility] L wrist 85233  - MARCELLO L wrist 48812    Surgeon(s):  Swati Perrin DO    Assistant:  * No surgical staff found *    Anesthesia: Monitor Anesthesia Care and local    Estimated Blood Loss (mL): 5 cc    IVF: 500 cc crystalloid    Complications: None    Specimens:   * No specimens in log *    Implants:  None      Drains: * No LDAs found *    Findings: improved wrist ROM s/p SERGO and MARCELLO    Electronically signed by Swati Perrin DO on 2/16/2022 at 8:30 AM

## 2022-02-16 NOTE — CONSULTS
[x] [address] [facility] TWELVESTEP [facility] &  Therapy  [address]  [phone]  F: [phone] [] [address]  [facility]   [address]  P: [phone]  F: [phone] [] [address] &  Therapy  [address] [facility]  P: [phone]  F: [phone] [] [address]  P: [phone]  F: [phone] [] [address]  [facility]   [address]   [place]: [phone]  F: [phone]      Physical Therapy Evaluation and Treatment     Date:  2022  Patient Name:  Meryl Colon    :  1950  MRN: [id number]  Physician: Shayy Medley DO      Insurance: [facility] Medicare $03 copay  Medical Diagnosis:Closed fracture of distal end of left radius, unspecified fracture morphology, initial encounter (S52.502A [ICD-10-CM])     Rehab Codes: M25.432, M25.532, M25.632   Onset Date: 22   Next Dr. Connie Crane: TBD       Subjective:    Pain:  [x] Yes  [] No Location: L wrist Pain Rating: (0-10 scale) 4/10  Pain altered Tx:  [] No  [] Yes  Action:  Comments:Patient is immediately s/p L wrist hardware removal and MARCELLO. Patient reports wrist is very sore. She is not scheduled for follow up PT appointments, but plans to exercise on her own at home. Her primary concern is her $40 copay. She does request HEP.      PMHx: [] Unremarkable [] Diabetes [] HTN  [] Pacemaker   [] MI/Heart Problems [] Cancer [] Arthritis [] Asthma                         [x] refer to full medical chart  In [facility]  [] Other:        Comorbidities:   [x] Obesity [] Dialysis  [] Other:   [] Asthma/COPD [] Dementia [] Other:   [] Stroke [] Sleep apnea [] Other:   [] Vascular disease [] Rheumatic disease [] Other:     Tests: [x] X-Ray:see Epic [] MRI:  [] Other:    Medications: [x] Refer to full medical record [] None [] Other:  Allergies:      [x] Refer to full medical record  [] None [] Other:    Function:    Patient lives with: Alone    In what type of home []  One story   [x] Two story   [] Split level   Number of stairs to enter 3   With handrail on the [x]  Right to enter   [] Left to enter   Bathroom has a []  Tub only  [x] Tub/shower combo   [] Walk in shower    []  Grab bars   Washing machine is on []  Main level   [] Second level   [] Basement     ADL/IADL Previous level of function Current level of function Who currently assists the patient with task   Bathing  [x] Independent  [] Assist [x] Independent  [] Assist    Dress/grooming [x] Independent  [] Assist [x] Independent  [] Assist    Transfer/mobility [x] Independent  [] Assist [x] Independent  [] Assist    Feeding [x] Independent  [] Assist [x] Independent  [] Assist    Toileting [x] Independent  [] Assist [x] Independent  [] Assist    Driving [x] Independent  [] Assist [x] Independent  [] Assist    Housekeeping [x] Independent  [] Assist [x] Independent  [] Assist    Grocery shop/meal prep [x] Independent  [] Assist [x] Independent  [] Assist      Gait Prior level of function Current level of function    [x] Independent  [] Assist [x] Independent  [] Assist   Device: [x] Independent [x] Independent    [] Straight Cane [] Quad cane [] Straight Cane [] Quad cane    [] Standard walker [] Rolling walker   [] 4 wheeled walker [] Standard walker [] Rolling walker   [] 4 wheeled walker    [] Wheelchair [] Wheelchair     Patient goals: Restore left wrist and hand fucniton     Rehab Potential:  [x] Good  [] Fair  [] Poor   Suggested Professional Referral:  [x] No  [] Yes:  Barriers to Goal Achievement[de-identified]  [x] No  [] Yes:  Domestic Concerns:  [x] No  [] Yes:    Treatment Plan:  [x] Therapeutic Exercise   36315  [x] Vasopneumatic cold with compression  24489    [x] Therapeutic Activity  29643 [x] Cold/hotpack    [] Gait Training   [id number] [] Lumbar/Cervical Traction  [phone]   [x] Neuromuscular Re-education  [id number] [] Electrical Stimulation Unattended  81126   [x] Manual Therapy    48307 [] Electrical Stimulation Attended  [id number]   [] Iontophoresis: 4 mg/mL Dexamethasone Sodium Phosphate  mAmin  14820 []  Medication allergies reviewed for use of   Dexamethasone Sodium Phosphate 4mg/ml with iontophoresis treatments. Pt is not allergic. Objective: p = Pain   ROM            A/P ROM A/P STRENGTH  STRENGTH    LEFT RIGHT LEFT RIGHT   Wrist Flexion 40,p full 4-/5 5/5   Wrist Extension  35,p full 4-/5 5/5    strength    4-/5 5/5        Exercises:  Exercise Reps/ Time Weight/ Level Comments   PROM  10 min  L wrist flexion, extension, ulnar and radial deviation. Patient was limited by pain level, pain increased to 7/10 near end range     Wrist Flexion Extension AROM - Palms Down - 1 x daily - 7 x weekly - 3 sets - 10 reps  Seated Full Fist AROM - 1 x daily - 7 x weekly - 3 sets - 10 reps      Assessment:   Patient demonstrates L wrist strength and ROM deficits. Patient would benefit from physical therapy to improve L wrist ROM, strength, and function. Patient wishes to forgo formal treatment and attempt HEP on her own. HEP was provided details listed below. Patient may return to PT as needed. STG  1. Patient demonstrates independence with HEP. MET     Pt. Education:  [x] Yes  [] No  [x] Reviewed Prior HEP/Ed  Method of Education: [x] Verbal  [x] Demo  [] Written  Comprehension of Education:  [x] Verbalizes understanding. [x] Demonstrates understanding. [x] Needs review. [x] Demonstrates/verbalizes HEP/Ed  given. Access Code: [email or web address]  URL: [email or web address]  Date: 02/16/2022  Prepared by: Inetta Baumgarten      Sent to patient cell phone# [phone] per her request.    Exercises  Wrist Flexion Extension AROM - Palms Down - 1 x daily - 7 x weekly - 3 sets - 10 reps  Seated Wrist Flexion Stretch - 1 x daily - 7 x weekly - 10 reps - 20 sec hold  Seated Wrist Extension Stretch - 1 x daily - 7 x weekly - 10 reps - 20 sec hold  Seated Full Fist AROM - 1 x daily - 7 x weekly - 3 sets - 10 reps  Seated Gripping Towel - 1 x daily - 7 x weekly - 3 sets - 10 reps  Thumb AROM Opposition To All Fingers - 1 x daily - 7 x weekly - 3 sets - 10 reps      Plan: D/C to HEP per patient request. Patient may return for formal PT or contact clinic with any questions regarding HEP.        Evaluation Complexity:  History (Personal factors, comorbidities) [] 0 [x] 1-2 [] 3+   Exam (limitations, restrictions) [] 1-2 [x] 3 [] 4+   Clinical presentation (progression) [x] Stable [] Evolving  [] Unstable   Decision Making [x] Low [] Moderate [] High    [x] Low Complexity [] Moderate Complexity [] High Complexity       Treatment Charges: Mins Units   [x] Evaluation       [x]  Low       []  Moderate       []  High 15 1   []  Modalities     [x]  Ther Exercise 15 1   []  Manual Therapy     []  Ther Activities     []  Aquatics     []  Vasocompression     []  Other       TOTAL TREATMENT TIME: 30      Time In: 0900           Time Out: 0930    Electronically signed by:  Shira Villanueva, PT

## 2022-02-16 NOTE — PROGRESS NOTES
DC instructions given over the phone to tiffanie Stewart. Script for [facility] [place] given to patient. Vitals stable. IV removed.

## 2022-02-16 NOTE — OP NOTE
[address] [facility] [address]                                OPERATIVE REPORT    PATIENT NAME: ADI MALDONADO                      :        1950  MED REC NO:   [id number]                             ROOM:  ACCOUNT NO:   [de-identified]                           ADMIT DATE: 2022  PROVIDER:     Nedra Lawton    DATE OF PROCEDURE:  2022    PREOPERATIVE DIAGNOSIS:  Retained dorsal spanning plate to left wrist.    POSTOPERATIVE DIAGNOSIS:  Retained dorsal spanning plate to left wrist.    PROCEDURES:  1. Removal of hardware from left wrist, CPT 58678.  2.  Manipulation under anesthesia of left wrist, CPT 20025. ATTENDING SURGEON:  Nedra Lawton DO    ASSISTANTS:  1. Valentina Charles, PGY-3.  Jada Richards MD, PGY-2. ANESTHESIA:  Monitored anesthesia care with local.    ESTIMATED BLOOD LOSS:  5 mL. IV FLUIDS:  500 mL crystalloid. COMPLICATIONS:  None. SPECIMENS:  None. IMPLANTS:  None. INDICATIONS:  The patient is a 70-year-old female who had a transstyloid  radiocarpal fracture dislocation and was treated with a dorsal spanning  plate application. She has progressed well in her recovery, and we plan  to return today for anticipated removal of hardware after adequate time  for healing. She understands the risks, benefits, alternatives of this  procedure, and wishes to proceed. PROCEDURE:  The patient transferred to the operating room. Monitored  anesthesia care was administered by the anesthesia providers without  complication. She was transferred to a cantilever-type OR table in a  supine position with a hand table attached to left side. All bony  prominences were well padded. The patient was adequately secured to the  bed. Tourniquet placed on the left arm. Left upper extremity was  isolated, scrubbed with Hibiclens followed by alcohol and prepped and  draped in the usual sterile fashion. A time-out was performed that  included all involved parties in correctly identifying the patient,  planned procedure and operative site. After everyone agreed, we  continued. We marked out the previous incisions on the dorsal aspect of  the second metacarpal and the radial shaft. These were incised with  blunt dissection to the soft tissues to avoid injury to superficial  neurovascular structures. The plate was identified in both locations,  and a hand screwdriver was used to remove all six screws in total and a  [place] elevator was used to mobilize the plate for removal in its  entirety without complication. Radiographs were taken at that time to  document complete removal of hardware. The patient had significant  residual stiffness of the radiocarpal joint as anticipated with this  treatment. We then performed a manipulation under anesthesia to  carefully improve her flexion and extension. There was a significant  improvement in both directions of motion and lateral radiographs with  maximal flexion and maximal extension were documented to be able to  review with the patient in the clinic. Under a live lateral radiograph,  we also performed a radiocarpal shuck test which showed no instability  across this joint and the radial styloid fracture was noted to be well  healed. The wounds were copiously irrigated and closed in standard  layered fashion. The skin was approximated using 3-0 Monocryl in a  running subcuticular fashion. Field was cleaned and dried and the  incision sealed with Dermabond glue. We infiltrated the area around  both incision sites with a mixture of 1/2% Marcaine with epinephrine and  1% plain lidocaine for postoperative anesthesia. Soft dressings were  applied over the incision sites. The patient was then and transported  to the recovery room by the anesthesia providers without complication.     POSTOPERATIVE PLAN:  The patient does not require radiographs,  outpatient antibiotics or DVT prophylaxis at this isolated upper  extremity injury. She is okay to use the operative extremity without  restrictions and we stressed the importance of immediate home exercise  program to maintain and possibly improve this range of motion through  usual active assist range of motion exercises as well as passive  stretching. These exercises were reviewed in great detail in the clinic  prior to this procedure. She stated clear understanding, and were able  to demonstrate these procedures. Our initial recommendation was formal  physical therapy to help in this process; however, the patient could not  do this due to monetary constraints. The patient is to follow up in  approximate 2 weeks from the date of this procedure for wound check and  range of motion examination.         Job Santillan    D: 02/16/2022 8:59:50       T: 02/16/2022 9:03:31     [email or web address]  Job#: [id number]     Doc#: [id number]    CC:

## 2022-02-17 ENCOUNTER — HOSPITAL ENCOUNTER (OUTPATIENT)
Age: 72
Discharge: HOME | End: 2022-02-17
Payer: MEDICARE

## 2022-02-17 LAB
ABSOLUTE EOS #: 0.2 K/UL (ref 0–0.4)
ABSOLUTE IMMATURE GRANULOCYTE: ABNORMAL K/UL (ref 0–0.3)
ABSOLUTE LYMPH #: 2.2 K/UL (ref 1–4.8)
ABSOLUTE MONO #: 0.8 K/UL (ref 0.1–1.3)
AMORPHOUS: NORMAL
ANION GAP SERPL CALCULATED.3IONS-SCNC: 15 MMOL/L (ref 9–17)
BACTERIA: NORMAL
BASOPHILS # BLD: 1 % (ref 0–2)
BASOPHILS ABSOLUTE: 0.1 K/UL (ref 0–0.2)
BILIRUBIN URINE: NEGATIVE
BUN BLDV-MCNC: 24 MG/DL (ref 8–23)
BUN/CREAT BLD: ABNORMAL (ref 9–20)
CALCIUM SERPL-MCNC: 9.6 MG/DL (ref 8.6–10.4)
CASTS UA: NORMAL /LPF
CHLORIDE BLD-SCNC: 98 MMOL/L (ref 98–107)
CO2: 26 MMOL/L (ref 20–31)
COLOR: YELLOW
COMMENT UA: ABNORMAL
CREAT SERPL-MCNC: 2.23 MG/DL (ref 0.5–0.9)
CRYSTALS, UA: NORMAL /HPF
DIFFERENTIAL TYPE: ABNORMAL
EOSINOPHILS RELATIVE PERCENT: 2 % (ref 0–4)
EPITHELIAL CELLS UA: NORMAL /HPF
GFR AFRICAN AMERICAN: 26 ML/MIN
GFR NON-AFRICAN AMERICAN: 22 ML/MIN
GFR SERPL CREATININE-BSD FRML MDRD: ABNORMAL ML/MIN/{1.73_M2}
GFR SERPL CREATININE-BSD FRML MDRD: ABNORMAL ML/MIN/{1.73_M2}
GLUCOSE BLD-MCNC: 196 MG/DL (ref 70–99)
GLUCOSE URINE: NEGATIVE
HCT VFR BLD CALC: 33.5 % (ref 36–46)
HEMOGLOBIN: 10.9 G/DL (ref 12–16)
IMMATURE GRANULOCYTES: ABNORMAL %
KETONES, URINE: NEGATIVE
LEUKOCYTE ESTERASE, URINE: ABNORMAL
LYMPHOCYTES # BLD: 24 % (ref 24–44)
MAGNESIUM: 1.7 MG/DL (ref 1.6–2.6)
MCH RBC QN AUTO: 29.3 PG (ref 26–34)
MCHC RBC AUTO-ENTMCNC: 32.4 G/DL (ref 31–37)
MCV RBC AUTO: 90.5 FL (ref 80–100)
MONOCYTES # BLD: 9 % (ref 1–7)
MUCUS: NORMAL
NITRITE, URINE: NEGATIVE
NRBC AUTOMATED: ABNORMAL PER 100 WBC
OTHER OBSERVATIONS UA: NORMAL
PDW BLD-RTO: 14.7 % (ref 11.5–14.9)
PH UA: 6.5 (ref 5–8)
PHOSPHORUS: 4.2 MG/DL (ref 2.6–4.5)
PLATELET # BLD: 286 K/UL (ref 150–450)
PLATELET ESTIMATE: ABNORMAL
PMV BLD AUTO: 8.5 FL (ref 6–12)
POTASSIUM SERPL-SCNC: 4.4 MMOL/L (ref 3.7–5.3)
PROTEIN UA: ABNORMAL
RBC # BLD: 3.7 M/UL (ref 4–5.2)
RBC # BLD: ABNORMAL 10*6/UL
RBC UA: NORMAL /HPF
RENAL EPITHELIAL, UA: NORMAL /HPF
SEG NEUTROPHILS: 64 % (ref 36–66)
SEGMENTED NEUTROPHILS ABSOLUTE COUNT: 5.7 K/UL (ref 1.3–9.1)
SODIUM BLD-SCNC: 139 MMOL/L (ref 135–144)
SPECIFIC GRAVITY UA: 1.02 (ref 1–1.03)
TRICHOMONAS: NORMAL
TURBIDITY: CLEAR
URINE HGB: NEGATIVE
UROBILINOGEN, URINE: NORMAL
WBC # BLD: 9 K/UL (ref 3.5–11)
WBC # BLD: ABNORMAL 10*3/UL
WBC UA: NORMAL /HPF
YEAST: NORMAL

## 2022-02-17 PROCEDURE — 84100 ASSAY OF PHOSPHORUS: CPT

## 2022-02-17 PROCEDURE — 81001 URINALYSIS AUTO W/SCOPE: CPT

## 2022-02-17 PROCEDURE — 83735 ASSAY OF MAGNESIUM: CPT

## 2022-02-17 PROCEDURE — 85025 COMPLETE CBC W/AUTO DIFF WBC: CPT

## 2022-02-17 PROCEDURE — 80048 BASIC METABOLIC PNL TOTAL CA: CPT

## 2022-02-17 PROCEDURE — 36415 COLL VENOUS BLD VENIPUNCTURE: CPT

## 2022-02-17 NOTE — OP NOTE
[address]                  [facility] [address]                                OPERATIVE REPORT    PATIENT NAME: ADI MALDONADO                      :        1950  MED REC NO:   [id number]                             ROOM:  ACCOUNT NO:   [de-identified]                           ADMIT DATE: 2022  PROVIDER:     Mary Wild    Corrected account number (2022 am)    DATE OF PROCEDURE:  2022    PREOPERATIVE DIAGNOSIS:  Retained dorsal spanning plate to left wrist.    POSTOPERATIVE DIAGNOSIS:  Retained dorsal spanning plate to left wrist.    PROCEDURES:  1. Removal of hardware from left wrist, CPT 80143.  2.  Manipulation under anesthesia of left wrist, CPT 02030. ATTENDING SURGEON:  Mary Wild DO    ASSISTANTS:  1. Indra James, PGY-3.  Shaylee Sanford MD, PGY-2. ANESTHESIA:  Monitored anesthesia care with local.    ESTIMATED BLOOD LOSS:  5 mL. IV FLUIDS:  500 mL crystalloid. COMPLICATIONS:  None. SPECIMENS:  None. IMPLANTS:  None. INDICATIONS:  The patient is a 77-year-old female who had a transstyloid  radiocarpal fracture dislocation and was treated with a dorsal spanning  plate application. She has progressed well in her recovery, and we plan  to return today for anticipated removal of hardware after adequate time  for healing. She understands the risks, benefits, alternatives of this  procedure, and wishes to proceed. PROCEDURE:  The patient transferred to the operating room. Monitored  anesthesia care was administered by the anesthesia providers without  complication. She was transferred to a cantilever-type OR table in a  supine position with a hand table attached to left side. All bony  prominences were well padded. The patient was adequately secured to the  bed. Tourniquet placed on the left arm.   Left upper extremity was  isolated, scrubbed with Hibiclens followed by alcohol and prepped and  draped in the usual sterile fashion. A time-out was performed that  included all involved parties in correctly identifying the patient,  planned procedure and operative site. After everyone agreed, we  continued. We marked out the previous incisions on the dorsal aspect of  the second metacarpal and the radial shaft. These were incised with  blunt dissection to the soft tissues to avoid injury to superficial  neurovascular structures. The plate was identified in both locations,  and a hand screwdriver was used to remove all six screws in total and a  [place] elevator was used to mobilize the plate for removal in its  entirety without complication. Radiographs were taken at that time to  document complete removal of hardware. The patient had significant  residual stiffness of the radiocarpal joint as anticipated with this  treatment. We then performed a manipulation under anesthesia to  carefully improve her flexion and extension. There was a significant  improvement in both directions of motion and lateral radiographs with  maximal flexion and maximal extension were documented to be able to  review with the patient in the clinic. Under a live lateral radiograph,  we also performed a radiocarpal shuck test which showed no instability  across this joint and the radial styloid fracture was noted to be well  healed. The wounds were copiously irrigated and closed in standard  layered fashion. The skin was approximated using 3-0 Monocryl in a  running subcuticular fashion. Field was cleaned and dried and the  incision sealed with Dermabond glue. We infiltrated the area around  both incision sites with a mixture of 1/2% Marcaine with epinephrine and  1% plain lidocaine for postoperative anesthesia. Soft dressings were  applied over the incision sites. The patient was then and transported  to the recovery room by the anesthesia providers without complication.     POSTOPERATIVE PLAN:  The patient does not require radiographs,  outpatient antibiotics or DVT prophylaxis at this isolated upper  extremity injury. She is okay to use the operative extremity without  restrictions and we stressed the importance of immediate home exercise  program to maintain and possibly improve this range of motion through  usual active assist range of motion exercises as well as passive  stretching. These exercises were reviewed in great detail in the clinic  prior to this procedure. She stated clear understanding, and were able  to demonstrate these procedures. Our initial recommendation was formal  physical therapy to help in this process; however, the patient could not  do this due to monetary constraints. The patient is to follow up in  approximate 2 weeks from the date of this procedure for wound check and  range of motion examination.         Amalia Thompson    D: 02/16/2022 8:59:50       T: 02/16/2022 9:03:31     [email or web address]  Job#: [id number]     Doc#: [id number]

## 2022-02-17 NOTE — ANESTHESIA POSTPROCEDURE EVALUATION
Department of Anesthesiology  Postprocedure Note    Patient: Td Yusuf  MRN: [id number]  YOB: 1950  Date of evaluation: 2/17/2022  Time:  1:25 PM     Procedure Summary     Date: 02/16/22 Room / Location: [address]    Anesthesia Start: 5748 Anesthesia Stop: 490    Procedure: HARDWARE REMOVAL LEFT WRIST WITH MANIPULATION UNDER ANESTHESIA (Left Hand) Diagnosis: (TRAUMATIC LEFT WRIST HARDWARE)    Surgeons: Trent Moss DO Responsible Provider: Lynsey Middleton MD    Anesthesia Type: MAC, TIVA ASA Status: 4          Anesthesia Type: MAC, TIVA    Ifrah Phase I:      Ifrah Phase II: Ifrah Score: 9    Last vitals: Reviewed and per EMR flowsheets. Anesthesia Post Evaluation    Patient location during evaluation: PACU  Patient participation: complete - patient participated  Level of consciousness: awake and alert  Pain scale: see EPIC.   Nausea & Vomiting: no nausea  Cardiovascular status: hemodynamically stable  Respiratory status: room air

## 2022-03-04 ENCOUNTER — OFFICE VISIT (OUTPATIENT)
Dept: ORTHOPEDIC SURGERY | Age: 72
End: 2022-03-04

## 2022-03-04 VITALS — BODY MASS INDEX: 33.82 KG/M2 | HEIGHT: 65 IN | WEIGHT: 203 LBS

## 2022-03-04 DIAGNOSIS — S52.502D CLOSED FRACTURE OF DISTAL END OF LEFT RADIUS WITH ROUTINE HEALING, UNSPECIFIED FRACTURE MORPHOLOGY, SUBSEQUENT ENCOUNTER: Primary | ICD-10-CM

## 2022-03-04 PROCEDURE — 99024 POSTOP FOLLOW-UP VISIT: CPT | Performed by: NURSE PRACTITIONER

## 2022-03-04 NOTE — PROGRESS NOTES
[facility]  [address] [facility] [address]  Dept Phone: [phone]  Dept Fax: [phone]      [facility] Follow Up      Subjective:   Date of Surgery:     2/16/2022: Dorsal spanning plate removal   93/19/0875: ORIF left transstyloid radiocarpal fracture dislocation with dorsal spanning plate     Marilyn Gomez is a 70y.o. year old female who presents to the clinic today for routine follow up 2 weeks post-operatively from removal of dorsal spanning plate from the left wrist. Patient originally underwent ORIF with dorsal spanning plate on 14/80/2264 for a left trans-styloid radiocarpal fracture dislocation. Patient states since having the plate removed, she has had decreased pain and improvement in range of motion and function of the left wrist. She states she has been doing her own home therapy and feels she has been making progress. She reports numbness to her 3rd digit which has been present since injury. States she gets pain over the radial styloid and ulnar styloid when lifting certain objects such as a full coffee cup but her overall strength has returned in the left hand. Review of Systems  Gen: no fever, chills, malaise  CV: no chest pain or palpitations  Resp: no cough or shortness of breath  GI: no nausea, vomiting, diarrhea, or constipation  Neuro: no seizures, vertigo, or headache  Msk: left wrist pain   10 remaining systems reviewed and negative    Objective : There were no vitals filed for this visit. Body mass index is 33.78 kg/m². General: No acute distress, resting comfortably in the clinic  Neuro: alert. oriented  Eyes: Extra-ocular muscles intact  Pulm: Respirations unlabored and regular. Skin: warm, well perfused  Psych:   Patient has good fund of knowledge and displays understanding of exam, diagnosis, and plan. LUE:  Skin intact, incision well healed without evidence of infection. Active flexion and extension to 40 degrees. EPL/EPB/APL function intact. TTP over ulnar styloid. Pain with radial deviation. Compartments soft. 2+ rad pulse. Median/Radial/Ulnar/AIN/PIN motor intact. Median/Radial/Ulnar nerve SILT. Dysesthesia noted to 3rd digit (baseline from injury). Radiology:  No radiographs obtained today. Assessment:   70y.o. year old female with a left trans-styloid radiocarpal fracture dislocation s/p ORIF and dorsal spanning plate removal; DOS: 80/88/2023 & 2/16/2022. Plan:   - Overall ,patient is satisfied with her recovery and her own guided therapy program. She wishes to continue with home therapy and verbalized understanding of these exercises. - Plan to f/u in 6 weeks with repeat exam to assess ROM    Follow up:Return in about 6 weeks (around 4/15/2022). No orders of the defined types were placed in this encounter. No orders of the defined types were placed in this encounter. Electronically signed by QI Irving CNP on 3/4/2022 at 8:35 AM    This note is created with the assistance of a speech recognition program.  While intending to generate a document that actually reflects the content of the visit, the document can still have some errors including those of syntax and sound a like substitutions which may escape proof reading.   In such instances, actual meaning can be extrapolated by contextual diversion
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
## 2022-03-09 ENCOUNTER — HOSPITAL ENCOUNTER (OUTPATIENT)
Dept: PAIN MANAGEMENT | Age: 72
Discharge: HOME | End: 2022-03-09
Payer: MEDICARE

## 2022-03-09 VITALS
RESPIRATION RATE: 16 BRPM | SYSTOLIC BLOOD PRESSURE: 136 MMHG | DIASTOLIC BLOOD PRESSURE: 44 MMHG | OXYGEN SATURATION: 97 % | HEART RATE: 62 BPM | TEMPERATURE: 97.5 F

## 2022-03-09 DIAGNOSIS — M25.552 LEFT HIP PAIN: ICD-10-CM

## 2022-03-09 DIAGNOSIS — M47.817 LUMBOSACRAL SPONDYLOSIS WITHOUT MYELOPATHY: ICD-10-CM

## 2022-03-09 DIAGNOSIS — M46.1 SACROILIITIS (HCC): Primary | ICD-10-CM

## 2022-03-09 DIAGNOSIS — M16.12 PRIMARY OSTEOARTHRITIS OF LEFT HIP: ICD-10-CM

## 2022-03-09 DIAGNOSIS — M48.061 DEGENERATIVE LUMBAR SPINAL STENOSIS: ICD-10-CM

## 2022-03-09 DIAGNOSIS — M47.22 OSTEOARTHRITIS OF SPINE WITH RADICULOPATHY, CERVICAL REGION: ICD-10-CM

## 2022-03-09 DIAGNOSIS — M48.02 CERVICAL STENOSIS OF SPINE: ICD-10-CM

## 2022-03-09 DIAGNOSIS — M54.50 CHRONIC MIDLINE LOW BACK PAIN WITHOUT SCIATICA: ICD-10-CM

## 2022-03-09 DIAGNOSIS — M51.36 DDD (DEGENERATIVE DISC DISEASE), LUMBAR: ICD-10-CM

## 2022-03-09 DIAGNOSIS — M50.30 DDD (DEGENERATIVE DISC DISEASE), CERVICAL: ICD-10-CM

## 2022-03-09 DIAGNOSIS — M54.16 LUMBAR RADICULOPATHY: ICD-10-CM

## 2022-03-09 DIAGNOSIS — G89.29 CHRONIC MIDLINE LOW BACK PAIN WITHOUT SCIATICA: ICD-10-CM

## 2022-03-09 DIAGNOSIS — M54.2 NECK PAIN: ICD-10-CM

## 2022-03-09 PROCEDURE — 99213 OFFICE O/P EST LOW 20 MIN: CPT

## 2022-03-09 PROCEDURE — 99213 OFFICE O/P EST LOW 20 MIN: CPT | Performed by: NURSE PRACTITIONER

## 2022-03-09 RX ORDER — HYDROCODONE BITARTRATE AND ACETAMINOPHEN 5; 325 MG/1; MG/1
1 TABLET ORAL EVERY 8 HOURS PRN
Qty: 90 TABLET | Refills: 0 | Status: SHIPPED | OUTPATIENT
Start: 2022-03-15 | End: 2022-04-07 | Stop reason: SDUPTHER

## 2022-03-09 ASSESSMENT — ENCOUNTER SYMPTOMS
BACK PAIN: 1
GASTROINTESTINAL NEGATIVE: 1
SHORTNESS OF BREATH: 1

## 2022-03-16 PROBLEM — Z01.818 PREOP EXAMINATION: Status: RESOLVED | Noted: 2022-02-14 | Resolved: 2022-03-16

## 2022-04-07 ENCOUNTER — HOSPITAL ENCOUNTER (OUTPATIENT)
Dept: PAIN MANAGEMENT | Age: 72
Discharge: HOME | End: 2022-04-07
Payer: MEDICARE

## 2022-04-07 VITALS
WEIGHT: 198 LBS | OXYGEN SATURATION: 97 % | SYSTOLIC BLOOD PRESSURE: 141 MMHG | BODY MASS INDEX: 32.99 KG/M2 | RESPIRATION RATE: 16 BRPM | TEMPERATURE: 97.1 F | HEIGHT: 65 IN | DIASTOLIC BLOOD PRESSURE: 63 MMHG | HEART RATE: 62 BPM

## 2022-04-07 DIAGNOSIS — G89.29 CHRONIC MIDLINE LOW BACK PAIN WITHOUT SCIATICA: ICD-10-CM

## 2022-04-07 DIAGNOSIS — M51.36 DDD (DEGENERATIVE DISC DISEASE), LUMBAR: ICD-10-CM

## 2022-04-07 DIAGNOSIS — Z79.891 ENCOUNTER FOR LONG-TERM OPIATE ANALGESIC USE: ICD-10-CM

## 2022-04-07 DIAGNOSIS — M54.2 NECK PAIN: ICD-10-CM

## 2022-04-07 DIAGNOSIS — M48.061 DEGENERATIVE LUMBAR SPINAL STENOSIS: ICD-10-CM

## 2022-04-07 DIAGNOSIS — M50.30 DDD (DEGENERATIVE DISC DISEASE), CERVICAL: Primary | ICD-10-CM

## 2022-04-07 DIAGNOSIS — M54.50 CHRONIC MIDLINE LOW BACK PAIN WITHOUT SCIATICA: ICD-10-CM

## 2022-04-07 PROCEDURE — 80307 DRUG TEST PRSMV CHEM ANLYZR: CPT

## 2022-04-07 PROCEDURE — 99213 OFFICE O/P EST LOW 20 MIN: CPT

## 2022-04-07 PROCEDURE — 99213 OFFICE O/P EST LOW 20 MIN: CPT | Performed by: NURSE PRACTITIONER

## 2022-04-07 RX ORDER — HYDROCODONE BITARTRATE AND ACETAMINOPHEN 5; 325 MG/1; MG/1
1 TABLET ORAL EVERY 8 HOURS PRN
Qty: 90 TABLET | Refills: 0 | Status: SHIPPED | OUTPATIENT
Start: 2022-04-14 | End: 2022-05-11 | Stop reason: SDUPTHER

## 2022-04-07 ASSESSMENT — ENCOUNTER SYMPTOMS
CONSTIPATION: 0
EYES NEGATIVE: 1
SHORTNESS OF BREATH: 0
SORE THROAT: 1
RESPIRATORY NEGATIVE: 1
BACK PAIN: 1
COUGH: 0

## 2022-04-07 ASSESSMENT — PAIN SCALES - GENERAL: PAINLEVEL_OUTOF10: 8
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
## 2022-04-11 LAB
6-ACETYLMORPHINE, UR: NOT DETECTED
7-AMINOCLONAZEPAM, URINE: NOT DETECTED
ALPHA-OH-ALPRAZ, URINE: NOT DETECTED
ALPHA-OH-MIDAZOLAM, URINE: NOT DETECTED
ALPRAZOLAM, URINE: NOT DETECTED
AMPHETAMINES, URINE: NOT DETECTED
BARBITURATES, URINE: NOT DETECTED
BENZOYLECGONINE, UR: NOT DETECTED
BUPRENORPHINE URINE: NOT DETECTED
CARISOPRODOL, UR: NOT DETECTED
CLONAZEPAM, URINE: NOT DETECTED
CODEINE, URINE: NOT DETECTED
CREATININE URINE: 96.3 MG/DL (ref 20–400)
DIAZEPAM, URINE: NOT DETECTED
DRUGS EXPECTED, UR: NORMAL
EER HI RES INTERP UR: NORMAL
ETHYL GLUCURONIDE UR: NOT DETECTED
FENTANYL URINE: NOT DETECTED
GABAPENTIN: NOT DETECTED
HYDROCODONE, URINE: PRESENT
HYDROMORPHONE, URINE: NOT DETECTED
LORAZEPAM, URINE: NOT DETECTED
MARIJUANA METAB, UR: NOT DETECTED
MDA, UR: NOT DETECTED
MDEA, EVE, UR: NOT DETECTED
MDMA URINE: NOT DETECTED
MEPERIDINE METAB, UR: NOT DETECTED
METHADONE, URINE: NOT DETECTED
METHAMPHETAMINE, URINE: NOT DETECTED
METHYLPHENIDATE: NOT DETECTED
MIDAZOLAM, URINE: NOT DETECTED
MORPHINE URINE: NOT DETECTED
NALOXONE URINE: NOT DETECTED
NORBUPRENORPHINE, URINE: NOT DETECTED
NORDIAZEPAM, URINE: NOT DETECTED
NORFENTANYL, URINE: NOT DETECTED
NORHYDROCODONE, URINE: PRESENT
NOROXYCODONE, URINE: NOT DETECTED
NOROXYMORPHONE, URINE: NOT DETECTED
OXAZEPAM, URINE: NOT DETECTED
OXYCODONE URINE: NOT DETECTED
OXYMORPHONE, URINE: NOT DETECTED
PAIN MANAGEMENT DRUG PANEL INTERP, URINE: NORMAL
PAIN MGT DRUG PANEL, HI RES, UR: NORMAL
PCP,URINE: NOT DETECTED
PHENTERMINE, UR: NOT DETECTED
PREGABALIN: NOT DETECTED
TAPENTADOL, URINE: NOT DETECTED
TAPENTADOL-O-SULFATE, URINE: NOT DETECTED
TEMAZEPAM, URINE: NOT DETECTED
TRAMADOL, URINE: NOT DETECTED
ZOLPIDEM METABOLITE (ZCA), URINE: NOT DETECTED
ZOLPIDEM, URINE: NOT DETECTED

## 2022-04-20 ENCOUNTER — HOSPITAL ENCOUNTER (OUTPATIENT)
Age: 72
Discharge: HOME | End: 2022-04-20
Payer: MEDICARE

## 2022-04-20 DIAGNOSIS — R80.9 PROTEINURIA, UNSPECIFIED TYPE: ICD-10-CM

## 2022-04-20 DIAGNOSIS — N18.4 ANEMIA IN STAGE 4 CHRONIC KIDNEY DISEASE (HCC): ICD-10-CM

## 2022-04-20 DIAGNOSIS — I12.9 BENIGN HYPERTENSION WITH CHRONIC KIDNEY DISEASE, STAGE IV (HCC): ICD-10-CM

## 2022-04-20 DIAGNOSIS — D63.1 ANEMIA IN STAGE 4 CHRONIC KIDNEY DISEASE (HCC): ICD-10-CM

## 2022-04-20 DIAGNOSIS — N18.4 BENIGN HYPERTENSION WITH CHRONIC KIDNEY DISEASE, STAGE IV (HCC): ICD-10-CM

## 2022-04-20 DIAGNOSIS — N18.4 CKD (CHRONIC KIDNEY DISEASE), STAGE IV (HCC): ICD-10-CM

## 2022-04-20 LAB
ABSOLUTE EOS #: 0.2 K/UL (ref 0–0.4)
ABSOLUTE LYMPH #: 2.2 K/UL (ref 1–4.8)
ABSOLUTE MONO #: 0.7 K/UL (ref 0.1–1.3)
ANION GAP SERPL CALCULATED.3IONS-SCNC: 15 MMOL/L (ref 9–17)
BACTERIA: ABNORMAL
BASOPHILS # BLD: 1 % (ref 0–2)
BASOPHILS ABSOLUTE: 0.1 K/UL (ref 0–0.2)
BILIRUBIN URINE: NEGATIVE
BUN BLDV-MCNC: 37 MG/DL (ref 8–23)
CALCIUM SERPL-MCNC: 9.6 MG/DL (ref 8.6–10.4)
CASTS UA: ABNORMAL /LPF
CHLORIDE BLD-SCNC: 100 MMOL/L (ref 98–107)
CO2: 26 MMOL/L (ref 20–31)
COLOR: YELLOW
CREAT SERPL-MCNC: 2.84 MG/DL (ref 0.5–0.9)
EOSINOPHILS RELATIVE PERCENT: 2 % (ref 0–4)
EPITHELIAL CELLS UA: ABNORMAL /HPF
GFR AFRICAN AMERICAN: 20 ML/MIN
GFR NON-AFRICAN AMERICAN: 16 ML/MIN
GFR SERPL CREATININE-BSD FRML MDRD: ABNORMAL ML/MIN/{1.73_M2}
GLUCOSE BLD-MCNC: 283 MG/DL (ref 70–99)
GLUCOSE URINE: ABNORMAL
HCT VFR BLD CALC: 36.7 % (ref 36–46)
HEMOGLOBIN: 12 G/DL (ref 12–16)
KETONES, URINE: NEGATIVE
LEUKOCYTE ESTERASE, URINE: NEGATIVE
LYMPHOCYTES # BLD: 24 % (ref 24–44)
MAGNESIUM: 1.7 MG/DL (ref 1.6–2.6)
MCH RBC QN AUTO: 29.6 PG (ref 26–34)
MCHC RBC AUTO-ENTMCNC: 32.8 G/DL (ref 31–37)
MCV RBC AUTO: 90.2 FL (ref 80–100)
MONOCYTES # BLD: 8 % (ref 1–7)
NITRITE, URINE: NEGATIVE
PDW BLD-RTO: 13.7 % (ref 11.5–14.9)
PH UA: 7 (ref 5–8)
PHOSPHORUS: 4.3 MG/DL (ref 2.6–4.5)
PLATELET # BLD: 266 K/UL (ref 150–450)
PMV BLD AUTO: 9.2 FL (ref 6–12)
POTASSIUM SERPL-SCNC: 4.9 MMOL/L (ref 3.7–5.3)
PROTEIN UA: ABNORMAL
RBC # BLD: 4.08 M/UL (ref 4–5.2)
RBC UA: ABNORMAL /HPF
SEG NEUTROPHILS: 65 % (ref 36–66)
SEGMENTED NEUTROPHILS ABSOLUTE COUNT: 6.2 K/UL (ref 1.3–9.1)
SODIUM BLD-SCNC: 141 MMOL/L (ref 135–144)
SPECIFIC GRAVITY UA: 1.01 (ref 1–1.03)
TURBIDITY: CLEAR
URINE HGB: NEGATIVE
UROBILINOGEN, URINE: NORMAL
WBC # BLD: 9.4 K/UL (ref 3.5–11)
WBC UA: ABNORMAL /HPF

## 2022-04-20 PROCEDURE — 36415 COLL VENOUS BLD VENIPUNCTURE: CPT

## 2022-04-20 PROCEDURE — 84100 ASSAY OF PHOSPHORUS: CPT

## 2022-04-20 PROCEDURE — 83735 ASSAY OF MAGNESIUM: CPT

## 2022-04-20 PROCEDURE — 80048 BASIC METABOLIC PNL TOTAL CA: CPT

## 2022-04-20 PROCEDURE — 81001 URINALYSIS AUTO W/SCOPE: CPT

## 2022-04-20 PROCEDURE — 85025 COMPLETE CBC W/AUTO DIFF WBC: CPT

## 2022-05-10 ENCOUNTER — HOSPITAL ENCOUNTER (OUTPATIENT)
Dept: ULTRASOUND IMAGING | Age: 72
Discharge: HOME | End: 2022-05-12
Payer: MEDICARE

## 2022-05-10 DIAGNOSIS — N13.30 HYDRONEPHROSIS OF LEFT KIDNEY: ICD-10-CM

## 2022-05-10 PROCEDURE — 76775 US EXAM ABDO BACK WALL LIM: CPT

## 2022-05-11 ENCOUNTER — HOSPITAL ENCOUNTER (OUTPATIENT)
Dept: PAIN MANAGEMENT | Age: 72
Discharge: HOME | End: 2022-05-11
Payer: MEDICARE

## 2022-05-11 VITALS
TEMPERATURE: 97.3 F | SYSTOLIC BLOOD PRESSURE: 142 MMHG | WEIGHT: 198 LBS | HEIGHT: 65 IN | BODY MASS INDEX: 32.99 KG/M2 | HEART RATE: 67 BPM | OXYGEN SATURATION: 99 % | DIASTOLIC BLOOD PRESSURE: 71 MMHG | RESPIRATION RATE: 16 BRPM

## 2022-05-11 DIAGNOSIS — M48.02 CERVICAL STENOSIS OF SPINE: ICD-10-CM

## 2022-05-11 DIAGNOSIS — M50.30 DDD (DEGENERATIVE DISC DISEASE), CERVICAL: Primary | ICD-10-CM

## 2022-05-11 DIAGNOSIS — M48.061 DEGENERATIVE LUMBAR SPINAL STENOSIS: ICD-10-CM

## 2022-05-11 DIAGNOSIS — M51.36 DDD (DEGENERATIVE DISC DISEASE), LUMBAR: ICD-10-CM

## 2022-05-11 DIAGNOSIS — Z79.891 ENCOUNTER FOR LONG-TERM OPIATE ANALGESIC USE: ICD-10-CM

## 2022-05-11 DIAGNOSIS — M54.16 LUMBAR RADICULOPATHY: ICD-10-CM

## 2022-05-11 DIAGNOSIS — M47.817 LUMBOSACRAL SPONDYLOSIS WITHOUT MYELOPATHY: ICD-10-CM

## 2022-05-11 PROCEDURE — 99213 OFFICE O/P EST LOW 20 MIN: CPT

## 2022-05-11 PROCEDURE — 99213 OFFICE O/P EST LOW 20 MIN: CPT | Performed by: NURSE PRACTITIONER

## 2022-05-11 RX ORDER — HYDROCODONE BITARTRATE AND ACETAMINOPHEN 5; 325 MG/1; MG/1
1 TABLET ORAL EVERY 8 HOURS PRN
Qty: 90 TABLET | Refills: 0 | Status: SHIPPED | OUTPATIENT
Start: 2022-05-18 | End: 2022-06-14 | Stop reason: SDUPTHER

## 2022-05-11 ASSESSMENT — ENCOUNTER SYMPTOMS
COUGH: 0
CONSTIPATION: 0
SHORTNESS OF BREATH: 0
BACK PAIN: 1

## 2022-05-11 ASSESSMENT — PAIN SCALES - GENERAL: PAINLEVEL_OUTOF10: 8
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
## 2022-06-01 ENCOUNTER — TELEPHONE (OUTPATIENT)
Dept: UROLOGY | Age: 72
End: 2022-06-01

## 2022-06-02 ENCOUNTER — HOSPITAL ENCOUNTER (OUTPATIENT)
Dept: GENERAL RADIOLOGY | Age: 72
Discharge: HOME | End: 2022-06-04
Payer: MEDICARE

## 2022-06-02 ENCOUNTER — HOSPITAL ENCOUNTER (OUTPATIENT)
Dept: GENERAL RADIOLOGY | Age: 72
End: 2022-06-02
Payer: MEDICARE

## 2022-06-02 ENCOUNTER — HOSPITAL ENCOUNTER (OUTPATIENT)
Age: 72
Discharge: HOME | End: 2022-06-04
Payer: MEDICARE

## 2022-06-02 DIAGNOSIS — N20.0 KIDNEY STONE: ICD-10-CM

## 2022-06-02 PROCEDURE — 74018 RADEX ABDOMEN 1 VIEW: CPT

## 2022-06-06 ENCOUNTER — OFFICE VISIT (OUTPATIENT)
Dept: UROLOGY | Age: 72
End: 2022-06-06
Payer: MEDICARE

## 2022-06-06 VITALS
BODY MASS INDEX: 32.99 KG/M2 | DIASTOLIC BLOOD PRESSURE: 80 MMHG | SYSTOLIC BLOOD PRESSURE: 150 MMHG | WEIGHT: 198 LBS | HEIGHT: 65 IN | TEMPERATURE: 96.9 F

## 2022-06-06 DIAGNOSIS — R39.15 URGENCY OF URINATION: ICD-10-CM

## 2022-06-06 DIAGNOSIS — N13.30 HYDRONEPHROSIS OF LEFT KIDNEY: ICD-10-CM

## 2022-06-06 DIAGNOSIS — N20.0 KIDNEY STONES: Primary | ICD-10-CM

## 2022-06-06 PROCEDURE — 99213 OFFICE O/P EST LOW 20 MIN: CPT | Performed by: UROLOGY

## 2022-06-06 PROCEDURE — 1123F ACP DISCUSS/DSCN MKR DOCD: CPT | Performed by: UROLOGY

## 2022-06-06 RX ORDER — HYDROXYZINE PAMOATE 25 MG/1
25 CAPSULE ORAL 3 TIMES DAILY PRN
COMMUNITY
Start: 2022-04-01

## 2022-06-06 RX ORDER — TROSPIUM CHLORIDE 20 MG/1
TABLET, FILM COATED ORAL
COMMUNITY
Start: 2022-04-01 | End: 2022-06-29

## 2022-06-06 ASSESSMENT — ENCOUNTER SYMPTOMS
VOMITING: 0
EYE PAIN: 0
DIARRHEA: 0
COUGH: 0
WHEEZING: 0
CONSTIPATION: 0
NAUSEA: 0
BACK PAIN: 0
SHORTNESS OF BREATH: 0
EYE REDNESS: 0
ABDOMINAL PAIN: 0
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
## 2022-06-14 ENCOUNTER — HOSPITAL ENCOUNTER (OUTPATIENT)
Dept: PAIN MANAGEMENT | Age: 72
Discharge: HOME | End: 2022-06-14
Payer: MEDICARE

## 2022-06-14 VITALS
RESPIRATION RATE: 16 BRPM | OXYGEN SATURATION: 99 % | HEART RATE: 64 BPM | DIASTOLIC BLOOD PRESSURE: 91 MMHG | WEIGHT: 198 LBS | HEIGHT: 65 IN | TEMPERATURE: 97.3 F | BODY MASS INDEX: 32.99 KG/M2 | SYSTOLIC BLOOD PRESSURE: 181 MMHG

## 2022-06-14 DIAGNOSIS — M48.061 DEGENERATIVE LUMBAR SPINAL STENOSIS: ICD-10-CM

## 2022-06-14 DIAGNOSIS — M51.36 DDD (DEGENERATIVE DISC DISEASE), LUMBAR: ICD-10-CM

## 2022-06-14 DIAGNOSIS — M54.16 LUMBAR RADICULOPATHY: ICD-10-CM

## 2022-06-14 DIAGNOSIS — M50.30 DDD (DEGENERATIVE DISC DISEASE), CERVICAL: ICD-10-CM

## 2022-06-14 DIAGNOSIS — M47.817 LUMBOSACRAL SPONDYLOSIS WITHOUT MYELOPATHY: Primary | ICD-10-CM

## 2022-06-14 DIAGNOSIS — Z79.891 ENCOUNTER FOR LONG-TERM OPIATE ANALGESIC USE: ICD-10-CM

## 2022-06-14 DIAGNOSIS — M47.22 OSTEOARTHRITIS OF SPINE WITH RADICULOPATHY, CERVICAL REGION: ICD-10-CM

## 2022-06-14 PROCEDURE — 99213 OFFICE O/P EST LOW 20 MIN: CPT | Performed by: NURSE PRACTITIONER

## 2022-06-14 PROCEDURE — 99213 OFFICE O/P EST LOW 20 MIN: CPT

## 2022-06-14 RX ORDER — HYDROCODONE BITARTRATE AND ACETAMINOPHEN 5; 325 MG/1; MG/1
1 TABLET ORAL EVERY 8 HOURS PRN
Qty: 90 TABLET | Refills: 0 | Status: SHIPPED | OUTPATIENT
Start: 2022-06-17 | End: 2022-07-14 | Stop reason: SDUPTHER

## 2022-06-14 ASSESSMENT — ENCOUNTER SYMPTOMS
BOWEL INCONTINENCE: 0
COUGH: 0
SHORTNESS OF BREATH: 0
BACK PAIN: 1
CONSTIPATION: 0

## 2022-06-14 ASSESSMENT — PAIN SCALES - GENERAL: PAINLEVEL_OUTOF10: 5
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
## 2022-06-22 ENCOUNTER — OFFICE VISIT (OUTPATIENT)
Dept: ORTHOPEDIC SURGERY | Age: 72
End: 2022-06-22
Payer: MEDICARE

## 2022-06-22 VITALS — WEIGHT: 209 LBS | HEIGHT: 65 IN | BODY MASS INDEX: 34.82 KG/M2

## 2022-06-22 DIAGNOSIS — Z96.659 HISTORY OF PARTIAL KNEE REPLACEMENT: Primary | ICD-10-CM

## 2022-06-22 DIAGNOSIS — M17.0 PRIMARY OSTEOARTHRITIS OF BOTH KNEES: ICD-10-CM

## 2022-06-22 PROCEDURE — 20610 DRAIN/INJ JOINT/BURSA W/O US: CPT | Performed by: ORTHOPAEDIC SURGERY

## 2022-06-22 PROCEDURE — 1123F ACP DISCUSS/DSCN MKR DOCD: CPT | Performed by: ORTHOPAEDIC SURGERY

## 2022-06-22 PROCEDURE — 99203 OFFICE O/P NEW LOW 30 MIN: CPT | Performed by: ORTHOPAEDIC SURGERY

## 2022-06-22 RX ORDER — METHYLPREDNISOLONE ACETATE 40 MG/ML
40 INJECTION, SUSPENSION INTRA-ARTICULAR; INTRALESIONAL; INTRAMUSCULAR; SOFT TISSUE ONCE
Status: SHIPPED | OUTPATIENT
Start: 2022-06-22

## 2022-06-22 RX ORDER — LIDOCAINE HYDROCHLORIDE 10 MG/ML
5 INJECTION, SOLUTION INFILTRATION; PERINEURAL ONCE
Status: SHIPPED | OUTPATIENT
Start: 2022-06-22

## 2022-06-22 NOTE — PROGRESS NOTES
Is 77-year-old patient is seen here because of pain in both the knees. She had partial knee replacement bilaterally carried out about 20 years ago. However within the last few months she has been having pain in the front of the knees. Is having difficulty going up the stairs as well as coming down the stairs. The knees have not given out on her. No history of locking episode. Examination: She points to the knee in the peripatellar region bilaterally. There is no effusion in the joint. There was no joint line tenderness. Knees have normal range of motion but there is patellofemoral grating. X-rays: Show that the previous implants are stable. There does not appear to be any loosening. The lateral joint spaces are still well-maintained with some calcification within the menisci. The patellofemoral joints appear well preserved but there are minor osteophyte. Diagnosis: Patellofemoral arthritis bilaterally. #2 history of previous partial replacement of the knees. Treatment: Under sterile condition I injected both knees with 40 mg Depo-Medrol and 5 cc of 1% plain lidocaine and then asked her to walk and she said her pain was much better. I will see her as needed.

## 2022-06-29 PROBLEM — I73.9 CLAUDICATION (HCC): Status: ACTIVE | Noted: 2022-05-30

## 2022-07-13 ENCOUNTER — TELEPHONE (OUTPATIENT)
Dept: PAIN MANAGEMENT | Age: 72
End: 2022-07-13

## 2022-07-14 DIAGNOSIS — M51.36 DDD (DEGENERATIVE DISC DISEASE), LUMBAR: ICD-10-CM

## 2022-07-14 DIAGNOSIS — M48.061 DEGENERATIVE LUMBAR SPINAL STENOSIS: ICD-10-CM

## 2022-07-14 RX ORDER — HYDROCODONE BITARTRATE AND ACETAMINOPHEN 5; 325 MG/1; MG/1
1 TABLET ORAL EVERY 8 HOURS PRN
Qty: 12 TABLET | Refills: 0 | Status: SHIPPED | OUTPATIENT
Start: 2022-07-17 | End: 2022-07-21 | Stop reason: SDUPTHER

## 2022-07-21 ENCOUNTER — HOSPITAL ENCOUNTER (OUTPATIENT)
Dept: PAIN MANAGEMENT | Age: 72
Discharge: HOME | End: 2022-07-21
Payer: COMMERCIAL

## 2022-07-21 VITALS — HEART RATE: 61 BPM | DIASTOLIC BLOOD PRESSURE: 71 MMHG | SYSTOLIC BLOOD PRESSURE: 134 MMHG | OXYGEN SATURATION: 98 %

## 2022-07-21 DIAGNOSIS — M48.02 CERVICAL STENOSIS OF SPINE: ICD-10-CM

## 2022-07-21 DIAGNOSIS — M51.36 DDD (DEGENERATIVE DISC DISEASE), LUMBAR: ICD-10-CM

## 2022-07-21 DIAGNOSIS — M47.817 LUMBOSACRAL SPONDYLOSIS WITHOUT MYELOPATHY: Primary | ICD-10-CM

## 2022-07-21 DIAGNOSIS — Z79.891 CHRONIC USE OF OPIATE FOR THERAPEUTIC PURPOSE: ICD-10-CM

## 2022-07-21 DIAGNOSIS — M48.061 DEGENERATIVE LUMBAR SPINAL STENOSIS: ICD-10-CM

## 2022-07-21 PROCEDURE — 99213 OFFICE O/P EST LOW 20 MIN: CPT

## 2022-07-21 PROCEDURE — 99214 OFFICE O/P EST MOD 30 MIN: CPT | Performed by: ANESTHESIOLOGY

## 2022-07-21 RX ORDER — HYDROCODONE BITARTRATE AND ACETAMINOPHEN 5; 325 MG/1; MG/1
1 TABLET ORAL EVERY 8 HOURS PRN
Qty: 90 TABLET | Refills: 0 | Status: SHIPPED | OUTPATIENT
Start: 2022-07-21 | End: 2022-08-23 | Stop reason: SDUPTHER

## 2022-07-21 ASSESSMENT — ENCOUNTER SYMPTOMS
BACK PAIN: 1
NAUSEA: 0
COUGH: 0
VOMITING: 0
CONSTIPATION: 0
SHORTNESS OF BREATH: 0
DIARRHEA: 0
WHEEZING: 0
SORE THROAT: 0
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
## 2022-08-23 ENCOUNTER — HOSPITAL ENCOUNTER (OUTPATIENT)
Dept: PAIN MANAGEMENT | Age: 72
Discharge: HOME | End: 2022-08-23
Payer: COMMERCIAL

## 2022-08-23 VITALS
TEMPERATURE: 97.3 F | RESPIRATION RATE: 20 BRPM | HEART RATE: 66 BPM | SYSTOLIC BLOOD PRESSURE: 128 MMHG | DIASTOLIC BLOOD PRESSURE: 64 MMHG | OXYGEN SATURATION: 98 %

## 2022-08-23 DIAGNOSIS — M54.16 LUMBAR RADICULOPATHY: ICD-10-CM

## 2022-08-23 DIAGNOSIS — M51.36 DDD (DEGENERATIVE DISC DISEASE), LUMBAR: ICD-10-CM

## 2022-08-23 DIAGNOSIS — M48.02 CERVICAL STENOSIS OF SPINE: Primary | ICD-10-CM

## 2022-08-23 DIAGNOSIS — M47.817 LUMBOSACRAL SPONDYLOSIS WITHOUT MYELOPATHY: ICD-10-CM

## 2022-08-23 DIAGNOSIS — Z79.891 CHRONIC USE OF OPIATE FOR THERAPEUTIC PURPOSE: ICD-10-CM

## 2022-08-23 DIAGNOSIS — M48.061 DEGENERATIVE LUMBAR SPINAL STENOSIS: ICD-10-CM

## 2022-08-23 PROCEDURE — 99213 OFFICE O/P EST LOW 20 MIN: CPT | Performed by: NURSE PRACTITIONER

## 2022-08-23 PROCEDURE — 99213 OFFICE O/P EST LOW 20 MIN: CPT

## 2022-08-23 RX ORDER — HYDROCODONE BITARTRATE AND ACETAMINOPHEN 5; 325 MG/1; MG/1
1 TABLET ORAL EVERY 8 HOURS PRN
Qty: 90 TABLET | Refills: 0 | Status: SHIPPED | OUTPATIENT
Start: 2022-08-23 | End: 2022-09-21 | Stop reason: SDUPTHER

## 2022-08-23 ASSESSMENT — PAIN SCALES - GENERAL: PAINLEVEL_OUTOF10: 8

## 2022-08-23 ASSESSMENT — ENCOUNTER SYMPTOMS
COUGH: 0
SHORTNESS OF BREATH: 0
CONSTIPATION: 0
BACK PAIN: 1
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
## 2022-09-08 DIAGNOSIS — M47.817 LUMBOSACRAL SPONDYLOSIS WITHOUT MYELOPATHY: Primary | ICD-10-CM

## 2022-09-19 ENCOUNTER — HOSPITAL ENCOUNTER (OUTPATIENT)
Dept: WOMENS IMAGING | Age: 72
Discharge: HOME | End: 2022-09-21
Payer: COMMERCIAL

## 2022-09-19 ENCOUNTER — HOSPITAL ENCOUNTER (OUTPATIENT)
Dept: MRI IMAGING | Age: 72
Discharge: HOME | End: 2022-09-21
Payer: COMMERCIAL

## 2022-09-19 DIAGNOSIS — Z12.31 ENCOUNTER FOR SCREENING MAMMOGRAM FOR MALIGNANT NEOPLASM OF BREAST: ICD-10-CM

## 2022-09-19 DIAGNOSIS — M47.817 LUMBOSACRAL SPONDYLOSIS WITHOUT MYELOPATHY: ICD-10-CM

## 2022-09-19 PROCEDURE — 77063 BREAST TOMOSYNTHESIS BI: CPT

## 2022-09-19 PROCEDURE — 72148 MRI LUMBAR SPINE W/O DYE: CPT

## 2022-09-21 ENCOUNTER — HOSPITAL ENCOUNTER (OUTPATIENT)
Dept: PAIN MANAGEMENT | Age: 72
Discharge: HOME | End: 2022-09-21
Payer: COMMERCIAL

## 2022-09-21 VITALS
HEIGHT: 65 IN | TEMPERATURE: 97.3 F | BODY MASS INDEX: 34.16 KG/M2 | OXYGEN SATURATION: 97 % | DIASTOLIC BLOOD PRESSURE: 70 MMHG | WEIGHT: 205 LBS | SYSTOLIC BLOOD PRESSURE: 130 MMHG | RESPIRATION RATE: 20 BRPM | HEART RATE: 64 BPM

## 2022-09-21 DIAGNOSIS — M48.061 DEGENERATIVE LUMBAR SPINAL STENOSIS: ICD-10-CM

## 2022-09-21 DIAGNOSIS — M54.16 LUMBAR RADICULOPATHY: ICD-10-CM

## 2022-09-21 DIAGNOSIS — M50.30 DDD (DEGENERATIVE DISC DISEASE), CERVICAL: Primary | ICD-10-CM

## 2022-09-21 DIAGNOSIS — M51.36 DDD (DEGENERATIVE DISC DISEASE), LUMBAR: ICD-10-CM

## 2022-09-21 DIAGNOSIS — M47.817 LUMBOSACRAL SPONDYLOSIS WITHOUT MYELOPATHY: ICD-10-CM

## 2022-09-21 DIAGNOSIS — M48.02 CERVICAL STENOSIS OF SPINE: ICD-10-CM

## 2022-09-21 DIAGNOSIS — M46.1 SACROILIITIS (HCC): ICD-10-CM

## 2022-09-21 PROCEDURE — 99213 OFFICE O/P EST LOW 20 MIN: CPT

## 2022-09-21 PROCEDURE — 99213 OFFICE O/P EST LOW 20 MIN: CPT | Performed by: NURSE PRACTITIONER

## 2022-09-21 RX ORDER — HYDROCODONE BITARTRATE AND ACETAMINOPHEN 5; 325 MG/1; MG/1
1 TABLET ORAL EVERY 8 HOURS PRN
Qty: 90 TABLET | Refills: 0 | Status: SHIPPED | OUTPATIENT
Start: 2022-09-21 | End: 2022-10-18 | Stop reason: SDUPTHER

## 2022-09-21 ASSESSMENT — ENCOUNTER SYMPTOMS
BACK PAIN: 1
CONSTIPATION: 0
SHORTNESS OF BREATH: 0
COUGH: 0
BOWEL INCONTINENCE: 0

## 2022-09-21 ASSESSMENT — PAIN SCALES - GENERAL: PAINLEVEL_OUTOF10: 9
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
## 2022-10-07 ENCOUNTER — APPOINTMENT (OUTPATIENT)
Dept: GENERAL RADIOLOGY | Age: 72
End: 2022-10-07
Payer: COMMERCIAL

## 2022-10-07 ENCOUNTER — HOSPITAL ENCOUNTER (EMERGENCY)
Age: 72
Discharge: HOME | End: 2022-10-07
Attending: EMERGENCY MEDICINE
Payer: COMMERCIAL

## 2022-10-07 ENCOUNTER — APPOINTMENT (OUTPATIENT)
Dept: ULTRASOUND IMAGING | Age: 72
End: 2022-10-07
Payer: COMMERCIAL

## 2022-10-07 VITALS
HEART RATE: 68 BPM | BODY MASS INDEX: 34.66 KG/M2 | SYSTOLIC BLOOD PRESSURE: 135 MMHG | OXYGEN SATURATION: 96 % | DIASTOLIC BLOOD PRESSURE: 54 MMHG | RESPIRATION RATE: 16 BRPM | HEIGHT: 65 IN | WEIGHT: 208 LBS | TEMPERATURE: 97.6 F

## 2022-10-07 DIAGNOSIS — S76.212A INGUINAL STRAIN, LEFT, INITIAL ENCOUNTER: Primary | ICD-10-CM

## 2022-10-07 PROCEDURE — 73502 X-RAY EXAM HIP UNI 2-3 VIEWS: CPT

## 2022-10-07 PROCEDURE — 99283 EMERGENCY DEPT VISIT LOW MDM: CPT

## 2022-10-07 PROCEDURE — 76882 US LMTD JT/FCL EVL NVASC XTR: CPT

## 2022-10-07 PROCEDURE — 73030 X-RAY EXAM OF SHOULDER: CPT

## 2022-10-07 ASSESSMENT — PAIN DESCRIPTION - PAIN TYPE: TYPE: ACUTE PAIN

## 2022-10-07 ASSESSMENT — ENCOUNTER SYMPTOMS
DIARRHEA: 0
SHORTNESS OF BREATH: 0
CONSTIPATION: 0
ABDOMINAL PAIN: 0
EYE PAIN: 0
NAUSEA: 0
COUGH: 0
BACK PAIN: 0
CHEST TIGHTNESS: 0
VOMITING: 0
EYE REDNESS: 0
SORE THROAT: 0

## 2022-10-07 ASSESSMENT — PAIN DESCRIPTION - ORIENTATION
ORIENTATION: LEFT
ORIENTATION_2: RIGHT

## 2022-10-07 ASSESSMENT — PAIN SCALES - GENERAL: PAINLEVEL_OUTOF10: 9

## 2022-10-07 ASSESSMENT — LIFESTYLE VARIABLES
HOW OFTEN DO YOU HAVE A DRINK CONTAINING ALCOHOL: MONTHLY OR LESS
HOW MANY STANDARD DRINKS CONTAINING ALCOHOL DO YOU HAVE ON A TYPICAL DAY: 1 OR 2

## 2022-10-07 ASSESSMENT — PAIN DESCRIPTION - LOCATION
LOCATION: GROIN
LOCATION_2: SHOULDER

## 2022-10-07 ASSESSMENT — PAIN - FUNCTIONAL ASSESSMENT: PAIN_FUNCTIONAL_ASSESSMENT: 0-10

## 2022-10-07 ASSESSMENT — PAIN DESCRIPTION - DESCRIPTORS: DESCRIPTORS_2: SORE
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
## 2022-10-18 ENCOUNTER — HOSPITAL ENCOUNTER (OUTPATIENT)
Dept: PAIN MANAGEMENT | Age: 72
Discharge: HOME | End: 2022-10-18
Payer: COMMERCIAL

## 2022-10-18 VITALS
WEIGHT: 208 LBS | TEMPERATURE: 97.3 F | HEIGHT: 65 IN | HEART RATE: 63 BPM | DIASTOLIC BLOOD PRESSURE: 69 MMHG | OXYGEN SATURATION: 100 % | SYSTOLIC BLOOD PRESSURE: 133 MMHG | BODY MASS INDEX: 34.66 KG/M2

## 2022-10-18 DIAGNOSIS — M48.061 DEGENERATIVE LUMBAR SPINAL STENOSIS: ICD-10-CM

## 2022-10-18 DIAGNOSIS — M70.61 GREATER TROCHANTERIC BURSITIS OF RIGHT HIP: ICD-10-CM

## 2022-10-18 DIAGNOSIS — M47.817 LUMBOSACRAL SPONDYLOSIS WITHOUT MYELOPATHY: Primary | ICD-10-CM

## 2022-10-18 DIAGNOSIS — M51.36 DDD (DEGENERATIVE DISC DISEASE), LUMBAR: ICD-10-CM

## 2022-10-18 DIAGNOSIS — Z79.891 CHRONIC USE OF OPIATE FOR THERAPEUTIC PURPOSE: ICD-10-CM

## 2022-10-18 PROCEDURE — 20610 DRAIN/INJ JOINT/BURSA W/O US: CPT

## 2022-10-18 PROCEDURE — 20610 DRAIN/INJ JOINT/BURSA W/O US: CPT | Performed by: STUDENT IN AN ORGANIZED HEALTH CARE EDUCATION/TRAINING PROGRAM

## 2022-10-18 PROCEDURE — 99213 OFFICE O/P EST LOW 20 MIN: CPT

## 2022-10-18 PROCEDURE — 99214 OFFICE O/P EST MOD 30 MIN: CPT | Performed by: STUDENT IN AN ORGANIZED HEALTH CARE EDUCATION/TRAINING PROGRAM

## 2022-10-18 RX ORDER — HYDROCODONE BITARTRATE AND ACETAMINOPHEN 5; 325 MG/1; MG/1
1 TABLET ORAL EVERY 8 HOURS PRN
Qty: 90 TABLET | Refills: 0 | Status: SHIPPED | OUTPATIENT
Start: 2022-10-21 | End: 2022-11-20

## 2022-10-18 RX ORDER — BUPIVACAINE HYDROCHLORIDE 2.5 MG/ML
4 INJECTION, SOLUTION EPIDURAL; INFILTRATION; INTRACAUDAL
Status: DISCONTINUED | OUTPATIENT
Start: 2022-10-18 | End: 2022-10-19 | Stop reason: HOSPADM

## 2022-10-18 RX ORDER — TRIAMCINOLONE ACETONIDE 40 MG/ML
40 INJECTION, SUSPENSION INTRA-ARTICULAR; INTRAMUSCULAR ONCE
Status: DISCONTINUED | OUTPATIENT
Start: 2022-10-18 | End: 2022-10-19 | Stop reason: HOSPADM

## 2022-10-18 ASSESSMENT — PAIN SCALES - GENERAL: PAINLEVEL_OUTOF10: 9
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
## 2022-10-24 ENCOUNTER — TELEPHONE (OUTPATIENT)
Dept: PAIN MANAGEMENT | Age: 72
End: 2022-10-24

## 2022-11-02 RX ADMIN — TRIAMCINOLONE ACETONIDE 40 MG: 40 INJECTION, SUSPENSION INTRA-ARTICULAR; INTRAMUSCULAR at 12:43

## 2022-11-02 RX ADMIN — BUPIVACAINE HYDROCHLORIDE 10 MG: 2.5 INJECTION, SOLUTION EPIDURAL; INFILTRATION; INTRACAUDAL at 12:44

## 2022-11-15 ENCOUNTER — HOSPITAL ENCOUNTER (OUTPATIENT)
Dept: PAIN MANAGEMENT | Age: 72
Discharge: HOME | End: 2022-11-15
Payer: COMMERCIAL

## 2022-11-15 VITALS
TEMPERATURE: 97.3 F | SYSTOLIC BLOOD PRESSURE: 121 MMHG | WEIGHT: 205 LBS | RESPIRATION RATE: 20 BRPM | DIASTOLIC BLOOD PRESSURE: 63 MMHG | HEART RATE: 61 BPM | BODY MASS INDEX: 34.16 KG/M2 | HEIGHT: 65 IN | OXYGEN SATURATION: 98 %

## 2022-11-15 DIAGNOSIS — M51.36 DDD (DEGENERATIVE DISC DISEASE), LUMBAR: ICD-10-CM

## 2022-11-15 DIAGNOSIS — Z79.891 CHRONIC USE OF OPIATE FOR THERAPEUTIC PURPOSE: ICD-10-CM

## 2022-11-15 DIAGNOSIS — G89.29 CHRONIC RIGHT SHOULDER PAIN: ICD-10-CM

## 2022-11-15 DIAGNOSIS — M48.061 DEGENERATIVE LUMBAR SPINAL STENOSIS: ICD-10-CM

## 2022-11-15 DIAGNOSIS — M25.511 CHRONIC RIGHT SHOULDER PAIN: ICD-10-CM

## 2022-11-15 DIAGNOSIS — M70.61 GREATER TROCHANTERIC BURSITIS OF RIGHT HIP: ICD-10-CM

## 2022-11-15 DIAGNOSIS — M47.817 LUMBOSACRAL SPONDYLOSIS WITHOUT MYELOPATHY: Primary | ICD-10-CM

## 2022-11-15 PROCEDURE — 99213 OFFICE O/P EST LOW 20 MIN: CPT

## 2022-11-15 PROCEDURE — 99213 OFFICE O/P EST LOW 20 MIN: CPT | Performed by: STUDENT IN AN ORGANIZED HEALTH CARE EDUCATION/TRAINING PROGRAM

## 2022-11-15 RX ORDER — HYDROCODONE BITARTRATE AND ACETAMINOPHEN 5; 325 MG/1; MG/1
1 TABLET ORAL EVERY 8 HOURS PRN
Qty: 90 TABLET | Refills: 0 | Status: SHIPPED | OUTPATIENT
Start: 2022-11-20 | End: 2022-12-20

## 2022-11-15 ASSESSMENT — PAIN SCALES - GENERAL: PAINLEVEL_OUTOF10: 7
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
## 2022-11-23 ENCOUNTER — HOSPITAL ENCOUNTER (OUTPATIENT)
Age: 72
Discharge: HOME | End: 2022-11-23
Payer: COMMERCIAL

## 2022-11-23 DIAGNOSIS — N18.4 BENIGN HYPERTENSION WITH CHRONIC KIDNEY DISEASE, STAGE IV (HCC): ICD-10-CM

## 2022-11-23 DIAGNOSIS — I12.9 BENIGN HYPERTENSION WITH CHRONIC KIDNEY DISEASE, STAGE IV (HCC): ICD-10-CM

## 2022-11-23 DIAGNOSIS — N18.4 CKD (CHRONIC KIDNEY DISEASE), STAGE IV (HCC): ICD-10-CM

## 2022-11-23 LAB
ABSOLUTE EOS #: 0.2 K/UL (ref 0–0.4)
ABSOLUTE LYMPH #: 2.3 K/UL (ref 1–4.8)
ABSOLUTE MONO #: 0.9 K/UL (ref 0.1–1.3)
ANION GAP SERPL CALCULATED.3IONS-SCNC: 9 MMOL/L (ref 9–17)
BACTERIA: ABNORMAL
BASOPHILS # BLD: 1 % (ref 0–2)
BASOPHILS ABSOLUTE: 0.1 K/UL (ref 0–0.2)
BILIRUBIN URINE: NEGATIVE
BUN BLDV-MCNC: 30 MG/DL (ref 8–23)
CALCIUM SERPL-MCNC: 9.7 MG/DL (ref 8.6–10.4)
CASTS UA: ABNORMAL /LPF
CHLORIDE BLD-SCNC: 103 MMOL/L (ref 98–107)
CO2: 29 MMOL/L (ref 20–31)
COLOR: YELLOW
CREAT SERPL-MCNC: 2.44 MG/DL (ref 0.5–0.9)
EOSINOPHILS RELATIVE PERCENT: 2 % (ref 0–4)
EPITHELIAL CELLS UA: ABNORMAL /HPF
GFR SERPL CREATININE-BSD FRML MDRD: 21 ML/MIN/1.73M2
GLUCOSE BLD-MCNC: 115 MG/DL (ref 70–99)
GLUCOSE URINE: NEGATIVE
HCT VFR BLD CALC: 38.9 % (ref 36–46)
HEMOGLOBIN: 12.6 G/DL (ref 12–16)
KETONES, URINE: NEGATIVE
LEUKOCYTE ESTERASE, URINE: ABNORMAL
LYMPHOCYTES # BLD: 26 % (ref 24–44)
MAGNESIUM: 2.1 MG/DL (ref 1.6–2.6)
MCH RBC QN AUTO: 29.3 PG (ref 26–34)
MCHC RBC AUTO-ENTMCNC: 32.3 G/DL (ref 31–37)
MCV RBC AUTO: 90.7 FL (ref 80–100)
MONOCYTES # BLD: 10 % (ref 1–7)
NITRITE, URINE: NEGATIVE
PDW BLD-RTO: 14.5 % (ref 11.5–14.9)
PH UA: 7 (ref 5–8)
PHOSPHORUS: 3.9 MG/DL (ref 2.6–4.5)
PLATELET # BLD: 306 K/UL (ref 150–450)
PMV BLD AUTO: 8.4 FL (ref 6–12)
POTASSIUM SERPL-SCNC: 5.2 MMOL/L (ref 3.7–5.3)
PROTEIN UA: ABNORMAL
RBC # BLD: 4.29 M/UL (ref 4–5.2)
RBC UA: ABNORMAL /HPF
SEG NEUTROPHILS: 61 % (ref 36–66)
SEGMENTED NEUTROPHILS ABSOLUTE COUNT: 5.4 K/UL (ref 1.3–9.1)
SODIUM BLD-SCNC: 141 MMOL/L (ref 135–144)
SPECIFIC GRAVITY UA: 1.01 (ref 1–1.03)
TURBIDITY: CLEAR
URINE HGB: ABNORMAL
UROBILINOGEN, URINE: NORMAL
WBC # BLD: 8.9 K/UL (ref 3.5–11)
WBC UA: ABNORMAL /HPF

## 2022-11-23 PROCEDURE — 83735 ASSAY OF MAGNESIUM: CPT

## 2022-11-23 PROCEDURE — 84100 ASSAY OF PHOSPHORUS: CPT

## 2022-11-23 PROCEDURE — 80048 BASIC METABOLIC PNL TOTAL CA: CPT

## 2022-11-23 PROCEDURE — 36415 COLL VENOUS BLD VENIPUNCTURE: CPT

## 2022-11-23 PROCEDURE — 81001 URINALYSIS AUTO W/SCOPE: CPT

## 2022-11-23 PROCEDURE — 85025 COMPLETE CBC W/AUTO DIFF WBC: CPT

## 2022-12-12 ENCOUNTER — OFFICE VISIT (OUTPATIENT)
Dept: UROLOGY | Age: 72
End: 2022-12-12
Payer: COMMERCIAL

## 2022-12-12 VITALS
HEART RATE: 72 BPM | SYSTOLIC BLOOD PRESSURE: 124 MMHG | HEIGHT: 65 IN | WEIGHT: 218 LBS | DIASTOLIC BLOOD PRESSURE: 80 MMHG | BODY MASS INDEX: 36.32 KG/M2

## 2022-12-12 DIAGNOSIS — R68.2 DRY MOUTH: ICD-10-CM

## 2022-12-12 DIAGNOSIS — N32.81 OAB (OVERACTIVE BLADDER): ICD-10-CM

## 2022-12-12 DIAGNOSIS — R39.15 URGENCY OF URINATION: Primary | ICD-10-CM

## 2022-12-12 PROCEDURE — 3078F DIAST BP <80 MM HG: CPT | Performed by: UROLOGY

## 2022-12-12 PROCEDURE — 99214 OFFICE O/P EST MOD 30 MIN: CPT | Performed by: UROLOGY

## 2022-12-12 PROCEDURE — 3074F SYST BP LT 130 MM HG: CPT | Performed by: UROLOGY

## 2022-12-12 PROCEDURE — 1123F ACP DISCUSS/DSCN MKR DOCD: CPT | Performed by: UROLOGY

## 2022-12-12 RX ORDER — MIRABEGRON 50 MG/1
50 TABLET, FILM COATED, EXTENDED RELEASE ORAL DAILY
Qty: 30 TABLET | Refills: 5 | Status: SHIPPED | OUTPATIENT
Start: 2022-12-12

## 2022-12-12 ASSESSMENT — ENCOUNTER SYMPTOMS
VOMITING: 0
RESPIRATORY NEGATIVE: 1
EYES NEGATIVE: 1
CONSTIPATION: 0
GASTROINTESTINAL NEGATIVE: 1
SHORTNESS OF BREATH: 0
DIARRHEA: 0
EYE PAIN: 0
WHEEZING: 0
BACK PAIN: 0
COUGH: 0
NAUSEA: 0
ABDOMINAL PAIN: 0
EYE REDNESS: 0
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
## 2022-12-19 ENCOUNTER — HOSPITAL ENCOUNTER (OUTPATIENT)
Dept: PAIN MANAGEMENT | Age: 72
Discharge: HOME | End: 2022-12-19
Payer: COMMERCIAL

## 2022-12-19 VITALS
HEART RATE: 64 BPM | WEIGHT: 218 LBS | HEIGHT: 65 IN | SYSTOLIC BLOOD PRESSURE: 101 MMHG | RESPIRATION RATE: 20 BRPM | OXYGEN SATURATION: 94 % | BODY MASS INDEX: 36.32 KG/M2 | TEMPERATURE: 97.3 F | DIASTOLIC BLOOD PRESSURE: 59 MMHG

## 2022-12-19 DIAGNOSIS — Z79.891 CHRONIC USE OF OPIATE FOR THERAPEUTIC PURPOSE: Primary | ICD-10-CM

## 2022-12-19 DIAGNOSIS — M48.02 CERVICAL STENOSIS OF SPINE: ICD-10-CM

## 2022-12-19 DIAGNOSIS — M51.36 DDD (DEGENERATIVE DISC DISEASE), LUMBAR: ICD-10-CM

## 2022-12-19 DIAGNOSIS — M46.1 SACROILIITIS (HCC): ICD-10-CM

## 2022-12-19 DIAGNOSIS — Z79.891 ENCOUNTER FOR LONG-TERM OPIATE ANALGESIC USE: Chronic | ICD-10-CM

## 2022-12-19 DIAGNOSIS — M54.16 LUMBAR RADICULOPATHY: ICD-10-CM

## 2022-12-19 DIAGNOSIS — M48.061 DEGENERATIVE LUMBAR SPINAL STENOSIS: ICD-10-CM

## 2022-12-19 DIAGNOSIS — M47.817 LUMBOSACRAL SPONDYLOSIS WITHOUT MYELOPATHY: ICD-10-CM

## 2022-12-19 PROCEDURE — 99213 OFFICE O/P EST LOW 20 MIN: CPT

## 2022-12-19 RX ORDER — HYDROCODONE BITARTRATE AND ACETAMINOPHEN 5; 325 MG/1; MG/1
1 TABLET ORAL EVERY 8 HOURS PRN
Qty: 90 TABLET | Refills: 0 | Status: SHIPPED | OUTPATIENT
Start: 2022-12-20 | End: 2023-01-19

## 2022-12-19 ASSESSMENT — ENCOUNTER SYMPTOMS
COUGH: 0
BACK PAIN: 1
CONSTIPATION: 0
BOWEL INCONTINENCE: 0
SHORTNESS OF BREATH: 0

## 2022-12-19 ASSESSMENT — PAIN SCALES - GENERAL: PAINLEVEL_OUTOF10: 10
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
## 2023-01-16 ENCOUNTER — HOSPITAL ENCOUNTER (OUTPATIENT)
Dept: GENERAL RADIOLOGY | Age: 73
Discharge: HOME | End: 2023-01-18
Payer: COMMERCIAL

## 2023-01-16 ENCOUNTER — HOSPITAL ENCOUNTER (OUTPATIENT)
Dept: PAIN MANAGEMENT | Age: 73
Discharge: HOME | End: 2023-01-16
Payer: COMMERCIAL

## 2023-01-16 VITALS
WEIGHT: 205 LBS | BODY MASS INDEX: 34.16 KG/M2 | TEMPERATURE: 97.3 F | SYSTOLIC BLOOD PRESSURE: 126 MMHG | HEART RATE: 70 BPM | HEIGHT: 65 IN | DIASTOLIC BLOOD PRESSURE: 94 MMHG | OXYGEN SATURATION: 98 % | RESPIRATION RATE: 21 BRPM

## 2023-01-16 DIAGNOSIS — R52 PAIN MANAGEMENT: ICD-10-CM

## 2023-01-16 PROCEDURE — 3209999900 FLUORO FOR SURGICAL PROCEDURES

## 2023-01-16 PROCEDURE — 64495 INJ PARAVERT F JNT L/S 3 LEV: CPT

## 2023-01-16 PROCEDURE — 64493 INJ PARAVERT F JNT L/S 1 LEV: CPT

## 2023-01-16 PROCEDURE — 64494 INJ PARAVERT F JNT L/S 2 LEV: CPT

## 2023-01-16 PROCEDURE — 2500000003 HC RX 250 WO HCPCS: Performed by: STUDENT IN AN ORGANIZED HEALTH CARE EDUCATION/TRAINING PROGRAM

## 2023-01-16 RX ORDER — LIDOCAINE HYDROCHLORIDE 20 MG/ML
INJECTION, SOLUTION EPIDURAL; INFILTRATION; INTRACAUDAL; PERINEURAL
Status: COMPLETED | OUTPATIENT
Start: 2023-01-16 | End: 2023-01-16

## 2023-01-16 RX ADMIN — LIDOCAINE HYDROCHLORIDE 6 ML: 20 INJECTION, SOLUTION EPIDURAL; INFILTRATION; INTRACAUDAL; PERINEURAL at 09:54

## 2023-01-16 ASSESSMENT — PAIN DESCRIPTION - ORIENTATION: ORIENTATION: RIGHT

## 2023-01-16 ASSESSMENT — PAIN DESCRIPTION - LOCATION
LOCATION: BACK
LOCATION: BACK

## 2023-01-16 ASSESSMENT — PAIN DESCRIPTION - PAIN TYPE: TYPE: CHRONIC PAIN

## 2023-01-16 ASSESSMENT — PAIN SCALES - GENERAL
PAINLEVEL_OUTOF10: 5
PAINLEVEL_OUTOF10: 8

## 2023-01-16 ASSESSMENT — PAIN - FUNCTIONAL ASSESSMENT: PAIN_FUNCTIONAL_ASSESSMENT: PREVENTS OR INTERFERES SOME ACTIVE ACTIVITIES AND ADLS

## 2023-01-16 ASSESSMENT — PAIN DESCRIPTION - DESCRIPTORS
DESCRIPTORS: ACHING;DULL
DESCRIPTORS: ACHING;DULL

## 2023-01-16 NOTE — OP NOTE
PROCEDURE PERFORMED: Bilateral Lumbar medial branch block    PREOPERATIVE DIAGNOSIS: Lumbosacral spondylosis    INDICATIONS: Chronic low back pain    The patient's history and physical exam were reviewed. The risk, benefits, and alternatives of the procedure were discussed and all questions were answered to the patient's satisfaction. The patient agreed to proceed and written informed consent was obtained. POSTOPERATIVE DIAGNOSIS: Lumbar spondylosis    PHYSICIAN:  Dr. Tin Still DO    ANESTHESIA:  LOCAL    ASSISTANT:  NONE    PATHOLOGY:  NONE    ESTIMATED BLOOD LOSS:  N/A    IMPLANTS:  NONE    PROCEDURE DESCRIPTION: Diagnostic bilateral lumbar medial branch block using fluoroscopy    The patient was placed on the operative bed in prone position. The area was prepped with  Chlorhexidine. The area was then draped in a sterile fashion. Targeted levels: Bilateral lumbar L3, L4, L5 medial branch block    An AP  fluoroscopy image was used to identify and anette Gaytan's point at the L3, L4 levels on the targeted side. Additionally, the junction of the SAP and sacral ala was also marked on the same side. A 25-gauge 3-1/2 inch Quincke spinal needle was then advanced toward each of these points under fluoroscopic guidance. Once bone was contacted, negative aspiration was confirmed and 0.5 mL of lidocaine 2% was injected each level. The needles were removed and the needle sites were dressed appropriately. The same procedure was performed on the opposite side. The patient was transferred to the postoperative care unit in stable condition. Written discharge instructions were given to the patient. The patient was given a pain diary upon discharge. COMPLICATIONS:  There were no apparent complications. The patient tolerated the procedure well.

## 2023-01-16 NOTE — DISCHARGE INSTRUCTIONS
Discharge Instructions:  Increase diet as tolerated. Keep up on fluids if diet allows. General Instructions:  Do not take a tub bath for 72 hours after procedure (this includes hot tubs and swimming pools). You may shower, but avoid hot water to injection site. Avoid strenuous activity TODAY especially if you experience dizziness. Remove band-aid the next day. Wash off any residual iodine   Do not use heat, heating pad, or any other heating device over the injection site for 3 days after the procedure. If you experience pain after your procedure, you may continue with your current pain medication as prescribed. (DO NOT INCREASE YOUR PAIN MEDICATION WITHOUT TALKING TO DOCTOR)  Soreness and pain at injection site is common, may use ice to reduce soreness. Please complete pain diary as instructed.      Call Merna Galvin at [phone] if you experience:   Fever, chills or temperature over 100    Vomiting, Headache, persistent stiff neck, nausea, blurred vision   Difficulty in urinating or unable to urinate with 8 hours   Increase in weakness, numbness or loss of function   Increased redness, swelling or drainage at the injection site
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
## 2023-01-18 ENCOUNTER — HOSPITAL ENCOUNTER (OUTPATIENT)
Dept: PAIN MANAGEMENT | Age: 73
Discharge: HOME | End: 2023-01-18
Payer: COMMERCIAL

## 2023-01-18 VITALS
SYSTOLIC BLOOD PRESSURE: 115 MMHG | OXYGEN SATURATION: 99 % | BODY MASS INDEX: 34.16 KG/M2 | WEIGHT: 205 LBS | HEIGHT: 65 IN | DIASTOLIC BLOOD PRESSURE: 54 MMHG | HEART RATE: 59 BPM

## 2023-01-18 DIAGNOSIS — G89.29 CHRONIC RIGHT SHOULDER PAIN: ICD-10-CM

## 2023-01-18 DIAGNOSIS — M25.511 CHRONIC RIGHT SHOULDER PAIN: ICD-10-CM

## 2023-01-18 DIAGNOSIS — Z79.891 CHRONIC USE OF OPIATE FOR THERAPEUTIC PURPOSE: Primary | ICD-10-CM

## 2023-01-18 DIAGNOSIS — M48.061 DEGENERATIVE LUMBAR SPINAL STENOSIS: ICD-10-CM

## 2023-01-18 DIAGNOSIS — M47.817 LUMBOSACRAL SPONDYLOSIS WITHOUT MYELOPATHY: ICD-10-CM

## 2023-01-18 DIAGNOSIS — M51.36 DDD (DEGENERATIVE DISC DISEASE), LUMBAR: ICD-10-CM

## 2023-01-18 PROCEDURE — 99213 OFFICE O/P EST LOW 20 MIN: CPT

## 2023-01-18 PROCEDURE — 2500000003 HC RX 250 WO HCPCS: Performed by: STUDENT IN AN ORGANIZED HEALTH CARE EDUCATION/TRAINING PROGRAM

## 2023-01-18 PROCEDURE — 6360000002 HC RX W HCPCS: Performed by: STUDENT IN AN ORGANIZED HEALTH CARE EDUCATION/TRAINING PROGRAM

## 2023-01-18 PROCEDURE — 20610 DRAIN/INJ JOINT/BURSA W/O US: CPT

## 2023-01-18 RX ORDER — TRIAMCINOLONE ACETONIDE 40 MG/ML
40 INJECTION, SUSPENSION INTRA-ARTICULAR; INTRAMUSCULAR ONCE
Status: COMPLETED | OUTPATIENT
Start: 2023-01-18 | End: 2023-01-18

## 2023-01-18 RX ORDER — HYDROCODONE BITARTRATE AND ACETAMINOPHEN 5; 325 MG/1; MG/1
1 TABLET ORAL EVERY 8 HOURS PRN
Qty: 90 TABLET | Refills: 0 | Status: SHIPPED | OUTPATIENT
Start: 2023-01-21 | End: 2023-02-20

## 2023-01-18 RX ORDER — BUPIVACAINE HYDROCHLORIDE 2.5 MG/ML
4 INJECTION, SOLUTION EPIDURAL; INFILTRATION; INTRACAUDAL
Status: COMPLETED | OUTPATIENT
Start: 2023-01-18 | End: 2023-01-18

## 2023-01-18 RX ADMIN — BUPIVACAINE HYDROCHLORIDE 4 ML: 2.5 INJECTION, SOLUTION EPIDURAL; INFILTRATION; INTRACAUDAL; PERINEURAL at 11:20

## 2023-01-18 RX ADMIN — TRIAMCINOLONE ACETONIDE 40 MG: 40 INJECTION, SUSPENSION INTRA-ARTICULAR; INTRAMUSCULAR at 11:21
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
## 2023-01-20 ENCOUNTER — TELEPHONE (OUTPATIENT)
Dept: PAIN MANAGEMENT | Age: 73
End: 2023-01-20

## 2023-01-20 NOTE — TELEPHONE ENCOUNTER
Pt calls the office letting us know at her appt.  1/18 she forgot to bring medication, she did have 11 tablets left as of 1/18

## 2023-01-30 ENCOUNTER — HOSPITAL ENCOUNTER (OUTPATIENT)
Dept: GENERAL RADIOLOGY | Age: 73
Discharge: HOME | End: 2023-02-01
Payer: COMMERCIAL

## 2023-01-30 ENCOUNTER — HOSPITAL ENCOUNTER (OUTPATIENT)
Dept: PAIN MANAGEMENT | Age: 73
Discharge: HOME | End: 2023-01-30
Payer: COMMERCIAL

## 2023-01-30 VITALS
OXYGEN SATURATION: 98 % | BODY MASS INDEX: 34.16 KG/M2 | RESPIRATION RATE: 18 BRPM | HEIGHT: 65 IN | WEIGHT: 205 LBS | DIASTOLIC BLOOD PRESSURE: 61 MMHG | TEMPERATURE: 98.4 F | SYSTOLIC BLOOD PRESSURE: 169 MMHG | HEART RATE: 56 BPM

## 2023-01-30 DIAGNOSIS — R52 PAIN MANAGEMENT: ICD-10-CM

## 2023-01-30 PROCEDURE — 64493 INJ PARAVERT F JNT L/S 1 LEV: CPT | Performed by: STUDENT IN AN ORGANIZED HEALTH CARE EDUCATION/TRAINING PROGRAM

## 2023-01-30 PROCEDURE — 64494 INJ PARAVERT F JNT L/S 2 LEV: CPT | Performed by: STUDENT IN AN ORGANIZED HEALTH CARE EDUCATION/TRAINING PROGRAM

## 2023-01-30 PROCEDURE — 64495 INJ PARAVERT F JNT L/S 3 LEV: CPT

## 2023-01-30 PROCEDURE — 3209999900 FLUORO FOR SURGICAL PROCEDURES

## 2023-01-30 PROCEDURE — 2500000003 HC RX 250 WO HCPCS: Performed by: STUDENT IN AN ORGANIZED HEALTH CARE EDUCATION/TRAINING PROGRAM

## 2023-01-30 PROCEDURE — 64494 INJ PARAVERT F JNT L/S 2 LEV: CPT

## 2023-01-30 PROCEDURE — 64493 INJ PARAVERT F JNT L/S 1 LEV: CPT

## 2023-01-30 RX ORDER — LIDOCAINE HYDROCHLORIDE 20 MG/ML
INJECTION, SOLUTION EPIDURAL; INFILTRATION; INTRACAUDAL; PERINEURAL
Status: COMPLETED | OUTPATIENT
Start: 2023-01-30 | End: 2023-01-30

## 2023-01-30 RX ADMIN — LIDOCAINE HYDROCHLORIDE 6 ML: 20 INJECTION, SOLUTION EPIDURAL; INFILTRATION; INTRACAUDAL; PERINEURAL at 10:15

## 2023-01-30 ASSESSMENT — PAIN - FUNCTIONAL ASSESSMENT: PAIN_FUNCTIONAL_ASSESSMENT: 0-10

## 2023-01-30 ASSESSMENT — PAIN DESCRIPTION - DESCRIPTORS: DESCRIPTORS: DULL

## 2023-01-30 NOTE — DISCHARGE INSTRUCTIONS
Discharge Instructions following Sedation or Anesthesia:  You have  received  a sedative/anesthetic therefore, you should not consume any alcoholic beverages for minimum of 12 hours. Do not drive or operate machinery for 24 hours. Do not sign legal documents for 24 hours. Dizziness, drowsiness, and unsteadiness may occur. Rest when need to. Increase diet as tolerated. Keep up on fluids if diet allows. General Instructions:  Do not take a tub bath for 72 hours after procedure (this includes hot tubs and swimming pools). You may shower, but avoid hot water to injection site. Avoid strenuous activity TODAY especially if you experience dizziness. Remove band-aid the next day. Wash off any residual iodine   Do not use heat, heating pad, or any other heating device over the injection site for 3 days after the procedure. If you experience pain after your procedure, you may continue with your current pain medication as prescribed. (DO NOT INCREASE YOUR PAIN MEDICATION WITHOUT TALKING TO DOCTOR)  Soreness and pain at injection site is common, may use ice to reduce soreness. Please complete pain diary as instructed.      Call Merna Galvin at [phone] if you experience:   Fever, chills or temperature over 100    Vomiting, Headache, persistent stiff neck, nausea, blurred vision   Difficulty in urinating or unable to urinate with 8 hours   Increase in weakness, numbness or loss of function   Increased redness, swelling or drainage at the injection site

## 2023-01-30 NOTE — INTERVAL H&P NOTE
Update History & Physical    The patient's History and Physical of January 18, 2023 was reviewed with the patient and I examined the patient. There was no change. The surgical site was confirmed by the patient and me. Plan: The risks, benefits, expected outcome, and alternative to the recommended procedure have been discussed with the patient. Patient understands and wants to proceed with the procedure.      Electronically signed by Claudine Martinez DO on 1/30/2023 at 9:58 AM

## 2023-02-10 ENCOUNTER — TELEPHONE (OUTPATIENT)
Dept: PAIN MANAGEMENT | Age: 73
End: 2023-02-10

## 2023-02-13 ENCOUNTER — OFFICE VISIT (OUTPATIENT)
Dept: UROLOGY | Age: 73
End: 2023-02-13
Payer: COMMERCIAL

## 2023-02-13 VITALS
WEIGHT: 205 LBS | HEIGHT: 65 IN | DIASTOLIC BLOOD PRESSURE: 62 MMHG | SYSTOLIC BLOOD PRESSURE: 125 MMHG | HEART RATE: 62 BPM | BODY MASS INDEX: 34.16 KG/M2

## 2023-02-13 DIAGNOSIS — R39.15 URGENCY OF URINATION: ICD-10-CM

## 2023-02-13 DIAGNOSIS — N32.81 OAB (OVERACTIVE BLADDER): Primary | ICD-10-CM

## 2023-02-13 DIAGNOSIS — R68.2 DRY MOUTH: ICD-10-CM

## 2023-02-13 PROCEDURE — 3074F SYST BP LT 130 MM HG: CPT | Performed by: UROLOGY

## 2023-02-13 PROCEDURE — 3078F DIAST BP <80 MM HG: CPT | Performed by: UROLOGY

## 2023-02-13 PROCEDURE — 1123F ACP DISCUSS/DSCN MKR DOCD: CPT | Performed by: UROLOGY

## 2023-02-13 PROCEDURE — 99213 OFFICE O/P EST LOW 20 MIN: CPT | Performed by: UROLOGY

## 2023-02-13 ASSESSMENT — ENCOUNTER SYMPTOMS
EYES NEGATIVE: 1
EYE PAIN: 0
SHORTNESS OF BREATH: 0
RESPIRATORY NEGATIVE: 1
COUGH: 0
EYE REDNESS: 0
WHEEZING: 0
DIARRHEA: 0
GASTROINTESTINAL NEGATIVE: 1
BACK PAIN: 0
NAUSEA: 0
VOMITING: 0
CONSTIPATION: 0
ABDOMINAL PAIN: 0

## 2023-02-13 NOTE — PROGRESS NOTES
Review of Systems   Constitutional: Negative. Negative for appetite change, chills and fever. Eyes: Negative. Negative for pain, redness and visual disturbance. Respiratory: Negative. Negative for cough, shortness of breath and wheezing. Cardiovascular: Negative. Negative for chest pain and leg swelling. Gastrointestinal: Negative. Negative for abdominal pain, constipation, diarrhea, nausea and vomiting. Genitourinary: Negative. Negative for difficulty urinating, dysuria, flank pain, frequency, hematuria and urgency. Musculoskeletal: Negative. Negative for back pain, joint swelling and myalgias. Skin: Negative. Negative for rash and wound. Neurological: Negative. Negative for dizziness, tremors and numbness. Hematological: Negative. Negative for adenopathy. Does not bruise/bleed easily. Patient stated \"Myrbetriq was not working for me. So, I went back on Oxybutynin. \"
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
## 2023-02-14 ENCOUNTER — HOSPITAL ENCOUNTER (OUTPATIENT)
Dept: PAIN MANAGEMENT | Age: 73
Discharge: HOME | End: 2023-02-14
Payer: MEDICARE

## 2023-02-14 VITALS
TEMPERATURE: 97.3 F | OXYGEN SATURATION: 99 % | HEART RATE: 68 BPM | DIASTOLIC BLOOD PRESSURE: 66 MMHG | RESPIRATION RATE: 20 BRPM | SYSTOLIC BLOOD PRESSURE: 152 MMHG

## 2023-02-14 DIAGNOSIS — M25.511 CHRONIC RIGHT SHOULDER PAIN: ICD-10-CM

## 2023-02-14 DIAGNOSIS — Z79.891 ENCOUNTER FOR LONG-TERM OPIATE ANALGESIC USE: Primary | Chronic | ICD-10-CM

## 2023-02-14 DIAGNOSIS — G89.29 CHRONIC RIGHT SHOULDER PAIN: ICD-10-CM

## 2023-02-14 DIAGNOSIS — M47.817 LUMBOSACRAL SPONDYLOSIS WITHOUT MYELOPATHY: ICD-10-CM

## 2023-02-14 DIAGNOSIS — M70.61 GREATER TROCHANTERIC BURSITIS OF RIGHT HIP: ICD-10-CM

## 2023-02-14 DIAGNOSIS — M50.30 DDD (DEGENERATIVE DISC DISEASE), CERVICAL: ICD-10-CM

## 2023-02-14 DIAGNOSIS — M54.50 CHRONIC MIDLINE LOW BACK PAIN WITHOUT SCIATICA: ICD-10-CM

## 2023-02-14 DIAGNOSIS — G89.29 CHRONIC MIDLINE LOW BACK PAIN WITHOUT SCIATICA: ICD-10-CM

## 2023-02-14 DIAGNOSIS — M48.061 DEGENERATIVE LUMBAR SPINAL STENOSIS: ICD-10-CM

## 2023-02-14 DIAGNOSIS — M48.02 CERVICAL STENOSIS OF SPINE: ICD-10-CM

## 2023-02-14 DIAGNOSIS — M51.36 DDD (DEGENERATIVE DISC DISEASE), LUMBAR: ICD-10-CM

## 2023-02-14 PROCEDURE — 99213 OFFICE O/P EST LOW 20 MIN: CPT

## 2023-02-14 RX ORDER — HYDROCODONE BITARTRATE AND ACETAMINOPHEN 5; 325 MG/1; MG/1
1 TABLET ORAL EVERY 8 HOURS PRN
Qty: 90 TABLET | Refills: 0 | Status: SHIPPED | OUTPATIENT
Start: 2023-02-21 | End: 2023-03-23

## 2023-02-14 ASSESSMENT — ENCOUNTER SYMPTOMS
BACK PAIN: 1
BOWEL INCONTINENCE: 0
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
## 2023-03-13 ENCOUNTER — OFFICE VISIT (OUTPATIENT)
Dept: INTERNAL MEDICINE CLINIC | Age: 73
End: 2023-03-13
Payer: MEDICARE

## 2023-03-13 ENCOUNTER — HOSPITAL ENCOUNTER (OUTPATIENT)
Age: 73
Setting detail: SPECIMEN
Discharge: HOME | End: 2023-03-13

## 2023-03-13 VITALS — WEIGHT: 223.6 LBS | SYSTOLIC BLOOD PRESSURE: 124 MMHG | BODY MASS INDEX: 37.21 KG/M2 | DIASTOLIC BLOOD PRESSURE: 82 MMHG

## 2023-03-13 DIAGNOSIS — E78.49 FAMILIAL HYPERLIPIDEMIA: ICD-10-CM

## 2023-03-13 DIAGNOSIS — I10 ESSENTIAL HYPERTENSION: ICD-10-CM

## 2023-03-13 DIAGNOSIS — Q22.1 CONGENITAL PULMONARY VALVE STENOSIS: ICD-10-CM

## 2023-03-13 DIAGNOSIS — E03.9 HYPOTHYROIDISM, UNSPECIFIED TYPE: ICD-10-CM

## 2023-03-13 DIAGNOSIS — Z12.11 SCREEN FOR COLON CANCER: ICD-10-CM

## 2023-03-13 DIAGNOSIS — N18.4 TYPE 2 DIABETES MELLITUS WITH STAGE 4 CHRONIC KIDNEY DISEASE, WITHOUT LONG-TERM CURRENT USE OF INSULIN (HCC): Primary | ICD-10-CM

## 2023-03-13 DIAGNOSIS — E11.22 TYPE 2 DIABETES MELLITUS WITH STAGE 4 CHRONIC KIDNEY DISEASE, WITHOUT LONG-TERM CURRENT USE OF INSULIN (HCC): Primary | ICD-10-CM

## 2023-03-13 DIAGNOSIS — M16.12 PRIMARY OSTEOARTHRITIS OF LEFT HIP: ICD-10-CM

## 2023-03-13 LAB
CHOLEST SERPL-MCNC: 190 MG/DL
CHOLESTEROL/HDL RATIO: 4.5
HBA1C MFR BLD: 7.4 %
HDLC SERPL-MCNC: 42 MG/DL
LDLC SERPL CALC-MCNC: 111 MG/DL (ref 0–130)
TRIGL SERPL-MCNC: 186 MG/DL

## 2023-03-13 PROCEDURE — 3074F SYST BP LT 130 MM HG: CPT | Performed by: INTERNAL MEDICINE

## 2023-03-13 PROCEDURE — 1123F ACP DISCUSS/DSCN MKR DOCD: CPT | Performed by: INTERNAL MEDICINE

## 2023-03-13 PROCEDURE — 99204 OFFICE O/P NEW MOD 45 MIN: CPT | Performed by: INTERNAL MEDICINE

## 2023-03-13 PROCEDURE — 3078F DIAST BP <80 MM HG: CPT | Performed by: INTERNAL MEDICINE

## 2023-03-13 PROCEDURE — 3051F HG A1C>EQUAL 7.0%<8.0%: CPT | Performed by: INTERNAL MEDICINE

## 2023-03-13 PROCEDURE — 83036 HEMOGLOBIN GLYCOSYLATED A1C: CPT | Performed by: INTERNAL MEDICINE

## 2023-03-13 SDOH — ECONOMIC STABILITY: INCOME INSECURITY: HOW HARD IS IT FOR YOU TO PAY FOR THE VERY BASICS LIKE FOOD, HOUSING, MEDICAL CARE, AND HEATING?: NOT HARD AT ALL

## 2023-03-13 SDOH — ECONOMIC STABILITY: HOUSING INSECURITY
IN THE LAST 12 MONTHS, WAS THERE A TIME WHEN YOU DID NOT HAVE A STEADY PLACE TO SLEEP OR SLEPT IN A SHELTER (INCLUDING NOW)?: NO

## 2023-03-13 SDOH — ECONOMIC STABILITY: FOOD INSECURITY: WITHIN THE PAST 12 MONTHS, THE FOOD YOU BOUGHT JUST DIDN'T LAST AND YOU DIDN'T HAVE MONEY TO GET MORE.: NEVER TRUE

## 2023-03-13 SDOH — ECONOMIC STABILITY: FOOD INSECURITY: WITHIN THE PAST 12 MONTHS, YOU WORRIED THAT YOUR FOOD WOULD RUN OUT BEFORE YOU GOT MONEY TO BUY MORE.: NEVER TRUE

## 2023-03-13 ASSESSMENT — PATIENT HEALTH QUESTIONNAIRE - PHQ9
SUM OF ALL RESPONSES TO PHQ9 QUESTIONS 1 & 2: 0
2. FEELING DOWN, DEPRESSED OR HOPELESS: 0
1. LITTLE INTEREST OR PLEASURE IN DOING THINGS: 0
SUM OF ALL RESPONSES TO PHQ QUESTIONS 1-9: 0

## 2023-03-13 NOTE — PROGRESS NOTES
[address] [facility] [address] [facility] [address]  [place] [address]  Dept: [phone]  Dept Fax: [phone]    Office Progress/Follow Up Note  Date of patient's visit: 3/13/2023  Patient's Name:  Marilyn Mora YOB: 1950            Patient Care Team:  Nasim Monte MD as PCP - General (Internal Medicine)  Alix Spicer MD as Consulting Physician (Gastroenterology)    REASON FOR VISIT: Routine outpatient follow up/Same day visit/Post hospital/ED visit    HISTORY OF PRESENT ILLNESS:      Chief Complaint   Patient presents with    Annual Exam     New patient      Came to establish care  Has history of diabetes mellitus, CKD stage IV, follows up with nephrology hyperlipidemia hypertension history of hypothyroidism,, last TSH was checked 2018 which was 13, HbA1c 7.4  Patient currently denies any acute issues,  History of diabetes, patient states that she check her blood sugar at home, denies any episodes of hypoglycemia, blood sugars controlled,    Patient Active Problem List   Diagnosis    CMC arthritis    Arthritis of left hip    Left hip pain    Chronic midline low back pain without sciatica    Degenerative lumbar spinal stenosis    Lumbar radiculopathy    DDD (degenerative disc disease), lumbar    Lumbosacral spondylosis without myelopathy    Primary osteoarthritis of left hip    Sacroiliitis (HCC)    DDD (degenerative disc disease), cervical    Neck pain    Cervical stenosis of spine    Osteoarthritis of spine with radiculopathy, cervical region    WERO (acute kidney injury) ([address]    Acute cystitis without hematuria    Hydronephrosis of left kidney    Hydroureter, left    Type 2 diabetes mellitus, without long-term current use of insulin ([address]    Essential hypertension    Hypothyroidism    Acute infective cystitis    Uremia    Pyelonephritis    Iron deficiency anemia    GERD (gastroesophageal reflux disease)    Leukocytosis    Allergy to penicillin    Anemia    Chronic use of opiate for therapeutic purpose    Radiocarpal joint dislocation, closed, left, subsequent encounter    Retained orthopedic hardware    Claudication ([address]    Greater trochanteric bursitis of right hip    Chronic right shoulder pain    Congenital pulmonary valve stenosis    Encounter for long-term opiate analgesic use       Health Maintenance Due   Topic Date Due    Diabetic foot exam  Never done    Depression Screen  Never done    Diabetic retinal exam  Never done    Hepatitis C screen  Never done    Shingles vaccine (1 of 2) Never done    Colorectal Cancer Screen  Never done    Annual Wellness Visit (AWV)  Never done    Diabetic Alb to Cr ratio (uACR) test  07/15/2022    Lipids  11/12/2022       Allergies   Allergen Reactions    Pcn [Penicillins] Anaphylaxis     Face and hands swell up, got hives    Cyclobenzaprine Hives    Heparin Nausea And Vomiting and Other (See Comments)     severe migraine , was getting IV heparin for what they thought was a blood clot    Lamotrigine Hives    Seasonal Other (See Comments)     Allergic rhinnitis         MEDICATIONS:     Current Outpatient Medications   Medication Sig Dispense Refill    HYDROcodone-acetaminophen (NORCO) 5-325 MG per tablet Take 1 tablet by mouth every 8 hours as needed for Pain for up to 30 days.  90 tablet 0    vitamin D (CHOLECALCIFEROL) 25 MCG (1000 UT) TABS tablet Take 1,000 Units by mouth daily      hydrOXYzine HCl (ATARAX) 25 MG tablet       BASAGLAR KWIKPEN 100 UNIT/ML injection pen       DROPLET PEN NEEDLES 31G X 8 MM MISC       tacrolimus (PROTOPIC) 0.1 % ointment Apply 1 application topically 2 times daily      trospium (SANCTURA) 20 MG tablet       ONE TOUCH ULTRASOFT LANCETS [facility]       ONETOUCH ULTRA strip       hydrOXYzine pamoate (VISTARIL) 25 MG capsule Take 25 mg by mouth 3 times daily as needed      levothyroxine (SYNTHROID) 125 MCG tablet       oxybutynin (DITROPAN-XL) 10 MG extended release tablet       baclofen (LIORESAL) 10 MG tablet Take 10 mg by mouth nightly as needed      pantoprazole (PROTONIX) 20 MG tablet Take 20 mg by mouth nightly       Bioflavonoid Products (BIOFLEX PO) Take by mouth      Ascorbic Acid (VITAMIN C PLUS WILD DON HIPS) 500 MG CHEW Take 1 tablet by mouth daily       pramipexole (MIRAPEX) 0.125 MG tablet Take 0.125 mg by mouth at bedtime       olopatadine (PATANOL) 0.1 % ophthalmic solution Place 1 drop into both eyes as needed      amLODIPine (NORVASC) 10 MG tablet Take 1 tablet by mouth daily (Patient taking differently: Take 10 mg by mouth nightly) 30 tablet 3    ferrous sulfate (IRON 325) 325 (65 Fe) MG tablet Take 1 tablet by mouth daily (with breakfast) 30 tablet 3    buPROPion (WELLBUTRIN XL) 300 MG extended release tablet TAKE ONE TABLET BY MOUTH DAILY      nystatin (MYCOSTATIN) 489123 UNIT/GM powder Apply topically 2 times daily as needed (fungal infection perineum)       busPIRone (BUSPAR) 15 MG tablet Take 15 mg by mouth 2 times daily      isosorbide mononitrate (IMDUR) 30 MG extended release tablet Take 1 tablet by mouth daily (Patient taking differently: Take 30 mg by mouth nightly) 30 tablet 3    nystatin (MYCOSTATIN) 287381 UNIT/GM cream Apply topically 2 times daily as needed (fungal infection in perineum)       atorvastatin (LIPITOR) 40 MG tablet Take 40 mg by mouth daily       nitroGLYCERIN (NITROSTAT) 0.4 MG SL tablet Place 0.4 mg under the tongue every 5 minutes as needed for Chest pain up to max of 3 total doses. If no relief after 1 dose, call 911. Multiple Vitamins-Minerals (THERAPEUTIC MULTIVITAMIN-MINERALS) tablet Take 1 tablet by mouth daily. aspirin 81 MG EC tablet Take 81 mg by mouth daily.  LAST DOSE 9/8/14      metoprolol (TOPROL-XL) 100 MG XL tablet Take 100 mg by mouth at bedtime       vitamin B-12 (CYANOCOBALAMIN) 500 MCG tablet Take 500 mcg by mouth daily        Current Facility-Administered Medications   Medication Dose Route Frequency Provider Last Rate Last Admin    methylPREDNISolone acetate (DEPO-MEDROL) injection 40 mg  40 mg Intra-artICUlar Once Javan Schlatter, MD        methylPREDNISolone acetate (DEPO-MEDROL) injection 40 mg  40 mg Intra-artICUlar Once Javan Schlatter, MD        lidocaine 1 % injection 5 mL  5 mL Intra-artICUlar Once Javan Schlatter, MD        lidocaine 1 % injection 5 mL  5 mL Intra-artICUlar Once Javan Schlatter, MD           SOCIAL HISTORY    Reviewed and no change from previous record. June  reports that she quit smoking about 18 years ago. Her smoking use included cigarettes. She has a 30.00 pack-year smoking history. She has never used smokeless tobacco.    FAMILY HISTORY:    Reviewed and No change from previous visit  family history includes Asthma in her sister and son; Breast Cancer (age of onset: 62) in her mother; Diabetes in her brother and sister; Heart Disease in her father; Other in her father; Stroke in her maternal grandmother; Stroke (age of onset: 54) in her brother.     OF SYSTEMS:    General : Negative for fatigue, weight loss, appetite change  HEENT : Negative for nasal congestion, sneezing, runny nose, sinus pain  Respiratory : Negative for shortness of breath cough, congestion, wheezing  Cardiovascular: Negative for chest pain, palpitations, shortness of breath  GI: Negative for abdominal pain, nausea, vomiting, diarrhea, constipation  Genitourinary : negative for dysuria, urinary frequency, urinary urgency, hematuria  Neurological : Negative for headache, dizziness, gait change,   Psych : Negative for depression, anxiety  Skin: Negative rash and skin lesions  Musculoskeletal : Negative for joint pain, muscle pain      PHYSICAL EXAM:      Vitals:    03/13/23 1101   BP: 124/82   Site: Right Upper Arm   Weight: 223 lb 9.6 oz (101.4 kg)     BP Readings from Last 3 Encounters:   03/13/23 124/82   02/14/23 (!) 152/66   02/13/23 125/62        Physical Exam   Physical exam  General : Patient alert awake in no acute distress  HEENT : Atraumatic, normocephalic , oral mucosa membrane moist,  Eyes : Extraocular movements intact  Neck : Supple, range of motion intact,  Cardiovascular : Regular rate and rhythm, no murmur appreciated  Respiratory : Bilateral clear breath sounds, no wheezing crackles appreciated  Gastrointestinal  : Abdomen soft, nontender, bowel sounds present,  Extremities : Trace pedal edema present skin : Warm and dry, no skin lesions appreciated  Psych : Mood normal  Logical no focal motor neurological deficit present    Lab Results   Component Value Date    LABA1C 7.0 (H) 06/20/2021     Lab Results   Component Value Date     06/20/2021      LABORATORY FINDINGS:    CBC:  Lab Results   Component Value Date/Time    WBC 8.9 11/23/2022 11:19 AM    HGB 12.6 11/23/2022 11:19 AM     11/23/2022 11:19 AM       BMP:    Lab Results   Component Value Date/Time     11/23/2022 11:19 AM    K 5.2 11/23/2022 11:19 AM     11/23/2022 11:19 AM    CO2 29 11/23/2022 11:19 AM    BUN 30 11/23/2022 11:19 AM    CREATININE 2.44 11/23/2022 11:19 AM    GLUCOSE 115 11/23/2022 11:19 AM       HEMOGLOBIN A1C:   Lab Results   Component Value Date/Time    LABA1C 7.0 06/20/2021 09:36 PM       FASTING LIPID PANEL:  Lab Results   Component Value Date    CHOL 120 04/24/2018    HDL 32 (L) 04/24/2018    TRIG 142 04/24/2018       No results found for: TSHREFFT4     No valid procedures specified. ASSESSMENT AND PLAN:      Diagnoses and all orders for this visit:  Type 2 diabetes mellitus with stage 4 chronic kidney disease, without long-term current use of insulin (HCC)  -     POCT glycosylated hemoglobin (Hb A1C)  Screen for colon cancer  -     Fecal DNA Colorectal cancer screening (Cologuard)  Familial hyperlipidemia  -     Lipid Panel;  Future  Congenital pulmonary valve stenosis  Essential hypertension  Primary osteoarthritis of left hip  Hypothyroidism, unspecified type  -     TSH With Reflex Ft4; Future     FOLLOW UP AND INSTRUCTIONS:   Return in about 3 months (around 6/13/2023).    Marilyn received counseling on the following healthy behaviors: nutrition    Discussed use, benefit, and side effects of prescribed medications.  Barriers to medication compliance addressed.  All patient questions answered.  Pt voiced understanding.     Patient given educational materials - see patient instructions    Eryn Thorpe MD  [facility]  3/13/2023, 11:04 AM    Please note that this chart was generated using voice recognition Dragon dictation software.  Although every effort was made to ensure the accuracy of this automatedtranscription, some errors in transcription may have occurred.

## 2023-03-13 NOTE — PROGRESS NOTES
Visit Information    Have you changed or started any medications since your last visit including any over-the-counter medicines, vitamins, or herbal medicines? no   Are you having any side effects from any of your medications? -  no  Have you stopped taking any of your medications? Is so, why? -  no    Have you seen any other physician or provider since your last visit? No  Have you had any other diagnostic tests since your last visit? No  Have you been seen in the emergency room and/or had an admission to a hospital since we last saw you? No  Have you had your routine dental cleaning in the past 6 months? no    Have you activated your StockUp account? If not, what are your barriers?  Yes     Patient Care Team:  Yandel Mendoza MD as PCP - General (Internal Medicine)  Cherri Wong MD as Consulting Physician (Gastroenterology)    Medical History Review  Past Medical, Family, and Social History reviewed and does not contribute to the patient presenting condition    Health Maintenance   Topic Date Due    Diabetic foot exam  Never done    Depression Screen  Never done    Diabetic retinal exam  Never done    Hepatitis C screen  Never done    Shingles vaccine (1 of 2) Never done    Colorectal Cancer Screen  Never done    Annual Wellness Visit (AWV)  Never done    Diabetic Alb to Cr ratio (uACR) test  07/15/2022    Lipids  11/12/2022    A1C test (Diabetic or Prediabetic)  08/24/2023    Breast cancer screen  09/19/2023    GFR test (Diabetes, CKD 3-4, OR last GFR 15-59)  11/23/2023    DTaP/Tdap/Td vaccine (2 - Td or Tdap) 05/20/2032    DEXA (modify frequency per FRAX score)  Completed    Flu vaccine  Completed    Pneumococcal 65+ years Vaccine  Completed    COVID-19 Vaccine  Completed    Hepatitis A vaccine  Aged Out    Hib vaccine  Aged Out    Meningococcal (ACWY) vaccine  Aged Out

## 2023-03-21 ENCOUNTER — HOSPITAL ENCOUNTER (OUTPATIENT)
Dept: PAIN MANAGEMENT | Age: 73
Discharge: HOME | End: 2023-03-21
Payer: MEDICARE

## 2023-03-21 VITALS — BODY MASS INDEX: 37.15 KG/M2 | HEIGHT: 65 IN | WEIGHT: 223 LBS | TEMPERATURE: 97.3 F

## 2023-03-21 DIAGNOSIS — M51.36 DDD (DEGENERATIVE DISC DISEASE), LUMBAR: ICD-10-CM

## 2023-03-21 DIAGNOSIS — Z79.891 ENCOUNTER FOR LONG-TERM OPIATE ANALGESIC USE: Primary | Chronic | ICD-10-CM

## 2023-03-21 DIAGNOSIS — M70.61 GREATER TROCHANTERIC BURSITIS OF RIGHT HIP: ICD-10-CM

## 2023-03-21 DIAGNOSIS — M54.16 LUMBAR RADICULOPATHY: ICD-10-CM

## 2023-03-21 DIAGNOSIS — M25.511 CHRONIC RIGHT SHOULDER PAIN: ICD-10-CM

## 2023-03-21 DIAGNOSIS — G89.29 CHRONIC RIGHT SHOULDER PAIN: ICD-10-CM

## 2023-03-21 DIAGNOSIS — M48.061 DEGENERATIVE LUMBAR SPINAL STENOSIS: ICD-10-CM

## 2023-03-21 PROCEDURE — G0481 DRUG TEST DEF 8-14 CLASSES: HCPCS

## 2023-03-21 PROCEDURE — 99213 OFFICE O/P EST LOW 20 MIN: CPT | Performed by: NURSE PRACTITIONER

## 2023-03-21 PROCEDURE — 99213 OFFICE O/P EST LOW 20 MIN: CPT

## 2023-03-21 PROCEDURE — 80307 DRUG TEST PRSMV CHEM ANLYZR: CPT

## 2023-03-21 RX ORDER — HYDROCODONE BITARTRATE AND ACETAMINOPHEN 5; 325 MG/1; MG/1
1 TABLET ORAL EVERY 8 HOURS PRN
Qty: 90 TABLET | Refills: 0 | Status: SHIPPED | OUTPATIENT
Start: 2023-03-23 | End: 2023-04-22

## 2023-03-21 ASSESSMENT — ENCOUNTER SYMPTOMS
BOWEL INCONTINENCE: 0
BACK PAIN: 1

## 2023-03-21 ASSESSMENT — PAIN SCALES - GENERAL: PAINLEVEL_OUTOF10: 7

## 2023-03-21 NOTE — PROGRESS NOTES
Chief Complaint   Patient presents with    Back Pain     Med refill         PMH     Pt complains of chronic neck pain. MRI with moderate stenosis. She had cervical facet injections with moderate relief but states she cannot afford to repeat at this time. Last injection was 3/2021. She sees a chiropractor with benefit. Had appt with Dr Xenia Qiu in Jan 2022 with ACDF C3-4 and C5-6 recommended but patient does not wish to have surgery. Back pain  Reports chronic intermittent lumbar pain. Across lumbar area with occ radiation down both legs to her feet. No known injury or surgery to the area. Has not had PT. Did have LESI in 2020 with relief but has not repeated d/t copay. MRI  9/2022 with moderate canal stenosis at L4-5 Neural foraminal stenosis has worsened since previous exam. Declines NS consult  She has had 2 successful MBB and needs to scheudle RFA but reports not ready to do it yet, wants \"a break\" from procedures     Knee Hip and shoulder pain  She had bilat knee injection 6/2022 with Dr Debbie Arauz  She had right hip injection 10/18/22 with 100% relief  She had right shoulder injection 1/2023 with  100% relief     She is opioid dependant for pain control and takes norco 5 mg TID. HPI:     Back Pain  This is a chronic problem. The current episode started more than 1 year ago. The problem occurs constantly. The problem is unchanged. The pain is present in the lumbar spine. The quality of the pain is described as aching. The pain radiates to the right knee, right foot and right thigh. The pain is at a severity of 7/10. The pain is The same all the time. The symptoms are aggravated by bending, sitting and standing. Associated symptoms include headaches. Pertinent negatives include no bladder incontinence, bowel incontinence, chest pain, fever, numbness, tingling or weakness. She has tried ice and heat for the symptoms. Patient denies any new neurological symptoms.  No bowel or bladder incontinence,

## 2023-03-25 LAB
6-ACETYLMORPHINE, UR: NOT DETECTED
7-AMINOCLONAZEPAM, URINE: NOT DETECTED
ALPHA-OH-ALPRAZ, URINE: NOT DETECTED
ALPHA-OH-MIDAZOLAM, URINE: NOT DETECTED
ALPRAZOLAM, URINE: NOT DETECTED
AMPHETAMINES, URINE: NOT DETECTED
BARBITURATES, URINE: NOT DETECTED
BENZOYLECGONINE, UR: NOT DETECTED
BUPRENORPHINE URINE: NOT DETECTED
CARISOPRODOL, UR: NOT DETECTED
CLONAZEPAM, URINE: NOT DETECTED
CODEINE, URINE: NOT DETECTED
CREATININE URINE: 79.2 MG/DL (ref 20–400)
DIAZEPAM, URINE: NOT DETECTED
DRUGS EXPECTED, UR: NORMAL
EER HI RES INTERP UR: NORMAL
ETHYL GLUCURONIDE UR: NOT DETECTED
FENTANYL URINE: NOT DETECTED
GABAPENTIN: NOT DETECTED
HYDROCODONE, URINE: PRESENT
HYDROMORPHONE, URINE: PRESENT
LORAZEPAM, URINE: NOT DETECTED
MARIJUANA METAB, UR: NOT DETECTED
MDA, UR: NOT DETECTED
MDEA, EVE, UR: NOT DETECTED
MDMA URINE: NOT DETECTED
MEPERIDINE METAB, UR: NOT DETECTED
METHADONE, URINE: NOT DETECTED
METHAMPHETAMINE, URINE: NOT DETECTED
METHYLPHENIDATE: NOT DETECTED
MIDAZOLAM, URINE: NOT DETECTED
MORPHINE URINE: NOT DETECTED
NALOXONE URINE: NOT DETECTED
NORBUPRENORPHINE, URINE: NOT DETECTED
NORDIAZEPAM, URINE: PRESENT
NORFENTANYL, URINE: NOT DETECTED
NORHYDROCODONE, URINE: PRESENT
NOROXYCODONE, URINE: NOT DETECTED
NOROXYMORPHONE, URINE: NOT DETECTED
OXAZEPAM, URINE: PRESENT
OXYCODONE URINE: NOT DETECTED
OXYMORPHONE, URINE: NOT DETECTED
PAIN MANAGEMENT DRUG PANEL INTERP, URINE: NORMAL
PAIN MGT DRUG PANEL, HI RES, UR: NORMAL
PCP,URINE: NOT DETECTED
PHENTERMINE, UR: NOT DETECTED
PREGABALIN: NOT DETECTED
TAPENTADOL, URINE: NOT DETECTED
TAPENTADOL-O-SULFATE, URINE: NOT DETECTED
TEMAZEPAM, URINE: PRESENT
TRAMADOL, URINE: NOT DETECTED
ZOLPIDEM METABOLITE (ZCA), URINE: NOT DETECTED
ZOLPIDEM, URINE: NOT DETECTED

## 2023-03-30 LAB — NONINV COLON CA DNA+OCC BLD SCRN STL QL: POSITIVE

## 2023-04-04 ENCOUNTER — TELEPHONE (OUTPATIENT)
Dept: INTERNAL MEDICINE CLINIC | Age: 73
End: 2023-04-04

## 2023-04-04 DIAGNOSIS — R19.5 OCCULT BLOOD POSITIVE STOOL: Primary | ICD-10-CM

## 2023-04-18 ENCOUNTER — HOSPITAL ENCOUNTER (OUTPATIENT)
Dept: PAIN MANAGEMENT | Age: 73
Discharge: HOME | End: 2023-04-18
Payer: MEDICARE

## 2023-04-18 VITALS — BODY MASS INDEX: 37.15 KG/M2 | HEIGHT: 65 IN | WEIGHT: 223 LBS | TEMPERATURE: 97.3 F

## 2023-04-18 DIAGNOSIS — M54.2 NECK PAIN: ICD-10-CM

## 2023-04-18 DIAGNOSIS — M48.02 CERVICAL STENOSIS OF SPINE: ICD-10-CM

## 2023-04-18 DIAGNOSIS — M48.061 DEGENERATIVE LUMBAR SPINAL STENOSIS: ICD-10-CM

## 2023-04-18 DIAGNOSIS — M54.16 LUMBAR RADICULOPATHY: ICD-10-CM

## 2023-04-18 DIAGNOSIS — M50.30 DDD (DEGENERATIVE DISC DISEASE), CERVICAL: ICD-10-CM

## 2023-04-18 DIAGNOSIS — M47.817 LUMBOSACRAL SPONDYLOSIS WITHOUT MYELOPATHY: ICD-10-CM

## 2023-04-18 DIAGNOSIS — M51.36 DDD (DEGENERATIVE DISC DISEASE), LUMBAR: ICD-10-CM

## 2023-04-18 DIAGNOSIS — Z79.891 ENCOUNTER FOR LONG-TERM OPIATE ANALGESIC USE: Primary | Chronic | ICD-10-CM

## 2023-04-18 PROCEDURE — 99213 OFFICE O/P EST LOW 20 MIN: CPT

## 2023-04-18 PROCEDURE — 99213 OFFICE O/P EST LOW 20 MIN: CPT | Performed by: NURSE PRACTITIONER

## 2023-04-18 RX ORDER — HYDROCODONE BITARTRATE AND ACETAMINOPHEN 5; 325 MG/1; MG/1
1 TABLET ORAL EVERY 8 HOURS PRN
Qty: 90 TABLET | Refills: 0 | Status: SHIPPED | OUTPATIENT
Start: 2023-04-18 | End: 2023-05-18

## 2023-04-18 ASSESSMENT — ENCOUNTER SYMPTOMS
COUGH: 0
BOWEL INCONTINENCE: 0
SHORTNESS OF BREATH: 0
BACK PAIN: 1
CONSTIPATION: 0

## 2023-04-18 ASSESSMENT — PAIN SCALES - GENERAL: PAINLEVEL_OUTOF10: 9

## 2023-04-18 NOTE — PROGRESS NOTES
List Items Addressed This Visit       Encounter for long-term opiate analgesic use - Primary (Chronic)    Degenerative lumbar spinal stenosis    Relevant Medications    HYDROcodone-acetaminophen (NORCO) 5-325 MG per tablet    Lumbar radiculopathy    Lumbosacral spondylosis without myelopathy    Relevant Medications    HYDROcodone-acetaminophen (NORCO) 5-325 MG per tablet    DDD (degenerative disc disease), cervical    Relevant Medications    HYDROcodone-acetaminophen (NORCO) 5-325 MG per tablet    Neck pain    Cervical stenosis of spine     Other Visit Diagnoses       DDD (degenerative disc disease), lumbar        Relevant Medications    HYDROcodone-acetaminophen (NORCO) 5-325 MG per tablet               Treatment Plan:  Patient relates current medications are helping the pain. Patient reports taking pain medications as prescribed, denies obtaining medications from different sources and denies use of illegal drugs. Medication risk and benefits have been discussed. Patient denies side effects from medications like nausea, vomiting, constipation or drowsiness. Patient reports current activities of daily living are possible due to medications and would like to continue them. As always, we encourage daily stretching and strengthening exercises, and recommend minimizing use of pain medications unless patient cannot get through daily activities due to pain. Due to the high risk nature of this patient's pain medication close monitoring is required. Continue current medication management, pt has been stable and compliant. Script written for norco  Pt is warned that concomitant use of benzo and opioid significantly increase the risk of respiratory depression. That concurrent use of medication is a violation of contract. Continuation of medication will require patient to follow the opioid contract and avoid  use in future and that further evidence with result in termination of medication contract.  Pt verbalized

## 2023-05-03 ENCOUNTER — OFFICE VISIT (OUTPATIENT)
Dept: GASTROENTEROLOGY | Age: 73
End: 2023-05-03
Payer: MEDICARE

## 2023-05-03 VITALS
DIASTOLIC BLOOD PRESSURE: 65 MMHG | BODY MASS INDEX: 36.49 KG/M2 | HEIGHT: 65 IN | WEIGHT: 219 LBS | SYSTOLIC BLOOD PRESSURE: 128 MMHG

## 2023-05-03 DIAGNOSIS — R19.5 POSITIVE COLORECTAL CANCER SCREENING USING COLOGUARD TEST: Primary | ICD-10-CM

## 2023-05-03 DIAGNOSIS — N18.4 CHRONIC KIDNEY DISEASE, STAGE IV (SEVERE) (HCC): ICD-10-CM

## 2023-05-03 PROCEDURE — 3074F SYST BP LT 130 MM HG: CPT | Performed by: INTERNAL MEDICINE

## 2023-05-03 PROCEDURE — 1123F ACP DISCUSS/DSCN MKR DOCD: CPT | Performed by: INTERNAL MEDICINE

## 2023-05-03 PROCEDURE — 99204 OFFICE O/P NEW MOD 45 MIN: CPT | Performed by: INTERNAL MEDICINE

## 2023-05-03 PROCEDURE — 3078F DIAST BP <80 MM HG: CPT | Performed by: INTERNAL MEDICINE

## 2023-05-03 ASSESSMENT — ENCOUNTER SYMPTOMS
ALLERGIC/IMMUNOLOGIC NEGATIVE: 1
GASTROINTESTINAL NEGATIVE: 1
COUGH: 1

## 2023-05-03 NOTE — PROGRESS NOTES
closely and will in touch with us in the next 10 days after visiting nephrologist.        Thank you for allowing me to participate in the care of Ms. Mora. For any further questions please do not hesitate to contact me. I have reviewed and agree with the ROS entered by the MA/LPN. Note is dictated utilizing voice recognition software. Unfortunately this leads to occasional typographical errors.  Please contact our office if you have any questions        Sadia Joshi MD,FACP, [facility]  Board Certified in Gastroenterology and [address] [place] [address] Gastroenterology  Office #: [phone]

## 2023-05-08 ENCOUNTER — OFFICE VISIT (OUTPATIENT)
Dept: INTERNAL MEDICINE CLINIC | Age: 73
End: 2023-05-08
Payer: MEDICARE

## 2023-05-08 ENCOUNTER — TELEPHONE (OUTPATIENT)
Dept: GASTROENTEROLOGY | Age: 73
End: 2023-05-08

## 2023-05-08 VITALS
HEART RATE: 63 BPM | HEIGHT: 65 IN | OXYGEN SATURATION: 97 % | BODY MASS INDEX: 35.82 KG/M2 | WEIGHT: 215 LBS | SYSTOLIC BLOOD PRESSURE: 116 MMHG | DIASTOLIC BLOOD PRESSURE: 60 MMHG

## 2023-05-08 DIAGNOSIS — I73.9 CLAUDICATION (HCC): ICD-10-CM

## 2023-05-08 DIAGNOSIS — N18.4 TYPE 2 DIABETES MELLITUS WITH STAGE 4 CHRONIC KIDNEY DISEASE, WITHOUT LONG-TERM CURRENT USE OF INSULIN (HCC): ICD-10-CM

## 2023-05-08 DIAGNOSIS — Z00.00 INITIAL MEDICARE ANNUAL WELLNESS VISIT: ICD-10-CM

## 2023-05-08 DIAGNOSIS — M46.1 SACROILIITIS (HCC): Primary | ICD-10-CM

## 2023-05-08 DIAGNOSIS — Z78.0 POST-MENOPAUSAL: ICD-10-CM

## 2023-05-08 DIAGNOSIS — F32.1 CURRENT MODERATE EPISODE OF MAJOR DEPRESSIVE DISORDER, UNSPECIFIED WHETHER RECURRENT (HCC): ICD-10-CM

## 2023-05-08 DIAGNOSIS — E11.22 TYPE 2 DIABETES MELLITUS WITH STAGE 4 CHRONIC KIDNEY DISEASE, WITHOUT LONG-TERM CURRENT USE OF INSULIN (HCC): ICD-10-CM

## 2023-05-08 PROCEDURE — G0438 PPPS, INITIAL VISIT: HCPCS | Performed by: INTERNAL MEDICINE

## 2023-05-08 PROCEDURE — 3078F DIAST BP <80 MM HG: CPT | Performed by: INTERNAL MEDICINE

## 2023-05-08 PROCEDURE — 1123F ACP DISCUSS/DSCN MKR DOCD: CPT | Performed by: INTERNAL MEDICINE

## 2023-05-08 PROCEDURE — 3074F SYST BP LT 130 MM HG: CPT | Performed by: INTERNAL MEDICINE

## 2023-05-08 PROCEDURE — 3051F HG A1C>EQUAL 7.0%<8.0%: CPT | Performed by: INTERNAL MEDICINE

## 2023-05-08 RX ORDER — ESCITALOPRAM OXALATE 20 MG/1
20 TABLET ORAL DAILY
Qty: 30 TABLET | Refills: 3 | Status: SHIPPED | OUTPATIENT
Start: 2023-05-08

## 2023-05-08 ASSESSMENT — PATIENT HEALTH QUESTIONNAIRE - PHQ9
SUM OF ALL RESPONSES TO PHQ QUESTIONS 1-9: 2
SUM OF ALL RESPONSES TO PHQ QUESTIONS 1-9: 2
SUM OF ALL RESPONSES TO PHQ9 QUESTIONS 1 & 2: 2
SUM OF ALL RESPONSES TO PHQ QUESTIONS 1-9: 2
SUM OF ALL RESPONSES TO PHQ QUESTIONS 1-9: 2
1. LITTLE INTEREST OR PLEASURE IN DOING THINGS: 1
2. FEELING DOWN, DEPRESSED OR HOPELESS: 1

## 2023-05-08 NOTE — TELEPHONE ENCOUNTER
Rec'd call from pt stating she talked to Dr. Padmini Escalera and he states to pt she can have any prep but it needs to be without phosphorous, please call pt regarding prep.

## 2023-05-08 NOTE — TELEPHONE ENCOUNTER
The patient has CKD and we give golytely for this. Per the patient at her appointment it causes her to vomit. The patient wants sutab and or suprep.

## 2023-05-08 NOTE — PATIENT INSTRUCTIONS
feeling in the back, neck, jaw, or upper belly or in one or both shoulders or arms.     Lightheadedness or sudden weakness.     A fast or irregular heartbeat. After you call 911, the  may tell you to chew 1 adult-strength or 2 to 4 low-dose aspirin. Wait for an ambulance. Do not try to drive yourself. Watch closely for changes in your health, and be sure to contact your doctor if you have any problems. Where can you learn more? Go to http://www.anglin.com/ and enter F075 to learn more about \"A Healthy Heart: Care Instructions. \"  Current as of: September 7, 2022               Content Version: 13.6  © 8004-8833 "[employer]. Care instructions adapted under license by [facility] ([facility]). If you have questions about a medical condition or this instruction, always ask your healthcare professional. [place] [address] any warranty or liability for your use of this information. Personalized Preventive Plan for Marilyn A Long - 5/8/2023  Medicare offers a range of preventive health benefits. Some of the tests and screenings are paid in full while other may be subject to a deductible, co-insurance, and/or copay. Some of these benefits include a comprehensive review of your medical history including lifestyle, illnesses that may run in your family, and various assessments and screenings as appropriate. After reviewing your medical record and screening and assessments performed today your provider may have ordered immunizations, labs, imaging, and/or referrals for you. A list of these orders (if applicable) as well as your Preventive Care list are included within your After Visit Summary for your review. Other Preventive Recommendations:    A preventive eye exam performed by an eye specialist is recommended every 1-2 years to screen for glaucoma; cataracts, macular degeneration, and other eye disorders. A preventive dental visit is recommended every 6 months.   Try

## 2023-05-08 NOTE — PROGRESS NOTES
Visit Information    Have you changed or started any medications since your last visit including any over-the-counter medicines, vitamins, or herbal medicines? no   Are you having any side effects from any of your medications? -  no  Have you stopped taking any of your medications? Is so, why? -  no    Have you seen any other physician or provider since your last visit? No  Have you had any other diagnostic tests since your last visit? No  Have you been seen in the emergency room and/or had an admission to a hospital since we last saw you? No  Have you had your routine dental cleaning in the past 6 months? no    Have you activated your gopogo account? If not, what are your barriers?  Yes     Patient Care Team:  Pritesh Treviño MD as PCP - General (Internal Medicine)  Pritesh Treviño MD as PCP - Empaneled Provider  Valente Lopes MD as Consulting Physician (Gastroenterology)    Medical History Review  Past Medical, Family, and Social History reviewed and does contribute to the patient presenting condition    Health Maintenance   Topic Date Due    Diabetic foot exam  Never done    Diabetic retinal exam  Never done    Hepatitis C screen  Never done    Shingles vaccine (1 of 2) Never done    DEXA (modify frequency per FRAX score)  Never done    Annual Wellness Visit (AWV)  Never done    Diabetic Alb to Cr ratio (uACR) test  07/15/2022    Breast cancer screen  09/19/2023    A1C test (Diabetic or Prediabetic)  03/13/2024    Lipids  03/13/2024    Depression Screen  03/13/2024    GFR test (Diabetes, CKD 3-4, OR last GFR 15-59)  04/10/2024    Colorectal Cancer Screen  03/23/2026    DTaP/Tdap/Td vaccine (2 - Td or Tdap) 05/20/2032    Flu vaccine  Completed    Pneumococcal 65+ years Vaccine  Completed    COVID-19 Vaccine  Completed    Hepatitis A vaccine  Aged Out    Hib vaccine  Aged Out    Meningococcal (ACWY) vaccine  Aged Out    Diabetes screen  Discontinued
plan was agreed upon to work toward a weight loss goal of 175 pounds: lower carbohydrate diet. Educational materials for weight loss were provided. .    Dentist Screen:  Have you seen the dentist within the past year?: (!) No         Vision Screen:  Do you have difficulty driving, watching TV, or doing any of your daily activities because of your eyesight?: No  Have you had an eye exam within the past year?: (!) No  No results found. Interventions:   Patient comments: seeing opthalmopathy                       Objective   Vitals:    05/08/23 1050   BP: 116/60   Site: Right Upper Arm   Pulse: 63   SpO2: 97%   Weight: 215 lb (97.5 kg)   Height: 5' 5\" (1.651 m)      Body mass index is 35.78 kg/m².       General Appearance: alert and oriented to person, place and time, well developed and well- nourished, in no acute distress  Skin: warm and dry, no rash or erythema  Head: normocephalic and atraumatic  Eyes: pupils equal, round, and reactive to light, extraocular eye movements intact, conjunctivae normal  ENT: tympanic membrane, external ear and ear canal normal bilaterally, nose without deformity, nasal mucosa and turbinates normal without polyps  Neck: supple and non-tender without mass, no thyromegaly or thyroid nodules, no cervical lymphadenopathy  Pulmonary/Chest: clear to auscultation bilaterally- no wheezes, rales or rhonchi, normal air movement, no respiratory distress  Cardiovascular: normal rate, regular rhythm, normal S1 and S2, no murmurs, rubs, clicks, or gallops, distal pulses intact, no carotid bruits  Abdomen: soft, non-tender, non-distended, normal bowel sounds, no masses or organomegaly  Extremities: no cyanosis, clubbing or edema  Musculoskeletal: normal range of motion, no joint swelling, deformity or tenderness  Neurologic: reflexes normal and symmetric, no cranial nerve deficit, gait, coordination and speech normal       Allergies   Allergen Reactions    Pcn [Penicillins] Anaphylaxis     Face and

## 2023-05-09 ENCOUNTER — TELEPHONE (OUTPATIENT)
Dept: INTERNAL MEDICINE CLINIC | Age: 73
End: 2023-05-09

## 2023-05-09 NOTE — TELEPHONE ENCOUNTER
Pt needs an order for Diabetic Glucose meter, Pt called her insurance company and it is covered through her insurance. [employer]     Phone # [phone]  Fax # [phone]      Pt also is having frequent urination. She said that when she has to go, she has to go right that second. Pt would like to know if there is any antibiotic we can call in for her?  Pt was just here on 5/8/23 and forgot to mention it to

## 2023-05-10 ENCOUNTER — HOSPITAL ENCOUNTER (OUTPATIENT)
Dept: WOMENS IMAGING | Age: 73
Discharge: HOME | End: 2023-05-12
Payer: MEDICARE

## 2023-05-10 DIAGNOSIS — Z78.0 POST-MENOPAUSAL: ICD-10-CM

## 2023-05-10 PROCEDURE — 77080 DXA BONE DENSITY AXIAL: CPT

## 2023-05-10 NOTE — TELEPHONE ENCOUNTER
Dr. Buster Bhatia cleared the patient with no restrictions. Per  Kevin at the office the doctor does not want her to have phosphorus enemas. The patient can due the sutab or suprep. Perhaps see how Dr. Alfredito Carter would like the patient to proceed.

## 2023-05-12 ENCOUNTER — TELEPHONE (OUTPATIENT)
Dept: INTERNAL MEDICINE CLINIC | Age: 73
End: 2023-05-12

## 2023-05-12 NOTE — TELEPHONE ENCOUNTER
Joao Warner for patient to contact office to schedule procedure with Malachi. DEBRAGI received clearance from Dr. Ashvin Villarreal for ASA (HOLD x5 days prior to procedure). Patient has not yet been scheduled, please note patient has been cleared by Marcos Michael to take sutab for her bowel prep.

## 2023-05-16 ENCOUNTER — TELEPHONE (OUTPATIENT)
Dept: PAIN MANAGEMENT | Age: 73
End: 2023-05-16

## 2023-05-17 ENCOUNTER — HOSPITAL ENCOUNTER (OUTPATIENT)
Dept: PAIN MANAGEMENT | Age: 73
Discharge: HOME | End: 2023-05-17
Payer: MEDICARE

## 2023-05-17 VITALS
WEIGHT: 217 LBS | HEIGHT: 65 IN | HEART RATE: 65 BPM | OXYGEN SATURATION: 96 % | TEMPERATURE: 97.3 F | RESPIRATION RATE: 20 BRPM | SYSTOLIC BLOOD PRESSURE: 128 MMHG | DIASTOLIC BLOOD PRESSURE: 56 MMHG | BODY MASS INDEX: 36.15 KG/M2

## 2023-05-17 DIAGNOSIS — Z79.891 ENCOUNTER FOR LONG-TERM OPIATE ANALGESIC USE: Primary | ICD-10-CM

## 2023-05-17 DIAGNOSIS — M48.061 DEGENERATIVE LUMBAR SPINAL STENOSIS: ICD-10-CM

## 2023-05-17 DIAGNOSIS — M51.36 DDD (DEGENERATIVE DISC DISEASE), LUMBAR: ICD-10-CM

## 2023-05-17 DIAGNOSIS — M47.817 LUMBOSACRAL SPONDYLOSIS WITHOUT MYELOPATHY: ICD-10-CM

## 2023-05-17 PROBLEM — M51.369 DDD (DEGENERATIVE DISC DISEASE), LUMBAR: Status: ACTIVE | Noted: 2023-05-17

## 2023-05-17 PROCEDURE — 99214 OFFICE O/P EST MOD 30 MIN: CPT | Performed by: STUDENT IN AN ORGANIZED HEALTH CARE EDUCATION/TRAINING PROGRAM

## 2023-05-17 PROCEDURE — 99213 OFFICE O/P EST LOW 20 MIN: CPT

## 2023-05-17 RX ORDER — HYDROCODONE BITARTRATE AND ACETAMINOPHEN 5; 325 MG/1; MG/1
1 TABLET ORAL EVERY 8 HOURS PRN
Qty: 90 TABLET | Refills: 0 | Status: SHIPPED | OUTPATIENT
Start: 2023-05-18 | End: 2023-06-17

## 2023-05-17 NOTE — PROGRESS NOTES
Chronic Pain Clinic Note     Encounter Date: 5/17/2023     SUBJECTIVE:  Chief Complaint   Patient presents with    Back Pain    Medication Refill     Norco- Due 5/18/2023       History of Present Illness:   Marilyn Arellano is a 67 y.o. female who presents with back and neck    Medication Refill: Norco-2    Current Complaints of Pain:   Location: thoracic right Back, midline Cervical neck pain     Radiation: Right hip area   Severity: high   Pain Numerical Score - 7/8 today right now  Average:  8     Highest: 10  Lowest: 5  Character/Quality: Dull ache   Timing: Constant  Associated symptoms: weakness  Numbness: no  Weakness: yes bilateral legs, when talking   Exacerbating factors:  walking up and down stairs   Alleviating factors: heat and ice   Length of time pain has been present: Started on years   Inciting event/injury: no  Bowel/Bladder incontinence:  no  Falls: yes   Physical Therapy: yes    History of Interventions:   Surgery: Neck  Injections: Yes    Imaging:    MRI lumbar spine 9/21/2022    FINDINGS:   BONES/ALIGNMENT: Alignment is normal.  There is no acute fracture. Bone   marrow signal intensity is normal.  There is disc desiccation and disc space   narrowing at L4-L5. Moderate degenerative endplate changes are present at   L4-5. There is no spondylolysis. SPINAL CORD: The conus medullaris is normal in size and signal intensities   and terminates normally. SOFT TISSUES: No paraspinal mass is identified. L1-L2: There is no disc bulge or protrusion present. There is no significant   spinal canal stenosis or neural foraminal narrowing present. L2-L3: There is no disc bulge or protrusion present. There is no significant   spinal canal stenosis or neural foraminal narrowing present. There is   bilateral facet arthropathy. L3-L4: There is a disc bulge, facet arthropathy and thickening of the   ligamentum flavum.   There is mild spinal canal stenosis and moderate left   neural

## 2023-05-19 DIAGNOSIS — R19.5 POSITIVE COLORECTAL CANCER SCREENING USING COLOGUARD TEST: Primary | ICD-10-CM

## 2023-05-22 ENCOUNTER — OFFICE VISIT (OUTPATIENT)
Dept: UROLOGY | Age: 73
End: 2023-05-22
Payer: MEDICARE

## 2023-05-22 VITALS
HEIGHT: 65 IN | WEIGHT: 217 LBS | HEART RATE: 63 BPM | BODY MASS INDEX: 36.15 KG/M2 | TEMPERATURE: 98.5 F | SYSTOLIC BLOOD PRESSURE: 125 MMHG | DIASTOLIC BLOOD PRESSURE: 69 MMHG

## 2023-05-22 DIAGNOSIS — N32.81 OAB (OVERACTIVE BLADDER): Primary | ICD-10-CM

## 2023-05-22 DIAGNOSIS — R39.15 URGENCY OF URINATION: ICD-10-CM

## 2023-05-22 PROCEDURE — 1123F ACP DISCUSS/DSCN MKR DOCD: CPT | Performed by: UROLOGY

## 2023-05-22 PROCEDURE — 99214 OFFICE O/P EST MOD 30 MIN: CPT | Performed by: UROLOGY

## 2023-05-22 PROCEDURE — 3074F SYST BP LT 130 MM HG: CPT | Performed by: UROLOGY

## 2023-05-22 PROCEDURE — 3078F DIAST BP <80 MM HG: CPT | Performed by: UROLOGY

## 2023-05-22 ASSESSMENT — ENCOUNTER SYMPTOMS
WHEEZING: 0
COUGH: 0
EYE PAIN: 0
DIARRHEA: 0
SHORTNESS OF BREATH: 0
NAUSEA: 0
ABDOMINAL PAIN: 0
CONSTIPATION: 0
BACK PAIN: 0
VOMITING: 0
EYE REDNESS: 0

## 2023-05-22 NOTE — PROGRESS NOTES
[address] [facility] [id number]  Dept: [address] Urology Office Note - Established    Patient:  June A Long  YOB: 1950  Date: 5/22/2023    The patient is a 67 y.o. female whopresents today for evaluation of the following problems:   Chief Complaint   Patient presents with    Follow-up       HPI  This is a very pleasant 49-year-old female who has a history of overactive bladder. She has failed multiple medications including Sanctura, oxybutynin, and Myrbetriq. Right now she is not taking any medications and is continue to have bothersome symptoms. She is here to discuss options which we have already discussed in the past.  These include sacral neuromodulation and Botox. Summary of old records: N/A    Additional History: N/A    Procedures Today: N/A    Urinalysis today:  No results found for this visit on 05/22/23. Imaging Reviewed during this Office Visit: none  (results were independently reviewed by physician and radiology report verified)    AUA Symptom Score (5/22/2023):                                Last BUN and creatinine:  Lab Results   Component Value Date    BUN 33 (H) 04/10/2023     Lab Results   Component Value Date    CREATININE 2.48 (H) 04/10/2023       Additional Lab/Culture results: none    PAST MEDICAL, FAMILY AND SOCIAL HISTORY UPDATE:  Past Medical History:   Diagnosis Date    WERO (acute kidney injury) ([facility] [address] 06/20/2021    Hx of dialysis x3 sessions    Anemia     iron def    Anxiety and depression     managed per PCP    Aortic valve stenosis     mild per chart    Arthritis     Bilateral carotid artery stenosis     CAD (coronary artery disease) 1993    stent to RCA, [address],  last seen 1/26/22 for clearance    Cervical spinal stenosis     supposed to be having OR with Dr. Keke Coleman 2/28/22 for this, pain N/T down both arms and headaches, has been in pain

## 2023-05-26 ENCOUNTER — HOSPITAL ENCOUNTER (OUTPATIENT)
Dept: PREADMISSION TESTING | Age: 73
Discharge: HOME | End: 2023-05-30

## 2023-05-26 VITALS — WEIGHT: 207 LBS | HEIGHT: 65 IN | BODY MASS INDEX: 34.49 KG/M2

## 2023-06-07 NOTE — PRE-PROCEDURE INSTRUCTIONS
No answer, left message ? Unable to leave message ? When were you told to arrive at hospital ? -0800     Do you have a  ?-YES    Are you on any blood thinners ? -NONE                    If yes when did you stop taking ? Do you have your prep Rx filled and instruction ?  -YES    Nothing to eat the day before , only clear liquids. -INSTRUCTED    Are you experiencing any covid symptoms ? -NONE    Do you have any infections or rash we should be aware of ?-NONE      Do you have the Hibiclens soap to use the night before and the morning of surgery ?-N/A    Nothing to eat or drink after midnight, only a sip of water to take any medication instructed to take the night before. -INSTRUCTED    Wear comfortable clothing, leave any valuables at home, remove any jewelry and body piercing . -INSTRUCTED

## 2023-06-08 ENCOUNTER — ANESTHESIA EVENT (OUTPATIENT)
Dept: ENDOSCOPY | Age: 73
End: 2023-06-08
Payer: MEDICARE

## 2023-06-09 ENCOUNTER — ANESTHESIA (OUTPATIENT)
Dept: ENDOSCOPY | Age: 73
End: 2023-06-09
Payer: MEDICARE

## 2023-06-09 ENCOUNTER — HOSPITAL ENCOUNTER (OUTPATIENT)
Age: 73
Setting detail: OUTPATIENT SURGERY
Discharge: HOME | End: 2023-06-09
Attending: INTERNAL MEDICINE | Admitting: INTERNAL MEDICINE
Payer: MEDICARE

## 2023-06-09 VITALS
BODY MASS INDEX: 34.49 KG/M2 | HEART RATE: 77 BPM | RESPIRATION RATE: 13 BRPM | DIASTOLIC BLOOD PRESSURE: 59 MMHG | WEIGHT: 207 LBS | HEIGHT: 65 IN | SYSTOLIC BLOOD PRESSURE: 125 MMHG | OXYGEN SATURATION: 99 % | TEMPERATURE: 97 F

## 2023-06-09 LAB — GLUCOSE BLD-MCNC: 173 MG/DL (ref 65–105)

## 2023-06-09 PROCEDURE — 7100000010 HC PHASE II RECOVERY - FIRST 15 MIN: Performed by: INTERNAL MEDICINE

## 2023-06-09 PROCEDURE — 3609027000 HC COLONOSCOPY: Performed by: INTERNAL MEDICINE

## 2023-06-09 PROCEDURE — 2580000003 HC RX 258: Performed by: ANESTHESIOLOGY

## 2023-06-09 PROCEDURE — 2500000003 HC RX 250 WO HCPCS: Performed by: ANESTHESIOLOGY

## 2023-06-09 PROCEDURE — 2709999900 HC NON-CHARGEABLE SUPPLY: Performed by: INTERNAL MEDICINE

## 2023-06-09 PROCEDURE — 45378 DIAGNOSTIC COLONOSCOPY: CPT | Performed by: INTERNAL MEDICINE

## 2023-06-09 PROCEDURE — 3700000000 HC ANESTHESIA ATTENDED CARE: Performed by: INTERNAL MEDICINE

## 2023-06-09 PROCEDURE — 6360000002 HC RX W HCPCS: Performed by: ANESTHESIOLOGY

## 2023-06-09 PROCEDURE — 82947 ASSAY GLUCOSE BLOOD QUANT: CPT

## 2023-06-09 PROCEDURE — 7100000011 HC PHASE II RECOVERY - ADDTL 15 MIN: Performed by: INTERNAL MEDICINE

## 2023-06-09 PROCEDURE — 3700000001 HC ADD 15 MINUTES (ANESTHESIA): Performed by: INTERNAL MEDICINE

## 2023-06-09 RX ORDER — LIDOCAINE HYDROCHLORIDE 20 MG/ML
INJECTION, SOLUTION EPIDURAL; INFILTRATION; INTRACAUDAL; PERINEURAL PRN
Status: DISCONTINUED | OUTPATIENT
Start: 2023-06-09 | End: 2023-06-09 | Stop reason: SDUPTHER

## 2023-06-09 RX ORDER — PROPOFOL 10 MG/ML
INJECTION, EMULSION INTRAVENOUS PRN
Status: DISCONTINUED | OUTPATIENT
Start: 2023-06-09 | End: 2023-06-09 | Stop reason: SDUPTHER

## 2023-06-09 RX ORDER — SODIUM CHLORIDE 9 MG/ML
INJECTION, SOLUTION INTRAVENOUS PRN
Status: DISCONTINUED | OUTPATIENT
Start: 2023-06-09 | End: 2023-06-09 | Stop reason: HOSPADM

## 2023-06-09 RX ORDER — LIDOCAINE HYDROCHLORIDE 10 MG/ML
1 INJECTION, SOLUTION EPIDURAL; INFILTRATION; INTRACAUDAL; PERINEURAL
Status: COMPLETED | OUTPATIENT
Start: 2023-06-09 | End: 2023-06-09

## 2023-06-09 RX ORDER — SODIUM CHLORIDE 9 MG/ML
INJECTION, SOLUTION INTRAVENOUS CONTINUOUS
Status: DISCONTINUED | OUTPATIENT
Start: 2023-06-09 | End: 2023-06-09 | Stop reason: HOSPADM

## 2023-06-09 RX ORDER — SODIUM CHLORIDE 0.9 % (FLUSH) 0.9 %
5-40 SYRINGE (ML) INJECTION EVERY 12 HOURS SCHEDULED
Status: DISCONTINUED | OUTPATIENT
Start: 2023-06-09 | End: 2023-06-09 | Stop reason: HOSPADM

## 2023-06-09 RX ORDER — SODIUM CHLORIDE 0.9 % (FLUSH) 0.9 %
5-40 SYRINGE (ML) INJECTION PRN
Status: DISCONTINUED | OUTPATIENT
Start: 2023-06-09 | End: 2023-06-09 | Stop reason: HOSPADM

## 2023-06-09 RX ADMIN — SODIUM CHLORIDE: 9 INJECTION, SOLUTION INTRAVENOUS at 08:52

## 2023-06-09 RX ADMIN — LIDOCAINE HYDROCHLORIDE 1 ML: 10 INJECTION, SOLUTION EPIDURAL; INFILTRATION; INTRACAUDAL; PERINEURAL at 08:52

## 2023-06-09 RX ADMIN — PROPOFOL 600 MG: 10 INJECTION, EMULSION INTRAVENOUS at 10:00

## 2023-06-09 RX ADMIN — LIDOCAINE HYDROCHLORIDE 40 MG: 20 INJECTION, SOLUTION EPIDURAL; INFILTRATION; INTRACAUDAL; PERINEURAL at 10:00

## 2023-06-09 ASSESSMENT — PAIN - FUNCTIONAL ASSESSMENT: PAIN_FUNCTIONAL_ASSESSMENT: NONE - DENIES PAIN

## 2023-06-09 ASSESSMENT — ENCOUNTER SYMPTOMS
SORE THROAT: 0
BACK PAIN: 0
COUGH: 0
WHEEZING: 0
SHORTNESS OF BREATH: 0

## 2023-06-09 NOTE — H&P
% ophthalmic solution Place 1 drop into both eyes as needed      amLODIPine (NORVASC) 10 MG tablet Take 1 tablet by mouth daily (Patient taking differently: Take 1 tablet by mouth nightly) 30 tablet 3    ferrous sulfate (IRON 325) 325 (65 Fe) MG tablet Take 1 tablet by mouth daily (with breakfast) 30 tablet 3    nystatin (MYCOSTATIN) 481782 UNIT/GM powder Apply topically 2 times daily as needed (fungal infection perineum)       busPIRone (BUSPAR) 15 MG tablet Take 15 mg by mouth 2 times daily      isosorbide mononitrate (IMDUR) 30 MG extended release tablet Take 1 tablet by mouth daily (Patient taking differently: Take 1 tablet by mouth nightly) 30 tablet 3    nystatin (MYCOSTATIN) 184531 UNIT/GM cream Apply topically 2 times daily as needed (fungal infection in perineum)       atorvastatin (LIPITOR) 40 MG tablet Take 1 tablet by mouth daily      nitroGLYCERIN (NITROSTAT) 0.4 MG SL tablet Place 1 tablet under the tongue every 5 minutes as needed for Chest pain up to max of 3 total doses. If no relief after 1 dose, call 911. Multiple Vitamins-Minerals (THERAPEUTIC MULTIVITAMIN-MINERALS) tablet Take 1 tablet by mouth daily      aspirin 81 MG EC tablet Take 1 tablet by mouth daily LAST DOSE 9/8/14      metoprolol (TOPROL-XL) 100 MG XL tablet Take 1 tablet by mouth at bedtime      vitamin B-12 (CYANOCOBALAMIN) 500 MCG tablet Take 1 tablet by mouth daily     Notation: Above medications are not currently reconciled at time of signing this H&P note, to be reconciled in pre-op per RN. Review of Systems   Constitutional:  Negative for chills and fever. HENT:  Negative for congestion, dental problem, hearing loss and sore throat. Eyes:  Positive for visual disturbance (Glasses). Respiratory:  Negative for cough, shortness of breath and wheezing. Cardiovascular:  Negative for chest pain and palpitations. Gastrointestinal:         See HPI   Genitourinary: Negative.     Musculoskeletal:  Negative for back

## 2023-06-09 NOTE — ANESTHESIA PRE PROCEDURE
Vitals:    06/09/23 0829   BP: (!) 146/66   Pulse: 83   Resp: 16   Temp: 96.9 °F (36.1 °C)   TempSrc: Infrared   SpO2: 97%   Weight: 207 lb (93.9 kg)   Height: 5' 5\" (1.651 m)                                              BP Readings from Last 3 Encounters:   06/09/23 (!) 146/66   05/22/23 125/69   05/17/23 (!) 128/56       NPO Status: Time of last liquid consumption: 2200                        Time of last solid consumption: 2300                        Date of last liquid consumption: 06/08/23                        Date of last solid food consumption: 06/04/23    BMI:   Wt Readings from Last 3 Encounters:   06/09/23 207 lb (93.9 kg)   05/26/23 207 lb (93.9 kg)   05/22/23 217 lb (98.4 kg)     Body mass index is 34.45 kg/m². CBC:   Lab Results   Component Value Date/Time    WBC 10.7 04/10/2023 11:22 AM    RBC 3.97 04/10/2023 11:22 AM    HGB 12.0 04/10/2023 11:22 AM    HCT 35.9 04/10/2023 11:22 AM    MCV 90.6 04/10/2023 11:22 AM    RDW 13.5 04/10/2023 11:22 AM     04/10/2023 11:22 AM       CMP:   Lab Results   Component Value Date/Time     04/10/2023 11:22 AM    K 4.6 04/10/2023 11:22 AM     04/10/2023 11:22 AM    CO2 26 04/10/2023 11:22 AM    BUN 33 04/10/2023 11:22 AM    CREATININE 2.48 04/10/2023 11:22 AM    GFRAA 20 04/20/2022 11:01 AM    LABGLOM 20 04/10/2023 11:22 AM    GLUCOSE 187 04/10/2023 11:22 AM    PROT 7.5 12/07/2021 12:56 PM    CALCIUM 9.8 04/10/2023 11:22 AM    BILITOT 0.28 12/07/2021 12:56 PM    ALKPHOS 91 12/07/2021 12:56 PM    AST 15 12/07/2021 12:56 PM    ALT 12 12/07/2021 12:56 PM       POC Tests:   Recent Labs     06/09/23  0851   POCGLU 173*       Coags: No results found for: PROTIME, INR, APTT    HCG (If Applicable): No results found for: PREGTESTUR, PREGSERUM, HCG, HCGQUANT     ABGs: No results found for: PHART, PO2ART, SYL0MCQ, HUB8GMZ, BEART, O0YDYBHR     Type & Screen (If Applicable):  No results found for: LABABO, LABRH    Drug/Infectious Status (If Applicable):   No

## 2023-06-09 NOTE — ANESTHESIA POSTPROCEDURE EVALUATION
Department of Anesthesiology  Postprocedure Note    Patient: Marilyn Mora  MRN: [id number]  YOB: 1950  Date of evaluation: 6/9/2023      Procedure Summary     Date: 06/09/23 Room / Location: [place] [address] / [facility]    Anesthesia Start: 5959 Anesthesia Stop: 3646    Procedure: COLONOSCOPY DIAGNOSTIC Diagnosis:       Positive colorectal cancer screening using Cologuard test      (Positive colorectal cancer screening using Cologuard test [R19.5])    Surgeons: Hernan Martinez MD Responsible Provider: Yarelis Rowley MD    Anesthesia Type: general ASA Status: 3          Anesthesia Type: No value filed.     Ifrah Phase I:      Ifrah Phase II: Ifrah Score: 10      Anesthesia Post Evaluation    Comments: POST- ANESTHESIA EVALUATION       Pt Name: Niall Delgado  MRN: [id number]  YOB: 1950  Date of evaluation: 6/9/2023  Time:  11:02 AM      BP (!) 125/59   Pulse 77   Temp 97 °F (36.1 °C)   Resp 13   Ht 5' 5\" (1.651 m)   Wt 207 lb (93.9 kg)   LMP 03/19/1980   SpO2 99%   BMI 34.45 kg/m²      Consciousness Level  Awake  Cardiopulmonary Status  Stable  Pain Adequately Treated YES  Nausea / Vomiting  NO  Adequate Hydration  YES  Anesthesia Related Complications NONE      Electronically signed by Yarelis Rowley MD on 6/9/2023 at 11:02 AM

## 2023-06-09 NOTE — DISCHARGE INSTRUCTIONS
medicine effects are gone and you can think clearly. The anesthesia medicine can make it hard for you to fully understand what you are agreeing to. Follow-up care is a key part of your treatment and safety. Be sure to make and go to all appointments, and call your doctor if you are having problems. It's also a good idea to know your test results and keep a list of the medicines you take. Call your Doctor if you have any of the following:             Passing blood rectally or vomiting blood (it may be red or black). Persistent nausea or vomiting. Severe abdominal or chest pain, not relieved by passing gas. Fever of 100 or more, chills or excessive sweating. Redness or swelling at the IV site. If you experience shortness of breath or severe chest pain, call 911. Where can you learn more? Go to https://[employer]. org and sign in to your [employer] account. Enter E264 in the ControlCircle box to learn more about Colonoscopy: What to Expect at Home.     If you do not have an account, please click on the Sign Up Now link. © 2690-2923 Healthwise, Incorporated. Care instructions adapted under license by [facility]. This care instruction is for use with your licensed healthcare professional. If you have questions about a medical condition or this instruction, always ask your healthcare professional. [address] any warranty or liability for your use of this information. Content Version: 2.0.790376; Last Revised: February 20, 2013   PATIENT INSTRUCTIONS  DIVERTICULOSIS    FOLLOW-UP:  Please make an appointment with your physician as directed. Call your physician immediately if you have any fevers greater than 102.5,  increasing abdominal pain, GI bleeding (from the colon or rectum),or nausea/vomiting.       CAUSE:  Some people may have congenital diverticulosis, but most people develop diverticulosis around or after

## 2023-06-09 NOTE — OP NOTE
Office as instructed  Discussed with the family  High fiber diet   Precautions to avoid constipation     Next colonoscopy: 3-5 years.   If Colonoscopy is less than 10 years the recommended reason is due: Positive Cologuard test without identifying lesions    Electronically signed by Alexandru Ghosh MD  on 6/9/2023 at 10:31 AM

## 2023-06-30 ENCOUNTER — OFFICE VISIT (OUTPATIENT)
Dept: INTERNAL MEDICINE CLINIC | Age: 73
End: 2023-06-30

## 2023-06-30 VITALS
SYSTOLIC BLOOD PRESSURE: 116 MMHG | BODY MASS INDEX: 35.48 KG/M2 | WEIGHT: 213.2 LBS | DIASTOLIC BLOOD PRESSURE: 76 MMHG | OXYGEN SATURATION: 97 % | HEART RATE: 76 BPM

## 2023-06-30 DIAGNOSIS — M47.22 OSTEOARTHRITIS OF SPINE WITH RADICULOPATHY, CERVICAL REGION: ICD-10-CM

## 2023-06-30 DIAGNOSIS — E11.9 TYPE 2 DIABETES MELLITUS WITHOUT COMPLICATION, WITHOUT LONG-TERM CURRENT USE OF INSULIN (HCC): Primary | ICD-10-CM

## 2023-06-30 DIAGNOSIS — M54.16 LUMBAR RADICULOPATHY: ICD-10-CM

## 2023-06-30 DIAGNOSIS — I10 ESSENTIAL HYPERTENSION: ICD-10-CM

## 2023-06-30 DIAGNOSIS — I25.10 CORONARY ARTERY DISEASE INVOLVING NATIVE CORONARY ARTERY OF NATIVE HEART WITHOUT ANGINA PECTORIS: ICD-10-CM

## 2023-06-30 DIAGNOSIS — E03.9 HYPOTHYROIDISM, UNSPECIFIED TYPE: ICD-10-CM

## 2023-06-30 DIAGNOSIS — K21.9 GASTROESOPHAGEAL REFLUX DISEASE WITHOUT ESOPHAGITIS: ICD-10-CM

## 2023-06-30 DIAGNOSIS — E11.9 ENCOUNTER FOR DIABETIC FOOT EXAM (HCC): ICD-10-CM

## 2023-06-30 LAB — HBA1C MFR BLD: 8.3 %

## 2023-06-30 RX ORDER — INSULIN GLARGINE 100 [IU]/ML
23 INJECTION, SOLUTION SUBCUTANEOUS NIGHTLY
Qty: 5 ADJUSTABLE DOSE PRE-FILLED PEN SYRINGE | Refills: 0 | Status: SHIPPED | OUTPATIENT
Start: 2023-06-30

## 2023-07-13 ENCOUNTER — HOSPITAL ENCOUNTER (OUTPATIENT)
Dept: PAIN MANAGEMENT | Age: 73
Discharge: HOME | End: 2023-07-13
Payer: MEDICARE

## 2023-07-13 VITALS
HEART RATE: 68 BPM | OXYGEN SATURATION: 94 % | SYSTOLIC BLOOD PRESSURE: 103 MMHG | BODY MASS INDEX: 35.52 KG/M2 | WEIGHT: 213.2 LBS | DIASTOLIC BLOOD PRESSURE: 57 MMHG | HEIGHT: 65 IN

## 2023-07-13 DIAGNOSIS — M48.02 CERVICAL STENOSIS OF SPINE: ICD-10-CM

## 2023-07-13 DIAGNOSIS — G89.29 CHRONIC MIDLINE LOW BACK PAIN WITHOUT SCIATICA: ICD-10-CM

## 2023-07-13 DIAGNOSIS — M48.061 DEGENERATIVE LUMBAR SPINAL STENOSIS: ICD-10-CM

## 2023-07-13 DIAGNOSIS — M46.1 SACROILIITIS (HCC): ICD-10-CM

## 2023-07-13 DIAGNOSIS — M47.817 LUMBOSACRAL SPONDYLOSIS WITHOUT MYELOPATHY: ICD-10-CM

## 2023-07-13 DIAGNOSIS — M54.50 CHRONIC MIDLINE LOW BACK PAIN WITHOUT SCIATICA: ICD-10-CM

## 2023-07-13 DIAGNOSIS — Z79.891 CHRONIC USE OF OPIATE FOR THERAPEUTIC PURPOSE: ICD-10-CM

## 2023-07-13 DIAGNOSIS — M50.30 DDD (DEGENERATIVE DISC DISEASE), CERVICAL: Primary | ICD-10-CM

## 2023-07-13 DIAGNOSIS — M51.36 DDD (DEGENERATIVE DISC DISEASE), LUMBAR: ICD-10-CM

## 2023-07-13 PROCEDURE — 99213 OFFICE O/P EST LOW 20 MIN: CPT

## 2023-07-13 PROCEDURE — 99213 OFFICE O/P EST LOW 20 MIN: CPT | Performed by: NURSE PRACTITIONER

## 2023-07-13 RX ORDER — HYDROCODONE BITARTRATE AND ACETAMINOPHEN 5; 325 MG/1; MG/1
1 TABLET ORAL EVERY 8 HOURS PRN
Qty: 90 TABLET | Refills: 0 | Status: SHIPPED | OUTPATIENT
Start: 2023-07-18 | End: 2023-08-17

## 2023-07-13 ASSESSMENT — ENCOUNTER SYMPTOMS
COUGH: 0
CONSTIPATION: 0
BACK PAIN: 1
SHORTNESS OF BREATH: 0
BOWEL INCONTINENCE: 0

## 2023-07-13 ASSESSMENT — PAIN DESCRIPTION - PAIN TYPE: TYPE: CHRONIC PAIN

## 2023-07-13 ASSESSMENT — PAIN SCALES - GENERAL: PAINLEVEL_OUTOF10: 9

## 2023-07-13 ASSESSMENT — PAIN DESCRIPTION - DESCRIPTORS: DESCRIPTORS: DULL

## 2023-07-13 ASSESSMENT — PAIN DESCRIPTION - FREQUENCY: FREQUENCY: CONTINUOUS

## 2023-07-13 ASSESSMENT — PAIN DESCRIPTION - LOCATION: LOCATION: BACK

## 2023-08-14 ENCOUNTER — HOSPITAL ENCOUNTER (OUTPATIENT)
Age: 73
Discharge: HOME | End: 2023-08-14
Payer: MEDICARE

## 2023-08-14 ENCOUNTER — HOSPITAL ENCOUNTER (OUTPATIENT)
Dept: PAIN MANAGEMENT | Age: 73
Discharge: HOME | End: 2023-08-14
Payer: MEDICARE

## 2023-08-14 VITALS
DIASTOLIC BLOOD PRESSURE: 64 MMHG | OXYGEN SATURATION: 98 % | HEIGHT: 65 IN | WEIGHT: 213.2 LBS | HEART RATE: 67 BPM | SYSTOLIC BLOOD PRESSURE: 155 MMHG | BODY MASS INDEX: 35.52 KG/M2

## 2023-08-14 DIAGNOSIS — Z79.891 ENCOUNTER FOR LONG-TERM OPIATE ANALGESIC USE: Chronic | ICD-10-CM

## 2023-08-14 DIAGNOSIS — M46.1 SACROILIITIS (HCC): Primary | ICD-10-CM

## 2023-08-14 DIAGNOSIS — M54.16 LUMBAR RADICULOPATHY: ICD-10-CM

## 2023-08-14 DIAGNOSIS — M50.30 DDD (DEGENERATIVE DISC DISEASE), CERVICAL: ICD-10-CM

## 2023-08-14 DIAGNOSIS — I12.9 BENIGN HYPERTENSION WITH CHRONIC KIDNEY DISEASE, STAGE IV (HCC): ICD-10-CM

## 2023-08-14 DIAGNOSIS — R82.998 LEUKOCYTES IN URINE: ICD-10-CM

## 2023-08-14 DIAGNOSIS — M51.36 DDD (DEGENERATIVE DISC DISEASE), LUMBAR: ICD-10-CM

## 2023-08-14 DIAGNOSIS — R80.9 PROTEINURIA, UNSPECIFIED TYPE: ICD-10-CM

## 2023-08-14 DIAGNOSIS — N18.4 CKD (CHRONIC KIDNEY DISEASE), STAGE IV (HCC): ICD-10-CM

## 2023-08-14 DIAGNOSIS — M48.02 CERVICAL STENOSIS OF SPINE: ICD-10-CM

## 2023-08-14 DIAGNOSIS — M48.061 DEGENERATIVE LUMBAR SPINAL STENOSIS: ICD-10-CM

## 2023-08-14 DIAGNOSIS — N18.4 BENIGN HYPERTENSION WITH CHRONIC KIDNEY DISEASE, STAGE IV (HCC): ICD-10-CM

## 2023-08-14 DIAGNOSIS — M47.817 LUMBOSACRAL SPONDYLOSIS WITHOUT MYELOPATHY: ICD-10-CM

## 2023-08-14 LAB
ANION GAP SERPL CALCULATED.3IONS-SCNC: 14 MMOL/L (ref 9–17)
BACTERIA URNS QL MICRO: ABNORMAL
BASOPHILS # BLD: 0.1 K/UL (ref 0–0.2)
BASOPHILS NFR BLD: 1 % (ref 0–2)
BILIRUB UR QL STRIP: NEGATIVE
BUN SERPL-MCNC: 26 MG/DL (ref 8–23)
CALCIUM SERPL-MCNC: 9.7 MG/DL (ref 8.6–10.4)
CASTS #/AREA URNS LPF: ABNORMAL /LPF
CHLORIDE SERPL-SCNC: 102 MMOL/L (ref 98–107)
CLARITY UR: ABNORMAL
CO2 SERPL-SCNC: 24 MMOL/L (ref 20–31)
COLOR UR: YELLOW
CREAT SERPL-MCNC: 2.2 MG/DL (ref 0.5–0.9)
EOSINOPHIL # BLD: 0.3 K/UL (ref 0–0.4)
EOSINOPHILS RELATIVE PERCENT: 2 % (ref 0–4)
EPI CELLS #/AREA URNS HPF: ABNORMAL /HPF
ERYTHROCYTE [DISTWIDTH] IN BLOOD BY AUTOMATED COUNT: 13.8 % (ref 11.5–14.9)
GFR SERPL CREATININE-BSD FRML MDRD: 23 ML/MIN/1.73M2
GLUCOSE SERPL-MCNC: 191 MG/DL (ref 70–99)
GLUCOSE UR STRIP-MCNC: NEGATIVE MG/DL
HCT VFR BLD AUTO: 37.1 % (ref 36–46)
HGB BLD-MCNC: 12 G/DL (ref 12–16)
HGB UR QL STRIP.AUTO: NEGATIVE
KETONES UR STRIP-MCNC: NEGATIVE MG/DL
LEUKOCYTE ESTERASE UR QL STRIP: ABNORMAL
LYMPHOCYTES NFR BLD: 3.2 K/UL (ref 1–4.8)
LYMPHOCYTES RELATIVE PERCENT: 27 % (ref 24–44)
MAGNESIUM SERPL-MCNC: 2.1 MG/DL (ref 1.6–2.6)
MCH RBC QN AUTO: 29 PG (ref 26–34)
MCHC RBC AUTO-ENTMCNC: 32.2 G/DL (ref 31–37)
MCV RBC AUTO: 90 FL (ref 80–100)
MONOCYTES NFR BLD: 0.9 K/UL (ref 0.1–1.3)
MONOCYTES NFR BLD: 8 % (ref 1–7)
NEUTROPHILS NFR BLD: 62 % (ref 36–66)
NEUTS SEG NFR BLD: 7.2 K/UL (ref 1.3–9.1)
NITRITE UR QL STRIP: NEGATIVE
PH UR STRIP: 5.5 [PH] (ref 5–8)
PHOSPHATE SERPL-MCNC: 4.1 MG/DL (ref 2.6–4.5)
PLATELET # BLD AUTO: 290 K/UL (ref 150–450)
PMV BLD AUTO: 8.8 FL (ref 6–12)
POTASSIUM SERPL-SCNC: 5.1 MMOL/L (ref 3.7–5.3)
PROT UR STRIP-MCNC: ABNORMAL MG/DL
RBC # BLD AUTO: 4.12 M/UL (ref 4–5.2)
RBC #/AREA URNS HPF: ABNORMAL /HPF
SODIUM SERPL-SCNC: 140 MMOL/L (ref 135–144)
SP GR UR STRIP: 1.01 (ref 1–1.03)
UROBILINOGEN UR STRIP-ACNC: NORMAL EU/DL (ref 0–1)
WBC #/AREA URNS HPF: ABNORMAL /HPF
WBC OTHER # BLD: 11.7 K/UL (ref 3.5–11)

## 2023-08-14 PROCEDURE — 84100 ASSAY OF PHOSPHORUS: CPT

## 2023-08-14 PROCEDURE — 99213 OFFICE O/P EST LOW 20 MIN: CPT | Performed by: NURSE PRACTITIONER

## 2023-08-14 PROCEDURE — 80048 BASIC METABOLIC PNL TOTAL CA: CPT

## 2023-08-14 PROCEDURE — 83735 ASSAY OF MAGNESIUM: CPT

## 2023-08-14 PROCEDURE — 85025 COMPLETE CBC W/AUTO DIFF WBC: CPT

## 2023-08-14 PROCEDURE — 99213 OFFICE O/P EST LOW 20 MIN: CPT

## 2023-08-14 PROCEDURE — 36415 COLL VENOUS BLD VENIPUNCTURE: CPT

## 2023-08-14 PROCEDURE — 81001 URINALYSIS AUTO W/SCOPE: CPT

## 2023-08-14 RX ORDER — HYDROCODONE BITARTRATE AND ACETAMINOPHEN 5; 325 MG/1; MG/1
1 TABLET ORAL EVERY 8 HOURS PRN
Qty: 90 TABLET | Refills: 0 | Status: SHIPPED | OUTPATIENT
Start: 2023-08-17 | End: 2023-09-16

## 2023-08-14 ASSESSMENT — ENCOUNTER SYMPTOMS
BOWEL INCONTINENCE: 0
CONSTIPATION: 0
BACK PAIN: 1
SHORTNESS OF BREATH: 0
COUGH: 0

## 2023-08-14 ASSESSMENT — PAIN SCALES - GENERAL: PAINLEVEL_OUTOF10: 8

## 2023-08-14 ASSESSMENT — PAIN DESCRIPTION - PAIN TYPE: TYPE: CHRONIC PAIN

## 2023-08-14 ASSESSMENT — PAIN DESCRIPTION - FREQUENCY: FREQUENCY: CONTINUOUS

## 2023-08-14 ASSESSMENT — PAIN DESCRIPTION - DESCRIPTORS: DESCRIPTORS: ACHING;DULL

## 2023-08-14 ASSESSMENT — PAIN DESCRIPTION - LOCATION: LOCATION: BACK;NECK

## 2023-08-14 NOTE — PROGRESS NOTES
Chief Complaint   Patient presents with    Back Pain    Neck Pain     [facility]     Pt complains of chronic neck pain. MRI with moderate stenosis. She had cervical facet injections with moderate relief but states she cannot afford to repeat at this time. Last injection was 3/2021. She sees a chiropractor with benefit. Had appt with Dr Rossy Khan in Jan 2022 with ACDF C3-4 and C5-6 recommended but patient does not wish to have surgery. She states her cardiologist has advised her to not have another surgery. Back Pain  This is a chronic problem. The current episode started more than 1 year ago. The problem occurs constantly. The problem is unchanged. The pain is present in the lumbar spine. The quality of the pain is described as aching. The pain radiates to the right knee, right foot and right thigh. The pain is at a severity of 8/10. The pain is severe. The pain is The same all the time. The symptoms are aggravated by bending, position, standing and twisting. Associated symptoms include weakness. Pertinent negatives include no bladder incontinence, bowel incontinence, chest pain, fever, headaches, numbness or tingling. She has tried bed rest, home exercises, heat, ice and walking for the symptoms. Neck Pain   This is a chronic problem. The current episode started more than 1 year ago. The problem occurs constantly. The problem has been unchanged. The pain is associated with nothing. The pain is present in the left side. The quality of the pain is described as aching. The pain is at a severity of 10/10. The pain is severe. The symptoms are aggravated by bending, position and twisting. The pain is Same all the time. Stiffness is present All day. Associated symptoms include weakness. Pertinent negatives include no chest pain, fever, headaches, numbness or tingling. She has tried bed rest, ice, heat and oral narcotics for the symptoms. The treatment provided mild relief.      Patient denies any new neurological

## 2023-08-17 ENCOUNTER — HOSPITAL ENCOUNTER (OUTPATIENT)
Age: 73
Setting detail: SPECIMEN
Discharge: HOME | End: 2023-08-17
Payer: MEDICARE

## 2023-08-17 DIAGNOSIS — N18.4 CKD (CHRONIC KIDNEY DISEASE), STAGE IV (HCC): ICD-10-CM

## 2023-08-17 DIAGNOSIS — I12.9 BENIGN HYPERTENSION WITH CHRONIC KIDNEY DISEASE, STAGE IV (HCC): ICD-10-CM

## 2023-08-17 DIAGNOSIS — N18.4 BENIGN HYPERTENSION WITH CHRONIC KIDNEY DISEASE, STAGE IV (HCC): ICD-10-CM

## 2023-08-17 DIAGNOSIS — R80.9 PROTEINURIA, UNSPECIFIED TYPE: ICD-10-CM

## 2023-08-17 DIAGNOSIS — E87.5 HYPERKALEMIA: ICD-10-CM

## 2023-08-17 DIAGNOSIS — R82.998 LEUKOCYTES IN URINE: ICD-10-CM

## 2023-08-17 LAB
BACTERIA URNS QL MICRO: ABNORMAL
BILIRUB UR QL STRIP: NEGATIVE
CASTS #/AREA URNS LPF: ABNORMAL /LPF
CLARITY UR: ABNORMAL
COLOR UR: YELLOW
EPI CELLS #/AREA URNS HPF: ABNORMAL /HPF
GLUCOSE UR STRIP-MCNC: NEGATIVE MG/DL
HGB UR QL STRIP.AUTO: ABNORMAL
KETONES UR STRIP-MCNC: NEGATIVE MG/DL
LEUKOCYTE ESTERASE UR QL STRIP: ABNORMAL
NITRITE UR QL STRIP: NEGATIVE
PH UR STRIP: 6 [PH] (ref 5–8)
PROT UR STRIP-MCNC: ABNORMAL MG/DL
RBC #/AREA URNS HPF: ABNORMAL /HPF
SP GR UR STRIP: 1.01 (ref 1–1.03)
UROBILINOGEN UR STRIP-ACNC: NORMAL EU/DL (ref 0–1)
WBC #/AREA URNS HPF: ABNORMAL /HPF

## 2023-08-17 PROCEDURE — 81001 URINALYSIS AUTO W/SCOPE: CPT

## 2023-08-17 PROCEDURE — 87086 URINE CULTURE/COLONY COUNT: CPT

## 2023-08-18 LAB
MICROORGANISM SPEC CULT: NORMAL
SPECIMEN DESCRIPTION: NORMAL

## 2023-08-29 DIAGNOSIS — F32.1 CURRENT MODERATE EPISODE OF MAJOR DEPRESSIVE DISORDER, UNSPECIFIED WHETHER RECURRENT (HCC): ICD-10-CM

## 2023-08-29 RX ORDER — ESCITALOPRAM OXALATE 20 MG/1
TABLET ORAL
Qty: 30 TABLET | Refills: 3 | OUTPATIENT
Start: 2023-08-29

## 2023-08-29 NOTE — TELEPHONE ENCOUNTER
Pt can get refill from previous PCP , until she will see me. She will see me as new patient on 10/3.

## 2023-09-14 ENCOUNTER — HOSPITAL ENCOUNTER (OUTPATIENT)
Dept: PAIN MANAGEMENT | Age: 73
Discharge: HOME | End: 2023-09-14
Payer: MEDICARE

## 2023-09-14 VITALS
WEIGHT: 219 LBS | HEIGHT: 65 IN | HEART RATE: 62 BPM | OXYGEN SATURATION: 98 % | DIASTOLIC BLOOD PRESSURE: 58 MMHG | SYSTOLIC BLOOD PRESSURE: 105 MMHG | BODY MASS INDEX: 36.49 KG/M2

## 2023-09-14 DIAGNOSIS — M48.02 CERVICAL STENOSIS OF SPINE: Primary | ICD-10-CM

## 2023-09-14 DIAGNOSIS — M51.36 DDD (DEGENERATIVE DISC DISEASE), LUMBAR: ICD-10-CM

## 2023-09-14 DIAGNOSIS — M50.30 DDD (DEGENERATIVE DISC DISEASE), CERVICAL: ICD-10-CM

## 2023-09-14 DIAGNOSIS — Z79.891 ENCOUNTER FOR LONG-TERM OPIATE ANALGESIC USE: Chronic | ICD-10-CM

## 2023-09-14 DIAGNOSIS — M47.817 LUMBOSACRAL SPONDYLOSIS WITHOUT MYELOPATHY: ICD-10-CM

## 2023-09-14 DIAGNOSIS — M46.1 SACROILIITIS (HCC): ICD-10-CM

## 2023-09-14 DIAGNOSIS — Z79.891 CHRONIC USE OF OPIATE FOR THERAPEUTIC PURPOSE: ICD-10-CM

## 2023-09-14 DIAGNOSIS — M48.061 DEGENERATIVE LUMBAR SPINAL STENOSIS: ICD-10-CM

## 2023-09-14 PROCEDURE — 99213 OFFICE O/P EST LOW 20 MIN: CPT

## 2023-09-14 PROCEDURE — 99214 OFFICE O/P EST MOD 30 MIN: CPT | Performed by: NURSE PRACTITIONER

## 2023-09-14 RX ORDER — HYDROCODONE BITARTRATE AND ACETAMINOPHEN 5; 325 MG/1; MG/1
1 TABLET ORAL EVERY 8 HOURS PRN
Qty: 90 TABLET | Refills: 0 | Status: SHIPPED | OUTPATIENT
Start: 2023-09-16 | End: 2023-10-16

## 2023-09-14 ASSESSMENT — PAIN DESCRIPTION - FREQUENCY: FREQUENCY: CONTINUOUS

## 2023-09-14 ASSESSMENT — ENCOUNTER SYMPTOMS
SHORTNESS OF BREATH: 0
COUGH: 0
BOWEL INCONTINENCE: 0
BACK PAIN: 1
CONSTIPATION: 0

## 2023-09-14 ASSESSMENT — PAIN DESCRIPTION - DESCRIPTORS: DESCRIPTORS: CRAMPING;SHOOTING

## 2023-09-14 ASSESSMENT — PAIN DESCRIPTION - PAIN TYPE: TYPE: CHRONIC PAIN

## 2023-09-14 ASSESSMENT — PAIN SCALES - GENERAL: PAINLEVEL_OUTOF10: 8

## 2023-09-14 ASSESSMENT — PAIN DESCRIPTION - LOCATION: LOCATION: BACK;NECK

## 2023-09-14 NOTE — PROGRESS NOTES
Chief Complaint   Patient presents with    Back Pain    Neck Pain     [facility]     Pt complains of chronic neck pain. MRI with moderate stenosis. She had cervical facet injections with moderate relief but states she cannot afford to repeat at this time. Last injection was 3/2021. She sees a chiropractor with benefit. Had appt with Dr Pete Aden in Jan 2022 with ACDF C3-4 and C5-6 recommended but patient does not wish to have surgery. She states her cardiologist has advised her to not have another surgery. Back Pain  This is a chronic problem. The current episode started more than 1 year ago. The problem occurs constantly. The problem is unchanged. The pain is present in the lumbar spine. The quality of the pain is described as shooting and cramping. The pain radiates to the right knee, right foot and right thigh. The pain is at a severity of 8/10. The pain is severe. The pain is The same all the time. The symptoms are aggravated by bending, twisting, standing and position. Associated symptoms include weakness. Pertinent negatives include no bladder incontinence, bowel incontinence, chest pain, fever, numbness or tingling. She has tried bed rest, home exercises, heat, ice, walking and NSAIDs for the symptoms. Neck Pain   This is a chronic problem. The current episode started more than 1 year ago. The problem occurs constantly. The problem has been unchanged. The pain is associated with nothing. The pain is present in the left side. The quality of the pain is described as aching. The pain is at a severity of 8/10. The pain is severe. The symptoms are aggravated by bending, position and twisting. The pain is Same all the time. Stiffness is present All day. Associated symptoms include weakness. Pertinent negatives include no chest pain, fever, numbness or tingling. She has tried bed rest, ice, home exercises, heat, NSAIDs and oral narcotics for the symptoms. Patient denies any new neurological symptoms.  No

## 2023-09-20 ENCOUNTER — HOSPITAL ENCOUNTER (OUTPATIENT)
Dept: WOMENS IMAGING | Age: 73
Discharge: HOME | End: 2023-09-22
Payer: MEDICARE

## 2023-09-20 DIAGNOSIS — Z12.31 ENCOUNTER FOR SCREENING MAMMOGRAM FOR BREAST CANCER: ICD-10-CM

## 2023-09-20 PROCEDURE — 77063 BREAST TOMOSYNTHESIS BI: CPT

## 2023-10-02 ENCOUNTER — TELEPHONE (OUTPATIENT)
Dept: FAMILY MEDICINE CLINIC | Age: 73
End: 2023-10-02

## 2023-10-16 ENCOUNTER — HOSPITAL ENCOUNTER (OUTPATIENT)
Dept: PAIN MANAGEMENT | Age: 73
Discharge: HOME | End: 2023-10-16
Payer: MEDICARE

## 2023-10-16 VITALS
SYSTOLIC BLOOD PRESSURE: 121 MMHG | HEART RATE: 60 BPM | HEIGHT: 65 IN | WEIGHT: 219 LBS | BODY MASS INDEX: 36.49 KG/M2 | OXYGEN SATURATION: 96 % | DIASTOLIC BLOOD PRESSURE: 57 MMHG

## 2023-10-16 DIAGNOSIS — M48.061 DEGENERATIVE LUMBAR SPINAL STENOSIS: ICD-10-CM

## 2023-10-16 DIAGNOSIS — M46.1 SACROILIITIS (HCC): ICD-10-CM

## 2023-10-16 DIAGNOSIS — M54.50 CHRONIC MIDLINE LOW BACK PAIN WITHOUT SCIATICA: ICD-10-CM

## 2023-10-16 DIAGNOSIS — M51.36 DDD (DEGENERATIVE DISC DISEASE), LUMBAR: ICD-10-CM

## 2023-10-16 DIAGNOSIS — G89.29 CHRONIC MIDLINE LOW BACK PAIN WITHOUT SCIATICA: ICD-10-CM

## 2023-10-16 DIAGNOSIS — G89.29 CHRONIC RIGHT SHOULDER PAIN: ICD-10-CM

## 2023-10-16 DIAGNOSIS — M50.30 DDD (DEGENERATIVE DISC DISEASE), CERVICAL: ICD-10-CM

## 2023-10-16 DIAGNOSIS — M48.02 CERVICAL STENOSIS OF SPINE: ICD-10-CM

## 2023-10-16 DIAGNOSIS — M25.511 CHRONIC RIGHT SHOULDER PAIN: ICD-10-CM

## 2023-10-16 DIAGNOSIS — M47.817 LUMBOSACRAL SPONDYLOSIS WITHOUT MYELOPATHY: Primary | ICD-10-CM

## 2023-10-16 PROCEDURE — 99213 OFFICE O/P EST LOW 20 MIN: CPT

## 2023-10-16 PROCEDURE — 99213 OFFICE O/P EST LOW 20 MIN: CPT | Performed by: NURSE PRACTITIONER

## 2023-10-16 RX ORDER — HYDROCODONE BITARTRATE AND ACETAMINOPHEN 5; 325 MG/1; MG/1
1 TABLET ORAL EVERY 8 HOURS PRN
Qty: 90 TABLET | Refills: 0 | Status: SHIPPED | OUTPATIENT
Start: 2023-10-16 | End: 2023-11-15

## 2023-10-16 ASSESSMENT — ENCOUNTER SYMPTOMS
BACK PAIN: 1
CONSTIPATION: 0
COUGH: 0
SHORTNESS OF BREATH: 0
BOWEL INCONTINENCE: 0

## 2023-10-16 ASSESSMENT — PAIN DESCRIPTION - PAIN TYPE: TYPE: CHRONIC PAIN

## 2023-10-16 ASSESSMENT — PAIN DESCRIPTION - LOCATION: LOCATION: BACK;NECK

## 2023-10-16 ASSESSMENT — PAIN DESCRIPTION - DESCRIPTORS: DESCRIPTORS: ACHING

## 2023-10-16 ASSESSMENT — PAIN DESCRIPTION - FREQUENCY: FREQUENCY: CONTINUOUS

## 2023-10-16 ASSESSMENT — PAIN SCALES - GENERAL: PAINLEVEL_OUTOF10: 8

## 2023-10-16 NOTE — PROGRESS NOTES
Chief Complaint   Patient presents with    Neck Pain    Back Pain     [facility]     Pt complains of chronic neck pain. MRI with moderate stenosis. She had cervical facet injections with moderate relief but states she cannot afford to repeat at this time. Last injection was 3/2021. She sees a chiropractor with benefit. Had appt with Dr Destini Dominguez in Jan 2022 with ACDF C3-4 and C5-6 recommended but patient does not wish to have surgery. She states her cardiologist has advised her to not have another surgery. Neck Pain   This is a chronic problem. The current episode started more than 1 year ago. The problem occurs constantly. The problem has been unchanged. The pain is associated with nothing. The pain is present in the left side. The quality of the pain is described as aching. The pain is at a severity of 8/10. The pain is severe. The symptoms are aggravated by bending, position and twisting. The pain is Same all the time. Stiffness is present All day. Associated symptoms include headaches and weakness. Pertinent negatives include no chest pain, fever, numbness or tingling. She has tried bed rest, ice, home exercises, heat and NSAIDs for the symptoms. Back Pain  This is a chronic problem. The current episode started more than 1 year ago. The problem occurs constantly. The problem is unchanged. The pain is present in the lumbar spine. The quality of the pain is described as aching. The pain radiates to the right knee, right foot, right thigh, left foot, left thigh and left knee (L>R). The pain is at a severity of 8/10. The pain is severe. The pain is The same all the time. The symptoms are aggravated by bending, position, standing and twisting. Associated symptoms include headaches and weakness. Pertinent negatives include no bladder incontinence, bowel incontinence, chest pain, fever, numbness or tingling. She has tried bed rest, home exercises, heat, ice, walking and NSAIDs for the symptoms.      Patient denies

## 2023-10-31 ENCOUNTER — OFFICE VISIT (OUTPATIENT)
Dept: FAMILY MEDICINE CLINIC | Age: 73
End: 2023-10-31
Payer: MEDICARE

## 2023-10-31 ENCOUNTER — HOSPITAL ENCOUNTER (OUTPATIENT)
Age: 73
Discharge: HOME | End: 2023-10-31
Payer: MEDICARE

## 2023-10-31 VITALS
TEMPERATURE: 97.9 F | WEIGHT: 216.6 LBS | DIASTOLIC BLOOD PRESSURE: 76 MMHG | HEART RATE: 95 BPM | SYSTOLIC BLOOD PRESSURE: 128 MMHG | HEIGHT: 63 IN | OXYGEN SATURATION: 98 % | BODY MASS INDEX: 38.38 KG/M2

## 2023-10-31 DIAGNOSIS — Z11.59 ENCOUNTER FOR SCREENING FOR OTHER VIRAL DISEASES: ICD-10-CM

## 2023-10-31 DIAGNOSIS — E03.9 HYPOTHYROIDISM, UNSPECIFIED TYPE: ICD-10-CM

## 2023-10-31 DIAGNOSIS — N18.4 STAGE 4 CHRONIC KIDNEY DISEASE (HCC): ICD-10-CM

## 2023-10-31 DIAGNOSIS — E11.9 TYPE 2 DIABETES MELLITUS WITHOUT COMPLICATION, WITHOUT LONG-TERM CURRENT USE OF INSULIN (HCC): ICD-10-CM

## 2023-10-31 DIAGNOSIS — D50.9 IRON DEFICIENCY ANEMIA, UNSPECIFIED IRON DEFICIENCY ANEMIA TYPE: ICD-10-CM

## 2023-10-31 DIAGNOSIS — M47.817 LUMBOSACRAL SPONDYLOSIS WITHOUT MYELOPATHY: ICD-10-CM

## 2023-10-31 DIAGNOSIS — E11.9 TYPE 2 DIABETES MELLITUS WITHOUT COMPLICATION, WITHOUT LONG-TERM CURRENT USE OF INSULIN (HCC): Primary | ICD-10-CM

## 2023-10-31 DIAGNOSIS — I10 ESSENTIAL HYPERTENSION: ICD-10-CM

## 2023-10-31 DIAGNOSIS — L02.92 BOIL: ICD-10-CM

## 2023-10-31 DIAGNOSIS — F33.1 MODERATE EPISODE OF RECURRENT MAJOR DEPRESSIVE DISORDER (HCC): ICD-10-CM

## 2023-10-31 DIAGNOSIS — R19.5 POSITIVE COLORECTAL CANCER SCREENING USING COLOGUARD TEST: ICD-10-CM

## 2023-10-31 PROBLEM — N30.00 ACUTE CYSTITIS WITHOUT HEMATURIA: Status: RESOLVED | Noted: 2021-06-20 | Resolved: 2023-10-31

## 2023-10-31 PROBLEM — N13.30 HYDRONEPHROSIS OF LEFT KIDNEY: Status: RESOLVED | Noted: 2021-06-20 | Resolved: 2023-10-31

## 2023-10-31 PROBLEM — N12 PYELONEPHRITIS: Status: RESOLVED | Noted: 2021-07-15 | Resolved: 2023-10-31

## 2023-10-31 PROBLEM — N13.4 HYDROURETER, LEFT: Status: RESOLVED | Noted: 2021-06-20 | Resolved: 2023-10-31

## 2023-10-31 PROBLEM — N17.9 AKI (ACUTE KIDNEY INJURY) (HCC): Status: RESOLVED | Noted: 2021-06-20 | Resolved: 2023-10-31

## 2023-10-31 PROBLEM — N30.00 ACUTE INFECTIVE CYSTITIS: Status: RESOLVED | Noted: 2021-06-20 | Resolved: 2023-10-31

## 2023-10-31 LAB
CREAT UR-MCNC: 64.5 MG/DL (ref 28–217)
HBA1C MFR BLD: 8 %
HCV AB SERPL QL IA: NONREACTIVE
MICROALBUMIN UR-MCNC: 48 MG/L
MICROALBUMIN/CREAT UR-RTO: 74 MCG/MG CREAT

## 2023-10-31 PROCEDURE — 1123F ACP DISCUSS/DSCN MKR DOCD: CPT | Performed by: FAMILY MEDICINE

## 2023-10-31 PROCEDURE — 83036 HEMOGLOBIN GLYCOSYLATED A1C: CPT | Performed by: FAMILY MEDICINE

## 2023-10-31 PROCEDURE — 82043 UR ALBUMIN QUANTITATIVE: CPT

## 2023-10-31 PROCEDURE — 82570 ASSAY OF URINE CREATININE: CPT

## 2023-10-31 PROCEDURE — 3078F DIAST BP <80 MM HG: CPT | Performed by: FAMILY MEDICINE

## 2023-10-31 PROCEDURE — 99214 OFFICE O/P EST MOD 30 MIN: CPT | Performed by: FAMILY MEDICINE

## 2023-10-31 PROCEDURE — 3074F SYST BP LT 130 MM HG: CPT | Performed by: FAMILY MEDICINE

## 2023-10-31 PROCEDURE — 36415 COLL VENOUS BLD VENIPUNCTURE: CPT

## 2023-10-31 PROCEDURE — 3052F HG A1C>EQUAL 8.0%<EQUAL 9.0%: CPT | Performed by: FAMILY MEDICINE

## 2023-10-31 PROCEDURE — 86803 HEPATITIS C AB TEST: CPT

## 2023-10-31 RX ORDER — QUETIAPINE FUMARATE 25 MG/1
25 TABLET, FILM COATED ORAL NIGHTLY
Qty: 60 TABLET | Refills: 3 | Status: SHIPPED | OUTPATIENT
Start: 2023-10-31

## 2023-10-31 RX ORDER — ESCITALOPRAM OXALATE 10 MG/1
10 TABLET ORAL DAILY
Qty: 90 TABLET | Refills: 1 | Status: SHIPPED | OUTPATIENT
Start: 2023-10-31

## 2023-10-31 SDOH — ECONOMIC STABILITY: FOOD INSECURITY: WITHIN THE PAST 12 MONTHS, YOU WORRIED THAT YOUR FOOD WOULD RUN OUT BEFORE YOU GOT MONEY TO BUY MORE.: NEVER TRUE

## 2023-10-31 SDOH — ECONOMIC STABILITY: INCOME INSECURITY: HOW HARD IS IT FOR YOU TO PAY FOR THE VERY BASICS LIKE FOOD, HOUSING, MEDICAL CARE, AND HEATING?: HARD

## 2023-10-31 SDOH — ECONOMIC STABILITY: FOOD INSECURITY: WITHIN THE PAST 12 MONTHS, THE FOOD YOU BOUGHT JUST DIDN'T LAST AND YOU DIDN'T HAVE MONEY TO GET MORE.: NEVER TRUE

## 2023-10-31 ASSESSMENT — COLUMBIA-SUICIDE SEVERITY RATING SCALE - C-SSRS
2. HAVE YOU ACTUALLY HAD ANY THOUGHTS OF KILLING YOURSELF?: NO
3. HAVE YOU BEEN THINKING ABOUT HOW YOU MIGHT KILL YOURSELF?: NO
BASED ON RESPONSES TO C-SSRS QS 1-6, WHAT IS THE PATIENT'S OVERALL RISK RATING FOR SUICIDE: NO RISK
1. WITHIN THE PAST MONTH, HAVE YOU WISHED YOU WERE DEAD OR WISHED YOU COULD GO TO SLEEP AND NOT WAKE UP?: NO
4. HAVE YOU HAD THESE THOUGHTS AND HAD SOME INTENTION OF ACTING ON THEM?: NO
6. HAVE YOU EVER DONE ANYTHING, STARTED TO DO ANYTHING, OR PREPARED TO DO ANYTHING TO END YOUR LIFE?: NO
5. HAVE YOU STARTED TO WORK OUT OR WORKED OUT THE DETAILS OF HOW TO KILL YOURSELF? DO YOU INTEND TO CARRY OUT THIS PLAN?: NO
7. DID THIS OCCUR IN THE LAST THREE MONTHS: NO

## 2023-10-31 ASSESSMENT — PATIENT HEALTH QUESTIONNAIRE - PHQ9
SUM OF ALL RESPONSES TO PHQ9 QUESTIONS 1 & 2: 6
2. FEELING DOWN, DEPRESSED OR HOPELESS: 3
SUM OF ALL RESPONSES TO PHQ QUESTIONS 1-9: 23
3. TROUBLE FALLING OR STAYING ASLEEP: 3
1. LITTLE INTEREST OR PLEASURE IN DOING THINGS: 3
SUM OF ALL RESPONSES TO PHQ QUESTIONS 1-9: 23
5. POOR APPETITE OR OVEREATING: 3
4. FEELING TIRED OR HAVING LITTLE ENERGY: 3
8. MOVING OR SPEAKING SO SLOWLY THAT OTHER PEOPLE COULD HAVE NOTICED. OR THE OPPOSITE, BEING SO FIGETY OR RESTLESS THAT YOU HAVE BEEN MOVING AROUND A LOT MORE THAN USUAL: 3
9. THOUGHTS THAT YOU WOULD BE BETTER OFF DEAD, OR OF HURTING YOURSELF: 0
SUM OF ALL RESPONSES TO PHQ QUESTIONS 1-9: 23
SUM OF ALL RESPONSES TO PHQ QUESTIONS 1-9: 23
7. TROUBLE CONCENTRATING ON THINGS, SUCH AS READING THE NEWSPAPER OR WATCHING TELEVISION: 3
10. IF YOU CHECKED OFF ANY PROBLEMS, HOW DIFFICULT HAVE THESE PROBLEMS MADE IT FOR YOU TO DO YOUR WORK, TAKE CARE OF THINGS AT HOME, OR GET ALONG WITH OTHER PEOPLE: 0
6. FEELING BAD ABOUT YOURSELF - OR THAT YOU ARE A FAILURE OR HAVE LET YOURSELF OR YOUR FAMILY DOWN: 2

## 2023-10-31 ASSESSMENT — ENCOUNTER SYMPTOMS
SHORTNESS OF BREATH: 0
SORE THROAT: 0
WHEEZING: 0
RHINORRHEA: 0
SINUS PRESSURE: 0
COLOR CHANGE: 0
DIARRHEA: 0
BLOOD IN STOOL: 0
ABDOMINAL PAIN: 0
NAUSEA: 0
STRIDOR: 0
ABDOMINAL DISTENTION: 0
CHEST TIGHTNESS: 0
CONSTIPATION: 0
RECTAL PAIN: 0
TROUBLE SWALLOWING: 0
EYE REDNESS: 0
BACK PAIN: 1
COUGH: 0
VOMITING: 0

## 2023-10-31 ASSESSMENT — ANXIETY QUESTIONNAIRES
6. BECOMING EASILY ANNOYED OR IRRITABLE: 2
GAD7 TOTAL SCORE: 17
4. TROUBLE RELAXING: 3
IF YOU CHECKED OFF ANY PROBLEMS ON THIS QUESTIONNAIRE, HOW DIFFICULT HAVE THESE PROBLEMS MADE IT FOR YOU TO DO YOUR WORK, TAKE CARE OF THINGS AT HOME, OR GET ALONG WITH OTHER PEOPLE: NOT DIFFICULT AT ALL
1. FEELING NERVOUS, ANXIOUS, OR ON EDGE: 3
7. FEELING AFRAID AS IF SOMETHING AWFUL MIGHT HAPPEN: 0
3. WORRYING TOO MUCH ABOUT DIFFERENT THINGS: 3
5. BEING SO RESTLESS THAT IT IS HARD TO SIT STILL: 3
2. NOT BEING ABLE TO STOP OR CONTROL WORRYING: 3

## 2023-10-31 NOTE — PROGRESS NOTES
Chief Complaint   Patient presents with    Establish Care    Depression    Anxiety    Back Pain     GOES TO [facility] MANAGEMENT/PAIN SCORE: 9/10    Fatigue    Joint Pain     THE LAST FEW WEEKS    Foot Pain     TINGLING PAIN/B/L FEET    Skin Problem     POSSIBLE INGROWN HAIR ON VAGINA     Weight Gain     EMOTIONAL EATER    Medication Refill     NITROGLYCERIN          Marilyn FLOYD Long  here today for follow up on chronic medical problems, go over labs and/or diagnostic studies, and medication refills. Establish Care, Depression, Anxiety, Back Pain (GOES TO [facility] MANAGEMENT/PAIN SCORE: 9/10), Fatigue, Joint Pain (THE LAST FEW WEEKS), Foot Pain (TINGLING PAIN/B/L FEET), Skin Problem (POSSIBLE INGROWN HAIR ON VAGINA ), Weight Gain (EMOTIONAL EATER), and Medication Refill (NITROGLYCERIN )      HPI: Patient is scheduled to establish. Type 2 diabetes, controlled, A1c has improved to 8 from 8.3 patient is currently on 20 units of insulin. Patient reports that was increased to 22 units but she could not tolerate and her leg started swelling up. Patient monitors her blood sugars and are usually below 200. Patient is not taking any other medications. Hypertension controlled is currently on amlodipine 10 mg, metoprolol 100 mg. Patient follows with cardiologist Dr. Edward Gilman she is also on Lipitor 30 mg. Stage IV chronic kidney disease, baseline creatinine is more than 2 follows with nephrologist.  Patient is not on any NSAIDs or any nephrotoxic medications. Hypothyroidism stable on Synthroid recent TSH within normal range. Lumbosacral spondylosis follows with pain management is currently on Norco.    Depression uncontrolled, patient is currently on BuSpar and is also on Prozac and Lexapro but patient reports she has not been taking has not received any medications except the BuSpar. Patient reports depression has been under control she was taking Ativan in the past and that was helping.
err
Depression Screen  Discontinued    Diabetes screen  Discontinued

## 2023-10-31 NOTE — PATIENT INSTRUCTIONS
Thank you for choosing [facility] as your healthcare provider as your care is important to us. Please arrive at your appointment on time. If you are unable to make your appointment, please call our office as soon as possible so that we may reschedule your appointment. Missing 3 appointments in a calendar year without notifying the office may lead to dismissal from the practice. We appreciate you calling at least 24 hours in advance, when possible. Thank you. New Updates for [facility]    Thank you for choosing [facility] to give you the best care! [facility] is always trying to think of new ways to help their patients. We are asking all patients to try out the new digital registration that is now available through your [facility] account Via the yanely you're now able to update your personal and registration information prior to your upcoming appointment. This will save you time once you arrive at the office to check-in, not to mention your information remains safe!! Many other perks come from signing up for an account, such as:  Requesting refills  Scheduling an appointment  Completing an E-Visit  Sending a message to the office/provider  Having access to your medication list  Paying your bill/copay prior to your appointment  Scheduling your yearly mammogram  Review your test results    If you are not familiar with [facility] please ask one of us and we will be happy to answer any questions or help you set-up your account.       Your [employer],

## 2023-11-01 RX ORDER — FERROUS SULFATE 325(65) MG
325 TABLET ORAL
Qty: 30 TABLET | Refills: 3 | Status: SHIPPED | OUTPATIENT
Start: 2023-11-01

## 2023-11-01 NOTE — TELEPHONE ENCOUNTER
Please Approve or Refuse.   Send to Pharmacy per Pt's Request:      Next Visit Date:  1/30/2024   Last Visit Date: 10/31/2023    Hemoglobin A1C (%)   Date Value   10/31/2023 8.0   06/30/2023 8.3   03/13/2023 7.4             ( goal A1C is < 7)   BP Readings from Last 3 Encounters:   10/31/23 128/76   10/16/23 (!) 121/57   09/14/23 (!) 105/58          (goal 120/80)  BUN   Date Value Ref Range Status   08/14/2023 26 (H) 8 - 23 mg/dL Final     Creatinine   Date Value Ref Range Status   08/14/2023 2.2 (H) 0.5 - 0.9 mg/dL Final     Potassium   Date Value Ref Range Status   08/14/2023 5.1 3.7 - 5.3 mmol/L Final

## 2023-11-13 ENCOUNTER — HOSPITAL ENCOUNTER (OUTPATIENT)
Dept: PAIN MANAGEMENT | Age: 73
Discharge: HOME | End: 2023-11-13
Payer: MEDICARE

## 2023-11-13 VITALS — HEIGHT: 63 IN | TEMPERATURE: 97.3 F | WEIGHT: 216 LBS | BODY MASS INDEX: 38.27 KG/M2

## 2023-11-13 DIAGNOSIS — Z79.891 ENCOUNTER FOR LONG-TERM OPIATE ANALGESIC USE: Chronic | ICD-10-CM

## 2023-11-13 DIAGNOSIS — M25.511 CHRONIC RIGHT SHOULDER PAIN: ICD-10-CM

## 2023-11-13 DIAGNOSIS — M50.30 DDD (DEGENERATIVE DISC DISEASE), CERVICAL: ICD-10-CM

## 2023-11-13 DIAGNOSIS — G89.29 CHRONIC RIGHT SHOULDER PAIN: ICD-10-CM

## 2023-11-13 DIAGNOSIS — M51.36 DDD (DEGENERATIVE DISC DISEASE), LUMBAR: ICD-10-CM

## 2023-11-13 DIAGNOSIS — M47.817 LUMBOSACRAL SPONDYLOSIS WITHOUT MYELOPATHY: Primary | ICD-10-CM

## 2023-11-13 DIAGNOSIS — M48.061 DEGENERATIVE LUMBAR SPINAL STENOSIS: ICD-10-CM

## 2023-11-13 DIAGNOSIS — Z79.891 CHRONIC USE OF OPIATE FOR THERAPEUTIC PURPOSE: ICD-10-CM

## 2023-11-13 PROCEDURE — 99213 OFFICE O/P EST LOW 20 MIN: CPT | Performed by: NURSE PRACTITIONER

## 2023-11-13 PROCEDURE — 99213 OFFICE O/P EST LOW 20 MIN: CPT

## 2023-11-13 RX ORDER — HYDROCODONE BITARTRATE AND ACETAMINOPHEN 5; 325 MG/1; MG/1
1 TABLET ORAL EVERY 8 HOURS PRN
Qty: 90 TABLET | Refills: 0 | Status: SHIPPED | OUTPATIENT
Start: 2023-11-15 | End: 2023-12-15

## 2023-11-13 ASSESSMENT — ENCOUNTER SYMPTOMS
BOWEL INCONTINENCE: 0
BACK PAIN: 1
COUGH: 0
CONSTIPATION: 0
SHORTNESS OF BREATH: 0

## 2023-11-13 ASSESSMENT — PAIN SCALES - GENERAL: PAINLEVEL_OUTOF10: 8

## 2023-11-13 NOTE — PROGRESS NOTES
Chief Complaint   Patient presents with    Back Pain     Med refill    Neck Pain         PMH     Pt complains of chronic neck pain. MRI with moderate stenosis. She had cervical facet injections with moderate relief but states she cannot afford to repeat at this time. Last injection was 3/2021. She sees a chiropractor with benefit. Had appt with Dr Rossy Khan in Jan 2022 with ACDF C3-4 and C5-6 recommended but patient does not wish to have surgery. She states her cardiologist has advised her to not have another surgery. She continues with chiropractic care with benefit. Back Pain  This is a chronic problem. The current episode started more than 1 year ago. The problem occurs constantly. The problem is unchanged. The pain is present in the lumbar spine. The pain radiates to the right knee, right foot and right thigh. The pain is at a severity of 8/10. The pain is The same all the time. The symptoms are aggravated by bending, sitting and standing. Associated symptoms include headaches and tingling. Pertinent negatives include no bladder incontinence, bowel incontinence, chest pain, fever, numbness or weakness. She has tried ice and heat for the symptoms. Neck Pain   This is a chronic problem. The current episode started more than 1 year ago. The problem occurs constantly. The problem has been unchanged. The pain is present in the left side, midline and right side. The pain is at a severity of 8/10. The symptoms are aggravated by bending, position and twisting. The pain is Same all the time. Associated symptoms include headaches and tingling. Pertinent negatives include no chest pain, fever, numbness or weakness. She has tried heat and ice for the symptoms. Patient denies any new neurological symptoms. No bowel or bladder incontinence, no weakness, and no falling.     Pill count: appropriate / Stevie Madelinehead - 6 - due 11/15    Morphine equivalent: 15    Controlled Substance Monitoring:    Acute and Chronic Pain

## 2023-11-22 ENCOUNTER — TELEPHONE (OUTPATIENT)
Dept: FAMILY MEDICINE CLINIC | Age: 73
End: 2023-11-22

## 2023-11-22 NOTE — TELEPHONE ENCOUNTER
Patient called and stated that her side affects from medications are terrible, she is unable to sleep and is feeling shaky, will like to know if another medication can be called in. QUEtiapine (SEROQUEL) 25 MG tablet [[id number]]     Order Details  Dose: 25 mg Route: Oral Frequency: Nightly   Dispense Quantity: 60 tablet Refills: 3          Sig: Take 1 tablet by mouth at bedtime         Start Date: 10/31/23 End Date: --   Written Date: 10/31/23 Expiration Date: 10/30/24       Associated Diagnoses: Moderate episode of recurrent major depressive disorder [facility]) [F33.1]   Providers    Authorizing Provider: Balwinder Sheriff MD NPI: [id number]   Ordering User: Aida Wise [facility] [id number] - [place], [address] [place] [address] Road   [address] [place] [address]   Phone:  [phone]  Fax:  [phone]         Please Approve or Refuse.   Send to Pharmacy per Pt's Request: eli     Next Visit Date:  1/30/2024   Last Visit Date: 10/31/2023    Hemoglobin A1C (%)   Date Value   10/31/2023 8.0   06/30/2023 8.3   03/13/2023 7.4             ( goal A1C is < 7)   BP Readings from Last 3 Encounters:   10/31/23 128/76   10/16/23 (!) 121/57   09/14/23 (!) 105/58          (goal 120/80)  BUN   Date Value Ref Range Status   08/14/2023 26 (H) 8 - 23 mg/dL Final     Creatinine   Date Value Ref Range Status   08/14/2023 2.2 (H) 0.5 - 0.9 mg/dL Final     Potassium   Date Value Ref Range Status   08/14/2023 5.1 3.7 - 5.3 mmol/L Final

## 2023-11-30 ENCOUNTER — HOSPITAL ENCOUNTER (OUTPATIENT)
Age: 73
Discharge: HOME | End: 2023-11-30
Payer: MEDICARE

## 2023-11-30 DIAGNOSIS — E87.5 HYPERKALEMIA: ICD-10-CM

## 2023-11-30 DIAGNOSIS — I12.9 BENIGN HYPERTENSION WITH CHRONIC KIDNEY DISEASE, STAGE IV (HCC): ICD-10-CM

## 2023-11-30 DIAGNOSIS — N18.4 BENIGN HYPERTENSION WITH CHRONIC KIDNEY DISEASE, STAGE IV (HCC): ICD-10-CM

## 2023-11-30 DIAGNOSIS — R80.9 PROTEINURIA, UNSPECIFIED TYPE: ICD-10-CM

## 2023-11-30 DIAGNOSIS — N18.4 CKD (CHRONIC KIDNEY DISEASE), STAGE IV (HCC): ICD-10-CM

## 2023-11-30 DIAGNOSIS — R82.998 LEUKOCYTES IN URINE: ICD-10-CM

## 2023-11-30 LAB
ANION GAP SERPL CALCULATED.3IONS-SCNC: 12 MMOL/L (ref 9–17)
BACTERIA URNS QL MICRO: ABNORMAL
BASOPHILS # BLD: 0.1 K/UL (ref 0–0.2)
BASOPHILS NFR BLD: 1 % (ref 0–2)
BILIRUB UR QL STRIP: NEGATIVE
BUN SERPL-MCNC: 36 MG/DL (ref 8–23)
CALCIUM SERPL-MCNC: 9.3 MG/DL (ref 8.6–10.4)
CASTS #/AREA URNS LPF: ABNORMAL /LPF
CHLORIDE SERPL-SCNC: 102 MMOL/L (ref 98–107)
CLARITY UR: ABNORMAL
CO2 SERPL-SCNC: 25 MMOL/L (ref 20–31)
COLOR UR: YELLOW
CREAT SERPL-MCNC: 2.6 MG/DL (ref 0.5–0.9)
EOSINOPHIL # BLD: 0.2 K/UL (ref 0–0.4)
EOSINOPHILS RELATIVE PERCENT: 2 % (ref 0–4)
EPI CELLS #/AREA URNS HPF: ABNORMAL /HPF
ERYTHROCYTE [DISTWIDTH] IN BLOOD BY AUTOMATED COUNT: 13.2 % (ref 11.5–14.9)
GFR SERPL CREATININE-BSD FRML MDRD: 19 ML/MIN/1.73M2
GLUCOSE SERPL-MCNC: 251 MG/DL (ref 70–99)
GLUCOSE UR STRIP-MCNC: NEGATIVE MG/DL
HCT VFR BLD AUTO: 37.8 % (ref 36–46)
HGB BLD-MCNC: 12.2 G/DL (ref 12–16)
HGB UR QL STRIP.AUTO: NEGATIVE
KETONES UR STRIP-MCNC: NEGATIVE MG/DL
LEUKOCYTE ESTERASE UR QL STRIP: ABNORMAL
LYMPHOCYTES NFR BLD: 2.4 K/UL (ref 1–4.8)
LYMPHOCYTES RELATIVE PERCENT: 24 % (ref 24–44)
MAGNESIUM SERPL-MCNC: 1.9 MG/DL (ref 1.6–2.6)
MCH RBC QN AUTO: 28.8 PG (ref 26–34)
MCHC RBC AUTO-ENTMCNC: 32.2 G/DL (ref 31–37)
MCV RBC AUTO: 89.3 FL (ref 80–100)
MONOCYTES NFR BLD: 0.8 K/UL (ref 0.1–1.3)
MONOCYTES NFR BLD: 8 % (ref 1–7)
NEUTROPHILS NFR BLD: 65 % (ref 36–66)
NEUTS SEG NFR BLD: 6.4 K/UL (ref 1.3–9.1)
NITRITE UR QL STRIP: NEGATIVE
PH UR STRIP: 5.5 [PH] (ref 5–8)
PHOSPHATE SERPL-MCNC: 4 MG/DL (ref 2.6–4.5)
PLATELET # BLD AUTO: 308 K/UL (ref 150–450)
PMV BLD AUTO: 8.8 FL (ref 6–12)
POTASSIUM SERPL-SCNC: 5.2 MMOL/L (ref 3.7–5.3)
PROT UR STRIP-MCNC: ABNORMAL MG/DL
RBC # BLD AUTO: 4.23 M/UL (ref 4–5.2)
RBC #/AREA URNS HPF: ABNORMAL /HPF
SODIUM SERPL-SCNC: 139 MMOL/L (ref 135–144)
SP GR UR STRIP: 1.02 (ref 1–1.03)
UROBILINOGEN UR STRIP-ACNC: NORMAL EU/DL (ref 0–1)
WBC #/AREA URNS HPF: ABNORMAL /HPF
WBC OTHER # BLD: 9.8 K/UL (ref 3.5–11)

## 2023-11-30 PROCEDURE — 85025 COMPLETE CBC W/AUTO DIFF WBC: CPT

## 2023-11-30 PROCEDURE — 83735 ASSAY OF MAGNESIUM: CPT

## 2023-11-30 PROCEDURE — 36415 COLL VENOUS BLD VENIPUNCTURE: CPT

## 2023-11-30 PROCEDURE — 81001 URINALYSIS AUTO W/SCOPE: CPT

## 2023-11-30 PROCEDURE — 87086 URINE CULTURE/COLONY COUNT: CPT

## 2023-11-30 PROCEDURE — 80048 BASIC METABOLIC PNL TOTAL CA: CPT

## 2023-11-30 PROCEDURE — 84100 ASSAY OF PHOSPHORUS: CPT

## 2023-12-01 LAB
MICROORGANISM SPEC CULT: NORMAL
SPECIMEN DESCRIPTION: NORMAL

## 2023-12-11 ENCOUNTER — HOSPITAL ENCOUNTER (OUTPATIENT)
Dept: PAIN MANAGEMENT | Age: 73
Discharge: HOME | End: 2023-12-11
Payer: MEDICARE

## 2023-12-11 VITALS — WEIGHT: 216 LBS | BODY MASS INDEX: 36.88 KG/M2 | HEIGHT: 64 IN

## 2023-12-11 DIAGNOSIS — M51.36 DDD (DEGENERATIVE DISC DISEASE), LUMBAR: ICD-10-CM

## 2023-12-11 DIAGNOSIS — Z79.891 ENCOUNTER FOR LONG-TERM OPIATE ANALGESIC USE: Chronic | ICD-10-CM

## 2023-12-11 DIAGNOSIS — M46.1 SACROILIITIS (HCC): ICD-10-CM

## 2023-12-11 DIAGNOSIS — M47.817 LUMBOSACRAL SPONDYLOSIS WITHOUT MYELOPATHY: ICD-10-CM

## 2023-12-11 DIAGNOSIS — M54.16 LUMBAR RADICULOPATHY: ICD-10-CM

## 2023-12-11 DIAGNOSIS — F33.1 MODERATE EPISODE OF RECURRENT MAJOR DEPRESSIVE DISORDER (HCC): ICD-10-CM

## 2023-12-11 DIAGNOSIS — M48.02 CERVICAL STENOSIS OF SPINE: Primary | ICD-10-CM

## 2023-12-11 DIAGNOSIS — M48.061 DEGENERATIVE LUMBAR SPINAL STENOSIS: ICD-10-CM

## 2023-12-11 PROCEDURE — 99213 OFFICE O/P EST LOW 20 MIN: CPT

## 2023-12-11 PROCEDURE — 99213 OFFICE O/P EST LOW 20 MIN: CPT | Performed by: NURSE PRACTITIONER

## 2023-12-11 RX ORDER — HYDROCODONE BITARTRATE AND ACETAMINOPHEN 5; 325 MG/1; MG/1
1 TABLET ORAL EVERY 8 HOURS PRN
Qty: 90 TABLET | Refills: 0 | Status: SHIPPED | OUTPATIENT
Start: 2023-12-15 | End: 2024-01-14

## 2023-12-11 RX ORDER — QUETIAPINE FUMARATE 25 MG/1
25 TABLET, FILM COATED ORAL NIGHTLY
Qty: 60 TABLET | Refills: 3 | Status: SHIPPED | OUTPATIENT
Start: 2023-12-11

## 2023-12-11 ASSESSMENT — ENCOUNTER SYMPTOMS
COUGH: 0
SHORTNESS OF BREATH: 0
BACK PAIN: 1
BOWEL INCONTINENCE: 0
CONSTIPATION: 0

## 2023-12-11 ASSESSMENT — PAIN SCALES - GENERAL: PAINLEVEL_OUTOF10: 9

## 2023-12-11 NOTE — PROGRESS NOTES
Disp: 1 kit, Rfl: 0    vitamin D (CHOLECALCIFEROL) 25 MCG (1000 UT) TABS tablet, Take 1 tablet by mouth daily, Disp: , Rfl:     DROPLET PEN NEEDLES 31G X 8 MM MISC, , Disp: , Rfl:     trospium (SANCTURA) 20 MG tablet, , Disp: , Rfl:     ONE TOUCH ULTRASOFT LANCETS MISC, , Disp: , Rfl:     ONETOUCH ULTRA strip, , Disp: , Rfl:     levothyroxine (SYNTHROID) 125 MCG tablet, , Disp: , Rfl:     pantoprazole (PROTONIX) 20 MG tablet, Take 1 tablet by mouth nightly, Disp: , Rfl:     Bioflavonoid Products (BIOFLEX PO), Take by mouth, Disp: , Rfl:     Ascorbic Acid (VITAMIN C PLUS WILD DON HIPS) 500 MG CHEW, Take 1 tablet by mouth daily , Disp: , Rfl:     pramipexole (MIRAPEX) 0.125 MG tablet, Take 1 tablet by mouth at bedtime, Disp: , Rfl:     amLODIPine (NORVASC) 10 MG tablet, Take 1 tablet by mouth daily (Patient taking differently: Take 1 tablet by mouth nightly), Disp: 30 tablet, Rfl: 3    busPIRone (BUSPAR) 15 MG tablet, Take 15 mg by mouth 2 times daily, Disp: , Rfl:     isosorbide mononitrate (IMDUR) 30 MG extended release tablet, Take 1 tablet by mouth daily (Patient taking differently: Take 1 tablet by mouth nightly), Disp: 30 tablet, Rfl: 3    nystatin (MYCOSTATIN) 951691 UNIT/GM cream, Apply topically 2 times daily as needed (fungal infection in perineum) , Disp: , Rfl:     atorvastatin (LIPITOR) 40 MG tablet, Take 1 tablet by mouth daily, Disp: , Rfl:     nitroGLYCERIN (NITROSTAT) 0.4 MG SL tablet, Place 1 tablet under the tongue every 5 minutes as needed for Chest pain up to max of 3 total doses.  If no relief after 1 dose, call 911., Disp: , Rfl:     Multiple Vitamins-Minerals (THERAPEUTIC MULTIVITAMIN-MINERALS) tablet, Take 1 tablet by mouth daily, Disp: , Rfl:     aspirin 81 MG EC tablet, Take 1 tablet by mouth daily LAST DOSE 9/8/14, Disp: , Rfl:     metoprolol (TOPROL-XL) 100 MG XL tablet, Take 1 tablet by mouth at bedtime, Disp: , Rfl:     vitamin B-12 (CYANOCOBALAMIN) 500 MCG tablet, Take 1 tablet by mouth
As always, we encourage daily stretching and strengthening exercises, and recommend minimizing use of pain medications unless patient cannot get through daily activities due to pain. Due to the high risk nature of this patient's pain medication close monitoring is required. Continue current medication management, pt has been stable and compliant. Script written for  Follow up appointment made for 4 weeks    I have reviewed the chief complaint and history of present illness (including ROS and PFSH) and vital documentation by my staff and I agree with their documentation and have added where applicable.

## 2023-12-11 NOTE — TELEPHONE ENCOUNTER
PT IS ALSO REQUESTING IF A MEDICATION FOR URINE LEAKAGE. PLEASE ADVISE. Please Approve or Refuse.   Send to Pharmacy per Pt's Request: Sandra Dipika      Next Visit Date:  1/30/2024   Last Visit Date: 10/31/2023    Hemoglobin A1C (%)   Date Value   10/31/2023 8.0   06/30/2023 8.3   03/13/2023 7.4             ( goal A1C is < 7)   BP Readings from Last 3 Encounters:   12/06/23 132/70   10/31/23 128/76   10/16/23 (!) 121/57          (goal 120/80)  BUN   Date Value Ref Range Status   11/30/2023 36 (H) 8 - 23 mg/dL Final     Creatinine   Date Value Ref Range Status   11/30/2023 2.6 (H) 0.5 - 0.9 mg/dL Final     Potassium   Date Value Ref Range Status   11/30/2023 5.2 3.7 - 5.3 mmol/L Final

## 2024-01-05 ENCOUNTER — OFFICE VISIT (OUTPATIENT)
Dept: FAMILY MEDICINE CLINIC | Age: 74
End: 2024-01-05
Payer: MEDICARE

## 2024-01-05 VITALS
BODY MASS INDEX: 38.38 KG/M2 | TEMPERATURE: 97.7 F | HEIGHT: 63 IN | WEIGHT: 216.6 LBS | OXYGEN SATURATION: 95 % | DIASTOLIC BLOOD PRESSURE: 74 MMHG | HEART RATE: 65 BPM | SYSTOLIC BLOOD PRESSURE: 136 MMHG

## 2024-01-05 DIAGNOSIS — M21.941 ACQUIRED DEFORMITY OF RIGHT HAND: ICD-10-CM

## 2024-01-05 DIAGNOSIS — F33.1 MODERATE EPISODE OF RECURRENT MAJOR DEPRESSIVE DISORDER (HCC): ICD-10-CM

## 2024-01-05 DIAGNOSIS — N18.4 STAGE 4 CHRONIC KIDNEY DISEASE (HCC): ICD-10-CM

## 2024-01-05 DIAGNOSIS — N39.41 URGE INCONTINENCE OF URINE: Primary | ICD-10-CM
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
## 2024-01-05 DIAGNOSIS — R19.5 POSITIVE COLORECTAL CANCER SCREENING USING COLOGUARD TEST: ICD-10-CM

## 2024-01-05 DIAGNOSIS — Z23 NEED FOR ZOSTER VACCINATION: ICD-10-CM

## 2024-01-05 DIAGNOSIS — M47.817 LUMBOSACRAL SPONDYLOSIS WITHOUT MYELOPATHY: ICD-10-CM

## 2024-01-05 PROBLEM — M54.2 NECK PAIN: Status: RESOLVED | Noted: 2021-01-12 | Resolved: 2024-01-05

## 2024-01-05 PROBLEM — L81.4 LENTIGINOSIS: Status: RESOLVED | Noted: 2019-12-28 | Resolved: 2024-01-05

## 2024-01-05 PROBLEM — R00.2 PALPITATIONS: Status: RESOLVED | Noted: 2017-01-18 | Resolved: 2024-01-05

## 2024-01-05 PROBLEM — L81.4 LENTIGINOSIS: Status: ACTIVE | Noted: 2019-12-28

## 2024-01-05 PROBLEM — D64.9 ANEMIA: Status: RESOLVED | Noted: 2021-07-30 | Resolved: 2024-01-05

## 2024-01-05 PROBLEM — R20.8 BURNING SENSATION: Status: ACTIVE | Noted: 2021-02-20

## 2024-01-05 PROBLEM — R42 DIZZINESS: Status: ACTIVE | Noted: 2019-10-10

## 2024-01-05 PROBLEM — J30.0 VASOMOTOR RHINITIS: Status: ACTIVE | Noted: 2019-12-28

## 2024-01-05 PROBLEM — E66.01 SEVERE OBESITY (HCC): Status: ACTIVE | Noted: 2018-09-17

## 2024-01-05 PROBLEM — R00.2 PALPITATIONS: Status: ACTIVE | Noted: 2017-01-18

## 2024-01-05 PROBLEM — Z79.891 ENCOUNTER FOR LONG-TERM OPIATE ANALGESIC USE: Chronic | Status: RESOLVED | Noted: 2022-12-19 | Resolved: 2024-01-05

## 2024-01-05 PROBLEM — L02.92 BOIL: Status: RESOLVED | Noted: 2023-10-31 | Resolved: 2024-01-05

## 2024-01-05 PROBLEM — R42 DIZZINESS: Status: RESOLVED | Noted: 2019-10-10 | Resolved: 2024-01-05

## 2024-01-05 PROBLEM — I65.23 BILATERAL CAROTID ARTERY STENOSIS: Status: ACTIVE | Noted: 2020-05-04

## 2024-01-05 PROBLEM — I73.9 CLAUDICATION (HCC): Status: RESOLVED | Noted: 2022-05-30 | Resolved: 2024-01-05

## 2024-01-05 PROBLEM — R20.8 BURNING SENSATION: Status: RESOLVED | Noted: 2021-02-20 | Resolved: 2024-01-05
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
## 2024-01-05 PROBLEM — M25.552 LEFT HIP PAIN: Status: RESOLVED | Noted: 2017-06-19 | Resolved: 2024-01-05
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
## 2024-01-05 PROCEDURE — 1123F ACP DISCUSS/DSCN MKR DOCD: CPT | Performed by: FAMILY MEDICINE

## 2024-01-05 PROCEDURE — 3078F DIAST BP <80 MM HG: CPT | Performed by: FAMILY MEDICINE

## 2024-01-05 PROCEDURE — 99214 OFFICE O/P EST MOD 30 MIN: CPT | Performed by: FAMILY MEDICINE

## 2024-01-05 PROCEDURE — 3075F SYST BP GE 130 - 139MM HG: CPT | Performed by: FAMILY MEDICINE

## 2024-01-05 RX ORDER — ACETAMINOPHEN 500 MG
500 TABLET ORAL 2 TIMES DAILY PRN
Qty: 60 TABLET | Refills: 0 | Status: SHIPPED | OUTPATIENT
Start: 2024-01-05

## 2024-01-05 RX ORDER — OXYBUTYNIN CHLORIDE 10 MG/1
10 TABLET, EXTENDED RELEASE ORAL DAILY
Qty: 30 TABLET | Refills: 3 | Status: SHIPPED | OUTPATIENT
Start: 2024-01-05

## 2024-01-05 ASSESSMENT — ENCOUNTER SYMPTOMS
COUGH: 0
RHINORRHEA: 0
NAUSEA: 0
VOMITING: 0
SHORTNESS OF BREATH: 0
CHEST TIGHTNESS: 0
EYE REDNESS: 0
CONSTIPATION: 0
SINUS PRESSURE: 0
ABDOMINAL PAIN: 0
BACK PAIN: 0
DIARRHEA: 0
TROUBLE SWALLOWING: 0
STRIDOR: 0
RECTAL PAIN: 0
WHEEZING: 0
SORE THROAT: 0
ABDOMINAL DISTENTION: 0
BLOOD IN STOOL: 0
COLOR CHANGE: 0

## 2024-01-05 NOTE — PROGRESS NOTES
Visit Information    Have you changed or started any medications since your last visit including any over-the-counter medicines, vitamins, or herbal medicines? yes -    Have you stopped taking any of your medications? Is so, why? -  yes -   Are you having any side effects from any of your medications? - no    Have you seen any other physician or provider since your last visit?  no   Have you had any other diagnostic tests since your last visit?  no   Have you been seen in the emergency room and/or had an admission in a hospital since we last saw you?  no   Have you had your routine dental cleaning in the past 6 months?  no     Do you have an active MyChart account? If no, what is the barrier?  Yes    Patient Care Team:  Blanche Pepper MD as PCP - General (Family Medicine)  Blanche Pepper MD as PCP - Empaneled Provider  Nicholas Ly MD as Consulting Physician (Gastroenterology)    Medical History Review  Past Medical, Family, and Social History reviewed and does contribute to the patient presenting condition    Health Maintenance   Topic Date Due    Diabetic retinal exam  Never done    Respiratory Syncytial Virus (RSV) Pregnant or age 60 yrs+ (1 - 1-dose 60+ series) Never done    Shingles vaccine (2 of 2) 12/04/2023    Annual Wellness Visit (Medicare Advantage)  01/01/2024    Lipids  03/13/2024    Diabetic foot exam  06/30/2024    Breast cancer screen  09/20/2024    A1C test (Diabetic or Prediabetic)  10/31/2024    Diabetic Alb to Cr ratio (uACR) test  10/31/2024    Depression Monitoring  10/31/2024    GFR test (Diabetes, CKD 3-4, OR last GFR 15-59)  11/30/2024    Colorectal Cancer Screen  06/09/2033    DTaP/Tdap/Td vaccine (3 - Td or Tdap) 10/24/2033    DEXA (modify frequency per FRAX score)  Completed    Flu vaccine  Completed    Pneumococcal 65+ years Vaccine  Completed    COVID-19 Vaccine  Completed    Hepatitis C screen  Completed    Hepatitis A vaccine  Aged Out    Hepatitis B vaccine  Aged Out    
release tablet Take 1 tablet by mouth daily (Patient taking differently: Take 1 tablet by mouth nightly) 30 tablet 3    nystatin (MYCOSTATIN) 860724 UNIT/GM cream Apply topically 2 times daily as needed (fungal infection in perineum)       atorvastatin (LIPITOR) 40 MG tablet Take 1 tablet by mouth daily      nitroGLYCERIN (NITROSTAT) 0.4 MG SL tablet Place 1 tablet under the tongue every 5 minutes as needed for Chest pain up to max of 3 total doses. If no relief after 1 dose, call 911.      Multiple Vitamins-Minerals (THERAPEUTIC MULTIVITAMIN-MINERALS) tablet Take 1 tablet by mouth daily      aspirin 81 MG EC tablet Take 1 tablet by mouth daily LAST DOSE 9/8/14      metoprolol (TOPROL-XL) 100 MG XL tablet Take 1 tablet by mouth at bedtime      vitamin B-12 (CYANOCOBALAMIN) 500 MCG tablet Take 1 tablet by mouth daily      escitalopram (LEXAPRO) 10 MG tablet Take 1 tablet by mouth daily 90 tablet 1    Alcohol Swabs PADS 100 each by Does not apply route 2 times daily as needed (as needed) 100 each 5    blood glucose monitor kit and supplies Please dispense blood glucose monitor, use to test blood sugars twice daily 1 kit 0    blood glucose monitor kit and supplies Dispense sufficient amount for indicated testing frequency plus additional to accommodate PRN testing needs. Dispense all needed supplies to include: monitor, strips, lancing device, lancets, control solutions, alcohol swabs. 1 kit 0     No current facility-administered medications for this visit.             Social History     Socioeconomic History    Marital status:      Spouse name: Not on file    Number of children: 1    Years of education: Not on file    Highest education level: Not on file   Occupational History    Not on file   Tobacco Use    Smoking status: Former     Current packs/day: 0.00     Average packs/day: 1 pack/day for 38.2 years (38.2 ttl pk-yrs)     Types: Cigarettes     Start date: 1966     Quit date: 3/19/2004     Years since

## 2024-01-08 DIAGNOSIS — G47.00 INSOMNIA, UNSPECIFIED TYPE: ICD-10-CM

## 2024-01-08 RX ORDER — TRAZODONE HYDROCHLORIDE 100 MG/1
100 TABLET ORAL NIGHTLY
Qty: 90 TABLET | Refills: 0 | Status: SHIPPED | OUTPATIENT
Start: 2024-01-08

## 2024-01-10 ENCOUNTER — HOSPITAL ENCOUNTER (OUTPATIENT)
Dept: PAIN MANAGEMENT | Age: 74
Discharge: HOME | End: 2024-01-10
Payer: MEDICARE

## 2024-01-10 VITALS — BODY MASS INDEX: 38.27 KG/M2 | HEIGHT: 63 IN | WEIGHT: 216 LBS

## 2024-01-10 DIAGNOSIS — M51.36 DDD (DEGENERATIVE DISC DISEASE), LUMBAR: ICD-10-CM

## 2024-01-10 DIAGNOSIS — M47.817 LUMBOSACRAL SPONDYLOSIS WITHOUT MYELOPATHY: ICD-10-CM

## 2024-01-10 DIAGNOSIS — M48.02 CERVICAL STENOSIS OF SPINE: ICD-10-CM

## 2024-01-10 DIAGNOSIS — Z79.891 CHRONIC USE OF OPIATE FOR THERAPEUTIC PURPOSE: Primary | ICD-10-CM

## 2024-01-10 DIAGNOSIS — M50.30 DDD (DEGENERATIVE DISC DISEASE), CERVICAL: ICD-10-CM

## 2024-01-10 DIAGNOSIS — M16.12 PRIMARY OSTEOARTHRITIS OF LEFT HIP: ICD-10-CM

## 2024-01-10 DIAGNOSIS — M48.061 DEGENERATIVE LUMBAR SPINAL STENOSIS: ICD-10-CM

## 2024-01-10 DIAGNOSIS — M46.1 SACROILIITIS (HCC): ICD-10-CM

## 2024-01-10 PROCEDURE — G0481 DRUG TEST DEF 8-14 CLASSES: HCPCS

## 2024-01-10 PROCEDURE — 99213 OFFICE O/P EST LOW 20 MIN: CPT

## 2024-01-10 PROCEDURE — 99214 OFFICE O/P EST MOD 30 MIN: CPT | Performed by: NURSE PRACTITIONER

## 2024-01-10 PROCEDURE — 80307 DRUG TEST PRSMV CHEM ANLYZR: CPT

## 2024-01-10 RX ORDER — HYDROCODONE BITARTRATE AND ACETAMINOPHEN 5; 325 MG/1; MG/1
1 TABLET ORAL EVERY 8 HOURS PRN
Qty: 90 TABLET | Refills: 0 | Status: SHIPPED | OUTPATIENT
Start: 2024-01-14 | End: 2024-02-13

## 2024-01-10 ASSESSMENT — ENCOUNTER SYMPTOMS
BACK PAIN: 1
SHORTNESS OF BREATH: 0
COUGH: 0
CONSTIPATION: 0
BOWEL INCONTINENCE: 0

## 2024-01-10 NOTE — PROGRESS NOTES
Chief Complaint   Patient presents with    Back Pain    Neck Pain    Medication Refill     [place] due 1/14         PMH Pt complains of chronic neck pain. MRI with moderate stenosis. She had cervical facet injections with moderate relief but states she cannot afford to repeat at this time. Last injection was 3/2021. Had appt with Dr Mack in Jan 2022 with ACDF C3-4 and C5-6 recommended but patient does not wish to have surgery. She states her cardiologist has advised her to not have another surgery. She continues with chiropractic care with benefit. She also complains of bilateral hip pain. She had right greater trochanteric bursa injection on 10/18/2022 with nearly 100% improvement in pain and function. She would like to repeat this - has appt with Dr. Lara scheduled in March.      Back Pain  This is a chronic problem. The current episode started more than 1 year ago. The problem occurs constantly. The problem is unchanged. The pain is present in the lumbar spine. The quality of the pain is described as aching and shooting. The pain radiates to the left foot and right foot. The pain is at a severity of 10/10. The pain is severe. The pain is Worse during the night. The symptoms are aggravated by bending, standing, position and twisting. Stiffness is present In the morning. Associated symptoms include headaches. Pertinent negatives include no bladder incontinence, bowel incontinence, chest pain, fever, numbness, tingling or weakness. She has tried heat, ice, home exercises, NSAIDs and chiropractic manipulation for the symptoms. The treatment provided mild relief.   Neck Pain   This is a chronic problem. The current episode started more than 1 year ago. The problem occurs constantly. The problem has been unchanged. The pain is associated with nothing. The pain is present in the left side. The quality of the pain is described as aching. The pain is at a severity of 9/10. The pain is severe. The symptoms are

## 2024-01-11 NOTE — PROGRESS NOTES
Patient is here today to review medication contract. Chief Complaint: neck pain    [facility]   Pt complains of neck pain. MRI with moderate stenosis. She had cervical facet injections with moderate relief but states she cannot afford to repeat at this time. Last injection was 3/2021. She sees a chiropractor with benefit. She is opioid dependant for pain control and takes norco 5 mg TID. She is a surgical candidate for cervical spine surgery but her cardiologist will not clear her to have this done. She cannot take gabapentin due to kidney issues. Neck Pain   This is a chronic problem. The current episode started more than 1 year ago. The problem occurs constantly. The problem has been unchanged. The pain is present in the left side, midline and right side. The quality of the pain is described as aching. The pain is at a severity of 8/10. The pain is moderate. The symptoms are aggravated by position and twisting. Associated symptoms include headaches. Pertinent negatives include no chest pain or fever. She has tried oral narcotics, chiropractic manipulation, bed rest and heat for the symptoms. The treatment provided mild relief. Patient denies any new neurological symptoms. No bowel or bladder incontinence, no weakness, and no falling. Pill count: appropriate  Last refill was 9/7 - she has been out of medication for four days     Morphine equivalent: 15    Periodic Controlled Substance Monitoring: Possible medication side effects, risk of tolerance/dependence & alternative treatments discussed., No signs of potential drug abuse or diversion identified., Obtaining appropriate analgesic effect of treatment.  Oscar Pratt, APRN - CNP)      Past Medical History:   Diagnosis Date    Aortic valve stenosis     mild per chart    Arthritis     CAD (coronary artery disease) 2000    1 STENT, Dr. Darrick Montaño, [place]    Diabetes mellitus [facility])     Dr. Stefanie Marie Hyperlipidemia 2004    ON RX    MI, old     states many and they were mild    Pulmonary stenosis 1995    Pulmonary valve stenosis     mild/congenital per chart    Sciatica     right leg    Thyroid disease 2004    HYPOTHYROIDISM ON RX    Wears glasses        Past Surgical History:   Procedure Laterality Date    BLADDER SURGERY Left 9/24/2021    CYSTOSCOPY RETROGRADE PYELOGRAM,  URETERAL STENT REMOVAL performed by Naa Mccarthy MD at [facility]  01/01/1993    CORONARY ANGIOPLASTY WITH STENT PLACEMENT  01/01/2000    1 STENT    CYSTOSCOPY Left 06/21/2021    CYSTOSCOPY URETERAL STENT INSERTION performed by Naa Mccarthy MD at [address]  09/24/2021    CYSTOSCOPY RETROGRADE PYELOGRAM,  URETERAL STENT REMOVAL left    FINGER SURGERY Left 03/21/2014    [place] ARTHOPLASTY LEFT THUMB    HYSTERECTOMY  01/01/1980    WITH RT SALPINGOOPHERECTOMY    JOINT REPLACEMENT Bilateral 01/01/1994    PARTIAL-KNEES    OTHER SURGICAL HISTORY Right 09/23/2014    thumb repair    SALPINGO-OOPHORECTOMY Left 01/01/1987    SHOULDER SURGERY Left 01/01/1993    SPURS    TOE OSTEOTOMY Right 06/08/2016    Right 5th digit adductory wedge osteotomy with K wire fixation        Allergies   Allergen Reactions    Pcn [Penicillins] Swelling and Hives    Heparin Other (See Comments)     MIGRAINE      Lamotrigine Other (See Comments), Itching, Nausea Only and Rash    Cyclobenzaprine      Dry mouth, jitters         Current Outpatient Medications:     LEVOTHYROXINE SODIUM PO, Take 115 mcg by mouth daily, Disp: , Rfl:     pramipexole (MIRAPEX) 0.125 MG tablet, Take 0.125 mg by mouth daily, Disp: , Rfl:     olopatadine (PATANOL) 0.1 % ophthalmic solution, Place 1 drop into both eyes as needed , Disp: , Rfl:     FLUoxetine (PROZAC) 10 MG capsule, Take 1 capsule by mouth daily, Disp: 30 capsule, Rfl: 3    amLODIPine (NORVASC) 10 MG tablet, Take 1 tablet by mouth daily, Disp: 30 tablet, Rfl: 3    ferrous sulfate (IRON 325) 325 (65 Fe) MG tablet, Take 1 tablet by mouth daily (with breakfast), Disp: 30 tablet, Rfl: 3    buPROPion (WELLBUTRIN XL) 300 MG extended release tablet, TAKE ONE TABLET BY MOUTH DAILY, Disp: , Rfl:     nystatin (NYAMYC) 880734 UNIT/GM powder, APPLY TO AFFECTED AREA(S) FOUR TIMES A DAY, Disp: , Rfl:     busPIRone (BUSPAR) 15 MG tablet, Take 15 mg by mouth 2 times daily, Disp: , Rfl:     isosorbide mononitrate (IMDUR) 30 MG extended release tablet, Take 1 tablet by mouth daily, Disp: 30 tablet, Rfl: 3    nystatin (MYCOSTATIN) 551135 UNIT/GM cream, Apply topically 2 times daily Apply topically 2 times daily. , Disp: , Rfl:     atorvastatin (LIPITOR) 40 MG tablet, , Disp: , Rfl:     nitroGLYCERIN (NITROSTAT) 0.4 MG SL tablet, Place 0.4 mg under the tongue every 5 minutes as needed for Chest pain up to max of 3 total doses. If no relief after 1 dose, call 911. , Disp: , Rfl:     Multiple Vitamins-Minerals (THERAPEUTIC MULTIVITAMIN-MINERALS) tablet, Take 1 tablet by mouth daily. , Disp: , Rfl:     aspirin 81 MG EC tablet, Take 81 mg by mouth daily. LAST DOSE 9/8/14, Disp: , Rfl:     metoprolol (TOPROL-XL) 100 MG XL tablet, Take 100 mg by mouth daily. , Disp: , Rfl:     vitamin B-12 (CYANOCOBALAMIN) 500 MCG tablet, Take 500 mcg by mouth daily , Disp: , Rfl:     Family History   Problem Relation Age of Onset    Breast Cancer Mother 62        BREAST WITH METS T  LIVER AND LUNG    Other Father         AAA    Heart Disease Father     Asthma Sister     Diabetes Sister         NIDDM    Stroke Brother     Diabetes Brother         NIDDM    Stroke Maternal Grandmother     Asthma Son        Social History     Socioeconomic History    Marital status:       Spouse name: Not on file    Number of children: 1    Years of education: Not on file    Highest education level: Not on file   Occupational History    Not on file   Tobacco Use    Smoking status: Former Smoker     Packs/day: 1.00 Years: 30.00     Pack years: 30.00     Types: Cigarettes     Quit date: 3/19/2004     Years since quittin.5    Smokeless tobacco: Never Used   Vaping Use    Vaping Use: Never used   Substance and Sexual Activity    Alcohol use: Yes     Comment: socially, once a year    Drug use: No    Sexual activity: Not Currently   Other Topics Concern    Not on file   Social History Narrative    Not on file     Social Determinants of Health     Financial Resource Strain:     Difficulty of Paying Living Expenses:    Food Insecurity:     Worried About Running Out of Food in the Last Year:     [address] in the Last Year:    Transportation Needs:     Lack of Transportation (Medical):  Lack of Transportation (Non-Medical):    Physical Activity:     Days of Exercise per Week:     Minutes of Exercise per Session:    Stress:     Feeling of Stress :    Social Connections:     Frequency of Communication with Friends and Family:     Frequency of Social Gatherings with Friends and Family:     Attends [employer] Services:     Active Member of Clubs or Organizations:     Attends Club or Organization Meetings:     Marital Status:    Intimate Partner Violence:     Fear of Current or Ex-Partner:     Emotionally Abused:     Physically Abused:     Sexually Abused:        Review of Systems:  Review of Systems   Constitutional: Negative for chills and fever. Cardiovascular: Negative for chest pain and palpitations. Respiratory: Negative for cough and shortness of breath. Musculoskeletal: Positive for back pain and neck pain. Gastrointestinal: Negative for constipation. Neurological: Positive for headaches. Negative for disturbances in coordination and loss of balance. Physical Exam:  BP (!) 150/63   Pulse 66   Resp 18   LMP 1980   SpO2 98%     Physical Exam  HENT:      Head: Normocephalic.    Pulmonary:      Effort: Pulmonary effort is normal.   Musculoskeletal:         General: Normal range of motion. Cervical back: Normal range of motion. Tenderness present. Back:    Skin:     General: Skin is warm and dry. Neurological:      Mental Status: She is alert and oriented to person, place, and time. Record/Diagnostics Review:    Last miles 3/2021 and was negative for opiates    ABERRANT BEHAVIORS SINCE LAST VISIT  Lost rx/pills:------------------------------------------ no  Taking  medication as prescribed: ----------- yes  Urine Drug Screen ---------------------------------[facility]  Recent ER visits: -------------------------------------no  Pill count is appropriate: ---------------------------yes   Refills for prescriptions appropriate:---------- yes      Assessment:  Problem List Items Addressed This Visit     Degenerative lumbar spinal stenosis    Relevant Medications    HYDROcodone-acetaminophen (NORCO) 5-325 MG per tablet    DDD (degenerative disc disease), lumbar    Relevant Medications    HYDROcodone-acetaminophen ([address]) 5-325 MG per tablet    Lumbosacral spondylosis without myelopathy - Primary    Relevant Medications    HYDROcodone-acetaminophen (NORCO) 5-325 MG per tablet    Other Relevant Orders    DRUG SCREEN, PAIN    Encounter for long-term opiate analgesic use    Relevant Orders    DRUG SCREEN, PAIN             Treatment Plan:  Patient relates current medications are helping the pain. Patient reports taking pain medications as prescribed, denies obtaining medications from different sources and denies use of illegal drugs. Patient denies side effects from medications like nausea, vomiting, constipation or drowsiness. Patient reports current activities of daily living are possible due to medications and would like to continue them. As always, we encourage daily stretching and strengthening exercises, and recommend minimizing use of pain medications unless patient cannot get through daily activities due to pain. Contract requirements met. Continue opioid therapy. Spray Paint Technique: No Detail Level: Detailed Duration Of Freeze Thaw-Cycle (Seconds): 5-10 Post-Care Instructions: Aftercare Cryosurgery\\nYou have just had cryotherapy for the treatment of benign (non-cancerous) skin lesions. You can expect the pain to rapidly decrease over the next 45 minutes. The area treated will initially turn red and over the next 72 hours turn brown to black. A scab will form that will peel off by itself within 5 to 7 days. A blister or reddish-purple blood blister may form where the area has been treated. Show Spray Paint Technique Variable?: Yes Spray Paint Text: The liquid nitrogen was applied to the skin utilizing a spray paint frosting technique. Consent: The patient's consent was obtained including but not limited to risks of crusting, scabbing, blistering, scarring, darker or lighter pigmentary change, recurrence, incomplete removal and infection. Medical Necessity Information: It is in your best interest to select a reason for this procedure from the list below. All of these items fulfill various CMS LCD requirements except the new and changing color options. Medical Necessity Clause: This procedure was medically necessary because the lesions that were treated were:

## 2024-01-14 LAB
6-ACETYLMORPHINE, UR: NOT DETECTED
7-AMINOCLONAZEPAM, URINE: NOT DETECTED
ALPHA-OH-ALPRAZ, URINE: NOT DETECTED
ALPHA-OH-MIDAZOLAM, URINE: NOT DETECTED
ALPRAZOLAM, URINE: NOT DETECTED
AMPHETAMINES, URINE: NOT DETECTED
BARBITURATES, URINE: NEGATIVE
BENZOYLECGONINE, UR: NEGATIVE
BUPRENORPHINE URINE: NOT DETECTED
CARISOPRODOL, UR: NEGATIVE
CLONAZEPAM, URINE: NOT DETECTED
CODEINE, URINE: NOT DETECTED
CREAT UR-MCNC: 59.1 MG/DL (ref 20–400)
DIAZEPAM, URINE: NOT DETECTED
DRUGS EXPECTED, UR: NORMAL
EER HI RES INTERP UR: NORMAL
ETHYL GLUCURONIDE UR: NEGATIVE
FENTANYL URINE: NOT DETECTED
GABAPENTIN: NOT DETECTED
HYDROCODONE, OPI6M: PRESENT
HYDROMORPHONE, OPI3M: PRESENT
LORAZEPAM, URINE: NOT DETECTED
MARIJUANA METAB, UR: NEGATIVE
MDA, UR: NOT DETECTED
MDEA, EVE, UR: NOT DETECTED
MDMA URINE: NOT DETECTED
MEPERIDINE METAB, UR: NOT DETECTED
METHADONE, URINE: NEGATIVE
METHAMPHETAMINE, URINE: NOT DETECTED
METHYLPHENIDATE: NOT DETECTED
MIDAZOLAM, URINE: NOT DETECTED
MORPHINE, OPI1M: NOT DETECTED
NALOXONE URINE: NOT DETECTED
NORBUPRENORPHINE, URINE: NOT DETECTED
NORDIAZEPAM, URINE: NOT DETECTED
NORFENTANYL, URINE: NOT DETECTED
NORHYDROCODONE, URINE: PRESENT
NOROXYCODONE, URINE: NOT DETECTED
NOROXYMORPHONE, URINE: NOT DETECTED
OXAZEPAM, URINE: NOT DETECTED
OXYCODONE URINE: NOT DETECTED
OXYMORPHONE, URINE: NOT DETECTED
PAIN MANAGEMENT DRUG PANEL INTERP, URINE: NORMAL
PAIN MGT DRUG PANEL, HI RES, UR: NORMAL
PCP,URINE: NEGATIVE
PHENTERMINE, UR: NOT DETECTED
PREGABALIN: NOT DETECTED
TAPENTADOL, URINE: NOT DETECTED
TAPENTADOL-O-SULFATE, URINE: NOT DETECTED
TEMAZEPAM, URINE: NOT DETECTED
TRAMADOL, URINE: NEGATIVE
ZOLPIDEM METABOLITE (ZCA), URINE: NOT DETECTED
ZOLPIDEM, URINE: NOT DETECTED

## 2024-01-30 ENCOUNTER — OFFICE VISIT (OUTPATIENT)
Dept: FAMILY MEDICINE CLINIC | Age: 74
End: 2024-01-30
Payer: MEDICARE

## 2024-01-30 VITALS
BODY MASS INDEX: 37.11 KG/M2 | DIASTOLIC BLOOD PRESSURE: 68 MMHG | WEIGHT: 217.4 LBS | TEMPERATURE: 97 F | HEART RATE: 66 BPM | OXYGEN SATURATION: 94 % | SYSTOLIC BLOOD PRESSURE: 116 MMHG | HEIGHT: 64 IN

## 2024-01-30 DIAGNOSIS — E03.9 HYPOTHYROIDISM, UNSPECIFIED TYPE: ICD-10-CM

## 2024-01-30 DIAGNOSIS — F33.1 MODERATE EPISODE OF RECURRENT MAJOR DEPRESSIVE DISORDER (HCC): ICD-10-CM

## 2024-01-30 DIAGNOSIS — M16.12 ARTHRITIS OF LEFT HIP: ICD-10-CM

## 2024-01-30 DIAGNOSIS — I10 ESSENTIAL HYPERTENSION: ICD-10-CM

## 2024-01-30 DIAGNOSIS — M17.0 ARTHRITIS OF BOTH KNEES: ICD-10-CM

## 2024-01-30 DIAGNOSIS — E11.9 TYPE 2 DIABETES MELLITUS WITHOUT COMPLICATION, WITHOUT LONG-TERM CURRENT USE OF INSULIN (HCC): Primary | ICD-10-CM

## 2024-01-30 DIAGNOSIS — N39.41 URGE INCONTINENCE OF URINE: ICD-10-CM

## 2024-01-30 DIAGNOSIS — E78.2 MIXED HYPERLIPIDEMIA: ICD-10-CM

## 2024-01-30 DIAGNOSIS — I35.0 NONRHEUMATIC AORTIC VALVE STENOSIS: ICD-10-CM

## 2024-01-30 DIAGNOSIS — N18.4 STAGE 4 CHRONIC KIDNEY DISEASE (HCC): ICD-10-CM

## 2024-01-30 DIAGNOSIS — M70.61 TROCHANTERIC BURSITIS OF RIGHT HIP: ICD-10-CM

## 2024-01-30 PROBLEM — R19.5 POSITIVE COLORECTAL CANCER SCREENING USING COLOGUARD TEST: Status: RESOLVED | Noted: 2023-10-31 | Resolved: 2024-01-30

## 2024-01-30 LAB — HBA1C MFR BLD: 8.6 %

## 2024-01-30 PROCEDURE — 83036 HEMOGLOBIN GLYCOSYLATED A1C: CPT | Performed by: FAMILY MEDICINE

## 2024-01-30 PROCEDURE — 1123F ACP DISCUSS/DSCN MKR DOCD: CPT | Performed by: FAMILY MEDICINE

## 2024-01-30 PROCEDURE — 99215 OFFICE O/P EST HI 40 MIN: CPT | Performed by: FAMILY MEDICINE

## 2024-01-30 PROCEDURE — 3074F SYST BP LT 130 MM HG: CPT | Performed by: FAMILY MEDICINE

## 2024-01-30 PROCEDURE — 3052F HG A1C>EQUAL 8.0%<EQUAL 9.0%: CPT | Performed by: FAMILY MEDICINE

## 2024-01-30 PROCEDURE — 3078F DIAST BP <80 MM HG: CPT | Performed by: FAMILY MEDICINE

## 2024-01-30 RX ORDER — AMLODIPINE BESYLATE 10 MG/1
TABLET ORAL
COMMUNITY
Start: 2024-01-19

## 2024-01-30 RX ORDER — INSULIN GLARGINE 100 [IU]/ML
25 INJECTION, SOLUTION SUBCUTANEOUS NIGHTLY
Qty: 5 ADJUSTABLE DOSE PRE-FILLED PEN SYRINGE | Refills: 0 | Status: SHIPPED | OUTPATIENT
Start: 2024-01-30

## 2024-01-30 SDOH — ECONOMIC STABILITY: FOOD INSECURITY: WITHIN THE PAST 12 MONTHS, YOU WORRIED THAT YOUR FOOD WOULD RUN OUT BEFORE YOU GOT MONEY TO BUY MORE.: NEVER TRUE

## 2024-01-30 SDOH — ECONOMIC STABILITY: FOOD INSECURITY: WITHIN THE PAST 12 MONTHS, THE FOOD YOU BOUGHT JUST DIDN'T LAST AND YOU DIDN'T HAVE MONEY TO GET MORE.: NEVER TRUE

## 2024-01-30 SDOH — ECONOMIC STABILITY: INCOME INSECURITY: HOW HARD IS IT FOR YOU TO PAY FOR THE VERY BASICS LIKE FOOD, HOUSING, MEDICAL CARE, AND HEATING?: NOT HARD AT ALL

## 2024-01-30 ASSESSMENT — COLUMBIA-SUICIDE SEVERITY RATING SCALE - C-SSRS
1. WITHIN THE PAST MONTH, HAVE YOU WISHED YOU WERE DEAD OR WISHED YOU COULD GO TO SLEEP AND NOT WAKE UP?: NO
6. HAVE YOU EVER DONE ANYTHING, STARTED TO DO ANYTHING, OR PREPARED TO DO ANYTHING TO END YOUR LIFE?: NO
2. HAVE YOU ACTUALLY HAD ANY THOUGHTS OF KILLING YOURSELF?: NO

## 2024-01-30 ASSESSMENT — ENCOUNTER SYMPTOMS
COLOR CHANGE: 0
WHEEZING: 0
TROUBLE SWALLOWING: 0
RHINORRHEA: 0
ABDOMINAL PAIN: 0
COUGH: 0
VOMITING: 0
NAUSEA: 0
BACK PAIN: 1
STRIDOR: 0
CHEST TIGHTNESS: 0
SORE THROAT: 0
DIARRHEA: 0
CONSTIPATION: 0
RECTAL PAIN: 0
SINUS PRESSURE: 0
BLOOD IN STOOL: 0
ABDOMINAL DISTENTION: 0
SHORTNESS OF BREATH: 0
EYE REDNESS: 0

## 2024-01-30 ASSESSMENT — PATIENT HEALTH QUESTIONNAIRE - PHQ9
SUM OF ALL RESPONSES TO PHQ QUESTIONS 1-9: 10
2. FEELING DOWN, DEPRESSED OR HOPELESS: 3
7. TROUBLE CONCENTRATING ON THINGS, SUCH AS READING THE NEWSPAPER OR WATCHING TELEVISION: 0
SUM OF ALL RESPONSES TO PHQ QUESTIONS 1-9: 10
SUM OF ALL RESPONSES TO PHQ9 QUESTIONS 1 & 2: 5
6. FEELING BAD ABOUT YOURSELF - OR THAT YOU ARE A FAILURE OR HAVE LET YOURSELF OR YOUR FAMILY DOWN: 0
3. TROUBLE FALLING OR STAYING ASLEEP: 2
10. IF YOU CHECKED OFF ANY PROBLEMS, HOW DIFFICULT HAVE THESE PROBLEMS MADE IT FOR YOU TO DO YOUR WORK, TAKE CARE OF THINGS AT HOME, OR GET ALONG WITH OTHER PEOPLE: 2
SUM OF ALL RESPONSES TO PHQ QUESTIONS 1-9: 10
4. FEELING TIRED OR HAVING LITTLE ENERGY: 2
1. LITTLE INTEREST OR PLEASURE IN DOING THINGS: 2
5. POOR APPETITE OR OVEREATING: 1
8. MOVING OR SPEAKING SO SLOWLY THAT OTHER PEOPLE COULD HAVE NOTICED. OR THE OPPOSITE, BEING SO FIGETY OR RESTLESS THAT YOU HAVE BEEN MOVING AROUND A LOT MORE THAN USUAL: 0
SUM OF ALL RESPONSES TO PHQ QUESTIONS 1-9: 10
9. THOUGHTS THAT YOU WOULD BE BETTER OFF DEAD, OR OF HURTING YOURSELF: 0

## 2024-01-30 NOTE — PATIENT INSTRUCTIONS
Dear valued patient,    We hope this message finds you in good health. At [ ], we are committed to providing you with the best possible healthcare experience. To further enhance your convenience and streamline your access to our services, we would like to introduce you to the benefits of utilizing direct scheduling through the [facility] Patient Portal.    Direct scheduling is a feature within the [facility] Patient Portal that allows you to schedule appointments with your healthcare provider directly, without the need to make a phone call or wait for a callback. We understand that your time is cassi, and we want to ensure that you have quick and easy access to our services whenever you need them.  Here are some reasons why you should consider utilizing direct scheduling through the [facility] Patient Portal:    1. Convenience: With direct scheduling, you can book appointments at any time that suits you best, 24 hours a day, 7 days a week. No more waiting on hold or playing phone tag with our office staff. It puts you in control of managing your healthcare appointments effortlessly.    2. Accessibility: The [facility] Patient Portal is accessible through your computer, smartphone, or tablet, allowing you to schedule appointments from the comfort of your home, office, or on the go. You can access your medical records, review test results, and communicate with your healthcare provider all in one secure and user-friendly platform.    3. Timesaving: By utilizing direct scheduling, you can avoid unnecessary delays and save cassi time. You can easily browse the available appointment slots and select the one that works best for you, eliminating the back-and-forth communication typically required when scheduling via phone.    4. Reminders and notifications: [facility] Portal sends automatic reminders for upcoming appointments, ensuring that you never miss an important visit. You can also receive notifications about test

## 2024-01-30 NOTE — PROGRESS NOTES
Visit Information    Have you changed or started any medications since your last visit including any over-the-counter medicines, vitamins, or herbal medicines? no   Have you stopped taking any of your medications? Is so, why? -  no  Are you having any side effects from any of your medications? - no    Have you seen any other physician or provider since your last visit?  no   Have you had any other diagnostic tests since your last visit?  no   Have you been seen in the emergency room and/or had an admission in a hospital since we last saw you?  no   Have you had your routine dental cleaning in the past 6 months?  no     Do you have an active MyChart account? If no, what is the barrier?  Yes    Patient Care Team:  Blanche Pepper MD as PCP - General (Family Medicine)  Blanche Pepper MD as PCP - Empaneled Provider  Nicholas Ly MD as Consulting Physician (Gastroenterology)    Medical History Review  Past Medical, Family, and Social History reviewed and does contribute to the patient presenting condition    Health Maintenance   Topic Date Due    Diabetic retinal exam  Never done    Respiratory Syncytial Virus (RSV) Pregnant or age 60 yrs+ (1 - 1-dose 60+ series) Never done    Shingles vaccine (2 of 2) 12/04/2023    Annual Wellness Visit (Medicare Advantage)  01/01/2024    Lipids  03/13/2024    Diabetic foot exam  06/30/2024    Breast cancer screen  09/20/2024    A1C test (Diabetic or Prediabetic)  10/31/2024    Diabetic Alb to Cr ratio (uACR) test  10/31/2024    Depression Monitoring  10/31/2024    GFR test (Diabetes, CKD 3-4, OR last GFR 15-59)  11/30/2024    Colorectal Cancer Screen  06/09/2033    DTaP/Tdap/Td vaccine (3 - Td or Tdap) 10/24/2033    DEXA (modify frequency per FRAX score)  Completed    Flu vaccine  Completed    Pneumococcal 65+ years Vaccine  Completed    COVID-19 Vaccine  Completed    Hepatitis C screen  Completed    Hepatitis A vaccine  Aged Out    Hepatitis B vaccine  Aged Out    Hib

## 2024-01-30 NOTE — PROGRESS NOTES
Chief Complaint   Patient presents with    Diabetes    Hypertension    Hyperlipidemia    Hip Pain     10/10 pain   Right knee     Knee Pain     Right knee     Arthritis     Needs something for pain   Nothing seems to be helping          Marilyn A Long  here today for follow up on chronic medical problems, go over labs and/or diagnostic studies, and medication refills. Diabetes, Hypertension, Hyperlipidemia, Hip Pain (10/10 pain /Right knee ), Knee Pain (Right knee ), and Arthritis (Needs something for pain /Nothing seems to be helping )      HPI: Patient is scheduled for follow-up on diabetes and chronic medical conditions.    Type 2 diabetes, A1c has increased to 8.6.  Previous A1c was 8.  Patient is currently on insulin due to chronic kidney disease stage IV.  She is on 23 units of insulin which was increased on previous appointment, reports she is taking only 20 units of insulin.  Patient is not on any oral medications.  Patient is due for eye exam discussed to schedule appointment.    Stage IV chronic kidney disease follows with nephrologist, kidney function is at her baseline.  Patient denies any acute pain.  Patient was taking trospium, which I discontinued today.  Patient was started on oxybutynin and was concerned about worsening her kidney function.  Discussed with patient different medications and that was the safest 1 discussed to decrease the dose to 5 mg.    Hypertension controlled, patient is currently on amlodipine 10 mg, denies any side effects.  Patient is also on beta-blockers metoprolol and Imdur.  Patient follows with cardiologist due to history of aortic stenosis.      Patient is concerned about her hip pain and also knee pain.  Patient has multiple joint arthritis.  Patient reports she has nausea knee replacement done in both knees 5 years before.  Patient reports she needed full replacement, her pain is getting worse.  She is still limping during walking.  She does have a cane and walker but does

## 2024-01-31 DIAGNOSIS — F32.1 CURRENT MODERATE EPISODE OF MAJOR DEPRESSIVE DISORDER, UNSPECIFIED WHETHER RECURRENT (HCC): ICD-10-CM

## 2024-02-01 RX ORDER — ESCITALOPRAM OXALATE 20 MG/1
20 TABLET ORAL DAILY
Qty: 30 TABLET | Refills: 3 | Status: SHIPPED | OUTPATIENT
Start: 2024-02-01

## 2024-02-05 ENCOUNTER — OFFICE VISIT (OUTPATIENT)
Dept: ORTHOPEDIC SURGERY | Age: 74
End: 2024-02-05
Payer: MEDICARE

## 2024-02-05 DIAGNOSIS — Z96.659 HISTORY OF PARTIAL KNEE REPLACEMENT: ICD-10-CM

## 2024-02-05 DIAGNOSIS — M25.551 PAIN OF RIGHT HIP: Primary | ICD-10-CM

## 2024-02-05 PROCEDURE — 99203 OFFICE O/P NEW LOW 30 MIN: CPT | Performed by: ORTHOPAEDIC SURGERY

## 2024-02-05 PROCEDURE — 20610 DRAIN/INJ JOINT/BURSA W/O US: CPT | Performed by: ORTHOPAEDIC SURGERY

## 2024-02-05 PROCEDURE — 1123F ACP DISCUSS/DSCN MKR DOCD: CPT | Performed by: ORTHOPAEDIC SURGERY

## 2024-02-05 RX ORDER — LIDOCAINE HYDROCHLORIDE 10 MG/ML
2 INJECTION, SOLUTION INFILTRATION; PERINEURAL ONCE
Status: COMPLETED | OUTPATIENT
Start: 2024-02-05 | End: 2024-02-05

## 2024-02-05 RX ORDER — BUPIVACAINE HYDROCHLORIDE 2.5 MG/ML
2 INJECTION, SOLUTION INFILTRATION; PERINEURAL ONCE
Status: COMPLETED | OUTPATIENT
Start: 2024-02-05 | End: 2024-02-05

## 2024-02-05 RX ORDER — BETAMETHASONE SODIUM PHOSPHATE AND BETAMETHASONE ACETATE 3; 3 MG/ML; MG/ML
12 INJECTION, SUSPENSION INTRA-ARTICULAR; INTRALESIONAL; INTRAMUSCULAR; SOFT TISSUE ONCE
Status: COMPLETED | OUTPATIENT
Start: 2024-02-05 | End: 2024-02-05

## 2024-02-05 RX ADMIN — BETAMETHASONE SODIUM PHOSPHATE AND BETAMETHASONE ACETATE 12 MG: 3; 3 INJECTION, SUSPENSION INTRA-ARTICULAR; INTRALESIONAL; INTRAMUSCULAR; SOFT TISSUE at 10:08

## 2024-02-05 RX ADMIN — BUPIVACAINE HYDROCHLORIDE 5 MG: 2.5 INJECTION, SOLUTION INFILTRATION; PERINEURAL at 10:09

## 2024-02-05 RX ADMIN — LIDOCAINE HYDROCHLORIDE 2 ML: 10 INJECTION, SOLUTION INFILTRATION; PERINEURAL at 10:10

## 2024-02-05 NOTE — PROGRESS NOTES
SURGERY Left 12/10/2021    OPEN REDUCTION INTERNAL FIXATION LEFT DISTAL RADIUS performed by Sarabjit De DO at [facility] OR    FOREARM SURGERY Left 2/16/2022    HARDWARE REMOVAL LEFT WRIST WITH MANIPULATION UNDER ANESTHESIA performed by Sarabjit De DO at [facility] OR    HYSTERECTOMY (CERVIX STATUS UNKNOWN)  01/01/1980    WITH RT SALPINGOOPHERECTOMY    JOINT REPLACEMENT Bilateral 01/01/1994    PARTIAL-KNEES    OTHER SURGICAL HISTORY  1957    repair of facial laceration    PAIN MANAGEMENT PROCEDURE      Back injections    SALPINGO-OOPHORECTOMY Left 01/01/1987    SHOULDER SURGERY Left 01/01/1993    SPURS    TOE OSTEOTOMY Right 06/08/2016    Right 5th digit adductory wedge osteotomy with K wire fixation     TONSILLECTOMY      UPPER GASTROINTESTINAL ENDOSCOPY      EGD    WRIST SURGERY Left 02/16/2022    HARDWARE REMOVAL LEFT WRIST WITH MANIPULATION UNDER ANESTHESIA     Family History   Problem Relation Age of Onset    Breast Cancer Mother 57        BREAST WITH METS T  LIVER AND LUNG    Other Father         AAA    Heart Disease Father     Asthma Sister     Diabetes Sister         NIDDM    Stroke Brother 55    Diabetes Brother         NIDDM    Stroke Maternal Grandmother     Asthma Son    Plan  An informed verbal consent for the procedure was obtained and risks including, but not limited to: allergy to medications, injection, bleeding, stiffness of joint, recurrence of symptoms, loss of function, swelling, drainage, irrigation, need for surgery and pseudo-septic inflammation, were explained to the patient. Also, discussed was the potential for further injections, irrigation and debridement and surgery. Alternate means of treatment have also been discussed with the patient.      Administrations This Visit       betamethasone acetate-betamethasone sodium phosphate (CELESTONE) injection 12 mg       Admin Date  02/05/2024  10:08 Action  Given Dose  12 mg Route  Intra-artICUlar Site  Hip Right Administered By  Milly Soto,

## 2024-02-07 RX ORDER — HYDROXYZINE HYDROCHLORIDE 25 MG/1
25 TABLET, FILM COATED ORAL 3 TIMES DAILY
Qty: 90 TABLET | Refills: 0 | Status: SHIPPED | OUTPATIENT
Start: 2024-02-07

## 2024-02-07 RX ORDER — LEVOTHYROXINE SODIUM 0.12 MG/1
125 TABLET ORAL DAILY
Qty: 30 TABLET | Refills: 0 | Status: SHIPPED | OUTPATIENT
Start: 2024-02-07

## 2024-02-07 NOTE — TELEPHONE ENCOUNTER
----- Message from Marilyn Mora sent at 2/6/2024  4:56 PM EST -----  Regarding: Medicine refill  Contact: [phone]  Refill on.   Hydroxyzine hcl 25 mg. Tablet.   3 times a day.  Levothyroxine 125 mcg tablet 1 a day

## 2024-02-09 ENCOUNTER — HOSPITAL ENCOUNTER (OUTPATIENT)
Dept: PAIN MANAGEMENT | Age: 74
Discharge: HOME | End: 2024-02-09
Payer: MEDICARE

## 2024-02-09 VITALS — BODY MASS INDEX: 38.45 KG/M2 | WEIGHT: 217 LBS | HEIGHT: 63 IN

## 2024-02-09 DIAGNOSIS — M48.061 DEGENERATIVE LUMBAR SPINAL STENOSIS: ICD-10-CM

## 2024-02-09 DIAGNOSIS — M51.36 DDD (DEGENERATIVE DISC DISEASE), LUMBAR: ICD-10-CM

## 2024-02-09 DIAGNOSIS — M48.02 CERVICAL STENOSIS OF SPINE: ICD-10-CM

## 2024-02-09 DIAGNOSIS — Z79.891 CHRONIC USE OF OPIATE FOR THERAPEUTIC PURPOSE: ICD-10-CM

## 2024-02-09 DIAGNOSIS — M47.817 LUMBOSACRAL SPONDYLOSIS WITHOUT MYELOPATHY: Primary | ICD-10-CM

## 2024-02-09 DIAGNOSIS — M50.30 DDD (DEGENERATIVE DISC DISEASE), CERVICAL: ICD-10-CM

## 2024-02-09 PROCEDURE — 99213 OFFICE O/P EST LOW 20 MIN: CPT | Performed by: NURSE PRACTITIONER

## 2024-02-09 PROCEDURE — 99213 OFFICE O/P EST LOW 20 MIN: CPT

## 2024-02-09 RX ORDER — HYDROCODONE BITARTRATE AND ACETAMINOPHEN 5; 325 MG/1; MG/1
1 TABLET ORAL EVERY 8 HOURS PRN
Qty: 90 TABLET | Refills: 0 | Status: SHIPPED | OUTPATIENT
Start: 2024-02-13 | End: 2024-03-14

## 2024-02-09 ASSESSMENT — ENCOUNTER SYMPTOMS
BOWEL INCONTINENCE: 0
CONSTIPATION: 0
BACK PAIN: 1
COUGH: 0
SHORTNESS OF BREATH: 0

## 2024-02-09 NOTE — PROGRESS NOTES
(including ROS and PFSH) and vital documentation by my staff and I agree with their documentation and have added where applicable.

## 2024-02-13 ENCOUNTER — PATIENT MESSAGE (OUTPATIENT)
Dept: ORTHOPEDIC SURGERY | Age: 74
End: 2024-02-13

## 2024-02-14 ENCOUNTER — TELEPHONE (OUTPATIENT)
Dept: ORTHOPEDIC SURGERY | Age: 74
End: 2024-02-14

## 2024-02-14 NOTE — TELEPHONE ENCOUNTER
Juan, any recommendations or suggestions for this patient?  She said the injection only helped a little and that she is doing all her hip exercises and stretches.  She said she is also heating and icing with her exercises as well.

## 2024-02-14 NOTE — TELEPHONE ENCOUNTER
Would advise physical therapy.  It appears patient has chronic kidney disease so likely not able to tolerate NSAIDs.  In light of this would advise topical lidocaine or menthol based gels i.e. IcyHot or Biofreeze

## 2024-02-14 NOTE — TELEPHONE ENCOUNTER
From: Marilyn Mora  To: Dr. Brando Garcia  Sent: 2/13/2024 1:22 PM EST  Subject: Hip    I got a shot in my hip last Monday and it helped a lil . Still having alot of pain. What do you suggest. Thank you. Marilyn mora

## 2024-02-15 NOTE — TELEPHONE ENCOUNTER
Pt states her insurance doesn't pay for ANY PT at all so she will just continue to do her exercises at home as well as try the biofreeze or icey hot

## 2024-02-16 ENCOUNTER — TELEPHONE (OUTPATIENT)
Dept: ORTHOPEDIC SURGERY | Age: 74
End: 2024-02-16

## 2024-02-16 DIAGNOSIS — N39.41 URGE INCONTINENCE OF URINE: ICD-10-CM

## 2024-02-16 RX ORDER — OXYBUTYNIN CHLORIDE 10 MG/1
10 TABLET, EXTENDED RELEASE ORAL DAILY
Qty: 30 TABLET | Refills: 3 | Status: SHIPPED | OUTPATIENT
Start: 2024-02-16

## 2024-02-16 NOTE — TELEPHONE ENCOUNTER
Please continue with home exercise program and topical medication as recommended in last telephone encounter.

## 2024-02-16 NOTE — TELEPHONE ENCOUNTER
----- Message from Marilyn Mora sent at 2/16/2024 11:02 AM EST -----  Regarding: Medicine refill  Contact: [phone]  Could you please have her submit the The refill for axbutytin ten milligrams

## 2024-02-16 NOTE — TELEPHONE ENCOUNTER
Please Approve or Refuse.  Send to Pharmacy per Pt's Request:      Next Visit Date:  4/30/2024   Last Visit Date: 1/30/2024    Hemoglobin A1C (%)   Date Value   01/30/2024 8.6   10/31/2023 8.0   06/30/2023 8.3             ( goal A1C is < 7)   BP Readings from Last 3 Encounters:   01/30/24 116/68   01/05/24 136/74   12/06/23 132/70          (goal 120/80)  BUN   Date Value Ref Range Status   11/30/2023 36 (H) 8 - 23 mg/dL Final     Creatinine   Date Value Ref Range Status   11/30/2023 2.6 (H) 0.5 - 0.9 mg/dL Final     Potassium   Date Value Ref Range Status   11/30/2023 5.2 3.7 - 5.3 mmol/L Final

## 2024-02-19 ENCOUNTER — OFFICE VISIT (OUTPATIENT)
Dept: UROLOGY | Age: 74
End: 2024-02-19
Payer: MEDICARE

## 2024-02-19 ENCOUNTER — TELEPHONE (OUTPATIENT)
Dept: UROLOGY | Age: 74
End: 2024-02-19

## 2024-02-19 VITALS
DIASTOLIC BLOOD PRESSURE: 78 MMHG | HEART RATE: 83 BPM | SYSTOLIC BLOOD PRESSURE: 118 MMHG | BODY MASS INDEX: 38.45 KG/M2 | HEIGHT: 63 IN | OXYGEN SATURATION: 96 % | TEMPERATURE: 98 F | WEIGHT: 217 LBS

## 2024-02-19 DIAGNOSIS — N39.41 URGE INCONTINENCE: ICD-10-CM

## 2024-02-19 DIAGNOSIS — R39.15 URGENCY OF URINATION: ICD-10-CM

## 2024-02-19 DIAGNOSIS — N32.81 OAB (OVERACTIVE BLADDER): Primary | ICD-10-CM

## 2024-02-19 PROCEDURE — 99214 OFFICE O/P EST MOD 30 MIN: CPT | Performed by: UROLOGY

## 2024-02-19 PROCEDURE — 3078F DIAST BP <80 MM HG: CPT | Performed by: UROLOGY

## 2024-02-19 PROCEDURE — 3074F SYST BP LT 130 MM HG: CPT | Performed by: UROLOGY

## 2024-02-19 PROCEDURE — 1123F ACP DISCUSS/DSCN MKR DOCD: CPT | Performed by: UROLOGY

## 2024-02-19 NOTE — PROGRESS NOTES
[facility] PHYSICIANS [facility] UROLOGY CENTER  [address]  [facility] [address]  Dept: [phone]    [facility] Urology Office Note - Established    Patient:  Marilyn Mora  YOB: 1950  Date: 2/19/2024    The patient is a 73 y.o. female whopresents today for evaluation of the following problems:   Chief Complaint   Patient presents with    Discuss Medications     botox       HPI  This is a very pleasant 73-year-old female who has a history of overactive bladder.  She has urgency and frequency as well as urge incontinence.  She has failed multiple oral medications.  She is very frustrated with her symptoms.  She is here to discuss other options.    Summary of old records: N/A    Additional History: N/A    Procedures Today: N/A    Urinalysis today:  No results found for this visit on 02/19/24.    Imaging Reviewed during this Office Visit: none  (results were independently reviewed by physician and radiology report verified)    AUA Symptom Score (2/19/2024):                               Last BUN and creatinine:  Lab Results   Component Value Date    BUN 36 (H) 11/30/2023     Lab Results   Component Value Date    CREATININE 2.6 (H) 11/30/2023       Additional Lab/Culture results: none    PAST MEDICAL, FAMILY AND SOCIAL HISTORY UPDATE:  Past Medical History:   Diagnosis Date    WERO (acute kidney injury) (Formerly [facility]) 06/20/2021    Hx of dialysis x3 sessions    Anemia     iron def    Anxiety and depression     managed per PCP    Aortic valve stenosis     mild per chart    Arthritis     Bilateral carotid artery stenosis     CAD (coronary artery disease) 1993    stent to RCA, [facility],  last seen 1/26/22 for clearance    Cervical spinal stenosis     supposed to be having OR with Dr. Mack 2/28/22 for this, pain N/T down both arms and headaches, has been in pain management for this    Cervical stenosis of spine 02/23/2021    Chronic kidney disease     DDD

## 2024-03-04 ENCOUNTER — PROCEDURE VISIT (OUTPATIENT)
Dept: UROLOGY | Age: 74
End: 2024-03-04

## 2024-03-04 VITALS — HEART RATE: 69 BPM | DIASTOLIC BLOOD PRESSURE: 63 MMHG | SYSTOLIC BLOOD PRESSURE: 120 MMHG | TEMPERATURE: 97.2 F

## 2024-03-04 DIAGNOSIS — N39.41 URGE INCONTINENCE: Primary | ICD-10-CM

## 2024-03-04 NOTE — PROGRESS NOTES
Dr. Domingo Sweeney  Date: 3/4/2024    Procedure:  Interstim percutaneous sacral nerve stimulation test. Repeat of procedure on the opposite of the sacral nerve.     Informed consent has been obtained.Risks and Benefits discussed prior to procedure.     Description:  Patient was properly identified and placed in the prone position.  Pillows were placed under the lower abdomen to flatten the sacrum and under the shins to allow the toes to dangle freely.  Cape was placed on each buttock and pulled laterally to separate cheeks adequately to visual the anal sphincter.  A ground pad was placed on the bottom of the patient's foot and the patient's cable was connected to the grounding pad and to the external stimulation box.  Patient was prepped and draped in the usual sterile fashion using prep solution.  Local injection of 2% Lidocaine was administered and the needle was placed at a 60 degree angle into the S3 foramen without difficulty.     The proper S3 needle position was confirmed by the patient's sensation to stimulation, direct observation and lifting of the perineum and bellowing and observation of plantar flexion of the great toe utilizing the external test stimulator box and the J hook.     The needle stylet was removed and the percutaneous lead was inserted.  Lead placements was tested and confirmed by connecting the J hook to the top of the PNE lead. Using a push and pull technique the needle was taken out over the lead.  The above procedure was performed again on the opposite side and all the appropriate responses were again verified.     The patient cables were connected to the leads and grounding pads.  The patient was cleaned off and the entire region was covered with a large tegaderm.  The estimated blood loss was negligible.     Using the external test stimulation,  the patient was programmed to the optimum sensation via the lead, and given instructions on utilizing the external test stimulator prior to

## 2024-03-06 RX ORDER — LEVOTHYROXINE SODIUM 0.12 MG/1
125 TABLET ORAL DAILY
Qty: 30 TABLET | Refills: 0 | Status: SHIPPED | OUTPATIENT
Start: 2024-03-06

## 2024-03-06 NOTE — TELEPHONE ENCOUNTER
Please Approve or Refuse.  Send to Pharmacy per Pt's Request:      Next Visit Date:  4/30/2024   Last Visit Date: 1/30/2024    Hemoglobin A1C (%)   Date Value   01/30/2024 8.6   10/31/2023 8.0   06/30/2023 8.3             ( goal A1C is < 7)   BP Readings from Last 3 Encounters:   03/04/24 120/63   02/19/24 118/78   01/30/24 116/68          (goal 120/80)  BUN   Date Value Ref Range Status   11/30/2023 36 (H) 8 - 23 mg/dL Final     Creatinine   Date Value Ref Range Status   11/30/2023 2.6 (H) 0.5 - 0.9 mg/dL Final     Potassium   Date Value Ref Range Status   11/30/2023 5.2 3.7 - 5.3 mmol/L Final

## 2024-03-11 ENCOUNTER — PROCEDURE VISIT (OUTPATIENT)
Dept: UROLOGY | Age: 74
End: 2024-03-11

## 2024-03-11 ENCOUNTER — HOSPITAL ENCOUNTER (OUTPATIENT)
Dept: PAIN MANAGEMENT | Age: 74
Discharge: HOME | End: 2024-03-11
Payer: MEDICARE

## 2024-03-11 VITALS
WEIGHT: 217 LBS | TEMPERATURE: 98 F | HEIGHT: 63 IN | HEART RATE: 51 BPM | SYSTOLIC BLOOD PRESSURE: 140 MMHG | DIASTOLIC BLOOD PRESSURE: 84 MMHG | BODY MASS INDEX: 38.45 KG/M2 | OXYGEN SATURATION: 96 %

## 2024-03-11 VITALS — BODY MASS INDEX: 38.45 KG/M2 | HEIGHT: 63 IN | WEIGHT: 217 LBS

## 2024-03-11 DIAGNOSIS — M47.817 LUMBOSACRAL SPONDYLOSIS WITHOUT MYELOPATHY: Primary | ICD-10-CM

## 2024-03-11 DIAGNOSIS — N39.41 URGE INCONTINENCE: ICD-10-CM

## 2024-03-11 DIAGNOSIS — M25.511 CHRONIC RIGHT SHOULDER PAIN: ICD-10-CM

## 2024-03-11 DIAGNOSIS — N32.81 OAB (OVERACTIVE BLADDER): Primary | ICD-10-CM

## 2024-03-11 DIAGNOSIS — M51.36 DDD (DEGENERATIVE DISC DISEASE), LUMBAR: ICD-10-CM

## 2024-03-11 DIAGNOSIS — R39.15 URGENCY OF URINATION: ICD-10-CM

## 2024-03-11 DIAGNOSIS — M48.061 DEGENERATIVE LUMBAR SPINAL STENOSIS: ICD-10-CM

## 2024-03-11 DIAGNOSIS — G89.29 CHRONIC RIGHT SHOULDER PAIN: ICD-10-CM

## 2024-03-11 DIAGNOSIS — Z79.891 CHRONIC USE OF OPIATE FOR THERAPEUTIC PURPOSE: ICD-10-CM

## 2024-03-11 PROCEDURE — 99213 OFFICE O/P EST LOW 20 MIN: CPT

## 2024-03-11 PROCEDURE — 6360000002 HC RX W HCPCS: Performed by: STUDENT IN AN ORGANIZED HEALTH CARE EDUCATION/TRAINING PROGRAM

## 2024-03-11 PROCEDURE — 20610 DRAIN/INJ JOINT/BURSA W/O US: CPT | Performed by: STUDENT IN AN ORGANIZED HEALTH CARE EDUCATION/TRAINING PROGRAM

## 2024-03-11 PROCEDURE — 20610 DRAIN/INJ JOINT/BURSA W/O US: CPT

## 2024-03-11 PROCEDURE — 99214 OFFICE O/P EST MOD 30 MIN: CPT | Performed by: STUDENT IN AN ORGANIZED HEALTH CARE EDUCATION/TRAINING PROGRAM

## 2024-03-11 PROCEDURE — 99024 POSTOP FOLLOW-UP VISIT: CPT | Performed by: NURSE PRACTITIONER

## 2024-03-11 RX ORDER — HYDROCODONE BITARTRATE AND ACETAMINOPHEN 5; 325 MG/1; MG/1
1 TABLET ORAL EVERY 8 HOURS PRN
Qty: 90 TABLET | Refills: 0 | Status: SHIPPED | OUTPATIENT
Start: 2024-03-14 | End: 2024-04-13

## 2024-03-11 RX ORDER — BUPIVACAINE HYDROCHLORIDE 2.5 MG/ML
4 INJECTION, SOLUTION EPIDURAL; INFILTRATION; INTRACAUDAL
Status: COMPLETED | OUTPATIENT
Start: 2024-03-11 | End: 2024-03-11

## 2024-03-11 RX ORDER — TRIAMCINOLONE ACETONIDE 40 MG/ML
40 INJECTION, SUSPENSION INTRA-ARTICULAR; INTRAMUSCULAR ONCE
Status: COMPLETED | OUTPATIENT
Start: 2024-03-11 | End: 2024-03-11

## 2024-03-11 RX ADMIN — TRIAMCINOLONE ACETONIDE 40 MG: 40 INJECTION, SUSPENSION INTRA-ARTICULAR; INTRAMUSCULAR at 11:04

## 2024-03-11 RX ADMIN — BUPIVACAINE HYDROCHLORIDE 4 ML: 2.5 INJECTION, SOLUTION EPIDURAL; INFILTRATION; INTRACAUDAL; PERINEURAL at 11:04

## 2024-03-11 ASSESSMENT — ENCOUNTER SYMPTOMS
WHEEZING: 0
SHORTNESS OF BREATH: 0
ABDOMINAL PAIN: 0
EYE REDNESS: 0
COUGH: 0
NAUSEA: 0
EYE PAIN: 0
CONSTIPATION: 0
DIARRHEA: 0
VOMITING: 0
BACK PAIN: 0

## 2024-03-11 ASSESSMENT — PAIN SCALES - GENERAL: PAINLEVEL_OUTOF10: 9

## 2024-03-11 NOTE — PROGRESS NOTES
[facility] PHYSICIANS [facility], [facility] UROLOGY CENTER  [address]  [facility] [address]  Dept: [phone]    [facility] Urology Office Note - Established    Patient:  Marilyn Mora  YOB: 1950  Date: 3/11/2024    The patient is a 73 y.o. female whopresents today for evaluation of the following problems:   Chief Complaint   Patient presents with    Other     Lead Pull       HPI  Patient is a 72 yo female with hx of OAB. She struggles with urinary urgency, frequency, and incontinence. She has failed oral medications in the past, but is still taking oxybutynin 10mg daily to get some sort of relief with her symptoms.  She recently underwent PNE trial. Overall, she did not see much improvement during the trial period.  She reports urgency did not improve, and there was only minor improvement in the amount of leakage.  She mentions that the lead placement seemed to flare up her sciatic pain. She is not interested in implantation of the bladder stimulator.      Summary of old records: N/A    Additional History: N/A    Procedures Today:   Patient was placed in prone position with pillows placed under lower abd to flatten sacrum. Lead was disconnected from EPG (external pulse generator).  At a 60 degree angle cephalad, slow and steady pressure used to remove lead. Tip visualized and fully intact. Buttocks and back were cleansed with chlorhexidine solution and dried.  Band-aid applied. Patient tolerated well. She was observed in the office for 15 minutes following lead removal to observe for any complications.       Urinalysis today:  No results found for this visit on 03/11/24.    Imaging Reviewed during this Office Visit: none       Last BUN and creatinine:  Lab Results   Component Value Date    BUN 36 (H) 11/30/2023     Lab Results   Component Value Date    CREATININE 2.6 (H) 11/30/2023       Additional Lab/Culture results: none    PAST MEDICAL, FAMILY AND SOCIAL

## 2024-03-11 NOTE — PROGRESS NOTES
Procedure performed: Right subacromial bursa injection    Indications: Chronic Right Shoulder pain/Shoulder Impingement    Risks, benefits, and alternatives of the procedure were reviewed.  All questions were answered appropriately and informed consent, both written and verbal, was obtained.  The patient was placed in the sitting position.  A timeout was obtained.  Landmarks were palpated. A lateral to the inferior border of the scapula was palpated and marked.  The area was prepped with alcohol x3 in typical aseptic fashion.  At this point, a 25-gauge needle was passed into the shoulder.  At this point, a total of 4 mL of Marcaine 0.25% combined with 40 mg of Kenalog was slowly injected into the shoulder for a total of 4 mL of Marcaine and 40 mg of Kenalog after negative aspiration.  The needle was withdrawn with the point of needle intact.  The procedure was tolerated without sequelae. The patient was kept in the office for approximately 10 minutes and left without signs of any immediate sequelae.   
Plan:  -1 month    Patient was offered intervention where appropriate.     Multi-modal Pain Therapy:  The patient was explicitly considered for multimodal and interdisciplinary therapy. Non-opioid and non-pharmacological opportunities to enhance analgesia and quality of life have been and will continue to be pursued.    Opioid Therapy: Education provided to patient regarding short term and long term implications of opioid medication use. Repeat opioid risk stratification today, discussion regarding functional achievements, safe storage, and optimization of non-opioid interventional, behavioral, and pharmaceutical modalities. Will continue attempt to wean off medication as appropriate.    Cade Lara, DO  Interventional Pain Management/PM&R   [facility]    Orders Placed This Encounter    triamcinolone acetonide (KENALOG-40) injection 40 mg    BUPivacaine (PF) (MARCAINE) 0.25 % injection 10 mg    HYDROcodone-acetaminophen (NORCO) 5-325 MG per tablet     Sig: Take 1 tablet by mouth every 8 hours as needed for Pain for up to 30 days.     Dispense:  90 tablet     Refill:  0     Reduce doses taken as pain becomes manageable

## 2024-03-15 NOTE — ADDENDUM NOTE
Addendum  created 06/22/21 1203 by Richar Jack MD    Clinical Note Signed SOB with onset yesterday, productive cough with intermitt fevers and chest pain  Reports breathing has gotten progressively worse today  Hx COVID in Feb        Triage Assessment (Adult)       Row Name 03/14/24 1938          Triage Assessment    Airway WDL WDL        Respiratory WDL    Respiratory WDL X;rhythm/pattern;cough     Rhythm/Pattern, Respiratory shortness of breath     Cough Frequency frequent     Cough Type loose;productive        Cardiac WDL    Cardiac WDL WDL        Peripheral/Neurovascular WDL    Peripheral Neurovascular WDL WDL        Cognitive/Neuro/Behavioral WDL    Cognitive/Neuro/Behavioral WDL WDL

## 2024-03-17 ENCOUNTER — PATIENT MESSAGE (OUTPATIENT)
Dept: FAMILY MEDICINE CLINIC | Age: 74
End: 2024-03-17

## 2024-03-17 DIAGNOSIS — N39.41 URGE INCONTINENCE OF URINE: ICD-10-CM

## 2024-03-18 RX ORDER — OXYBUTYNIN CHLORIDE 15 MG/1
15 TABLET, EXTENDED RELEASE ORAL DAILY
Qty: 90 TABLET | Refills: 0 | Status: SHIPPED | OUTPATIENT
Start: 2024-03-18

## 2024-03-18 NOTE — TELEPHONE ENCOUNTER
Felicia Hurd MA 3/18/2024 10:28 AM EDT      ----- Message -----  From: Marilyn Mora \"Marilyn [email or web address]\"  Sent: 3/17/2024 6:39 PM EDT  To: [facility] Clinical Support Pool  Subject: Urine leakage     Could your up the medicine for my leakage and I have been itching real bad cause of my nerves. Could you please prescribe something please. Thank you.  Marilyn mora

## 2024-03-19 RX ORDER — HYDROXYZINE HYDROCHLORIDE 25 MG/1
25 TABLET, FILM COATED ORAL 3 TIMES DAILY
Qty: 90 TABLET | Refills: 0 | Status: SHIPPED | OUTPATIENT
Start: 2024-03-19

## 2024-03-19 NOTE — TELEPHONE ENCOUNTER
Please Approve or Refuse.  Send to Pharmacy per Pt's Request:      Next Visit Date:  4/30/2024   Last Visit Date: 1/30/2024    Hemoglobin A1C (%)   Date Value   01/30/2024 8.6   10/31/2023 8.0   06/30/2023 8.3             ( goal A1C is < 7)   BP Readings from Last 3 Encounters:   03/11/24 (!) 140/84   03/04/24 120/63   02/19/24 118/78          (goal 120/80)  BUN   Date Value Ref Range Status   11/30/2023 36 (H) 8 - 23 mg/dL Final     Creatinine   Date Value Ref Range Status   11/30/2023 2.6 (H) 0.5 - 0.9 mg/dL Final     Potassium   Date Value Ref Range Status   11/30/2023 5.2 3.7 - 5.3 mmol/L Final

## 2024-03-25 RX ORDER — FERROUS SULFATE 325(65) MG
1 TABLET ORAL DAILY
Qty: 30 TABLET | Refills: 3 | Status: SHIPPED | OUTPATIENT
Start: 2024-03-25

## 2024-03-25 NOTE — TELEPHONE ENCOUNTER
Please Approve or Refuse.  Send to Pharmacy per Pt's Request: eli      Next Visit Date:  4/30/2024   Last Visit Date: 1/30/2024    Hemoglobin A1C (%)   Date Value   01/30/2024 8.6   10/31/2023 8.0   06/30/2023 8.3             ( goal A1C is < 7)   BP Readings from Last 3 Encounters:   03/11/24 (!) 140/84   03/04/24 120/63   02/19/24 118/78          (goal 120/80)  BUN   Date Value Ref Range Status   11/30/2023 36 (H) 8 - 23 mg/dL Final     Creatinine   Date Value Ref Range Status   11/30/2023 2.6 (H) 0.5 - 0.9 mg/dL Final     Potassium   Date Value Ref Range Status   11/30/2023 5.2 3.7 - 5.3 mmol/L Final

## 2024-03-28 DIAGNOSIS — E11.9 TYPE 2 DIABETES MELLITUS WITHOUT COMPLICATION, WITHOUT LONG-TERM CURRENT USE OF INSULIN (HCC): ICD-10-CM

## 2024-03-28 RX ORDER — INSULIN GLARGINE 100 [IU]/ML
INJECTION, SOLUTION SUBCUTANEOUS
Qty: 15 ML | Refills: 3 | Status: SHIPPED | OUTPATIENT
Start: 2024-03-28

## 2024-04-04 DIAGNOSIS — G47.00 INSOMNIA, UNSPECIFIED TYPE: ICD-10-CM

## 2024-04-04 RX ORDER — TRAZODONE HYDROCHLORIDE 100 MG/1
100 TABLET ORAL NIGHTLY
Qty: 90 TABLET | Refills: 0 | Status: SHIPPED | OUTPATIENT
Start: 2024-04-04

## 2024-04-08 ENCOUNTER — HOSPITAL ENCOUNTER (OUTPATIENT)
Dept: GENERAL RADIOLOGY | Age: 74
Discharge: HOME | End: 2024-04-10
Payer: MEDICARE

## 2024-04-08 ENCOUNTER — HOSPITAL ENCOUNTER (OUTPATIENT)
Age: 74
Discharge: HOME | End: 2024-04-08
Payer: MEDICARE

## 2024-04-08 ENCOUNTER — HOSPITAL ENCOUNTER (OUTPATIENT)
Age: 74
Discharge: HOME | End: 2024-04-10
Payer: MEDICARE

## 2024-04-08 DIAGNOSIS — I12.9 BENIGN HYPERTENSION WITH CHRONIC KIDNEY DISEASE, STAGE IV (HCC): ICD-10-CM

## 2024-04-08 DIAGNOSIS — M21.941 ACQUIRED DEFORMITY OF RIGHT HAND: ICD-10-CM

## 2024-04-08 DIAGNOSIS — N18.4 BENIGN HYPERTENSION WITH CHRONIC KIDNEY DISEASE, STAGE IV (HCC): ICD-10-CM

## 2024-04-08 DIAGNOSIS — R82.998 LEUKOCYTES IN URINE: ICD-10-CM

## 2024-04-08 DIAGNOSIS — N18.4 CKD (CHRONIC KIDNEY DISEASE), STAGE IV (HCC): ICD-10-CM

## 2024-04-08 DIAGNOSIS — R80.9 PROTEINURIA, UNSPECIFIED TYPE: ICD-10-CM

## 2024-04-08 LAB
BACTERIA URNS QL MICRO: ABNORMAL
BASOPHILS # BLD: 0.1 K/UL (ref 0–0.2)
BASOPHILS NFR BLD: 1 % (ref 0–2)
BILIRUB UR QL STRIP: NEGATIVE
CASTS #/AREA URNS LPF: ABNORMAL /LPF
CLARITY UR: CLEAR
COLOR UR: YELLOW
EOSINOPHIL # BLD: 0.3 K/UL (ref 0–0.4)
EOSINOPHILS RELATIVE PERCENT: 4 % (ref 0–4)
EPI CELLS #/AREA URNS HPF: ABNORMAL /HPF
ERYTHROCYTE [DISTWIDTH] IN BLOOD BY AUTOMATED COUNT: 15.2 % (ref 11.5–14.9)
GLUCOSE UR STRIP-MCNC: ABNORMAL MG/DL
HCT VFR BLD AUTO: 38.9 % (ref 36–46)
HGB BLD-MCNC: 12.3 G/DL (ref 12–16)
HGB UR QL STRIP.AUTO: NEGATIVE
KETONES UR STRIP-MCNC: NEGATIVE MG/DL
LEUKOCYTE ESTERASE UR QL STRIP: ABNORMAL
LYMPHOCYTES NFR BLD: 1 K/UL (ref 1–4.8)
LYMPHOCYTES RELATIVE PERCENT: 17 % (ref 24–44)
MAGNESIUM SERPL-MCNC: 2.1 MG/DL (ref 1.6–2.6)
MCH RBC QN AUTO: 29.3 PG (ref 26–34)
MCHC RBC AUTO-ENTMCNC: 31.5 G/DL (ref 31–37)
MCV RBC AUTO: 92.8 FL (ref 80–100)
MONOCYTES NFR BLD: 0.7 K/UL (ref 0.1–1.3)
MONOCYTES NFR BLD: 11 % (ref 1–7)
NEUTROPHILS NFR BLD: 67 % (ref 36–66)
NEUTS SEG NFR BLD: 4 K/UL (ref 1.3–9.1)
NITRITE UR QL STRIP: NEGATIVE
PH UR STRIP: 6.5 [PH] (ref 5–8)
PHOSPHATE SERPL-MCNC: 3.4 MG/DL (ref 2.6–4.5)
PLATELET # BLD AUTO: 205 K/UL (ref 150–450)
PMV BLD AUTO: 8.9 FL (ref 6–12)
PROT UR STRIP-MCNC: ABNORMAL MG/DL
RBC # BLD AUTO: 4.19 M/UL (ref 4–5.2)
RBC #/AREA URNS HPF: ABNORMAL /HPF
SP GR UR STRIP: 1.02 (ref 1–1.03)
UROBILINOGEN UR STRIP-ACNC: NORMAL EU/DL (ref 0–1)
WBC #/AREA URNS HPF: ABNORMAL /HPF
WBC OTHER # BLD: 6 K/UL (ref 3.5–11)

## 2024-04-08 PROCEDURE — 83735 ASSAY OF MAGNESIUM: CPT

## 2024-04-08 PROCEDURE — 87086 URINE CULTURE/COLONY COUNT: CPT

## 2024-04-08 PROCEDURE — 86403 PARTICLE AGGLUT ANTBDY SCRN: CPT

## 2024-04-08 PROCEDURE — 84100 ASSAY OF PHOSPHORUS: CPT

## 2024-04-08 PROCEDURE — 73130 X-RAY EXAM OF HAND: CPT

## 2024-04-08 PROCEDURE — 36415 COLL VENOUS BLD VENIPUNCTURE: CPT

## 2024-04-08 PROCEDURE — 81001 URINALYSIS AUTO W/SCOPE: CPT

## 2024-04-08 PROCEDURE — 85025 COMPLETE CBC W/AUTO DIFF WBC: CPT

## 2024-04-09 LAB
MICROORGANISM SPEC CULT: ABNORMAL
SPECIMEN DESCRIPTION: ABNORMAL

## 2024-04-10 ENCOUNTER — HOSPITAL ENCOUNTER (OUTPATIENT)
Dept: PAIN MANAGEMENT | Age: 74
Discharge: HOME | End: 2024-04-10
Payer: MEDICARE

## 2024-04-10 VITALS — BODY MASS INDEX: 38.45 KG/M2 | HEIGHT: 63 IN | WEIGHT: 217 LBS

## 2024-04-10 DIAGNOSIS — M48.02 CERVICAL STENOSIS OF SPINE: ICD-10-CM

## 2024-04-10 DIAGNOSIS — M47.817 LUMBOSACRAL SPONDYLOSIS WITHOUT MYELOPATHY: ICD-10-CM

## 2024-04-10 DIAGNOSIS — M50.30 DDD (DEGENERATIVE DISC DISEASE), CERVICAL: ICD-10-CM

## 2024-04-10 DIAGNOSIS — M46.1 SACROILIITIS (HCC): Primary | ICD-10-CM

## 2024-04-10 DIAGNOSIS — M48.061 DEGENERATIVE LUMBAR SPINAL STENOSIS: ICD-10-CM

## 2024-04-10 DIAGNOSIS — M16.12 PRIMARY OSTEOARTHRITIS OF LEFT HIP: ICD-10-CM

## 2024-04-10 DIAGNOSIS — Z79.891 CHRONIC USE OF OPIATE FOR THERAPEUTIC PURPOSE: ICD-10-CM

## 2024-04-10 DIAGNOSIS — M51.36 DDD (DEGENERATIVE DISC DISEASE), LUMBAR: ICD-10-CM

## 2024-04-10 DIAGNOSIS — N39.0 URINARY TRACT INFECTION WITHOUT HEMATURIA, SITE UNSPECIFIED: Primary | ICD-10-CM

## 2024-04-10 PROCEDURE — 99213 OFFICE O/P EST LOW 20 MIN: CPT

## 2024-04-10 PROCEDURE — 99213 OFFICE O/P EST LOW 20 MIN: CPT | Performed by: NURSE PRACTITIONER

## 2024-04-10 RX ORDER — CEPHALEXIN 500 MG/1
500 CAPSULE ORAL 3 TIMES DAILY
Qty: 15 CAPSULE | Refills: 0 | Status: SHIPPED | OUTPATIENT
Start: 2024-04-10 | End: 2024-04-15

## 2024-04-10 RX ORDER — HYDROCODONE BITARTRATE AND ACETAMINOPHEN 5; 325 MG/1; MG/1
1 TABLET ORAL EVERY 8 HOURS PRN
Qty: 90 TABLET | Refills: 0 | Status: SHIPPED | OUTPATIENT
Start: 2024-04-13 | End: 2024-05-13

## 2024-04-10 ASSESSMENT — ENCOUNTER SYMPTOMS
BACK PAIN: 1
SHORTNESS OF BREATH: 0
CONSTIPATION: 0
COUGH: 0

## 2024-04-10 ASSESSMENT — PAIN SCALES - GENERAL: PAINLEVEL_OUTOF10: 8

## 2024-04-10 NOTE — RESULT ENCOUNTER NOTE
Please notify patient: Urinary tract infection, I will send Keflex to the pharmacy     Patient received cefazolin and ceftriaxone in the past per medication review, multiple times, no reaction  Future Appointments  4/10/2024  10:20 AM   Ellie Gunderson A* [facility]        [place]  4/15/2024  2:45 PM    Walker Dillon MD  [facility]        [facility]  4/30/2024  10:30 AM   Blanche Pepper MD         [facility]  5/8/2024   10:20 AM   Ellie Gunderson A* [facility]        [place]

## 2024-04-10 NOTE — PROGRESS NOTES
Chief Complaint   Patient presents with    Neck Pain         [facility]  Pt complains of chronic neck pain. MRI with moderate stenosis. She had cervical facet injections with moderate relief but states she cannot afford to repeat at this time. Last injection was 3/2021. Had appt with Dr Mack in Jan 2022 with ACDF C3-4 and C5-6 recommended but patient does not wish to have surgery. She states her cardiologist has advised her to not have another surgery. She continues with chiropractic care with benefit. She also complains of bilateral hip pain. She had right hip injection 2/5/23 with Dr. Garcia with mild benefit. She currently takes Norco 5-325 TID with good relief.      She had right shoulder injection 3/11/24 with Dr. Lara and reports significant improvement in pain and function.     Neck Pain   This is a chronic problem. The current episode started more than 1 year ago. The problem occurs constantly. The problem has been unchanged. The pain is associated with nothing. The pain is present in the midline. The quality of the pain is described as aching. The pain is at a severity of 8/10. The symptoms are aggravated by bending, position and twisting. The pain is Same all the time. Associated symptoms include headaches and leg pain. Pertinent negatives include no chest pain, fever, numbness, tingling or weakness. She has tried heat and ice for the symptoms.     Patient denies any new neurological symptoms. No bowel or bladder incontinence, no weakness, and no falling.    Pill count: appropriate / Norco - 11 - due 4/13    Morphine equivalent: 15     Controlled Substance Monitoring:    Acute and Chronic Pain Monitoring:   RX Monitoring Periodic Controlled Substance Monitoring   4/10/2024  10:12 AM Possible medication side effects, risk of tolerance/dependence & alternative treatments discussed.;No signs of potential drug abuse or diversion identified.;Assessed functional status (ability to engage in work or other

## 2024-04-15 ENCOUNTER — HOSPITAL ENCOUNTER (OUTPATIENT)
Age: 74
Discharge: HOME | End: 2024-04-15
Payer: MEDICARE

## 2024-04-15 DIAGNOSIS — I12.9 BENIGN HYPERTENSION WITH CHRONIC KIDNEY DISEASE, STAGE IV (HCC): ICD-10-CM

## 2024-04-15 DIAGNOSIS — N18.4 BENIGN HYPERTENSION WITH CHRONIC KIDNEY DISEASE, STAGE IV (HCC): ICD-10-CM

## 2024-04-15 DIAGNOSIS — R80.9 PROTEINURIA, UNSPECIFIED TYPE: ICD-10-CM

## 2024-04-15 DIAGNOSIS — N18.4 CKD (CHRONIC KIDNEY DISEASE), STAGE IV (HCC): ICD-10-CM

## 2024-04-15 DIAGNOSIS — R82.998 LEUKOCYTES IN URINE: ICD-10-CM

## 2024-04-15 PROBLEM — L29.8 UREMIC PRURITUS: Status: ACTIVE | Noted: 2024-04-15

## 2024-04-15 PROBLEM — L29.89 UREMIC PRURITUS: Status: ACTIVE | Noted: 2024-04-15

## 2024-04-15 LAB
ANION GAP SERPL CALCULATED.3IONS-SCNC: 12 MMOL/L (ref 9–17)
BUN SERPL-MCNC: 52 MG/DL (ref 8–23)
CALCIUM SERPL-MCNC: 9.3 MG/DL (ref 8.6–10.4)
CHLORIDE SERPL-SCNC: 104 MMOL/L (ref 98–107)
CO2 SERPL-SCNC: 25 MMOL/L (ref 20–31)
CREAT SERPL-MCNC: 2.6 MG/DL (ref 0.5–0.9)
GFR SERPL CREATININE-BSD FRML MDRD: 19 ML/MIN/1.73M2
GLUCOSE SERPL-MCNC: 121 MG/DL (ref 70–99)
POTASSIUM SERPL-SCNC: 5.1 MMOL/L (ref 3.7–5.3)
SODIUM SERPL-SCNC: 141 MMOL/L (ref 135–144)

## 2024-04-15 PROCEDURE — 36415 COLL VENOUS BLD VENIPUNCTURE: CPT

## 2024-04-15 PROCEDURE — 80048 BASIC METABOLIC PNL TOTAL CA: CPT

## 2024-04-26 ENCOUNTER — HOSPITAL ENCOUNTER (OUTPATIENT)
Age: 74
Discharge: HOME | End: 2024-04-26
Payer: MEDICARE

## 2024-04-26 LAB
ANION GAP SERPL CALCULATED.3IONS-SCNC: 10 MMOL/L (ref 9–17)
BASOPHILS # BLD: 0 K/UL (ref 0–0.2)
BASOPHILS NFR BLD: 0 % (ref 0–2)
BUN SERPL-MCNC: 39 MG/DL (ref 8–23)
CALCIUM SERPL-MCNC: 9.2 MG/DL (ref 8.6–10.4)
CHLORIDE SERPL-SCNC: 104 MMOL/L (ref 98–107)
CO2 SERPL-SCNC: 26 MMOL/L (ref 20–31)
CREAT SERPL-MCNC: 2.5 MG/DL (ref 0.5–0.9)
EOSINOPHIL # BLD: 0.1 K/UL (ref 0–0.4)
EOSINOPHILS RELATIVE PERCENT: 1 % (ref 0–4)
ERYTHROCYTE [DISTWIDTH] IN BLOOD BY AUTOMATED COUNT: 14.9 % (ref 11.5–14.9)
GFR SERPL CREATININE-BSD FRML MDRD: 20 ML/MIN/1.73M2
GLUCOSE SERPL-MCNC: 114 MG/DL (ref 70–99)
HCT VFR BLD AUTO: 38.1 % (ref 36–46)
HGB BLD-MCNC: 12 G/DL (ref 12–16)
LYMPHOCYTES NFR BLD: 1.8 K/UL (ref 1–4.8)
LYMPHOCYTES RELATIVE PERCENT: 17 % (ref 24–44)
MAGNESIUM SERPL-MCNC: 2.2 MG/DL (ref 1.6–2.6)
MCH RBC QN AUTO: 29.2 PG (ref 26–34)
MCHC RBC AUTO-ENTMCNC: 31.5 G/DL (ref 31–37)
MCV RBC AUTO: 92.9 FL (ref 80–100)
MONOCYTES NFR BLD: 0.9 K/UL (ref 0.1–1.3)
MONOCYTES NFR BLD: 9 % (ref 1–7)
NEUTROPHILS NFR BLD: 73 % (ref 36–66)
NEUTS SEG NFR BLD: 7.6 K/UL (ref 1.3–9.1)
PHOSPHATE SERPL-MCNC: 4.2 MG/DL (ref 2.6–4.5)
PLATELET # BLD AUTO: 282 K/UL (ref 150–450)
PMV BLD AUTO: 8.7 FL (ref 6–12)
POTASSIUM SERPL-SCNC: 4.7 MMOL/L (ref 3.7–5.3)
RBC # BLD AUTO: 4.1 M/UL (ref 4–5.2)
SODIUM SERPL-SCNC: 140 MMOL/L (ref 135–144)
WBC OTHER # BLD: 10.5 K/UL (ref 3.5–11)

## 2024-04-26 PROCEDURE — 85025 COMPLETE CBC W/AUTO DIFF WBC: CPT

## 2024-04-26 PROCEDURE — 81001 URINALYSIS AUTO W/SCOPE: CPT

## 2024-04-26 PROCEDURE — 83735 ASSAY OF MAGNESIUM: CPT

## 2024-04-26 PROCEDURE — 84100 ASSAY OF PHOSPHORUS: CPT

## 2024-04-26 PROCEDURE — 36415 COLL VENOUS BLD VENIPUNCTURE: CPT

## 2024-04-26 PROCEDURE — 80048 BASIC METABOLIC PNL TOTAL CA: CPT

## 2024-04-26 PROCEDURE — 87086 URINE CULTURE/COLONY COUNT: CPT

## 2024-04-27 LAB
BACTERIA URNS QL MICRO: ABNORMAL
BILIRUB UR QL STRIP: NEGATIVE
CASTS #/AREA URNS LPF: ABNORMAL /LPF
CLARITY UR: CLEAR
COLOR UR: YELLOW
EPI CELLS #/AREA URNS HPF: ABNORMAL /HPF
GLUCOSE UR STRIP-MCNC: ABNORMAL MG/DL
HGB UR QL STRIP.AUTO: NEGATIVE
KETONES UR STRIP-MCNC: NEGATIVE MG/DL
LEUKOCYTE ESTERASE UR QL STRIP: NEGATIVE
MICROORGANISM SPEC CULT: NORMAL
NITRITE UR QL STRIP: NEGATIVE
PH UR STRIP: 6 [PH] (ref 5–8)
PROT UR STRIP-MCNC: ABNORMAL MG/DL
RBC #/AREA URNS HPF: ABNORMAL /HPF
SP GR UR STRIP: 1.02 (ref 1–1.03)
SPECIMEN DESCRIPTION: NORMAL
UROBILINOGEN UR STRIP-ACNC: NORMAL EU/DL (ref 0–1)
WBC #/AREA URNS HPF: ABNORMAL /HPF

## 2024-04-27 SDOH — HEALTH STABILITY: PHYSICAL HEALTH: ON AVERAGE, HOW MANY MINUTES DO YOU ENGAGE IN EXERCISE AT THIS LEVEL?: 20 MIN

## 2024-04-27 SDOH — HEALTH STABILITY: PHYSICAL HEALTH: ON AVERAGE, HOW MANY DAYS PER WEEK DO YOU ENGAGE IN MODERATE TO STRENUOUS EXERCISE (LIKE A BRISK WALK)?: 7 DAYS

## 2024-04-27 ASSESSMENT — LIFESTYLE VARIABLES
HOW OFTEN DO YOU HAVE A DRINK CONTAINING ALCOHOL: MONTHLY OR LESS
HOW OFTEN DO YOU HAVE SIX OR MORE DRINKS ON ONE OCCASION: 1
HOW OFTEN DO YOU HAVE A DRINK CONTAINING ALCOHOL: 2
HOW MANY STANDARD DRINKS CONTAINING ALCOHOL DO YOU HAVE ON A TYPICAL DAY: 0
HOW MANY STANDARD DRINKS CONTAINING ALCOHOL DO YOU HAVE ON A TYPICAL DAY: PATIENT DOES NOT DRINK

## 2024-04-27 ASSESSMENT — PATIENT HEALTH QUESTIONNAIRE - PHQ9
1. LITTLE INTEREST OR PLEASURE IN DOING THINGS: SEVERAL DAYS
SUM OF ALL RESPONSES TO PHQ QUESTIONS 1-9: 2
SUM OF ALL RESPONSES TO PHQ9 QUESTIONS 1 & 2: 2
2. FEELING DOWN, DEPRESSED OR HOPELESS: SEVERAL DAYS
SUM OF ALL RESPONSES TO PHQ QUESTIONS 1-9: 2

## 2024-04-30 ENCOUNTER — OFFICE VISIT (OUTPATIENT)
Dept: FAMILY MEDICINE CLINIC | Age: 74
End: 2024-04-30
Payer: MEDICARE

## 2024-04-30 VITALS
HEART RATE: 85 BPM | WEIGHT: 227 LBS | HEIGHT: 63 IN | BODY MASS INDEX: 40.22 KG/M2 | DIASTOLIC BLOOD PRESSURE: 70 MMHG | OXYGEN SATURATION: 93 % | SYSTOLIC BLOOD PRESSURE: 130 MMHG

## 2024-04-30 DIAGNOSIS — R10.2 PELVIC PAIN: ICD-10-CM

## 2024-04-30 DIAGNOSIS — F33.1 MODERATE EPISODE OF RECURRENT MAJOR DEPRESSIVE DISORDER (HCC): ICD-10-CM

## 2024-04-30 DIAGNOSIS — M70.61 GREATER TROCHANTERIC BURSITIS OF RIGHT HIP: ICD-10-CM

## 2024-04-30 DIAGNOSIS — E03.9 HYPOTHYROIDISM, UNSPECIFIED TYPE: ICD-10-CM

## 2024-04-30 DIAGNOSIS — I10 ESSENTIAL HYPERTENSION: ICD-10-CM

## 2024-04-30 DIAGNOSIS — Z00.00 MEDICARE ANNUAL WELLNESS VISIT, SUBSEQUENT: Primary | ICD-10-CM

## 2024-04-30 DIAGNOSIS — K40.90 NON-RECURRENT UNILATERAL INGUINAL HERNIA WITHOUT OBSTRUCTION OR GANGRENE: ICD-10-CM

## 2024-04-30 DIAGNOSIS — E78.2 MIXED HYPERLIPIDEMIA: ICD-10-CM

## 2024-04-30 DIAGNOSIS — E11.9 TYPE 2 DIABETES MELLITUS WITHOUT COMPLICATION, WITHOUT LONG-TERM CURRENT USE OF INSULIN (HCC): ICD-10-CM

## 2024-04-30 DIAGNOSIS — R19.00 PELVIC LUMP: ICD-10-CM

## 2024-04-30 LAB — HBA1C MFR BLD: 7.3 %

## 2024-04-30 PROCEDURE — 3078F DIAST BP <80 MM HG: CPT | Performed by: FAMILY MEDICINE

## 2024-04-30 PROCEDURE — 99214 OFFICE O/P EST MOD 30 MIN: CPT | Performed by: FAMILY MEDICINE

## 2024-04-30 PROCEDURE — 1123F ACP DISCUSS/DSCN MKR DOCD: CPT | Performed by: FAMILY MEDICINE

## 2024-04-30 PROCEDURE — 3051F HG A1C>EQUAL 7.0%<8.0%: CPT | Performed by: FAMILY MEDICINE

## 2024-04-30 PROCEDURE — 83036 HEMOGLOBIN GLYCOSYLATED A1C: CPT | Performed by: FAMILY MEDICINE

## 2024-04-30 PROCEDURE — 3075F SYST BP GE 130 - 139MM HG: CPT | Performed by: FAMILY MEDICINE

## 2024-04-30 PROCEDURE — G0439 PPPS, SUBSEQ VISIT: HCPCS | Performed by: FAMILY MEDICINE

## 2024-04-30 RX ORDER — PRAMIPEXOLE DIHYDROCHLORIDE 0.12 MG/1
0.12 TABLET ORAL NIGHTLY
Qty: 90 TABLET | Refills: 1 | Status: SHIPPED | OUTPATIENT
Start: 2024-04-30

## 2024-04-30 RX ORDER — PEN NEEDLE, DIABETIC 31 GX5/16"
100 NEEDLE, DISPOSABLE MISCELLANEOUS DAILY
Qty: 100 EACH | Refills: 3 | Status: SHIPPED | OUTPATIENT
Start: 2024-04-30

## 2024-04-30 RX ORDER — PANTOPRAZOLE SODIUM 20 MG/1
20 TABLET, DELAYED RELEASE ORAL NIGHTLY
Qty: 30 TABLET | Refills: 0 | Status: SHIPPED | OUTPATIENT
Start: 2024-04-30

## 2024-04-30 SDOH — ECONOMIC STABILITY: INCOME INSECURITY: HOW HARD IS IT FOR YOU TO PAY FOR THE VERY BASICS LIKE FOOD, HOUSING, MEDICAL CARE, AND HEATING?: NOT HARD AT ALL

## 2024-04-30 SDOH — ECONOMIC STABILITY: FOOD INSECURITY: WITHIN THE PAST 12 MONTHS, THE FOOD YOU BOUGHT JUST DIDN'T LAST AND YOU DIDN'T HAVE MONEY TO GET MORE.: NEVER TRUE

## 2024-04-30 SDOH — ECONOMIC STABILITY: FOOD INSECURITY: WITHIN THE PAST 12 MONTHS, YOU WORRIED THAT YOUR FOOD WOULD RUN OUT BEFORE YOU GOT MONEY TO BUY MORE.: NEVER TRUE

## 2024-04-30 ASSESSMENT — ENCOUNTER SYMPTOMS
CONSTIPATION: 0
EYE REDNESS: 0
DIARRHEA: 0
COLOR CHANGE: 0
STRIDOR: 0
RECTAL PAIN: 0
COUGH: 0
SHORTNESS OF BREATH: 0
ABDOMINAL DISTENTION: 0
VOMITING: 0
CHEST TIGHTNESS: 0
BACK PAIN: 1
WHEEZING: 0
TROUBLE SWALLOWING: 0
RHINORRHEA: 0
ABDOMINAL PAIN: 0
NAUSEA: 0
SINUS PRESSURE: 0
BLOOD IN STOOL: 0
SORE THROAT: 0

## 2024-04-30 ASSESSMENT — VISUAL ACUITY
OD_CC: 20/25
OS_CC: 20/30

## 2024-04-30 NOTE — PROGRESS NOTES
Medicare Annual Wellness Visit    Marilyn A Long is here for Medicare AWV, Ear Fullness (Right ear ), Groin Pain (Right side ), Numbness (Toes ), leg cramp (Bilateral calf and thigh cramps ), and Other (Needs script for incontinence pads )    Assessment & Plan   Medicare annual wellness visit, subsequent  Medicare wellness visit updated  Type 2 diabetes mellitus without complication, without long-term current use of insulin (HCC)  Controlled, A1c has improved continue Farxiga.  Continue to monitor blood sugars  -     POCT glycosylated hemoglobin (Hb A1C)  -     DROPLET PEN NEEDLES 31G X 8 MM MISC; Inject 100 each into the skin daily, Disp-100 each, R-3, DAWNormal  Essential hypertension  Controlled, continue current treatment continue all antihypertensives.  Watch for side effects recommended low-salt diet  Pelvic lump    Discussed with patient there is a lump on the right inguinal area either the hernia or lymph node, CT abdominal pelvis.  Patient cannot have IV contrast due to chronic kidney disease-    -     CT PELVIS WO CONTRAST Additional Contrast? Oral; Future  Non-recurrent unilateral inguinal hernia without obstruction or gangrene  Not seen on the previous CT abdomen pelvis  Mixed hyperlipidemia  -     Lipid Panel; Future  Pelvic pain    -Not sure about the cause, will do CT abdomen pelvis to rule out any inguinal hernias.  -     CT PELVIS WO CONTRAST Additional Contrast? Oral; Future  Hypothyroidism, unspecified type  Moderate episode of recurrent major depressive disorder (HCC)  Stable, medications updated  Greater trochanteric bursitis of right hip  Recently received a cortisone shot pain has improved  Recommendations for Preventive Services Due: see orders and patient instructions/AVS.  Recommended screening schedule for the next 5-10 years is provided to the patient in written form: see Patient Instructions/AVS.     Return in about 6 weeks (around 6/11/2024) for pelvic pain .     Subjective   The following

## 2024-04-30 NOTE — TELEPHONE ENCOUNTER
Please Approve or Refuse.  Send to Pharmacy per Pt's Request:      Next Visit Date:  6/12/2024   Last Visit Date: 4/30/2024    Hemoglobin A1C (%)   Date Value   04/30/2024 7.3   01/30/2024 8.6   10/31/2023 8.0             ( goal A1C is < 7)   BP Readings from Last 3 Encounters:   04/30/24 130/70   04/15/24 120/78   03/11/24 (!) 140/84          (goal 120/80)  BUN   Date Value Ref Range Status   04/26/2024 39 (H) 8 - 23 mg/dL Final     Creatinine   Date Value Ref Range Status   04/26/2024 2.5 (H) 0.5 - 0.9 mg/dL Final     Potassium   Date Value Ref Range Status   04/26/2024 4.7 3.7 - 5.3 mmol/L Final

## 2024-04-30 NOTE — PATIENT INSTRUCTIONS
important things you can do to protect your heart. It is never too late to quit. Try to avoid secondhand smoke too.     Stay at a weight that's healthy for you. Talk to your doctor if you need help losing weight.     Try to get 7 to 9 hours of sleep each night.     Limit alcohol to 2 drinks a day for men and 1 drink a day for women. Too much alcohol can cause health problems.     Manage other health problems such as diabetes, high blood pressure, and high cholesterol. If you think you may have a problem with alcohol or drug use, talk to your doctor.   Medicines    Take your medicines exactly as prescribed. Call your doctor if you think you are having a problem with your medicine.     If your doctor recommends aspirin, take the amount directed each day. Make sure you take aspirin and not another kind of pain reliever, such as acetaminophen (Tylenol).   When should you call for help?   Call 911 if you have symptoms of a heart attack. These may include:    Chest pain or pressure, or a strange feeling in the chest.     Sweating.     Shortness of breath.     Pain, pressure, or a strange feeling in the back, neck, jaw, or upper belly or in one or both shoulders or arms.     Lightheadedness or sudden weakness.     A fast or irregular heartbeat.   After you call 911, the  may tell you to chew 1 adult-strength or 2 to 4 low-dose aspirin. Wait for an ambulance. Do not try to drive yourself.  Watch closely for changes in your health, and be sure to contact your doctor if you have any problems.  Where can you learn more?  Go to https://www.[employer].net/patientEd and enter F075 to learn more about \"A Healthy Heart: Care Instructions.\"  Current as of: June 24, 2023               Content Version: 14.0  © 0274-4043 Healthwise, Incorporated.   Care instructions adapted under license by [employer]. If you have questions about a medical condition or this instruction, always ask your healthcare professional. [employer],

## 2024-04-30 NOTE — PROGRESS NOTES
Visit Information    Have you changed or started any medications since your last visit including any over-the-counter medicines, vitamins, or herbal medicines? no   Are you having any side effects from any of your medications? -  no  Have you stopped taking any of your medications? Is so, why? -  no    Have you seen any other physician or provider since your last visit? No  Have you had any other diagnostic tests since your last visit? No  Have you been seen in the emergency room and/or had an admission to a hospital since we last saw you? No  Have you had your routine dental cleaning in the past 6 months? yes     Have you activated your [employer] account? If not, what are your barriers? Yes     Patient Care Team:  Blanche Pepper MD as PCP - General (Family Medicine)  Blanche Pepper MD as PCP - Empaneled Provider  Nicholas Ly MD as Consulting Physician (Gastroenterology)    Medical History Review  Past Medical, Family, and Social History reviewed and does contribute to the patient presenting condition    Health Maintenance   Topic Date Due    Diabetic retinal exam  Never done    Annual Wellness Visit (Medicare Advantage)  01/01/2024    Lipids  03/13/2024    Diabetic foot exam  06/30/2024    Breast cancer screen  09/20/2024    Diabetic Alb to Cr ratio (uACR) test  10/31/2024    A1C test (Diabetic or Prediabetic)  01/30/2025    GFR test (Diabetes, CKD 3-4, OR last GFR 15-59)  04/26/2025    Depression Monitoring  04/27/2025    Colorectal Cancer Screen  06/09/2033    DTaP/Tdap/Td vaccine (3 - Td or Tdap) 10/24/2033    DEXA (modify frequency per FRAX score)  Completed    Flu vaccine  Completed    Shingles vaccine  Completed    Pneumococcal 65+ years Vaccine  Completed    COVID-19 Vaccine  Completed    Respiratory Syncytial Virus (RSV) Pregnant or age 60 yrs+  Completed    Hepatitis C screen  Completed    Hepatitis A vaccine  Aged Out    Hepatitis B vaccine  Aged Out    Hib vaccine  Aged Out    Polio vaccine

## 2024-05-03 ENCOUNTER — HOSPITAL ENCOUNTER (OUTPATIENT)
Age: 74
Discharge: HOME | End: 2024-05-03
Payer: MEDICARE

## 2024-05-03 ENCOUNTER — HOSPITAL ENCOUNTER (OUTPATIENT)
Dept: CT IMAGING | Age: 74
End: 2024-05-03
Payer: MEDICARE

## 2024-05-03 DIAGNOSIS — R10.2 PELVIC PAIN: ICD-10-CM

## 2024-05-03 DIAGNOSIS — R19.00 PELVIC LUMP: ICD-10-CM

## 2024-05-03 LAB
CHOLEST SERPL-MCNC: 169 MG/DL
CHOLESTEROL/HDL RATIO: 3.8
HDLC SERPL-MCNC: 45 MG/DL
LDLC SERPL CALC-MCNC: 101 MG/DL (ref 0–130)
TRIGL SERPL-MCNC: 116 MG/DL

## 2024-05-03 PROCEDURE — 72192 CT PELVIS W/O DYE: CPT

## 2024-05-03 PROCEDURE — 36415 COLL VENOUS BLD VENIPUNCTURE: CPT

## 2024-05-03 PROCEDURE — 2500000003 HC RX 250 WO HCPCS: Performed by: FAMILY MEDICINE

## 2024-05-03 PROCEDURE — 80061 LIPID PANEL: CPT

## 2024-05-03 RX ADMIN — BARIUM SULFATE 450 ML: 20 SUSPENSION ORAL at 11:01

## 2024-05-08 ENCOUNTER — HOSPITAL ENCOUNTER (OUTPATIENT)
Dept: PAIN MANAGEMENT | Age: 74
Discharge: HOME | End: 2024-05-08
Payer: MEDICARE

## 2024-05-08 VITALS — HEIGHT: 60 IN | WEIGHT: 227 LBS | BODY MASS INDEX: 44.57 KG/M2

## 2024-05-08 DIAGNOSIS — M50.30 DDD (DEGENERATIVE DISC DISEASE), CERVICAL: ICD-10-CM

## 2024-05-08 DIAGNOSIS — M48.02 CERVICAL STENOSIS OF SPINE: ICD-10-CM

## 2024-05-08 DIAGNOSIS — M47.817 LUMBOSACRAL SPONDYLOSIS WITHOUT MYELOPATHY: ICD-10-CM

## 2024-05-08 DIAGNOSIS — M70.61 GREATER TROCHANTERIC BURSITIS OF RIGHT HIP: ICD-10-CM

## 2024-05-08 DIAGNOSIS — M47.22 OSTEOARTHRITIS OF SPINE WITH RADICULOPATHY, CERVICAL REGION: Primary | ICD-10-CM

## 2024-05-08 DIAGNOSIS — M48.061 DEGENERATIVE LUMBAR SPINAL STENOSIS: ICD-10-CM

## 2024-05-08 DIAGNOSIS — Z79.891 CHRONIC USE OF OPIATE FOR THERAPEUTIC PURPOSE: ICD-10-CM

## 2024-05-08 DIAGNOSIS — M51.36 DDD (DEGENERATIVE DISC DISEASE), LUMBAR: ICD-10-CM

## 2024-05-08 PROCEDURE — 99213 OFFICE O/P EST LOW 20 MIN: CPT | Performed by: NURSE PRACTITIONER

## 2024-05-08 PROCEDURE — 99213 OFFICE O/P EST LOW 20 MIN: CPT

## 2024-05-08 RX ORDER — HYDROCODONE BITARTRATE AND ACETAMINOPHEN 5; 325 MG/1; MG/1
1 TABLET ORAL EVERY 8 HOURS PRN
Qty: 90 TABLET | Refills: 0 | Status: SHIPPED | OUTPATIENT
Start: 2024-05-13 | End: 2024-06-12

## 2024-05-08 ASSESSMENT — ENCOUNTER SYMPTOMS
BACK PAIN: 1
COUGH: 0
CONSTIPATION: 0
SHORTNESS OF BREATH: 0

## 2024-05-08 NOTE — PROGRESS NOTES
file   Occupational History    Not on file   Tobacco Use    Smoking status: Former     Current packs/day: 0.00     Average packs/day: 1 pack/day for 38.2 years (38.2 ttl pk-yrs)     Types: Cigarettes     Start date: 1966     Quit date: 3/19/2004     Years since quittin.1     Passive exposure: Past    Smokeless tobacco: Never   Vaping Use    Vaping Use: Never used   Substance and Sexual Activity    Alcohol use: Yes     Comment: rare    Drug use: No    Sexual activity: Not Currently   Other Topics Concern    Not on file   Social History Narrative    Not on file     Social Determinants of Health     Financial Resource Strain: Low Risk  (2024)    Overall Financial Resource Strain (CARDIA)     Difficulty of Paying Living Expenses: Not hard at all   Food Insecurity: No Food Insecurity (2024)    Hunger Vital Sign     Worried About Running Out of Food in the Last Year: Never true     Ran Out of Food in the Last Year: Never true   Transportation Needs: Unknown (2024)    PRAPARE - Transportation     Lack of Transportation (Medical): Not on file     Lack of Transportation (Non-Medical): No   Physical Activity: Insufficiently Active (2024)    Exercise Vital Sign     Days of Exercise per Week: 7 days     Minutes of Exercise per Session: 20 min   Stress: Not on file   Social Connections: Not on file   Intimate Partner Violence: Not on file   Housing Stability: Unknown (2024)    Housing Stability Vital Sign     Unable to Pay for Housing in the Last Year: Not on file     Number of Places Lived in the Last Year: Not on file     Unstable Housing in the Last Year: No       Review of Systems:  Review of Systems   Constitutional: Negative for chills and fever.   Cardiovascular:  Negative for chest pain and palpitations.   Respiratory:  Negative for cough and shortness of breath.    Musculoskeletal:  Positive for back pain, joint pain and neck pain.   Gastrointestinal:  Negative for constipation.

## 2024-05-10 ENCOUNTER — TELEPHONE (OUTPATIENT)
Dept: FAMILY MEDICINE CLINIC | Age: 74
End: 2024-05-10

## 2024-05-10 NOTE — TELEPHONE ENCOUNTER
Call received from facility stating the CT PELVIS WO CONTRAST Additional Contrast was not approved by insurance and they are requiring a peer to peer/ Reception was not good and writer adv caller to fax information over.    Writer also asked with authorization was not obtained prior to completing test.

## 2024-05-17 RX ORDER — LEVOTHYROXINE SODIUM 0.12 MG/1
125 TABLET ORAL DAILY
Qty: 30 TABLET | Refills: 2 | Status: SHIPPED | OUTPATIENT
Start: 2024-05-17

## 2024-05-24 DIAGNOSIS — G47.00 INSOMNIA, UNSPECIFIED TYPE: ICD-10-CM

## 2024-05-24 RX ORDER — TRAZODONE HYDROCHLORIDE 50 MG/1
50 TABLET ORAL NIGHTLY
Qty: 90 TABLET | Refills: 1 | OUTPATIENT
Start: 2024-05-24

## 2024-05-28 RX ORDER — PANTOPRAZOLE SODIUM 20 MG/1
20 TABLET, DELAYED RELEASE ORAL NIGHTLY
Qty: 30 TABLET | Refills: 0 | Status: SHIPPED | OUTPATIENT
Start: 2024-05-28

## 2024-05-28 NOTE — TELEPHONE ENCOUNTER
Please Approve or Refuse.  Send to Pharmacy per Pt's Request: eli      Next Visit Date:  6/12/2024   Last Visit Date: 4/30/2024    Hemoglobin A1C (%)   Date Value   04/30/2024 7.3   01/30/2024 8.6   10/31/2023 8.0             ( goal A1C is < 7)   BP Readings from Last 3 Encounters:   04/30/24 130/70   04/15/24 120/78   03/11/24 (!) 140/84          (goal 120/80)  BUN   Date Value Ref Range Status   04/26/2024 39 (H) 8 - 23 mg/dL Final     Creatinine   Date Value Ref Range Status   04/26/2024 2.5 (H) 0.5 - 0.9 mg/dL Final     Potassium   Date Value Ref Range Status   04/26/2024 4.7 3.7 - 5.3 mmol/L Final

## 2024-06-07 ENCOUNTER — HOSPITAL ENCOUNTER (OUTPATIENT)
Dept: PAIN MANAGEMENT | Age: 74
Discharge: HOME | End: 2024-06-07
Payer: MEDICARE

## 2024-06-07 VITALS — HEIGHT: 63 IN | BODY MASS INDEX: 40.22 KG/M2 | WEIGHT: 227 LBS

## 2024-06-07 DIAGNOSIS — M16.12 ARTHRITIS OF LEFT HIP: ICD-10-CM

## 2024-06-07 DIAGNOSIS — M51.36 DDD (DEGENERATIVE DISC DISEASE), LUMBAR: ICD-10-CM

## 2024-06-07 DIAGNOSIS — Z79.891 CHRONIC USE OF OPIATE FOR THERAPEUTIC PURPOSE: ICD-10-CM

## 2024-06-07 DIAGNOSIS — M48.02 CERVICAL STENOSIS OF SPINE: ICD-10-CM

## 2024-06-07 DIAGNOSIS — M46.1 SACROILIITIS (HCC): Primary | ICD-10-CM

## 2024-06-07 DIAGNOSIS — M47.817 LUMBOSACRAL SPONDYLOSIS WITHOUT MYELOPATHY: ICD-10-CM

## 2024-06-07 DIAGNOSIS — M48.061 DEGENERATIVE LUMBAR SPINAL STENOSIS: ICD-10-CM

## 2024-06-07 DIAGNOSIS — M50.30 DDD (DEGENERATIVE DISC DISEASE), CERVICAL: ICD-10-CM

## 2024-06-07 PROCEDURE — 99214 OFFICE O/P EST MOD 30 MIN: CPT | Performed by: NURSE PRACTITIONER

## 2024-06-07 PROCEDURE — 99213 OFFICE O/P EST LOW 20 MIN: CPT

## 2024-06-07 RX ORDER — HYDROCODONE BITARTRATE AND ACETAMINOPHEN 5; 325 MG/1; MG/1
1 TABLET ORAL EVERY 8 HOURS PRN
Qty: 90 TABLET | Refills: 0 | Status: SHIPPED | OUTPATIENT
Start: 2024-06-12 | End: 2024-07-12

## 2024-06-07 ASSESSMENT — ENCOUNTER SYMPTOMS
BOWEL INCONTINENCE: 0
COUGH: 0
CONSTIPATION: 0
BACK PAIN: 1
SHORTNESS OF BREATH: 0

## 2024-06-07 NOTE — PROGRESS NOTES
Chief Complaint   Patient presents with    Neck Pain     Med refill         [facility]   Pt complains of chronic neck pain. MRI with moderate stenosis. She had cervical facet injections with moderate relief but states she cannot afford to repeat at this time. Last injection was 3/2021. Had appt with Dr Mack in Jan 2022 with ACDF C3-4 and C5-6 recommended but patient does not wish to have surgery. She states her cardiologist has advised her to not have another surgery. She continues with chiropractic care with benefit. She also complains of bilateral hip pain. She had right hip injection 2/5/23 with Dr. Garcia with mild benefit. She currently takes Norco 5-325 TID with good relief.      She is having worsening neck pain and worsening muscle spasms in her legs and feet.      She had right shoulder injection 3/11/24 with Dr. Lara and reports significant improvement in pain and function.      Recent CT of pelvis for groin pain following urology procedure - shows degenerative changes of the bilateral sacroiliac joints and bilateral femoroacetabular joints.    Continues to follow with cardiology for CAD    Neck Pain   This is a chronic problem. The current episode started more than 1 year ago. The problem occurs constantly. The problem has been gradually worsening. The pain is associated with nothing. The pain is present in the left side. The quality of the pain is described as aching. The pain is at a severity of 8/10. The pain is moderate. The symptoms are aggravated by position (activity). The pain is Same all the time. Stiffness is present All day. Associated symptoms include headaches, leg pain, numbness and weakness. Pertinent negatives include no chest pain, fever or tingling. She has tried ice, heat and oral narcotics (icy hot) for the symptoms. The treatment provided mild relief.   Back Pain  This is a chronic problem. The current episode started more than 1 year ago. The problem occurs constantly. The

## 2024-06-12 ENCOUNTER — OFFICE VISIT (OUTPATIENT)
Dept: FAMILY MEDICINE CLINIC | Age: 74
End: 2024-06-12
Payer: MEDICARE

## 2024-06-12 VITALS
SYSTOLIC BLOOD PRESSURE: 124 MMHG | HEIGHT: 63 IN | DIASTOLIC BLOOD PRESSURE: 84 MMHG | WEIGHT: 224 LBS | HEART RATE: 59 BPM | BODY MASS INDEX: 39.69 KG/M2 | OXYGEN SATURATION: 98 %

## 2024-06-12 DIAGNOSIS — Z79.4 TYPE 2 DIABETES MELLITUS WITH DIABETIC POLYNEUROPATHY, WITH LONG-TERM CURRENT USE OF INSULIN (HCC): ICD-10-CM

## 2024-06-12 DIAGNOSIS — E11.42 TYPE 2 DIABETES MELLITUS WITH DIABETIC POLYNEUROPATHY, WITH LONG-TERM CURRENT USE OF INSULIN (HCC): ICD-10-CM

## 2024-06-12 DIAGNOSIS — M47.817 LUMBOSACRAL SPONDYLOSIS WITHOUT MYELOPATHY: ICD-10-CM

## 2024-06-12 DIAGNOSIS — G47.00 INSOMNIA, UNSPECIFIED TYPE: ICD-10-CM

## 2024-06-12 DIAGNOSIS — I10 ESSENTIAL HYPERTENSION: ICD-10-CM

## 2024-06-12 DIAGNOSIS — R19.00 PELVIC LUMP: Primary | ICD-10-CM

## 2024-06-12 PROCEDURE — 99214 OFFICE O/P EST MOD 30 MIN: CPT | Performed by: FAMILY MEDICINE

## 2024-06-12 PROCEDURE — 3074F SYST BP LT 130 MM HG: CPT | Performed by: FAMILY MEDICINE

## 2024-06-12 PROCEDURE — 1123F ACP DISCUSS/DSCN MKR DOCD: CPT | Performed by: FAMILY MEDICINE

## 2024-06-12 PROCEDURE — 3079F DIAST BP 80-89 MM HG: CPT | Performed by: FAMILY MEDICINE

## 2024-06-12 PROCEDURE — 3051F HG A1C>EQUAL 7.0%<8.0%: CPT | Performed by: FAMILY MEDICINE

## 2024-06-12 RX ORDER — GABAPENTIN 100 MG/1
100 CAPSULE ORAL 2 TIMES DAILY
Qty: 60 CAPSULE | Refills: 0 | Status: SHIPPED | OUTPATIENT
Start: 2024-06-12 | End: 2024-07-12

## 2024-06-12 RX ORDER — TRAZODONE HYDROCHLORIDE 100 MG/1
100 TABLET ORAL NIGHTLY
Qty: 90 TABLET | Refills: 1 | Status: SHIPPED | OUTPATIENT
Start: 2024-06-12

## 2024-06-12 SDOH — ECONOMIC STABILITY: FOOD INSECURITY: WITHIN THE PAST 12 MONTHS, THE FOOD YOU BOUGHT JUST DIDN'T LAST AND YOU DIDN'T HAVE MONEY TO GET MORE.: NEVER TRUE

## 2024-06-12 SDOH — ECONOMIC STABILITY: INCOME INSECURITY: HOW HARD IS IT FOR YOU TO PAY FOR THE VERY BASICS LIKE FOOD, HOUSING, MEDICAL CARE, AND HEATING?: PATIENT DECLINED

## 2024-06-12 SDOH — ECONOMIC STABILITY: FOOD INSECURITY: WITHIN THE PAST 12 MONTHS, YOU WORRIED THAT YOUR FOOD WOULD RUN OUT BEFORE YOU GOT MONEY TO BUY MORE.: NEVER TRUE

## 2024-06-12 ASSESSMENT — ENCOUNTER SYMPTOMS
BACK PAIN: 1
DIARRHEA: 0
NAUSEA: 0
RHINORRHEA: 0
CONSTIPATION: 0
ABDOMINAL DISTENTION: 0
COLOR CHANGE: 0
EYE REDNESS: 0
COUGH: 0
WHEEZING: 0
SINUS PRESSURE: 0
CHEST TIGHTNESS: 0
STRIDOR: 0
ABDOMINAL PAIN: 0
SHORTNESS OF BREATH: 0
TROUBLE SWALLOWING: 0
VOMITING: 0
RECTAL PAIN: 0
SORE THROAT: 0
BLOOD IN STOOL: 0

## 2024-06-12 NOTE — PROGRESS NOTES
Polio vaccine  Aged Out    Meningococcal (ACWY) vaccine  Aged Out    Depression Screen  Discontinued    Diabetes screen  Discontinued               
maintenance  Continue current medications, diet and exercise.  Discussed use, benefit, and side effects of prescribed medications. Barriers to medication compliance addressed.   Patient given educational materials - see patient instructions  Was a self-tracking handout given in paper form or via [employer]? Yes    Requested Prescriptions     Signed Prescriptions Disp Refills    traZODone (DESYREL) 100 MG tablet 90 tablet 1     Sig: Take 1 tablet by mouth nightly    gabapentin (NEURONTIN) 100 MG capsule 60 capsule 0     Sig: Take 1 capsule by mouth in the morning and at bedtime for 30 days.       All patient questions answered.  Patient voiced understanding.    Quality Measures    Body mass index is 39.68 kg/m². Elevated. Weight control planned discussed Healthy diet and regular exercise.    BP: 124/84 Blood pressure is normal. Treatment plan consists of Weight Reduction, DASH Eating Plan, Dietary Sodium Restriction, Increased Physical Activity, and No treatment change needed.    No results found for: \"LDLDIRECT\" (goal LDL reduction with dx if diabetes is 50% LDL reduction)          4/27/2024     1:12 PM 1/30/2024    10:18 AM 10/31/2023     1:27 PM 5/8/2023    10:59 AM 3/13/2023    11:02 AM   PHQ Scores   PHQ2 Score 2 5 6 2 0   PHQ9 Score 2 10 23 2 0     Interpretation of Total Score Depression Severity: 1-4 = Minimal depression, 5-9 = Mild depression, 10-14 = Moderate depression, 15-19 = Moderately severe depression, 20-27 = Severe depression    The patient'spast medical, surgical, social, and family history as well as her   current medications and allergies were reviewed as documented in today's encounter.      Medications, labs, diagnostic studies, consultations andfollow-up as documented in this encounter.    Return in about 3 months (around 9/12/2024) for dm ,htn, hld, 15 MIN SEDRICK, chronic conditions.    Patient wasseen with total face to face time of 30 minutes. More than 50% of this visit was counseling and

## 2024-06-12 NOTE — PATIENT INSTRUCTIONS
Dear valued patient,    We hope this message finds you in good health. At [], we are committed to providing you with the best possible healthcare experience. To further enhance your convenience and streamline your access to our services, we would like to introduce you to the benefits of utilizing direct scheduling through the [employer] Patient Portal.    Direct scheduling is a feature within the [employer] Patient Portal that allows you to schedule appointments with your healthcare provider directly, without the need to make a phone call or wait for a callback. We understand that your time is cassi, and we want to ensure that you have quick and easy access to our services whenever you need them.  Here are some reasons why you should consider utilizing direct scheduling through the [employer] Patient Portal:    1. Convenience: With direct scheduling, you can book appointments at any time that suits you best, 24 hours a day, 7 days a week. No more waiting on hold or playing phone tag with our office staff. It puts you in control of managing your healthcare appointments effortlessly.    2. Accessibility: The [employer] Patient Portal is accessible through your computer, smartphone, or tablet, allowing you to schedule appointments from the comfort of your home, office, or on the go. You can access your medical records, review test results, and communicate with your healthcare provider all in one secure and user-friendly platform.    3. Timesaving: By utilizing direct scheduling, you can avoid unnecessary delays and save cassi time. You can easily browse the available appointment slots and select the one that works best for you, eliminating the back-and-forth communication typically required when scheduling via phone.    4. Reminders and notifications: [employer] [facility] Patient Portal sends automatic reminders for upcoming appointments, ensuring that you never miss an important visit. You can also receive notifications about test

## 2024-06-13 DIAGNOSIS — N39.41 URGE INCONTINENCE OF URINE: ICD-10-CM

## 2024-06-13 RX ORDER — OXYBUTYNIN CHLORIDE 15 MG/1
15 TABLET, EXTENDED RELEASE ORAL DAILY
Qty: 90 TABLET | Refills: 0 | Status: SHIPPED | OUTPATIENT
Start: 2024-06-13

## 2024-06-13 NOTE — TELEPHONE ENCOUNTER
Please Approve or Refuse.  Send to Pharmacy per Pt's Request:      Next Visit Date:  9/13/2024   Last Visit Date: 6/12/2024    Hemoglobin A1C (%)   Date Value   04/30/2024 7.3   01/30/2024 8.6   10/31/2023 8.0             ( goal A1C is < 7)   BP Readings from Last 3 Encounters:   06/12/24 124/84   04/30/24 130/70   04/15/24 120/78          (goal 120/80)  BUN   Date Value Ref Range Status   04/26/2024 39 (H) 8 - 23 mg/dL Final     Creatinine   Date Value Ref Range Status   04/26/2024 2.5 (H) 0.5 - 0.9 mg/dL Final     Potassium   Date Value Ref Range Status   04/26/2024 4.7 3.7 - 5.3 mmol/L Final

## 2024-06-21 ENCOUNTER — HOSPITAL ENCOUNTER (OUTPATIENT)
Dept: MRI IMAGING | Age: 74
End: 2024-06-21
Payer: MEDICARE

## 2024-06-21 DIAGNOSIS — M48.02 CERVICAL STENOSIS OF SPINE: ICD-10-CM

## 2024-06-21 DIAGNOSIS — M16.12 ARTHRITIS OF LEFT HIP: ICD-10-CM

## 2024-06-21 DIAGNOSIS — M51.36 DDD (DEGENERATIVE DISC DISEASE), LUMBAR: ICD-10-CM

## 2024-06-21 DIAGNOSIS — M50.30 DDD (DEGENERATIVE DISC DISEASE), CERVICAL: ICD-10-CM

## 2024-06-21 PROCEDURE — 72141 MRI NECK SPINE W/O DYE: CPT

## 2024-06-21 PROCEDURE — 72148 MRI LUMBAR SPINE W/O DYE: CPT

## 2024-06-25 RX ORDER — FERROUS SULFATE 325(65) MG
1 TABLET ORAL DAILY
Qty: 90 TABLET | Refills: 0 | Status: SHIPPED | OUTPATIENT
Start: 2024-06-25

## 2024-06-25 NOTE — TELEPHONE ENCOUNTER
Lab Results   Component Value Date    WBC 10.5 04/26/2024    HGB 12.0 04/26/2024    HCT 38.1 04/26/2024    MCV 92.9 04/26/2024     04/26/2024

## 2024-06-26 RX ORDER — PANTOPRAZOLE SODIUM 20 MG/1
20 TABLET, DELAYED RELEASE ORAL NIGHTLY
Qty: 30 TABLET | Refills: 3 | Status: SHIPPED | OUTPATIENT
Start: 2024-06-26

## 2024-07-09 DIAGNOSIS — G47.00 INSOMNIA, UNSPECIFIED TYPE: ICD-10-CM

## 2024-07-09 RX ORDER — TRAZODONE HYDROCHLORIDE 100 MG/1
100 TABLET ORAL NIGHTLY
Qty: 90 TABLET | Refills: 1 | Status: SHIPPED | OUTPATIENT
Start: 2024-07-09

## 2024-07-09 RX ORDER — BUSPIRONE HYDROCHLORIDE 15 MG/1
15 TABLET ORAL 2 TIMES DAILY
Qty: 120 TABLET | Refills: 1 | Status: SHIPPED | OUTPATIENT
Start: 2024-07-09

## 2024-07-09 NOTE — TELEPHONE ENCOUNTER
Marilyn Mora is calling to request a refill on the following medication(s):    Last Visit Date (If Applicable):  6/12/2024    Next Visit Date:    9/13/2024    Medication Request:  Requested Prescriptions     Pending Prescriptions Disp Refills    busPIRone (BUSPAR) 15 MG tablet       Sig: Take 15 mg by mouth 2 times daily

## 2024-07-09 NOTE — TELEPHONE ENCOUNTER
Please Approve or Refuse.  Send to Pharmacy per Pt's Request:      Next Visit Date:  7/9/2024   Last Visit Date: 6/12/2024    Hemoglobin A1C (%)   Date Value   04/30/2024 7.3   01/30/2024 8.6   10/31/2023 8.0             ( goal A1C is < 7)   BP Readings from Last 3 Encounters:   06/12/24 124/84   04/30/24 130/70   04/15/24 120/78          (goal 120/80)  BUN   Date Value Ref Range Status   04/26/2024 39 (H) 8 - 23 mg/dL Final     Creatinine   Date Value Ref Range Status   04/26/2024 2.5 (H) 0.5 - 0.9 mg/dL Final     Potassium   Date Value Ref Range Status   04/26/2024 4.7 3.7 - 5.3 mmol/L Final

## 2024-07-12 ENCOUNTER — HOSPITAL ENCOUNTER (OUTPATIENT)
Dept: PAIN MANAGEMENT | Age: 74
Discharge: HOME | End: 2024-07-12
Payer: MEDICARE

## 2024-07-12 VITALS — HEIGHT: 63 IN | WEIGHT: 224 LBS | BODY MASS INDEX: 39.69 KG/M2

## 2024-07-12 DIAGNOSIS — Z79.891 CHRONIC USE OF OPIATE FOR THERAPEUTIC PURPOSE: Primary | ICD-10-CM

## 2024-07-12 DIAGNOSIS — M48.061 DEGENERATIVE LUMBAR SPINAL STENOSIS: ICD-10-CM

## 2024-07-12 DIAGNOSIS — M46.1 SACROILIITIS (HCC): ICD-10-CM

## 2024-07-12 DIAGNOSIS — M50.30 DDD (DEGENERATIVE DISC DISEASE), CERVICAL: ICD-10-CM

## 2024-07-12 DIAGNOSIS — M47.817 LUMBOSACRAL SPONDYLOSIS WITHOUT MYELOPATHY: ICD-10-CM

## 2024-07-12 DIAGNOSIS — M51.36 DDD (DEGENERATIVE DISC DISEASE), LUMBAR: ICD-10-CM

## 2024-07-12 PROCEDURE — G0481 DRUG TEST DEF 8-14 CLASSES: HCPCS

## 2024-07-12 PROCEDURE — 99213 OFFICE O/P EST LOW 20 MIN: CPT

## 2024-07-12 PROCEDURE — 80307 DRUG TEST PRSMV CHEM ANLYZR: CPT

## 2024-07-12 RX ORDER — HYDROCODONE BITARTRATE AND ACETAMINOPHEN 5; 325 MG/1; MG/1
1 TABLET ORAL EVERY 8 HOURS PRN
Qty: 90 TABLET | Refills: 0 | Status: SHIPPED | OUTPATIENT
Start: 2024-07-12 | End: 2024-08-11

## 2024-07-12 ASSESSMENT — ENCOUNTER SYMPTOMS
COUGH: 0
BACK PAIN: 1
SHORTNESS OF BREATH: 0
CONSTIPATION: 0

## 2024-07-12 ASSESSMENT — PAIN DESCRIPTION - FREQUENCY: FREQUENCY: CONTINUOUS

## 2024-07-12 ASSESSMENT — PAIN DESCRIPTION - ORIENTATION: ORIENTATION: RIGHT;LEFT

## 2024-07-12 ASSESSMENT — PAIN DESCRIPTION - DESCRIPTORS: DESCRIPTORS: ACHING

## 2024-07-12 ASSESSMENT — PAIN DESCRIPTION - PAIN TYPE: TYPE: CHRONIC PAIN

## 2024-07-12 ASSESSMENT — PAIN DESCRIPTION - LOCATION: LOCATION: NECK

## 2024-07-12 ASSESSMENT — PAIN SCALES - GENERAL: PAINLEVEL_OUTOF10: 8

## 2024-07-12 NOTE — PROGRESS NOTES
Chief Complaint   Patient presents with    Neck Pain    Medication Refill     Norco         [facility] Pt complains of chronic neck pain. MRI with moderate stenosis. She had cervical facet injections with moderate relief but states she cannot afford to repeat at this time. Last injection was 3/2021.     Had appt with Dr Mack in Jan 2022 with ACDF C3-4 and C5-6 recommended but patient does not wish to have surgery. She states her cardiologist has advised her to not have another surgery. She continues with chiropractic care with benefit. She also complains of bilateral hip pain. She had right hip injection 2/5/23 with Dr. Garcia with mild benefit. She currently takes Norco 5-325 TID with good relief.       She is having worsening neck pain and worsening muscle spasms in her legs and feet.      She had right shoulder injection 3/11/24 with Dr. Lara and reports significant improvement in pain and function.      Recent CT of pelvis for groin pain following urology procedure - shows degenerative changes of the bilateral sacroiliac joints and bilateral femoroacetabular joints.     Continues to follow with cardiology for CAD      New MRIs of neck and low back completed 6/21/24 and demonstrate Moderate spinal canal stenosis, severe left and moderate right neural foraminal narrowing at C3-4, unchanged.Grade 1 anterolisthesis of C5 relative to C6 and C6 relative to C7, new  from the previous exam.  Moderate spinal canal stenosis and moderate to severe bilateral neural foraminal narrowing at L4-5, similar to the previous exam.    Neck Pain   This is a chronic problem. The current episode started more than 1 year ago. The problem occurs constantly. The problem has been unchanged. The pain is associated with nothing. The pain is present in the left side and midline. The quality of the pain is described as stabbing. The pain is at a severity of 8/10. The pain is severe. The symptoms are aggravated by bending, twisting,

## 2024-07-15 DIAGNOSIS — E11.42 TYPE 2 DIABETES MELLITUS WITH DIABETIC POLYNEUROPATHY, WITH LONG-TERM CURRENT USE OF INSULIN (HCC): ICD-10-CM

## 2024-07-15 DIAGNOSIS — Z79.4 TYPE 2 DIABETES MELLITUS WITH DIABETIC POLYNEUROPATHY, WITH LONG-TERM CURRENT USE OF INSULIN (HCC): ICD-10-CM

## 2024-07-15 RX ORDER — GABAPENTIN 100 MG/1
CAPSULE ORAL
Qty: 60 CAPSULE | Refills: 0 | Status: SHIPPED | OUTPATIENT
Start: 2024-07-15 | End: 2024-08-14

## 2024-07-17 LAB
6-ACETYLMORPHINE, UR: NOT DETECTED
7-AMINOCLONAZEPAM, URINE: NOT DETECTED
ALPHA-OH-ALPRAZ, URINE: NOT DETECTED
ALPHA-OH-MIDAZOLAM, URINE: NOT DETECTED
ALPRAZOLAM, URINE: NOT DETECTED
AMPHETAMINE, URINE: NOT DETECTED
BARBITURATES, URINE: NEGATIVE
BENZOYLECGONINE, UR: NEGATIVE
BUPRENORPHINE URINE: NOT DETECTED
CARISOPRODOL, UR: NEGATIVE
CLONAZEPAM, URINE: NOT DETECTED
CODEINE, URINE: NOT DETECTED
CREAT UR-MCNC: 107.9 MG/DL (ref 20–400)
DIAZEPAM, URINE: NOT DETECTED
DRUGS EXPECTED, UR: NORMAL
EER HI RES INTERP UR: NORMAL
ETHYL GLUCURONIDE UR: NORMAL
FENTANYL URINE: NOT DETECTED
GABAPENTIN: PRESENT
HYDROCODONE, URINE: PRESENT
HYDROMORPHONE, URINE: PRESENT
LORAZEPAM, URINE: NOT DETECTED
MARIJUANA METAB, UR: NEGATIVE
MDA, UR: NOT DETECTED
MDEA, EVE, UR: NOT DETECTED
MDMA URINE: NOT DETECTED
MEPERIDINE METAB, UR: NOT DETECTED
METHADONE, URINE: NEGATIVE
METHAMPHETAMINE, URINE: NOT DETECTED
METHYLPHENIDATE: NOT DETECTED
MIDAZOLAM, URINE: NOT DETECTED
MORPHINE, OPI1M: NOT DETECTED
NALOXONE URINE: NOT DETECTED
NORBUPRENORPHINE, URINE: NOT DETECTED
NORDIAZEPAM, URINE: NOT DETECTED
NORFENTANYL, URINE: NOT DETECTED
NORHYDROCODONE, URINE: PRESENT
NOROXYCODONE, URINE: NOT DETECTED
NOROXYMORPHONE, URINE: NOT DETECTED
OXAZEPAM, URINE: NOT DETECTED
OXYCODONE URINE: NOT DETECTED
OXYMORPHONE, URINE: NOT DETECTED
PAIN MANAGEMENT DRUG PANEL INTERP, URINE: NORMAL
PAIN MGT DRUG PANEL, HI RES, UR: NORMAL
PCP,URINE: NEGATIVE
PHENTERMINE, UR: NOT DETECTED
PREGABALIN: NOT DETECTED
TAPENTADOL, URINE: NOT DETECTED
TAPENTADOL-O-SULFATE, URINE: NOT DETECTED
TEMAZEPAM, URINE: NOT DETECTED
TRAMADOL, URINE: NEGATIVE
ZOLPIDEM METABOLITE (ZCA), URINE: NOT DETECTED
ZOLPIDEM, URINE: NOT DETECTED

## 2024-07-23 ENCOUNTER — OFFICE VISIT (OUTPATIENT)
Dept: ORTHOPEDIC SURGERY | Age: 74
End: 2024-07-23
Payer: MEDICARE

## 2024-07-23 DIAGNOSIS — M54.2 NECK PAIN: ICD-10-CM

## 2024-07-23 DIAGNOSIS — G89.29 CHRONIC MIDLINE LOW BACK PAIN WITH SCIATICA, SCIATICA LATERALITY UNSPECIFIED: ICD-10-CM

## 2024-07-23 DIAGNOSIS — M48.02 CERVICAL STENOSIS OF SPINE: Primary | ICD-10-CM

## 2024-07-23 DIAGNOSIS — M48.061 SPINAL STENOSIS OF LUMBAR REGION, UNSPECIFIED WHETHER NEUROGENIC CLAUDICATION PRESENT: ICD-10-CM

## 2024-07-23 DIAGNOSIS — M54.40 CHRONIC MIDLINE LOW BACK PAIN WITH SCIATICA, SCIATICA LATERALITY UNSPECIFIED: ICD-10-CM

## 2024-07-23 DIAGNOSIS — M43.12 ACQUIRED SPONDYLOLISTHESIS OF CERVICAL VERTEBRA: ICD-10-CM

## 2024-07-23 PROCEDURE — 1123F ACP DISCUSS/DSCN MKR DOCD: CPT | Performed by: ORTHOPAEDIC SURGERY

## 2024-07-23 PROCEDURE — 99213 OFFICE O/P EST LOW 20 MIN: CPT | Performed by: ORTHOPAEDIC SURGERY

## 2024-07-23 NOTE — PROGRESS NOTES
Patient ID: Marilyn Mora is a 74 y.o. 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 Cervical stenosis of spine    2. Acquired spondylolisthesis of cervical vertebra    3. Neck pain    4. Chronic midline low back pain with sciatica, sciatica laterality unspecified    5. Spinal stenosis of lumbar region, unspecified whether neurogenic claudication present      Chronic cervical pain.  Please refer to previous clinic notes from 2 years ago.  At that time we had planned a C3-4 and C5-6 anterior cervical discectomy and fusion    History is largely unchanged.  We had scheduled the patient for two-level anterior cervical discectomy and fusion but were unable to obtain cardiac clearance.    Patient has been working with pain management who recently ordered MRIs of the cervical and lumbar spine.    Patient would like to have neck surgery    At this time, the patient is ready to proceed with cervical surgery.  Risks, benefits, possible complications, and alternatives to therapy were discussed with the patient.  Risks including no relief of pain, infection, neurovascular injury, bleeding, transfusion, need for future surgery, systemic and anesthetic complications were discussed.  The patient understands and wishes to proceed.      Refer to Dr. Mckenzie to receive clearance for surgery.    Follow up after     HPI:  This is a 74 y.o. female who presents to the clinic today for cervical pain. Patient denies injections for her neck. However, she has received low back injections twice at

## 2024-07-26 DIAGNOSIS — E11.9 TYPE 2 DIABETES MELLITUS WITHOUT COMPLICATION, WITHOUT LONG-TERM CURRENT USE OF INSULIN (HCC): ICD-10-CM

## 2024-07-29 RX ORDER — DAPAGLIFLOZIN 5 MG/1
5 TABLET, FILM COATED ORAL EVERY MORNING
Qty: 90 TABLET | Refills: 1 | Status: SHIPPED | OUTPATIENT
Start: 2024-07-29

## 2024-08-08 ENCOUNTER — HOSPITAL ENCOUNTER (OUTPATIENT)
Dept: PAIN MANAGEMENT | Age: 74
Discharge: HOME | End: 2024-08-08
Payer: MEDICARE

## 2024-08-08 VITALS — BODY MASS INDEX: 39.69 KG/M2 | HEIGHT: 63 IN | WEIGHT: 224 LBS

## 2024-08-08 DIAGNOSIS — M48.061 DEGENERATIVE LUMBAR SPINAL STENOSIS: Primary | ICD-10-CM

## 2024-08-08 DIAGNOSIS — M48.02 CERVICAL STENOSIS OF SPINE: ICD-10-CM

## 2024-08-08 DIAGNOSIS — M50.30 DDD (DEGENERATIVE DISC DISEASE), CERVICAL: ICD-10-CM

## 2024-08-08 DIAGNOSIS — Z79.891 CHRONIC USE OF OPIATE FOR THERAPEUTIC PURPOSE: ICD-10-CM

## 2024-08-08 DIAGNOSIS — M47.22 OSTEOARTHRITIS OF SPINE WITH RADICULOPATHY, CERVICAL REGION: ICD-10-CM

## 2024-08-08 DIAGNOSIS — M16.12 PRIMARY OSTEOARTHRITIS OF LEFT HIP: ICD-10-CM

## 2024-08-08 DIAGNOSIS — M46.1 SACROILIITIS (HCC): ICD-10-CM

## 2024-08-08 DIAGNOSIS — M51.36 DDD (DEGENERATIVE DISC DISEASE), LUMBAR: ICD-10-CM

## 2024-08-08 DIAGNOSIS — M47.817 LUMBOSACRAL SPONDYLOSIS WITHOUT MYELOPATHY: ICD-10-CM

## 2024-08-08 PROCEDURE — 99214 OFFICE O/P EST MOD 30 MIN: CPT | Performed by: NURSE PRACTITIONER

## 2024-08-08 PROCEDURE — 99213 OFFICE O/P EST LOW 20 MIN: CPT

## 2024-08-08 RX ORDER — HYDROCODONE BITARTRATE AND ACETAMINOPHEN 5; 325 MG/1; MG/1
1 TABLET ORAL EVERY 8 HOURS PRN
Qty: 90 TABLET | Refills: 0 | Status: SHIPPED | OUTPATIENT
Start: 2024-08-11 | End: 2024-09-10

## 2024-08-08 ASSESSMENT — ENCOUNTER SYMPTOMS
COUGH: 0
SHORTNESS OF BREATH: 0
BACK PAIN: 1
CONSTIPATION: 0

## 2024-08-08 NOTE — PROGRESS NOTES
medications are helping the pain. Patient reports taking pain medications as prescribed, denies obtaining medications from different sources and denies use of illegal drugs. Medication risk and benefits have been discussed. Patient denies side effects from medications like nausea, vomiting, constipation or drowsiness. Patient reports current activities of daily living are possible due to medications and would like to continue them.     As always, we encourage daily stretching and strengthening exercises, and recommend minimizing use of pain medications unless patient cannot get through daily activities due to pain.    We have discussed with the patient the effect the patient’s medical condition and opioid medication may have on the patient’s ability to safely operate a vehicle. Pt verbalized understanding.     Due to the high risk nature of this patient's pain medication close monitoring is required.   Continue current medication management, pt has been stable and compliant.  Script written for norco  Pt is warned that concomitant use of alcohol and opioid significantly increase the risk of respiratory depression. That concurrent use of ETOH with medication is a violation of contract. Continuation of medication will require patient to follow the opioid contract and avoid alcohol use in future and that further evidence with result in termination of medication contract. Pt verbalized understanding.     Follow up appointment made for 4 weeks    I have reviewed the chief complaint and history of present illness (including ROS and PFSH) and vital documentation by my staff and I agree with their documentation and have added where applicable.

## 2024-08-09 ENCOUNTER — OFFICE VISIT (OUTPATIENT)
Dept: FAMILY MEDICINE CLINIC | Age: 74
End: 2024-08-09
Payer: MEDICARE

## 2024-08-09 ENCOUNTER — HOSPITAL ENCOUNTER (OUTPATIENT)
Age: 74
Setting detail: SPECIMEN
Discharge: HOME | End: 2024-08-09

## 2024-08-09 VITALS
BODY MASS INDEX: 41.25 KG/M2 | WEIGHT: 232.8 LBS | HEIGHT: 63 IN | SYSTOLIC BLOOD PRESSURE: 128 MMHG | HEART RATE: 64 BPM | DIASTOLIC BLOOD PRESSURE: 84 MMHG | OXYGEN SATURATION: 98 %

## 2024-08-09 DIAGNOSIS — Z90.710 HISTORY OF HYSTERECTOMY: ICD-10-CM

## 2024-08-09 DIAGNOSIS — N89.8 VAGINAL ITCHING: ICD-10-CM

## 2024-08-09 DIAGNOSIS — Z80.3 FAMILY HISTORY OF BREAST CANCER: ICD-10-CM

## 2024-08-09 DIAGNOSIS — E11.42 TYPE 2 DIABETES MELLITUS WITH DIABETIC POLYNEUROPATHY, WITH LONG-TERM CURRENT USE OF INSULIN (HCC): ICD-10-CM

## 2024-08-09 DIAGNOSIS — Z78.0 POSTMENOPAUSAL: Primary | ICD-10-CM

## 2024-08-09 DIAGNOSIS — Z79.4 TYPE 2 DIABETES MELLITUS WITH DIABETIC POLYNEUROPATHY, WITH LONG-TERM CURRENT USE OF INSULIN (HCC): ICD-10-CM

## 2024-08-09 DIAGNOSIS — N95.1 VAGINAL DRYNESS, MENOPAUSAL: ICD-10-CM

## 2024-08-09 DIAGNOSIS — Z12.31 SCREENING MAMMOGRAM FOR HIGH-RISK PATIENT: ICD-10-CM

## 2024-08-09 LAB
CANDIDA SPECIES: NEGATIVE
GARDNERELLA VAGINALIS: POSITIVE
SOURCE: ABNORMAL
TRICHOMONAS: NEGATIVE

## 2024-08-09 PROCEDURE — 99214 OFFICE O/P EST MOD 30 MIN: CPT | Performed by: FAMILY MEDICINE

## 2024-08-09 PROCEDURE — 1123F ACP DISCUSS/DSCN MKR DOCD: CPT | Performed by: FAMILY MEDICINE

## 2024-08-09 PROCEDURE — 3079F DIAST BP 80-89 MM HG: CPT | Performed by: FAMILY MEDICINE

## 2024-08-09 PROCEDURE — 3051F HG A1C>EQUAL 7.0%<8.0%: CPT | Performed by: FAMILY MEDICINE

## 2024-08-09 PROCEDURE — 3074F SYST BP LT 130 MM HG: CPT | Performed by: FAMILY MEDICINE

## 2024-08-09 RX ORDER — ESTRADIOL 0.1 MG/G
0.5 CREAM VAGINAL
Qty: 1 EACH | Refills: 1 | Status: SHIPPED | OUTPATIENT
Start: 2024-08-09

## 2024-08-09 RX ORDER — GABAPENTIN 300 MG/1
CAPSULE ORAL
Qty: 60 CAPSULE | Refills: 0 | Status: SHIPPED | OUTPATIENT
Start: 2024-08-09 | End: 2024-09-09

## 2024-08-09 ASSESSMENT — ENCOUNTER SYMPTOMS
BLOOD IN STOOL: 0
ABDOMINAL PAIN: 0
RECTAL PAIN: 0
BACK PAIN: 0
WHEEZING: 0
DIARRHEA: 0
COUGH: 0
EYE REDNESS: 0
RHINORRHEA: 0
STRIDOR: 0
VOMITING: 0
NAUSEA: 0
CONSTIPATION: 0
ABDOMINAL DISTENTION: 0
SINUS PRESSURE: 0
SORE THROAT: 0
COLOR CHANGE: 0
CHEST TIGHTNESS: 0
TROUBLE SWALLOWING: 0
SHORTNESS OF BREATH: 0

## 2024-08-09 NOTE — PROGRESS NOTES
Visit Information    Have you changed or started any medications since your last visit including any over-the-counter medicines, vitamins, or herbal medicines? no   Are you having any side effects from any of your medications? -  no  Have you stopped taking any of your medications? Is so, why? -  no    Have you seen any other physician or provider since your last visit? No  Have you had any other diagnostic tests since your last visit? No  Have you been seen in the emergency room and/or had an admission to a hospital since we last saw you? No  Have you had your routine dental cleaning in the past 6 months? yes     Have you activated your [employer] account? If not, what are your barriers? Yes     Patient Care Team:  Blanche Pepper MD as PCP - General (Family Medicine)  Blanche Pepper MD as PCP - Empaneled Provider  Nicholas Ly MD as Consulting Physician (Gastroenterology)    Medical History Review  Past Medical, Family, and Social History reviewed and does contribute to the patient presenting condition    Health Maintenance   Topic Date Due    Diabetic retinal exam  Never done    Flu vaccine (1) 08/01/2024    Breast cancer screen  09/20/2024    Diabetic Alb to Cr ratio (uACR) test  10/31/2024    GFR test (Diabetes, CKD 3-4, OR last GFR 15-59)  04/26/2025    Depression Monitoring  04/27/2025    A1C test (Diabetic or Prediabetic)  04/30/2025    Lipids  05/03/2025    Diabetic foot exam  06/12/2025    Colorectal Cancer Screen  06/09/2033    DTaP/Tdap/Td vaccine (3 - Td or Tdap) 10/24/2033    DEXA (modify frequency per FRAX score)  Completed    Annual Wellness Visit (Medicare Advantage)  Completed    Shingles vaccine  Completed    Pneumococcal 65+ years Vaccine  Completed    COVID-19 Vaccine  Completed    Respiratory Syncytial Virus (RSV) Pregnant or age 60 yrs+  Completed    Hepatitis C screen  Completed    Hepatitis A vaccine  Aged Out    Hepatitis B vaccine  Aged Out    Hib vaccine  Aged Out    Polio vaccine

## 2024-08-09 NOTE — PROGRESS NOTES
Chief Complaint   Patient presents with    Vaginal Bleeding     2-3 weeks ago with some pain     feet feels like needles are in them     Rash     Right shoulder          Marilyn A Long  here today for follow up on chronic medical problems, go over labs and/or diagnostic studies, and medication refills. Vaginal Bleeding (2-3 weeks ago with some pain ), feet feels like needles are in them , and Rash (Right shoulder )      HPI: Patient is scheduled for sick call complaining of vaginal bleeding.  Patient reports it happened about 2 to 3 weeks ago she has noticed a red persistent vaginal blood.  Patient reports it was associate with severe itching she was not able to tolerate.  Patient reports she still has intermittent spotting and she has to wear pads.  Patient denies any urinary symptoms, increased frequency of urination, suprapubic pain.    Patient has history of hysterectomy done due to severe pain and has endometriosis.  Patient reports that hysterectomy was done about 10 years before.  She does have family history of breast cancer in her mother.  She is up-to-date on mammogram and is due for the mammogram this year.    Patient does complain of dryness and itching, she is not sexually active.  Patient denies any vaginal discharge or any rash.  Patient reports she has noted some pimple on the labia.  We did the pelvic exam I did not see any boil or any acute bleeding.    Patient has diabetic neuropathy and is on gabapentin 100 mg twice a day.  Reports she still has numbness and tingling, she denies any side effects from the gabapentin wants to increase dose of the gabapentin.      /84   Pulse 64   Ht 1.6 m (5' 2.99\")   Wt 105.6 kg (232 lb 12.8 oz)   LMP 03/19/1980   SpO2 98%   BMI 41.25 kg/m²    Body mass index is 41.25 kg/m².  Wt Readings from Last 3 Encounters:   08/09/24 105.6 kg (232 lb 12.8 oz)   08/08/24 101.6 kg (224 lb)   07/12/24 101.6 kg (224 lb)        []Negative depression screening.

## 2024-08-13 ENCOUNTER — HOSPITAL ENCOUNTER (OUTPATIENT)
Dept: ULTRASOUND IMAGING | Age: 74
Discharge: HOME | End: 2024-08-15
Payer: MEDICARE

## 2024-08-13 DIAGNOSIS — Z78.0 POSTMENOPAUSAL: ICD-10-CM

## 2024-08-13 PROCEDURE — 76856 US EXAM PELVIC COMPLETE: CPT

## 2024-08-19 RX ORDER — LEVOTHYROXINE SODIUM 0.12 MG/1
125 TABLET ORAL DAILY
Qty: 30 TABLET | Refills: 2 | Status: SHIPPED | OUTPATIENT
Start: 2024-08-19

## 2024-08-19 NOTE — TELEPHONE ENCOUNTER
Marilyn Mora is calling to request a refill on the following medication(s):    Medication Request:  Requested Prescriptions     Pending Prescriptions Disp Refills    levothyroxine (SYNTHROID) 125 MCG tablet [Pharmacy Med Name: LEVOTHYROXINE 125 MCG TABLET] 30 tablet 2     Sig: TAKE 1 TABLET BY MOUTH DAILY       Last Visit Date (If Applicable):  8/9/2024    Next Visit Date:    9/17/2024

## 2024-08-20 ENCOUNTER — HOSPITAL ENCOUNTER (OUTPATIENT)
Age: 74
Discharge: HOME | End: 2024-08-20
Payer: MEDICARE

## 2024-08-20 DIAGNOSIS — N18.4 CKD (CHRONIC KIDNEY DISEASE), STAGE IV (HCC): ICD-10-CM

## 2024-08-20 DIAGNOSIS — R80.9 PROTEINURIA, UNSPECIFIED TYPE: ICD-10-CM

## 2024-08-20 DIAGNOSIS — I12.9 BENIGN HYPERTENSION WITH CHRONIC KIDNEY DISEASE, STAGE IV (HCC): ICD-10-CM

## 2024-08-20 DIAGNOSIS — N18.4 BENIGN HYPERTENSION WITH CHRONIC KIDNEY DISEASE, STAGE IV (HCC): ICD-10-CM

## 2024-08-20 DIAGNOSIS — R82.998 LEUKOCYTES IN URINE: ICD-10-CM

## 2024-08-20 LAB
ANION GAP SERPL CALCULATED.3IONS-SCNC: 14 MMOL/L (ref 9–17)
BACTERIA URNS QL MICRO: ABNORMAL
BASOPHILS # BLD: 0.1 K/UL (ref 0–0.2)
BASOPHILS NFR BLD: 0 % (ref 0–2)
BILIRUB UR QL STRIP: NEGATIVE
BUN SERPL-MCNC: 36 MG/DL (ref 8–23)
CALCIUM SERPL-MCNC: 9.2 MG/DL (ref 8.6–10.4)
CASTS #/AREA URNS LPF: ABNORMAL /LPF
CHLORIDE SERPL-SCNC: 101 MMOL/L (ref 98–107)
CLARITY UR: ABNORMAL
CO2 SERPL-SCNC: 24 MMOL/L (ref 20–31)
COLOR UR: YELLOW
CREAT SERPL-MCNC: 2.2 MG/DL (ref 0.5–0.9)
EOSINOPHIL # BLD: 0.3 K/UL (ref 0–0.4)
EOSINOPHILS RELATIVE PERCENT: 2 % (ref 0–4)
EPI CELLS #/AREA URNS HPF: ABNORMAL /HPF
ERYTHROCYTE [DISTWIDTH] IN BLOOD BY AUTOMATED COUNT: 16 % (ref 11.5–14.9)
GFR, ESTIMATED: 23 ML/MIN/1.73M2
GLUCOSE SERPL-MCNC: 302 MG/DL (ref 70–99)
GLUCOSE UR STRIP-MCNC: ABNORMAL MG/DL
HCT VFR BLD AUTO: 40.3 % (ref 36–46)
HGB BLD-MCNC: 12.7 G/DL (ref 12–16)
HGB UR QL STRIP.AUTO: NEGATIVE
KETONES UR STRIP-MCNC: NEGATIVE MG/DL
LEUKOCYTE ESTERASE UR QL STRIP: ABNORMAL
LYMPHOCYTES NFR BLD: 2.7 K/UL (ref 1–4.8)
LYMPHOCYTES RELATIVE PERCENT: 21 % (ref 24–44)
MAGNESIUM SERPL-MCNC: 2.1 MG/DL (ref 1.6–2.6)
MCH RBC QN AUTO: 29.7 PG (ref 26–34)
MCHC RBC AUTO-ENTMCNC: 31.5 G/DL (ref 31–37)
MCV RBC AUTO: 94.5 FL (ref 80–100)
MONOCYTES NFR BLD: 1.1 K/UL (ref 0.1–1.3)
MONOCYTES NFR BLD: 8 % (ref 1–7)
NEUTROPHILS NFR BLD: 69 % (ref 36–66)
NEUTS SEG NFR BLD: 9.1 K/UL (ref 1.3–9.1)
NITRITE UR QL STRIP: NEGATIVE
PH UR STRIP: 6 [PH] (ref 5–8)
PHOSPHATE SERPL-MCNC: 3.4 MG/DL (ref 2.6–4.5)
PLATELET # BLD AUTO: 254 K/UL (ref 150–450)
PMV BLD AUTO: 9.6 FL (ref 6–12)
POTASSIUM SERPL-SCNC: 4.5 MMOL/L (ref 3.7–5.3)
PROT UR STRIP-MCNC: ABNORMAL MG/DL
RBC # BLD AUTO: 4.26 M/UL (ref 4–5.2)
RBC #/AREA URNS HPF: ABNORMAL /HPF
SODIUM SERPL-SCNC: 139 MMOL/L (ref 135–144)
SP GR UR STRIP: 1.02 (ref 1–1.03)
UROBILINOGEN UR STRIP-ACNC: NORMAL EU/DL (ref 0–1)
WBC #/AREA URNS HPF: ABNORMAL /HPF
WBC OTHER # BLD: 13.3 K/UL (ref 3.5–11)

## 2024-08-20 PROCEDURE — 83735 ASSAY OF MAGNESIUM: CPT

## 2024-08-20 PROCEDURE — 85025 COMPLETE CBC W/AUTO DIFF WBC: CPT

## 2024-08-20 PROCEDURE — 81001 URINALYSIS AUTO W/SCOPE: CPT

## 2024-08-20 PROCEDURE — 36415 COLL VENOUS BLD VENIPUNCTURE: CPT

## 2024-08-20 PROCEDURE — 84100 ASSAY OF PHOSPHORUS: CPT

## 2024-08-20 PROCEDURE — 80048 BASIC METABOLIC PNL TOTAL CA: CPT

## 2024-08-22 ENCOUNTER — HOSPITAL ENCOUNTER (OUTPATIENT)
Age: 74
Discharge: HOME | End: 2024-08-22
Payer: MEDICARE

## 2024-08-22 PROCEDURE — 87086 URINE CULTURE/COLONY COUNT: CPT

## 2024-08-22 PROCEDURE — 86403 PARTICLE AGGLUT ANTBDY SCRN: CPT

## 2024-08-23 LAB
MICROORGANISM SPEC CULT: ABNORMAL
SPECIMEN DESCRIPTION: ABNORMAL

## 2024-08-26 ENCOUNTER — PATIENT MESSAGE (OUTPATIENT)
Dept: FAMILY MEDICINE CLINIC | Age: 74
End: 2024-08-26

## 2024-08-27 PROBLEM — I65.29 CAROTID STENOSIS, ASYMPTOMATIC: Status: ACTIVE | Noted: 2024-05-21

## 2024-08-29 ENCOUNTER — TELEPHONE (OUTPATIENT)
Dept: FAMILY MEDICINE CLINIC | Age: 74
End: 2024-08-29

## 2024-08-29 DIAGNOSIS — L29.9 ITCHING: Primary | ICD-10-CM

## 2024-08-29 RX ORDER — DIPHENHYDRAMINE HCL 25 MG
25 CAPSULE ORAL NIGHTLY PRN
Qty: 15 CAPSULE | Refills: 0 | Status: SHIPPED | OUTPATIENT
Start: 2024-08-29 | End: 2024-09-13

## 2024-08-29 NOTE — TELEPHONE ENCOUNTER
Incoming call came from pt stating that she is still having vaginal itching , will like something further called in. She also is having some itching all over her body as nerve itching. Will like something called in.       Please Approve or Refuse.  Send to Pharmacy per Pt's Request: [facility]     Next Visit Date:  9/17/2024   Last Visit Date: 8/9/2024    Hemoglobin A1C (%)   Date Value   04/30/2024 7.3   01/30/2024 8.6   10/31/2023 8.0             ( goal A1C is < 7)   BP Readings from Last 3 Encounters:   08/28/24 120/80   08/09/24 128/84   06/12/24 124/84          (goal 120/80)  BUN   Date Value Ref Range Status   08/20/2024 36 (H) 8 - 23 mg/dL Final     Creatinine   Date Value Ref Range Status   08/20/2024 2.2 (H) 0.5 - 0.9 mg/dL Final     Potassium   Date Value Ref Range Status   08/20/2024 4.5 3.7 - 5.3 mmol/L Final

## 2024-08-29 NOTE — TELEPHONE ENCOUNTER
Please notify patient I sent Benadryl for nighttime to help with the itching, she may need to get reevaluated if it is not improving.

## 2024-08-30 NOTE — TELEPHONE ENCOUNTER
Called out to patient and gave her message below. Verbalized understanding. She was picking up meds.

## 2024-09-04 ENCOUNTER — TELEPHONE (OUTPATIENT)
Dept: ORTHOPEDIC SURGERY | Age: 74
End: 2024-09-04

## 2024-09-04 ENCOUNTER — HOSPITAL ENCOUNTER (OUTPATIENT)
Dept: PAIN MANAGEMENT | Age: 74
Discharge: HOME | End: 2024-09-04
Payer: MEDICARE

## 2024-09-04 VITALS — BODY MASS INDEX: 40.04 KG/M2 | WEIGHT: 226 LBS | HEIGHT: 63 IN

## 2024-09-04 DIAGNOSIS — G89.29 CHRONIC RIGHT SHOULDER PAIN: Primary | ICD-10-CM

## 2024-09-04 DIAGNOSIS — M51.36 DDD (DEGENERATIVE DISC DISEASE), LUMBAR: ICD-10-CM

## 2024-09-04 DIAGNOSIS — M47.817 LUMBOSACRAL SPONDYLOSIS WITHOUT MYELOPATHY: ICD-10-CM

## 2024-09-04 DIAGNOSIS — M50.30 DDD (DEGENERATIVE DISC DISEASE), CERVICAL: ICD-10-CM

## 2024-09-04 DIAGNOSIS — M25.511 CHRONIC RIGHT SHOULDER PAIN: Primary | ICD-10-CM

## 2024-09-04 DIAGNOSIS — Z79.891 CHRONIC USE OF OPIATE FOR THERAPEUTIC PURPOSE: ICD-10-CM

## 2024-09-04 PROCEDURE — 99213 OFFICE O/P EST LOW 20 MIN: CPT

## 2024-09-04 PROCEDURE — 6360000002 HC RX W HCPCS: Performed by: STUDENT IN AN ORGANIZED HEALTH CARE EDUCATION/TRAINING PROGRAM

## 2024-09-04 PROCEDURE — 20610 DRAIN/INJ JOINT/BURSA W/O US: CPT

## 2024-09-04 RX ORDER — BUPIVACAINE HYDROCHLORIDE 2.5 MG/ML
4 INJECTION, SOLUTION EPIDURAL; INFILTRATION; INTRACAUDAL
Status: COMPLETED | OUTPATIENT
Start: 2024-09-04 | End: 2024-09-04

## 2024-09-04 RX ORDER — TRIAMCINOLONE ACETONIDE 40 MG/ML
40 INJECTION, SUSPENSION INTRA-ARTICULAR; INTRAMUSCULAR ONCE
Status: COMPLETED | OUTPATIENT
Start: 2024-09-04 | End: 2024-09-04

## 2024-09-04 RX ADMIN — TRIAMCINOLONE ACETONIDE 40 MG: 40 INJECTION, SUSPENSION INTRA-ARTICULAR; INTRAMUSCULAR at 13:07

## 2024-09-04 RX ADMIN — BUPIVACAINE HYDROCHLORIDE 4 ML: 2.5 INJECTION, SOLUTION EPIDURAL; INFILTRATION; INTRACAUDAL; PERINEURAL at 13:06

## 2024-09-04 ASSESSMENT — PAIN SCALES - GENERAL: PAINLEVEL_OUTOF10: 8

## 2024-09-04 NOTE — TELEPHONE ENCOUNTER
Patient claims she has been cleared by Dr Mckenzie.  Do you want to see her in office again or just schedule for surgery?

## 2024-09-04 NOTE — PROGRESS NOTES
Procedure performed: Right subacromial bursa injection    Indications: Chronic Right Shoulder pain/Shoulder Impingement    Risks, benefits, and alternatives of the procedure were reviewed.  All questions were answered appropriately and informed consent, both written and verbal, was obtained.  The patient was placed in the sitting position.  A timeout was obtained.  Landmarks were palpated. A lateral to the inferior border of the scapula was palpated and marked.  The area was prepped with alcohol x3 in typical aseptic fashion.  At this point, a 25-gauge needle was passed into the shoulder.  At this point, a total of 4 mL of Marcaine 0.25% combined with 40 mg of Kenalog was slowly injected into the shoulder for a total of 4 mL of Marcaine and 40 mg of Kenalog after negative aspiration.  The needle was withdrawn with the point of needle intact.  The procedure was tolerated without sequelae. The patient was kept in the office for approximately 10 minutes and left without signs of any immediate sequelae.   
[employer] - Occupational Stress Questionnaire     Feeling of Stress : Very much   Social Connections: Socially Isolated (2/20/2022)    [facility] [employer]    Social Connection and Isolation Panel [NHANES]     Frequency of Communication with Friends and Family: Once a week     Frequency of Social Gatherings with Friends and Family: Once a week     Attends Gnosticist Services: More than 4 times per year     Active Member of Clubs or Organizations: No     Attends Club or Organization Meetings: Never     Marital Status:    Intimate Partner Violence: Unknown (2/22/2024)    Received from [facility]     Fear of Current or Ex-Partner: Not on file     Emotionally Abused: Not on file     Physically Abused: Not on file     Sexually Abused: Not on file     Physically or Sexually Abused: Not on file   Housing Stability: Unknown (6/12/2024)    Housing Stability Vital Sign     Unable to Pay for Housing in the Last Year: Not on file     Number of Places Lived in the Last Year: Not on file     Unstable Housing in the Last Year: No       Medications & Allergies:   Current Outpatient Medications   Medication Instructions    Alcohol Swabs PADS 100 each, Does not apply, 2 TIMES DAILY PRN    ascorbic acid (VITAMIN C) 500 mg, Oral, DAILY    aspirin 81 mg, Oral, DAILY, LAST DOSE 9/8/14    atorvastatin (LIPITOR) 40 mg, Oral, DAILY    BASAGLAR KWIKPEN 100 UNIT/ML injection pen INJECT UNDER THE SKIN 25 UNITS NIGHTLY    Bioflavonoid Products (BIOFLEX PO) Oral    blood glucose monitor kit and supplies Please dispense blood glucose monitor, use to test blood sugars twice daily    blood glucose monitor strips Test 2 times a day & as needed for symptoms of irregular blood glucose. Dispense sufficient amount for indicated testing frequency plus additional to accommodate PRN testing needs.    busPIRone (BUSPAR) 15 mg, Oral, 2 TIMES DAILY

## 2024-09-06 NOTE — TELEPHONE ENCOUNTER
Called and left voicemail for patient to call back and schedule appointment with Dr Mack to discuss.

## 2024-09-10 ENCOUNTER — TELEPHONE (OUTPATIENT)
Dept: PAIN MANAGEMENT | Age: 74
End: 2024-09-10

## 2024-09-10 DIAGNOSIS — M51.36 DDD (DEGENERATIVE DISC DISEASE), LUMBAR: Primary | ICD-10-CM

## 2024-09-10 DIAGNOSIS — M50.30 DDD (DEGENERATIVE DISC DISEASE), CERVICAL: ICD-10-CM

## 2024-09-12 RX ORDER — HYDROCODONE BITARTRATE AND ACETAMINOPHEN 5; 325 MG/1; MG/1
1 TABLET ORAL EVERY 8 HOURS PRN
Qty: 21 TABLET | Refills: 0 | OUTPATIENT
Start: 2024-09-12 | End: 2024-09-19

## 2024-09-17 ENCOUNTER — OFFICE VISIT (OUTPATIENT)
Dept: ORTHOPEDIC SURGERY | Age: 74
End: 2024-09-17
Payer: MEDICARE

## 2024-09-17 ENCOUNTER — TELEPHONE (OUTPATIENT)
Dept: PAIN MANAGEMENT | Age: 74
End: 2024-09-17

## 2024-09-17 ENCOUNTER — TELEPHONE (OUTPATIENT)
Dept: ORTHOPEDIC SURGERY | Age: 74
End: 2024-09-17

## 2024-09-17 ENCOUNTER — OFFICE VISIT (OUTPATIENT)
Dept: FAMILY MEDICINE CLINIC | Age: 74
End: 2024-09-17
Payer: MEDICARE

## 2024-09-17 VITALS — HEIGHT: 63 IN | RESPIRATION RATE: 14 BRPM | WEIGHT: 225.09 LBS | BODY MASS INDEX: 39.88 KG/M2

## 2024-09-17 VITALS
BODY MASS INDEX: 39.87 KG/M2 | DIASTOLIC BLOOD PRESSURE: 74 MMHG | OXYGEN SATURATION: 97 % | HEART RATE: 48 BPM | WEIGHT: 225 LBS | HEIGHT: 63 IN | SYSTOLIC BLOOD PRESSURE: 120 MMHG

## 2024-09-17 DIAGNOSIS — M47.817 LUMBOSACRAL SPONDYLOSIS WITHOUT MYELOPATHY: ICD-10-CM

## 2024-09-17 DIAGNOSIS — R00.1 BRADYCARDIA: ICD-10-CM

## 2024-09-17 DIAGNOSIS — M48.02 CERVICAL STENOSIS OF SPINE: ICD-10-CM

## 2024-09-17 DIAGNOSIS — M43.12 ACQUIRED SPONDYLOLISTHESIS OF CERVICAL VERTEBRA: ICD-10-CM

## 2024-09-17 DIAGNOSIS — I10 ESSENTIAL HYPERTENSION: ICD-10-CM

## 2024-09-17 DIAGNOSIS — M54.2 NECK PAIN: Primary | ICD-10-CM

## 2024-09-17 DIAGNOSIS — N95.1 VAGINAL DRYNESS, MENOPAUSAL: ICD-10-CM

## 2024-09-17 DIAGNOSIS — L29.9 ITCHING: ICD-10-CM

## 2024-09-17 DIAGNOSIS — F33.1 MODERATE EPISODE OF RECURRENT MAJOR DEPRESSIVE DISORDER (HCC): ICD-10-CM

## 2024-09-17 DIAGNOSIS — E78.2 MIXED HYPERLIPIDEMIA: ICD-10-CM

## 2024-09-17 DIAGNOSIS — E11.9 TYPE 2 DIABETES MELLITUS WITHOUT COMPLICATION, WITHOUT LONG-TERM CURRENT USE OF INSULIN (HCC): Primary | ICD-10-CM

## 2024-09-17 DIAGNOSIS — E03.9 HYPOTHYROIDISM, UNSPECIFIED TYPE: ICD-10-CM

## 2024-09-17 DIAGNOSIS — N18.4 STAGE 4 CHRONIC KIDNEY DISEASE (HCC): ICD-10-CM

## 2024-09-17 DIAGNOSIS — M47.817 LUMBOSACRAL SPONDYLOSIS WITHOUT MYELOPATHY: Primary | ICD-10-CM

## 2024-09-17 DIAGNOSIS — F41.0 PANIC ATTACKS: ICD-10-CM

## 2024-09-17 DIAGNOSIS — Z23 ENCOUNTER FOR IMMUNIZATION: ICD-10-CM

## 2024-09-17 PROBLEM — D50.9 IRON DEFICIENCY ANEMIA: Status: RESOLVED | Noted: 2021-07-15 | Resolved: 2024-09-17

## 2024-09-17 PROBLEM — Z90.710 HISTORY OF HYSTERECTOMY: Status: RESOLVED | Noted: 2024-08-09 | Resolved: 2024-09-17

## 2024-09-17 PROBLEM — G89.29 CHRONIC RIGHT SHOULDER PAIN: Status: RESOLVED | Noted: 2022-11-15 | Resolved: 2024-09-17

## 2024-09-17 PROBLEM — L29.89 UREMIC PRURITUS: Status: RESOLVED | Noted: 2024-04-15 | Resolved: 2024-09-17

## 2024-09-17 PROBLEM — L29.8 UREMIC PRURITUS: Status: RESOLVED | Noted: 2024-04-15 | Resolved: 2024-09-17

## 2024-09-17 PROBLEM — M25.511 CHRONIC RIGHT SHOULDER PAIN: Status: RESOLVED | Noted: 2022-11-15 | Resolved: 2024-09-17

## 2024-09-17 LAB — HBA1C MFR BLD: 8.1 %

## 2024-09-17 PROCEDURE — 1123F ACP DISCUSS/DSCN MKR DOCD: CPT | Performed by: ORTHOPAEDIC SURGERY

## 2024-09-17 PROCEDURE — 3074F SYST BP LT 130 MM HG: CPT | Performed by: FAMILY MEDICINE

## 2024-09-17 PROCEDURE — 3052F HG A1C>EQUAL 8.0%<EQUAL 9.0%: CPT | Performed by: FAMILY MEDICINE

## 2024-09-17 PROCEDURE — 3078F DIAST BP <80 MM HG: CPT | Performed by: FAMILY MEDICINE

## 2024-09-17 PROCEDURE — 99213 OFFICE O/P EST LOW 20 MIN: CPT | Performed by: ORTHOPAEDIC SURGERY

## 2024-09-17 PROCEDURE — 83036 HEMOGLOBIN GLYCOSYLATED A1C: CPT | Performed by: FAMILY MEDICINE

## 2024-09-17 PROCEDURE — 99214 OFFICE O/P EST MOD 30 MIN: CPT | Performed by: FAMILY MEDICINE

## 2024-09-17 PROCEDURE — 1123F ACP DISCUSS/DSCN MKR DOCD: CPT | Performed by: FAMILY MEDICINE

## 2024-09-17 RX ORDER — HYDROXYZINE HYDROCHLORIDE 25 MG/1
25 TABLET, FILM COATED ORAL 2 TIMES DAILY PRN
Qty: 120 TABLET | Refills: 1 | Status: SHIPPED | OUTPATIENT
Start: 2024-09-17

## 2024-09-17 RX ORDER — ESCITALOPRAM OXALATE 10 MG/1
10 TABLET ORAL DAILY
Qty: 90 TABLET | Refills: 1 | Status: SHIPPED | OUTPATIENT
Start: 2024-09-17

## 2024-09-17 SDOH — ECONOMIC STABILITY: FOOD INSECURITY: WITHIN THE PAST 12 MONTHS, THE FOOD YOU BOUGHT JUST DIDN'T LAST AND YOU DIDN'T HAVE MONEY TO GET MORE.: NEVER TRUE

## 2024-09-17 SDOH — ECONOMIC STABILITY: FOOD INSECURITY: WITHIN THE PAST 12 MONTHS, YOU WORRIED THAT YOUR FOOD WOULD RUN OUT BEFORE YOU GOT MONEY TO BUY MORE.: NEVER TRUE

## 2024-09-17 SDOH — ECONOMIC STABILITY: INCOME INSECURITY: HOW HARD IS IT FOR YOU TO PAY FOR THE VERY BASICS LIKE FOOD, HOUSING, MEDICAL CARE, AND HEATING?: NOT HARD AT ALL

## 2024-09-17 ASSESSMENT — ENCOUNTER SYMPTOMS
VOMITING: 0
NAUSEA: 0
DIARRHEA: 0
CHEST TIGHTNESS: 0
RHINORRHEA: 0
ABDOMINAL PAIN: 1
SORE THROAT: 0
SHORTNESS OF BREATH: 0
COLOR CHANGE: 0
STRIDOR: 0
BACK PAIN: 1
EYE REDNESS: 0
RECTAL PAIN: 0
SINUS PRESSURE: 0
TROUBLE SWALLOWING: 0
ABDOMINAL DISTENTION: 0
BLOOD IN STOOL: 0
CONSTIPATION: 0
WHEEZING: 0
COUGH: 0

## 2024-09-18 RX ORDER — DIPHENHYDRAMINE HYDROCHLORIDE 25 MG/1
CAPSULE ORAL
Qty: 15 CAPSULE | Refills: 0 | Status: SHIPPED | OUTPATIENT
Start: 2024-09-18

## 2024-09-18 RX ORDER — HYDROCODONE BITARTRATE AND ACETAMINOPHEN 5; 325 MG/1; MG/1
1 TABLET ORAL EVERY 8 HOURS PRN
Qty: 90 TABLET | Refills: 0 | Status: SHIPPED | OUTPATIENT
Start: 2024-09-18 | End: 2024-10-18

## 2024-09-19 ENCOUNTER — HOSPITAL ENCOUNTER (OUTPATIENT)
Age: 74
Discharge: HOME | End: 2024-09-21
Payer: MEDICARE

## 2024-09-19 DIAGNOSIS — R00.1 BRADYCARDIA: ICD-10-CM

## 2024-09-19 PROCEDURE — 93005 ELECTROCARDIOGRAM TRACING: CPT

## 2024-09-20 ENCOUNTER — TELEPHONE (OUTPATIENT)
Dept: ORTHOPEDIC SURGERY | Age: 74
End: 2024-09-20

## 2024-09-20 LAB
EKG ATRIAL RATE: 60 BPM
EKG P AXIS: 63 DEGREES
EKG P-R INTERVAL: 188 MS
EKG Q-T INTERVAL: 430 MS
EKG QRS DURATION: 74 MS
EKG QTC CALCULATION (BAZETT): 430 MS
EKG R AXIS: 68 DEGREES
EKG T AXIS: 75 DEGREES
EKG VENTRICULAR RATE: 60 BPM

## 2024-09-23 ENCOUNTER — HOSPITAL ENCOUNTER (OUTPATIENT)
Dept: WOMENS IMAGING | Age: 74
Discharge: HOME | End: 2024-09-25
Payer: MEDICARE

## 2024-09-23 DIAGNOSIS — Z12.31 SCREENING MAMMOGRAM FOR HIGH-RISK PATIENT: ICD-10-CM

## 2024-09-23 PROCEDURE — 77063 BREAST TOMOSYNTHESIS BI: CPT

## 2024-09-26 ENCOUNTER — TELEPHONE (OUTPATIENT)
Dept: FAMILY MEDICINE CLINIC | Age: 74
End: 2024-09-26

## 2024-09-26 DIAGNOSIS — R49.0 HOARSENESS: Primary | ICD-10-CM

## 2024-09-30 ENCOUNTER — TELEPHONE (OUTPATIENT)
Dept: FAMILY MEDICINE CLINIC | Age: 74
End: 2024-09-30

## 2024-09-30 DIAGNOSIS — N95.1 VAGINAL DRYNESS, MENOPAUSAL: ICD-10-CM

## 2024-09-30 NOTE — TELEPHONE ENCOUNTER
Patient states that her medication she takes for her nerves (she thinks there are three of them) have been causing her sores on stomach/legs and she is constantly itching. She states her demeanor towards others has not been nice and that's not like her. She would like to stop these medications and start on something new. Please advise.

## 2024-09-30 NOTE — TELEPHONE ENCOUNTER
Please Approve or Refuse.  Send to Pharmacy per Pt's Request:      Next Visit Date:  12/17/2024   Last Visit Date: 9/17/2024    Hemoglobin A1C (%)   Date Value   09/17/2024 8.1   04/30/2024 7.3   01/30/2024 8.6             ( goal A1C is < 7)   BP Readings from Last 3 Encounters:   09/17/24 120/74   08/28/24 120/80   08/09/24 128/84          (goal 120/80)  BUN   Date Value Ref Range Status   08/20/2024 36 (H) 8 - 23 mg/dL Final     Creatinine   Date Value Ref Range Status   08/20/2024 2.2 (H) 0.5 - 0.9 mg/dL Final     Potassium   Date Value Ref Range Status   08/20/2024 4.5 3.7 - 5.3 mmol/L Final

## 2024-10-01 RX ORDER — ESTRADIOL 0.1 MG/G
0.5 CREAM VAGINAL
Qty: 1 EACH | Refills: 1 | Status: SHIPPED | OUTPATIENT
Start: 2024-10-02

## 2024-10-01 RX ORDER — NYSTATIN 100000 U/G
CREAM TOPICAL 2 TIMES DAILY PRN
Qty: 1 EACH | Refills: 0 | Status: SHIPPED | OUTPATIENT
Start: 2024-10-01

## 2024-10-01 NOTE — TELEPHONE ENCOUNTER
Patient follows with Chirag for his psych problems, if she wants to switch around medications she can schedule appointment or can discuss with Chirag.  I cannot make any changes on her psych medications, unless she wants us to handle the medications.

## 2024-10-08 ENCOUNTER — HOSPITAL ENCOUNTER (OUTPATIENT)
Age: 74
Discharge: HOME | End: 2024-10-08
Payer: MEDICARE

## 2024-10-08 ENCOUNTER — HOSPITAL ENCOUNTER (OUTPATIENT)
Dept: PAIN MANAGEMENT | Age: 74
Discharge: HOME | End: 2024-10-08
Payer: MEDICARE

## 2024-10-08 VITALS — HEIGHT: 62 IN | BODY MASS INDEX: 41.41 KG/M2 | WEIGHT: 225 LBS

## 2024-10-08 DIAGNOSIS — M16.12 PRIMARY OSTEOARTHRITIS OF LEFT HIP: ICD-10-CM

## 2024-10-08 DIAGNOSIS — E78.2 MIXED HYPERLIPIDEMIA: ICD-10-CM

## 2024-10-08 DIAGNOSIS — M47.817 LUMBOSACRAL SPONDYLOSIS WITHOUT MYELOPATHY: ICD-10-CM

## 2024-10-08 DIAGNOSIS — Z79.891 CHRONIC USE OF OPIATE FOR THERAPEUTIC PURPOSE: ICD-10-CM

## 2024-10-08 DIAGNOSIS — R19.00 PELVIC LUMP: ICD-10-CM

## 2024-10-08 DIAGNOSIS — M48.061 DEGENERATIVE LUMBAR SPINAL STENOSIS: Primary | ICD-10-CM

## 2024-10-08 LAB
CHOLEST SERPL-MCNC: 187 MG/DL (ref 0–199)
HDLC SERPL-MCNC: 42 MG/DL
LDLC SERPL CALC-MCNC: 111 MG/DL (ref 0–100)
TRIGL SERPL-MCNC: 168 MG/DL (ref 0–149)

## 2024-10-08 PROCEDURE — 80061 LIPID PANEL: CPT

## 2024-10-08 PROCEDURE — 99213 OFFICE O/P EST LOW 20 MIN: CPT | Performed by: NURSE PRACTITIONER

## 2024-10-08 PROCEDURE — 99213 OFFICE O/P EST LOW 20 MIN: CPT

## 2024-10-08 PROCEDURE — 36415 COLL VENOUS BLD VENIPUNCTURE: CPT

## 2024-10-08 RX ORDER — ATORVASTATIN CALCIUM 40 MG/1
40 TABLET, FILM COATED ORAL DAILY
Qty: 90 TABLET | Refills: 1 | Status: SHIPPED | OUTPATIENT
Start: 2024-10-08

## 2024-10-08 RX ORDER — HYDROCODONE BITARTRATE AND ACETAMINOPHEN 5; 325 MG/1; MG/1
1 TABLET ORAL EVERY 8 HOURS PRN
Qty: 90 TABLET | Refills: 0 | Status: SHIPPED | OUTPATIENT
Start: 2024-10-19 | End: 2024-11-18

## 2024-10-08 ASSESSMENT — ENCOUNTER SYMPTOMS
SHORTNESS OF BREATH: 0
BACK PAIN: 1
COUGH: 0
CONSTIPATION: 0

## 2024-10-08 NOTE — PROGRESS NOTES
symptoms are aggravated by bending, position, standing and twisting. Stiffness is present In the morning. Associated symptoms include leg pain and weakness. Pertinent negatives include no chest pain, fever, headaches, numbness or tingling. She has tried heat and analgesics for the symptoms. The treatment provided mild relief.         Patient denies any new neurological symptoms. No bowel or bladder incontinence, no weakness, and no falling.    Pill count: appropriate Norco-36 10/18    Morphine equivalent: 15    Controlled Substance Monitoring:    Acute and Chronic Pain Monitoring:   RX Monitoring Periodic Controlled Substance Monitoring   10/8/2024   9:54 AM Possible medication side effects, risk of tolerance/dependence & alternative treatments discussed.;No signs of potential drug abuse or diversion identified.;Assessed functional status (ability to engage in work or other purposeful activities, the pain intensity and its interference with activities of daily living, quality of family life and social activities, and the physical activity);Obtaining appropriate analgesic effect of treatment.          Periodic Controlled Substance Monitoring: Possible medication side effects, risk of tolerance/dependence & alternative treatments discussed., No signs of potential drug abuse or diversion identified., Assessed functional status (ability to engage in work or other purposeful activities, the pain intensity and its interference with activities of daily living, quality of family life and social activities, and the physical activity), Obtaining appropriate analgesic effect of treatment. ([place]iana Diggs, APRN - CNP)      Past Medical History:   Diagnosis Date    WERO (acute kidney injury) (HCC) 06/20/2021    Hx of dialysis x3 sessions    Anemia     iron def    Anxiety and depression     managed per PCP    Aortic valve stenosis     mild per chart    Arthritis     Bilateral carotid artery stenosis     CAD (coronary artery disease)

## 2024-10-09 ENCOUNTER — OFFICE VISIT (OUTPATIENT)
Dept: FAMILY MEDICINE CLINIC | Age: 74
End: 2024-10-09

## 2024-10-09 VITALS
DIASTOLIC BLOOD PRESSURE: 80 MMHG | BODY MASS INDEX: 41.96 KG/M2 | SYSTOLIC BLOOD PRESSURE: 128 MMHG | OXYGEN SATURATION: 98 % | HEIGHT: 62 IN | WEIGHT: 228 LBS | HEART RATE: 60 BPM

## 2024-10-09 DIAGNOSIS — E03.9 HYPOTHYROIDISM, UNSPECIFIED TYPE: ICD-10-CM

## 2024-10-09 DIAGNOSIS — E78.2 MIXED HYPERLIPIDEMIA: ICD-10-CM

## 2024-10-09 DIAGNOSIS — L98.9 SKIN LESIONS: Primary | ICD-10-CM

## 2024-10-09 DIAGNOSIS — F41.0 PANIC ATTACKS: ICD-10-CM

## 2024-10-09 DIAGNOSIS — H00.14 CHALAZION OF LEFT UPPER EYELID: ICD-10-CM

## 2024-10-09 PROBLEM — N89.8 VAGINAL ITCHING: Status: RESOLVED | Noted: 2024-08-09 | Resolved: 2024-10-09

## 2024-10-09 RX ORDER — MUPIROCIN 20 MG/G
OINTMENT TOPICAL
Qty: 1 EACH | Refills: 0 | Status: SHIPPED | OUTPATIENT
Start: 2024-10-09 | End: 2024-10-16

## 2024-10-09 RX ORDER — ESCITALOPRAM OXALATE 20 MG/1
20 TABLET ORAL DAILY
Qty: 90 TABLET | Refills: 0 | Status: SHIPPED | OUTPATIENT
Start: 2024-10-09

## 2024-10-09 ASSESSMENT — ENCOUNTER SYMPTOMS
DIARRHEA: 0
SORE THROAT: 0
BACK PAIN: 1
COLOR CHANGE: 0
NAUSEA: 0
RHINORRHEA: 0
WHEEZING: 0
TROUBLE SWALLOWING: 0
VOMITING: 0
ABDOMINAL PAIN: 0
SHORTNESS OF BREATH: 0
ABDOMINAL DISTENTION: 0
STRIDOR: 0
SINUS PRESSURE: 0
BLOOD IN STOOL: 0
RECTAL PAIN: 0
COUGH: 0
CONSTIPATION: 0
CHEST TIGHTNESS: 0
EYE REDNESS: 0

## 2024-10-09 NOTE — PROGRESS NOTES
Visit Information    Have you changed or started any medications since your last visit including any over-the-counter medicines, vitamins, or herbal medicines? no   Are you having any side effects from any of your medications? -  no  Have you stopped taking any of your medications? Is so, why? -  no    Have you seen any other physician or provider since your last visit? No  Have you had any other diagnostic tests since your last visit? No  Have you been seen in the emergency room and/or had an admission to a hospital since we last saw you? No  Have you had your routine dental cleaning in the past 6 months? no    Have you activated your [employer] account? If not, what are your barriers? Yes     Patient Care Team:  Blanche Pepper MD as PCP - General (Family Medicine)  Blanche Pepper MD as PCP - Empaneled Provider  Nicholas Ly MD as Consulting Physician (Gastroenterology)    Medical History Review  Past Medical, Family, and Social History reviewed and does contribute to the patient presenting condition    Health Maintenance   Topic Date Due    Diabetic retinal exam  Never done    Diabetic Alb to Cr ratio (uACR) test  10/31/2024    Depression Monitoring  04/27/2025    Diabetic foot exam  06/12/2025    GFR test (Diabetes, CKD 3-4, OR last GFR 15-59)  08/20/2025    A1C test (Diabetic or Prediabetic)  09/17/2025    Breast cancer screen  09/23/2025    Lipids  10/08/2025    Colorectal Cancer Screen  06/09/2033    DTaP/Tdap/Td vaccine (3 - Td or Tdap) 10/24/2033    DEXA (modify frequency per FRAX score)  Completed    Annual Wellness Visit (Medicare Advantage)  Completed    Flu vaccine  Completed    Shingles vaccine  Completed    Pneumococcal 65+ years Vaccine  Completed    COVID-19 Vaccine  Completed    Respiratory Syncytial Virus (RSV) Pregnant or age 60 yrs+  Completed    Hepatitis C screen  Completed    Hepatitis A vaccine  Aged Out    Hepatitis B vaccine  Aged Out    Hib vaccine  Aged Out    Polio vaccine  Aged

## 2024-10-09 NOTE — PROGRESS NOTES
Chief Complaint   Patient presents with    Eye Problem     Right eyelid    Thyroid Problem    Depression    Stress    Other     Sores on her stomach very itchy     Back Pain         Marilyn A Long  here today for follow up on chronic medical problems, go over labs and/or diagnostic studies, and medication refills. Eye Problem (Right eyelid), Thyroid Problem, Depression, Stress, Other (Sores on her stomach very itchy ), and Back Pain      HPI: Patient is scheduled for sick call complaining of rash on upper extremities and also on the back and abdominal area.  Patient reports it is going on since more than 6 months she noticed small pimple-like lesions, reports she started itching.  Patient tried over-the-counter medications reports that did not help.      Patient has history of panic attacks and depression is currently on Lexapro 10 mg.  Patient is also on BuSpar as needed reports that does not help.  She is also on hydroxyzine for the anxiety and itching reports that medication helps.  She would like to change the medications.    Hypothyroidism no recent TSH, patient complains of fatigue tiredness and weight gain she is currently on 125 mcg of levothyroxine.    Patient did had hyperlipidemia which is not at goal on recent blood work she is currently on statins.  Discussed with patient to watch diet and be compliant with the medications.      Patient is complaining of some swelling on the upper lid on left side.  The swelling is small, pinpoint soft in consistency.  Discussed with patient to follow-up with ophthalmologist.          /80   Pulse 60   Ht 1.575 m (5' 2.01\")   Wt 103.4 kg (228 lb)   LMP 03/19/1980   SpO2 98%   BMI 41.69 kg/m²    Body mass index is 41.69 kg/m².  Wt Readings from Last 3 Encounters:   10/09/24 103.4 kg (228 lb)   10/08/24 102.1 kg (225 lb)   09/17/24 102.1 kg (225 lb 1.4 oz)        []Negative depression screening.      4/27/2024     1:12 PM 1/30/2024    10:18 AM 10/31/2023     1:27

## 2024-10-11 ENCOUNTER — TELEPHONE (OUTPATIENT)
Dept: ORTHOPEDIC SURGERY | Age: 74
End: 2024-10-11

## 2024-10-11 NOTE — TELEPHONE ENCOUNTER
Pt is awaiting a call to schedule a surgery with Dr. Mack. Please contact to advise when available at [phone], OK to leave detailed voicemail message if needed.

## 2024-10-17 ENCOUNTER — PREP FOR PROCEDURE (OUTPATIENT)
Dept: ORTHOPEDIC SURGERY | Age: 74
End: 2024-10-17

## 2024-10-17 DIAGNOSIS — M43.12 ACQUIRED SPONDYLOLISTHESIS OF CERVICAL VERTEBRA: Primary | ICD-10-CM

## 2024-10-17 NOTE — TELEPHONE ENCOUNTER
Patient called back and surgery was scheduled.  Patient was going to confirm dates and times through [facility].

## 2024-10-25 RX ORDER — PRAMIPEXOLE DIHYDROCHLORIDE 0.12 MG/1
0.12 TABLET ORAL NIGHTLY
Qty: 90 TABLET | Refills: 1 | Status: SHIPPED | OUTPATIENT
Start: 2024-10-25

## 2024-10-30 NOTE — TELEPHONE ENCOUNTER
Please Approve or Refuse.  Send to Pharmacy per Pt's Request:      Next Visit Date:  12/17/2024   Last Visit Date: 10/9/2024    Hemoglobin A1C (%)   Date Value   09/17/2024 8.1   04/30/2024 7.3   01/30/2024 8.6             ( goal A1C is < 7)   BP Readings from Last 3 Encounters:   10/09/24 128/80   09/17/24 120/74   08/28/24 120/80          (goal 120/80)  BUN   Date Value Ref Range Status   08/20/2024 36 (H) 8 - 23 mg/dL Final     Creatinine   Date Value Ref Range Status   08/20/2024 2.2 (H) 0.5 - 0.9 mg/dL Final     Potassium   Date Value Ref Range Status   08/20/2024 4.5 3.7 - 5.3 mmol/L Final

## 2024-10-31 RX ORDER — PANTOPRAZOLE SODIUM 20 MG/1
20 TABLET, DELAYED RELEASE ORAL NIGHTLY
Qty: 90 TABLET | Refills: 3 | Status: SHIPPED | OUTPATIENT
Start: 2024-10-31

## 2024-11-02 DIAGNOSIS — L29.9 ITCHING: ICD-10-CM

## 2024-11-04 RX ORDER — DIPHENHYDRAMINE HYDROCHLORIDE 25 MG/1
CAPSULE ORAL
Qty: 15 CAPSULE | Refills: 0 | Status: SHIPPED | OUTPATIENT
Start: 2024-11-04

## 2024-11-04 NOTE — TELEPHONE ENCOUNTER
Please Approve or Refuse.  Send to Pharmacy per Pt's Request: [facility]      Next Visit Date:  12/17/2024   Last Visit Date: 10/9/2024    Hemoglobin A1C (%)   Date Value   09/17/2024 8.1   04/30/2024 7.3   01/30/2024 8.6             ( goal A1C is < 7)   BP Readings from Last 3 Encounters:   10/09/24 128/80   09/17/24 120/74   08/28/24 120/80          (goal 120/80)  BUN   Date Value Ref Range Status   08/20/2024 36 (H) 8 - 23 mg/dL Final     Creatinine   Date Value Ref Range Status   08/20/2024 2.2 (H) 0.5 - 0.9 mg/dL Final     Potassium   Date Value Ref Range Status   08/20/2024 4.5 3.7 - 5.3 mmol/L Final

## 2024-11-08 ENCOUNTER — HOSPITAL ENCOUNTER (OUTPATIENT)
Age: 74
Discharge: HOME | End: 2024-11-08
Payer: MEDICARE

## 2024-11-08 DIAGNOSIS — E03.9 HYPOTHYROIDISM, UNSPECIFIED TYPE: ICD-10-CM

## 2024-11-08 LAB — TSH SERPL DL<=0.05 MIU/L-ACNC: 1.1 UIU/ML (ref 0.27–4.2)

## 2024-11-08 PROCEDURE — 84443 ASSAY THYROID STIM HORMONE: CPT

## 2024-11-08 PROCEDURE — 36415 COLL VENOUS BLD VENIPUNCTURE: CPT

## 2024-11-14 ENCOUNTER — TELEPHONE (OUTPATIENT)
Dept: ORTHOPEDIC SURGERY | Age: 74
End: 2024-11-14

## 2024-11-14 NOTE — TELEPHONE ENCOUNTER
Dr. Gautam completed peer to peer. Per results of peer to peer, patient's surgery will need to be cancelled and physical therapy order for neck be placed. Please confirm which location patient prefers when calling.

## 2024-11-14 NOTE — TELEPHONE ENCOUNTER
Called and left a voicemail for patient to call back and discuss PT locations as well as surgery cancellation.

## 2024-11-14 NOTE — TELEPHONE ENCOUNTER
Received call from Gallito MORRELL on the patient's behalf as she had received a denial letter for surgery.  I informed them that we attempted a peer to peer this morning but were not successful in having the surgery denial overturned.

## 2024-11-15 ENCOUNTER — HOSPITAL ENCOUNTER (OUTPATIENT)
Dept: PAIN MANAGEMENT | Age: 74
Discharge: HOME | End: 2024-11-15
Payer: MEDICARE

## 2024-11-15 VITALS — HEIGHT: 62 IN | BODY MASS INDEX: 41.96 KG/M2 | WEIGHT: 228 LBS

## 2024-11-15 DIAGNOSIS — Z79.891 CHRONIC USE OF OPIATE FOR THERAPEUTIC PURPOSE: Primary | ICD-10-CM

## 2024-11-15 DIAGNOSIS — M46.1 SACROILIITIS (HCC): ICD-10-CM

## 2024-11-15 DIAGNOSIS — M47.22 OSTEOARTHRITIS OF SPINE WITH RADICULOPATHY, CERVICAL REGION: ICD-10-CM

## 2024-11-15 DIAGNOSIS — M47.817 LUMBOSACRAL SPONDYLOSIS WITHOUT MYELOPATHY: ICD-10-CM

## 2024-11-15 DIAGNOSIS — M48.02 CERVICAL STENOSIS OF SPINE: ICD-10-CM

## 2024-11-15 PROCEDURE — G0481 DRUG TEST DEF 8-14 CLASSES: HCPCS

## 2024-11-15 PROCEDURE — 99213 OFFICE O/P EST LOW 20 MIN: CPT

## 2024-11-15 PROCEDURE — 80307 DRUG TEST PRSMV CHEM ANLYZR: CPT

## 2024-11-15 PROCEDURE — 99214 OFFICE O/P EST MOD 30 MIN: CPT | Performed by: NURSE PRACTITIONER

## 2024-11-15 RX ORDER — HYDROCODONE BITARTRATE AND ACETAMINOPHEN 5; 325 MG/1; MG/1
1 TABLET ORAL EVERY 8 HOURS PRN
Qty: 90 TABLET | Refills: 0 | Status: SHIPPED | OUTPATIENT
Start: 2024-11-23 | End: 2024-12-23

## 2024-11-15 ASSESSMENT — ENCOUNTER SYMPTOMS
CONSTIPATION: 0
COUGH: 0
SHORTNESS OF BREATH: 0
BACK PAIN: 1

## 2024-11-15 NOTE — PROGRESS NOTES
Chief Complaint   Patient presents with    Back Pain     Med refill         PMH      Pt complains of chronic neck pain. MRI with moderate stenosis. She had cervical facet injections with moderate relief but states she cannot afford to repeat at this time. Last injection was 3/2021.      Had appt with Dr Mack in Jan 2022 with ACDF C3-4 and C5-6 recommended  She continues with chiropractic care with benefit.   She is scheduled for cervical spine surgery 12/2 - awaiting insurance approval    She also complains of bilateral hip pain. She had right hip injection 2/5/23 with Dr. Garcia with mild benefit. She currently takes Norco 5-325 TID with good relief.       She is having worsening neck pain and worsening muscle spasms in her legs and feet.      She had right shoulder injection 9/4/24 with Dr. Lara and reports significant improvement in pain and function.      Recent CT of pelvis for groin pain following urology procedure - shows degenerative changes of the bilateral sacroiliac joints and bilateral femoroacetabular joints.     Continues to follow with cardiology for CAD       New MRIs of neck and low back completed 6/21/24 and demonstrate Moderate spinal canal stenosis, severe left and moderate right neural foraminal narrowing at C3-4, unchanged.Grade 1 anterolisthesis of C5 relative to C6 and C6 relative to C7, new  from the previous exam.  Moderate spinal canal stenosis and moderate to severe bilateral neural foraminal narrowing at L4-5, similar to the previous exam.      Back Pain  This is a chronic problem. The current episode started more than 1 year ago. The problem occurs constantly. The problem is unchanged. The pain is present in the lumbar spine. The quality of the pain is described as aching. The pain radiates to the right foot, right knee and right thigh. The pain is at a severity of 8/10. The pain is moderate. The pain is The same all the time. The symptoms are aggravated by bending,

## 2024-11-18 NOTE — TELEPHONE ENCOUNTER
Received a voicemail from patient regarding her surgery denial stating that she has been doing a home exercise program for her neck.  She also states she called her insurance and let them know that.    I do not know if she started an appeal during that phone call.    I called and left patient a voicemail asking her to call back and discuss.  Given the surgery denial requesting formal physical therapy I do not know if a home exercise program will satisfy the insurance.

## 2024-11-20 NOTE — TELEPHONE ENCOUNTER
Still awaiting patient's preferred location for physical therapy. Patient should be contacted so that physical therapy can be initiated to increase chances of surgery approval.

## 2024-11-21 LAB
6-ACETYLMORPHINE, UR: NOT DETECTED
7-AMINOCLONAZEPAM, URINE: NOT DETECTED
ALPHA-OH-ALPRAZ, URINE: NOT DETECTED
ALPHA-OH-MIDAZOLAM, URINE: NOT DETECTED
ALPRAZOLAM, URINE: NOT DETECTED
AMPHETAMINE, URINE: NOT DETECTED
BARBITURATES, URINE: NEGATIVE
BENZOYLECGONINE, UR: NEGATIVE
BUPRENORPHINE URINE: NOT DETECTED
CARISOPRODOL, UR: NEGATIVE
CLONAZEPAM, URINE: NOT DETECTED
CODEINE, URINE: NOT DETECTED
CREAT UR-MCNC: 122.1 MG/DL (ref 20–400)
DIAZEPAM, URINE: NOT DETECTED
DRUGS EXPECTED, UR: NORMAL
EER HI RES INTERP UR: NORMAL
ETHYL GLUCURONIDE UR: NEGATIVE
FENTANYL URINE: NOT DETECTED
GABAPENTIN: NOT DETECTED
HYDROCODONE, URINE: PRESENT
HYDROMORPHONE, URINE: PRESENT
LORAZEPAM, URINE: NOT DETECTED
MARIJUANA METAB, UR: NEGATIVE
MDA, URINE: NOT DETECTED
MDEA, EVE, UR: NOT DETECTED
MDMA, URINE: NOT DETECTED
MEPERIDINE METAB, UR: NOT DETECTED
METHADONE, URINE: NEGATIVE
METHAMPHETAMINE, URINE: NOT DETECTED
METHYLPHENIDATE: NOT DETECTED
MIDAZOLAM, URINE: NOT DETECTED
MORPHINE, OPI1M: NOT DETECTED
NALOXONE URINE: NOT DETECTED
NORBUPRENORPHINE, URINE: NOT DETECTED
NORDIAZEPAM, URINE: NOT DETECTED
NORFENTANYL, URINE: NOT DETECTED
NORHYDROCODONE, URINE: PRESENT
NOROXYCODONE, URINE: NOT DETECTED
NOROXYMORPHONE, URINE: NOT DETECTED
OXAZEPAM, URINE: NOT DETECTED
OXYCODONE URINE: NOT DETECTED
OXYMORPHONE, URINE: NOT DETECTED
PAIN MANAGEMENT DRUG PANEL INTERP, URINE: NORMAL
PAIN MGT DRUG PANEL, HI RES, UR: NORMAL
PCP,URINE: NEGATIVE
PHENTERMINE, UR: NOT DETECTED
PREGABALIN: NOT DETECTED
TAPENTADOL, URINE: NOT DETECTED
TAPENTADOL-O-SULFATE, URINE: NOT DETECTED
TEMAZEPAM, URINE: NOT DETECTED
TRAMADOL, URINE: NEGATIVE
ZOLPIDEM METABOLITE (ZCA), URINE: NOT DETECTED
ZOLPIDEM, URINE: NOT DETECTED

## 2024-12-01 DIAGNOSIS — E11.9 TYPE 2 DIABETES MELLITUS WITHOUT COMPLICATION, WITHOUT LONG-TERM CURRENT USE OF INSULIN (HCC): ICD-10-CM

## 2024-12-01 RX ORDER — INSULIN GLARGINE 100 [IU]/ML
INJECTION, SOLUTION SUBCUTANEOUS
Qty: 15 ML | Refills: 3 | Status: SHIPPED | OUTPATIENT
Start: 2024-12-01

## 2024-12-01 RX ORDER — LEVOTHYROXINE SODIUM 125 UG/1
125 TABLET ORAL DAILY
Qty: 30 TABLET | Refills: 2 | Status: SHIPPED | OUTPATIENT
Start: 2024-12-01

## 2024-12-17 ENCOUNTER — OFFICE VISIT (OUTPATIENT)
Dept: FAMILY MEDICINE CLINIC | Age: 74
End: 2024-12-17

## 2024-12-17 VITALS
WEIGHT: 227 LBS | BODY MASS INDEX: 41.77 KG/M2 | HEIGHT: 62 IN | HEART RATE: 94 BPM | DIASTOLIC BLOOD PRESSURE: 84 MMHG | SYSTOLIC BLOOD PRESSURE: 120 MMHG | OXYGEN SATURATION: 98 %

## 2024-12-17 DIAGNOSIS — M50.30 DDD (DEGENERATIVE DISC DISEASE), CERVICAL: ICD-10-CM

## 2024-12-17 DIAGNOSIS — E66.813 CLASS 3 SEVERE OBESITY WITH SERIOUS COMORBIDITY AND BODY MASS INDEX (BMI) OF 40.0 TO 44.9 IN ADULT, UNSPECIFIED OBESITY TYPE: ICD-10-CM

## 2024-12-17 DIAGNOSIS — E78.2 MIXED HYPERLIPIDEMIA: ICD-10-CM

## 2024-12-17 DIAGNOSIS — E03.9 HYPOTHYROIDISM, UNSPECIFIED TYPE: ICD-10-CM

## 2024-12-17 DIAGNOSIS — E66.01 CLASS 3 SEVERE OBESITY WITH SERIOUS COMORBIDITY AND BODY MASS INDEX (BMI) OF 40.0 TO 44.9 IN ADULT, UNSPECIFIED OBESITY TYPE: ICD-10-CM

## 2024-12-17 DIAGNOSIS — E11.9 TYPE 2 DIABETES MELLITUS WITHOUT COMPLICATION, WITHOUT LONG-TERM CURRENT USE OF INSULIN (HCC): Primary | ICD-10-CM

## 2024-12-17 DIAGNOSIS — N18.4 STAGE 4 CHRONIC KIDNEY DISEASE (HCC): ICD-10-CM

## 2024-12-17 DIAGNOSIS — I10 ESSENTIAL HYPERTENSION: ICD-10-CM

## 2024-12-17 PROBLEM — Z78.0 POSTMENOPAUSAL: Status: RESOLVED | Noted: 2024-08-09 | Resolved: 2024-12-17

## 2024-12-17 PROBLEM — I65.29 CAROTID STENOSIS, ASYMPTOMATIC: Status: RESOLVED | Noted: 2024-05-21 | Resolved: 2024-12-17

## 2024-12-17 PROBLEM — H00.14 CHALAZION OF LEFT UPPER EYELID: Status: RESOLVED | Noted: 2024-10-09 | Resolved: 2024-12-17

## 2024-12-17 PROBLEM — M70.61 GREATER TROCHANTERIC BURSITIS OF RIGHT HIP: Status: RESOLVED | Noted: 2022-10-18 | Resolved: 2024-12-17

## 2024-12-17 PROBLEM — M51.369 DDD (DEGENERATIVE DISC DISEASE), LUMBAR: Status: RESOLVED | Noted: 2023-05-17 | Resolved: 2024-12-17

## 2024-12-17 LAB — HBA1C MFR BLD: 8.7 %

## 2024-12-17 RX ORDER — INSULIN GLARGINE 100 [IU]/ML
35 INJECTION, SOLUTION SUBCUTANEOUS NIGHTLY
Qty: 15 ML | Refills: 3 | Status: SHIPPED | OUTPATIENT
Start: 2024-12-17

## 2024-12-17 RX ORDER — DAPAGLIFLOZIN 5 MG/1
5 TABLET, FILM COATED ORAL EVERY MORNING
Qty: 90 TABLET | Refills: 1 | Status: SHIPPED | OUTPATIENT
Start: 2024-12-17

## 2024-12-17 ASSESSMENT — ENCOUNTER SYMPTOMS
BACK PAIN: 0
STRIDOR: 0
CONSTIPATION: 1
ABDOMINAL PAIN: 1
RHINORRHEA: 0
NAUSEA: 0
TROUBLE SWALLOWING: 0
COLOR CHANGE: 0
SORE THROAT: 0
CHEST TIGHTNESS: 0
COUGH: 0
DIARRHEA: 0
BLOOD IN STOOL: 0
ABDOMINAL DISTENTION: 0
SINUS PRESSURE: 0
EYE REDNESS: 0
WHEEZING: 0
RECTAL PAIN: 0
SHORTNESS OF BREATH: 0
VOMITING: 0

## 2024-12-17 NOTE — PROGRESS NOTES
Chief Complaint   Patient presents with    Diabetes         Marilyn A Long  here today for follow up on chronic medical problems, go over labs and/or diagnostic studies, and medication refills. Diabetes      HPI: Patient is scheduled for follow-up on chronic medical conditions type 2 diabetes.      Diabetes uncontrolled, A1c is increased to 7.7, patient is currently on insulin 25 units and Farxiga.  Patient reports compliance with all her medications.  Patient reports she does not monitor her blood sugars on a regular basis.  She monitors occasionally and they are usually running below 150.  She does not monitor postprandial blood sugars.  She is due for eye exam reports she will follow-up with ophthalmologist.      Hypertension controlled, is on multiple medication reports compliance.  Patient denies any chest pain shortness of breath dyspnea exertion.  Patient is return to low-salt diet.  Patient is currently on Imdur and metoprolol.    Chronic kidney disease follows with nephrologist, she had blood work ordered by nephrologist she has not done that.  Patient reports she needs to schedule appointment.    Patient had mildly elevated white cell count, no recent blood work.  Discussed with patient need to repeat blood work for stability of the white cell count.      Patient was supposed to have cervical spine surgery, patient reports a surgery card canceled through insurance.  Patient reports she has to go through physical therapy before they approve the surgery.      Patient follows with pain management and is currently on Norco's.  Patient denies any dependence or any side effects.      Hypothyroidism stable on current dose of Synthroid.  Patient has recent TSH done that is within normal range.    Mammogram is up-to-date.    /84   Pulse 94   Ht 1.575 m (5' 2.01\")   Wt 103 kg (227 lb)   LMP 03/19/1980   SpO2 98%   BMI 41.51 kg/m²    Body mass index is 41.51 kg/m².  Wt Readings from Last 3 Encounters: 
Meningococcal (ACWY) vaccine  Aged Out    Depression Screen  Discontinued    Diabetes screen  Discontinued

## 2024-12-20 ENCOUNTER — HOSPITAL ENCOUNTER (OUTPATIENT)
Age: 74
Discharge: HOME | End: 2024-12-20
Payer: MEDICARE

## 2024-12-20 ENCOUNTER — HOSPITAL ENCOUNTER (OUTPATIENT)
Dept: PAIN MANAGEMENT | Age: 74
Discharge: HOME | End: 2024-12-20
Payer: MEDICARE

## 2024-12-20 VITALS — WEIGHT: 227 LBS | HEIGHT: 62 IN | BODY MASS INDEX: 41.77 KG/M2

## 2024-12-20 DIAGNOSIS — M46.1 SACROILIITIS (HCC): Primary | ICD-10-CM

## 2024-12-20 DIAGNOSIS — Z79.891 CHRONIC USE OF OPIATE FOR THERAPEUTIC PURPOSE: ICD-10-CM

## 2024-12-20 DIAGNOSIS — N18.4 CKD (CHRONIC KIDNEY DISEASE), STAGE IV (HCC): ICD-10-CM

## 2024-12-20 DIAGNOSIS — M47.817 LUMBOSACRAL SPONDYLOSIS WITHOUT MYELOPATHY: ICD-10-CM

## 2024-12-20 DIAGNOSIS — M50.30 DDD (DEGENERATIVE DISC DISEASE), CERVICAL: ICD-10-CM

## 2024-12-20 DIAGNOSIS — M48.061 DEGENERATIVE LUMBAR SPINAL STENOSIS: ICD-10-CM

## 2024-12-20 DIAGNOSIS — I12.9 BENIGN HYPERTENSION WITH CHRONIC KIDNEY DISEASE, STAGE IV (HCC): ICD-10-CM

## 2024-12-20 DIAGNOSIS — R82.998 LEUKOCYTES IN URINE: ICD-10-CM

## 2024-12-20 DIAGNOSIS — N18.4 BENIGN HYPERTENSION WITH CHRONIC KIDNEY DISEASE, STAGE IV (HCC): ICD-10-CM

## 2024-12-20 DIAGNOSIS — R80.9 PROTEINURIA, UNSPECIFIED TYPE: ICD-10-CM

## 2024-12-20 DIAGNOSIS — M47.22 OSTEOARTHRITIS OF SPINE WITH RADICULOPATHY, CERVICAL REGION: ICD-10-CM

## 2024-12-20 DIAGNOSIS — E11.9 TYPE 2 DIABETES MELLITUS WITHOUT COMPLICATION, WITHOUT LONG-TERM CURRENT USE OF INSULIN (HCC): ICD-10-CM

## 2024-12-20 LAB
ANION GAP SERPL CALCULATED.3IONS-SCNC: 16 MMOL/L (ref 9–16)
BACTERIA URNS QL MICRO: ABNORMAL
BASOPHILS # BLD: 0.1 K/UL (ref 0–0.2)
BASOPHILS # BLD: 0.1 K/UL (ref 0–0.2)
BASOPHILS NFR BLD: 0 % (ref 0–2)
BASOPHILS NFR BLD: 0 % (ref 0–2)
BILIRUB UR QL STRIP: NEGATIVE
BUN SERPL-MCNC: 29 MG/DL (ref 8–23)
CALCIUM SERPL-MCNC: 9.2 MG/DL (ref 8.6–10.4)
CASTS #/AREA URNS LPF: ABNORMAL /LPF
CHLORIDE SERPL-SCNC: 99 MMOL/L (ref 98–107)
CLARITY UR: CLEAR
CO2 SERPL-SCNC: 24 MMOL/L (ref 20–31)
COLOR UR: YELLOW
CREAT SERPL-MCNC: 2.2 MG/DL (ref 0.7–1.2)
CREAT UR-MCNC: 88.6 MG/DL (ref 28–217)
EOSINOPHIL # BLD: 0.2 K/UL (ref 0–0.4)
EOSINOPHIL # BLD: 0.2 K/UL (ref 0–0.4)
EOSINOPHILS RELATIVE PERCENT: 2 % (ref 0–4)
EOSINOPHILS RELATIVE PERCENT: 2 % (ref 0–4)
EPI CELLS #/AREA URNS HPF: ABNORMAL /HPF
ERYTHROCYTE [DISTWIDTH] IN BLOOD BY AUTOMATED COUNT: 14.6 % (ref 11.5–14.9)
ERYTHROCYTE [DISTWIDTH] IN BLOOD BY AUTOMATED COUNT: 14.6 % (ref 11.5–14.9)
GFR, ESTIMATED: 23 ML/MIN/1.73M2
GLUCOSE SERPL-MCNC: 209 MG/DL (ref 74–99)
GLUCOSE UR STRIP-MCNC: ABNORMAL MG/DL
HCT VFR BLD AUTO: 41.1 % (ref 36–46)
HCT VFR BLD AUTO: 41.2 % (ref 36–46)
HGB BLD-MCNC: 13.1 G/DL (ref 12–16)
HGB BLD-MCNC: 13.2 G/DL (ref 12–16)
HGB UR QL STRIP.AUTO: ABNORMAL
KETONES UR STRIP-MCNC: NEGATIVE MG/DL
LEUKOCYTE ESTERASE UR QL STRIP: NEGATIVE
LYMPHOCYTES NFR BLD: 2.1 K/UL (ref 1–4.8)
LYMPHOCYTES NFR BLD: 2.3 K/UL (ref 1–4.8)
LYMPHOCYTES RELATIVE PERCENT: 17 % (ref 24–44)
LYMPHOCYTES RELATIVE PERCENT: 19 % (ref 24–44)
MAGNESIUM SERPL-MCNC: 1.8 MG/DL (ref 1.6–2.4)
MCH RBC QN AUTO: 30.5 PG (ref 26–34)
MCH RBC QN AUTO: 30.6 PG (ref 26–34)
MCHC RBC AUTO-ENTMCNC: 31.9 G/DL (ref 31–37)
MCHC RBC AUTO-ENTMCNC: 32.1 G/DL (ref 31–37)
MCV RBC AUTO: 95.3 FL (ref 80–100)
MCV RBC AUTO: 95.5 FL (ref 80–100)
MICROALBUMIN UR-MCNC: 38 MG/L (ref 0–20)
MICROALBUMIN/CREAT UR-RTO: 43 MCG/MG CREAT (ref 0–25)
MONOCYTES NFR BLD: 0.8 K/UL (ref 0.1–1.3)
MONOCYTES NFR BLD: 0.9 K/UL (ref 0.1–1.3)
MONOCYTES NFR BLD: 7 % (ref 1–7)
MONOCYTES NFR BLD: 7 % (ref 1–7)
NEUTROPHILS NFR BLD: 72 % (ref 36–66)
NEUTROPHILS NFR BLD: 74 % (ref 36–66)
NEUTS SEG NFR BLD: 8.8 K/UL (ref 1.3–9.1)
NEUTS SEG NFR BLD: 9.2 K/UL (ref 1.3–9.1)
NITRITE UR QL STRIP: NEGATIVE
PH UR STRIP: 6 [PH] (ref 5–8)
PHOSPHATE SERPL-MCNC: 3.3 MG/DL (ref 2.5–4.5)
PLATELET # BLD AUTO: 241 K/UL (ref 150–450)
PLATELET # BLD AUTO: 249 K/UL (ref 150–450)
PMV BLD AUTO: 9.2 FL (ref 6–12)
PMV BLD AUTO: 9.6 FL (ref 6–12)
POTASSIUM SERPL-SCNC: 4.4 MMOL/L (ref 3.7–5.3)
PROT UR STRIP-MCNC: ABNORMAL MG/DL
RBC # BLD AUTO: 4.31 M/UL (ref 4–5.2)
RBC # BLD AUTO: 4.31 M/UL (ref 4–5.2)
RBC #/AREA URNS HPF: ABNORMAL /HPF
SODIUM SERPL-SCNC: 139 MMOL/L (ref 136–145)
SP GR UR STRIP: 1.02 (ref 1–1.03)
UROBILINOGEN UR STRIP-ACNC: NORMAL EU/DL (ref 0–1)
WBC #/AREA URNS HPF: ABNORMAL /HPF
WBC OTHER # BLD: 12.2 K/UL (ref 3.5–11)
WBC OTHER # BLD: 12.5 K/UL (ref 3.5–11)

## 2024-12-20 PROCEDURE — 83735 ASSAY OF MAGNESIUM: CPT

## 2024-12-20 PROCEDURE — 36415 COLL VENOUS BLD VENIPUNCTURE: CPT

## 2024-12-20 PROCEDURE — 80048 BASIC METABOLIC PNL TOTAL CA: CPT

## 2024-12-20 PROCEDURE — 82570 ASSAY OF URINE CREATININE: CPT

## 2024-12-20 PROCEDURE — 99213 OFFICE O/P EST LOW 20 MIN: CPT

## 2024-12-20 PROCEDURE — 81001 URINALYSIS AUTO W/SCOPE: CPT

## 2024-12-20 PROCEDURE — 85025 COMPLETE CBC W/AUTO DIFF WBC: CPT

## 2024-12-20 PROCEDURE — 82043 UR ALBUMIN QUANTITATIVE: CPT

## 2024-12-20 PROCEDURE — 84100 ASSAY OF PHOSPHORUS: CPT

## 2024-12-20 PROCEDURE — 99213 OFFICE O/P EST LOW 20 MIN: CPT | Performed by: NURSE PRACTITIONER

## 2024-12-20 RX ORDER — HYDROCODONE BITARTRATE AND ACETAMINOPHEN 5; 325 MG/1; MG/1
1 TABLET ORAL EVERY 8 HOURS PRN
Qty: 90 TABLET | Refills: 0 | Status: SHIPPED | OUTPATIENT
Start: 2024-12-23 | End: 2025-01-22

## 2024-12-20 ASSESSMENT — ENCOUNTER SYMPTOMS
BOWEL INCONTINENCE: 0
COUGH: 0
CONSTIPATION: 0
BACK PAIN: 1
SHORTNESS OF BREATH: 0

## 2024-12-20 ASSESSMENT — PAIN SCALES - GENERAL: PAINLEVEL_OUTOF10: 8

## 2024-12-20 NOTE — PROGRESS NOTES
Chief Complaint   Patient presents with    Back Pain     Med Refill - Norco         [facility]    Pt complains of chronic neck pain. MRI with moderate stenosis. She had cervical facet injections with moderate relief but states she cannot afford to repeat at this time. Last injection was 3/2021.      Had appt with Dr Mack in Jan 2022 with ACDF C3-4 and C5-6 recommended  She continues with chiropractic care with benefit.   She is scheduled for cervical spine surgery 12/2 - awaiting insurance approval - has to complete PT for this.      She also complains of bilateral hip pain. She had right hip injection 2/5/23 with Dr. Garcia with mild benefit. She currently takes Norco 5-325 TID with good relief.       She is having worsening neck pain and worsening muscle spasms in her legs and feet.      She had right shoulder injection 9/4/24 with Dr. Lara and reports significant improvement in pain and function.      Recent CT of pelvis for groin pain following urology procedure - shows degenerative changes of the bilateral sacroiliac joints and bilateral femoroacetabular joints.     Continues to follow with cardiology for CAD       New MRIs of neck and low back completed 6/21/24 and demonstrate Moderate spinal canal stenosis, severe left and moderate right neural foraminal narrowing at C3-4, unchanged.Grade 1 anterolisthesis of C5 relative to C6 and C6 relative to C7, new  from the previous exam.  Moderate spinal canal stenosis and moderate to severe bilateral neural foraminal narrowing at L4-5, similar to the previous exam.      Back Pain  This is a chronic problem. The current episode started more than 1 year ago. The problem occurs constantly. The problem is unchanged. The pain is present in the lumbar spine. The quality of the pain is described as aching. The pain radiates to the left foot and right foot. The pain is at a severity of 8/10. The pain is severe. The pain is Worse during the night. The symptoms are

## 2025-01-06 RX ORDER — FERROUS SULFATE 325(65) MG
1 TABLET ORAL DAILY
Qty: 90 TABLET | Refills: 0 | Status: SHIPPED | OUTPATIENT
Start: 2025-01-06

## 2025-01-06 NOTE — TELEPHONE ENCOUNTER
Please Approve or Refuse.  Send to Pharmacy per Pt's Request:      Next Visit Date:  3/18/2025   Last Visit Date: 12/17/2024    Hemoglobin A1C (%)   Date Value   12/17/2024 8.7   09/17/2024 8.1   04/30/2024 7.3             ( goal A1C is < 7)   BP Readings from Last 3 Encounters:   12/17/24 120/84   10/09/24 128/80   09/17/24 120/74          (goal 120/80)  BUN   Date Value Ref Range Status   12/20/2024 29 (H) 8 - 23 mg/dL Final     Creatinine   Date Value Ref Range Status   12/20/2024 2.2 (H) 0.7 - 1.2 mg/dL Final     Potassium   Date Value Ref Range Status   12/20/2024 4.4 3.7 - 5.3 mmol/L Final

## 2025-01-09 DIAGNOSIS — F41.0 PANIC ATTACKS: ICD-10-CM

## 2025-01-10 RX ORDER — ESCITALOPRAM OXALATE 20 MG/1
20 TABLET ORAL DAILY
Qty: 90 TABLET | Refills: 0 | Status: SHIPPED | OUTPATIENT
Start: 2025-01-10

## 2025-01-12 DIAGNOSIS — G47.00 INSOMNIA, UNSPECIFIED TYPE: ICD-10-CM

## 2025-01-13 ENCOUNTER — TELEPHONE (OUTPATIENT)
Dept: ORTHOPEDIC SURGERY | Age: 75
End: 2025-01-13

## 2025-01-13 RX ORDER — TRAZODONE HYDROCHLORIDE 100 MG/1
100 TABLET ORAL NIGHTLY
Qty: 90 TABLET | Refills: 1 | Status: SHIPPED | OUTPATIENT
Start: 2025-01-13

## 2025-01-13 NOTE — TELEPHONE ENCOUNTER
I called and spoke with the patient.  She states she appealed the original surgery denial and was recently told by the insurance that it was now approved and we could re-submit.  I spoke with pre-certification and they checked on their system.  They could not find anything that stated it had been appealed and approved, the case was still showing denied.    I asked the patient to contact the insurance and have them send a written noticed of the appeal approval so we could begin this process.

## 2025-01-13 NOTE — TELEPHONE ENCOUNTER
ACDF from 12/2 was denied by insurance and cancelled due to PT.  We did attempt a peer to peer but the denial was upheld.  Patient is wanting to try and re-schedule.  Are you ok re-scheduling or do you want to see her in office first?

## 2025-01-20 ENCOUNTER — HOSPITAL ENCOUNTER (OUTPATIENT)
Dept: PAIN MANAGEMENT | Age: 75
Discharge: HOME | End: 2025-01-20
Payer: MEDICARE

## 2025-01-20 VITALS — BODY MASS INDEX: 41.41 KG/M2 | WEIGHT: 225 LBS | HEIGHT: 62 IN

## 2025-01-20 DIAGNOSIS — Z79.891 CHRONIC USE OF OPIATE FOR THERAPEUTIC PURPOSE: ICD-10-CM

## 2025-01-20 DIAGNOSIS — M46.1 SACROILIITIS (HCC): Primary | ICD-10-CM

## 2025-01-20 DIAGNOSIS — M47.817 LUMBOSACRAL SPONDYLOSIS WITHOUT MYELOPATHY: ICD-10-CM

## 2025-01-20 DIAGNOSIS — M25.511 CHRONIC RIGHT SHOULDER PAIN: ICD-10-CM

## 2025-01-20 DIAGNOSIS — L29.9 ITCHING: ICD-10-CM

## 2025-01-20 DIAGNOSIS — G89.29 CHRONIC RIGHT SHOULDER PAIN: ICD-10-CM

## 2025-01-20 DIAGNOSIS — M48.02 CERVICAL STENOSIS OF SPINE: ICD-10-CM

## 2025-01-20 PROCEDURE — 99213 OFFICE O/P EST LOW 20 MIN: CPT

## 2025-01-20 PROCEDURE — 99213 OFFICE O/P EST LOW 20 MIN: CPT | Performed by: NURSE PRACTITIONER

## 2025-01-20 RX ORDER — HYDROCODONE BITARTRATE AND ACETAMINOPHEN 5; 325 MG/1; MG/1
1 TABLET ORAL EVERY 8 HOURS PRN
Qty: 90 TABLET | Refills: 0 | Status: SHIPPED | OUTPATIENT
Start: 2025-01-22 | End: 2025-02-21

## 2025-01-20 RX ORDER — DIPHENHYDRAMINE HCL 25 MG
25 CAPSULE ORAL NIGHTLY PRN
Qty: 15 CAPSULE | Refills: 0 | Status: SHIPPED | OUTPATIENT
Start: 2025-01-20

## 2025-01-20 RX ORDER — GABAPENTIN 100 MG/1
100 CAPSULE ORAL 2 TIMES DAILY
Qty: 60 CAPSULE | Refills: 0 | Status: SHIPPED | OUTPATIENT
Start: 2025-01-20 | End: 2025-02-19

## 2025-01-20 RX ORDER — HYDROCODONE BITARTRATE AND ACETAMINOPHEN 5; 325 MG/1; MG/1
1 TABLET ORAL EVERY 8 HOURS PRN
Qty: 90 TABLET | Refills: 0 | Status: CANCELLED | OUTPATIENT
Start: 2025-01-20 | End: 2025-02-19

## 2025-01-20 ASSESSMENT — ENCOUNTER SYMPTOMS
COUGH: 0
BACK PAIN: 1
CONSTIPATION: 0
SHORTNESS OF BREATH: 0

## 2025-01-20 ASSESSMENT — PAIN SCALES - GENERAL: PAINLEVEL_OUTOF10: 8

## 2025-01-20 ASSESSMENT — PAIN DESCRIPTION - LOCATION: LOCATION: BACK
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
## 2025-01-22 DIAGNOSIS — L29.9 ITCHING: ICD-10-CM

## 2025-01-23 RX ORDER — DIPHENHYDRAMINE HCL 25 MG
25 CAPSULE ORAL NIGHTLY PRN
Qty: 15 CAPSULE | Refills: 0 | OUTPATIENT
Start: 2025-01-23

## 2025-01-23 NOTE — TELEPHONE ENCOUNTER
Please Approve or Refuse.  Send to Pharmacy per Pt's Request: [facility]      Next Visit Date:  3/18/2025   Last Visit Date: 12/17/2024    Hemoglobin A1C (%)   Date Value   12/17/2024 8.7   09/17/2024 8.1   04/30/2024 7.3             ( goal A1C is < 7)   BP Readings from Last 3 Encounters:   12/17/24 120/84   10/09/24 128/80   09/17/24 120/74          (goal 120/80)  BUN   Date Value Ref Range Status   12/20/2024 29 (H) 8 - 23 mg/dL Final     Creatinine   Date Value Ref Range Status   12/20/2024 2.2 (H) 0.7 - 1.2 mg/dL Final     Potassium   Date Value Ref Range Status   12/20/2024 4.4 3.7 - 5.3 mmol/L Final

## 2025-02-18 RX ORDER — GABAPENTIN 100 MG/1
CAPSULE ORAL
Qty: 60 CAPSULE | Refills: 0 | OUTPATIENT
Start: 2025-02-18

## 2025-02-19 ENCOUNTER — HOSPITAL ENCOUNTER (OUTPATIENT)
Dept: PAIN MANAGEMENT | Age: 75
Discharge: HOME | End: 2025-02-19
Payer: MEDICARE

## 2025-02-19 VITALS — HEIGHT: 62 IN | BODY MASS INDEX: 41.41 KG/M2 | WEIGHT: 225 LBS

## 2025-02-19 DIAGNOSIS — Z79.891 CHRONIC USE OF OPIATE FOR THERAPEUTIC PURPOSE: ICD-10-CM

## 2025-02-19 DIAGNOSIS — M47.817 LUMBOSACRAL SPONDYLOSIS WITHOUT MYELOPATHY: Primary | ICD-10-CM

## 2025-02-19 DIAGNOSIS — M48.061 DEGENERATIVE LUMBAR SPINAL STENOSIS: ICD-10-CM

## 2025-02-19 DIAGNOSIS — M50.30 DDD (DEGENERATIVE DISC DISEASE), CERVICAL: ICD-10-CM

## 2025-02-19 DIAGNOSIS — M48.02 CERVICAL STENOSIS OF SPINE: ICD-10-CM

## 2025-02-19 PROCEDURE — 99213 OFFICE O/P EST LOW 20 MIN: CPT | Performed by: NURSE PRACTITIONER

## 2025-02-19 PROCEDURE — 99213 OFFICE O/P EST LOW 20 MIN: CPT

## 2025-02-19 RX ORDER — GABAPENTIN 100 MG/1
CAPSULE ORAL
Qty: 90 CAPSULE | Refills: 0 | Status: SHIPPED | OUTPATIENT
Start: 2025-02-19 | End: 2025-03-21

## 2025-02-19 RX ORDER — HYDROCODONE BITARTRATE AND ACETAMINOPHEN 5; 325 MG/1; MG/1
1 TABLET ORAL EVERY 8 HOURS PRN
Qty: 90 TABLET | Refills: 0 | Status: SHIPPED | OUTPATIENT
Start: 2025-02-21 | End: 2025-03-23

## 2025-02-19 ASSESSMENT — ENCOUNTER SYMPTOMS
BOWEL INCONTINENCE: 0
BACK PAIN: 1
SHORTNESS OF BREATH: 0
COUGH: 0
CONSTIPATION: 0

## 2025-02-19 ASSESSMENT — PAIN SCALES - GENERAL: PAINLEVEL_OUTOF10: 10

## 2025-02-19 NOTE — PROGRESS NOTES
Chief Complaint   Patient presents with    Back Pain    Medication Refill     Norco  due 2/21/25  Gabapentin  due 2/19/25         PMH     Pt complains of chronic neck pain. MRI with moderate stenosis. She had cervical facet injections with moderate relief but states she cannot afford to repeat at this time. Last injection was 3/2021.      Had appt with Dr Mack in Jan 2022 with ACDF C3-4 and C5-6 recommended  She continues with chiropractic care with benefit.   She is scheduled for cervical spine surgery 12/2 - awaiting insurance approval - has to complete PT for this.      She also complains of bilateral hip pain. She had right hip injection 2/5/23 with Dr. Garcia with mild benefit. She currently takes Norco 5-325 TID with good relief.       She is having worsening neck pain and worsening muscle spasms in her legs and feet.      She had right shoulder injection 9/4/24 with Dr. Lara and reports significant improvement in pain and function.      Recent CT of pelvis for groin pain following urology procedure - shows degenerative changes of the bilateral sacroiliac joints and bilateral femoroacetabular joints.     Continues to follow with cardiology for CAD       New MRIs of neck and low back completed 6/21/24 and demonstrate Moderate spinal canal stenosis, severe left and moderate right neural foraminal narrowing at C3-4, unchanged.Grade 1 anterolisthesis of C5 relative to C6 and C6 relative to C7, new  from the previous exam.  Moderate spinal canal stenosis and moderate to severe bilateral neural foraminal narrowing at L4-5, similar to the previous exam.     Started on gabapentin 100 mg BID 1/20/2025 with some relief. Denies side effects.     Back Pain  This is a chronic problem. The current episode started more than 1 year ago. The problem occurs constantly. The problem has been waxing and waning since onset. The pain is present in the lumbar spine and thoracic spine. The quality of the pain is described

## 2025-02-21 ENCOUNTER — HOSPITAL ENCOUNTER (OUTPATIENT)
Age: 75
Discharge: HOME | End: 2025-02-21
Payer: MEDICARE

## 2025-02-21 LAB
ANION GAP SERPL CALCULATED.3IONS-SCNC: 10 MMOL/L (ref 9–16)
BASOPHILS # BLD: 0.1 K/UL (ref 0–0.2)
BASOPHILS NFR BLD: 1 % (ref 0–2)
BUN SERPL-MCNC: 24 MG/DL (ref 8–23)
CALCIUM SERPL-MCNC: 9.6 MG/DL (ref 8.6–10.4)
CHLORIDE SERPL-SCNC: 105 MMOL/L (ref 98–107)
CO2 SERPL-SCNC: 28 MMOL/L (ref 20–31)
CREAT SERPL-MCNC: 2.1 MG/DL (ref 0.7–1.2)
EOSINOPHIL # BLD: 0.3 K/UL (ref 0–0.4)
EOSINOPHILS RELATIVE PERCENT: 3 % (ref 0–4)
ERYTHROCYTE [DISTWIDTH] IN BLOOD BY AUTOMATED COUNT: 14.5 % (ref 11.5–14.9)
GFR, ESTIMATED: 24 ML/MIN/1.73M2
GLUCOSE SERPL-MCNC: 197 MG/DL (ref 74–99)
HCT VFR BLD AUTO: 40.5 % (ref 36–46)
HGB BLD-MCNC: 13.1 G/DL (ref 12–16)
LYMPHOCYTES NFR BLD: 2.2 K/UL (ref 1–4.8)
LYMPHOCYTES RELATIVE PERCENT: 21 % (ref 24–44)
MAGNESIUM SERPL-MCNC: 1.9 MG/DL (ref 1.6–2.4)
MCH RBC QN AUTO: 30.3 PG (ref 26–34)
MCHC RBC AUTO-ENTMCNC: 32.5 G/DL (ref 31–37)
MCV RBC AUTO: 93.3 FL (ref 80–100)
MONOCYTES NFR BLD: 0.8 K/UL (ref 0.1–1.3)
MONOCYTES NFR BLD: 8 % (ref 1–7)
NEUTROPHILS NFR BLD: 67 % (ref 36–66)
NEUTS SEG NFR BLD: 7.4 K/UL (ref 1.3–9.1)
PHOSPHATE SERPL-MCNC: 3.3 MG/DL (ref 2.5–4.5)
PLATELET # BLD AUTO: 213 K/UL (ref 150–450)
PMV BLD AUTO: 9 FL (ref 6–12)
POTASSIUM SERPL-SCNC: 5.4 MMOL/L (ref 3.7–5.3)
RBC # BLD AUTO: 4.34 M/UL (ref 4–5.2)
SODIUM SERPL-SCNC: 143 MMOL/L (ref 136–145)
WBC OTHER # BLD: 10.8 K/UL (ref 3.5–11)

## 2025-02-21 PROCEDURE — 36415 COLL VENOUS BLD VENIPUNCTURE: CPT

## 2025-02-21 PROCEDURE — 80048 BASIC METABOLIC PNL TOTAL CA: CPT

## 2025-02-21 PROCEDURE — 84100 ASSAY OF PHOSPHORUS: CPT

## 2025-02-21 PROCEDURE — 83735 ASSAY OF MAGNESIUM: CPT

## 2025-02-21 PROCEDURE — 85025 COMPLETE CBC W/AUTO DIFF WBC: CPT

## 2025-03-03 RX ORDER — LEVOTHYROXINE SODIUM 125 UG/1
125 TABLET ORAL DAILY
Qty: 30 TABLET | Refills: 2 | Status: SHIPPED | OUTPATIENT
Start: 2025-03-03

## 2025-03-03 NOTE — TELEPHONE ENCOUNTER
Please Approve or Refuse.  Send to Pharmacy per Pt's Request:      Next Visit Date:  3/18/2025   Last Visit Date: 12/17/2024    Hemoglobin A1C (%)   Date Value   12/17/2024 8.7   09/17/2024 8.1   04/30/2024 7.3             ( goal A1C is < 7)   BP Readings from Last 3 Encounters:   02/27/25 (!) 156/70   12/17/24 120/84   10/09/24 128/80          (goal 120/80)  BUN   Date Value Ref Range Status   02/21/2025 24 (H) 8 - 23 mg/dL Final     Creatinine   Date Value Ref Range Status   02/21/2025 2.1 (H) 0.7 - 1.2 mg/dL Final     Potassium   Date Value Ref Range Status   02/21/2025 5.4 (H) 3.7 - 5.3 mmol/L Final

## 2025-03-11 DIAGNOSIS — N39.41 URGE INCONTINENCE OF URINE: ICD-10-CM

## 2025-03-11 RX ORDER — OXYBUTYNIN CHLORIDE 15 MG/1
15 TABLET, EXTENDED RELEASE ORAL DAILY
Qty: 90 TABLET | Refills: 3 | Status: SHIPPED | OUTPATIENT
Start: 2025-03-11

## 2025-03-15 ASSESSMENT — PATIENT HEALTH QUESTIONNAIRE - PHQ9
SUM OF ALL RESPONSES TO PHQ QUESTIONS 1-9: 7
8. MOVING OR SPEAKING SO SLOWLY THAT OTHER PEOPLE COULD HAVE NOTICED. OR THE OPPOSITE, BEING SO FIGETY OR RESTLESS THAT YOU HAVE BEEN MOVING AROUND A LOT MORE THAN USUAL: NOT AT ALL
5. POOR APPETITE OR OVEREATING: SEVERAL DAYS
6. FEELING BAD ABOUT YOURSELF - OR THAT YOU ARE A FAILURE OR HAVE LET YOURSELF OR YOUR FAMILY DOWN: NOT AT ALL
3. TROUBLE FALLING OR STAYING ASLEEP: SEVERAL DAYS
SUM OF ALL RESPONSES TO PHQ QUESTIONS 1-9: 7
SUM OF ALL RESPONSES TO PHQ QUESTIONS 1-9: 7
4. FEELING TIRED OR HAVING LITTLE ENERGY: MORE THAN HALF THE DAYS
1. LITTLE INTEREST OR PLEASURE IN DOING THINGS: SEVERAL DAYS
7. TROUBLE CONCENTRATING ON THINGS, SUCH AS READING THE NEWSPAPER OR WATCHING TELEVISION: SEVERAL DAYS
2. FEELING DOWN, DEPRESSED OR HOPELESS: SEVERAL DAYS
SUM OF ALL RESPONSES TO PHQ QUESTIONS 1-9: 7
4. FEELING TIRED OR HAVING LITTLE ENERGY: MORE THAN HALF THE DAYS
10. IF YOU CHECKED OFF ANY PROBLEMS, HOW DIFFICULT HAVE THESE PROBLEMS MADE IT FOR YOU TO DO YOUR WORK, TAKE CARE OF THINGS AT HOME, OR GET ALONG WITH OTHER PEOPLE: SOMEWHAT DIFFICULT
5. POOR APPETITE OR OVEREATING: SEVERAL DAYS
9. THOUGHTS THAT YOU WOULD BE BETTER OFF DEAD, OR OF HURTING YOURSELF: NOT AT ALL
SUM OF ALL RESPONSES TO PHQ QUESTIONS 1-9: 7
1. LITTLE INTEREST OR PLEASURE IN DOING THINGS: SEVERAL DAYS
7. TROUBLE CONCENTRATING ON THINGS, SUCH AS READING THE NEWSPAPER OR WATCHING TELEVISION: SEVERAL DAYS
9. THOUGHTS THAT YOU WOULD BE BETTER OFF DEAD, OR OF HURTING YOURSELF: NOT AT ALL
3. TROUBLE FALLING OR STAYING ASLEEP: SEVERAL DAYS
8. MOVING OR SPEAKING SO SLOWLY THAT OTHER PEOPLE COULD HAVE NOTICED. OR THE OPPOSITE - BEING SO FIDGETY OR RESTLESS THAT YOU HAVE BEEN MOVING AROUND A LOT MORE THAN USUAL: NOT AT ALL
6. FEELING BAD ABOUT YOURSELF - OR THAT YOU ARE A FAILURE OR HAVE LET YOURSELF OR YOUR FAMILY DOWN: NOT AT ALL
10. IF YOU CHECKED OFF ANY PROBLEMS, HOW DIFFICULT HAVE THESE PROBLEMS MADE IT FOR YOU TO DO YOUR WORK, TAKE CARE OF THINGS AT HOME, OR GET ALONG WITH OTHER PEOPLE: SOMEWHAT DIFFICULT

## 2025-03-18 ENCOUNTER — OFFICE VISIT (OUTPATIENT)
Dept: FAMILY MEDICINE CLINIC | Age: 75
End: 2025-03-18

## 2025-03-18 ENCOUNTER — HOSPITAL ENCOUNTER (OUTPATIENT)
Dept: PAIN MANAGEMENT | Age: 75
Discharge: HOME | End: 2025-03-18
Payer: MEDICARE

## 2025-03-18 VITALS — BODY MASS INDEX: 39.69 KG/M2 | WEIGHT: 224 LBS | HEIGHT: 63 IN

## 2025-03-18 VITALS
DIASTOLIC BLOOD PRESSURE: 61 MMHG | WEIGHT: 224 LBS | SYSTOLIC BLOOD PRESSURE: 117 MMHG | HEART RATE: 96 BPM | BODY MASS INDEX: 39.68 KG/M2

## 2025-03-18 DIAGNOSIS — F33.1 MODERATE EPISODE OF RECURRENT MAJOR DEPRESSIVE DISORDER (HCC): ICD-10-CM

## 2025-03-18 DIAGNOSIS — M47.22 OSTEOARTHRITIS OF SPINE WITH RADICULOPATHY, CERVICAL REGION: ICD-10-CM

## 2025-03-18 DIAGNOSIS — E03.9 HYPOTHYROIDISM, UNSPECIFIED TYPE: ICD-10-CM

## 2025-03-18 DIAGNOSIS — E11.21 TYPE 2 DIABETES MELLITUS WITH DIABETIC NEPHROPATHY, WITH LONG-TERM CURRENT USE OF INSULIN (HCC): Primary | ICD-10-CM

## 2025-03-18 DIAGNOSIS — Z79.4 TYPE 2 DIABETES MELLITUS WITH DIABETIC NEPHROPATHY, WITH LONG-TERM CURRENT USE OF INSULIN (HCC): Primary | ICD-10-CM

## 2025-03-18 DIAGNOSIS — M47.817 LUMBOSACRAL SPONDYLOSIS WITHOUT MYELOPATHY: ICD-10-CM

## 2025-03-18 DIAGNOSIS — M48.02 CERVICAL STENOSIS OF SPINE: ICD-10-CM

## 2025-03-18 DIAGNOSIS — Z79.891 CHRONIC USE OF OPIATE FOR THERAPEUTIC PURPOSE: Primary | ICD-10-CM

## 2025-03-18 DIAGNOSIS — M48.061 DEGENERATIVE LUMBAR SPINAL STENOSIS: ICD-10-CM

## 2025-03-18 DIAGNOSIS — I10 ESSENTIAL HYPERTENSION: ICD-10-CM

## 2025-03-18 DIAGNOSIS — E87.5 HYPERKALEMIA: ICD-10-CM

## 2025-03-18 DIAGNOSIS — E78.2 MIXED HYPERLIPIDEMIA: ICD-10-CM

## 2025-03-18 DIAGNOSIS — N18.4 STAGE 4 CHRONIC KIDNEY DISEASE (HCC): ICD-10-CM

## 2025-03-18 DIAGNOSIS — E66.01 CLASS 2 SEVERE OBESITY WITH SERIOUS COMORBIDITY AND BODY MASS INDEX (BMI) OF 39.0 TO 39.9 IN ADULT, UNSPECIFIED OBESITY TYPE: ICD-10-CM

## 2025-03-18 DIAGNOSIS — E66.812 CLASS 2 SEVERE OBESITY WITH SERIOUS COMORBIDITY AND BODY MASS INDEX (BMI) OF 39.0 TO 39.9 IN ADULT, UNSPECIFIED OBESITY TYPE: ICD-10-CM

## 2025-03-18 PROBLEM — L98.9 SKIN LESIONS: Status: RESOLVED | Noted: 2023-10-31 | Resolved: 2025-03-18

## 2025-03-18 PROBLEM — F41.0 PANIC ATTACKS: Status: RESOLVED | Noted: 2024-09-17 | Resolved: 2025-03-18

## 2025-03-18 LAB — HBA1C MFR BLD: 9.6 %

## 2025-03-18 PROCEDURE — 99213 OFFICE O/P EST LOW 20 MIN: CPT | Performed by: NURSE PRACTITIONER

## 2025-03-18 PROCEDURE — 99213 OFFICE O/P EST LOW 20 MIN: CPT

## 2025-03-18 RX ORDER — GABAPENTIN 100 MG/1
100 CAPSULE ORAL DAILY
Qty: 30 CAPSULE | Refills: 0 | OUTPATIENT
Start: 2025-03-18 | End: 2025-04-17

## 2025-03-18 RX ORDER — DAPAGLIFLOZIN 10 MG/1
10 TABLET, FILM COATED ORAL EVERY MORNING
Qty: 90 TABLET | Refills: 1 | Status: SHIPPED | OUTPATIENT
Start: 2025-03-18

## 2025-03-18 RX ORDER — GABAPENTIN 100 MG/1
CAPSULE ORAL
Qty: 30 CAPSULE | Refills: 0 | Status: CANCELLED | OUTPATIENT
Start: 2025-03-18 | End: 2025-04-17

## 2025-03-18 RX ORDER — HYDROCODONE BITARTRATE AND ACETAMINOPHEN 5; 325 MG/1; MG/1
1 TABLET ORAL EVERY 8 HOURS PRN
Qty: 90 TABLET | Refills: 0 | Status: SHIPPED | OUTPATIENT
Start: 2025-03-23 | End: 2025-04-22

## 2025-03-18 RX ORDER — INSULIN GLARGINE 100 [IU]/ML
25 INJECTION, SOLUTION SUBCUTANEOUS 2 TIMES DAILY
Qty: 15 ML | Refills: 3 | Status: SHIPPED | OUTPATIENT
Start: 2025-03-18

## 2025-03-18 SDOH — ECONOMIC STABILITY: FOOD INSECURITY: WITHIN THE PAST 12 MONTHS, THE FOOD YOU BOUGHT JUST DIDN'T LAST AND YOU DIDN'T HAVE MONEY TO GET MORE.: NEVER TRUE

## 2025-03-18 SDOH — ECONOMIC STABILITY: FOOD INSECURITY: WITHIN THE PAST 12 MONTHS, YOU WORRIED THAT YOUR FOOD WOULD RUN OUT BEFORE YOU GOT MONEY TO BUY MORE.: NEVER TRUE

## 2025-03-18 ASSESSMENT — ENCOUNTER SYMPTOMS
SINUS PRESSURE: 0
SHORTNESS OF BREATH: 0
BACK PAIN: 1
CONSTIPATION: 0
RECTAL PAIN: 0
ABDOMINAL DISTENTION: 0
COUGH: 0
CHEST TIGHTNESS: 0
VOMITING: 0
COLOR CHANGE: 0
TROUBLE SWALLOWING: 0
BACK PAIN: 1
RHINORRHEA: 0
WHEEZING: 0
SORE THROAT: 0
NAUSEA: 0
EYE REDNESS: 0
BOWEL INCONTINENCE: 0
ABDOMINAL PAIN: 0
DIARRHEA: 0
STRIDOR: 0
BLOOD IN STOOL: 0

## 2025-03-18 ASSESSMENT — PAIN SCALES - GENERAL: PAINLEVEL_OUTOF10: 8

## 2025-03-18 NOTE — PROGRESS NOTES
daily (with meals)      Multiple Vitamins-Minerals (HAIR SKIN & NAILS ADVANCED PO) Take 5,000 mcg by mouth in the morning and at bedtime      sodium zirconium cyclosilicate (LOKELMA) 5 g PACK oral suspension Take one 5 g packet every other day for 2 weeks and then twice a week for 2 weeks 12 packet 0    HYDROcodone-acetaminophen (NORCO) 5-325 MG per tablet Take 1 tablet by mouth every 8 hours as needed for Pain for up to 30 days. Intended supply: 30 days. Take lowest dose possible to manage pain Max Daily Amount: 3 tablets 90 tablet 0    gabapentin (NEURONTIN) 100 MG capsule Take 1 capsule by mouth daily AND 2 capsules nightly. Do all this for 30 days. Intended supply: 30 days. (Patient taking differently: Take 1 capsule by mouth daily AND 2 capsules nightly. Do all this for 30 days. Intended supply: 30 days.) 90 capsule 0    diphenhydrAMINE (BANOPHEN) 25 MG capsule Take 1 capsule by mouth nightly as needed for Itching TAKE ONE CAPSULE BY MOUTH ONCE NIGHTLY AS NEEDED FOR ITCHING 15 capsule 0    traZODone (DESYREL) 100 MG tablet TAKE ONE TABLET BY MOUTH ONCE NIGHTLY 90 tablet 1    Continuous Glucose Sensor (FREESTYLE GARRETT 3 SENSOR) [facility] Use daily for monitoring glucose 1 each 0    Continuous Glucose  (FREESTYLE GARRETT 3 READER) [facility] Use daily for monitoring glucose 1 each 2    escitalopram (LEXAPRO) 20 MG tablet TAKE 1 TABLET BY MOUTH DAILY 90 tablet 0    ferrous sulfate (FEROSUL) 325 (65 Fe) MG tablet Take 1 tablet by mouth daily 90 tablet 0    hydrOXYzine HCl (ATARAX) 25 MG tablet Take 1 tablet by mouth 2 times daily as needed for Itching 120 tablet 1    BASAGLAR KWIKPEN 100 UNIT/ML injection pen Inject 35 Units into the skin nightly 15 mL 3    dapagliflozin (FARXIGA) 5 MG tablet Take 1 tablet by mouth every morning 90 tablet 1    ezetimibe (ZETIA) 10 MG tablet Take 1 tablet by mouth daily      pantoprazole (PROTONIX) 20 MG tablet Take 1 tablet by mouth nightly 90 tablet 3    pramipexole (MIRAPEX) 0.125 MG

## 2025-03-18 NOTE — PROGRESS NOTES
Patient denies side effects from medications like nausea, vomiting, constipation or drowsiness. Patient reports current activities of daily living are possible due to medications and would like to continue them.     As always, we encourage daily stretching and strengthening exercises, and recommend minimizing use of pain medications unless patient cannot get through daily activities due to pain.    We have discussed with the patient the effect the patient’s medical condition and opioid medication may have on the patient’s ability to safely operate a vehicle. Pt verbalized understanding.     Due to the high risk nature of this patient's pain medication close monitoring is required.   Continue current medication management, pt has been stable and compliant.  Script written for Norco 5-325  Follow up appointment made for 4 weeks    I have reviewed the chief complaint and history of present illness (including ROS and PFSH) and vital documentation by my staff and I agree with their documentation and have added where applicable.

## 2025-03-28 ENCOUNTER — HOSPITAL ENCOUNTER (OUTPATIENT)
Age: 75
Discharge: HOME | End: 2025-03-28
Payer: COMMERCIAL

## 2025-03-28 ENCOUNTER — RESULTS FOLLOW-UP (OUTPATIENT)
Dept: FAMILY MEDICINE CLINIC | Age: 75
End: 2025-03-28

## 2025-03-28 LAB
ANION GAP SERPL CALCULATED.3IONS-SCNC: 11 MMOL/L (ref 9–16)
BUN SERPL-MCNC: 24 MG/DL (ref 8–23)
CALCIUM SERPL-MCNC: 9.4 MG/DL (ref 8.6–10.4)
CHLORIDE SERPL-SCNC: 105 MMOL/L (ref 98–107)
CO2 SERPL-SCNC: 24 MMOL/L (ref 20–31)
CREAT SERPL-MCNC: 2.1 MG/DL (ref 0.7–1.2)
GFR, ESTIMATED: 24 ML/MIN/1.73M2
GLUCOSE SERPL-MCNC: 192 MG/DL (ref 74–99)
POTASSIUM SERPL-SCNC: 4.8 MMOL/L (ref 3.7–5.3)
SODIUM SERPL-SCNC: 140 MMOL/L (ref 136–145)

## 2025-03-28 PROCEDURE — 36415 COLL VENOUS BLD VENIPUNCTURE: CPT

## 2025-03-28 PROCEDURE — 80048 BASIC METABOLIC PNL TOTAL CA: CPT

## 2025-03-28 NOTE — RESULT ENCOUNTER NOTE
Please notify patient: Chronic kidney disease stage IV, stable for the past 7 months, she is following with Dr. Dillon  Absolutely avoid naproxen Aleve, Motrin, low-salt diet is advisable low-salt diet, can drink 48 ounce water daily  Future Appointments  4/17/2025  11:15 AM   Blanche Pepper MD         fp sc               [facility] DEP  4/21/2025  1:15 PM    Cade Lara DO [facility]        [place]

## 2025-04-01 DIAGNOSIS — E11.9 TYPE 2 DIABETES MELLITUS WITHOUT COMPLICATION, WITHOUT LONG-TERM CURRENT USE OF INSULIN: ICD-10-CM

## 2025-04-01 DIAGNOSIS — E11.21 TYPE 2 DIABETES MELLITUS WITH DIABETIC NEPHROPATHY, WITHOUT LONG-TERM CURRENT USE OF INSULIN (HCC): ICD-10-CM

## 2025-04-02 RX ORDER — ACYCLOVIR 800 MG/1
TABLET ORAL
Qty: 1 EACH | Refills: 0 | Status: SHIPPED | OUTPATIENT
Start: 2025-04-02

## 2025-04-02 RX ORDER — PEN NEEDLE, DIABETIC 31 GX5/16"
100 NEEDLE, DISPOSABLE MISCELLANEOUS 2 TIMES DAILY
Qty: 200 EACH | Refills: 3 | Status: SHIPPED | OUTPATIENT
Start: 2025-04-02 | End: 2025-04-04 | Stop reason: SDUPTHER

## 2025-04-02 NOTE — TELEPHONE ENCOUNTER
Please Approve or Refuse.  Send to Pharmacy per Pt's Request: [facility]      Next Visit Date:  4/17/2025   Last Visit Date: 3/18/2025    Hemoglobin A1C (%)   Date Value   03/18/2025 9.6   12/17/2024 8.7   09/17/2024 8.1             ( goal A1C is < 7)   BP Readings from Last 3 Encounters:   03/18/25 117/61   02/27/25 (!) 156/70   12/17/24 120/84          (goal 120/80)  BUN   Date Value Ref Range Status   03/28/2025 24 (H) 8 - 23 mg/dL Final     Creatinine   Date Value Ref Range Status   03/28/2025 2.1 (H) 0.7 - 1.2 mg/dL Final     Potassium   Date Value Ref Range Status   03/28/2025 4.8 3.7 - 5.3 mmol/L Final

## 2025-04-04 DIAGNOSIS — E11.9 TYPE 2 DIABETES MELLITUS WITHOUT COMPLICATION, WITHOUT LONG-TERM CURRENT USE OF INSULIN: ICD-10-CM

## 2025-04-04 DIAGNOSIS — E11.21 TYPE 2 DIABETES MELLITUS WITH DIABETIC NEPHROPATHY, WITHOUT LONG-TERM CURRENT USE OF INSULIN (HCC): ICD-10-CM

## 2025-04-04 RX ORDER — PEN NEEDLE, DIABETIC 31 GX5/16"
100 NEEDLE, DISPOSABLE MISCELLANEOUS 2 TIMES DAILY
Qty: 200 EACH | Refills: 3 | Status: SHIPPED | OUTPATIENT
Start: 2025-04-04

## 2025-04-04 NOTE — TELEPHONE ENCOUNTER
Please Approve or Refuse.  Send to Pharmacy per Pt's Request:      Next Visit Date:  4/17/2025   Last Visit Date: 3/18/2025    Hemoglobin A1C (%)   Date Value   03/18/2025 9.6   12/17/2024 8.7   09/17/2024 8.1             ( goal A1C is < 7)   BP Readings from Last 3 Encounters:   03/18/25 117/61   02/27/25 (!) 156/70   12/17/24 120/84          (goal 120/80)  BUN   Date Value Ref Range Status   03/28/2025 24 (H) 8 - 23 mg/dL Final     Creatinine   Date Value Ref Range Status   03/28/2025 2.1 (H) 0.7 - 1.2 mg/dL Final     Potassium   Date Value Ref Range Status   03/28/2025 4.8 3.7 - 5.3 mmol/L Final

## 2025-04-08 ENCOUNTER — PREP FOR PROCEDURE (OUTPATIENT)
Dept: ORTHOPEDIC SURGERY | Age: 75
End: 2025-04-08

## 2025-04-08 ENCOUNTER — TELEPHONE (OUTPATIENT)
Dept: ORTHOPEDIC SURGERY | Age: 75
End: 2025-04-08

## 2025-04-08 DIAGNOSIS — M43.12 ACQUIRED SPONDYLOLISTHESIS OF CERVICAL VERTEBRA: ICD-10-CM

## 2025-04-08 DIAGNOSIS — M48.02 SPINAL STENOSIS IN CERVICAL REGION: ICD-10-CM

## 2025-04-08 DIAGNOSIS — M48.02 CERVICAL STENOSIS OF SPINE: Primary | ICD-10-CM

## 2025-04-08 NOTE — TELEPHONE ENCOUNTER
Called and left voicemail for patient to call back and schedule surgery.   O-L Flap Text: The defect edges were debeveled with a #15 scalpel blade.  Given the location of the defect, shape of the defect and the proximity to free margins an O-L flap was deemed most appropriate.  Using a sterile surgical marker, an appropriate advancement flap was drawn incorporating the defect and placing the expected incisions within the relaxed skin tension lines where possible.    The area thus outlined was incised deep to adipose tissue with a #15 scalpel blade.  The skin margins were undermined to an appropriate distance in all directions utilizing iris scissors.

## 2025-04-10 ENCOUNTER — HOSPITAL ENCOUNTER (OUTPATIENT)
Dept: PREADMISSION TESTING | Age: 75
Discharge: HOME | End: 2025-04-14
Payer: MEDICARE

## 2025-04-10 VITALS
BODY MASS INDEX: 40.22 KG/M2 | HEIGHT: 63 IN | SYSTOLIC BLOOD PRESSURE: 111 MMHG | OXYGEN SATURATION: 96 % | HEART RATE: 56 BPM | DIASTOLIC BLOOD PRESSURE: 49 MMHG | TEMPERATURE: 96.2 F | RESPIRATION RATE: 18 BRPM | WEIGHT: 227 LBS

## 2025-04-10 DIAGNOSIS — F41.0 PANIC ATTACKS: ICD-10-CM

## 2025-04-10 LAB
BASOPHILS # BLD: 0.1 K/UL (ref 0–0.2)
BASOPHILS NFR BLD: 1 % (ref 0–2)
EOSINOPHIL # BLD: 0.3 K/UL (ref 0–0.4)
EOSINOPHILS RELATIVE PERCENT: 3 % (ref 0–4)
ERYTHROCYTE [DISTWIDTH] IN BLOOD BY AUTOMATED COUNT: 14 % (ref 11.5–14.9)
HCT VFR BLD AUTO: 41.8 % (ref 36–46)
HGB BLD-MCNC: 13.5 G/DL (ref 12–16)
LYMPHOCYTES NFR BLD: 3.4 K/UL (ref 1–4.8)
LYMPHOCYTES RELATIVE PERCENT: 29 % (ref 24–44)
MCH RBC QN AUTO: 30.1 PG (ref 26–34)
MCHC RBC AUTO-ENTMCNC: 32.1 G/DL (ref 31–37)
MCV RBC AUTO: 93.6 FL (ref 80–100)
MONOCYTES NFR BLD: 1 K/UL (ref 0.1–1.3)
MONOCYTES NFR BLD: 9 % (ref 1–7)
NEUTROPHILS NFR BLD: 58 % (ref 36–66)
NEUTS SEG NFR BLD: 6.8 K/UL (ref 1.3–9.1)
PLATELET # BLD AUTO: 256 K/UL (ref 150–450)
PMV BLD AUTO: 9 FL (ref 6–12)
RBC # BLD AUTO: 4.47 M/UL (ref 4–5.2)
WBC OTHER # BLD: 11.6 K/UL (ref 3.5–11)

## 2025-04-10 PROCEDURE — 36415 COLL VENOUS BLD VENIPUNCTURE: CPT

## 2025-04-10 PROCEDURE — 85025 COMPLETE CBC W/AUTO DIFF WBC: CPT

## 2025-04-10 RX ORDER — FERROUS SULFATE 325(65) MG
1 TABLET ORAL DAILY
Qty: 90 TABLET | Refills: 0 | Status: SHIPPED | OUTPATIENT
Start: 2025-04-10

## 2025-04-10 RX ORDER — ESCITALOPRAM OXALATE 10 MG/1
10 TABLET ORAL DAILY
Qty: 90 TABLET | Refills: 1 | Status: SHIPPED | OUTPATIENT
Start: 2025-04-10

## 2025-04-10 NOTE — DISCHARGE INSTRUCTIONS
Pre-op Instructions For Patient Being Admitted After Surgery    Medication Instructions:  Please stop herbs and any supplements now (includes vitamins and minerals).    For these medications:  Dulaglutide (Trulicity), Exenatide (Byetta and Bydureon, Liraglutide (Victoza), Lixisenatide (Adlyxin), Semaglutide (Ozempic and Rybelsus), Tirzepatide (Mounjaro, Zepbound)- Stop 1 week prior if taking weekly or 1 day prior if taking every 12 hours or daily.     Please contact your surgeon and prescribing physician for pre-op instructions for any blood thinners. STOP ASPIRIN AS DIRECTED    If you have inhalers/aerosol treatments at home, please use them the morning of your surgery and bring the inhalers with you to the hospital.    Please take the following medications the morning of your surgery with a sip of water: Isosorbide mononitrate,  Levothyroxine, and Pantoprazole    Surgery Instructions:  After midnight before surgery:  Do not eat or drink anything, including water, mints, gum, and hard candy.  You may brush your teeth without swallowing.  No smoking, chewing tobacco, or street drugs.     ** Please Follow Bowel Prep instructions if given by surgeon's office**    Please shower or bathe before surgery.  If you were given Surgical Scrub Chlorhexidine Gluconate Liquid (CHG), please shower the night before and the morning of your surgery following the detailed instructions you received during your pre-admission visit.     Please do not wear any cologne, lotion, powder, deodorant, jewelry, piercings, perfume, makeup, nail polish, hair accessories, or hair spray on the day of surgery.  Wear loose comfortable clothing.    Leave your valuables at home.  Bring a storage case for any glasses/contacts.    The Day of Surgery:  Arrive at [facility] Surgery Entrance at the time directed by your surgeon and check in at the desk.    If you have a living will or healthcare power of , please bring a copy.    You

## 2025-04-10 NOTE — TELEPHONE ENCOUNTER
Please Approve or Refuse.  Send to Pharmacy per Pt's Request:      Next Visit Date:  4/17/2025   Last Visit Date: 3/18/2025    Hemoglobin A1C (%)   Date Value   03/18/2025 9.6   12/17/2024 8.7   09/17/2024 8.1             ( goal A1C is < 7)   BP Readings from Last 3 Encounters:   04/10/25 (!) 111/49   03/18/25 117/61   02/27/25 (!) 156/70          (goal 120/80)  BUN   Date Value Ref Range Status   03/28/2025 24 (H) 8 - 23 mg/dL Final     Creatinine   Date Value Ref Range Status   03/28/2025 2.1 (H) 0.7 - 1.2 mg/dL Final     Potassium   Date Value Ref Range Status   03/28/2025 4.8 3.7 - 5.3 mmol/L Final

## 2025-04-10 NOTE — PROGRESS NOTES
Dr. Bennett, anesthesia, was contacted and informed of patient's history and planned surgery.  Medical and cardiac clearance requested.  Chart coordinator will notify Dr. Amezquita's office who will be responsible for making sure the clearance is obtained and is in the chart for surgery.

## 2025-04-11 LAB
EKG ATRIAL RATE: 58 BPM
EKG P AXIS: 61 DEGREES
EKG P-R INTERVAL: 182 MS
EKG Q-T INTERVAL: 466 MS
EKG QRS DURATION: 82 MS
EKG QTC CALCULATION (BAZETT): 457 MS
EKG R AXIS: 61 DEGREES
EKG T AXIS: 73 DEGREES
EKG VENTRICULAR RATE: 58 BPM

## 2025-04-11 RX ORDER — GABAPENTIN 100 MG/1
CAPSULE ORAL
Qty: 90 CAPSULE | OUTPATIENT
Start: 2025-04-11

## 2025-04-11 NOTE — TELEPHONE ENCOUNTER
Last filled 03/18/25 with no refills. Patient scheduled for follow up on 04/21/25. Please advise on medication refill.

## 2025-04-14 ENCOUNTER — TELEPHONE (OUTPATIENT)
Dept: ORTHOPEDIC SURGERY | Age: 75
End: 2025-04-14

## 2025-04-14 NOTE — TELEPHONE ENCOUNTER
Called patient to notify her of time change for surgery.  New arrival time is 5:30 AM and start time is 7:30 AM.    No answer so left voicemail.

## 2025-04-21 ENCOUNTER — HOSPITAL ENCOUNTER (OUTPATIENT)
Dept: PAIN MANAGEMENT | Age: 75
Discharge: HOME | End: 2025-04-21
Payer: MEDICARE

## 2025-04-21 VITALS — HEIGHT: 63 IN | BODY MASS INDEX: 40.22 KG/M2 | WEIGHT: 227 LBS

## 2025-04-21 DIAGNOSIS — Z79.891 CHRONIC USE OF OPIATE FOR THERAPEUTIC PURPOSE: Primary | ICD-10-CM

## 2025-04-21 DIAGNOSIS — M50.30 DDD (DEGENERATIVE DISC DISEASE), CERVICAL: ICD-10-CM

## 2025-04-21 DIAGNOSIS — M48.061 DEGENERATIVE LUMBAR SPINAL STENOSIS: ICD-10-CM

## 2025-04-21 DIAGNOSIS — M47.817 LUMBOSACRAL SPONDYLOSIS WITHOUT MYELOPATHY: ICD-10-CM

## 2025-04-21 PROCEDURE — 99213 OFFICE O/P EST LOW 20 MIN: CPT

## 2025-04-21 PROCEDURE — 99214 OFFICE O/P EST MOD 30 MIN: CPT | Performed by: STUDENT IN AN ORGANIZED HEALTH CARE EDUCATION/TRAINING PROGRAM

## 2025-04-21 RX ORDER — HYDROCODONE BITARTRATE AND ACETAMINOPHEN 5; 325 MG/1; MG/1
1 TABLET ORAL EVERY 8 HOURS PRN
Qty: 90 TABLET | Refills: 0 | Status: SHIPPED | OUTPATIENT
Start: 2025-04-22 | End: 2025-05-22

## 2025-04-21 NOTE — PROGRESS NOTES
clonus    DIAGNOSIS:    ICD-10-CM    1. Chronic use of opiate for therapeutic purpose  Z79.891       2. Lumbosacral spondylosis without myelopathy  M47.817 HYDROcodone-acetaminophen (NORCO) 5-325 MG per tablet      3. Degenerative lumbar spinal stenosis  M48.061       4. DDD (degenerative disc disease), cervical  M50.30         ASSESSMENT:    Marilyn Mora is a 74 y.o.female who presents with chronic low back pain, right hip pain and opioid management visit. To review, patient has had longstanding low back pain without any recent injuries or trauma.  She has a history of previous ACDF of C3-4 and C5-6 with Dr. Mack.    Her low back pain is consistent with lumbosacral spondylosis.    Neurologically, it appears the patient has full strength and normal sensation. There is no evidence of radiculopathy or myelopathy on examination. There are no red flags in the patient's history.    The patient continues to take opioid medications to improve pain, function and quality of life.  The patient denies any side effects from the medications including constipation or respiratory depression.  The patient reports adequate analgesia with the medication.  There is no evidence of aberrant behavior.  At today's visit, I reviewed the urine drug screen on 11/15/2024 which was appropriate.  I refilled her [place] at today's visit.    PLAN:  Medications:    For nonopioid therapy, the following medications were prescribed:    -Continue medication prescribed by primary care physician    Opioid therapy:  -Continue Norco 5/325 mg 3 times daily as needed, refilled  -Pain Treatment agreement: Up to date  -Urine Drug Screen: 11/15/2024, reviewed and appropriate  -[place]ARRS reviewed and appropriate    Interventions:  - Status post right subacromial bursa injection in office at today's visit 9/4/2024 (refer to procedure note)  -Status post bilateral lumbar L3, L4, L5 medial branch confirmatory block on 1/30/2023 with 80% improvement in pain and

## 2025-04-24 RX ORDER — PRAMIPEXOLE DIHYDROCHLORIDE 0.12 MG/1
0.12 TABLET ORAL NIGHTLY
Qty: 90 TABLET | Refills: 1 | Status: SHIPPED | OUTPATIENT
Start: 2025-04-24

## 2025-04-24 NOTE — TELEPHONE ENCOUNTER
Please Approve or Refuse.  Send to Pharmacy per Pt's Request:      Next Visit Date:  Visit date not found   Last Visit Date: 3/18/2025    Hemoglobin A1C (%)   Date Value   03/18/2025 9.6   12/17/2024 8.7   09/17/2024 8.1             ( goal A1C is < 7)   BP Readings from Last 3 Encounters:   04/10/25 (!) 111/49   03/18/25 117/61   02/27/25 (!) 156/70          (goal 120/80)  BUN   Date Value Ref Range Status   03/28/2025 24 (H) 8 - 23 mg/dL Final     Creatinine   Date Value Ref Range Status   03/28/2025 2.1 (H) 0.7 - 1.2 mg/dL Final     Potassium   Date Value Ref Range Status   03/28/2025 4.8 3.7 - 5.3 mmol/L Final

## 2025-05-01 ENCOUNTER — PATIENT MESSAGE (OUTPATIENT)
Dept: FAMILY MEDICINE CLINIC | Age: 75
End: 2025-05-01

## 2025-05-02 RX ORDER — GABAPENTIN 100 MG/1
100 CAPSULE ORAL DAILY
Qty: 30 CAPSULE | Refills: 0 | Status: SHIPPED | OUTPATIENT
Start: 2025-05-02 | End: 2025-06-01

## 2025-05-04 RX ORDER — ATORVASTATIN CALCIUM 40 MG/1
40 TABLET, FILM COATED ORAL DAILY
Qty: 90 TABLET | Refills: 1 | Status: SHIPPED | OUTPATIENT
Start: 2025-05-04

## 2025-05-04 SDOH — HEALTH STABILITY: PHYSICAL HEALTH: ON AVERAGE, HOW MANY MINUTES DO YOU ENGAGE IN EXERCISE AT THIS LEVEL?: 20 MIN

## 2025-05-04 SDOH — HEALTH STABILITY: PHYSICAL HEALTH: ON AVERAGE, HOW MANY DAYS PER WEEK DO YOU ENGAGE IN MODERATE TO STRENUOUS EXERCISE (LIKE A BRISK WALK)?: 2 DAYS

## 2025-05-04 ASSESSMENT — PATIENT HEALTH QUESTIONNAIRE - PHQ9
SUM OF ALL RESPONSES TO PHQ QUESTIONS 1-9: 7
9. THOUGHTS THAT YOU WOULD BE BETTER OFF DEAD, OR OF HURTING YOURSELF: NOT AT ALL
8. MOVING OR SPEAKING SO SLOWLY THAT OTHER PEOPLE COULD HAVE NOTICED. OR THE OPPOSITE, BEING SO FIGETY OR RESTLESS THAT YOU HAVE BEEN MOVING AROUND A LOT MORE THAN USUAL: NOT AT ALL
SUM OF ALL RESPONSES TO PHQ QUESTIONS 1-9: 7
6. FEELING BAD ABOUT YOURSELF - OR THAT YOU ARE A FAILURE OR HAVE LET YOURSELF OR YOUR FAMILY DOWN: SEVERAL DAYS
5. POOR APPETITE OR OVEREATING: SEVERAL DAYS
SUM OF ALL RESPONSES TO PHQ QUESTIONS 1-9: 7
1. LITTLE INTEREST OR PLEASURE IN DOING THINGS: SEVERAL DAYS
4. FEELING TIRED OR HAVING LITTLE ENERGY: SEVERAL DAYS
SUM OF ALL RESPONSES TO PHQ QUESTIONS 1-9: 7
7. TROUBLE CONCENTRATING ON THINGS, SUCH AS READING THE NEWSPAPER OR WATCHING TELEVISION: SEVERAL DAYS
2. FEELING DOWN, DEPRESSED OR HOPELESS: SEVERAL DAYS
10. IF YOU CHECKED OFF ANY PROBLEMS, HOW DIFFICULT HAVE THESE PROBLEMS MADE IT FOR YOU TO DO YOUR WORK, TAKE CARE OF THINGS AT HOME, OR GET ALONG WITH OTHER PEOPLE: SOMEWHAT DIFFICULT
3. TROUBLE FALLING OR STAYING ASLEEP: SEVERAL DAYS

## 2025-05-04 ASSESSMENT — LIFESTYLE VARIABLES
HOW MANY STANDARD DRINKS CONTAINING ALCOHOL DO YOU HAVE ON A TYPICAL DAY: 0
HOW MANY STANDARD DRINKS CONTAINING ALCOHOL DO YOU HAVE ON A TYPICAL DAY: PATIENT DOES NOT DRINK
HOW OFTEN DO YOU HAVE A DRINK CONTAINING ALCOHOL: NEVER
HOW OFTEN DO YOU HAVE SIX OR MORE DRINKS ON ONE OCCASION: 1
HOW OFTEN DO YOU HAVE A DRINK CONTAINING ALCOHOL: 1

## 2025-05-07 ENCOUNTER — OFFICE VISIT (OUTPATIENT)
Dept: FAMILY MEDICINE CLINIC | Age: 75
End: 2025-05-07
Payer: MEDICARE

## 2025-05-07 VITALS
WEIGHT: 228 LBS | HEIGHT: 63 IN | OXYGEN SATURATION: 98 % | HEART RATE: 60 BPM | DIASTOLIC BLOOD PRESSURE: 74 MMHG | BODY MASS INDEX: 40.4 KG/M2 | SYSTOLIC BLOOD PRESSURE: 120 MMHG

## 2025-05-07 DIAGNOSIS — Z79.4 TYPE 2 DIABETES MELLITUS WITH DIABETIC NEPHROPATHY, WITH LONG-TERM CURRENT USE OF INSULIN (HCC): ICD-10-CM

## 2025-05-07 DIAGNOSIS — E11.21 TYPE 2 DIABETES MELLITUS WITH DIABETIC NEPHROPATHY, WITH LONG-TERM CURRENT USE OF INSULIN (HCC): ICD-10-CM

## 2025-05-07 DIAGNOSIS — I10 ESSENTIAL HYPERTENSION: ICD-10-CM

## 2025-05-07 DIAGNOSIS — N18.4 STAGE 4 CHRONIC KIDNEY DISEASE (HCC): ICD-10-CM

## 2025-05-07 DIAGNOSIS — Z00.00 MEDICARE ANNUAL WELLNESS VISIT, SUBSEQUENT: Primary | ICD-10-CM

## 2025-05-07 PROCEDURE — 3046F HEMOGLOBIN A1C LEVEL >9.0%: CPT | Performed by: FAMILY MEDICINE

## 2025-05-07 PROCEDURE — G0439 PPPS, SUBSEQ VISIT: HCPCS | Performed by: FAMILY MEDICINE

## 2025-05-07 PROCEDURE — 99213 OFFICE O/P EST LOW 20 MIN: CPT | Performed by: FAMILY MEDICINE

## 2025-05-07 PROCEDURE — 1160F RVW MEDS BY RX/DR IN RCRD: CPT | Performed by: FAMILY MEDICINE

## 2025-05-07 PROCEDURE — 1123F ACP DISCUSS/DSCN MKR DOCD: CPT | Performed by: FAMILY MEDICINE

## 2025-05-07 PROCEDURE — 1159F MED LIST DOCD IN RCRD: CPT | Performed by: FAMILY MEDICINE

## 2025-05-07 PROCEDURE — 3074F SYST BP LT 130 MM HG: CPT | Performed by: FAMILY MEDICINE

## 2025-05-07 PROCEDURE — 1126F AMNT PAIN NOTED NONE PRSNT: CPT | Performed by: FAMILY MEDICINE

## 2025-05-07 PROCEDURE — 3078F DIAST BP <80 MM HG: CPT | Performed by: FAMILY MEDICINE

## 2025-05-07 SDOH — ECONOMIC STABILITY: FOOD INSECURITY: WITHIN THE PAST 12 MONTHS, YOU WORRIED THAT YOUR FOOD WOULD RUN OUT BEFORE YOU GOT MONEY TO BUY MORE.: NEVER TRUE

## 2025-05-07 SDOH — ECONOMIC STABILITY: FOOD INSECURITY: WITHIN THE PAST 12 MONTHS, THE FOOD YOU BOUGHT JUST DIDN'T LAST AND YOU DIDN'T HAVE MONEY TO GET MORE.: NEVER TRUE

## 2025-05-07 ASSESSMENT — ENCOUNTER SYMPTOMS
COUGH: 0
BACK PAIN: 0
SINUS PRESSURE: 0
ABDOMINAL PAIN: 0
COLOR CHANGE: 0
STRIDOR: 0
TROUBLE SWALLOWING: 0
BLOOD IN STOOL: 0
EYE REDNESS: 0
RECTAL PAIN: 0
VOMITING: 0
SORE THROAT: 0
DIARRHEA: 0
SHORTNESS OF BREATH: 0
NAUSEA: 0
CONSTIPATION: 0
WHEEZING: 0
RHINORRHEA: 0
CHEST TIGHTNESS: 0

## 2025-05-07 ASSESSMENT — VISUAL ACUITY
OS_CC: 20/20
OD_CC: 20/25

## 2025-05-07 NOTE — PROGRESS NOTES
Visit Information    Have you changed or started any medications since your last visit including any over-the-counter medicines, vitamins, or herbal medicines? no   Are you having any side effects from any of your medications? -  no  Have you stopped taking any of your medications? Is so, why? -  no    Have you seen any other physician or provider since your last visit? No  Have you had any other diagnostic tests since your last visit? No  Have you been seen in the emergency room and/or had an admission to a hospital since we last saw you? No  Have you had your routine dental cleaning in the past 6 months? no    Have you activated your [employer] account? If not, what are your barriers? Yes     Patient Care Team:  Blanche Pepper MD as PCP - General (Family Medicine)  Blanche Pepper MD as PCP - Empaneled Provider  Nicholas Ly MD as Consulting Physician (Gastroenterology)    Medical History Review  Past Medical, Family, and Social History reviewed and does contribute to the patient presenting condition    Health Maintenance   Topic Date Due    Diabetic retinal exam  Never done    COVID-19 Vaccine (8 - 2024-25 season) 03/23/2025    Annual Wellness Visit (Medicare)  05/01/2025    Diabetic foot exam  06/12/2025    A1C test (Diabetic or Prediabetic)  06/18/2025    Breast cancer screen  09/23/2025    Lipids  10/08/2025    Diabetic Alb to Cr ratio (uACR) test  12/20/2025    GFR test (Diabetes, CKD 3-4, OR last GFR 15-59)  03/28/2026    Depression Monitoring  05/04/2026    Colorectal Cancer Screen  06/09/2033    DTaP/Tdap/Td vaccine (3 - Td or Tdap) 10/24/2033    DEXA (modify frequency per FRAX score)  Completed    Flu vaccine  Completed    Shingles vaccine  Completed    Pneumococcal 50+ years Vaccine  Completed    Respiratory Syncytial Virus (RSV) Pregnant or age 60 yrs+  Completed    Hepatitis C screen  Completed    Hepatitis A vaccine  Aged Out    Hepatitis B vaccine  Aged Out    Hib vaccine  Aged Out    Polio 
in providing care):   Patient Care Team:  Blanche Pepper MD as PCP - General (Family Medicine)  Blanche Pepper MD as PCP - Empaneled Provider  Nicholas Ly MD as Consulting Physician (Gastroenterology)     Recommendations for Preventive Services Due: see orders and patient instructions/AVS.  Recommended screening schedule for the next 5-10 years is provided to the patient in written form: see Patient Instructions/AVS.     Reviewed and updated this visit:  Tobacco  Allergies  Meds  Problems  Med Hx  Surg Hx  Fam Hx  Sexual   Hx                  The patient (or guardian, if applicable) and other individuals in attendance with the patient were advised that Artificial Intelligence will be utilized during this visit to record and process the conversation to generate a clinical note. The patient (or guardian, if applicable) and other individuals in attendance at the appointment consented to the use of AI, including the recording.

## 2025-05-13 NOTE — PROGRESS NOTES
RN Joaquín Skelton called down to [facility] asking if Morphine was administered to pt since comment in [facility] said \"vial broken\". NESS ROSA stated pt DID receive the dose of morphine, but the broken vial prevented her from being able to scan the medication. Splint

## 2025-05-20 ENCOUNTER — HOSPITAL ENCOUNTER (OUTPATIENT)
Dept: PAIN MANAGEMENT | Age: 75
Discharge: HOME | End: 2025-05-20
Payer: MEDICARE

## 2025-05-20 VITALS — HEIGHT: 63 IN | BODY MASS INDEX: 40.4 KG/M2 | WEIGHT: 228 LBS

## 2025-05-20 DIAGNOSIS — Z79.891 CHRONIC USE OF OPIATE FOR THERAPEUTIC PURPOSE: Primary | ICD-10-CM

## 2025-05-20 DIAGNOSIS — M16.12 PRIMARY OSTEOARTHRITIS OF LEFT HIP: ICD-10-CM

## 2025-05-20 DIAGNOSIS — M48.061 DEGENERATIVE LUMBAR SPINAL STENOSIS: ICD-10-CM

## 2025-05-20 DIAGNOSIS — M48.02 CERVICAL STENOSIS OF SPINE: ICD-10-CM

## 2025-05-20 DIAGNOSIS — M25.511 CHRONIC RIGHT SHOULDER PAIN: ICD-10-CM

## 2025-05-20 DIAGNOSIS — M47.817 LUMBOSACRAL SPONDYLOSIS WITHOUT MYELOPATHY: ICD-10-CM

## 2025-05-20 DIAGNOSIS — G89.29 CHRONIC RIGHT SHOULDER PAIN: ICD-10-CM

## 2025-05-20 PROCEDURE — 99213 OFFICE O/P EST LOW 20 MIN: CPT | Performed by: NURSE PRACTITIONER

## 2025-05-20 PROCEDURE — 99213 OFFICE O/P EST LOW 20 MIN: CPT

## 2025-05-20 RX ORDER — HYDROCODONE BITARTRATE AND ACETAMINOPHEN 5; 325 MG/1; MG/1
1 TABLET ORAL EVERY 8 HOURS PRN
Qty: 90 TABLET | Refills: 0 | Status: SHIPPED | OUTPATIENT
Start: 2025-05-22 | End: 2025-06-21

## 2025-05-20 ASSESSMENT — ENCOUNTER SYMPTOMS
BOWEL INCONTINENCE: 1
BACK PAIN: 1

## 2025-05-20 ASSESSMENT — PAIN SCALES - GENERAL: PAINLEVEL_OUTOF10: 8

## 2025-05-20 NOTE — PROGRESS NOTES
Chief Complaint   Patient presents with    Neck Pain     Medication Refill        [facility]     Patient is a 74-year-old female with c/o chronic neck pain. MRI with moderate stenosis. She had cervical facet injections with moderate relief but states she cannot afford to repeat at this time. Last injection was 3/2021.      Had appt with Dr Mack in Jan 2022 with ACDF C3-4 and C5-6 recommended  She continues with chiropractic care with benefit.   She is scheduled for cervical spine surgery 12/2 - awaiting insurance approval - has to complete PT for this.      She also complains of bilateral hip pain. She had right hip injection 2/5/23 with Dr. Garcia with mild benefit. She currently takes Norco 5-325 TID with good relief. Denies side effects.      She is having worsening neck pain and worsening muscle spasms in her legs and feet.      She had right shoulder injection 9/4/24 with Dr. Lara and reports significant improvement in pain and function.      Recent CT of pelvis for groin pain following urology procedure - shows degenerative changes of the bilateral sacroiliac joints and bilateral femoroacetabular joints.     Continues to follow with cardiology for CAD       New MRIs of neck and low back completed 6/21/24 and demonstrate Moderate spinal canal stenosis, severe left and moderate right neural foraminal narrowing at C3-4, unchanged.Grade 1 anterolisthesis of C5 relative to C6 and C6 relative to C7, new  from the previous exam.  Moderate spinal canal stenosis and moderate to severe bilateral neural foraminal narrowing at L4-5, similar to the previous exam.      Started on gabapentin 100 mg BID 1/20/2025 with some relief. Reports she does feel it causes \"tingling to fingers\" and she cannot sleep when taking twice a day.   Recently decreased gabapentin dose back down to 100mg daily. Tolerating better and would like to continue at this time.      She is actively working on weight loss and increasing her activity.

## 2025-05-27 RX ORDER — LEVOTHYROXINE SODIUM 125 UG/1
125 TABLET ORAL DAILY
Qty: 30 TABLET | Refills: 2 | Status: SHIPPED | OUTPATIENT
Start: 2025-05-27

## 2025-05-28 NOTE — TELEPHONE ENCOUNTER
Please Approve or Refuse.  Send to Pharmacy per Pt's Request:      Next Visit Date:  8/7/2025   Last Visit Date: 5/7/2025    Hemoglobin A1C (%)   Date Value   03/18/2025 9.6   12/17/2024 8.7   09/17/2024 8.1             ( goal A1C is < 7)   BP Readings from Last 3 Encounters:   05/07/25 120/74   04/10/25 (!) 111/49   03/18/25 117/61          (goal 120/80)  BUN   Date Value Ref Range Status   03/28/2025 24 (H) 8 - 23 mg/dL Final     Creatinine   Date Value Ref Range Status   03/28/2025 2.1 (H) 0.7 - 1.2 mg/dL Final     Potassium   Date Value Ref Range Status   03/28/2025 4.8 3.7 - 5.3 mmol/L Final

## 2025-05-29 RX ORDER — GABAPENTIN 100 MG/1
100 CAPSULE ORAL DAILY
Qty: 30 CAPSULE | Refills: 0 | Status: SHIPPED | OUTPATIENT
Start: 2025-05-29 | End: 2025-06-28

## 2025-05-30 NOTE — PRE-PROCEDURE INSTRUCTIONS
PAT Phone Interview Instructions     ARRIVE AT [facility] ON Friday, 6/6/25 at 0710 AM    Once you enter the hospital lobby, take the elevators to the second floor.  Check-In is at the surgery registration desk.      Continue to take your home medications as you normally do up to and including the night before surgery with the exception of any blood thinning medications.      Blood Thinning Medications:  Please stop prescription blood thinning medications such as Apixaban (Eliquis); Clopidogrel (Plavix); Dabigatran (Pradaxa); Prasugrel (Effient); Rivaroxaban (Xarelto); Ticagrelor (Brilinta); Warfarin (Coumadin) only as directed by your surgeon and/or the prescribing physician    Some common examples of other medications that can thin your blood are: Aspirin, Ibuprofen (Advil, Motrin), Naproxen (Aleve), Meloxicam (Mobic), Celecoxib (Celebrex), Fish Oil, many Herbal Supplements.  These medications should usually be stopped at least 7 days prior to surgery.    Stop aspirin as previously instructed.    Tylenol is OK to take for pain the week prior to surgery.    Failure to stop certain medications may interfere with your scheduled surgery.    If you receive instructions from your surgeon regarding what medications to stop prior to surgery, please follow those specific instructions.      If You Have Diabetes:  Do not take any of your diabetic medications, (injectables or by mouth) the morning of surgery unless otherwise instructed by the doctor who manages your diabetes. If you are taking insulin, contact the doctor the manages your diabetes for instructions about any changes to your insulin dosages the day before surgery.        Please take the following medication(s) the day of surgery with a small sip of water:  Isosorbide, gabapentin, levothyroxine, pantoprazole.    If you are on medicare and there is a possibility that you will be admitted following surgery:   Please bring your prescription medications in  their original bottles with pharmacy label on the day of surgery.    Please use your inhaler(s) if needed and bring your inhaler(s) from home the day of surgery      PREPARING FOR YOUR SURGERY:     Before surgery, you can play an important role in your own health. Because skin is not sterile, we need to be sure that your skin is as free of germs as possible before surgery by carefully washing before surgery.  Preparing or “prepping” skin before surgery can reduce the risk of a “surgical site infection.”  Do not shave the area of your body where your surgery will be performed unless you received specific permission from your physician.    Preoperative Bathing Instructions  Bathe or shower the day of surgery.  Wear clean clothing after bathing.  Shampoo hair before surgery  Keep nails clean    Do not apply lotion/creams/oils; cosmetics; perfumes/aftershave; deodorant; alcohol based hair or skin products the day of surgery.  Do not shave the area of your body where your surgery will be performed unless you receive specific permission from your surgeon.       Patient Instructions:    If you are having any type of anesthesia you are to have nothing to eat or drink after midnight the night before your surgery.  This includes gum, hard candy, mints, water or smoking or chewing tobacco.  The only exception to this is a small sip of water to take with any morning dose of heart, blood pressure, or seizure medications.  No alcoholic beverages for 24 hours prior to surgery. No marijuana 48 hours prior to surgery.    You may brush your teeth but do not swallow the water.    Bring your eyeglasses and case with you.  No contacts are to be worn the day of surgery.  You also may bring your hearing aids.  Most surgical procedures involving anesthesia will require that you remove your dentures prior to surgery.    If you are on C-PAP or Bi-PAP at home and plan on staying in the hospital overnight for your surgery please bring the

## 2025-06-02 ENCOUNTER — ANESTHESIA EVENT (OUTPATIENT)
Dept: OPERATING ROOM | Age: 75
End: 2025-06-02
Payer: MEDICARE

## 2025-06-05 RX ORDER — GABAPENTIN 100 MG/1
100 CAPSULE ORAL DAILY
Qty: 30 CAPSULE | Refills: 0 | Status: SHIPPED | OUTPATIENT
Start: 2025-06-05 | End: 2025-07-05

## 2025-06-06 ENCOUNTER — HOSPITAL ENCOUNTER (OUTPATIENT)
Age: 75
Setting detail: OUTPATIENT SURGERY
Discharge: HOME | End: 2025-06-06
Attending: UROLOGY | Admitting: UROLOGY
Payer: MEDICARE

## 2025-06-06 ENCOUNTER — ANESTHESIA (OUTPATIENT)
Dept: OPERATING ROOM | Age: 75
End: 2025-06-06
Payer: MEDICARE

## 2025-06-06 VITALS
HEIGHT: 63 IN | RESPIRATION RATE: 12 BRPM | HEART RATE: 53 BPM | BODY MASS INDEX: 40.04 KG/M2 | OXYGEN SATURATION: 95 % | WEIGHT: 226 LBS | TEMPERATURE: 97 F | SYSTOLIC BLOOD PRESSURE: 131 MMHG | DIASTOLIC BLOOD PRESSURE: 49 MMHG

## 2025-06-06 DIAGNOSIS — N39.41 URGENCY INCONTINENCE: ICD-10-CM

## 2025-06-06 LAB
BILIRUB UR QL STRIP: NEGATIVE
CLARITY UR: CLEAR
COLOR UR: YELLOW
EPI CELLS #/AREA URNS HPF: NORMAL /HPF (ref 0–5)
GLUCOSE BLD-MCNC: 195 MG/DL (ref 65–105)
GLUCOSE BLD-MCNC: 210 MG/DL (ref 65–105)
GLUCOSE UR STRIP-MCNC: ABNORMAL MG/DL
HGB UR QL STRIP.AUTO: NEGATIVE
KETONES UR STRIP-MCNC: NEGATIVE MG/DL
LEUKOCYTE ESTERASE UR QL STRIP: NEGATIVE
NITRITE UR QL STRIP: NEGATIVE
PH UR STRIP: 6 [PH] (ref 5–8)
PROT UR STRIP-MCNC: ABNORMAL MG/DL
RBC #/AREA URNS HPF: NORMAL /HPF (ref 0–2)
SP GR UR STRIP: 1.01 (ref 1–1.03)
UROBILINOGEN UR STRIP-ACNC: NORMAL EU/DL (ref 0–1)
WBC #/AREA URNS HPF: NORMAL /HPF (ref 0–5)

## 2025-06-06 PROCEDURE — 2709999900 HC NON-CHARGEABLE SUPPLY: Performed by: UROLOGY

## 2025-06-06 PROCEDURE — 82947 ASSAY GLUCOSE BLOOD QUANT: CPT

## 2025-06-06 PROCEDURE — 3600000002 HC SURGERY LEVEL 2 BASE: Performed by: UROLOGY

## 2025-06-06 PROCEDURE — 6370000000 HC RX 637 (ALT 250 FOR IP): Performed by: UROLOGY

## 2025-06-06 PROCEDURE — 81001 URINALYSIS AUTO W/SCOPE: CPT

## 2025-06-06 PROCEDURE — 3700000000 HC ANESTHESIA ATTENDED CARE: Performed by: UROLOGY

## 2025-06-06 PROCEDURE — 2580000003 HC RX 258: Performed by: ANESTHESIOLOGY

## 2025-06-06 PROCEDURE — 7100000010 HC PHASE II RECOVERY - FIRST 15 MIN: Performed by: UROLOGY

## 2025-06-06 PROCEDURE — 6360000002 HC RX W HCPCS

## 2025-06-06 PROCEDURE — 7100000011 HC PHASE II RECOVERY - ADDTL 15 MIN: Performed by: UROLOGY

## 2025-06-06 PROCEDURE — 3700000001 HC ADD 15 MINUTES (ANESTHESIA): Performed by: UROLOGY

## 2025-06-06 PROCEDURE — 3600000012 HC SURGERY LEVEL 2 ADDTL 15MIN: Performed by: UROLOGY

## 2025-06-06 PROCEDURE — 6360000002 HC RX W HCPCS: Performed by: UROLOGY

## 2025-06-06 RX ORDER — SODIUM CHLORIDE, SODIUM LACTATE, POTASSIUM CHLORIDE, CALCIUM CHLORIDE 600; 310; 30; 20 MG/100ML; MG/100ML; MG/100ML; MG/100ML
INJECTION, SOLUTION INTRAVENOUS CONTINUOUS
Status: DISCONTINUED | OUTPATIENT
Start: 2025-06-06 | End: 2025-06-06 | Stop reason: HOSPADM

## 2025-06-06 RX ORDER — HYDROMORPHONE HYDROCHLORIDE 1 MG/ML
0.5 INJECTION, SOLUTION INTRAMUSCULAR; INTRAVENOUS; SUBCUTANEOUS EVERY 5 MIN PRN
Status: DISCONTINUED | OUTPATIENT
Start: 2025-06-06 | End: 2025-06-06 | Stop reason: HOSPADM

## 2025-06-06 RX ORDER — SODIUM CHLORIDE 0.9 % (FLUSH) 0.9 %
5-40 SYRINGE (ML) INJECTION PRN
Status: DISCONTINUED | OUTPATIENT
Start: 2025-06-06 | End: 2025-06-06 | Stop reason: HOSPADM

## 2025-06-06 RX ORDER — CIPROFLOXACIN 250 MG/1
250 TABLET, FILM COATED ORAL 2 TIMES DAILY
Qty: 8 TABLET | Refills: 0 | Status: SHIPPED | OUTPATIENT
Start: 2025-06-06 | End: 2025-06-10

## 2025-06-06 RX ORDER — SODIUM CHLORIDE 0.9 % (FLUSH) 0.9 %
5-40 SYRINGE (ML) INJECTION EVERY 12 HOURS SCHEDULED
Status: DISCONTINUED | OUTPATIENT
Start: 2025-06-06 | End: 2025-06-06 | Stop reason: HOSPADM

## 2025-06-06 RX ORDER — PROPOFOL 10 MG/ML
INJECTION, EMULSION INTRAVENOUS
Status: DISCONTINUED | OUTPATIENT
Start: 2025-06-06 | End: 2025-06-06 | Stop reason: SDUPTHER

## 2025-06-06 RX ORDER — NALOXONE HYDROCHLORIDE 0.4 MG/ML
INJECTION, SOLUTION INTRAMUSCULAR; INTRAVENOUS; SUBCUTANEOUS PRN
Status: DISCONTINUED | OUTPATIENT
Start: 2025-06-06 | End: 2025-06-06 | Stop reason: HOSPADM

## 2025-06-06 RX ORDER — FENTANYL CITRATE 50 UG/ML
25 INJECTION, SOLUTION INTRAMUSCULAR; INTRAVENOUS EVERY 5 MIN PRN
Status: DISCONTINUED | OUTPATIENT
Start: 2025-06-06 | End: 2025-06-06 | Stop reason: HOSPADM

## 2025-06-06 RX ORDER — CEFAZOLIN SODIUM/WATER 2 G/20 ML
2000 SYRINGE (ML) INTRAVENOUS ONCE
Status: COMPLETED | OUTPATIENT
Start: 2025-06-06 | End: 2025-06-06

## 2025-06-06 RX ORDER — DIPHENHYDRAMINE HYDROCHLORIDE 50 MG/ML
12.5 INJECTION, SOLUTION INTRAMUSCULAR; INTRAVENOUS
Status: DISCONTINUED | OUTPATIENT
Start: 2025-06-06 | End: 2025-06-06 | Stop reason: HOSPADM

## 2025-06-06 RX ORDER — DARIFENACIN 15 MG/1
15 TABLET, EXTENDED RELEASE ORAL DAILY
Qty: 30 TABLET | Refills: 0 | Status: SHIPPED | OUTPATIENT
Start: 2025-06-06

## 2025-06-06 RX ORDER — METOCLOPRAMIDE HYDROCHLORIDE 5 MG/ML
10 INJECTION INTRAMUSCULAR; INTRAVENOUS
Status: DISCONTINUED | OUTPATIENT
Start: 2025-06-06 | End: 2025-06-06 | Stop reason: HOSPADM

## 2025-06-06 RX ORDER — LIDOCAINE HYDROCHLORIDE 20 MG/ML
JELLY TOPICAL PRN
Status: DISCONTINUED | OUTPATIENT
Start: 2025-06-06 | End: 2025-06-06 | Stop reason: ALTCHOICE

## 2025-06-06 RX ORDER — OXYCODONE HYDROCHLORIDE 5 MG/1
5 TABLET ORAL
Status: DISCONTINUED | OUTPATIENT
Start: 2025-06-06 | End: 2025-06-06 | Stop reason: HOSPADM

## 2025-06-06 RX ORDER — LIDOCAINE HYDROCHLORIDE 10 MG/ML
1 INJECTION, SOLUTION EPIDURAL; INFILTRATION; INTRACAUDAL; PERINEURAL
Status: DISCONTINUED | OUTPATIENT
Start: 2025-06-06 | End: 2025-06-06 | Stop reason: HOSPADM

## 2025-06-06 RX ORDER — SODIUM CHLORIDE 9 MG/ML
INJECTION, SOLUTION INTRAVENOUS PRN
Status: DISCONTINUED | OUTPATIENT
Start: 2025-06-06 | End: 2025-06-06 | Stop reason: HOSPADM

## 2025-06-06 RX ORDER — PROCHLORPERAZINE EDISYLATE 5 MG/ML
10 INJECTION INTRAMUSCULAR; INTRAVENOUS
Status: DISCONTINUED | OUTPATIENT
Start: 2025-06-06 | End: 2025-06-06 | Stop reason: HOSPADM

## 2025-06-06 RX ORDER — SODIUM CHLORIDE 9 MG/ML
INJECTION, SOLUTION INTRAVENOUS CONTINUOUS
Status: DISCONTINUED | OUTPATIENT
Start: 2025-06-06 | End: 2025-06-06

## 2025-06-06 RX ADMIN — SODIUM CHLORIDE, SODIUM LACTATE, POTASSIUM CHLORIDE, AND CALCIUM CHLORIDE: .6; .31; .03; .02 INJECTION, SOLUTION INTRAVENOUS at 07:32

## 2025-06-06 RX ADMIN — PROPOFOL 20 MG: 10 INJECTION, EMULSION INTRAVENOUS at 09:30

## 2025-06-06 RX ADMIN — PROPOFOL 20 MG: 10 INJECTION, EMULSION INTRAVENOUS at 09:26

## 2025-06-06 RX ADMIN — Medication 2000 MG: at 09:28

## 2025-06-06 RX ADMIN — PROPOFOL 50 MG: 10 INJECTION, EMULSION INTRAVENOUS at 09:22

## 2025-06-06 RX ADMIN — PROPOFOL 30 MG: 10 INJECTION, EMULSION INTRAVENOUS at 09:24

## 2025-06-06 ASSESSMENT — PAIN SCALES - GENERAL: PAINLEVEL_OUTOF10: 0

## 2025-06-06 ASSESSMENT — PAIN - FUNCTIONAL ASSESSMENT
PAIN_FUNCTIONAL_ASSESSMENT: ACTIVITIES ARE NOT PREVENTED
PAIN_FUNCTIONAL_ASSESSMENT: 0-10
PAIN_FUNCTIONAL_ASSESSMENT: NONE - DENIES PAIN
PAIN_FUNCTIONAL_ASSESSMENT: ACTIVITIES ARE NOT PREVENTED
PAIN_FUNCTIONAL_ASSESSMENT: 0-10

## 2025-06-06 NOTE — H&P
Interval H&P Note    Pt Name: Marilyn Mora  MRN: [id number]  YOB: 1950  Date of evaluation: 6/6/2025      [x] I have reviewed in [facility] the family medicine progress note by Dr. Pepper dated 5/7/2025 for an Interval History and Physical note (attached below).      [x] I have examined  Marilyn Mora, a 75 y.o. female.There are no changes to the patient who is scheduled for CYSTOSCOPY URODYNAMICS by Vitor Ley MD for Urgency incontinence. Patient reports needing to change her pad about 3 times throughout the day due to urinary incontinence as well as frequent nocturia. She previously had an InterStim placed that did not improve her symptoms. She is following with Dr. Ley for second opinion of these symptoms. The patient denies new health changes, fever, chills, wheezing, cough, increased SOB, chest pain, open sores or wounds. Known diabetes, on insulin, POC . Last aspirin 5/30/2025.     Significant PMH included Stage 4 CKD, CAD with stent, IDDM, cervical stenosis (plans for surgery once A1c is improved), GERD (well controlled), thyroid disease, HLD, HTN.     Vital signs: BP (!) 164/55   Pulse 56   Temp 97.3 °F (36.3 °C) (Temporal)   Resp 16   Ht 1.6 m (5' 3\")   Wt 102.5 kg (226 lb)   LMP 03/19/1980   SpO2 95%   BMI 40.03 kg/m²     Allergies:  Penicillins, Cyclobenzaprine, Heparin, Lamotrigine, and Environmental/seasonal    Medications:    Prior to Admission medications    Medication Sig Start Date End Date Taking? Authorizing Provider   gabapentin (NEURONTIN) 100 MG capsule Take 1 capsule by mouth daily for 30 days. 6/5/25 7/5/25 Yes Blanche Pepper MD   levothyroxine (SYNTHROID) 125 MCG tablet TAKE 1 TABLET BY MOUTH DAILY 5/27/25  Yes Blanche Pepper MD   HYDROcodone-acetaminophen (NORCO) 5-325 MG per tablet Take 1 tablet by mouth every 8 hours as needed for Pain for up to 30 days. Intended supply: 30 days. Take lowest dose possible to manage pain Max Daily Amount: 3 tablets 5/22/25 6/21/25 Yes

## 2025-06-06 NOTE — ANESTHESIA POSTPROCEDURE EVALUATION
Department of Anesthesiology  Postprocedure Note    Patient: Marilyn Mora  MRN: [id number]  YOB: 1950  Date of evaluation: 6/6/2025    Procedure Summary       Date: 06/06/25 Room / Location: [facility]    Anesthesia Start: 0918 Anesthesia Stop: 0958    Procedure: CYSTOSCOPY URODYNAMICS Diagnosis:       Urgency incontinence      (Urgency incontinence [N39.41])    Surgeons: Vitor Ley MD Responsible Provider: Buddy Gaston MD    Anesthesia Type: MAC ASA Status: 3            Anesthesia Type: No value filed.    Ifrah Phase I: Ifrah Score: 10    Ifrah Phase II: Ifrah Score: 10    Anesthesia Post Evaluation    Patient location during evaluation: PACU  Patient participation: complete - patient participated  Level of consciousness: awake and alert  Airway patency: patent  Nausea & Vomiting: no nausea and no vomiting  Cardiovascular status: hemodynamically stable  Respiratory status: acceptable  Hydration status: euvolemic  Pain management: adequate    No notable events documented.

## 2025-06-06 NOTE — ANESTHESIA PRE PROCEDURE
Department of Anesthesiology  Preprocedure Note       Name:  Marilyn Mora   Age:  75 y.o.  :  1950                                          MRN:  [id number]         Date:  2025      Surgeon: Surgeon(s):  Vitor Ley MD    Procedure: Procedure(s):  CYSTOSCOPY URODYNAMICS    Medications prior to admission:   Prior to Admission medications    Medication Sig Start Date End Date Taking? Authorizing Provider   gabapentin (NEURONTIN) 100 MG capsule Take 1 capsule by mouth daily for 30 days. 25 Yes Blanche Pepper MD   levothyroxine (SYNTHROID) 125 MCG tablet TAKE 1 TABLET BY MOUTH DAILY 25  Yes Blanche Pepper MD   HYDROcodone-acetaminophen (NORCO) 5-325 MG per tablet Take 1 tablet by mouth every 8 hours as needed for Pain for up to 30 days. Intended supply: 30 days. Take lowest dose possible to manage pain Max Daily Amount: 3 tablets 25 Yes Elizabeth Zamora, APRN - CNP   atorvastatin (LIPITOR) 40 MG tablet TAKE 1 TABLET BY MOUTH DAILY 25  Yes Blanche Pepper MD   pramipexole (MIRAPEX) 0.125 MG tablet TAKE 1 TABLET BY MOUTH AT BEDTIME 25  Yes Blanche Pepper MD   FEROSUL 325 (65 Fe) MG tablet TAKE 1 TABLET BY MOUTH DAILY 4/10/25  Yes Blanche Pepper MD   dapagliflozin (FARXIGA) 10 MG tablet Take 1 tablet by mouth every morning 3/18/25  Yes Blanche Pepper MD   BASAGLAR KWIKPEN 100 UNIT/ML injection pen Inject 25 Units into the skin 2 times daily 3/18/25  Yes Blanche Pepper MD   oxyBUTYnin (DITROPAN XL) 15 MG extended release tablet Take 1 tablet by mouth daily 3/11/25  Yes Blanche Pepper MD   traZODone (DESYREL) 100 MG tablet TAKE ONE TABLET BY MOUTH ONCE NIGHTLY 25  Yes Blanche Pepper MD   escitalopram (LEXAPRO) 20 MG tablet TAKE 1 TABLET BY MOUTH DAILY 1/10/25  Yes Blanche Pepper MD   hydrOXYzine HCl (ATARAX) 25 MG tablet Take 1 tablet by mouth 2 times daily as needed for Itching 24  Yes Blanche Pepper MD   ezetimibe (ZETIA) 10 MG tablet Take 1 tablet by

## 2025-06-07 NOTE — OP NOTE
-*Operative Note      Patient: Marilyn Mora  YOB: 1950  MRN: [id number]    Date of Procedure: 6/6/2025    Pre-Op Diagnosis Codes:      * Urgency incontinence [N39.41]    Post-Op Diagnosis: {MH OR SAME:[id number]}       Procedure(s):  CYSTOSCOPY URODYNAMICS    Surgeon(s):  Vitor Ley MD    Assistant:   * No surgical staff found *    Anesthesia: Monitor Anesthesia Care    Estimated Blood Loss (mL): {NUMBERS; EBL:68455}    Complications: {Symptoms; Intra-op complications:59476}    Specimens:   ID Type Source Tests Collected by Time Destination   1 : CYSTO URINE Urine Urine, Cystoscopic URINALYSIS WITH REFLEX TO CULTURE Vitor Ley MD 6/6/2025 0735        Implants:  * No implants in log *      Drains: * No LDAs found *    Findings:  Infection Present At Time Of Surgery (PATOS) (choose all levels that have infection present):  {PATOS LEVELS:[id number]}  Other Findings: ***    Detailed Description of Procedure:   ***    Electronically signed by Vitor Ley MD on 6/7/2025 at 9:47 AM

## 2025-06-18 ENCOUNTER — HOSPITAL ENCOUNTER (OUTPATIENT)
Age: 75
Discharge: HOME | End: 2025-06-18
Payer: MEDICARE

## 2025-06-18 ENCOUNTER — HOSPITAL ENCOUNTER (OUTPATIENT)
Dept: PAIN MANAGEMENT | Age: 75
Discharge: HOME | End: 2025-06-18
Payer: MEDICARE

## 2025-06-18 VITALS — BODY MASS INDEX: 40.04 KG/M2 | WEIGHT: 226 LBS | HEIGHT: 63 IN

## 2025-06-18 DIAGNOSIS — M50.30 DDD (DEGENERATIVE DISC DISEASE), CERVICAL: ICD-10-CM

## 2025-06-18 DIAGNOSIS — Z79.891 CHRONIC USE OF OPIATE FOR THERAPEUTIC PURPOSE: ICD-10-CM

## 2025-06-18 DIAGNOSIS — M46.1 SACROILIITIS: Primary | ICD-10-CM

## 2025-06-18 DIAGNOSIS — M47.817 LUMBOSACRAL SPONDYLOSIS WITHOUT MYELOPATHY: ICD-10-CM

## 2025-06-18 LAB
EST. AVERAGE GLUCOSE BLD GHB EST-MCNC: 203 MG/DL
HBA1C MFR BLD: 8.7 % (ref 4–6)

## 2025-06-18 PROCEDURE — 36415 COLL VENOUS BLD VENIPUNCTURE: CPT

## 2025-06-18 PROCEDURE — G0480 DRUG TEST DEF 1-7 CLASSES: HCPCS

## 2025-06-18 PROCEDURE — 99213 OFFICE O/P EST LOW 20 MIN: CPT

## 2025-06-18 PROCEDURE — 99214 OFFICE O/P EST MOD 30 MIN: CPT | Performed by: NURSE PRACTITIONER

## 2025-06-18 PROCEDURE — 83036 HEMOGLOBIN GLYCOSYLATED A1C: CPT

## 2025-06-18 PROCEDURE — 80307 DRUG TEST PRSMV CHEM ANLYZR: CPT

## 2025-06-18 RX ORDER — HYDROCODONE BITARTRATE AND ACETAMINOPHEN 5; 325 MG/1; MG/1
1 TABLET ORAL EVERY 8 HOURS PRN
Qty: 90 TABLET | Refills: 0 | Status: SHIPPED | OUTPATIENT
Start: 2025-06-21 | End: 2025-07-21

## 2025-06-18 ASSESSMENT — ENCOUNTER SYMPTOMS
SHORTNESS OF BREATH: 0
COUGH: 0
BACK PAIN: 1
CONSTIPATION: 0

## 2025-06-18 ASSESSMENT — PAIN SCALES - GENERAL: PAINLEVEL_OUTOF10: 9

## 2025-06-18 NOTE — PROGRESS NOTES
Chief Complaint   Patient presents with    Neck Pain    Medication Refill     Norco         [facility]    Patient is a 74-year-old female with c/o chronic neck pain. MRI with moderate stenosis.   She had cervical facet injections with moderate relief but states she cannot afford to repeat at this time. Last injection was 3/2021.      Had appt with Dr Mack in Jan 2022 with ACDF C3-4 and C5-6 recommended  She continues with chiropractic care with benefit.   She was scheduled for cervical spine surgery 12/2 but did not have this - states her A1C was too high. Working on better glucose control but states she is not planning to reschedule surgery at this time.      She also complains of bilateral hip pain. She had right hip injection 2/5/23 with Dr. Garcia with mild benefit. She currently takes Norco 5-325 TID with good relief. Denies side effects.     MRIs of neck and low back completed 6/21/24 and demonstrate Moderate spinal canal stenosis, severe left and moderate right neural foraminal narrowing at C3-4, unchanged.Grade 1 anterolisthesis of C5 relative to C6 and C6 relative to C7, new  from the previous exam.  Moderate spinal canal stenosis and moderate to severe bilateral neural foraminal narrowing at L4-5, similar to the previous exam.     Recent CT of pelvis for groin pain following urology procedure - shows degenerative changes of the bilateral sacroiliac joints and bilateral femoroacetabular joints.    She had right shoulder injection 9/4/24 with Dr. Lara with  significant improvement in pain and function.       Started on gabapentin 100 mg BID 1/20/2025 with some relief. Reports she does feel it causes \"tingling to fingers\" and she cannot sleep when taking twice a day.   Recently decreased gabapentin dose back down to 100mg daily. Tolerating better and would like to continue at this time.       She is actively working on weight loss and increasing her activity.      Neck Pain   This is a chronic problem.

## 2025-06-20 ENCOUNTER — RESULTS FOLLOW-UP (OUTPATIENT)
Dept: FAMILY MEDICINE CLINIC | Age: 75
End: 2025-06-20

## 2025-06-20 DIAGNOSIS — F41.0 PANIC ATTACKS: ICD-10-CM

## 2025-06-20 DIAGNOSIS — N39.41 URGE INCONTINENCE OF URINE: Primary | ICD-10-CM

## 2025-06-20 RX ORDER — ESCITALOPRAM OXALATE 20 MG/1
20 TABLET ORAL DAILY
Qty: 90 TABLET | Refills: 3 | Status: SHIPPED | OUTPATIENT
Start: 2025-06-20

## 2025-06-20 RX ORDER — OXYBUTYNIN CHLORIDE 15 MG/1
15 TABLET, EXTENDED RELEASE ORAL DAILY
Qty: 30 TABLET | Refills: 3 | Status: SHIPPED | OUTPATIENT
Start: 2025-06-20

## 2025-06-23 LAB
6-ACETYLMORPHINE, UR: NOT DETECTED
7-AMINOCLONAZEPAM, URINE: NOT DETECTED
ALPHA-OH-ALPRAZ, URINE: NOT DETECTED
ALPHA-OH-MIDAZOLAM, URINE: NOT DETECTED
ALPRAZOLAM, URINE: NOT DETECTED
AMPHETAMINE, URINE: NOT DETECTED
BARBITURATES, URINE: NEGATIVE
BENZOYLECGONINE, UR: NEGATIVE
BUPRENORPHINE URINE: NOT DETECTED
CARISOPRODOL, URINE: NEGATIVE
CLONAZEPAM, URINE: NOT DETECTED
CODEINE, URINE: NOT DETECTED
CREAT UR-MCNC: 101.1 MG/DL (ref 20–400)
DIAZEPAM, URINE: NOT DETECTED
DRUGS EXPECTED, UR: NORMAL
EER HI RES INTERP UR: NORMAL
ETHYL GLUCURONIDE UR: NEGATIVE
FENTANYL URINE: NOT DETECTED
GABAPENTIN: NOT DETECTED
HYDROCODONE, URINE: PRESENT
HYDROMORPHONE, URINE: PRESENT
LORAZEPAM, URINE: NOT DETECTED
MARIJUANA METAB, UR: NEGATIVE
MDA, URINE: NOT DETECTED
MDEA, EVE, UR: NOT DETECTED
MDMA, URINE: NOT DETECTED
MEPERIDINE METAB, UR: NOT DETECTED
METHADONE, URINE: NEGATIVE
METHAMPHETAMINE, URINE: NOT DETECTED
METHYLPHENIDATE: NOT DETECTED
MIDAZOLAM, URINE: NOT DETECTED
MORPHINE, OPI1M: NOT DETECTED
NALOXONE URINE: NOT DETECTED
NORBUPRENORPHINE, URINE: NOT DETECTED
NORDIAZEPAM, URINE: NOT DETECTED
NORFENTANYL, URINE: NOT DETECTED
NORHYDROCODONE, URINE: PRESENT
NOROXYCODONE, URINE: NOT DETECTED
NOROXYMORPHONE, URINE: NOT DETECTED
OXAZEPAM, URINE: NOT DETECTED
OXYCODONE URINE: NOT DETECTED
OXYMORPHONE, URINE: NOT DETECTED
PAIN MANAGEMENT DRUG PANEL INTERP, URINE: NORMAL
PAIN MGT DRUG PANEL, HI RES, UR: NORMAL
PCP,URINE: NEGATIVE
PHENTERMINE, UR: NOT DETECTED
PREGABALIN: NOT DETECTED
TAPENTADOL, URINE: NOT DETECTED
TAPENTADOL-O-SULFATE, URINE: NOT DETECTED
TEMAZEPAM, URINE: NOT DETECTED
TRAMADOL, URINE: NEGATIVE
ZOLPIDEM METABOLITE (ZCA), URINE: NOT DETECTED
ZOLPIDEM, URINE: NOT DETECTED

## 2025-06-27 DIAGNOSIS — E11.21 TYPE 2 DIABETES MELLITUS WITH DIABETIC NEPHROPATHY, WITHOUT LONG-TERM CURRENT USE OF INSULIN (HCC): ICD-10-CM

## 2025-06-27 DIAGNOSIS — E11.9 TYPE 2 DIABETES MELLITUS WITHOUT COMPLICATION, WITHOUT LONG-TERM CURRENT USE OF INSULIN (HCC): ICD-10-CM

## 2025-06-27 RX ORDER — PEN NEEDLE, DIABETIC 31 GX5/16"
NEEDLE, DISPOSABLE MISCELLANEOUS
Qty: 100 EACH | Refills: 1 | Status: SHIPPED | OUTPATIENT
Start: 2025-06-27

## 2025-06-27 NOTE — TELEPHONE ENCOUNTER
Please Approve or Refuse.  Send to Pharmacy per Pt's Request:      Next Visit Date:  6/27/2025   Last Visit Date: 5/7/2025    Hemoglobin A1C (%)   Date Value   06/18/2025 8.7 (H)   03/18/2025 9.6   12/17/2024 8.7             ( goal A1C is < 7)   BP Readings from Last 3 Encounters:   06/06/25 (!) 131/49   05/07/25 120/74   04/10/25 (!) 111/49          (goal 120/80)  BUN   Date Value Ref Range Status   03/28/2025 24 (H) 8 - 23 mg/dL Final     Creatinine   Date Value Ref Range Status   03/28/2025 2.1 (H) 0.7 - 1.2 mg/dL Final     Potassium   Date Value Ref Range Status   03/28/2025 4.8 3.7 - 5.3 mmol/L Final

## 2025-06-27 NOTE — TELEPHONE ENCOUNTER
Please Approve or Refuse.  Send to Pharmacy per Pt's Request:      Next Visit Date:  7/7/2025   Last Visit Date: 5/7/2025    Hemoglobin A1C (%)   Date Value   06/18/2025 8.7 (H)   03/18/2025 9.6   12/17/2024 8.7             ( goal A1C is < 7)   BP Readings from Last 3 Encounters:   06/06/25 (!) 131/49   05/07/25 120/74   04/10/25 (!) 111/49          (goal 120/80)  BUN   Date Value Ref Range Status   03/28/2025 24 (H) 8 - 23 mg/dL Final     Creatinine   Date Value Ref Range Status   03/28/2025 2.1 (H) 0.7 - 1.2 mg/dL Final     Potassium   Date Value Ref Range Status   03/28/2025 4.8 3.7 - 5.3 mmol/L Final

## 2025-06-30 RX ORDER — FERROUS SULFATE 325(65) MG
1 TABLET ORAL DAILY
Qty: 90 TABLET | Refills: 0 | Status: SHIPPED | OUTPATIENT
Start: 2025-06-30

## 2025-06-30 RX ORDER — GABAPENTIN 100 MG/1
100 CAPSULE ORAL DAILY
Qty: 30 CAPSULE | Refills: 0 | Status: SHIPPED | OUTPATIENT
Start: 2025-06-30 | End: 2025-07-02 | Stop reason: SDUPTHER

## 2025-07-02 DIAGNOSIS — E11.9 TYPE 2 DIABETES MELLITUS WITHOUT COMPLICATION, WITHOUT LONG-TERM CURRENT USE OF INSULIN (HCC): ICD-10-CM

## 2025-07-02 DIAGNOSIS — G47.00 INSOMNIA, UNSPECIFIED TYPE: ICD-10-CM

## 2025-07-02 DIAGNOSIS — E11.21 TYPE 2 DIABETES MELLITUS WITH DIABETIC NEPHROPATHY, WITHOUT LONG-TERM CURRENT USE OF INSULIN (HCC): ICD-10-CM

## 2025-07-02 RX ORDER — PEN NEEDLE, DIABETIC 31 GX5/16"
NEEDLE, DISPOSABLE MISCELLANEOUS
Qty: 100 EACH | Refills: 1 | Status: SHIPPED | OUTPATIENT
Start: 2025-07-02

## 2025-07-02 RX ORDER — GABAPENTIN 100 MG/1
100 CAPSULE ORAL DAILY
Qty: 30 CAPSULE | Refills: 0 | Status: SHIPPED | OUTPATIENT
Start: 2025-07-02 | End: 2025-08-01

## 2025-07-02 RX ORDER — TRAZODONE HYDROCHLORIDE 100 MG/1
100 TABLET ORAL NIGHTLY
Qty: 90 TABLET | Refills: 1 | Status: SHIPPED | OUTPATIENT
Start: 2025-07-02

## 2025-07-07 ENCOUNTER — OFFICE VISIT (OUTPATIENT)
Dept: FAMILY MEDICINE CLINIC | Age: 75
End: 2025-07-07

## 2025-07-07 VITALS
HEIGHT: 63 IN | DIASTOLIC BLOOD PRESSURE: 70 MMHG | WEIGHT: 227 LBS | OXYGEN SATURATION: 95 % | SYSTOLIC BLOOD PRESSURE: 120 MMHG | BODY MASS INDEX: 40.22 KG/M2 | HEART RATE: 54 BPM

## 2025-07-07 DIAGNOSIS — J30.2 SEASONAL ALLERGIES: ICD-10-CM

## 2025-07-07 DIAGNOSIS — G47.00 INSOMNIA, UNSPECIFIED TYPE: ICD-10-CM

## 2025-07-07 DIAGNOSIS — E11.21 TYPE 2 DIABETES MELLITUS WITH DIABETIC NEPHROPATHY, WITHOUT LONG-TERM CURRENT USE OF INSULIN (HCC): Primary | ICD-10-CM

## 2025-07-07 DIAGNOSIS — N18.4 STAGE 4 CHRONIC KIDNEY DISEASE (HCC): ICD-10-CM

## 2025-07-07 DIAGNOSIS — L29.89 UREMIC PRURITUS: ICD-10-CM

## 2025-07-07 DIAGNOSIS — E78.2 MIXED HYPERLIPIDEMIA: ICD-10-CM

## 2025-07-07 DIAGNOSIS — I10 ESSENTIAL HYPERTENSION: ICD-10-CM

## 2025-07-07 DIAGNOSIS — E03.9 HYPOTHYROIDISM, UNSPECIFIED TYPE: ICD-10-CM

## 2025-07-07 PROBLEM — E87.5 HYPERKALEMIA: Status: RESOLVED | Noted: 2025-03-18 | Resolved: 2025-07-07

## 2025-07-07 RX ORDER — DIPHENHYDRAMINE HCL 25 MG
12.5 TABLET ORAL NIGHTLY PRN
Qty: 30 TABLET | Refills: 0 | Status: SHIPPED | OUTPATIENT
Start: 2025-07-07 | End: 2025-09-05

## 2025-07-07 RX ORDER — ALBUTEROL SULFATE 90 UG/1
2 INHALANT RESPIRATORY (INHALATION) 4 TIMES DAILY PRN
Qty: 18 G | Refills: 0 | Status: SHIPPED | OUTPATIENT
Start: 2025-07-07

## 2025-07-07 SDOH — ECONOMIC STABILITY: FOOD INSECURITY: WITHIN THE PAST 12 MONTHS, THE FOOD YOU BOUGHT JUST DIDN'T LAST AND YOU DIDN'T HAVE MONEY TO GET MORE.: NEVER TRUE

## 2025-07-07 SDOH — ECONOMIC STABILITY: FOOD INSECURITY: WITHIN THE PAST 12 MONTHS, YOU WORRIED THAT YOUR FOOD WOULD RUN OUT BEFORE YOU GOT MONEY TO BUY MORE.: NEVER TRUE

## 2025-07-07 ASSESSMENT — ENCOUNTER SYMPTOMS
TROUBLE SWALLOWING: 0
RHINORRHEA: 0
CHEST TIGHTNESS: 0
SORE THROAT: 0
CONSTIPATION: 0
DIARRHEA: 0
SINUS PRESSURE: 0
STRIDOR: 0
COLOR CHANGE: 0
VOMITING: 0
SHORTNESS OF BREATH: 0
ABDOMINAL PAIN: 0
ABDOMINAL DISTENTION: 0
NAUSEA: 0
RECTAL PAIN: 0
WHEEZING: 0
BLOOD IN STOOL: 0
COUGH: 0
BACK PAIN: 1
EYE REDNESS: 0

## 2025-07-07 ASSESSMENT — PATIENT HEALTH QUESTIONNAIRE - PHQ9
5. POOR APPETITE OR OVEREATING: NOT AT ALL
SUM OF ALL RESPONSES TO PHQ QUESTIONS 1-9: 7
7. TROUBLE CONCENTRATING ON THINGS, SUCH AS READING THE NEWSPAPER OR WATCHING TELEVISION: SEVERAL DAYS
8. MOVING OR SPEAKING SO SLOWLY THAT OTHER PEOPLE COULD HAVE NOTICED. OR THE OPPOSITE, BEING SO FIGETY OR RESTLESS THAT YOU HAVE BEEN MOVING AROUND A LOT MORE THAN USUAL: NOT AT ALL
1. LITTLE INTEREST OR PLEASURE IN DOING THINGS: NOT AT ALL
10. IF YOU CHECKED OFF ANY PROBLEMS, HOW DIFFICULT HAVE THESE PROBLEMS MADE IT FOR YOU TO DO YOUR WORK, TAKE CARE OF THINGS AT HOME, OR GET ALONG WITH OTHER PEOPLE: VERY DIFFICULT
3. TROUBLE FALLING OR STAYING ASLEEP: SEVERAL DAYS
SUM OF ALL RESPONSES TO PHQ QUESTIONS 1-9: 7
9. THOUGHTS THAT YOU WOULD BE BETTER OFF DEAD, OR OF HURTING YOURSELF: NOT AT ALL
SUM OF ALL RESPONSES TO PHQ QUESTIONS 1-9: 7
SUM OF ALL RESPONSES TO PHQ QUESTIONS 1-9: 7
6. FEELING BAD ABOUT YOURSELF - OR THAT YOU ARE A FAILURE OR HAVE LET YOURSELF OR YOUR FAMILY DOWN: SEVERAL DAYS
4. FEELING TIRED OR HAVING LITTLE ENERGY: SEVERAL DAYS
2. FEELING DOWN, DEPRESSED OR HOPELESS: NEARLY EVERY DAY

## 2025-07-07 NOTE — PROGRESS NOTES
Chief Complaint   Patient presents with    Shoulder Pain    Cough     The patient (or guardian, if applicable) and other individuals in attendance with the patient were advised that Artificial Intelligence will be utilized during this visit to record, process the conversation to generate a clinical note, and support improvement of the AI technology. The patient (or guardian, if applicable) and other individuals in attendance at the appointment consented to the use of AI, including the recording.                    Shoulder Pain and Cough      History of Present Illness  The patient presents for a sick call, complaining of shoulder pain and cough.    She experiences a cough when working outdoors, which she manages by sipping water. The cough subsides when she sits down and rests. She also reports shortness of breath during indoor activities such as changing sheets. She has an upcoming appointment with her cardiologist next month. She is on Allegra for allergies, taken nightly, but it is unclear if this alleviates her cough. She has a history of smoking but quit 30 years ago.    She has been taking half a tablet of a sleep medication at 7:00 PM, which induces sleepiness by 10:30 PM. However, she experiences itching at night, which she attributes to a fall on twigs and branches. She finds Benadryl more effective than hydroxyzine in managing the itching. She takes Lexapro for anxiety.    She is currently on insulin, administered as 30 units in the morning. She has not monitored her blood sugar levels for the past 3 days. She recently had a diabetic eye exam and is due for a foot check today.    She recalls an incident about 5 weeks ago where she was assisting with yard work and experienced a sharp pain in her shoulder while pulling ingrid. The pain persisted for a month and continues to bother her. She believes it is due to a pulled muscle. She has been using heat therapy and Tylenol Extra Strength for relief.    She has

## 2025-07-07 NOTE — PROGRESS NOTES
Visit Information    Have you changed or started any medications since your last visit including any over-the-counter medicines, vitamins, or herbal medicines? no   Are you having any side effects from any of your medications? -  no  Have you stopped taking any of your medications? Is so, why? -  no    Have you seen any other physician or provider since your last visit? No  Have you had any other diagnostic tests since your last visit? No  Have you been seen in the emergency room and/or had an admission to a hospital since we last saw you? No  Have you had your routine dental cleaning in the past 6 months? no    Have you activated your [employer] account? If not, what are your barriers? Yes     Patient Care Team:  Blanche Pepper MD as PCP - General (Family Medicine)  Blanche Pepper MD as PCP - Empaneled Provider  Nicholas Ly MD as Consulting Physician (Gastroenterology)    Medical History Review  Past Medical, Family, and Social History reviewed and does contribute to the patient presenting condition    Health Maintenance   Topic Date Due    Diabetic retinal exam  Never done    COVID-19 Vaccine (8 - 2024-25 season) 03/23/2025    Diabetic foot exam  06/12/2025    Flu vaccine (1) 08/01/2025    Lipids  10/08/2025    Diabetic Alb to Cr ratio (uACR) test  12/20/2025    GFR test (Diabetes, CKD 3-4, OR last GFR 15-59)  03/28/2026    Depression Monitoring  05/04/2026    Annual Wellness Visit (Medicare)  05/08/2026    A1C test (Diabetic or Prediabetic)  06/18/2026    Colorectal Cancer Screen  06/09/2033    DTaP/Tdap/Td vaccine (3 - Td or Tdap) 10/24/2033    DEXA (modify frequency per FRAX score)  Completed    Shingles vaccine  Completed    Pneumococcal 50+ years Vaccine  Completed    Respiratory Syncytial Virus (RSV) Pregnant or age 60 yrs+  Completed    Hepatitis C screen  Completed    Hepatitis A vaccine  Aged Out    Hepatitis B vaccine  Aged Out    Hib vaccine  Aged Out    Polio vaccine  Aged Out    Meningococcal

## 2025-07-12 DIAGNOSIS — Z79.4 TYPE 2 DIABETES MELLITUS WITH DIABETIC NEPHROPATHY, WITH LONG-TERM CURRENT USE OF INSULIN (HCC): ICD-10-CM

## 2025-07-12 DIAGNOSIS — E11.21 TYPE 2 DIABETES MELLITUS WITH DIABETIC NEPHROPATHY, WITH LONG-TERM CURRENT USE OF INSULIN (HCC): ICD-10-CM

## 2025-07-13 RX ORDER — INSULIN GLARGINE 100 [IU]/ML
INJECTION, SOLUTION SUBCUTANEOUS
Qty: 15 ML | Refills: 3 | Status: SHIPPED | OUTPATIENT
Start: 2025-07-13

## 2025-07-16 ENCOUNTER — TELEPHONE (OUTPATIENT)
Dept: ORTHOPEDIC SURGERY | Age: 75
End: 2025-07-16

## 2025-07-16 NOTE — TELEPHONE ENCOUNTER
----- Message from Mabel AMAYA sent at 7/16/2025  4:09 PM EDT -----  Regarding: Specialty Message to Provider  Specialty Message to Provider    Relationship to Patient: Self     Patient Message: Patient is wanting to have her surgery records from 12/10/21 for OPEN REDUCTION INTERNAL FIXATION LEFT DISTAL RADIUS (Left: Arm Lower) due to her insurance requesting this information. She is wanting this mailed to her home. If possible. Please call patient to discuss.  --------------------------------------------------------------------------------------------------------------------------    Call Back Information: OK to leave message on voicemail  Preferred Call Back Number: Phone [phone]

## 2025-07-22 ENCOUNTER — HOSPITAL ENCOUNTER (OUTPATIENT)
Dept: PAIN MANAGEMENT | Age: 75
Discharge: HOME | End: 2025-07-22
Payer: MEDICARE

## 2025-07-22 VITALS — HEIGHT: 62 IN | BODY MASS INDEX: 41.77 KG/M2 | WEIGHT: 227 LBS

## 2025-07-22 DIAGNOSIS — M46.1 SACROILIITIS: ICD-10-CM

## 2025-07-22 DIAGNOSIS — M25.511 CHRONIC RIGHT SHOULDER PAIN: ICD-10-CM

## 2025-07-22 DIAGNOSIS — G89.29 CHRONIC RIGHT SHOULDER PAIN: ICD-10-CM

## 2025-07-22 DIAGNOSIS — M47.817 LUMBOSACRAL SPONDYLOSIS WITHOUT MYELOPATHY: ICD-10-CM

## 2025-07-22 DIAGNOSIS — M48.02 CERVICAL STENOSIS OF SPINE: ICD-10-CM

## 2025-07-22 DIAGNOSIS — Z79.891 CHRONIC USE OF OPIATE FOR THERAPEUTIC PURPOSE: Primary | ICD-10-CM

## 2025-07-22 DIAGNOSIS — M48.061 DEGENERATIVE LUMBAR SPINAL STENOSIS: ICD-10-CM

## 2025-07-22 DIAGNOSIS — M50.30 DDD (DEGENERATIVE DISC DISEASE), CERVICAL: ICD-10-CM

## 2025-07-22 PROCEDURE — 99213 OFFICE O/P EST LOW 20 MIN: CPT

## 2025-07-22 PROCEDURE — 99213 OFFICE O/P EST LOW 20 MIN: CPT | Performed by: NURSE PRACTITIONER

## 2025-07-22 RX ORDER — HYDROCODONE BITARTRATE AND ACETAMINOPHEN 5; 325 MG/1; MG/1
1 TABLET ORAL EVERY 8 HOURS PRN
Qty: 90 TABLET | Refills: 0 | Status: SHIPPED | OUTPATIENT
Start: 2025-07-23 | End: 2025-08-22

## 2025-07-22 NOTE — PROGRESS NOTES
pain is described as aching. The pain is at a severity of 8/10. The pain is severe. The symptoms are aggravated by twisting, bending and position. The pain is Same all the time. Stiffness is present In the morning. Associated symptoms include headaches, leg pain and weakness. Pertinent negatives include no chest pain, fever, numbness or tingling. She has tried ice, heat and oral narcotics for the symptoms. The treatment provided mild relief.       Patient denies any new neurological symptoms. No bowel or bladder incontinence, no weakness, and no falling.    Pill count: appropriate Norco- pt states she has two pills (pt didn't bring bottle)      -due for refill 7/23/2025    Morphine equivalent: 15    Periodic Controlled Substance Monitoring: Possible medication side effects, risk of tolerance/dependence & alternative treatments discussed., No signs of potential drug abuse or diversion identified., Assessed functional status (ability to engage in work or other purposeful activities, the pain intensity and its interference with activities of daily living, quality of family life and social activities, and the physical activity), Obtaining appropriate analgesic effect of treatment. (Elizabeth Zamora, APRN - CNP)  Controlled Substance Monitoring:    Acute and Chronic Pain Monitoring:   RX Monitoring Periodic Controlled Substance Monitoring Chronic Pain > 50 MEDD   7/22/2025   1:49 PM Possible medication side effects, risk of tolerance/dependence & alternative treatments discussed.;No signs of potential drug abuse or diversion identified.;Assessed functional status (ability to engage in work or other purposeful activities, the pain intensity and its interference with activities of daily living, quality of family life and social activities, and the physical activity);Obtaining appropriate analgesic effect of treatment. Obtained or confirmed \"Consent for Opioid Use\" on file.          Past Medical History:   Diagnosis Date

## 2025-08-07 ENCOUNTER — HOSPITAL ENCOUNTER (OUTPATIENT)
Age: 75
Setting detail: SPECIMEN
Discharge: HOME | End: 2025-08-07
Payer: MEDICARE

## 2025-08-07 DIAGNOSIS — I12.9 BENIGN HYPERTENSION WITH CHRONIC KIDNEY DISEASE, STAGE IV (HCC): ICD-10-CM

## 2025-08-07 DIAGNOSIS — R80.9 PROTEINURIA, UNSPECIFIED TYPE: ICD-10-CM

## 2025-08-07 DIAGNOSIS — N18.4 BENIGN HYPERTENSION WITH CHRONIC KIDNEY DISEASE, STAGE IV (HCC): ICD-10-CM

## 2025-08-07 DIAGNOSIS — N18.4 CKD (CHRONIC KIDNEY DISEASE), STAGE IV (HCC): ICD-10-CM

## 2025-08-07 LAB
ANION GAP SERPL CALCULATED.3IONS-SCNC: 13 MMOL/L (ref 9–16)
BACTERIA URNS QL MICRO: ABNORMAL
BASOPHILS # BLD: 0.07 K/UL (ref 0–0.2)
BASOPHILS NFR BLD: 1 % (ref 0–2)
BILIRUB UR QL STRIP: NEGATIVE
BUN SERPL-MCNC: 39 MG/DL (ref 8–23)
CALCIUM SERPL-MCNC: 9.4 MG/DL (ref 8.6–10.4)
CASTS #/AREA URNS LPF: ABNORMAL /LPF
CHLORIDE SERPL-SCNC: 102 MMOL/L (ref 98–107)
CLARITY UR: CLEAR
CO2 SERPL-SCNC: 25 MMOL/L (ref 20–31)
COLOR UR: YELLOW
CREAT SERPL-MCNC: 2.3 MG/DL (ref 0.7–1.2)
EOSINOPHIL # BLD: 0.23 K/UL (ref 0–0.44)
EOSINOPHILS RELATIVE PERCENT: 2 % (ref 0–4)
EPI CELLS #/AREA URNS HPF: ABNORMAL /HPF
ERYTHROCYTE [DISTWIDTH] IN BLOOD BY AUTOMATED COUNT: 14.3 % (ref 11.5–14.9)
GFR, ESTIMATED: 22 ML/MIN/1.73M2
GLUCOSE SERPL-MCNC: 167 MG/DL (ref 74–99)
GLUCOSE UR STRIP-MCNC: ABNORMAL MG/DL
HCT VFR BLD AUTO: 41.1 % (ref 36–46)
HGB BLD-MCNC: 13 G/DL (ref 12–16)
HGB UR QL STRIP.AUTO: ABNORMAL
IMM GRANULOCYTES # BLD AUTO: 0.04 K/UL (ref 0–0.3)
IMM GRANULOCYTES NFR BLD: 0 %
KETONES UR STRIP-MCNC: NEGATIVE MG/DL
LEUKOCYTE ESTERASE UR QL STRIP: NEGATIVE
LYMPHOCYTES NFR BLD: 2.16 K/UL (ref 1.1–3.7)
LYMPHOCYTES RELATIVE PERCENT: 20 % (ref 24–44)
MAGNESIUM SERPL-MCNC: 2 MG/DL (ref 1.6–2.4)
MCH RBC QN AUTO: 29.7 PG (ref 26–34)
MCHC RBC AUTO-ENTMCNC: 31.6 G/DL (ref 31–37)
MCV RBC AUTO: 94.1 FL (ref 80–100)
MONOCYTES NFR BLD: 0.75 K/UL (ref 0.1–1.2)
MONOCYTES NFR BLD: 7 % (ref 3–12)
NEUTROPHILS NFR BLD: 70 % (ref 36–66)
NEUTS SEG NFR BLD: 7.35 K/UL (ref 1.5–8.1)
NITRITE UR QL STRIP: NEGATIVE
NRBC BLD-RTO: 0 PER 100 WBC
PH UR STRIP: 6 [PH] (ref 5–8)
PHOSPHATE SERPL-MCNC: 4.1 MG/DL (ref 2.5–4.5)
PLATELET # BLD AUTO: 256 K/UL (ref 150–450)
PMV BLD AUTO: 10.6 FL (ref 8–13.5)
POTASSIUM SERPL-SCNC: 4.8 MMOL/L (ref 3.7–5.3)
PROT UR STRIP-MCNC: ABNORMAL MG/DL
RBC # BLD AUTO: 4.37 M/UL (ref 3.95–5.11)
RBC #/AREA URNS HPF: ABNORMAL /HPF
SODIUM SERPL-SCNC: 140 MMOL/L (ref 136–145)
SP GR UR STRIP: 1.02 (ref 1–1.03)
UROBILINOGEN UR STRIP-ACNC: NORMAL EU/DL (ref 0–1)
WBC #/AREA URNS HPF: ABNORMAL /HPF
WBC OTHER # BLD: 10.6 K/UL (ref 3.5–11)

## 2025-08-07 PROCEDURE — 85025 COMPLETE CBC W/AUTO DIFF WBC: CPT

## 2025-08-07 PROCEDURE — 80048 BASIC METABOLIC PNL TOTAL CA: CPT

## 2025-08-07 PROCEDURE — 83735 ASSAY OF MAGNESIUM: CPT

## 2025-08-07 PROCEDURE — 36415 COLL VENOUS BLD VENIPUNCTURE: CPT

## 2025-08-07 PROCEDURE — 81001 URINALYSIS AUTO W/SCOPE: CPT

## 2025-08-07 PROCEDURE — 84100 ASSAY OF PHOSPHORUS: CPT

## 2025-08-12 DIAGNOSIS — E11.9 TYPE 2 DIABETES MELLITUS WITHOUT COMPLICATION, WITHOUT LONG-TERM CURRENT USE OF INSULIN (HCC): ICD-10-CM

## 2025-08-12 DIAGNOSIS — E11.21 TYPE 2 DIABETES MELLITUS WITH DIABETIC NEPHROPATHY, WITHOUT LONG-TERM CURRENT USE OF INSULIN (HCC): ICD-10-CM

## 2025-08-12 RX ORDER — PEN NEEDLE, DIABETIC 31 GX5/16"
NEEDLE, DISPOSABLE MISCELLANEOUS
Qty: 100 EACH | Refills: 1 | Status: SHIPPED | OUTPATIENT
Start: 2025-08-12

## 2025-08-15 PROBLEM — N81.89 PELVIC FLOOR RELAXATION: Status: ACTIVE | Noted: 2025-08-15

## 2025-08-15 PROBLEM — N39.0 URINARY TRACT INFECTION: Status: ACTIVE | Noted: 2025-08-15

## 2025-08-15 PROBLEM — N39.41 URGE INCONTINENCE OF URINE: Status: ACTIVE | Noted: 2025-08-15

## 2025-08-20 ENCOUNTER — HOSPITAL ENCOUNTER (OUTPATIENT)
Dept: PAIN MANAGEMENT | Age: 75
Discharge: HOME | End: 2025-08-20
Payer: MEDICARE

## 2025-08-20 VITALS — HEIGHT: 63 IN | BODY MASS INDEX: 40.22 KG/M2 | WEIGHT: 227 LBS

## 2025-08-20 DIAGNOSIS — Z79.891 CHRONIC USE OF OPIATE FOR THERAPEUTIC PURPOSE: ICD-10-CM

## 2025-08-20 DIAGNOSIS — M48.02 CERVICAL STENOSIS OF SPINE: ICD-10-CM

## 2025-08-20 DIAGNOSIS — M48.02 SPINAL STENOSIS IN CERVICAL REGION: Primary | ICD-10-CM

## 2025-08-20 DIAGNOSIS — M46.1 SACROILIITIS: ICD-10-CM

## 2025-08-20 DIAGNOSIS — M48.061 DEGENERATIVE LUMBAR SPINAL STENOSIS: ICD-10-CM

## 2025-08-20 DIAGNOSIS — M50.30 DDD (DEGENERATIVE DISC DISEASE), CERVICAL: ICD-10-CM

## 2025-08-20 DIAGNOSIS — M47.817 LUMBOSACRAL SPONDYLOSIS WITHOUT MYELOPATHY: ICD-10-CM

## 2025-08-20 PROCEDURE — 99213 OFFICE O/P EST LOW 20 MIN: CPT

## 2025-08-20 PROCEDURE — 99213 OFFICE O/P EST LOW 20 MIN: CPT | Performed by: NURSE PRACTITIONER

## 2025-08-20 RX ORDER — HYDROCODONE BITARTRATE AND ACETAMINOPHEN 5; 325 MG/1; MG/1
1 TABLET ORAL EVERY 8 HOURS PRN
Qty: 90 TABLET | Refills: 0 | Status: SHIPPED | OUTPATIENT
Start: 2025-08-22 | End: 2025-09-21

## 2025-08-20 ASSESSMENT — ENCOUNTER SYMPTOMS
BACK PAIN: 1
VISUAL CHANGE: 0

## 2025-08-20 ASSESSMENT — PAIN SCALES - GENERAL: PAINLEVEL_OUTOF10: 9

## 2025-08-25 RX ORDER — LEVOTHYROXINE SODIUM 125 UG/1
125 TABLET ORAL DAILY
Qty: 90 TABLET | Refills: 1 | Status: SHIPPED | OUTPATIENT
Start: 2025-08-25

## 2025-08-29 ENCOUNTER — HOSPITAL ENCOUNTER (EMERGENCY)
Age: 75
Discharge: HOME | End: 2025-08-29
Attending: EMERGENCY MEDICINE
Payer: MEDICARE

## 2025-08-29 ENCOUNTER — HOSPITAL ENCOUNTER (EMERGENCY)
Age: 75
Discharge: ANOTHER HOSPITAL | End: 2025-08-29
Attending: EMERGENCY MEDICINE
Payer: MEDICARE

## 2025-08-29 VITALS
HEIGHT: 63 IN | TEMPERATURE: 98 F | DIASTOLIC BLOOD PRESSURE: 50 MMHG | BODY MASS INDEX: 39.51 KG/M2 | RESPIRATION RATE: 19 BRPM | WEIGHT: 223 LBS | HEART RATE: 60 BPM | SYSTOLIC BLOOD PRESSURE: 141 MMHG | OXYGEN SATURATION: 94 %

## 2025-08-29 VITALS
HEART RATE: 62 BPM | OXYGEN SATURATION: 94 % | SYSTOLIC BLOOD PRESSURE: 167 MMHG | DIASTOLIC BLOOD PRESSURE: 74 MMHG | RESPIRATION RATE: 20 BRPM | WEIGHT: 221 LBS | TEMPERATURE: 98.4 F | HEIGHT: 63 IN | BODY MASS INDEX: 39.16 KG/M2

## 2025-08-29 DIAGNOSIS — I16.0 HYPERTENSIVE URGENCY: ICD-10-CM

## 2025-08-29 DIAGNOSIS — R04.0 EPISTAXIS: Primary | ICD-10-CM

## 2025-08-29 LAB
ALBUMIN SERPL-MCNC: 4.1 G/DL (ref 3.5–5.2)
ALBUMIN/GLOB SERPL: 1.3 {RATIO} (ref 1–2.5)
ALP SERPL-CCNC: 73 U/L (ref 35–104)
ALT SERPL-CCNC: 11 U/L (ref 10–35)
ANION GAP SERPL CALCULATED.3IONS-SCNC: 11 MMOL/L (ref 9–16)
ANION GAP SERPL CALCULATED.3IONS-SCNC: 12 MMOL/L (ref 9–16)
AST SERPL-CCNC: 19 U/L (ref 10–35)
BASOPHILS # BLD: 0.05 K/UL (ref 0–0.2)
BASOPHILS NFR BLD: 0 % (ref 0–2)
BILIRUB SERPL-MCNC: 0.4 MG/DL (ref 0–1.2)
BUN SERPL-MCNC: 39 MG/DL (ref 8–23)
BUN SERPL-MCNC: 40 MG/DL (ref 8–23)
CALCIUM SERPL-MCNC: 9.5 MG/DL (ref 8.6–10.4)
CALCIUM SERPL-MCNC: 9.8 MG/DL (ref 8.6–10.4)
CHLORIDE SERPL-SCNC: 102 MMOL/L (ref 98–107)
CHLORIDE SERPL-SCNC: 103 MMOL/L (ref 98–107)
CO2 SERPL-SCNC: 25 MMOL/L (ref 20–31)
CO2 SERPL-SCNC: 26 MMOL/L (ref 20–31)
CREAT SERPL-MCNC: 2.4 MG/DL (ref 0.6–0.9)
CREAT SERPL-MCNC: 2.5 MG/DL (ref 0.7–1.2)
EOSINOPHIL # BLD: 0.21 K/UL (ref 0–0.44)
EOSINOPHILS RELATIVE PERCENT: 2 % (ref 0–4)
ERYTHROCYTE [DISTWIDTH] IN BLOOD BY AUTOMATED COUNT: 14.6 % (ref 11.5–14.9)
GFR, ESTIMATED: 20 ML/MIN/1.73M2
GFR, ESTIMATED: 21 ML/MIN/1.73M2
GLUCOSE SERPL-MCNC: 105 MG/DL (ref 74–99)
GLUCOSE SERPL-MCNC: 132 MG/DL (ref 74–99)
HCT VFR BLD AUTO: 39.9 % (ref 36–46)
HGB BLD-MCNC: 12.4 G/DL (ref 12–16)
IMM GRANULOCYTES # BLD AUTO: 0.04 K/UL (ref 0–0.3)
IMM GRANULOCYTES NFR BLD: 0 %
LYMPHOCYTES NFR BLD: 2.16 K/UL (ref 1.1–3.7)
LYMPHOCYTES RELATIVE PERCENT: 19 % (ref 24–44)
MCH RBC QN AUTO: 29.1 PG (ref 26–34)
MCHC RBC AUTO-ENTMCNC: 31.1 G/DL (ref 31–37)
MCV RBC AUTO: 93.7 FL (ref 80–100)
MONOCYTES NFR BLD: 0.88 K/UL (ref 0.1–1.2)
MONOCYTES NFR BLD: 8 % (ref 3–12)
NEUTROPHILS NFR BLD: 71 % (ref 36–66)
NEUTS SEG NFR BLD: 8.24 K/UL (ref 1.5–8.1)
NRBC BLD-RTO: 0 PER 100 WBC
PLATELET # BLD AUTO: 250 K/UL (ref 150–450)
PMV BLD AUTO: 10.7 FL (ref 8–13.5)
POTASSIUM SERPL-SCNC: 5.3 MMOL/L (ref 3.7–5.3)
POTASSIUM SERPL-SCNC: 5.4 MMOL/L (ref 3.7–5.3)
PROT SERPL-MCNC: 7.3 G/DL (ref 6.6–8.7)
RBC # BLD AUTO: 4.26 M/UL (ref 3.95–5.11)
SODIUM SERPL-SCNC: 139 MMOL/L (ref 136–145)
SODIUM SERPL-SCNC: 140 MMOL/L (ref 136–145)
TROPONIN I SERPL HS-MCNC: 21 NG/L (ref 0–14)
TROPONIN I SERPL HS-MCNC: 23 NG/L (ref 0–14)
WBC OTHER # BLD: 11.6 K/UL (ref 3.5–11)

## 2025-08-29 PROCEDURE — 99284 EMERGENCY DEPT VISIT MOD MDM: CPT | Performed by: OTOLARYNGOLOGY

## 2025-08-29 PROCEDURE — 30905 CONTROL OF NOSEBLEED: CPT

## 2025-08-29 PROCEDURE — 96365 THER/PROPH/DIAG IV INF INIT: CPT

## 2025-08-29 PROCEDURE — 2500000003 HC RX 250 WO HCPCS: Performed by: PHYSICIAN ASSISTANT

## 2025-08-29 PROCEDURE — 80053 COMPREHEN METABOLIC PANEL: CPT

## 2025-08-29 PROCEDURE — 6360000002 HC RX W HCPCS

## 2025-08-29 PROCEDURE — 85025 COMPLETE CBC W/AUTO DIFF WBC: CPT

## 2025-08-29 PROCEDURE — 2580000003 HC RX 258

## 2025-08-29 PROCEDURE — 80048 BASIC METABOLIC PNL TOTAL CA: CPT

## 2025-08-29 PROCEDURE — 99284 EMERGENCY DEPT VISIT MOD MDM: CPT

## 2025-08-29 PROCEDURE — 99285 EMERGENCY DEPT VISIT HI MDM: CPT

## 2025-08-29 PROCEDURE — 6370000000 HC RX 637 (ALT 250 FOR IP): Performed by: EMERGENCY MEDICINE

## 2025-08-29 PROCEDURE — 84484 ASSAY OF TROPONIN QUANT: CPT

## 2025-08-29 PROCEDURE — 6370000000 HC RX 637 (ALT 250 FOR IP): Performed by: PHYSICIAN ASSISTANT

## 2025-08-29 PROCEDURE — 36415 COLL VENOUS BLD VENIPUNCTURE: CPT

## 2025-08-29 RX ORDER — ISOSORBIDE MONONITRATE 30 MG/1
30 TABLET, EXTENDED RELEASE ORAL ONCE
Status: COMPLETED | OUTPATIENT
Start: 2025-08-29 | End: 2025-08-29

## 2025-08-29 RX ORDER — HYDROCODONE BITARTRATE AND ACETAMINOPHEN 5; 325 MG/1; MG/1
1 TABLET ORAL ONCE
Status: COMPLETED | OUTPATIENT
Start: 2025-08-29 | End: 2025-08-29

## 2025-08-29 RX ADMIN — HYDROCODONE BITARTRATE AND ACETAMINOPHEN 1 TABLET: 5; 325 TABLET ORAL at 16:23

## 2025-08-29 RX ADMIN — ISOSORBIDE MONONITRATE 30 MG: 30 TABLET, EXTENDED RELEASE ORAL at 14:11

## 2025-08-29 RX ADMIN — PHENYLEPHRINE HYDROCHLORIDE 1 SPRAY: 0.5 SPRAY NASAL at 13:29

## 2025-08-29 RX ADMIN — SODIUM CHLORIDE 5 MG/HR: 0.9 INJECTION, SOLUTION INTRAVENOUS at 18:22

## 2025-08-29 ASSESSMENT — LIFESTYLE VARIABLES
HOW MANY STANDARD DRINKS CONTAINING ALCOHOL DO YOU HAVE ON A TYPICAL DAY: PATIENT DOES NOT DRINK
HOW OFTEN DO YOU HAVE A DRINK CONTAINING ALCOHOL: NEVER
HOW MANY STANDARD DRINKS CONTAINING ALCOHOL DO YOU HAVE ON A TYPICAL DAY: PATIENT DOES NOT DRINK
HOW OFTEN DO YOU HAVE A DRINK CONTAINING ALCOHOL: NEVER

## 2025-08-29 ASSESSMENT — PAIN - FUNCTIONAL ASSESSMENT
PAIN_FUNCTIONAL_ASSESSMENT: 0-10
PAIN_FUNCTIONAL_ASSESSMENT: 0-10

## 2025-08-29 ASSESSMENT — PAIN SCALES - GENERAL
PAINLEVEL_OUTOF10: 8
PAINLEVEL_OUTOF10: 2
PAINLEVEL_OUTOF10: 0

## 2025-08-29 ASSESSMENT — PAIN DESCRIPTION - LOCATION: LOCATION: NOSE

## 2025-08-29 ASSESSMENT — ENCOUNTER SYMPTOMS: SHORTNESS OF BREATH: 0

## 2025-08-29 ASSESSMENT — PAIN DESCRIPTION - DESCRIPTORS: DESCRIPTORS: BURNING

## 2025-09-02 ENCOUNTER — TELEPHONE (OUTPATIENT)
Dept: FAMILY MEDICINE CLINIC | Age: 75
End: 2025-09-02

## (undated) DEVICE — CYSTO/BLADDER IRRIGATION SET, REGULATING CLAMP

## (undated) DEVICE — INTENDED FOR TISSUE SEPARATION, AND OTHER PROCEDURES THAT REQUIRE A SHARP SURGICAL BLADE TO PUNCTURE OR CUT.: Brand: BARD-PARKER ® CARBON RIB-BACK BLADES

## (undated) DEVICE — TUBING AMB

## (undated) DEVICE — Z DISCONTINUED USE 2272124 DRAPE SURG XL N INVASIVE 2 LAYR DISP

## (undated) DEVICE — DRESSING TRNSPAR W2XL2.75IN FLM SHT SEMIPERMEABLE WIND

## (undated) DEVICE — DRAPE,U/ SHT,SPLIT,PLAS,STERIL: Brand: MEDLINE

## (undated) DEVICE — C-ARM: Brand: UNBRANDED

## (undated) DEVICE — APPLICATOR MEDICATED 26 CC SOLUTION HI LT ORNG CHLORAPREP

## (undated) DEVICE — DEFENDO AIR WATER SUCTION AND BIOPSY VALVE KIT FOR  OLYMPUS: Brand: DEFENDO AIR/WATER/SUCTION AND BIOPSY VALVE

## (undated) DEVICE — SUTURE VCRL SZ 0 L18IN ABSRB UD L36MM CT-1 1/2 CIR J840D

## (undated) DEVICE — GLOVE ORANGE PI 8   MSG9080

## (undated) DEVICE — ST CHARLES CYSTO PACK: Brand: MEDLINE INDUSTRIES, INC.

## (undated) DEVICE — TUBING SUCT 12FR MAL ALUM SHFT FN CAP VENT UNIV CONN W/ OBT

## (undated) DEVICE — Device: Brand: AIR-CHARGED SINGLE SENSOR COUDÉ CATHETER

## (undated) DEVICE — GLOVE SURG SZ 7 CRM LTX FREE POLYISOPRENE POLYMER BEAD ANTI

## (undated) DEVICE — BNDG,ELSTC,MATRIX,STRL,4"X5YD,LF,HOOK&LP: Brand: MEDLINE

## (undated) DEVICE — ENDO KIT W/SYRINGE: Brand: MEDLINE INDUSTRIES, INC.

## (undated) DEVICE — DRAPE,REIN 53X77,STERILE: Brand: MEDLINE

## (undated) DEVICE — SOLUTION SCRB 4OZ 4% CHG H2O AIDED FOR PREOPERATIVE SKIN

## (undated) DEVICE — BNDG,ELSTC,MATRIX,STRL,3"X5YD,LF,HOOK&LP: Brand: MEDLINE

## (undated) DEVICE — GLOVE ORANGE PI 7   MSG9070

## (undated) DEVICE — GLOVE ORANGE PI 7 1/2   MSG9075

## (undated) DEVICE — GLOVE ORANGE PI 8 1/2   MSG9085

## (undated) DEVICE — DRESSING TRNSPAR W5XL4.5IN FLM SHT SEMIPERMEABLE WIND

## (undated) DEVICE — STOCKINETTE,IMPERVIOUS,12X48,STERILE: Brand: MEDLINE

## (undated) DEVICE — BANDAGE COMPR W6INXL12FT SMOOTH FOR LIMB EXSANG ESMARCH

## (undated) DEVICE — SUTURE VCRL SZ 2-0 L18IN ABSRB UD CT-1 L36MM 1/2 CIR J839D

## (undated) DEVICE — PADDING,UNDERCAST,COTTON, 4"X4YD STERILE: Brand: MEDLINE

## (undated) DEVICE — PACK PROCEDURE SURG CYSTO SVMMC LF

## (undated) DEVICE — Device: Brand: AIR-CHARGED SINGLE SENSOR CATHETER

## (undated) DEVICE — GOWN,AURORA,NONREINFORCED,LARGE: Brand: MEDLINE

## (undated) DEVICE — GLOVE ORTHO 8   MSG9480

## (undated) DEVICE — GLOVE SURG SZ 65 THK91MIL LTX FREE SYN POLYISOPRENE

## (undated) DEVICE — GOWN,SIRUS,NONRNF,SETINSLV,XL,20/CS: Brand: MEDLINE

## (undated) DEVICE — Z INACTIVE USE 2660664 SOLUTION IRRIG 3000ML 0.9% SOD CHL USP UROMATIC PLAS CONT

## (undated) DEVICE — GOWN,SIRUS,NONRNF,SETINSLV,2XL,18/CS: Brand: MEDLINE

## (undated) DEVICE — CATHETER URET 5FR L70CM OPN END SGL LUMN INJ HUB FLEXIMA

## (undated) DEVICE — SVMMC ORTH SPL DRP PK

## (undated) DEVICE — SOL IRR SOD CHL 0.9% TITAN XL CNTNR 3000ML

## (undated) DEVICE — GARMENT,MEDLINE,DVT,INT,CALF,MED, GEN2: Brand: MEDLINE

## (undated) DEVICE — GAUZE,SPONGE,FLUFF,6"X6.75",STRL,5/TRAY: Brand: MEDLINE

## (undated) DEVICE — PADDING CAST W2INXL4YD COT LO LINTING WYTEX

## (undated) DEVICE — Device: Brand: PUMP TUBING INFUSION LINE

## (undated) DEVICE — ADHESIVE SKIN CLOSURE TOP 36 CC HI VISC DERMBND MINI

## (undated) DEVICE — BIT DRL L100MM DIA2MM QUIK CPL W/O STP REUSE

## (undated) DEVICE — BLADE CLIPPER GEN PURP NS

## (undated) DEVICE — SOLUTION IV 1000 ML 0.9 NACL INJ USP EXCEL PLAS CONTAINER

## (undated) DEVICE — SOLUTION IV IRRIG WATER 1000ML POUR BRL 2F7114

## (undated) DEVICE — CUFF REPROC TRNQT DPSB W/PLC RED 18IN

## (undated) DEVICE — SUTURE MCRYL SZ 3-0 L27IN ABSRB UD L24MM PS-1 3/8 CIR PRIM Y936H

## (undated) DEVICE — STAZ CYSTO: Brand: MEDLINE INDUSTRIES, INC.

## (undated) DEVICE — GUIDEWIRE URO L150CM DIA0.035IN STIFF NIT HYDRPHLC STR TIP

## (undated) DEVICE — NEOTRODE II NEONATAL/PEDIATRIC ECG ELECTRODE: Brand: NEOTRODE

## (undated) DEVICE — COVER,MAYO STAND,STERILE: Brand: MEDLINE

## (undated) DEVICE — CONE TIP URETERAL CATHETER WITH OPEN-END: Brand: CONE TIP